# Patient Record
Sex: FEMALE | Race: BLACK OR AFRICAN AMERICAN | HISPANIC OR LATINO | Employment: FULL TIME | URBAN - METROPOLITAN AREA
[De-identification: names, ages, dates, MRNs, and addresses within clinical notes are randomized per-mention and may not be internally consistent; named-entity substitution may affect disease eponyms.]

---

## 2018-10-07 ENCOUNTER — HOSPITAL ENCOUNTER (EMERGENCY)
Facility: HOSPITAL | Age: 38
Discharge: HOME | End: 2018-10-07
Attending: EMERGENCY MEDICINE | Admitting: EMERGENCY MEDICINE

## 2018-10-07 VITALS
TEMPERATURE: 97.7 F | HEART RATE: 73 BPM | SYSTOLIC BLOOD PRESSURE: 124 MMHG | DIASTOLIC BLOOD PRESSURE: 85 MMHG | OXYGEN SATURATION: 100 % | RESPIRATION RATE: 17 BRPM | WEIGHT: 191 LBS

## 2018-10-07 DIAGNOSIS — N39.0 UTI (URINARY TRACT INFECTION): Primary | ICD-10-CM

## 2018-10-07 LAB
BACTERIA UR QL AUTO: ABNORMAL /HPF
BILIRUB UR QL STRIP: NEGATIVE
CLARITY UR: ABNORMAL
COLOR UR: YELLOW
EXT PREG TEST URINE: NEGATIVE
GLUCOSE UR STRIP-MCNC: ABNORMAL MG/DL
HGB UR QL STRIP.AUTO: 150
KETONES UR STRIP-MCNC: NEGATIVE MG/DL
LEUKOCYTE ESTERASE UR QL STRIP: 100
NITRITE UR QL STRIP: NEGATIVE
NON-SQ EPI CELLS URNS QL MICRO: ABNORMAL /HPF
PH UR STRIP.AUTO: 6 [PH] (ref 4.5–8)
PROT UR STRIP-MCNC: ABNORMAL MG/DL
RBC #/AREA URNS AUTO: ABNORMAL /HPF
SP GR UR STRIP.AUTO: 1.01 (ref 1–1.04)
UROBILINOGEN UA: NEGATIVE MG/DL
WBC #/AREA URNS AUTO: ABNORMAL /HPF

## 2018-10-07 PROCEDURE — 99283 EMERGENCY DEPT VISIT LOW MDM: CPT

## 2018-10-07 PROCEDURE — 81001 URINALYSIS AUTO W/SCOPE: CPT | Performed by: PHYSICIAN ASSISTANT

## 2018-10-07 PROCEDURE — 81003 URINALYSIS AUTO W/O SCOPE: CPT | Performed by: PHYSICIAN ASSISTANT

## 2018-10-07 PROCEDURE — 81025 URINE PREGNANCY TEST: CPT | Performed by: PHYSICIAN ASSISTANT

## 2018-10-07 PROCEDURE — 87086 URINE CULTURE/COLONY COUNT: CPT | Performed by: PHYSICIAN ASSISTANT

## 2018-10-07 RX ORDER — SULFAMETHOXAZOLE AND TRIMETHOPRIM 800; 160 MG/1; MG/1
TABLET ORAL
Status: COMPLETED
Start: 2018-10-07 | End: 2018-10-07

## 2018-10-07 RX ORDER — SULFAMETHOXAZOLE AND TRIMETHOPRIM 800; 160 MG/1; MG/1
1 TABLET ORAL ONCE
Status: COMPLETED | OUTPATIENT
Start: 2018-10-07 | End: 2018-10-07

## 2018-10-07 RX ORDER — SULFAMETHOXAZOLE AND TRIMETHOPRIM 800; 160 MG/1; MG/1
1 TABLET ORAL 2 TIMES DAILY
Qty: 14 TABLET | Refills: 0 | Status: SHIPPED | OUTPATIENT
Start: 2018-10-07 | End: 2018-10-14

## 2018-10-07 RX ADMIN — SULFAMETHOXAZOLE AND TRIMETHOPRIM 1 TABLET: 800; 160 TABLET ORAL at 15:46

## 2018-10-07 NOTE — ED PROVIDER NOTES
History  Chief Complaint   Patient presents with    Possible UTI     "UTI bad for three days now"   frequency and pressure   denies vaginal discharge/bleeding       History provided by:  Patient   used: No    Medical Problem   Location:  Pt with burning and pressure with  urine  Severity:  Moderate  Onset quality:  Gradual  Duration:  3 days  Timing:  Constant  Chronicity:  Recurrent  Associated symptoms: no abdominal pain, no chest pain, no congestion, no cough, no diarrhea, no ear pain, no fatigue, no fever, no headaches, no loss of consciousness, no myalgias, no nausea, no rash, no rhinorrhea, no shortness of breath, no sore throat, no vomiting and no wheezing        Prior to Admission Medications   Prescriptions Last Dose Informant Patient Reported? Taking? Prenatal MV-Min-Fe Fum-FA-DHA (PRENATAL 1 PO)   Yes No   Sig: Take 1 tablet by mouth  insulin glargine (LANTUS) 100 units/mL subcutaneous injection   Yes No   Sig: Inject 30 Units under the skin 2 (two) times a day  30 units in morning; 45 HS      Facility-Administered Medications: None       Past Medical History:   Diagnosis Date    Diabetes mellitus ([facility] )        Past Surgical History:   Procedure Laterality Date    HERNIA REPAIR         History reviewed  No pertinent family history  I have reviewed and agree with the history as documented  Social History   Substance Use Topics    Smoking status: Never Smoker    Smokeless tobacco: Never Used    Alcohol use No        Review of Systems   Constitutional: Negative  Negative for fatigue and fever  HENT: Negative  Negative for congestion, ear pain, rhinorrhea and sore throat  Eyes: Negative  Respiratory: Negative  Negative for cough, shortness of breath and wheezing  Cardiovascular: Negative  Negative for chest pain  Gastrointestinal: Negative  Negative for abdominal pain, diarrhea, nausea and vomiting  Endocrine: Negative      Genitourinary: Positive for dysuria  Musculoskeletal: Negative  Negative for myalgias  Skin: Negative  Negative for rash  Allergic/Immunologic: Negative  Neurological: Negative  Negative for loss of consciousness and headaches  Hematological: Negative  Psychiatric/Behavioral: Negative  All other systems reviewed and are negative  Physical Exam  Physical Exam   Constitutional: She is oriented to person, place, and time  She appears well-developed and well-nourished  HENT:   Head: Normocephalic  Right Ear: External ear normal    Left Ear: External ear normal    Nose: Nose normal    Mouth/Throat: Oropharynx is clear and moist    Eyes: Pupils are equal, round, and reactive to light  Conjunctivae and EOM are normal    Neck: Normal range of motion  Neck supple  Cardiovascular: Normal rate, regular rhythm and normal heart sounds  Pulmonary/Chest: Effort normal    Abdominal: Soft  Bowel sounds are normal    Musculoskeletal: Normal range of motion  Neurological: She is alert and oriented to person, place, and time  Skin: Skin is warm  Psychiatric: She has a normal mood and affect  Her behavior is normal  Judgment and thought content normal    Nursing note and vitals reviewed        Vital Signs  ED Triage Vitals [10/07/18 1507]   Temperature Pulse Respirations Blood Pressure SpO2   97 7 °F (36 5 °C) 73 17 124/85 100 %      Temp Source Heart Rate Source Patient Position - Orthostatic VS BP Location FiO2 (%)   Tympanic Monitor Sitting Left arm --      Pain Score       --           Vitals:    10/07/18 1507   BP: 124/85   Pulse: 73   Patient Position - Orthostatic VS: Sitting       Visual Acuity      ED Medications  Medications   sulfamethoxazole-trimethoprim (BACTRIM DS) 800-160 mg per tablet 1 tablet (1 tablet Oral Given 10/7/18 1546)       Diagnostic Studies  Results Reviewed     Procedure Component Value Units Date/Time    Urine Microscopic [[id number]]  (Abnormal) Collected:  10/07/18 1531    Lab Status:  Final result Specimen:  Urine from Urine, Clean Catch Updated:  10/07/18 1601     RBC, UA 4-10 (A) /hpf      WBC, UA Innumerable (A) /hpf      Epithelial Cells Occasional /hpf      Bacteria, UA Occasional /hpf     Urine culture [[id number]] Collected:  10/07/18 1531    Lab Status: In process Specimen:  Urine from Urine, Clean Catch Updated:  10/07/18 1601    UA w Reflex to Microscopic w Reflex to Culture [[id number]]  (Abnormal) Collected:  10/07/18 1531    Lab Status:  Final result Specimen:  Urine from Urine, Clean Catch Updated:  10/07/18 1545     Color, UA Yellow     Clarity, UA Slightly Cloudy (A)     Specific Gravity, UA 1 015     pH, UA 6 0     Leukocytes,  0 (A)     Nitrite, UA Negative     Protein, UA 30 (1+) (A) mg/dl      Glucose, UA >=1000 (1%) (A) mg/dl      Ketones, UA Negative mg/dl      Bilirubin, UA Negative     Blood,  0 (A)     UROBILINOGEN UA Negative mg/dL     POCT pregnancy, urine [[id number]]  (Normal) Resulted:  10/07/18 1535    Lab Status:  Final result Updated:  10/07/18 1535     EXT PREG TEST UR (Ref: Negative) Negative                 No orders to display              Procedures  Procedures       Phone Contacts  ED Phone Contact    ED Course                               MDM  CritCare Time    Disposition  Final diagnoses:   UTI (urinary tract infection)     Time reflects when diagnosis was documented in both MDM as applicable and the Disposition within this note     Time User Action Codes Description Comment    10/7/2018  3:35 PM Christie Vences Add [N39 0] UTI (urinary tract infection)       ED Disposition     ED Disposition Condition Comment    Discharge  Isa Kennedy discharge to home/self care      Condition at discharge: Good        Follow-up Information     Follow up With Specialties Details Why [address], [address] Medicine Schedule an appointment as soon as possible for a visit  [address], [address] [facility]  [phone]          Discharge Medication List as of 10/7/2018  3:43 PM      START taking these medications    Details   sulfamethoxazole-trimethoprim (BACTRIM DS) 800-160 mg per tablet Take 1 tablet by mouth 2 (two) times a day for 7 days smx-tmp DS (BACTRIM) 800-160 mg tabs (1tab q12 D10), Starting Sun 10/7/2018, Until Sun 10/14/2018, Print         CONTINUE these medications which have NOT CHANGED    Details   insulin glargine (LANTUS) 100 units/mL subcutaneous injection Inject 30 Units under the skin 2 (two) times a day  30 units in morning; 45 HS, Until Discontinued, Historical Med      Prenatal MV-Min-Fe Fum-FA-DHA (PRENATAL 1 PO) Take 1 tablet by mouth , Until Discontinued, Historical Med           No discharge procedures on file      ED Provider  Electronically Signed by           Anne Ramsay PA-C  10/07/18 7483

## 2018-10-07 NOTE — DISCHARGE INSTRUCTIONS
Urinary Tract Infection in Women, Ambulatory Care   GENERAL INFORMATION:   A urinary tract infection (UTI)  is caused by bacteria that get inside your urinary tract  Most bacteria that enter your urinary tract are expelled when you urinate  If the bacteria stay in your urinary tract, you may get an infection  Your urinary tract includes your kidneys, ureters, bladder, and urethra  Urine is made in your kidneys, and it flows from the ureters to the bladder  Urine leaves the bladder through the urethra  A UTI is more common in your lower urinary tract, which includes your bladder and urethra  Common symptoms include the following:   · Urinating more often or waking from sleep to urinate    · Pain or burning when you urinate    · Pain or pressure in your lower abdomen     · Urine that smells bad    · Blood in your urine    · Leaking urine  Seek immediate care for the following symptoms:   · Urinating very little or not at all    · Vomiting    · Shaking chills with a fever    · Side or back pain that gets worse  Treatment for a UTI  may include medicines to treat a bacterial infection  You may also need medicines to decrease pain and burning, or decrease the urge to urinate often  Prevent a UTI:   · Urinate when you feel the urge  Do not hold your urine  Urinate as soon as you feel you have to  · Drink liquids as directed  Ask how much liquid to drink each day and which liquids are best for you  You may need to drink more fluids than usual to help flush out the bacteria  Do not drink alcohol, caffeine, and citrus juices  These can irritate your bladder and increase your symptoms  · Apply heat  on your abdomen for 20 to 30 minutes every 2 hours for as many days as directed  Heat helps decrease discomfort and pressure in your bladder  Follow up with your healthcare provider as directed:  Write down your questions so you remember to ask them during your visits     CARE AGREEMENT:   You have the right to help plan your care  Learn about your health condition and how it may be treated  Discuss treatment options with your caregivers to decide what care you want to receive  You always have the right to refuse treatment  The above information is an  only  It is not intended as medical advice for individual conditions or treatments  Talk to your doctor, nurse or pharmacist before following any medical regimen to see if it is safe and effective for you  © 2014 [address] is for End User's use only and may not be sold, redistributed or otherwise used for commercial purposes  All illustrations and images included in CareNotes® are the copyrighted property of [employer]  or Bryon Wylie

## 2018-10-08 LAB — BACTERIA UR CULT: NORMAL

## 2019-05-02 ENCOUNTER — OFFICE VISIT (OUTPATIENT)
Dept: OBGYN CLINIC | Facility: CLINIC | Age: 39
End: 2019-05-02

## 2019-05-02 VITALS
BODY MASS INDEX: 31.69 KG/M2 | HEIGHT: 65 IN | SYSTOLIC BLOOD PRESSURE: 115 MMHG | WEIGHT: 190.2 LBS | HEART RATE: 85 BPM | DIASTOLIC BLOOD PRESSURE: 77 MMHG

## 2019-05-02 DIAGNOSIS — Z01.419 ENCOUNTER FOR ANNUAL ROUTINE GYNECOLOGICAL EXAMINATION: Primary | ICD-10-CM

## 2019-05-02 PROCEDURE — 99395 PREV VISIT EST AGE 18-39: CPT | Performed by: NURSE PRACTITIONER

## 2019-09-17 ENCOUNTER — OFFICE VISIT (OUTPATIENT)
Dept: FAMILY MEDICINE CLINIC | Facility: CLINIC | Age: 39
End: 2019-09-17
Payer: COMMERCIAL

## 2019-09-17 VITALS
BODY MASS INDEX: 31.16 KG/M2 | HEART RATE: 83 BPM | DIASTOLIC BLOOD PRESSURE: 72 MMHG | HEIGHT: 65 IN | TEMPERATURE: 98.2 F | WEIGHT: 187 LBS | OXYGEN SATURATION: 98 % | SYSTOLIC BLOOD PRESSURE: 124 MMHG

## 2019-09-17 DIAGNOSIS — E11.69 TYPE 2 DIABETES MELLITUS WITH OTHER SPECIFIED COMPLICATION, WITH LONG-TERM CURRENT USE OF INSULIN (HCC): Primary | ICD-10-CM

## 2019-09-17 DIAGNOSIS — Z01.00 DIABETIC EYE EXAM (HCC): ICD-10-CM

## 2019-09-17 DIAGNOSIS — Z79.4 TYPE 2 DIABETES MELLITUS WITH OTHER SPECIFIED COMPLICATION, WITH LONG-TERM CURRENT USE OF INSULIN (HCC): Primary | ICD-10-CM

## 2019-09-17 DIAGNOSIS — E66.9 OBESITY (BMI 30-39.9): ICD-10-CM

## 2019-09-17 DIAGNOSIS — E11.65 UNCONTROLLED TYPE 2 DIABETES MELLITUS WITH HYPERGLYCEMIA, WITHOUT LONG-TERM CURRENT USE OF INSULIN (HCC): ICD-10-CM

## 2019-09-17 DIAGNOSIS — R79.89 ABNORMAL THYROID BLOOD TEST: ICD-10-CM

## 2019-09-17 DIAGNOSIS — E11.9 DIABETIC EYE EXAM (HCC): ICD-10-CM

## 2019-09-17 PROBLEM — F43.9 STRESS AT HOME: Status: ACTIVE | Noted: 2017-09-13

## 2019-09-17 LAB — SL AMB POCT HEMOGLOBIN AIC: 13 (ref ?–6.5)

## 2019-09-17 PROCEDURE — 99204 OFFICE O/P NEW MOD 45 MIN: CPT | Performed by: FAMILY MEDICINE

## 2019-09-17 PROCEDURE — 3008F BODY MASS INDEX DOCD: CPT | Performed by: FAMILY MEDICINE

## 2019-09-17 PROCEDURE — 83036 HEMOGLOBIN GLYCOSYLATED A1C: CPT | Performed by: FAMILY MEDICINE

## 2019-09-17 PROCEDURE — 3046F HEMOGLOBIN A1C LEVEL >9.0%: CPT | Performed by: FAMILY MEDICINE

## 2019-09-18 PROBLEM — E66.9 OBESITY (BMI 30-39.9): Status: ACTIVE | Noted: 2019-09-18

## 2019-09-18 PROBLEM — E11.9 DIABETES MELLITUS (HCC): Status: ACTIVE | Noted: 2019-09-18

## 2019-09-18 NOTE — PROGRESS NOTES
Assessment/Plan:    28-year-old woman with:  Type 2 diabetes, obesity and history of abnormal thyroid disease  Will restart her insulin and check fasting blood work and have patient return in 1 week to discuss test results  Encouraged her to check with her insurance to see which brand flu, they will cover  Discussed supportive care return parameters otherwise  No problem-specific Assessment & Plan notes found for this encounter  Diagnoses and all orders for this visit:    Type 2 diabetes mellitus with other specified complication, with long-term current use of insulin ([facility] )  -     POCT hemoglobin A1c  -     Ambulatory referral to Ophthalmology; Future  -     insulin glargine (LANTUS SOLOSTAR) 100 units/mL injection pen; Inject 30 Units under the skin daily at bedtime  -     insulin lispro (HUMALOG KWIKPEN) 100 units/mL injection pen; Inject 5 Units under the skin 3 (three) times a day with meals  -     CBC and differential; Future  -     Comprehensive metabolic panel; Future  -     TSH, 3rd generation with Free T4 reflex; Future  -     Lipid Panel with Direct LDL reflex; Future  -     Urinalysis with reflex to microscopic  -     Microalbumin / creatinine urine ratio    Diabetic eye exam [facility])  -     Ambulatory referral to Ophthalmology; Future  -     insulin glargine (LANTUS SOLOSTAR) 100 units/mL injection pen; Inject 30 Units under the skin daily at bedtime  -     insulin lispro (HUMALOG KWIKPEN) 100 units/mL injection pen; Inject 5 Units under the skin 3 (three) times a day with meals  -     CBC and differential; Future  -     Comprehensive metabolic panel; Future  -     TSH, 3rd generation with Free T4 reflex; Future  -     Lipid Panel with Direct LDL reflex;  Future  -     Urinalysis with reflex to microscopic  -     Microalbumin / creatinine urine ratio    Uncontrolled type 2 diabetes mellitus with hyperglycemia, without long-term current use of insulin (HCC)  -     insulin glargine (LANTUS SOLOSTAR) 100 units/mL injection pen; Inject 30 Units under the skin daily at bedtime  -     insulin lispro (HUMALOG KWIKPEN) 100 units/mL injection pen; Inject 5 Units under the skin 3 (three) times a day with meals  -     CBC and differential; Future  -     Comprehensive metabolic panel; Future  -     TSH, 3rd generation with Free T4 reflex; Future  -     Lipid Panel with Direct LDL reflex; Future  -     Urinalysis with reflex to microscopic  -     Microalbumin / creatinine urine ratio    Abnormal thyroid blood test    Obesity (BMI 30-39  9)          Subjective:      Patient ID: Robyn Martinez is a 46 y o  female  Patient is a 78-year-old woman who presents to establish care in this practice  She has type 2 diabetes that has been uncontrolled and she has been off insulin over the past year since she has not had insurance  She has history of abnormal thyroid tests and also obesity  She admits otherwise being generally stable on her medications  No dizziness lightheadedness or near-syncope  No fevers chills nausea vomiting  No other complaints at this time      The following portions of the patient's history were reviewed and updated as appropriate: allergies, current medications, past family history, past medical history, past social history, past surgical history and problem list     Review of Systems   Constitutional: Negative  HENT: Negative  Eyes: Negative  Respiratory: Negative  Cardiovascular: Negative  Gastrointestinal: Negative  Endocrine: Negative  Genitourinary: Negative  Musculoskeletal: Negative  Allergic/Immunologic: Negative  Neurological: Negative  Hematological: Negative  Psychiatric/Behavioral: Negative  All other systems reviewed and are negative          Objective:      /72   Pulse 83   Temp 98 2 °F (36 8 °C)   Ht 5' 5" (1 651 m)   Wt 84 8 kg (187 lb)   SpO2 98%   BMI 31 12 kg/m²          Physical Exam   Constitutional: She is oriented to person, place, and time  She appears well-developed and well-nourished  HENT:   Head: Atraumatic  Right Ear: External ear normal    Left Ear: External ear normal    Eyes: Pupils are equal, round, and reactive to light  Conjunctivae and EOM are normal    Neck: Normal range of motion  Cardiovascular: Normal rate, regular rhythm and normal heart sounds  Pulses are no weak pulses  Pulses:       Dorsalis pedis pulses are 2+ on the right side, and 2+ on the left side  Posterior tibial pulses are 2+ on the right side, and 2+ on the left side  Pulmonary/Chest: Effort normal and breath sounds normal  No respiratory distress  Abdominal: Soft  She exhibits no distension  There is no tenderness  There is no rebound and no guarding  Musculoskeletal: Normal range of motion  Feet:   Right Foot:   Skin Integrity: Negative for ulcer, skin breakdown, erythema, warmth, callus or dry skin  Left Foot:   Skin Integrity: Negative for ulcer, skin breakdown, erythema, warmth, callus or dry skin  Neurological: She is alert and oriented to person, place, and time  No cranial nerve deficit  Skin: Skin is warm and dry  Psychiatric: She has a normal mood and affect  Her behavior is normal  Judgment and thought content normal          BMI Counseling: Body mass index is 31 12 kg/m²  Discussed the patient's BMI with her  The BMI is above normal  Nutrition recommendations include 3-5 servings of fruits/vegetables daily  Exercise recommendations include moderate aerobic physical activity for 150 minutes/week  Patient's shoes and socks removed  Right Foot/Ankle   Right Foot Inspection  Skin Exam: skin normal and skin intact no dry skin, no warmth, no callus, no erythema, no maceration, no abnormal color, no pre-ulcer, no ulcer and no callus                          Toe Exam: ROM and strength within normal limits  Sensory       Monofilament testing: intact  Vascular    The right DP pulse is 2+   The right PT pulse is 2+      Left Foot/Ankle  Left Foot Inspection  Skin Exam: skin normal and skin intactno dry skin, no warmth, no erythema, no maceration, normal color, no pre-ulcer, no ulcer and no callus                         Toe Exam: ROM and strength within normal limits                   Sensory       Monofilament: intact  Vascular    The left DP pulse is 2+  The left PT pulse is 2+  Assign Risk Category:  No deformity present; No loss of protective sensation;  No weak pulses       Risk: 0

## 2019-09-23 ENCOUNTER — APPOINTMENT (OUTPATIENT)
Dept: LAB | Facility: HOSPITAL | Age: 39
End: 2019-09-23
Payer: COMMERCIAL

## 2019-09-23 DIAGNOSIS — Z01.00 DIABETIC EYE EXAM (HCC): ICD-10-CM

## 2019-09-23 DIAGNOSIS — E11.65 UNCONTROLLED TYPE 2 DIABETES MELLITUS WITH HYPERGLYCEMIA, WITHOUT LONG-TERM CURRENT USE OF INSULIN (HCC): ICD-10-CM

## 2019-09-23 DIAGNOSIS — E11.69 TYPE 2 DIABETES MELLITUS WITH OTHER SPECIFIED COMPLICATION, WITH LONG-TERM CURRENT USE OF INSULIN (HCC): ICD-10-CM

## 2019-09-23 DIAGNOSIS — E11.9 DIABETIC EYE EXAM (HCC): ICD-10-CM

## 2019-09-23 DIAGNOSIS — Z79.4 TYPE 2 DIABETES MELLITUS WITH OTHER SPECIFIED COMPLICATION, WITH LONG-TERM CURRENT USE OF INSULIN (HCC): ICD-10-CM

## 2019-09-23 LAB
ALBUMIN SERPL BCP-MCNC: 3.8 G/DL (ref 3–5.2)
ALP SERPL-CCNC: 72 U/L (ref 43–122)
ALT SERPL W P-5'-P-CCNC: 17 U/L (ref 9–52)
ANION GAP SERPL CALCULATED.3IONS-SCNC: 7 MMOL/L (ref 5–14)
AST SERPL W P-5'-P-CCNC: 17 U/L (ref 14–36)
BACTERIA UR QL AUTO: ABNORMAL /HPF
BASOPHILS # BLD AUTO: 0 THOUSANDS/ΜL (ref 0–0.1)
BASOPHILS NFR BLD AUTO: 1 % (ref 0–1)
BILIRUB SERPL-MCNC: 0.8 MG/DL
BILIRUB UR QL STRIP: NEGATIVE
BUN SERPL-MCNC: 10 MG/DL (ref 5–25)
CALCIUM SERPL-MCNC: 9.2 MG/DL (ref 8.4–10.2)
CHLORIDE SERPL-SCNC: 101 MMOL/L (ref 97–108)
CHOLEST SERPL-MCNC: 142 MG/DL
CLARITY UR: CLEAR
CO2 SERPL-SCNC: 28 MMOL/L (ref 22–30)
COLOR UR: YELLOW
CREAT SERPL-MCNC: 0.62 MG/DL (ref 0.6–1.2)
CREAT UR-MCNC: 127 MG/DL
EOSINOPHIL # BLD AUTO: 0.1 THOUSAND/ΜL (ref 0–0.4)
EOSINOPHIL NFR BLD AUTO: 2 % (ref 0–6)
ERYTHROCYTE [DISTWIDTH] IN BLOOD BY AUTOMATED COUNT: 13.2 %
GFR SERPL CREATININE-BSD FRML MDRD: 115 ML/MIN/1.73SQ M
GLUCOSE P FAST SERPL-MCNC: 273 MG/DL (ref 70–99)
GLUCOSE UR STRIP-MCNC: ABNORMAL MG/DL
HCT VFR BLD AUTO: 40.2 % (ref 36–46)
HDLC SERPL-MCNC: 55 MG/DL (ref 40–59)
HGB BLD-MCNC: 13.4 G/DL (ref 12–16)
HGB UR QL STRIP.AUTO: NEGATIVE
KETONES UR STRIP-MCNC: ABNORMAL MG/DL
LDLC SERPL CALC-MCNC: 69 MG/DL
LEUKOCYTE ESTERASE UR QL STRIP: NEGATIVE
LYMPHOCYTES # BLD AUTO: 3.8 THOUSANDS/ΜL (ref 0.5–4)
LYMPHOCYTES NFR BLD AUTO: 47 % (ref 25–45)
MCH RBC QN AUTO: 29.4 PG (ref 26–34)
MCHC RBC AUTO-ENTMCNC: 33.3 G/DL (ref 31–36)
MCV RBC AUTO: 89 FL (ref 80–100)
MICROALBUMIN UR-MCNC: 12.3 MG/L (ref 0–20)
MICROALBUMIN/CREAT 24H UR: 10 MG/G CREATININE (ref 0–30)
MONOCYTES # BLD AUTO: 0.7 THOUSAND/ΜL (ref 0.2–0.9)
MONOCYTES NFR BLD AUTO: 9 % (ref 1–10)
MUCOUS THREADS UR QL AUTO: ABNORMAL
NEUTROPHILS # BLD AUTO: 3.4 THOUSANDS/ΜL (ref 1.8–7.8)
NEUTS SEG NFR BLD AUTO: 42 % (ref 45–65)
NITRITE UR QL STRIP: NEGATIVE
NON-SQ EPI CELLS URNS QL MICRO: ABNORMAL /HPF
PH UR STRIP.AUTO: 5 [PH]
PLATELET # BLD AUTO: 354 THOUSANDS/UL (ref 150–450)
PMV BLD AUTO: 8 FL (ref 8.9–12.7)
POTASSIUM SERPL-SCNC: 3.8 MMOL/L (ref 3.6–5)
PROT SERPL-MCNC: 7 G/DL (ref 5.9–8.4)
PROT UR STRIP-MCNC: NEGATIVE MG/DL
RBC # BLD AUTO: 4.55 MILLION/UL (ref 4–5.2)
RBC #/AREA URNS AUTO: ABNORMAL /HPF
SODIUM SERPL-SCNC: 136 MMOL/L (ref 137–147)
SP GR UR STRIP.AUTO: 1.02 (ref 1–1.04)
TRIGL SERPL-MCNC: 91 MG/DL
TSH SERPL DL<=0.05 MIU/L-ACNC: 2.9 UIU/ML (ref 0.47–4.68)
UROBILINOGEN UA: NEGATIVE MG/DL
WBC # BLD AUTO: 8.1 THOUSAND/UL (ref 4.5–11)
WBC #/AREA URNS AUTO: ABNORMAL /HPF

## 2019-09-23 PROCEDURE — 85025 COMPLETE CBC W/AUTO DIFF WBC: CPT

## 2019-09-23 PROCEDURE — 82043 UR ALBUMIN QUANTITATIVE: CPT

## 2019-09-23 PROCEDURE — 80053 COMPREHEN METABOLIC PANEL: CPT

## 2019-09-23 PROCEDURE — 80061 LIPID PANEL: CPT

## 2019-09-23 PROCEDURE — 36415 COLL VENOUS BLD VENIPUNCTURE: CPT

## 2019-09-23 PROCEDURE — 82570 ASSAY OF URINE CREATININE: CPT

## 2019-09-23 PROCEDURE — 84443 ASSAY THYROID STIM HORMONE: CPT

## 2019-09-23 PROCEDURE — 81001 URINALYSIS AUTO W/SCOPE: CPT

## 2019-09-26 DIAGNOSIS — E11.69 TYPE 2 DIABETES MELLITUS WITH OTHER SPECIFIED COMPLICATION, WITH LONG-TERM CURRENT USE OF INSULIN (HCC): ICD-10-CM

## 2019-09-26 DIAGNOSIS — Z79.4 TYPE 2 DIABETES MELLITUS WITH OTHER SPECIFIED COMPLICATION, WITH LONG-TERM CURRENT USE OF INSULIN (HCC): Primary | ICD-10-CM

## 2019-09-26 DIAGNOSIS — E11.69 TYPE 2 DIABETES MELLITUS WITH OTHER SPECIFIED COMPLICATION, WITH LONG-TERM CURRENT USE OF INSULIN (HCC): Primary | ICD-10-CM

## 2019-09-26 DIAGNOSIS — Z79.4 TYPE 2 DIABETES MELLITUS WITH OTHER SPECIFIED COMPLICATION, WITH LONG-TERM CURRENT USE OF INSULIN (HCC): ICD-10-CM

## 2019-09-26 NOTE — TELEPHONE ENCOUNTER
humalog is not covered please send in alternative  Patient also needs pen needles, strips and lancets  Lancets are in but pharmacy says did not receive please sign for them

## 2019-09-27 ENCOUNTER — TELEPHONE (OUTPATIENT)
Dept: FAMILY MEDICINE CLINIC | Facility: CLINIC | Age: 39
End: 2019-09-27

## 2019-09-27 NOTE — TELEPHONE ENCOUNTER
Pharmacy called yesterday asking to clarify the testing frequency for the test strips/lancets  Their system indicated previously she was testing QID       ([facility])

## 2019-10-18 DIAGNOSIS — E11.65 UNCONTROLLED TYPE 2 DIABETES MELLITUS WITH HYPERGLYCEMIA, WITHOUT LONG-TERM CURRENT USE OF INSULIN (HCC): ICD-10-CM

## 2019-10-18 DIAGNOSIS — E11.9 DIABETIC EYE EXAM (HCC): ICD-10-CM

## 2019-10-18 DIAGNOSIS — Z79.4 TYPE 2 DIABETES MELLITUS WITH OTHER SPECIFIED COMPLICATION, WITH LONG-TERM CURRENT USE OF INSULIN (HCC): ICD-10-CM

## 2019-10-18 DIAGNOSIS — Z01.00 DIABETIC EYE EXAM (HCC): ICD-10-CM

## 2019-10-18 DIAGNOSIS — E11.69 TYPE 2 DIABETES MELLITUS WITH OTHER SPECIFIED COMPLICATION, WITH LONG-TERM CURRENT USE OF INSULIN (HCC): ICD-10-CM

## 2019-10-18 RX ORDER — INSULIN GLARGINE 100 [IU]/ML
INJECTION, SOLUTION SUBCUTANEOUS
Qty: 15 PEN | Refills: 2 | Status: SHIPPED | OUTPATIENT
Start: 2019-10-18 | End: 2020-04-16 | Stop reason: SDUPTHER

## 2020-04-16 ENCOUNTER — TELEMEDICINE (OUTPATIENT)
Dept: FAMILY MEDICINE CLINIC | Facility: CLINIC | Age: 40
End: 2020-04-16
Payer: COMMERCIAL

## 2020-04-16 DIAGNOSIS — E11.69 TYPE 2 DIABETES MELLITUS WITH OTHER SPECIFIED COMPLICATION, WITH LONG-TERM CURRENT USE OF INSULIN (HCC): ICD-10-CM

## 2020-04-16 DIAGNOSIS — R20.2 NUMBNESS AND TINGLING OF BOTH FEET: Primary | ICD-10-CM

## 2020-04-16 DIAGNOSIS — E11.65 UNCONTROLLED TYPE 2 DIABETES MELLITUS WITH HYPERGLYCEMIA, WITHOUT LONG-TERM CURRENT USE OF INSULIN (HCC): ICD-10-CM

## 2020-04-16 DIAGNOSIS — Z01.00 DIABETIC EYE EXAM (HCC): ICD-10-CM

## 2020-04-16 DIAGNOSIS — Z79.4 TYPE 2 DIABETES MELLITUS WITH OTHER SPECIFIED COMPLICATION, WITH LONG-TERM CURRENT USE OF INSULIN (HCC): ICD-10-CM

## 2020-04-16 DIAGNOSIS — E11.9 DIABETIC EYE EXAM (HCC): ICD-10-CM

## 2020-04-16 DIAGNOSIS — R20.0 NUMBNESS AND TINGLING OF BOTH FEET: Primary | ICD-10-CM

## 2020-04-16 PROCEDURE — 99213 OFFICE O/P EST LOW 20 MIN: CPT | Performed by: FAMILY MEDICINE

## 2020-04-23 ENCOUNTER — OFFICE VISIT (OUTPATIENT)
Dept: FAMILY MEDICINE CLINIC | Facility: CLINIC | Age: 40
End: 2020-04-23
Payer: COMMERCIAL

## 2020-04-23 VITALS
OXYGEN SATURATION: 98 % | SYSTOLIC BLOOD PRESSURE: 118 MMHG | DIASTOLIC BLOOD PRESSURE: 72 MMHG | BODY MASS INDEX: 31.65 KG/M2 | HEART RATE: 70 BPM | HEIGHT: 65 IN | WEIGHT: 190 LBS

## 2020-04-23 DIAGNOSIS — Z01.00 DIABETIC EYE EXAM (HCC): ICD-10-CM

## 2020-04-23 DIAGNOSIS — R20.2 NUMBNESS AND TINGLING OF BOTH FEET: ICD-10-CM

## 2020-04-23 DIAGNOSIS — E11.65 UNCONTROLLED TYPE 2 DIABETES MELLITUS WITH HYPERGLYCEMIA, WITHOUT LONG-TERM CURRENT USE OF INSULIN (HCC): ICD-10-CM

## 2020-04-23 DIAGNOSIS — E11.9 DIABETIC EYE EXAM (HCC): ICD-10-CM

## 2020-04-23 DIAGNOSIS — R20.0 NUMBNESS AND TINGLING OF BOTH FEET: ICD-10-CM

## 2020-04-23 DIAGNOSIS — E11.69 TYPE 2 DIABETES MELLITUS WITH OTHER SPECIFIED COMPLICATION, WITH LONG-TERM CURRENT USE OF INSULIN (HCC): Primary | ICD-10-CM

## 2020-04-23 DIAGNOSIS — E66.9 OBESITY (BMI 30-39.9): ICD-10-CM

## 2020-04-23 DIAGNOSIS — Z79.4 TYPE 2 DIABETES MELLITUS WITH OTHER SPECIFIED COMPLICATION, WITH LONG-TERM CURRENT USE OF INSULIN (HCC): Primary | ICD-10-CM

## 2020-04-23 LAB — SL AMB POCT HEMOGLOBIN AIC: 13 (ref ?–6.5)

## 2020-04-23 PROCEDURE — 99214 OFFICE O/P EST MOD 30 MIN: CPT | Performed by: FAMILY MEDICINE

## 2020-04-23 PROCEDURE — 83036 HEMOGLOBIN GLYCOSYLATED A1C: CPT | Performed by: FAMILY MEDICINE

## 2020-04-23 PROCEDURE — 3046F HEMOGLOBIN A1C LEVEL >9.0%: CPT | Performed by: FAMILY MEDICINE

## 2020-04-23 PROCEDURE — 3008F BODY MASS INDEX DOCD: CPT | Performed by: FAMILY MEDICINE

## 2020-04-23 PROCEDURE — 1036F TOBACCO NON-USER: CPT | Performed by: FAMILY MEDICINE

## 2020-07-21 ENCOUNTER — APPOINTMENT (EMERGENCY)
Dept: CT IMAGING | Facility: HOSPITAL | Age: 40
End: 2020-07-21
Payer: COMMERCIAL

## 2020-07-21 ENCOUNTER — HOSPITAL ENCOUNTER (EMERGENCY)
Facility: HOSPITAL | Age: 40
Discharge: HOME | End: 2020-07-21
Attending: EMERGENCY MEDICINE | Admitting: EMERGENCY MEDICINE
Payer: COMMERCIAL

## 2020-07-21 ENCOUNTER — APPOINTMENT (EMERGENCY)
Dept: RADIOLOGY | Facility: HOSPITAL | Age: 40
End: 2020-07-21
Payer: COMMERCIAL

## 2020-07-21 ENCOUNTER — OFFICE VISIT (OUTPATIENT)
Dept: FAMILY MEDICINE CLINIC | Facility: CLINIC | Age: 40
End: 2020-07-21
Payer: COMMERCIAL

## 2020-07-21 VITALS
DIASTOLIC BLOOD PRESSURE: 71 MMHG | OXYGEN SATURATION: 100 % | WEIGHT: 190.7 LBS | BODY MASS INDEX: 31.73 KG/M2 | TEMPERATURE: 98 F | SYSTOLIC BLOOD PRESSURE: 117 MMHG | HEART RATE: 66 BPM | RESPIRATION RATE: 14 BRPM

## 2020-07-21 VITALS
DIASTOLIC BLOOD PRESSURE: 70 MMHG | TEMPERATURE: 99.3 F | HEART RATE: 87 BPM | BODY MASS INDEX: 31.78 KG/M2 | WEIGHT: 191 LBS | SYSTOLIC BLOOD PRESSURE: 110 MMHG | OXYGEN SATURATION: 99 %

## 2020-07-21 DIAGNOSIS — S46.912A STRAIN OF LEFT SHOULDER, INITIAL ENCOUNTER: ICD-10-CM

## 2020-07-21 DIAGNOSIS — E11.65 UNCONTROLLED TYPE 2 DIABETES MELLITUS WITH HYPERGLYCEMIA, WITHOUT LONG-TERM CURRENT USE OF INSULIN (HCC): ICD-10-CM

## 2020-07-21 DIAGNOSIS — S09.90XA CLOSED HEAD INJURY, INITIAL ENCOUNTER: Primary | ICD-10-CM

## 2020-07-21 DIAGNOSIS — R55 SYNCOPE AND COLLAPSE: Primary | ICD-10-CM

## 2020-07-21 DIAGNOSIS — S06.0X9A CONCUSSION WITH LOSS OF CONSCIOUSNESS, INITIAL ENCOUNTER: ICD-10-CM

## 2020-07-21 DIAGNOSIS — S06.0X9A CONCUSSION: ICD-10-CM

## 2020-07-21 DIAGNOSIS — S16.1XXA STRAIN OF NECK MUSCLE, INITIAL ENCOUNTER: ICD-10-CM

## 2020-07-21 LAB
ANION GAP SERPL CALCULATED.3IONS-SCNC: 7 MMOL/L (ref 4–13)
BASOPHILS # BLD AUTO: 0.04 THOUSANDS/ΜL (ref 0–0.1)
BASOPHILS NFR BLD AUTO: 0 % (ref 0–1)
BUN SERPL-MCNC: 10 MG/DL (ref 5–25)
CALCIUM SERPL-MCNC: 9 MG/DL (ref 8.3–10.1)
CHLORIDE SERPL-SCNC: 100 MMOL/L (ref 100–108)
CO2 SERPL-SCNC: 30 MMOL/L (ref 21–32)
CREAT SERPL-MCNC: 0.87 MG/DL (ref 0.6–1.3)
EOSINOPHIL # BLD AUTO: 0.21 THOUSAND/ΜL (ref 0–0.61)
EOSINOPHIL NFR BLD AUTO: 2 % (ref 0–6)
ERYTHROCYTE [DISTWIDTH] IN BLOOD BY AUTOMATED COUNT: 12.3 % (ref 11.6–15.1)
EXT PREG TEST URINE: NEGATIVE
EXT. CONTROL ED NAV: NORMAL
GFR SERPL CREATININE-BSD FRML MDRD: 85 ML/MIN/1.73SQ M
GLUCOSE SERPL-MCNC: 291 MG/DL (ref 65–140)
GLUCOSE SERPL-MCNC: 310 MG/DL (ref 65–140)
HCT VFR BLD AUTO: 40.3 % (ref 34.8–46.1)
HGB BLD-MCNC: 13 G/DL (ref 11.5–15.4)
IMM GRANULOCYTES # BLD AUTO: 0.03 THOUSAND/UL (ref 0–0.2)
IMM GRANULOCYTES NFR BLD AUTO: 0 % (ref 0–2)
LYMPHOCYTES # BLD AUTO: 5.3 THOUSANDS/ΜL (ref 0.6–4.47)
LYMPHOCYTES NFR BLD AUTO: 57 % (ref 14–44)
MCH RBC QN AUTO: 29.1 PG (ref 26.8–34.3)
MCHC RBC AUTO-ENTMCNC: 32.3 G/DL (ref 31.4–37.4)
MCV RBC AUTO: 90 FL (ref 82–98)
MONOCYTES # BLD AUTO: 0.76 THOUSAND/ΜL (ref 0.17–1.22)
MONOCYTES NFR BLD AUTO: 8 % (ref 4–12)
NEUTROPHILS # BLD AUTO: 3.04 THOUSANDS/ΜL (ref 1.85–7.62)
NEUTS SEG NFR BLD AUTO: 33 % (ref 43–75)
NRBC BLD AUTO-RTO: 0 /100 WBCS
PLATELET # BLD AUTO: 297 THOUSANDS/UL (ref 149–390)
PMV BLD AUTO: 9.3 FL (ref 8.9–12.7)
POTASSIUM SERPL-SCNC: 4 MMOL/L (ref 3.5–5.3)
RBC # BLD AUTO: 4.47 MILLION/UL (ref 3.81–5.12)
SL AMB POCT HEMOGLOBIN AIC: 12.8 (ref ?–6.5)
SODIUM SERPL-SCNC: 137 MMOL/L (ref 136–145)
WBC # BLD AUTO: 9.38 THOUSAND/UL (ref 4.31–10.16)

## 2020-07-21 PROCEDURE — 83036 HEMOGLOBIN GLYCOSYLATED A1C: CPT | Performed by: FAMILY MEDICINE

## 2020-07-21 PROCEDURE — 1036F TOBACCO NON-USER: CPT | Performed by: FAMILY MEDICINE

## 2020-07-21 PROCEDURE — 81025 URINE PREGNANCY TEST: CPT | Performed by: PHYSICIAN ASSISTANT

## 2020-07-21 PROCEDURE — 82948 REAGENT STRIP/BLOOD GLUCOSE: CPT

## 2020-07-21 PROCEDURE — 36415 COLL VENOUS BLD VENIPUNCTURE: CPT | Performed by: PHYSICIAN ASSISTANT

## 2020-07-21 PROCEDURE — 96361 HYDRATE IV INFUSION ADD-ON: CPT

## 2020-07-21 PROCEDURE — 99284 EMERGENCY DEPT VISIT MOD MDM: CPT | Performed by: EMERGENCY MEDICINE

## 2020-07-21 PROCEDURE — 72125 CT NECK SPINE W/O DYE: CPT

## 2020-07-21 PROCEDURE — 99214 OFFICE O/P EST MOD 30 MIN: CPT | Performed by: FAMILY MEDICINE

## 2020-07-21 PROCEDURE — 3046F HEMOGLOBIN A1C LEVEL >9.0%: CPT | Performed by: FAMILY MEDICINE

## 2020-07-21 PROCEDURE — 70450 CT HEAD/BRAIN W/O DYE: CPT

## 2020-07-21 PROCEDURE — 85025 COMPLETE CBC W/AUTO DIFF WBC: CPT | Performed by: PHYSICIAN ASSISTANT

## 2020-07-21 PROCEDURE — 80048 BASIC METABOLIC PNL TOTAL CA: CPT | Performed by: PHYSICIAN ASSISTANT

## 2020-07-21 PROCEDURE — 96374 THER/PROPH/DIAG INJ IV PUSH: CPT

## 2020-07-21 PROCEDURE — 99284 EMERGENCY DEPT VISIT MOD MDM: CPT

## 2020-07-21 PROCEDURE — 73030 X-RAY EXAM OF SHOULDER: CPT

## 2020-07-21 RX ORDER — LIDOCAINE 50 MG/G
1 PATCH TOPICAL ONCE
Status: DISCONTINUED | OUTPATIENT
Start: 2020-07-21 | End: 2020-07-21 | Stop reason: HOSPADM

## 2020-07-21 RX ORDER — ACETAMINOPHEN 325 MG/1
975 TABLET ORAL ONCE AS NEEDED
Status: COMPLETED | OUTPATIENT
Start: 2020-07-21 | End: 2020-07-21

## 2020-07-21 RX ORDER — NAPROXEN 500 MG/1
500 TABLET ORAL 2 TIMES DAILY WITH MEALS
Qty: 60 TABLET | Refills: 0 | Status: ON HOLD | OUTPATIENT
Start: 2020-07-21 | End: 2021-04-25 | Stop reason: SDUPTHER

## 2020-07-21 RX ORDER — KETOROLAC TROMETHAMINE 30 MG/ML
15 INJECTION, SOLUTION INTRAMUSCULAR; INTRAVENOUS ONCE
Status: COMPLETED | OUTPATIENT
Start: 2020-07-21 | End: 2020-07-21

## 2020-07-21 RX ORDER — METHOCARBAMOL 500 MG/1
500 TABLET, FILM COATED ORAL 4 TIMES DAILY
Qty: 40 TABLET | Refills: 0 | Status: SHIPPED | OUTPATIENT
Start: 2020-07-21 | End: 2021-09-21 | Stop reason: HOSPADM

## 2020-07-21 RX ADMIN — LIDOCAINE 1 PATCH: 50 PATCH TOPICAL at 18:15

## 2020-07-21 RX ADMIN — KETOROLAC TROMETHAMINE 15 MG: 30 INJECTION, SOLUTION INTRAMUSCULAR at 19:19

## 2020-07-21 RX ADMIN — ACETAMINOPHEN 975 MG: 325 TABLET ORAL at 18:14

## 2020-07-21 RX ADMIN — SODIUM CHLORIDE 1000 ML: 0.9 INJECTION, SOLUTION INTRAVENOUS at 18:47

## 2020-07-21 NOTE — DISCHARGE INSTRUCTIONS
Tylenol as needed for pain  No sports or gym until you are cleared by your doctor/sports medicine  FU with your doctor in 1-3 days  Watch for sign of worsening head injury: vomiting, severe headache, somnolence, confusion, dizziness, if you see these signs return to the ED or return to your doctor sooner  Warm compresses to the area  Medication as directed  FU with your family doctor, return to the ED for worsening symptoms

## 2020-07-21 NOTE — ED PROVIDER NOTES
History  Chief Complaint   Patient presents with    Syncope     Patient reports had syncopal episode last night before showering  believes hit back of head  c/o headache  called PCP referred to ED  Patient reports last night she slipped and fell in the shower and struck the left side of her head and left shoulder  She thinks that she blacked out and was lying in the shower for several minutes and then came to  +HA since 6/10 since  No photo/phonophobia  occ dizziness since she fell  Taking tylenol for HA  Last dose was at 5am  No nasuea or vomiting  Sent in by PCP concerned because she is also DM and asked for eval            Prior to Admission Medications   Prescriptions Last Dose Informant Patient Reported? Taking? Injection Device for Insulin (B-D PEN MINI) ENRICO  Self No Yes   Sig: by Does not apply route 4 (four) times a day Please use for Lantus and Humalog  4 pen needles daily Dx: Diabetes   Lancets (ACCU-CHEK SOFT TOUCH) lancets  Self No Yes   Sig: Testing 1 time daily Dx: diabetes   Semaglutide,0 25 or 0 5MG/DOS, 2 MG/1 5ML SOPN   No Yes   Sig: Inject 0 25 mg under the skin once a week   insulin glargine (Lantus SoloStar) 100 units/mL injection pen   No Yes   Sig: Inject 20 Units under the skin daily at bedtime      Facility-Administered Medications: None       Past Medical History:   Diagnosis Date    Diabetes mellitus ([address] )        Past Surgical History:   Procedure Laterality Date    HERNIA REPAIR      TUBAL LIGATION  2017       Family History   Problem Relation Age of Onset    Hypertension Mother    [facility] Arthritis Mother     Asthma Sister     Bipolar disorder Brother     Schizophrenia Brother     Asthma Brother     Asthma Brother      I have reviewed and agree with the history as documented      E-Cigarette/Vaping    E-Cigarette Use Never User      E-Cigarette/Vaping Substances    Nicotine No     THC No     CBD No     Flavoring No     Other No     Unknown No      Social History Tobacco Use    Smoking status: Never Smoker    Smokeless tobacco: Never Used   Substance Use Topics    Alcohol use: No    Drug use: No       Review of Systems   All other systems reviewed and are negative  Physical Exam  Physical Exam   Constitutional: She appears well-developed and well-nourished  HENT:   Head: Normocephalic  Right Ear: Tympanic membrane and external ear normal    Left Ear: Tympanic membrane and external ear normal    Mouth/Throat: Oropharynx is clear and moist    Eyes: Conjunctivae and EOM are normal    Neck: Neck supple  Cardiovascular: Normal rate, regular rhythm, normal heart sounds and intact distal pulses  Pulmonary/Chest: Effort normal and breath sounds normal    Abdominal: Soft  Bowel sounds are normal    Musculoskeletal:        Left shoulder: She exhibits decreased range of motion, tenderness, pain and spasm  Cervical back: She exhibits tenderness, bony tenderness, pain and spasm  She exhibits no swelling and no edema  Back:    Lymphadenopathy:     She has no cervical adenopathy  Neurological: She is alert  Skin: Skin is warm  No rash noted  Psychiatric: She has a normal mood and affect  Her behavior is normal    Nursing note and vitals reviewed        Vital Signs  ED Triage Vitals   Temperature Pulse Respirations Blood Pressure SpO2   07/21/20 1654 07/21/20 1654 07/21/20 1654 07/21/20 1654 07/21/20 1654   98 °F (36 7 °C) 74 16 146/79 100 %      Temp Source Heart Rate Source Patient Position - Orthostatic VS BP Location FiO2 (%)   07/21/20 1654 07/21/20 1654 07/21/20 1654 07/21/20 1654 --   Temporal Monitor Sitting Right arm       Pain Score       07/21/20 1814       6           Vitals:    07/21/20 1654 07/21/20 1916   BP: 146/79 114/80   Pulse: 74 62   Patient Position - Orthostatic VS: Sitting Lying         Visual Acuity      ED Medications  Medications   lidocaine (LIDODERM) 5 % patch 1 patch (1 patch Topical Medication Applied 7/21/20 1815) acetaminophen (TYLENOL) tablet 975 mg (975 mg Oral Given 7/21/20 1814)   sodium chloride 0 9 % bolus 1,000 mL (1,000 mL Intravenous New Bag 7/21/20 1847)   ketorolac (TORADOL) injection 15 mg (15 mg Intravenous Given 7/21/20 1919)       Diagnostic Studies  Results Reviewed     Procedure Component Value Units Date/Time    Basic metabolic panel [[id number]]  (Abnormal) Collected:  07/21/20 1847    Lab Status:  Final result Specimen:  Blood from Arm, Right Updated:  07/21/20 1918     Sodium 137 mmol/L      Potassium 4 0 mmol/L      Chloride 100 mmol/L      CO2 30 mmol/L      ANION GAP 7 mmol/L      BUN 10 mg/dL      Creatinine 0 87 mg/dL      Glucose 291 mg/dL      Calcium 9 0 mg/dL      eGFR 85 ml/min/1 73sq m     Narrative:       [facility] guidelines for Chronic Kidney Disease (CKD):     Stage 1 with normal or high GFR (GFR > 90 mL/min/1 73 square meters)    Stage 2 Mild CKD (GFR = 60-89 mL/min/1 73 square meters)    Stage 3A Moderate CKD (GFR = 45-59 mL/min/1 73 square meters)    Stage 3B Moderate CKD (GFR = 30-44 mL/min/1 73 square meters)    Stage 4 Severe CKD (GFR = 15-29 mL/min/1 73 square meters)    Stage 5 End Stage CKD (GFR <15 mL/min/1 73 square meters)  Note: GFR calculation is accurate only with a steady state creatinine    CBC and differential [[id number]]  (Abnormal) Collected:  07/21/20 1847    Lab Status:  Final result Specimen:  Blood from Arm, Right Updated:  07/21/20 1906     WBC 9 38 Thousand/uL      RBC 4 47 Million/uL      Hemoglobin 13 0 g/dL      Hematocrit 40 3 %      MCV 90 fL      MCH 29 1 pg      MCHC 32 3 g/dL      RDW 12 3 %      MPV 9 3 fL      Platelets 731 Thousands/uL      nRBC 0 /100 WBCs      Neutrophils Relative 33 %      Immat GRANS % 0 %      Lymphocytes Relative 57 %      Monocytes Relative 8 %      Eosinophils Relative 2 %      Basophils Relative 0 %      Neutrophils Absolute 3 04 Thousands/µL      Immature Grans Absolute 0 03 Thousand/uL Lymphocytes Absolute 5 30 Thousands/µL      Monocytes Absolute 0 76 Thousand/µL      Eosinophils Absolute 0 21 Thousand/µL      Basophils Absolute 0 04 Thousands/µL     Fingerstick Glucose (POCT) [[id number]]  (Abnormal) Collected:  07/21/20 1824    Lab Status:  Final result Updated:  07/21/20 1825     POC Glucose 310 mg/dl     POCT pregnancy, urine [[id number]]  (Normal) Resulted:  07/21/20 1816    Lab Status:  Final result Updated:  07/21/20 1817     EXT PREG TEST UR (Ref: Negative) negative     Control valid                 CT head without contrast   Final Result by Dottie Soliz MD (07/21 1856)      No acute intracranial abnormality  Workstation performed: [id number]         CT cervical spine without contrast   Final Result by Dottie Soliz MD (07/21 1858)      No cervical spine fracture or traumatic malalignment  Workstation performed: [id number]         XR shoulder 2+ views LEFT   ED Interpretation by Meredith Jo PA-C (07/21 1904)   ALEXSANDER      Final Result by Wilfredo Calderon MD (07/21 1922)      Mild glenohumeral osteoarthritis  No acute fracture or dislocation  Workstation performed: [id number]                    Procedures  Procedures         ED Course  ED Course as of Jul 21 2012   Tue Jul 21, 2020 2011 Pain improved, instructions reviewed w pt  US AUDIT      Most Recent Value   Initial Alcohol Screen: US AUDIT-C    1  How often do you have a drink containing alcohol?  0 Filed at: 07/21/2020 1654   2  How many drinks containing alcohol do you have on a typical day you are drinking? 0 Filed at: 07/21/2020 1654   3b  FEMALE Any Age, or MALE 65+: How often do you have 4 or more drinks on one occassion? 0 Filed at: 07/21/2020 1654   Audit-C Score  0 Filed at: 07/21/2020 1654                  FAMILIA/DAST-10      Most Recent Value   How many times in the past year have you       Used an illegal drug or used a prescription medication for non-medical reasons? Never Filed at: 07/21/2020 6945                                [facility]  Number of Diagnoses or Management Options  Closed head injury, initial encounter: new and requires workup  Concussion: new and requires workup  Strain of left shoulder, initial encounter: new and requires workup  Strain of neck muscle, initial encounter: new and requires workup     Amount and/or Complexity of Data Reviewed  Clinical lab tests: reviewed  Tests in the radiology section of CPT®: reviewed  Independent visualization of images, tracings, or specimens: yes    Risk of Complications, Morbidity, and/or Mortality  General comments: Discussed risk vs benefit of robaxin and when to use it - instructions reviewed w pt  Patient Progress  Patient progress: improved        Disposition  Final diagnoses:   Closed head injury, initial encounter   Concussion   Strain of neck muscle, initial encounter   Strain of left shoulder, initial encounter     Time reflects when diagnosis was documented in both MDM as applicable and the Disposition within this note     Time User Action Codes Description Comment    7/21/2020  7:57 PM Lorin Cha [S09 [id number]] Closed head injury, initial encounter     7/21/2020  7:57 PM Lorin Cha [[id number]] Concussion     7/21/2020  7:58 PM Lorin Cha [G47  1XXA] Strain of neck muscle, initial encounter     7/21/2020  7:58 PM Lorin Cha [C97 [id number]] Strain of left shoulder, initial encounter       ED Disposition     ED Disposition Condition Date/Time Comment    Discharge Stable Tue Jul 21, 2020  7:57 PM [facility] discharge to home/self care              Follow-up Information     Follow up With Specialties Details Why [address] Camille Oviedo MD Family Medicine   [address] [place] [address] Road  [phone]            Patient's Medications   Discharge Prescriptions    METHOCARBAMOL (ROBAXIN) 500 MG TABLET    Take 1 tablet (500 mg total) by mouth 4 (four) times a day for 10 days       Start Date: 7/21/2020 End Date: 7/31/2020       Order Dose: 500 mg       Quantity: 40 tablet    Refills: 0    NAPROXEN (EC NAPROSYN) 500 MG EC TABLET    Take 1 tablet (500 mg total) by mouth 2 (two) times a day with meals       Start Date: 7/21/2020 End Date: 7/21/2021       Order Dose: 500 mg       Quantity: 60 tablet    Refills: 0     No discharge procedures on file      PDMP Review     None          ED Provider  Electronically Signed by           Meredith Jo PA-C  07/21/20 2012

## 2020-07-22 NOTE — PROGRESS NOTES
Assessment/Plan:    20-year-old woman with:  Syncope with uncontrolled diabetes and concussion  Will refer to the emergency room as we discussed the various workup and treatment options with risks and benefits  Discussed supportive care return parameters otherwise    No problem-specific Assessment & Plan notes found for this encounter  Diagnoses and all orders for this visit:    Syncope and collapse  -     POCT hemoglobin A1c  -     Ambulatory Referral to Emergency Medicine; Future    Uncontrolled type 2 diabetes mellitus with hyperglycemia, without long-term current use of insulin ([facility] [address] )  -     Ambulatory Referral to Emergency Medicine; Future    Concussion with loss of consciousness, initial encounter  -     Ambulatory Referral to Emergency Medicine; Future          Subjective:     Chief Complaint   Patient presents with    Fall     fell last night, does not remember how long she was out, woke up to child calling her    Fatigue     so drained, hisotry of anemia    Medication Problem     Lantus costing $100 refill can not afford        Patient ID: Verena Campbell is a 46 y o  female  Patient is a 20-year-old woman who presents after episode yesterday where she fell  She was in her bathroom she admits that she slipped and fell hitting her head and she was passed out she has symptoms for 10-15 minutes and was woken with her 1year-old daughter crying next to her  She did not go to the emergency room to get checked out  She has headaches and dizziness  No fevers chills nausea vomiting  Tolerating p o  Intake she has diabetes that has been particularly uncontrolled      The following portions of the patient's history were reviewed and updated as appropriate: allergies, current medications, past family history, past medical history, past social history, past surgical history and problem list     Review of Systems   Constitutional: Negative  HENT: Negative  Eyes: Negative  Respiratory: Negative  Cardiovascular: Negative  Gastrointestinal: Negative  Endocrine: Negative  Genitourinary: Negative  Musculoskeletal: Negative  Allergic/Immunologic: Negative  Neurological: Positive for dizziness and headaches  Hematological: Negative  Psychiatric/Behavioral: Negative  All other systems reviewed and are negative  Objective:      /70   Pulse 87   Temp 99 3 °F (37 4 °C)   Wt 86 6 kg (191 lb)   SpO2 99%   BMI 31 78 kg/m²          Physical Exam   Constitutional: She is oriented to person, place, and time  She appears well-developed and well-nourished  HENT:   Head: Atraumatic  Right Ear: External ear normal    Left Ear: External ear normal    Eyes: Pupils are equal, round, and reactive to light  Conjunctivae and EOM are normal    Neck: Normal range of motion  Cardiovascular: Normal rate, regular rhythm and normal heart sounds  Pulmonary/Chest: Effort normal and breath sounds normal  No respiratory distress  Abdominal: Soft  She exhibits no distension  There is no tenderness  There is no rebound and no guarding  Musculoskeletal: Normal range of motion  Neurological: She is alert and oriented to person, place, and time  No cranial nerve deficit  Difficulty with tandem walking   Skin: Skin is warm and dry  Psychiatric: She has a normal mood and affect  Her behavior is normal  Judgment and thought content normal        BMI Counseling: Body mass index is 31 78 kg/m²  The BMI is above normal  Nutrition recommendations include encouraging healthy choices of fruits and vegetables  Exercise recommendations include moderate physical activity 150 minutes/week

## 2020-08-25 ENCOUNTER — ANNUAL EXAM (OUTPATIENT)
Dept: OBGYN CLINIC | Facility: CLINIC | Age: 40
End: 2020-08-25

## 2020-08-25 VITALS
TEMPERATURE: 97.8 F | DIASTOLIC BLOOD PRESSURE: 76 MMHG | HEART RATE: 80 BPM | WEIGHT: 192.2 LBS | BODY MASS INDEX: 31.98 KG/M2 | SYSTOLIC BLOOD PRESSURE: 117 MMHG

## 2020-08-25 DIAGNOSIS — Z01.419 ENCOUNTER FOR ANNUAL ROUTINE GYNECOLOGICAL EXAMINATION: Primary | ICD-10-CM

## 2020-08-25 PROCEDURE — 3046F HEMOGLOBIN A1C LEVEL >9.0%: CPT | Performed by: NURSE PRACTITIONER

## 2020-08-25 PROCEDURE — 99395 PREV VISIT EST AGE 18-39: CPT | Performed by: NURSE PRACTITIONER

## 2020-08-25 NOTE — PATIENT INSTRUCTIONS
Call with needs or concerns  Return in 1 year    COVID-19 Instructions    If you are having any of the following:  Cough   Shortness of breath   Fever  If traveled within past 2 weeks internationally or to high risk [place] states  Or been in contact with someone that has     Please call either:   Your PCP office  [phone], option [phone]    They will screen you over the phone and direct you to the nearest appropriate testing location    DO NOT go to your PCP or OB office without calling first

## 2020-08-25 NOTE — PROGRESS NOTES
Annual Exam    Assessment   1  Encounter for annual routine gynecological examination       well woman       Plan       All questions answered  Breast self exam technique reviewed and patient encouraged to perform self-exam monthly  Contraception: tubal ligation  Discussed healthy lifestyle modifications  Follow up in 1 year  There are no Patient Instructions on file for this visit  Milton Coto is a 45 y o  [id number] female who presents for annual well woman exam  Periods are regular every 28-30 days, lasting 3 days  No intermenstrual bleeding, spotting, or discharge  Last WNL PAP and negative HPV 2018 at [facility]  1 partner x 7 years, denies domestic violence    BMI Counseling: Body mass index is 31 98 kg/m²  The BMI is above normal  Nutrition recommendations include reducing portion sizes and consuming healthier snacks  Exercise recommendations include moderate aerobic physical activity for 150 minutes/week  Current contraception: tubal ligation  History of abnormal Pap smear: no  Family history of uterine or ovarian cancer: no  Regular self breast exam: yes  History of abnormal mammogram: N/A  Family history of breast cancer: no  History of abnormal lipids: unknown  Menstrual History:  OB History        6    Para        Term                AB   3    Living   3       SAB   2    TAB   1    Ectopic        Multiple        Live Births   1                Menarche age: 13  Patient's last menstrual period was 2020 (within days)  The following portions of the patient's history were reviewed and updated as appropriate: allergies, current medications, past family history, past medical history, past social history, past surgical history and problem list     Review of Systems  Pertinent items are noted in HPI        Objective      /76   Pulse 80   Temp 97 8 °F (36 6 °C)   Wt 87 2 kg (192 lb 3 2 oz)   LMP 2020 (Within Days)   BMI 31 98 kg/m²     General: alert and oriented, in no acute distress, alert, appears stated age and cooperative   Heart: regular rate and rhythm, S1, S2 normal, no murmur, click, rub or gallop   Lungs: clear to auscultation bilaterally, WNL respiratory effort, negative cough or SOB   Thyroid: Negative masses   Abdomen: soft, non-tender, without masses or organomegaly   Vulva: normal   Vagina: normal mucosa   Cervix: no cervical motion tenderness and no lesions   Uterus: normal size, non-tender, normal shape and consistency   Adnexa: normal adnexa   Urethra: normal   Breasts: NT,negative masses, discharge, or dimpling  Negative fever

## 2020-10-12 LAB
LEFT EYE DIABETIC RETINOPATHY: NORMAL
RIGHT EYE DIABETIC RETINOPATHY: NORMAL

## 2020-11-21 ENCOUNTER — HOSPITAL ENCOUNTER (OUTPATIENT)
Facility: HOSPITAL | Age: 40
Setting detail: OBSERVATION
Discharge: HOME | End: 2020-11-22
Attending: EMERGENCY MEDICINE | Admitting: INTERNAL MEDICINE
Payer: COMMERCIAL

## 2020-11-21 ENCOUNTER — APPOINTMENT (EMERGENCY)
Dept: CT IMAGING | Facility: HOSPITAL | Age: 40
End: 2020-11-21
Payer: COMMERCIAL

## 2020-11-21 ENCOUNTER — APPOINTMENT (OUTPATIENT)
Dept: CT IMAGING | Facility: HOSPITAL | Age: 40
End: 2020-11-21
Payer: COMMERCIAL

## 2020-11-21 DIAGNOSIS — R73.9 HYPERGLYCEMIA: ICD-10-CM

## 2020-11-21 DIAGNOSIS — R41.82 ALTERED MENTAL STATUS: Primary | ICD-10-CM

## 2020-11-21 LAB
AMPHETAMINES SERPL QL SCN: NEGATIVE
ANION GAP SERPL CALCULATED.3IONS-SCNC: 7 MMOL/L (ref 4–13)
BARBITURATES UR QL: NEGATIVE
BASE EX.OXY STD BLDV CALC-SCNC: 71.6 % (ref 60–80)
BASE EXCESS BLDV CALC-SCNC: -0.8 MMOL/L
BASOPHILS # BLD AUTO: 0.04 THOUSANDS/ΜL (ref 0–0.1)
BASOPHILS NFR BLD AUTO: 1 % (ref 0–1)
BENZODIAZ UR QL: NEGATIVE
BETA-HYDROXYBUTYRATE: 0.1 MMOL/L
BILIRUB UR QL STRIP: NEGATIVE
BUN SERPL-MCNC: 8 MG/DL (ref 5–25)
CALCIUM SERPL-MCNC: 9.5 MG/DL (ref 8.3–10.1)
CHLORIDE SERPL-SCNC: 97 MMOL/L (ref 100–108)
CLARITY UR: CLEAR
CO2 SERPL-SCNC: 28 MMOL/L (ref 21–32)
COCAINE UR QL: NEGATIVE
COLOR UR: YELLOW
COLOR, POC: YELLOW
CREAT SERPL-MCNC: 1.05 MG/DL (ref 0.6–1.3)
EOSINOPHIL # BLD AUTO: 0.18 THOUSAND/ΜL (ref 0–0.61)
EOSINOPHIL NFR BLD AUTO: 2 % (ref 0–6)
ERYTHROCYTE [DISTWIDTH] IN BLOOD BY AUTOMATED COUNT: 12.3 % (ref 11.6–15.1)
ETHANOL SERPL-MCNC: <3 MG/DL (ref 0–3)
EXT PREG TEST URINE: NEGATIVE
EXT. CONTROL ED NAV: NORMAL
GFR SERPL CREATININE-BSD FRML MDRD: 67 ML/MIN/1.73SQ M
GLUCOSE SERPL-MCNC: 311 MG/DL (ref 65–140)
GLUCOSE SERPL-MCNC: 366 MG/DL (ref 65–140)
GLUCOSE UR STRIP-MCNC: ABNORMAL MG/DL
HCO3 BLDV-SCNC: 26.2 MMOL/L (ref 24–30)
HCT VFR BLD AUTO: 44.4 % (ref 34.8–46.1)
HGB BLD-MCNC: 14.3 G/DL (ref 11.5–15.4)
HGB UR QL STRIP.AUTO: NEGATIVE
IMM GRANULOCYTES # BLD AUTO: 0.02 THOUSAND/UL (ref 0–0.2)
IMM GRANULOCYTES NFR BLD AUTO: 0 % (ref 0–2)
KETONES UR STRIP-MCNC: NEGATIVE MG/DL
LEUKOCYTE ESTERASE UR QL STRIP: NEGATIVE
LYMPHOCYTES # BLD AUTO: 4.25 THOUSANDS/ΜL (ref 0.6–4.47)
LYMPHOCYTES NFR BLD AUTO: 50 % (ref 14–44)
MCH RBC QN AUTO: 28.9 PG (ref 26.8–34.3)
MCHC RBC AUTO-ENTMCNC: 32.2 G/DL (ref 31.4–37.4)
MCV RBC AUTO: 90 FL (ref 82–98)
METHADONE UR QL: NEGATIVE
MONOCYTES # BLD AUTO: 0.55 THOUSAND/ΜL (ref 0.17–1.22)
MONOCYTES NFR BLD AUTO: 7 % (ref 4–12)
NEUTROPHILS # BLD AUTO: 3.33 THOUSANDS/ΜL (ref 1.85–7.62)
NEUTS SEG NFR BLD AUTO: 40 % (ref 43–75)
NITRITE UR QL STRIP: NEGATIVE
NRBC BLD AUTO-RTO: 0 /100 WBCS
O2 CT BLDV-SCNC: 15.3 ML/DL
OPIATES UR QL SCN: NEGATIVE
OXYCODONE+OXYMORPHONE UR QL SCN: NEGATIVE
PCO2 BLDV: 52.6 MM HG (ref 42–50)
PCP UR QL: NEGATIVE
PH BLDV: 7.32 [PH] (ref 7.3–7.4)
PH UR STRIP.AUTO: 7 [PH] (ref 4.5–8)
PLATELET # BLD AUTO: 304 THOUSANDS/UL (ref 149–390)
PMV BLD AUTO: 10.9 FL (ref 8.9–12.7)
PO2 BLDV: 40.4 MM HG (ref 35–45)
POTASSIUM SERPL-SCNC: 4 MMOL/L (ref 3.5–5.3)
PROT UR STRIP-MCNC: NEGATIVE MG/DL
RBC # BLD AUTO: 4.95 MILLION/UL (ref 3.81–5.12)
SODIUM SERPL-SCNC: 132 MMOL/L (ref 136–145)
SP GR UR STRIP.AUTO: 1.02 (ref 1–1.03)
THC UR QL: POSITIVE
UROBILINOGEN UR QL STRIP.AUTO: 0.2 E.U./DL
WBC # BLD AUTO: 8.37 THOUSAND/UL (ref 4.31–10.16)

## 2020-11-21 PROCEDURE — 81003 URINALYSIS AUTO W/O SCOPE: CPT

## 2020-11-21 PROCEDURE — 85025 COMPLETE CBC W/AUTO DIFF WBC: CPT | Performed by: EMERGENCY MEDICINE

## 2020-11-21 PROCEDURE — 82948 REAGENT STRIP/BLOOD GLUCOSE: CPT

## 2020-11-21 PROCEDURE — 99285 EMERGENCY DEPT VISIT HI MDM: CPT

## 2020-11-21 PROCEDURE — 80048 BASIC METABOLIC PNL TOTAL CA: CPT | Performed by: EMERGENCY MEDICINE

## 2020-11-21 PROCEDURE — 80307 DRUG TEST PRSMV CHEM ANLYZR: CPT | Performed by: EMERGENCY MEDICINE

## 2020-11-21 PROCEDURE — 84443 ASSAY THYROID STIM HORMONE: CPT | Performed by: STUDENT IN AN ORGANIZED HEALTH CARE EDUCATION/TRAINING PROGRAM

## 2020-11-21 PROCEDURE — 70496 CT ANGIOGRAPHY HEAD: CPT

## 2020-11-21 PROCEDURE — 70498 CT ANGIOGRAPHY NECK: CPT

## 2020-11-21 PROCEDURE — G1004 CDSM NDSC: HCPCS

## 2020-11-21 PROCEDURE — 36415 COLL VENOUS BLD VENIPUNCTURE: CPT | Performed by: EMERGENCY MEDICINE

## 2020-11-21 PROCEDURE — 81025 URINE PREGNANCY TEST: CPT | Performed by: EMERGENCY MEDICINE

## 2020-11-21 PROCEDURE — 96360 HYDRATION IV INFUSION INIT: CPT

## 2020-11-21 PROCEDURE — 80320 DRUG SCREEN QUANTALCOHOLS: CPT | Performed by: EMERGENCY MEDICINE

## 2020-11-21 PROCEDURE — 70450 CT HEAD/BRAIN W/O DYE: CPT

## 2020-11-21 PROCEDURE — 82805 BLOOD GASES W/O2 SATURATION: CPT | Performed by: EMERGENCY MEDICINE

## 2020-11-21 PROCEDURE — 93005 ELECTROCARDIOGRAM TRACING: CPT

## 2020-11-21 PROCEDURE — 82010 KETONE BODYS QUAN: CPT | Performed by: EMERGENCY MEDICINE

## 2020-11-21 PROCEDURE — 99285 EMERGENCY DEPT VISIT HI MDM: CPT | Performed by: EMERGENCY MEDICINE

## 2020-11-21 PROCEDURE — 99220 PR INITIAL OBSERVATION CARE/DAY 70 MINUTES: CPT | Performed by: STUDENT IN AN ORGANIZED HEALTH CARE EDUCATION/TRAINING PROGRAM

## 2020-11-21 RX ADMIN — SODIUM CHLORIDE 1000 ML: 0.9 INJECTION, SOLUTION INTRAVENOUS at 20:30

## 2020-11-22 VITALS
OXYGEN SATURATION: 98 % | SYSTOLIC BLOOD PRESSURE: 111 MMHG | TEMPERATURE: 97.9 F | DIASTOLIC BLOOD PRESSURE: 69 MMHG | RESPIRATION RATE: 18 BRPM | HEART RATE: 65 BPM

## 2020-11-22 PROBLEM — R40.4 EPISODE OF UNRESPONSIVENESS: Status: ACTIVE | Noted: 2020-11-21

## 2020-11-22 PROBLEM — R41.89 EPISODE OF UNRESPONSIVENESS: Status: ACTIVE | Noted: 2020-11-21

## 2020-11-22 LAB
ALBUMIN SERPL BCP-MCNC: 2.8 G/DL (ref 3.5–5)
ALP SERPL-CCNC: 65 U/L (ref 46–116)
ALT SERPL W P-5'-P-CCNC: 12 U/L (ref 12–78)
ANION GAP SERPL CALCULATED.3IONS-SCNC: 8 MMOL/L (ref 4–13)
AST SERPL W P-5'-P-CCNC: 7 U/L (ref 5–45)
ATRIAL RATE: 77 BPM
BASOPHILS # BLD AUTO: 0.03 THOUSANDS/ΜL (ref 0–0.1)
BASOPHILS NFR BLD AUTO: 0 % (ref 0–1)
BILIRUB SERPL-MCNC: 0.24 MG/DL (ref 0.2–1)
BUN SERPL-MCNC: 11 MG/DL (ref 5–25)
CALCIUM ALBUM COR SERPL-MCNC: 9.9 MG/DL (ref 8.3–10.1)
CALCIUM SERPL-MCNC: 8.9 MG/DL (ref 8.3–10.1)
CHLORIDE SERPL-SCNC: 107 MMOL/L (ref 100–108)
CO2 SERPL-SCNC: 24 MMOL/L (ref 21–32)
CREAT SERPL-MCNC: 0.73 MG/DL (ref 0.6–1.3)
EOSINOPHIL # BLD AUTO: 0.18 THOUSAND/ΜL (ref 0–0.61)
EOSINOPHIL NFR BLD AUTO: 2 % (ref 0–6)
ERYTHROCYTE [DISTWIDTH] IN BLOOD BY AUTOMATED COUNT: 12.5 % (ref 11.6–15.1)
EST. AVERAGE GLUCOSE BLD GHB EST-MCNC: 315 MG/DL
GFR SERPL CREATININE-BSD FRML MDRD: 104 ML/MIN/1.73SQ M
GLUCOSE P FAST SERPL-MCNC: 293 MG/DL (ref 65–99)
GLUCOSE SERPL-MCNC: 251 MG/DL (ref 65–140)
GLUCOSE SERPL-MCNC: 277 MG/DL (ref 65–140)
GLUCOSE SERPL-MCNC: 293 MG/DL (ref 65–140)
GLUCOSE SERPL-MCNC: 298 MG/DL (ref 65–140)
HBA1C MFR BLD: 12.6 %
HCT VFR BLD AUTO: 40.2 % (ref 34.8–46.1)
HGB BLD-MCNC: 12.6 G/DL (ref 11.5–15.4)
IMM GRANULOCYTES # BLD AUTO: 0.03 THOUSAND/UL (ref 0–0.2)
IMM GRANULOCYTES NFR BLD AUTO: 0 % (ref 0–2)
LYMPHOCYTES # BLD AUTO: 3.52 THOUSANDS/ΜL (ref 0.6–4.47)
LYMPHOCYTES NFR BLD AUTO: 47 % (ref 14–44)
MAGNESIUM SERPL-MCNC: 1.8 MG/DL (ref 1.6–2.6)
MCH RBC QN AUTO: 28.8 PG (ref 26.8–34.3)
MCHC RBC AUTO-ENTMCNC: 31.3 G/DL (ref 31.4–37.4)
MCV RBC AUTO: 92 FL (ref 82–98)
MONOCYTES # BLD AUTO: 0.63 THOUSAND/ΜL (ref 0.17–1.22)
MONOCYTES NFR BLD AUTO: 8 % (ref 4–12)
NEUTROPHILS # BLD AUTO: 3.29 THOUSANDS/ΜL (ref 1.85–7.62)
NEUTS SEG NFR BLD AUTO: 43 % (ref 43–75)
NRBC BLD AUTO-RTO: 0 /100 WBCS
P AXIS: 66 DEGREES
PHOSPHATE SERPL-MCNC: 4.4 MG/DL (ref 2.7–4.5)
PLATELET # BLD AUTO: 297 THOUSANDS/UL (ref 149–390)
PMV BLD AUTO: 9.3 FL (ref 8.9–12.7)
POTASSIUM SERPL-SCNC: 3.8 MMOL/L (ref 3.5–5.3)
PR INTERVAL: 124 MS
PROT SERPL-MCNC: 6.5 G/DL (ref 6.4–8.2)
QRS AXIS: -11 DEGREES
QRSD INTERVAL: 74 MS
QT INTERVAL: 354 MS
QTC INTERVAL: 400 MS
RBC # BLD AUTO: 4.38 MILLION/UL (ref 3.81–5.12)
SODIUM SERPL-SCNC: 139 MMOL/L (ref 136–145)
T WAVE AXIS: 16 DEGREES
TSH SERPL DL<=0.05 MIU/L-ACNC: 1.1 UIU/ML (ref 0.36–3.74)
VENTRICULAR RATE: 77 BPM
WBC # BLD AUTO: 7.68 THOUSAND/UL (ref 4.31–10.16)

## 2020-11-22 PROCEDURE — 84100 ASSAY OF PHOSPHORUS: CPT | Performed by: STUDENT IN AN ORGANIZED HEALTH CARE EDUCATION/TRAINING PROGRAM

## 2020-11-22 PROCEDURE — 92610 EVALUATE SWALLOWING FUNCTION: CPT

## 2020-11-22 PROCEDURE — 99217 PR OBSERVATION CARE DISCHARGE MANAGEMENT: CPT | Performed by: INTERNAL MEDICINE

## 2020-11-22 PROCEDURE — 82948 REAGENT STRIP/BLOOD GLUCOSE: CPT

## 2020-11-22 PROCEDURE — 80053 COMPREHEN METABOLIC PANEL: CPT | Performed by: STUDENT IN AN ORGANIZED HEALTH CARE EDUCATION/TRAINING PROGRAM

## 2020-11-22 PROCEDURE — 83735 ASSAY OF MAGNESIUM: CPT | Performed by: STUDENT IN AN ORGANIZED HEALTH CARE EDUCATION/TRAINING PROGRAM

## 2020-11-22 PROCEDURE — 83036 HEMOGLOBIN GLYCOSYLATED A1C: CPT | Performed by: INTERNAL MEDICINE

## 2020-11-22 PROCEDURE — 99244 OFF/OP CNSLTJ NEW/EST MOD 40: CPT | Performed by: PSYCHIATRY & NEUROLOGY

## 2020-11-22 PROCEDURE — 93010 ELECTROCARDIOGRAM REPORT: CPT | Performed by: INTERNAL MEDICINE

## 2020-11-22 PROCEDURE — 85025 COMPLETE CBC W/AUTO DIFF WBC: CPT | Performed by: STUDENT IN AN ORGANIZED HEALTH CARE EDUCATION/TRAINING PROGRAM

## 2020-11-22 RX ORDER — INSULIN GLARGINE 100 [IU]/ML
20 INJECTION, SOLUTION SUBCUTANEOUS
Status: DISCONTINUED | OUTPATIENT
Start: 2020-11-22 | End: 2020-11-22 | Stop reason: HOSPADM

## 2020-11-22 RX ORDER — SODIUM CHLORIDE 9 MG/ML
75 INJECTION, SOLUTION INTRAVENOUS CONTINUOUS
Status: DISCONTINUED | OUTPATIENT
Start: 2020-11-22 | End: 2020-11-22 | Stop reason: HOSPADM

## 2020-11-22 RX ADMIN — INSULIN LISPRO 3 UNITS: 100 INJECTION, SOLUTION INTRAVENOUS; SUBCUTANEOUS at 08:11

## 2020-11-22 RX ADMIN — ENOXAPARIN SODIUM 40 MG: 40 INJECTION SUBCUTANEOUS at 08:12

## 2020-11-22 RX ADMIN — SODIUM CHLORIDE 75 ML/HR: 0.9 INJECTION, SOLUTION INTRAVENOUS at 01:19

## 2020-11-22 RX ADMIN — INSULIN LISPRO 4 UNITS: 100 INJECTION, SOLUTION INTRAVENOUS; SUBCUTANEOUS at 12:12

## 2020-11-22 RX ADMIN — IOHEXOL 85 ML: 350 INJECTION, SOLUTION INTRAVENOUS at 00:04

## 2020-11-22 RX ADMIN — INSULIN LISPRO 2 UNITS: 100 INJECTION, SOLUTION INTRAVENOUS; SUBCUTANEOUS at 02:05

## 2020-11-23 ENCOUNTER — PATIENT OUTREACH (OUTPATIENT)
Dept: FAMILY MEDICINE CLINIC | Facility: CLINIC | Age: 40
End: 2020-11-23

## 2020-11-23 DIAGNOSIS — Z71.89 COMPLEX CARE COORDINATION: Primary | ICD-10-CM

## 2020-11-24 ENCOUNTER — TELEPHONE (OUTPATIENT)
Dept: NEUROLOGY | Facility: CLINIC | Age: 40
End: 2020-11-24

## 2020-11-27 ENCOUNTER — OFFICE VISIT (OUTPATIENT)
Dept: FAMILY MEDICINE CLINIC | Facility: CLINIC | Age: 40
End: 2020-11-27
Payer: COMMERCIAL

## 2020-11-27 ENCOUNTER — TELEPHONE (OUTPATIENT)
Dept: NEUROLOGY | Facility: CLINIC | Age: 40
End: 2020-11-27

## 2020-11-27 VITALS
TEMPERATURE: 97.7 F | SYSTOLIC BLOOD PRESSURE: 128 MMHG | BODY MASS INDEX: 30.75 KG/M2 | HEIGHT: 65 IN | WEIGHT: 184.6 LBS | DIASTOLIC BLOOD PRESSURE: 80 MMHG

## 2020-11-27 DIAGNOSIS — E66.9 OBESITY (BMI 30-39.9): ICD-10-CM

## 2020-11-27 DIAGNOSIS — E11.65 UNCONTROLLED TYPE 2 DIABETES MELLITUS WITH HYPERGLYCEMIA, WITHOUT LONG-TERM CURRENT USE OF INSULIN (HCC): Primary | ICD-10-CM

## 2020-11-27 DIAGNOSIS — R79.89 ABNORMAL THYROID BLOOD TEST: ICD-10-CM

## 2020-11-27 DIAGNOSIS — R41.89 EPISODE OF UNRESPONSIVENESS: ICD-10-CM

## 2020-11-27 LAB
CREAT UR-MCNC: 49.8 MG/DL
MICROALBUMIN UR-MCNC: 13.7 MG/L (ref 0–20)
MICROALBUMIN/CREAT 24H UR: 28 MG/G CREATININE (ref 0–30)

## 2020-11-27 PROCEDURE — 82043 UR ALBUMIN QUANTITATIVE: CPT | Performed by: FAMILY MEDICINE

## 2020-11-27 PROCEDURE — 82570 ASSAY OF URINE CREATININE: CPT | Performed by: FAMILY MEDICINE

## 2020-11-27 PROCEDURE — 99495 TRANSJ CARE MGMT MOD F2F 14D: CPT | Performed by: FAMILY MEDICINE

## 2020-12-10 ENCOUNTER — CONSULT (OUTPATIENT)
Dept: ENDOCRINOLOGY | Facility: CLINIC | Age: 40
End: 2020-12-10
Payer: COMMERCIAL

## 2020-12-10 ENCOUNTER — TELEPHONE (OUTPATIENT)
Dept: ADMINISTRATIVE | Facility: OTHER | Age: 40
End: 2020-12-10

## 2020-12-10 VITALS
DIASTOLIC BLOOD PRESSURE: 80 MMHG | HEIGHT: 65 IN | BODY MASS INDEX: 30.99 KG/M2 | SYSTOLIC BLOOD PRESSURE: 124 MMHG | HEART RATE: 69 BPM | WEIGHT: 186 LBS

## 2020-12-10 DIAGNOSIS — Z79.4 CURRENT USE OF INSULIN (HCC): Primary | ICD-10-CM

## 2020-12-10 DIAGNOSIS — E11.65 UNCONTROLLED TYPE 2 DIABETES MELLITUS WITH HYPERGLYCEMIA, WITHOUT LONG-TERM CURRENT USE OF INSULIN (HCC): ICD-10-CM

## 2020-12-10 PROCEDURE — 99244 OFF/OP CNSLTJ NEW/EST MOD 40: CPT | Performed by: INTERNAL MEDICINE

## 2020-12-10 RX ORDER — LANCETS
EACH MISCELLANEOUS
Qty: 102 EACH | Refills: 4 | Status: SHIPPED | OUTPATIENT
Start: 2020-12-10 | End: 2021-02-17 | Stop reason: SDUPTHER

## 2020-12-10 RX ORDER — BLOOD SUGAR DIAGNOSTIC
STRIP MISCELLANEOUS
Qty: 100 EACH | Refills: 4 | Status: SHIPPED | OUTPATIENT
Start: 2020-12-10 | End: 2021-02-17 | Stop reason: SDUPTHER

## 2021-01-11 ENCOUNTER — OFFICE VISIT (OUTPATIENT)
Dept: NEUROLOGY | Facility: CLINIC | Age: 41
End: 2021-01-11
Payer: COMMERCIAL

## 2021-01-11 VITALS
BODY MASS INDEX: 29.99 KG/M2 | DIASTOLIC BLOOD PRESSURE: 66 MMHG | SYSTOLIC BLOOD PRESSURE: 114 MMHG | HEART RATE: 83 BPM | WEIGHT: 180.2 LBS

## 2021-01-11 DIAGNOSIS — R41.89 EPISODE OF UNRESPONSIVENESS: Primary | ICD-10-CM

## 2021-01-11 PROCEDURE — 99214 OFFICE O/P EST MOD 30 MIN: CPT | Performed by: PHYSICIAN ASSISTANT

## 2021-01-11 NOTE — PROGRESS NOTES
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, perhaps toxic-metabolic encephalopathy, or related to a stress or dissociative response  No further workup conducted in the hospital     Patient has not had any further events  She does describe other events ongoing for several years associated with feeling shaky along with nausea and vomiting, which is more likely fluctuations in her glucose  No LOC  For completeness, will order sleep deprived EEG  She has no family history of seizures/epilepsy, no personal risk factors for epilepsy  Discussed continuing to follow with endocrinology (newly established) to maintain adequate glucose control and prevent extreme fluctuations in her sugar  Can follow with attending once EEG completed  She was advised to call for any new or worsening symptoms, or if any further events  No problem-specific Assessment & Plan notes found for this encounter  Diagnoses and all orders for this visit:    Episode of unresponsiveness  -     EEG Sleep deprived; Future           Subjective:    DIXON    Lalo Noonan is a 45 y o  female with history of uncontrolled DM who presents today for a hospital follow up  Patient presented to the ED on 11/21/2020 with onset of nausea and vomiting followed by unresponsiveness and altered mentation   Patient was at a birthday party and admitted to having cake as well as wine  She was feeling fine until at the party but afterwards she began to feel very nauseous and vomited  This was followed by a feeling like she was flushed and felt dizziness described as lightheadedness  Then she states that she entered a state where she was aware of everything around her and what people were saying but she was unable to move any of her extremities or speak  She denies ever losing consciousness and remembers everything from the event to her arrival to the hospital and subsequent admission  Upon arrival to the ED she was found to be nonverbal and would not follow commands but had her eyes open and would respond to yes/no questions with blinking per report  Glucose reported to be 426 prior to arrival  She received a head CT that was unremarkable for any acute pathology  UDS was positive for THC  Of note, her A1c was 12 8 in July  Per nursing notes, despite having no movement prior, in response to having a catheter placed she suddenly moved her legs and used her left arm to push away the catheter  She also received a CT angiogram head/neck upon admission that was unremarkable  The next morning on 11/22 when seen by neurology, she was at her baseline with no complaints  She was evaluated by Dr Ryder Valdez  Per his note, 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  This may have been toxic-metabolic encephalopathy in the setting of hyperglycemia and substance use (THC) or may have been related to a stress or dissociative response causing a retarded catatonic-like state  He had low suspicion for primary CNS pathology or epilepsy although she did report an unexplained syncopal episode in July 2020 and other episodic nausea with shivering (which is more likely related to fluctuations in her glucose)  No further inpatient workup was suggested, including EEG, MRI brain  EEG suggested in the outpatient setting if she continued to have these episodes      Today, patient reports she is doing well  She denies any episodes similar to what she had in November, but does continue to report a few episodes intermittently of feeling "shaky" associated with nausea, vomiting  No LOC  She has had these episodes for years  She recently established care with endocrinology to help control her blood sugars  The following portions of the patient's history were reviewed and updated as appropriate: current medications, past family history, past medical history, past social history, past surgical history and problem list          Objective:    Blood pressure 114/66, pulse 83, weight 81 7 kg (180 lb 3 2 oz)    Physical Exam  Constitutional:       Appearance: Normal appearance  She is well-developed  HENT:      Head: Normocephalic and atraumatic  Eyes:      Extraocular Movements: EOM normal       Pupils: Pupils are equal, round, and reactive to light  Skin:     General: Skin is warm and dry  Neurological:      Mental Status: She is alert  Coordination: Coordination is intact  Deep Tendon Reflexes: Strength normal and reflexes are normal and symmetric  Psychiatric:         Mood and Affect: Mood normal          Speech: Speech normal          Behavior: Behavior normal          Neurological Exam  Mental Status  Alert  Oriented to person, place, time and situation  Speech is normal  Language is fluent with no aphasia  Attention and concentration are normal     Cranial Nerves  CN II: Visual fields full to confrontation  CN III, IV, VI: Extraocular movements intact bilaterally  Pupils equal round and reactive to light bilaterally  CN V: Facial sensation is normal   CN VII: Full and symmetric facial movement  CN VIII: Hearing is normal   CN IX, X: Palate elevates symmetrically  CN XI: Shoulder shrug strength is normal   CN XII: Tongue midline without atrophy or fasciculations  Motor   Normal muscle tone  Strength is 5/5 throughout all four extremities      Sensory  Light touch is normal in upper and lower extremities  Reflexes  Deep tendon reflexes are 2+ and symmetric in all four extremities with downgoing toes bilaterally  Coordination  Finger-to-nose, rapid alternating movements and heel-to-shin normal bilaterally without dysmetria  Gait  Casual gait is normal including stance, stride, and arm swing  ROS:    Review of Systems   Constitutional: Positive for fatigue  Negative for appetite change and fever  HENT: Positive for trouble swallowing  Negative for hearing loss, tinnitus and voice change  Sinus problems   Eyes: Negative  Negative for photophobia and pain  Respiratory: Negative  Negative for shortness of breath  Cardiovascular: Negative  Negative for palpitations  Gastrointestinal: Positive for abdominal pain, diarrhea, nausea and vomiting  Endocrine: Negative  Negative for cold intolerance  Genitourinary: Negative  Negative for dysuria, frequency and urgency  Musculoskeletal: Positive for back pain, gait problem and myalgias  Negative for neck pain  Skin: Negative  Negative for rash  Neurological: Positive for dizziness, light-headedness, numbness and headaches  Negative for tremors, seizures, syncope, facial asymmetry, speech difficulty and weakness  Tingling     Hematological: Negative  Does not bruise/bleed easily  Psychiatric/Behavioral: Negative  Negative for confusion, hallucinations and sleep disturbance       I personally reviewed and updated the ROS as appropriate

## 2021-02-17 DIAGNOSIS — Z79.4 TYPE 2 DIABETES MELLITUS WITH OTHER SPECIFIED COMPLICATION, WITH LONG-TERM CURRENT USE OF INSULIN (HCC): ICD-10-CM

## 2021-02-17 DIAGNOSIS — Z01.00 DIABETIC EYE EXAM (HCC): ICD-10-CM

## 2021-02-17 DIAGNOSIS — E11.9 DIABETIC EYE EXAM (HCC): ICD-10-CM

## 2021-02-17 DIAGNOSIS — E11.69 TYPE 2 DIABETES MELLITUS WITH OTHER SPECIFIED COMPLICATION, WITH LONG-TERM CURRENT USE OF INSULIN (HCC): ICD-10-CM

## 2021-02-17 DIAGNOSIS — E11.65 UNCONTROLLED TYPE 2 DIABETES MELLITUS WITH HYPERGLYCEMIA, WITHOUT LONG-TERM CURRENT USE OF INSULIN (HCC): ICD-10-CM

## 2021-02-17 DIAGNOSIS — Z79.4 CURRENT USE OF INSULIN (HCC): ICD-10-CM

## 2021-02-17 RX ORDER — LANCETS
EACH MISCELLANEOUS
Qty: 200 EACH | Refills: 0 | Status: SHIPPED | OUTPATIENT
Start: 2021-02-17 | End: 2021-03-18 | Stop reason: SDUPTHER

## 2021-02-17 RX ORDER — INSULIN ADMIN. SUPPLIES
INSULIN PEN (EA) SUBCUTANEOUS 4 TIMES DAILY
Qty: 400 EACH | Refills: 0 | Status: SHIPPED | OUTPATIENT
Start: 2021-02-17 | End: 2021-03-18 | Stop reason: SDUPTHER

## 2021-02-17 RX ORDER — BLOOD SUGAR DIAGNOSTIC
STRIP MISCELLANEOUS
Qty: 200 EACH | Refills: 0 | Status: SHIPPED | OUTPATIENT
Start: 2021-02-17 | End: 2021-03-18 | Stop reason: SDUPTHER

## 2021-02-24 ENCOUNTER — HOSPITAL ENCOUNTER (OUTPATIENT)
Dept: NEUROLOGY | Facility: CLINIC | Age: 41
Discharge: HOME | End: 2021-02-24
Payer: COMMERCIAL

## 2021-02-24 DIAGNOSIS — R41.89 EPISODE OF UNRESPONSIVENESS: ICD-10-CM

## 2021-02-24 PROCEDURE — 95812 EEG 41-60 MINUTES: CPT | Performed by: PSYCHIATRY & NEUROLOGY

## 2021-02-24 PROCEDURE — 95819 EEG AWAKE AND ASLEEP: CPT

## 2021-02-25 ENCOUNTER — TELEPHONE (OUTPATIENT)
Dept: NEUROLOGY | Facility: CLINIC | Age: 41
End: 2021-02-25

## 2021-02-25 NOTE — TELEPHONE ENCOUNTER
----- Message from Rafael Amador PA-C sent at 2/25/2021  8:52 AM EST -----  Please let patient know EEG is normal   She should keep the f/u with Dr Estefania Kelly next month as scheduled    Thanks

## 2021-03-18 DIAGNOSIS — E11.9 DIABETIC EYE EXAM (HCC): ICD-10-CM

## 2021-03-18 DIAGNOSIS — E11.65 UNCONTROLLED TYPE 2 DIABETES MELLITUS WITH HYPERGLYCEMIA, WITHOUT LONG-TERM CURRENT USE OF INSULIN (HCC): ICD-10-CM

## 2021-03-18 DIAGNOSIS — Z79.4 CURRENT USE OF INSULIN (HCC): ICD-10-CM

## 2021-03-18 DIAGNOSIS — Z79.4 TYPE 2 DIABETES MELLITUS WITH OTHER SPECIFIED COMPLICATION, WITH LONG-TERM CURRENT USE OF INSULIN (HCC): ICD-10-CM

## 2021-03-18 DIAGNOSIS — E11.69 TYPE 2 DIABETES MELLITUS WITH OTHER SPECIFIED COMPLICATION, WITH LONG-TERM CURRENT USE OF INSULIN (HCC): ICD-10-CM

## 2021-03-18 DIAGNOSIS — Z01.00 DIABETIC EYE EXAM (HCC): ICD-10-CM

## 2021-03-18 RX ORDER — BLOOD SUGAR DIAGNOSTIC
STRIP MISCELLANEOUS
Qty: 300 EACH | Refills: 1 | Status: SHIPPED | OUTPATIENT
Start: 2021-03-18 | End: 2021-03-18

## 2021-03-18 RX ORDER — INSULIN ADMIN. SUPPLIES
INSULIN PEN (EA) SUBCUTANEOUS 4 TIMES DAILY
Qty: 400 EACH | Refills: 0 | Status: SHIPPED | OUTPATIENT
Start: 2021-03-18

## 2021-03-18 RX ORDER — LANCETS
EACH MISCELLANEOUS
Qty: 300 EACH | Refills: 1 | Status: SHIPPED | OUTPATIENT
Start: 2021-03-18

## 2021-03-18 RX ORDER — BLOOD SUGAR DIAGNOSTIC
STRIP MISCELLANEOUS
Qty: 100 EACH | Refills: 1 | Status: SHIPPED | OUTPATIENT
Start: 2021-03-18

## 2021-03-23 ENCOUNTER — TELEPHONE (OUTPATIENT)
Dept: NEUROLOGY | Facility: CLINIC | Age: 41
End: 2021-03-23

## 2021-03-25 ENCOUNTER — OFFICE VISIT (OUTPATIENT)
Dept: NEUROLOGY | Facility: CLINIC | Age: 41
End: 2021-03-25
Payer: COMMERCIAL

## 2021-03-25 VITALS
WEIGHT: 175 LBS | SYSTOLIC BLOOD PRESSURE: 98 MMHG | BODY MASS INDEX: 29.16 KG/M2 | HEIGHT: 65 IN | HEART RATE: 81 BPM | DIASTOLIC BLOOD PRESSURE: 60 MMHG

## 2021-03-25 DIAGNOSIS — R41.89 RECURRENT EPISODES OF UNRESPONSIVENESS: Primary | ICD-10-CM

## 2021-03-25 DIAGNOSIS — R55 SYNCOPE: ICD-10-CM

## 2021-03-25 DIAGNOSIS — R25.1 EPISODE OF SHAKING: ICD-10-CM

## 2021-03-25 PROCEDURE — 99215 OFFICE O/P EST HI 40 MIN: CPT | Performed by: PSYCHIATRY & NEUROLOGY
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
## 2021-03-26 ENCOUNTER — TELEPHONE (OUTPATIENT)
Dept: NEUROLOGY | Facility: CLINIC | Age: 41
End: 2021-03-26

## 2021-03-26 DIAGNOSIS — R41.89 RECURRENT EPISODES OF UNRESPONSIVENESS: Primary | ICD-10-CM

## 2021-03-26 NOTE — TELEPHONE ENCOUNTER
Spoke to patient  Agreeable to EMU admission  Scheduled for 4/20/2021 8am  Added to EMU calendar  ADT21 order entered  EMU information mailed to patient  [facility] EMU admission for 4/20/2021 8am      Dr Figueroa - [facility] - patient scheduled for 4/20/21

## 2021-03-26 NOTE — TELEPHONE ENCOUNTER
----- Message from Johnny Vladez MD sent at 3/25/2021  6:14 PM EDT -----  Regarding: emu admission  Please arrange emu admission to characterize events  Thanks

## 2021-04-05 ENCOUNTER — IMMUNIZATIONS (OUTPATIENT)
Dept: FAMILY MEDICINE CLINIC | Facility: HOSPITAL | Age: 41
End: 2021-04-05

## 2021-04-05 DIAGNOSIS — Z23 ENCOUNTER FOR IMMUNIZATION: Primary | ICD-10-CM

## 2021-04-05 PROCEDURE — 0011A SARS-COV-2 / COVID-19 MRNA VACCINE (MODERNA) 100 MCG: CPT

## 2021-04-05 PROCEDURE — 91301 SARS-COV-2 / COVID-19 MRNA VACCINE (MODERNA) 100 MCG: CPT

## 2021-04-12 ENCOUNTER — APPOINTMENT (OUTPATIENT)
Dept: LAB | Facility: HOSPITAL | Age: 41
End: 2021-04-12
Attending: INTERNAL MEDICINE
Payer: COMMERCIAL

## 2021-04-12 DIAGNOSIS — E11.65 UNCONTROLLED TYPE 2 DIABETES MELLITUS WITH HYPERGLYCEMIA, WITHOUT LONG-TERM CURRENT USE OF INSULIN (HCC): ICD-10-CM

## 2021-04-12 DIAGNOSIS — Z79.4 CURRENT USE OF INSULIN (HCC): ICD-10-CM

## 2021-04-12 LAB
ANION GAP SERPL CALCULATED.3IONS-SCNC: 8 MMOL/L (ref 5–14)
BUN SERPL-MCNC: 9 MG/DL (ref 5–25)
CALCIUM SERPL-MCNC: 9.4 MG/DL (ref 8.4–10.2)
CHLORIDE SERPL-SCNC: 100 MMOL/L (ref 97–108)
CO2 SERPL-SCNC: 26 MMOL/L (ref 22–30)
CREAT SERPL-MCNC: 0.69 MG/DL (ref 0.6–1.2)
EST. AVERAGE GLUCOSE BLD GHB EST-MCNC: 286 MG/DL
GFR SERPL CREATININE-BSD FRML MDRD: 127 ML/MIN/1.73SQ M
GLUCOSE P FAST SERPL-MCNC: 368 MG/DL (ref 70–99)
HBA1C MFR BLD: 11.6 %
POTASSIUM SERPL-SCNC: 4.1 MMOL/L (ref 3.6–5)
SODIUM SERPL-SCNC: 134 MMOL/L (ref 137–147)

## 2021-04-12 PROCEDURE — 86341 ISLET CELL ANTIBODY: CPT

## 2021-04-12 PROCEDURE — 36415 COLL VENOUS BLD VENIPUNCTURE: CPT

## 2021-04-12 PROCEDURE — 80048 BASIC METABOLIC PNL TOTAL CA: CPT

## 2021-04-12 PROCEDURE — 83519 RIA NONANTIBODY: CPT

## 2021-04-12 PROCEDURE — 83036 HEMOGLOBIN GLYCOSYLATED A1C: CPT

## 2021-04-13 ENCOUNTER — OFFICE VISIT (OUTPATIENT)
Dept: ENDOCRINOLOGY | Facility: CLINIC | Age: 41
End: 2021-04-13
Payer: COMMERCIAL

## 2021-04-13 VITALS
SYSTOLIC BLOOD PRESSURE: 102 MMHG | HEART RATE: 78 BPM | DIASTOLIC BLOOD PRESSURE: 70 MMHG | HEIGHT: 65 IN | BODY MASS INDEX: 29.84 KG/M2 | WEIGHT: 179.13 LBS

## 2021-04-13 DIAGNOSIS — Z01.00 DIABETIC EYE EXAM (HCC): ICD-10-CM

## 2021-04-13 DIAGNOSIS — E11.65 UNCONTROLLED TYPE 2 DIABETES MELLITUS WITH HYPERGLYCEMIA, WITHOUT LONG-TERM CURRENT USE OF INSULIN (HCC): Primary | ICD-10-CM

## 2021-04-13 DIAGNOSIS — E11.9 DIABETIC EYE EXAM (HCC): ICD-10-CM

## 2021-04-13 DIAGNOSIS — Z79.4 TYPE 2 DIABETES MELLITUS WITH OTHER SPECIFIED COMPLICATION, WITH LONG-TERM CURRENT USE OF INSULIN (HCC): ICD-10-CM

## 2021-04-13 DIAGNOSIS — E11.69 TYPE 2 DIABETES MELLITUS WITH OTHER SPECIFIED COMPLICATION, WITH LONG-TERM CURRENT USE OF INSULIN (HCC): ICD-10-CM

## 2021-04-13 DIAGNOSIS — Z79.4 CURRENT USE OF INSULIN (HCC): ICD-10-CM

## 2021-04-13 PROCEDURE — 99214 OFFICE O/P EST MOD 30 MIN: CPT | Performed by: INTERNAL MEDICINE

## 2021-04-13 RX ORDER — PEN NEEDLE, DIABETIC 32GX 5/32"
NEEDLE, DISPOSABLE MISCELLANEOUS DAILY
Qty: 100 EACH | Refills: 3 | Status: SHIPPED | OUTPATIENT
Start: 2021-04-13 | End: 2022-01-05 | Stop reason: SDUPTHER

## 2021-04-13 NOTE — PROGRESS NOTES
Established Patient Progress Note      Chief Complaint   Patient presents with    Diabetes Type 2        History of Present Illness:   Kelly Laurent is a 45 y o  female with a history of type 2 diabetes with long term use of insulin for many years  Patient had gestational diabetes during 2nd pregnancy and she developed diabetes mellitus type 2 after 2nd pregnancy  Reports no micro or macrovascular complications  Hospitalized once in the past for hyperglycemia  Denies any issues with her current regimen  home glucose monitoring: none  She does not measure blood sugars because she cannot afford glucometer  ROS: denies polyuria, polydipsia, numbness and tingling in hands and feet    Home blood glucose readings: x     Current regimen: 0 25 mg of ozempic weekly  - has allergy to metformin  - she is not on levemir b/c she could not afford it    Last Eye Exam: 4/2021  Last Foot Exam: does not follow up with podiatry    Has hypertension: no  Has hyperlipidemia: no   Thyroid disorders: no    Patient Active Problem List   Diagnosis    Encounter for annual routine gynecological examination    Abnormal thyroid blood test    Papanicolaou smear of cervix with positive high risk human papilloma virus (HPV) test    Uncontrolled type 2 diabetes mellitus with hyperglycemia, without long-term current use of insulin ([facility])    Stress at home    [facility]    Obesity (BMI 30-39  9)    Numbness and tingling of both feet    Episode of unresponsiveness      Past Medical History:   Diagnosis Date    Diabetes mellitus ([address] )       Past Surgical History:   Procedure Laterality Date    HERNIA REPAIR      TUBAL LIGATION  2017      Family History   Problem Relation Age of Onset    Hypertension Mother    Eric Abt Arthritis Mother     Asthma Sister     Bipolar disorder Brother     Schizophrenia Brother     Asthma Brother     Asthma Brother     Diabetes Maternal Grandmother     Diabetes Paternal Grandmother     No Known Problems Maternal Grandfather     No Known Problems Paternal Grandfather      Social History     Tobacco Use    Smoking status: Never Smoker    Smokeless tobacco: Never Used   Substance Use Topics    Alcohol use: Not Currently     Frequency: Monthly or less     Drinks per session: 1 or 2     Binge frequency: Never     Allergies   Allergen Reactions    Metformin And Related Hives         Current Outpatient Medications:     Injection Device for Insulin (B-D PEN MINI) ENRICO, Use 4 (four) times a day Please use for Lantus and Humalog  4 pen needles daily Dx: Diabetes, Disp: 400 each, Rfl: 0    naproxen (EC NAPROSYN) 500 MG EC tablet, Take 1 tablet (500 mg total) by mouth 2 (two) times a day with meals (Patient taking differently: Take 500 mg by mouth 2 (two) times a day as needed ), Disp: 60 tablet, Rfl: 0    Semaglutide,0 25 or 0 5MG/DOS, 2 MG/1 5ML SOPN, Inject 0 5 mg under the skin once a week, Disp: 12 pen, Rfl: 0    Accu-Chek FastClix Lancets MISC, Test BG up to 3x daily as directed (Patient not taking: Reported on 4/13/2021), Disp: 300 each, Rfl: 1    Accu-Chek Guide test strip, TEST BG UP TO 3X DAILY AS DIRECTED (Patient not taking: Reported on 4/13/2021), Disp: 100 each, Rfl: 1    insulin detemir (LEVEMIR FLEXTOUCH) 100 Units/mL injection pen, Inject 20 Units under the skin daily at bedtime, Disp: 15 mL, Rfl: 2    Insulin Pen Needle (BD Pen Needle Em U/F) 32G X 4 MM MISC, Use daily, Disp: 100 each, Rfl: 3    methocarbamol (ROBAXIN) 500 mg tablet, Take 1 tablet (500 mg total) by mouth 4 (four) times a day for 10 days (Patient not taking: Reported on 4/13/2021), Disp: 40 tablet, Rfl: 0    Review of Systems   Constitutional: Negative for chills, fatigue and fever  HENT: Negative for congestion, postnasal drip and sore throat  Eyes: Negative for pain  Respiratory: Negative for cough and shortness of breath  Cardiovascular: Negative for chest pain, palpitations and leg swelling  Gastrointestinal: Negative for abdominal pain, blood in stool, constipation, diarrhea and nausea  Endocrine: Negative for polydipsia and polyuria  Genitourinary: Negative for dysuria  Musculoskeletal: Negative for arthralgias and back pain  Neurological: Negative for dizziness, light-headedness and headaches  Psychiatric/Behavioral: Negative for behavioral problems  The patient is not nervous/anxious  All other systems reviewed and are negative  Physical Exam:  Body mass index is 29 81 kg/m²  /70 (BP Location: Left arm, Patient Position: Sitting, Cuff Size: Standard)   Pulse 78   Ht 5' 5" (1 651 m)   Wt 81 3 kg (179 lb 2 oz)   BMI 29 81 kg/m²    Wt Readings from Last 3 Encounters:   04/13/21 81 3 kg (179 lb 2 oz)   03/25/21 79 4 kg (175 lb)   01/11/21 81 7 kg (180 lb 3 2 oz)       Physical Exam  Constitutional:       Appearance: She is well-developed  HENT:      Head: Normocephalic and atraumatic  Eyes:      General: No scleral icterus  Right eye: No discharge  Left eye: No discharge  Conjunctiva/sclera: Conjunctivae normal       Pupils: Pupils are equal, round, and reactive to light  Neck:      Musculoskeletal: Neck supple  Thyroid: No thyromegaly  Cardiovascular:      Rate and Rhythm: Normal rate and regular rhythm  Pulses: no weak pulses          Dorsalis pedis pulses are 2+ on the right side and 2+ on the left side  Posterior tibial pulses are 2+ on the right side and 2+ on the left side  Heart sounds: No murmur  Pulmonary:      Effort: Pulmonary effort is normal  No respiratory distress  Breath sounds: Normal breath sounds  Abdominal:      General: There is no distension  Palpations: Abdomen is soft  Tenderness: There is no abdominal tenderness  Feet:      Right foot:      Skin integrity: No ulcer, skin breakdown, erythema, warmth, callus or dry skin        Left foot:      Skin integrity: No ulcer, skin breakdown, erythema, warmth, callus or dry skin  Skin:     General: Skin is warm and dry  Neurological:      Mental Status: She is alert  Patient's shoes and socks removed  Right Foot/Ankle   Right Foot Inspection  Skin Exam: skin normal and skin intact no dry skin, no warmth, no callus, no erythema, no maceration, no abnormal color, no pre-ulcer, no ulcer and no callus                          Toe Exam: ROM and strength within normal limitsno swelling, no tenderness, erythema and  no right toe deformity  Sensory   Vibration: intact  Proprioception: intact   Monofilament testing: intact  Vascular    The right DP pulse is 2+  The right PT pulse is 2+  Left Foot/Ankle  Left Foot Inspection  Skin Exam: skin normal and skin intactno dry skin, no warmth, no erythema, no maceration, normal color, no pre-ulcer, no ulcer and no callus                         Toe Exam: ROM and strength within normal limitsno swelling, no tenderness, no erythema and no left toe deformity                   Sensory   Vibration: intact  Proprioception: intact  Monofilament: intact  Vascular    The left DP pulse is 2+  The left PT pulse is 2+  Assign Risk Category:  No deformity present; No loss of protective sensation;  No weak pulses       Risk: 0      Labs:   Lab Results   Component Value Date    HGBA1C 11 6 (H) 04/12/2021    HGBA1C 12 6 (H) 11/22/2020    HGBA1C 12 8 (A) 07/21/2020     Lab Results   Component Value Date    CREATININE 0 69 04/12/2021    CREATININE 0 73 11/22/2020    CREATININE 1 05 11/21/2020    BUN 9 04/12/2021     (L) 01/06/2014    K 4 1 04/12/2021     04/12/2021    CO2 26 04/12/2021     eGFR   Date Value Ref Range Status   04/12/2021 127 >60 ml/min/1 73sq m Final     Lab Results   Component Value Date    HDL 55 09/23/2019    TRIG 91 09/23/2019     Lab Results   Component Value Date    ALT 12 11/22/2020    AST 7 11/22/2020    ALKPHOS 65 11/22/2020     Lab Results   Component Value Date [id number] 1 105 11/21/2020    [id number] 2 900 09/23/2019     No results found for: FREET4, TSI    Impression & Plan:    Shruti West was seen today for diabetes type 2  Diagnoses and all orders for this visit:    Uncontrolled type 2 diabetes mellitus with hyperglycemia, without long-term current use of insulin (HCC) - most recent A1c 11 6%, CHAPITO, zinc transporter antibodies - pending  Insulin sent to [facility] - pt informed that she will have to pay deductible  Provided info about [employer] brandee and free glucometer when signed up for the brandee  Requested to measure BG 4x daily, send a log in 2 weeks  - increase ozempic to 0 5 mg weekly  - refusing to see diabetic educator regarding nutrition  -     insulin detemir (LEVEMIR FLEXTOUCH) 100 Units/mL injection pen; Inject 20 Units under the skin daily at bedtime  -     Insulin Pen Needle (BD Pen Needle Em U/F) 32G X 4 MM MISC; Use daily  -     Semaglutide,0 25 or 0 5MG/DOS, 2 MG/1 5ML SOPN; Inject 0 5 mg under the skin once a week  -     Basic metabolic panel Lab Collect; Future  -     HEMOGLOBIN A1C W/ EAG ESTIMATION Lab Collect; Future    Current use of insulin (HCC)  -     insulin detemir (LEVEMIR FLEXTOUCH) 100 Units/mL injection pen; Inject 20 Units under the skin daily at bedtime    Type 2 diabetes mellitus with other specified complication, with long-term current use of insulin (HCC)  -     Semaglutide,0 25 or 0 5MG/DOS, 2 MG/1 5ML SOPN; Inject 0 5 mg under the skin once a week    Diabetic eye exam ([facility] ) - pt completed eye exam    There are no Patient Instructions on file for this visit  Discussed with the patient and all questioned fully answered  She will call me if any problems arise  Follow-up appointment in 3 months       Counseled patient on diagnostic results, prognosis, risk and benefit of treatment options, instruction for management, importance of treatment compliance, Risk  factor reduction and impressions    Maria Luisa NG Mariana Cook MD

## 2021-04-14 LAB — GAD65 AB SER-ACNC: <5 U/ML (ref 0–5)

## 2021-04-19 NOTE — TELEPHONE ENCOUNTER
Authorization still pending for patient's EMU admission that is scheduled for tomorrow  Spoke with [employer] and this requires a peer to peer       Please complete

## 2021-04-20 ENCOUNTER — APPOINTMENT (INPATIENT)
Dept: NEUROLOGY | Facility: CLINIC | Age: 41
DRG: 101 | End: 2021-04-20
Payer: COMMERCIAL

## 2021-04-20 ENCOUNTER — HOSPITAL ENCOUNTER (INPATIENT)
Facility: HOSPITAL | Age: 41
LOS: 5 days | Discharge: HOME | DRG: 101 | End: 2021-04-25
Attending: PSYCHIATRY & NEUROLOGY | Admitting: PSYCHIATRY & NEUROLOGY
Payer: COMMERCIAL

## 2021-04-20 DIAGNOSIS — S16.1XXA STRAIN OF NECK MUSCLE, INITIAL ENCOUNTER: ICD-10-CM

## 2021-04-20 DIAGNOSIS — R40.4 ALTERED AWARENESS, TRANSIENT: ICD-10-CM

## 2021-04-20 DIAGNOSIS — R41.89 RECURRENT EPISODES OF UNRESPONSIVENESS: Primary | ICD-10-CM

## 2021-04-20 DIAGNOSIS — S46.912A STRAIN OF LEFT SHOULDER, INITIAL ENCOUNTER: ICD-10-CM

## 2021-04-20 DIAGNOSIS — E11.65 UNCONTROLLED TYPE 2 DIABETES MELLITUS WITH HYPERGLYCEMIA, WITHOUT LONG-TERM CURRENT USE OF INSULIN (HCC): ICD-10-CM

## 2021-04-20 PROBLEM — R51.9 FREQUENT HEADACHES: Status: ACTIVE | Noted: 2021-04-20

## 2021-04-20 LAB
ALBUMIN SERPL BCP-MCNC: 3.2 G/DL (ref 3.5–5)
ALP SERPL-CCNC: 57 U/L (ref 46–116)
ALT SERPL W P-5'-P-CCNC: 17 U/L (ref 12–78)
ANION GAP SERPL CALCULATED.3IONS-SCNC: 5 MMOL/L (ref 4–13)
AST SERPL W P-5'-P-CCNC: 15 U/L (ref 5–45)
BASOPHILS # BLD AUTO: 0.03 THOUSANDS/ΜL (ref 0–0.1)
BASOPHILS NFR BLD AUTO: 1 % (ref 0–1)
BILIRUB SERPL-MCNC: 0.5 MG/DL (ref 0.2–1)
BUN SERPL-MCNC: 10 MG/DL (ref 5–25)
CALCIUM ALBUM COR SERPL-MCNC: 9.7 MG/DL (ref 8.3–10.1)
CALCIUM SERPL-MCNC: 9.1 MG/DL (ref 8.3–10.1)
CHLORIDE SERPL-SCNC: 108 MMOL/L (ref 100–108)
CO2 SERPL-SCNC: 25 MMOL/L (ref 21–32)
CREAT SERPL-MCNC: 0.76 MG/DL (ref 0.6–1.3)
EOSINOPHIL # BLD AUTO: 0.16 THOUSAND/ΜL (ref 0–0.61)
EOSINOPHIL NFR BLD AUTO: 3 % (ref 0–6)
ERYTHROCYTE [DISTWIDTH] IN BLOOD BY AUTOMATED COUNT: 12.3 % (ref 11.6–15.1)
GFR SERPL CREATININE-BSD FRML MDRD: 114 ML/MIN/1.73SQ M
GLUCOSE SERPL-MCNC: 135 MG/DL (ref 65–140)
GLUCOSE SERPL-MCNC: 135 MG/DL (ref 65–140)
GLUCOSE SERPL-MCNC: 155 MG/DL (ref 65–140)
GLUCOSE SERPL-MCNC: 169 MG/DL (ref 65–140)
HCT VFR BLD AUTO: 42.6 % (ref 34.8–46.1)
HGB BLD-MCNC: 13.6 G/DL (ref 11.5–15.4)
IMM GRANULOCYTES # BLD AUTO: 0.01 THOUSAND/UL (ref 0–0.2)
IMM GRANULOCYTES NFR BLD AUTO: 0 % (ref 0–2)
LYMPHOCYTES # BLD AUTO: 2.74 THOUSANDS/ΜL (ref 0.6–4.47)
LYMPHOCYTES NFR BLD AUTO: 50 % (ref 14–44)
MCH RBC QN AUTO: 28.8 PG (ref 26.8–34.3)
MCHC RBC AUTO-ENTMCNC: 31.9 G/DL (ref 31.4–37.4)
MCV RBC AUTO: 90 FL (ref 82–98)
MONOCYTES # BLD AUTO: 0.53 THOUSAND/ΜL (ref 0.17–1.22)
MONOCYTES NFR BLD AUTO: 10 % (ref 4–12)
NEUTROPHILS # BLD AUTO: 1.97 THOUSANDS/ΜL (ref 1.85–7.62)
NEUTS SEG NFR BLD AUTO: 36 % (ref 43–75)
NRBC BLD AUTO-RTO: 0 /100 WBCS
PLATELET # BLD AUTO: 292 THOUSANDS/UL (ref 149–390)
PMV BLD AUTO: 9.2 FL (ref 8.9–12.7)
POTASSIUM SERPL-SCNC: 4.2 MMOL/L (ref 3.5–5.3)
PROT SERPL-MCNC: 7.1 G/DL (ref 6.4–8.2)
RBC # BLD AUTO: 4.72 MILLION/UL (ref 3.81–5.12)
SODIUM SERPL-SCNC: 138 MMOL/L (ref 136–145)
WBC # BLD AUTO: 5.44 THOUSAND/UL (ref 4.31–10.16)

## 2021-04-20 PROCEDURE — 95700 EEG CONT REC W/VID EEG TECH: CPT

## 2021-04-20 PROCEDURE — 85025 COMPLETE CBC W/AUTO DIFF WBC: CPT | Performed by: PSYCHIATRY & NEUROLOGY

## 2021-04-20 PROCEDURE — 99254 IP/OBS CNSLTJ NEW/EST MOD 60: CPT | Performed by: INTERNAL MEDICINE

## 2021-04-20 PROCEDURE — 82948 REAGENT STRIP/BLOOD GLUCOSE: CPT

## 2021-04-20 PROCEDURE — 4A10X4Z MONITORING OF CENTRAL NERVOUS ELECTRICAL ACTIVITY, EXTERNAL APPROACH: ICD-10-PCS | Performed by: PSYCHIATRY & NEUROLOGY

## 2021-04-20 PROCEDURE — 95715 VEEG EA 12-26HR INTMT MNTR: CPT

## 2021-04-20 PROCEDURE — 99223 1ST HOSP IP/OBS HIGH 75: CPT | Performed by: PSYCHIATRY & NEUROLOGY

## 2021-04-20 PROCEDURE — 80053 COMPREHEN METABOLIC PANEL: CPT | Performed by: PSYCHIATRY & NEUROLOGY

## 2021-04-20 RX ORDER — DIPHENHYDRAMINE HCL 25 MG
25 TABLET ORAL EVERY 6 HOURS PRN
Status: DISCONTINUED | OUTPATIENT
Start: 2021-04-20 | End: 2021-04-25 | Stop reason: HOSPADM

## 2021-04-20 RX ORDER — ONDANSETRON 2 MG/ML
4 INJECTION INTRAMUSCULAR; INTRAVENOUS EVERY 6 HOURS PRN
Status: DISCONTINUED | OUTPATIENT
Start: 2021-04-20 | End: 2021-04-25 | Stop reason: HOSPADM

## 2021-04-20 RX ORDER — NAPROXEN 500 MG/1
500 TABLET ORAL 2 TIMES DAILY PRN
Status: DISCONTINUED | OUTPATIENT
Start: 2021-04-20 | End: 2021-04-25 | Stop reason: HOSPADM

## 2021-04-20 RX ORDER — NAPROXEN 500 MG/1
250 TABLET ORAL 2 TIMES DAILY PRN
Status: DISCONTINUED | OUTPATIENT
Start: 2021-04-20 | End: 2021-04-25 | Stop reason: HOSPADM

## 2021-04-20 RX ORDER — ACETAMINOPHEN 325 MG/1
650 TABLET ORAL EVERY 6 HOURS PRN
Status: DISCONTINUED | OUTPATIENT
Start: 2021-04-20 | End: 2021-04-25 | Stop reason: HOSPADM

## 2021-04-20 RX ORDER — LORAZEPAM 2 MG/ML
2 INJECTION INTRAMUSCULAR EVERY 8 HOURS PRN
Status: DISCONTINUED | OUTPATIENT
Start: 2021-04-20 | End: 2021-04-25 | Stop reason: HOSPADM

## 2021-04-20 RX ORDER — POLYETHYLENE GLYCOL 3350 17 G/17G
17 POWDER, FOR SOLUTION ORAL DAILY PRN
Status: DISCONTINUED | OUTPATIENT
Start: 2021-04-20 | End: 2021-04-25 | Stop reason: HOSPADM

## 2021-04-20 RX ADMIN — INSULIN DETEMIR 20 UNITS: 100 INJECTION, SOLUTION SUBCUTANEOUS at 21:51

## 2021-04-20 RX ADMIN — INSULIN LISPRO 1 UNITS: 100 INJECTION, SOLUTION INTRAVENOUS; SUBCUTANEOUS at 16:49

## 2021-04-20 RX ADMIN — INSULIN LISPRO 1 UNITS: 100 INJECTION, SOLUTION INTRAVENOUS; SUBCUTANEOUS at 21:52

## 2021-04-20 NOTE — TELEPHONE ENCOUNTER
Call received from [facility] requesting follow up on peer to peer as there is still no determination for patients EMU admission today  Called plan  Spoke to [employer]  He states there is only documentation peer to peer was requested at 8:39am 4/20/21  (Per [facility] [place], she had requested P2P 4/19/2021 as well)  [employer] states video eeg was approved but for outpatient study, they didn't feel this required 3-7 day admission  Ref #[id number]  Per Wellington Marin will be completed at 11am today  Patient is agreeable to waiting at the hospital for determination  Patient was updated on status of authorization throughout process

## 2021-04-20 NOTE — CONSULTS
Jim Corbett 94 1980, 45 y o  female MRN: [id number]  Unit/Bed#: [facility] 878-56 Encounter: [id number]  Primary Care Provider: Elba Phelps MD   Date and time admitted to hospital: 4/20/2021 11:10 AM    Inpatient consult to Internal Medicine  Consult performed by: Oli Trujillo MD  Consult ordered by: Adan Cm MD        Frequent headaches  Assessment & Plan  Management as per primary    Uncontrolled type 2 diabetes mellitus with hyperglycemia, without long-term current use of insulin [facility])  Assessment & Plan  Lab Results   Component Value Date    HGBA1C 11 6 (H) 04/12/2021       Recent Labs     04/20/21  1302 04/20/21  1607   POCGLU 135 155*       Blood Sugar Average: Last 72 hrs:  (P) 145   Patient follows with endocrinology outpatient  Will continue Levemir 20 units q h s , hold Ozempic  Diabetic diet, SSI, Accu-Cheks    * Recurrent episodes of unresponsiveness  Assessment & Plan  Patient undergo video EEG as per primary        VTE Prophylaxis: Enoxaparin (Lovenox)  / sequential compression device     Recommendations for Discharge:  · As per primary    Counseling / Coordination of Care Time: 45 minutes  Greater than 50% of total time spent on patient counseling and coordination of care  Collaboration of Care: Were Recommendations Directly Discussed with Primary Treatment Team? - Yes     History of Present Illness:    Tania Villanueva is a 45 y o  female who is originally admitted to the Neurology service due to periods of unresponsiveness  We are consulted for diabetic management  Patient was admitted by Neurology for video EEG for periods of unresponsiveness  There is concern for focal impaired aware seizures  Patient has a history of uncontrolled diabetes  She has recently started seeing an endocrinologist   She states she has been compliant with medications and diet  She does not have any complications from this    At present patient has no complaints  Review of Systems:    Review of Systems   Constitutional: Negative for activity change, chills, diaphoresis, fatigue and fever  HENT: Negative for congestion, rhinorrhea, sinus pressure and trouble swallowing  Eyes: Negative  Respiratory: Negative for cough, chest tightness and shortness of breath  Cardiovascular: Negative for chest pain, palpitations and leg swelling  Gastrointestinal: Negative for abdominal distention, abdominal pain, constipation, diarrhea, nausea and vomiting  Endocrine: Negative  Genitourinary: Negative for difficulty urinating, frequency, hematuria and urgency  Musculoskeletal: Negative for back pain, joint swelling and neck pain  Skin: Negative  Allergic/Immunologic: Negative  Neurological: Negative for dizziness, syncope, weakness, light-headedness and headaches  Hematological: Negative  Psychiatric/Behavioral: Negative  All other systems reviewed and are negative  Past Medical and Surgical History:     Past Medical History:   Diagnosis Date    Diabetes mellitus ([facility] )        Past Surgical History:   Procedure Laterality Date    HERNIA REPAIR      TUBAL LIGATION  2017       Meds/Allergies:    PTA meds:   Prior to Admission Medications   Prescriptions Last Dose Informant Patient Reported? Taking? Accu-Chek FastClix Lancets MISC Not Taking at Unknown time Self No No   Sig: Test BG up to 3x daily as directed   Patient not taking: Reported on 4/13/2021   Accu-Chek Guide test strip Not Taking at Unknown time Self No No   Sig: TEST BG UP TO 3X DAILY AS DIRECTED   Patient not taking: Reported on 4/13/2021   Injection Device for Insulin (B-D PEN MINI) ENRICO Unknown at Unknown time Self No No   Sig: Use 4 (four) times a day Please use for Lantus and Humalog   4 pen needles daily Dx: Diabetes   Insulin Pen Needle (BD Pen Needle Em U/F) 32G X 4 MM MISC Unknown at Unknown time  No No   Sig: Use daily   Semaglutide,0 25 or 0 5MG/DOS, 2 MG/1 5ML SOPN 4/19/2021 at Unknown time  No Yes   Sig: Inject 0 5 mg under the skin once a week   insulin detemir (LEVEMIR FLEXTOUCH) 100 Units/mL injection pen Not Taking at Unknown time  No No   Sig: Inject 20 Units under the skin daily at bedtime   Patient not taking: Reported on 4/20/2021   methocarbamol (ROBAXIN) 500 mg tablet   No No   Sig: Take 1 tablet (500 mg total) by mouth 4 (four) times a day for 10 days   Patient not taking: Reported on 4/13/2021   naproxen (EC NAPROSYN) 500 MG EC tablet Past Week at Unknown time Self No Yes   Sig: Take 1 tablet (500 mg total) by mouth 2 (two) times a day with meals   Patient taking differently: Take 500 mg by mouth 2 (two) times a day as needed       Facility-Administered Medications: None       Allergies: Allergies   Allergen Reactions    Metformin And Related Hives       Social History:     Marital Status: Single    Substance Use History:   Social History     Substance and Sexual Activity   Alcohol Use Not Currently    Frequency: Monthly or less    Drinks per session: 1 or 2    Binge frequency: Never     Social History     Tobacco Use   Smoking Status Never Smoker   Smokeless Tobacco Never Used     Social History     Substance and Sexual Activity   Drug Use Never       Family History:    Family History   Problem Relation Age of Onset    Hypertension Mother     Arthritis Mother     Asthma Sister     Bipolar disorder Brother     Schizophrenia Brother     Asthma Brother     Asthma Brother     Diabetes Maternal Grandmother     Diabetes Paternal Grandmother     No Known Problems Maternal Grandfather     No Known Problems Paternal Grandfather        Physical Exam:     Vitals:   Blood Pressure: 108/73 (04/20/21 1519)  Pulse: 77 (04/20/21 1519)  Temperature: 98 5 °F (36 9 °C) (04/20/21 1519)  Respirations: 17 (04/20/21 1519)  SpO2: 96 % (04/20/21 1519)    Physical Exam  Vitals signs and nursing note reviewed     Constitutional:       Appearance: Normal appearance  She is normal weight  HENT:      Head: Normocephalic and atraumatic  Right Ear: External ear normal       Left Ear: External ear normal       Nose: Nose normal       Mouth/Throat:      Mouth: Mucous membranes are moist       Pharynx: Oropharynx is clear  Eyes:      Conjunctiva/sclera: Conjunctivae normal       Pupils: Pupils are equal, round, and reactive to light  Neck:      Musculoskeletal: Neck supple  No muscular tenderness  Cardiovascular:      Rate and Rhythm: Normal rate and regular rhythm  Pulses: Normal pulses  Heart sounds: Normal heart sounds  Pulmonary:      Effort: Pulmonary effort is normal       Breath sounds: Normal breath sounds  Abdominal:      General: Abdomen is flat  Bowel sounds are normal       Palpations: Abdomen is soft  Musculoskeletal:         General: No swelling or tenderness  Skin:     General: Skin is warm and dry  Capillary Refill: Capillary refill takes less than 2 seconds  Neurological:      General: No focal deficit present  Mental Status: She is alert and oriented to person, place, and time  Mental status is at baseline  Psychiatric:         Mood and Affect: Mood normal          Behavior: Behavior normal          Thought Content: Thought content normal          Judgment: Judgment normal        Additional Data:     Lab Results: I have personally reviewed pertinent reports        Results from last 7 days   Lab Units 04/20/21  1415   WBC Thousand/uL 5 44   HEMOGLOBIN g/dL 13 6   HEMATOCRIT % 42 6   PLATELETS Thousands/uL 292   NEUTROS PCT % 36*   LYMPHS PCT % 50*   MONOS PCT % 10   EOS PCT % 3     Results from last 7 days   Lab Units 04/20/21  1415   SODIUM mmol/L 138   POTASSIUM mmol/L 4 2   CHLORIDE mmol/L 108   CO2 mmol/L 25   BUN mg/dL 10   CREATININE mg/dL 0 76   ANION GAP mmol/L 5   CALCIUM mg/dL 9 1   ALBUMIN g/dL 3 2*   TOTAL BILIRUBIN mg/dL 0 50   ALK PHOS U/L 57   ALT U/L 17   AST U/L 15   GLUCOSE RANDOM mg/dL 135 Lab Results   Component Value Date/Time    HGBA1C 11 6 (H) 04/12/2021 07:31 AM    HGBA1C 12 6 (H) 11/22/2020 06:31 AM    HGBA1C 12 8 (A) 07/21/2020 03:53 PM    HGBA1C 13 (A) 04/23/2020 03:30 PM    HGBA1C 13 0 (A) 09/17/2019 03:28 PM    HGBA1C 12 9 (H) 01/23/2018 08:44 AM     Results from last 7 days   Lab Units 04/20/21  1607 04/20/21  1302   POC GLUCOSE mg/dl 155* 135           Imaging: I have personally reviewed pertinent reports  No orders to display       EKG, Pathology, and Other Studies Reviewed on Admission:   · EKG:  None available    ** Please Note: This note has been constructed using a voice recognition system   **

## 2021-04-20 NOTE — ASSESSMENT & PLAN NOTE
Lab Results   Component Value Date    HGBA1C 11 6 (H) 04/12/2021       Recent Labs     04/20/21  1302 04/20/21  1607   POCGLU 135 155*       Blood Sugar Average: Last 72 hrs:  (P) 145   Patient follows with endocrinology outpatient  Will continue Levemir 20 units q h s , hold Ozempic  Diabetic diet, SSI, Accu-Cheks

## 2021-04-21 ENCOUNTER — APPOINTMENT (INPATIENT)
Dept: NEUROLOGY | Facility: CLINIC | Age: 41
DRG: 101 | End: 2021-04-21
Payer: COMMERCIAL

## 2021-04-21 PROBLEM — Z79.4 UNCONTROLLED TYPE 2 DIABETES MELLITUS WITH HYPERGLYCEMIA, WITH LONG-TERM CURRENT USE OF INSULIN (HCC): Status: ACTIVE | Noted: 2017-02-01

## 2021-04-21 LAB
GLUCOSE SERPL-MCNC: 114 MG/DL (ref 65–140)
GLUCOSE SERPL-MCNC: 132 MG/DL (ref 65–140)
GLUCOSE SERPL-MCNC: 148 MG/DL (ref 65–140)
GLUCOSE SERPL-MCNC: 177 MG/DL (ref 65–140)

## 2021-04-21 PROCEDURE — 99232 SBSQ HOSP IP/OBS MODERATE 35: CPT | Performed by: INTERNAL MEDICINE

## 2021-04-21 PROCEDURE — 99232 SBSQ HOSP IP/OBS MODERATE 35: CPT | Performed by: PSYCHIATRY & NEUROLOGY

## 2021-04-21 PROCEDURE — 95715 VEEG EA 12-26HR INTMT MNTR: CPT

## 2021-04-21 PROCEDURE — 95720 EEG PHY/QHP EA INCR W/VEEG: CPT | Performed by: PSYCHIATRY & NEUROLOGY

## 2021-04-21 PROCEDURE — 82948 REAGENT STRIP/BLOOD GLUCOSE: CPT

## 2021-04-21 RX ADMIN — INSULIN DETEMIR 20 UNITS: 100 INJECTION, SOLUTION SUBCUTANEOUS at 21:50

## 2021-04-21 RX ADMIN — INSULIN LISPRO 3 UNITS: 100 INJECTION, SOLUTION INTRAVENOUS; SUBCUTANEOUS at 11:37

## 2021-04-21 RX ADMIN — ENOXAPARIN SODIUM 40 MG: 40 INJECTION SUBCUTANEOUS at 08:28

## 2021-04-21 RX ADMIN — INSULIN LISPRO 1 UNITS: 100 INJECTION, SOLUTION INTRAVENOUS; SUBCUTANEOUS at 11:37

## 2021-04-21 RX ADMIN — INSULIN LISPRO 3 UNITS: 100 INJECTION, SOLUTION INTRAVENOUS; SUBCUTANEOUS at 16:52

## 2021-04-21 RX ADMIN — ACETAMINOPHEN 650 MG: 325 TABLET ORAL at 21:52

## 2021-04-21 NOTE — CASE MANAGEMENT
Pt is not a 30 day readmission  Pt is not a bundle  Unplanned readmission risk color- Green  CM met pt at bedside, introduce self and made aware of Cm role at dc  Primary contact is pt's sister Joanna Calderon- [phone]  Pt lives with her children ( ages 6 and 3 y o ) in a 2 story house with 2 JAY and 7-8 steps to the 2nd flr bathroom  Pt reported that her kids is staying with family at this time  Pt was [employer] with all ADL's, drive and employed  Pt does not use any AD but has a RW and STR at home  Pt uses [place] [facility] on [address] [place] fro medication needs  PCP is Dr Edna Hernandez  Pt denies hx with HHc, STR, alc, drug and IP psych tx  Pt has transportation when dc  CM reviewed d/c planning process including the following: identifying help at home, patient preference for d/c planning needs, Discharge Lounge, [facility] Meds to Bed program, availability of treatment team to discuss questions or concerns patient and/or family may have regarding understanding medications and recognizing signs and symptoms once discharged  CM also encouraged patient to follow up with all recommended appointments after discharge  Patient advised of importance for patient and family to participate in managing patients medical well being

## 2021-04-21 NOTE — PROGRESS NOTES
[facility] [facility]  Progress Note - Vahid Jim 1980, 45 y o  female MRN: [id number]  [facility] Encounter: [id number]  Primary Care Provider: Aleksander Chambers MD   Date and time admitted to hospital: 2021 11:10 AM    Uncontrolled type 2 diabetes mellitus with hyperglycemia, with long-term current use of insulin [facility])  Assessment & Plan  Lab Results   Component Value Date    HGBA1C 11 6 (H) 2021       Recent Labs     21  1607 21  2106 21  0630 21  1056   POCGLU 155* 169* 148* 177*       Blood Sugar Average: Last 72 hrs:  (P) 156 8   Patient follows with endocrinology outpatient  Continue to hold Ozempic  Continue Levemir and insulin sliding scale  Add Humalog with meal  Diabetic diet, SSI, Accu-Cheks    * Recurrent episodes of unresponsiveness  Assessment & Plan  Patient undergo video EEG as per primary    Frequent headaches  Assessment & Plan  Management as per primary      VTE Pharmacologic Prophylaxis:   Pharmacologic: Enoxaparin (Lovenox)  Mechanical VTE Prophylaxis in Place: Yes    Patient Centered Rounds: I have performed bedside rounds with nursing staff today  Discussions with Specialists or Other Care Team Provider:     Education and Discussions with Family / Patient:  Patient    Time Spent for Care: 30 minutes  More than 50% of total time spent on counseling and coordination of care as described above      Current Length of Stay: 1 day(s)    Current Patient Status: Inpatient   Certification Statement: The patient will continue to require additional inpatient hospital stay due to For primary      Code Status: Level 1 - Full Code      Subjective:   Patient seen examined  Comfortable in bed  No chest pain shortness of breath  Currently on video EEG    Objective:     Vitals:   Temp (24hrs), Av 3 °F (36 8 °C), Min:98 °F (36 7 °C), Max:98 5 °F (36 9 °C)    Temp:  [98 °F (36 7 °C)-98 5 °F (36 9 °C)] 98 °F (36 7 °C)  HR: [71-84] 77  Resp:  [17-18] 18  BP: (108-128)/(71-87) 108/71  SpO2:  [96 %-100 %] 98 %  There is no height or weight on file to calculate BMI  Input and Output Summary (last 24 hours): Intake/Output Summary (Last 24 hours) at 4/21/2021 1112  Last data filed at 4/21/2021 0801  Gross per 24 hour   Intake 580 ml   Output --   Net 580 ml       Physical Exam:     Physical Exam  Patient is awake alert oriented no acute distress  On video EEG  Lung clear to auscultation bilateral  Heart positive S1-S2 no murmur  Abdomen soft nontender  Lower extremities no edema    Additional Data:     Labs:    Results from last 7 days   Lab Units 04/20/21  1415   WBC Thousand/uL 5 44   HEMOGLOBIN g/dL 13 6   HEMATOCRIT % 42 6   PLATELETS Thousands/uL 292   NEUTROS PCT % 36*   LYMPHS PCT % 50*   MONOS PCT % 10   EOS PCT % 3     Results from last 7 days   Lab Units 04/20/21  1415   POTASSIUM mmol/L 4 2   CHLORIDE mmol/L 108   CO2 mmol/L 25   BUN mg/dL 10   CREATININE mg/dL 0 76   CALCIUM mg/dL 9 1   ALK PHOS U/L 57   ALT U/L 17   AST U/L 15           * I Have Reviewed All Lab Data Listed Above  * Additional Pertinent Lab Tests Reviewed:  Mason [facility] Admission Reviewed    Imaging:    Imaging Reports Reviewed Today Include:   Imaging Personally Reviewed by Myself Includes:      Recent Cultures (last 7 days):           Last 24 Hours Medication List:   Current Facility-Administered Medications   Medication Dose Route Frequency Provider Last Rate    acetaminophen  650 mg Oral Q6H PRN Rose Fang MD      diphenhydrAMINE  25 mg Oral Q6H PRN Rose Fang MD      enoxaparin  40 mg Subcutaneous Q24H Aftab Lieberman MD      insulin detemir  20 Units Subcutaneous HS Rose Fang MD      insulin lispro  1-6 Units Subcutaneous 4x Daily (AC & HS) Jerod Valentine MD      insulin lispro  3 Units Subcutaneous TID With Meals Daniele Kumar DO      LORazepam  2 mg Intravenous Q8H PRN MD Rasta Rodney naproxen  250 mg Oral BID PRN Jared Pierre MD      naproxen  500 mg Oral BID PRN Jared Pierre MD      ondansetron  4 mg Intravenous Q6H PRN Jared Pierre MD      polyethylene glycol  17 g Oral Daily PRN Jared Pierre MD          Today, Patient Was Seen By: Dalila Carty DO    ** Please Note: This note has been constructed using a voice recognition system   **

## 2021-04-21 NOTE — PLAN OF CARE
Problem: PAIN - ADULT  Goal: Verbalizes/displays adequate comfort level or baseline comfort level  Description: Interventions:  - Encourage patient to monitor pain and request assistance  - Assess pain using appropriate pain scale  - Administer analgesics based on type and severity of pain and evaluate response  - Implement non-pharmacological measures as appropriate and evaluate response  - Consider cultural and social influences on pain and pain management  - Notify physician/advanced practitioner if interventions unsuccessful or patient reports new pain  Outcome: Progressing     Problem: INFECTION - ADULT  Goal: Absence or prevention of progression during hospitalization  Description: INTERVENTIONS:  - Assess and monitor for signs and symptoms of infection  - Monitor lab/diagnostic results  - Monitor all insertion sites, i e  indwelling lines, tubes, and drains  - Monitor endotracheal if appropriate and nasal secretions for changes in amount and color  - [place] appropriate cooling/warming therapies per order  - Administer medications as ordered  - Instruct and encourage patient and family to use good hand hygiene technique  - Identify and instruct in appropriate isolation precautions for identified infection/condition  Outcome: Progressing  Goal: Absence of fever/infection during neutropenic period  Description: INTERVENTIONS:  - Monitor WBC    Outcome: Progressing     Problem: SAFETY ADULT  Goal: Patient will remain free of falls  Description: INTERVENTIONS:  - Assess patient frequently for physical needs  -  Identify cognitive and physical deficits and behaviors that affect risk of falls    -  [place] fall precautions as indicated by assessment   - Educate patient/family on patient safety including physical limitations  - Instruct patient to call for assistance with activity based on assessment  - Modify environment to reduce risk of injury  - Consider OT/PT consult to assist with strengthening/mobility  Outcome: Progressing  Goal: Maintain or return to baseline ADL function  Description: INTERVENTIONS:  -  Assess patient's ability to carry out ADLs; assess patient's baseline for ADL function and identify physical deficits which impact ability to perform ADLs (bathing, care of mouth/teeth, toileting, grooming, dressing, etc )  - Assess/evaluate cause of self-care deficits   - Assess range of motion  - Assess patient's mobility; develop plan if impaired  - Assess patient's need for assistive devices and provide as appropriate  - Encourage maximum independence but intervene and supervise when necessary  - Involve family in performance of ADLs  - Assess for home care needs following discharge   - Consider OT consult to assist with ADL evaluation and planning for discharge  - Provide patient education as appropriate  Outcome: Progressing  Goal: Maintain or return mobility status to optimal level  Description: INTERVENTIONS:  - Assess patient's baseline mobility status (ambulation, transfers, stairs, etc )    - Identify cognitive and physical deficits and behaviors that affect mobility  - Identify mobility aids required to assist with transfers and/or ambulation (gait belt, sit-to-stand, lift, walker, cane, etc )  - [place] fall precautions as indicated by assessment  - Record patient progress and toleration of activity level on Mobility SBAR; progress patient to next Phase/Stage  - Instruct patient to call for assistance with activity based on assessment  - Consider rehabilitation consult to assist with strengthening/weightbearing, etc   Outcome: Progressing     Problem: DISCHARGE PLANNING  Goal: Discharge to home or other facility with appropriate resources  Description: INTERVENTIONS:  - Identify barriers to discharge w/patient and caregiver  - Arrange for needed discharge resources and transportation as appropriate  - Identify discharge learning needs (meds, wound care, etc )  - Arrange for interpretive services to assist at discharge as needed  - Refer to Case Management Department for coordinating discharge planning if the patient needs post-hospital services based on physician/advanced practitioner order or complex needs related to functional status, cognitive ability, or social support system  Outcome: Progressing     Problem: Knowledge Deficit  Goal: Patient/family/caregiver demonstrates understanding of disease process, treatment plan, medications, and discharge instructions  Description: Complete learning assessment and assess knowledge base    Interventions:  - Provide teaching at level of understanding  - Provide teaching via preferred learning methods  Outcome: Progressing     Problem: NEUROSENSORY - ADULT  Goal: Achieves stable or improved neurological status  Description: INTERVENTIONS  - Monitor and report changes in neurological status  - Monitor vital signs such as temperature, blood pressure, glucose, and any other labs ordered   - Initiate measures to prevent increased intracranial pressure  - Monitor for seizure activity and implement precautions if appropriate      Outcome: Progressing  Goal: Remains free of injury related to seizures activity  Description: INTERVENTIONS  - Maintain airway, patient safety  and administer oxygen as ordered  - Monitor patient for seizure activity, document and report duration and description of seizure to physician/advanced practitioner  - If seizure occurs,  ensure patient safety during seizure  - Reorient patient post seizure  - Seizure pads on all 4 side rails  - Instruct patient/family to notify RN of any seizure activity including if an aura is experienced  - Instruct patient/family to call for assistance with activity based on nursing assessment  - Administer anti-seizure medications if ordered    Outcome: Progressing  Goal: Achieves maximal functionality and self care  Description: INTERVENTIONS  - Monitor swallowing and airway patency with patient fatigue and changes in neurological status  - Encourage and assist patient to increase activity and self care  - Encourage visually impaired, hearing impaired and aphasic patients to use assistive/communication devices  Outcome: Progressing     Problem: Potential for Falls  Goal: Patient will remain free of falls  Description: INTERVENTIONS:  - Assess patient frequently for physical needs  -  Identify cognitive and physical deficits and behaviors that affect risk of falls    -  Ashley fall precautions as indicated by assessment   - Educate patient/family on patient safety including physical limitations  - Instruct patient to call for assistance with activity based on assessment  - Modify environment to reduce risk of injury  - Consider OT/PT consult to assist with strengthening/mobility  Outcome: Progressing

## 2021-04-21 NOTE — UTILIZATION REVIEW
Initial Clinical Review    Admission: Date/Time/Statement:   Admission Orders (From admission, onward)     Ordered        04/20/21 1111  Inpatient Admission  Once                   Orders Placed This Encounter   Procedures    Inpatient Admission     Standing Status:   Standing     Number of Occurrences:   1     Order Specific Question:   Level of Care     Answer:   Level 2 Stepdown / HOT [14]     Order Specific Question:   Bed Type     Answer:   EMU [8]     Order Specific Question:   Estimated length of stay     Answer:   More than 2 Midnights     Order Specific Question:   Certification     Answer:   I certify that inpatient services are medically necessary for this patient for a duration of greater than two midnights  See H&P and MD Progress Notes for additional information about the patient's course of treatment  Initial Presentation:  Direct elective admission      45year old female presents for inpatient admission to evaluate and treat for spells that are concerning for focal impaired aware seizures  PMHX : DM  Symptoms include unresponsiveness, sensation of shakiness, nausea and gi changes  However, GI complaints and feeling paralyzed are not consistent with epileptic seizures  Diabetes is poorly controlled  Plan includes 24 hour continuous video eeg monitoring, neuro checks q4 hr, seizure precautions, prn iv lorazepam     Date: 4-21  Day 2: medical surgical  Video EEG initiated on admission continues        Neurology assessment   Continuous video EEG monitoring 4/20-4/21/2021 reviewed to 8AM:  Normal background organization  No epileptiform discharges  No clinical event            Initial    04/20/21 1259 04/20/21 1259 04/20/21 1519 04/20/21 1259 04/20/21 1259   98 2 °F (36 8 °C) 71 17 128/87 100 %      Oral Monitor         No Pain          04/13/21 81 3 kg (179 lb 2 oz)     Additional Vital Signs:       Date/Time  Temp  Pulse  Resp  BP  MAP (mmHg)  SpO2  O2 Device   04/21/21 11:50:05  --  78 --  129/82  98  98 %  --   04/21/21 07:32:29  98 °F (36 7 °C)  77  18  108/71  83  98 %  --   04/20/21 22:13:03  98 4 °F (36 9 °C)  84  17  109/71  84  98 %  --   04/20/21 2000  --  --  --  --  --  --  None (Room air)   04/20/21 18:50:46  --  78  --  109/74  86  98 %  --   04/20/21 15:19:58  98 5 °F (36 9 °C)  77  17  108/73  85  96 %  --   04/20/21 12:59:11  98 2 °F (36 8 °C)  71  --  128/87  101  100 %  --       Date and Time R Pupil Size (mm) L Pupil Size (mm) R Pupil Reaction L Pupil Reaction   04/21/21 1200 3 3 Brisk Brisk   04/21/21 0800 3 3 Brisk Brisk   04/21/21 0400 3 3 Brisk Brisk   04/21/21 0000 3 3 Brisk Brisk   04/20/21 2000 3 3 Brisk Brisk   04/20/21 1412 3 3 Brisk Brisk           Pertinent Labs/Diagnostic Test Results:       Results from last 7 days   Lab Units 04/20/21  1415   WBC Thousand/uL 5 44   HEMOGLOBIN g/dL 13 6   HEMATOCRIT % 42 6   PLATELETS Thousands/uL 292   NEUTROS ABS Thousands/µL 1 97         Results from last 7 days   Lab Units 04/20/21  1415   SODIUM mmol/L 138   POTASSIUM mmol/L 4 2   CHLORIDE mmol/L 108   CO2 mmol/L 25   ANION GAP mmol/L 5   BUN mg/dL 10   CREATININE mg/dL 0 76   EGFR ml/min/1 73sq m 114   CALCIUM mg/dL 9 1     Results from last 7 days   Lab Units 04/20/21  1415   AST U/L 15   ALT U/L 17   ALK PHOS U/L 57   TOTAL PROTEIN g/dL 7 1   ALBUMIN g/dL 3 2*   TOTAL BILIRUBIN mg/dL 0 50     Results from last 7 days   Lab Units 04/21/21  1056 04/21/21  0630 04/20/21  2106 04/20/21  1607 04/20/21  1302   POC GLUCOSE mg/dl 177* 148* 169* 155* 135     Results from last 7 days   Lab Units 04/20/21  1415   GLUCOSE RANDOM mg/dL 135             BETA-HYDROXYBUTYRATE   Date Value Ref Range Status   11/21/2020 0 1 <0 6 mmol/L Final       Past Medical History:   Diagnosis Date    Diabetes mellitus ([facility] )      Present on Admission:   Recurrent episodes of unresponsiveness   Frequent headaches      Admitting Diagnosis: Recurrent episodes of unresponsiveness [R41 89]     Age/Sex: 45 y o  female  Admission Orders:  Scheduled Medications:  enoxaparin, 40 mg, Subcutaneous, Q24H [address]  insulin detemir, 20 Units, Subcutaneous, HS  insulin lispro, 1-6 Units, Subcutaneous, 4x Daily (AC & HS)  insulin lispro, 3 Units, Subcutaneous, TID With Meals      Continuous IV Infusions:     PRN Meds:  acetaminophen, 650 mg, Oral, Q6H PRN  diphenhydrAMINE, 25 mg, Oral, Q6H PRN  LORazepam, 2 mg, Intravenous, Q8H PRN  naproxen, 250 mg, Oral, BID PRN  naproxen, 500 mg, Oral, BID PRN  ondansetron, 4 mg, Intravenous, Q6H PRN  polyethylene glycol, 17 g, Oral, Daily PRN        IP CONSULT TO INTERNAL MEDICINE    Network Utilization Review Department  ATTENTION: Please call with any questions or concerns to [phone] and carefully listen to the prompts so that you are directed to the right person  All voicemails are confidential   Makenzie Avery all requests for admission clinical reviews, approved or denied determinations and any other requests to dedicated fax number below belonging to the campus where the patient is receiving treatment   List of dedicated fax numbers for the Facilities:  [address] DENIALS (Administrative/Medical Necessity) [phone]   [address] (Maternity/NICU/Pediatrics) [phone] [address] Etienne Kelly 4[address] [place] [address] Megan Pettit [phone] [place] [address] [phone]

## 2021-04-21 NOTE — UTILIZATION REVIEW
Inpatient Admission Authorization Request   NOTIFICATION OF INPATIENT ADMISSION/INPATIENT AUTHORIZATION REQUEST   SERVICING FACILITY:   [facility]  Address: [address] [place] [address], [address] [place] [address]  Tax ID: [id number]  NPI: [id number]  Place of Service: Inpatient [address] [facility] Code: 24     ATTENDING PROVIDER:  Attending Name and NPI#: Napoleon Barnett Md [[id number]]  Address: [address] [place] [address], [address] [place] [address]  Phone: [phone]     UTILIZATION REVIEW CONTACT:  Author Hampton, Utilization   Network Utilization Review Department  Phone: [phone]  Fax: [phone]  Email: [email or web address]     PHYSICIAN ADVISORY SERVICES:  FOR [id number] REVIEW - MEDICAL NECESSITY DENIAL  Phone: [phone]  Fax: [phone]  Email: [email or web address]     TYPE OF REQUEST:  Inpatient Status     ADMISSION INFORMATION:  ADMISSION DATE/TIME: 4/20/21 1110  PATIENT DIAGNOSIS CODE/DESCRIPTION:  Recurrent episodes of unresponsiveness [R41 89]  DISCHARGE DATE/TIME: No discharge date for patient encounter  DISCHARGE DISPOSITION (IF DISCHARGED): Home/Self Care     IMPORTANT INFORMATION:  Please contact the Author Hampton directly with any questions or concerns regarding this request  Department voicemails are confidential     Send requests for admission clinical reviews, concurrent reviews, approvals, and administrative denials due to lack of clinical to fax [phone]

## 2021-04-21 NOTE — PROGRESS NOTES
Epilepsy Monitoring Unit PROGRESS NOTE     Name: Micah Garzon   Age & Sex: 45 y o  female   MRN: [id number]  Unit/Bed#: [facility] 717-01   Encounter: [id number]    ASSESSMENT & PLAN     1  Recurrent Episodes of Unresponsiveness   - Continue Video EEG monitoring in order to capture and characterize events     -Discussed with patient that we will try sleep depravation today, along with photic stimulation and hyperventilation, in an attempt to elicit one of these events  Patient was advised that she is not able to sleep until 5AM on 4/22 and must subsequently wake up at 7am 2 hours later and remain awake until the evening of 4/22  2  Type 2 Diabetes Mellitus   - Internal medicine consulted for blood glucose monitoring and medication adjustments  Recommendations appreciated  SUBJECTIVE     Patient was seen and examined  Patient denies any acute events overnight  Denies any feelings of "shakiness" or "weakness" that typically precede one of her events  Further denies any headache, nausea, or vomiting at this time  No further complaints  While Ms Carter Resendez denies any headache currently, she does endorse a history of headaches since her fall with left sided head strike in July of 2020  She describes these headaches as "feeling like a hammer" inside her head with no specific laterality  She reports feeling nauseous on occasion with these headaches but denies any episodes of emesis  Patient states that there typically is some photophobia associated with these headaches and that they tend to resolve after "sleeping it off" for a day or two  Patient reports that she tries motrin for these headaches but that it only "takes the edge off"  Denies any associated aura and feelings that the headaches are typically associated with periods of stress as well as a lack of sleep  Head CT on 11/21/2020 was unremarkable  Review of Systems   All other systems reviewed and are negative      OBJECTIVE     Patient ID: Micah Garzon is a 45 y o  female  Vitals:    21 1519 21 1850 21 2213 21 0732   BP: 108/73 109/74 109/71 108/71   BP Location:    Right arm   Pulse: 77 78 84 77   Resp: 17  17 18   Temp: 98 5 °F (36 9 °C)  98 4 °F (36 9 °C) 98 °F (36 7 °C)   TempSrc:    Oral   SpO2: 96% 98% 98% 98%      Temperature:   Temp (24hrs), Av 3 °F (36 8 °C), Min:98 °F (36 7 °C), Max:98 5 °F (36 9 °C)    Temperature: 98 °F (36 7 °C)  Physical Exam  Vitals signs reviewed  Constitutional:       General: She is not in acute distress  HENT:      Head: Normocephalic and atraumatic  Cardiovascular:      Rate and Rhythm: Normal rate and regular rhythm  Heart sounds: Normal heart sounds  Pulmonary:      Effort: Pulmonary effort is normal       Breath sounds: Normal breath sounds  Abdominal:      General: Bowel sounds are normal       Palpations: Abdomen is soft  Neurological:      Mental Status: She is alert and oriented to person, place, and time  Coordination: Finger-Nose-Finger Test normal       Deep Tendon Reflexes: Strength normal       Reflex Scores:       Tricep reflexes are 2+ on the right side and 2+ on the left side  Bicep reflexes are 2+ on the right side and 2+ on the left side  Patellar reflexes are 1+ on the right side and 1+ on the left side  Achilles reflexes are 1+ on the right side and 1+ on the left side  Psychiatric:         Speech: Speech normal         Neurologic Exam     Mental Status   Oriented to person, place, and time  Speech: speech is normal     Cranial Nerves   Cranial nerves II through XII intact  Motor Exam   Muscle bulk: normal  Overall muscle tone: normal    Strength   Strength 5/5 throughout       Sensory Exam   Right arm vibration: normal  Left arm vibration: normal  Right leg vibration: decreased from ankle  Left leg vibration: decreased from ankle  Proprioception normal      Gait, Coordination, and Reflexes     Coordination   Finger to nose coordination: normal    Reflexes   Right biceps: 2+  Left biceps: 2+  Right triceps: 2+  Left triceps: 2+  Right patellar: 1+  Left patellar: 1+  Right achilles: 1+  Left achilles: 1+     LABORATORY DATA     Labs: I have personally reviewed pertinent reports  Results from last 7 days   Lab Units 04/20/21  1415   WBC Thousand/uL 5 44   HEMOGLOBIN g/dL 13 6   HEMATOCRIT % 42 6   PLATELETS Thousands/uL 292   NEUTROS PCT % 36*   MONOS PCT % 10      Results from last 7 days   Lab Units 04/20/21  1415   SODIUM mmol/L 138   POTASSIUM mmol/L 4 2   CHLORIDE mmol/L 108   CO2 mmol/L 25   BUN mg/dL 10   CREATININE mg/dL 0 76   CALCIUM mg/dL 9 1   ALK PHOS U/L 57   ALT U/L 17   AST U/L 15     IMAGING & DIAGNOSTIC TESTING     Other Diagnostic Testing: I have personally reviewed pertinent reports  ACTIVE MEDICATIONS     Current Facility-Administered Medications   Medication Dose Route Frequency    acetaminophen (TYLENOL) tablet 650 mg  650 mg Oral Q6H PRN    diphenhydrAMINE (BENADRYL) tablet 25 mg  25 mg Oral Q6H PRN    enoxaparin (LOVENOX) subcutaneous injection 40 mg  40 mg Subcutaneous Q24H CARMELO    insulin detemir (LEVEMIR) subcutaneous injection 20 Units  20 Units Subcutaneous HS    insulin lispro (HumaLOG) 100 units/mL subcutaneous injection 1-6 Units  1-6 Units Subcutaneous 4x Daily (AC & HS)    LORazepam (ATIVAN) injection 2 mg  2 mg Intravenous Q8H PRN    naproxen (NAPROSYN) tablet 250 mg  250 mg Oral BID PRN    naproxen (NAPROSYN) tablet 500 mg  500 mg Oral BID PRN    ondansetron (ZOFRAN) injection 4 mg  4 mg Intravenous Q6H PRN    polyethylene glycol (MIRALAX) packet 17 g  17 g Oral Daily PRN       Prior to Admission medications    Medication Sig Start Date End Date Taking?  Authorizing Provider   naproxen (EC NAPROSYN) 500 MG EC tablet Take 1 tablet (500 mg total) by mouth 2 (two) times a day with meals  Patient taking differently: Take 500 mg by mouth 2 (two) times a day as needed  7/21/20 7/21/21 Yes Lorin Cha PA-C   Semaglutide,0 25 or 0 5MG/DOS, 2 MG/1 5ML SOPN Inject 0 5 mg under the skin once a week 4/13/21  Yes Adali Heredia MD   Accu-Chek FastClix Lancets MISC Test BG up to 3x daily as directed  Patient not taking: Reported on 4/13/2021 3/18/21   Araceli Dumont MD   Accu-Chek Guide test strip TEST BG UP TO 3X DAILY AS DIRECTED  Patient not taking: Reported on 4/13/2021 3/18/21   Araceli Dumont MD   Injection Device for Insulin (B-D PEN MINI) ENRICO Use 4 (four) times a day Please use for Lantus and Humalog   4 pen needles daily Dx: Diabetes 3/18/21   Darlyn Newman MD   insulin detemir (LEVEMIR FLEXTOUCH) 100 Units/mL injection pen Inject 20 Units under the skin daily at bedtime  Patient not taking: Reported on 4/20/2021 4/13/21   Adali Heredia MD   Insulin Pen Needle (BD Pen Needle Em U/F) 32G X 4 MM MISC Use daily 4/13/21   Adali Heredia MD   methocarbamol (ROBAXIN) 500 mg tablet Take 1 tablet (500 mg total) by mouth 4 (four) times a day for 10 days  Patient not taking: Reported on 4/13/2021 7/21/20 1/11/21  Lorin Cha PA-C     VTE Pharmacologic Prophylaxis: Enoxaparin (Lovenox)  VTE Mechanical Prophylaxis: sequential compression device    ==  [facility] Student MS-3

## 2021-04-21 NOTE — ASSESSMENT & PLAN NOTE
Lab Results   Component Value Date    HGBA1C 11 6 (H) 04/12/2021       Recent Labs     04/20/21  1607 04/20/21  2106 04/21/21  0630 04/21/21  1056   POCGLU 155* 169* 148* 177*       Blood Sugar Average: Last 72 hrs:  (P) 156 8   Patient follows with endocrinology outpatient  Continue to hold Ozempic  Continue Levemir and insulin sliding scale  Add Humalog with meal  Diabetic diet, SSI, Accu-Cheks

## 2021-04-22 ENCOUNTER — APPOINTMENT (INPATIENT)
Dept: NEUROLOGY | Facility: CLINIC | Age: 41
DRG: 101 | End: 2021-04-22
Payer: COMMERCIAL

## 2021-04-22 LAB
GLUCOSE SERPL-MCNC: 121 MG/DL (ref 65–140)
GLUCOSE SERPL-MCNC: 137 MG/DL (ref 65–140)
GLUCOSE SERPL-MCNC: 164 MG/DL (ref 65–140)
GLUCOSE SERPL-MCNC: 165 MG/DL (ref 65–140)

## 2021-04-22 PROCEDURE — 95720 EEG PHY/QHP EA INCR W/VEEG: CPT | Performed by: PSYCHIATRY & NEUROLOGY

## 2021-04-22 PROCEDURE — 99232 SBSQ HOSP IP/OBS MODERATE 35: CPT | Performed by: INTERNAL MEDICINE

## 2021-04-22 PROCEDURE — 95715 VEEG EA 12-26HR INTMT MNTR: CPT

## 2021-04-22 PROCEDURE — 82948 REAGENT STRIP/BLOOD GLUCOSE: CPT

## 2021-04-22 PROCEDURE — 99232 SBSQ HOSP IP/OBS MODERATE 35: CPT | Performed by: PSYCHIATRY & NEUROLOGY

## 2021-04-22 RX ORDER — BUTALBITAL, ACETAMINOPHEN AND CAFFEINE 50; 325; 40 MG/1; MG/1; MG/1
1 TABLET ORAL
Status: DISCONTINUED | OUTPATIENT
Start: 2021-04-22 | End: 2021-04-25 | Stop reason: HOSPADM

## 2021-04-22 RX ADMIN — INSULIN LISPRO 1 UNITS: 100 INJECTION, SOLUTION INTRAVENOUS; SUBCUTANEOUS at 21:12

## 2021-04-22 RX ADMIN — INSULIN LISPRO 3 UNITS: 100 INJECTION, SOLUTION INTRAVENOUS; SUBCUTANEOUS at 17:12

## 2021-04-22 RX ADMIN — INSULIN DETEMIR 20 UNITS: 100 INJECTION, SOLUTION SUBCUTANEOUS at 21:12

## 2021-04-22 RX ADMIN — INSULIN LISPRO 1 UNITS: 100 INJECTION, SOLUTION INTRAVENOUS; SUBCUTANEOUS at 17:12

## 2021-04-22 RX ADMIN — INSULIN LISPRO 3 UNITS: 100 INJECTION, SOLUTION INTRAVENOUS; SUBCUTANEOUS at 11:18

## 2021-04-22 RX ADMIN — INSULIN LISPRO 3 UNITS: 100 INJECTION, SOLUTION INTRAVENOUS; SUBCUTANEOUS at 08:51

## 2021-04-22 RX ADMIN — ENOXAPARIN SODIUM 40 MG: 40 INJECTION SUBCUTANEOUS at 08:52

## 2021-04-22 NOTE — PLAN OF CARE
Problem: PAIN - ADULT  Goal: Verbalizes/displays adequate comfort level or baseline comfort level  Description: Interventions:  - Encourage patient to monitor pain and request assistance  - Assess pain using appropriate pain scale  - Administer analgesics based on type and severity of pain and evaluate response  - Implement non-pharmacological measures as appropriate and evaluate response  - Consider cultural and social influences on pain and pain management  - Notify physician/advanced practitioner if interventions unsuccessful or patient reports new pain  Outcome: Progressing     Problem: INFECTION - ADULT  Goal: Absence or prevention of progression during hospitalization  Description: INTERVENTIONS:  - Assess and monitor for signs and symptoms of infection  - Monitor lab/diagnostic results  - Monitor all insertion sites, i e  indwelling lines, tubes, and drains  - Monitor endotracheal if appropriate and nasal secretions for changes in amount and color  - [place] appropriate cooling/warming therapies per order  - Administer medications as ordered  - Instruct and encourage patient and family to use good hand hygiene technique  - Identify and instruct in appropriate isolation precautions for identified infection/condition  Outcome: Progressing  Goal: Absence of fever/infection during neutropenic period  Description: INTERVENTIONS:  - Monitor WBC    Outcome: Progressing     Problem: SAFETY ADULT  Goal: Patient will remain free of falls  Description: INTERVENTIONS:  - Assess patient frequently for physical needs  -  Identify cognitive and physical deficits and behaviors that affect risk of falls    -  [place] fall precautions as indicated by assessment   - Educate patient/family on patient safety including physical limitations  - Instruct patient to call for assistance with activity based on assessment  - Modify environment to reduce risk of injury  - Consider OT/PT consult to assist with strengthening/mobility  Outcome: Progressing  Goal: Maintain or return to baseline ADL function  Description: INTERVENTIONS:  -  Assess patient's ability to carry out ADLs; assess patient's baseline for ADL function and identify physical deficits which impact ability to perform ADLs (bathing, care of mouth/teeth, toileting, grooming, dressing, etc )  - Assess/evaluate cause of self-care deficits   - Assess range of motion  - Assess patient's mobility; develop plan if impaired  - Assess patient's need for assistive devices and provide as appropriate  - Encourage maximum independence but intervene and supervise when necessary  - Involve family in performance of ADLs  - Assess for home care needs following discharge   - Consider OT consult to assist with ADL evaluation and planning for discharge  - Provide patient education as appropriate  Outcome: Progressing  Goal: Maintain or return mobility status to optimal level  Description: INTERVENTIONS:  - Assess patient's baseline mobility status (ambulation, transfers, stairs, etc )    - Identify cognitive and physical deficits and behaviors that affect mobility  - Identify mobility aids required to assist with transfers and/or ambulation (gait belt, sit-to-stand, lift, walker, cane, etc )  - [place] fall precautions as indicated by assessment  - Record patient progress and toleration of activity level on Mobility SBAR; progress patient to next Phase/Stage  - Instruct patient to call for assistance with activity based on assessment  - Consider rehabilitation consult to assist with strengthening/weightbearing, etc   Outcome: Progressing     Problem: DISCHARGE PLANNING  Goal: Discharge to home or other facility with appropriate resources  Description: INTERVENTIONS:  - Identify barriers to discharge w/patient and caregiver  - Arrange for needed discharge resources and transportation as appropriate  - Identify discharge learning needs (meds, wound care, etc )  - Arrange for interpretive services to assist at discharge as needed  - Refer to Case Management Department for coordinating discharge planning if the patient needs post-hospital services based on physician/advanced practitioner order or complex needs related to functional status, cognitive ability, or social support system  Outcome: Progressing     Problem: NEUROSENSORY - ADULT  Goal: Achieves stable or improved neurological status  Description: INTERVENTIONS  - Monitor and report changes in neurological status  - Monitor vital signs such as temperature, blood pressure, glucose, and any other labs ordered   - Initiate measures to prevent increased intracranial pressure  - Monitor for seizure activity and implement precautions if appropriate      Outcome: Progressing  Goal: Remains free of injury related to seizures activity  Description: INTERVENTIONS  - Maintain airway, patient safety  and administer oxygen as ordered  - Monitor patient for seizure activity, document and report duration and description of seizure to physician/advanced practitioner  - If seizure occurs,  ensure patient safety during seizure  - Reorient patient post seizure  - Seizure pads on all 4 side rails  - Instruct patient/family to notify RN of any seizure activity including if an aura is experienced  - Instruct patient/family to call for assistance with activity based on nursing assessment  - Administer anti-seizure medications if ordered    Outcome: Progressing  Goal: Achieves maximal functionality and self care  Description: INTERVENTIONS  - Monitor swallowing and airway patency with patient fatigue and changes in neurological status  - Encourage and assist patient to increase activity and self care     - Encourage visually impaired, hearing impaired and aphasic patients to use assistive/communication devices  Outcome: Progressing     Problem: Potential for Falls  Goal: Patient will remain free of falls  Description: INTERVENTIONS:  - Assess patient frequently for physical needs  -  Identify cognitive and physical deficits and behaviors that affect risk of falls    -  [place] fall precautions as indicated by assessment   - Educate patient/family on patient safety including physical limitations  - Instruct patient to call for assistance with activity based on assessment  - Modify environment to reduce risk of injury  - Consider OT/PT consult to assist with strengthening/mobility  Outcome: Progressing

## 2021-04-22 NOTE — PROGRESS NOTES
Epilepsy Monitoring Unit PROGRESS NOTE     Name: Jimmie Matute   Age & Sex: 45 y o  female   MRN: [id number]  Unit/Bed#: [facility] 717-01   Encounter: [id number]    ASSESSMENT & PLAN     1  Recurrent episodes of unresponsiveness  -No seizures so far on hospital day 3   -Continue Video EEG monitoring in order to capture and characterize events    -Patient did not stay up for sleep deprivation last night, she reports that she tried but could not stay awake  She also reports a headache that lasted all night   -Discussed the importance of using sleep deprivation to elicit seizures  Offered Fioricet to patient for tonight to help her stay awake and alleviate headache  Will attempt sleep deprivation again today  2  Type 2 Diabetes Mellitus    -Internal medicine consulted for blood glucose monitoring and medication adjustments   -Recent blood sugars were well controlled: 132 -> 121 -> 137  SUBJECTIVE     Patient was seen and examined  No acute events overnight  She reports that she did not stay awake for sleep deprivation study, and that she tried but was very tired and could not stay awake  She also reports an overnight headache, which reportedly lasted "all night " Denies seizure events overnight  Reports that she has not had any shakiness or weakness, which often precede her seizures  No abnormal visual, auditory, or olfactory stimulation  She reports that her overnight headaches were of similar character to her chronic headaches, which she gets a "few" times each week  She describes the headaches as bilateral and "hammer-like " Her headaches are worse with stress and lack of sleep  She denies substance use  Pertinent Negatives include: numbness, weakness, speech or visual changes, syncope, seizures, paralysis/weakness, numbness or tingling, involuntary movements, speech impairment     Review of Systems   Respiratory: Negative for shortness of breath  Cardiovascular: Negative for chest pain  Gastrointestinal: Negative for diarrhea, nausea and vomiting  Genitourinary: Negative for difficulty urinating  Neurological: Negative for dizziness and light-headedness  Psychiatric/Behavioral: Negative for hallucinations  OBJECTIVE     Patient ID: Vladimir Malik is a 45 y o  female  Vitals:    21 1927 21 2153 21 0740 21 1043   BP: 121/79 120/80 118/72 120/82   BP Location:       Pulse: 90 78 77 81   Resp:   18 18   Temp: 98 6 °F (37 °C) 98 5 °F (36 9 °C) 98 2 °F (36 8 °C) 98 8 °F (37 1 °C)   TempSrc:       SpO2: 98% 98% 100% 98%      Temperature:   Temp (24hrs), Av 5 °F (36 9 °C), Min:98 2 °F (36 8 °C), Max:98 8 °F (37 1 °C)    Temperature: 98 8 °F (37 1 °C)      Physical Exam  Constitutional:       Appearance: Normal appearance  HENT:      Head: Normocephalic  Right Ear: External ear normal       Left Ear: External ear normal       Nose: Nose normal    Cardiovascular:      Rate and Rhythm: Normal rate and regular rhythm  Pulses: Normal pulses  Heart sounds: Normal heart sounds  Pulmonary:      Effort: Pulmonary effort is normal       Breath sounds: Normal breath sounds  Abdominal:      Palpations: Abdomen is soft  Skin:     General: Skin is warm  Capillary Refill: Capillary refill takes less than 2 seconds  Neurological:      General: No focal deficit present  Mental Status: She is alert  Deep Tendon Reflexes: Strength normal       Reflex Scores:       Tricep reflexes are 2+ on the right side and 2+ on the left side  Bicep reflexes are 2+ on the right side and 2+ on the left side  Patellar reflexes are 1+ on the right side and 1+ on the left side  Achilles reflexes are 1+ on the right side and 1+ on the left side  Neurologic Exam     Cranial Nerves   Cranial nerves II through XII intact  Motor Exam   Muscle bulk: normal  Overall muscle tone: normal    Strength   Strength 5/5 throughout       Sensory Exam   Right arm light touch: normal  Left arm light touch: normal  Right leg light touch: decreased from ankle  Left leg light touch: decreased from ankle  Proprioception normal      Gait, Coordination, and Reflexes     Reflexes   Right biceps: 2+  Left biceps: 2+  Right triceps: 2+  Left triceps: 2+  Right patellar: 1+  Left patellar: 1+  Right achilles: 1+  Left achilles: 1+         LABORATORY DATA     Labs: I have personally reviewed pertinent reports  Results from last 7 days   Lab Units 04/20/21  1415   WBC Thousand/uL 5 44   HEMOGLOBIN g/dL 13 6   HEMATOCRIT % 42 6   PLATELETS Thousands/uL 292   NEUTROS PCT % 36*   MONOS PCT % 10      Results from last 7 days   Lab Units 04/20/21  1415   SODIUM mmol/L 138   POTASSIUM mmol/L 4 2   CHLORIDE mmol/L 108   CO2 mmol/L 25   BUN mg/dL 10   CREATININE mg/dL 0 76   CALCIUM mg/dL 9 1   ALK PHOS U/L 57   ALT U/L 17   AST U/L 15                            IMAGING & DIAGNOSTIC TESTING     Radiology Results: I have personally reviewed pertinent reports  No orders to display       Other Diagnostic Testing: I have personally reviewed pertinent reports        ACTIVE MEDICATIONS     Current Facility-Administered Medications   Medication Dose Route Frequency    acetaminophen (TYLENOL) tablet 650 mg  650 mg Oral Q6H PRN    diphenhydrAMINE (BENADRYL) tablet 25 mg  25 mg Oral Q6H PRN    enoxaparin (LOVENOX) subcutaneous injection 40 mg  40 mg Subcutaneous Q24H CARMELO    insulin detemir (LEVEMIR) subcutaneous injection 20 Units  20 Units Subcutaneous HS    insulin lispro (HumaLOG) 100 units/mL subcutaneous injection 1-6 Units  1-6 Units Subcutaneous 4x Daily (AC & HS)    insulin lispro (HumaLOG) 100 units/mL subcutaneous injection 3 Units  3 Units Subcutaneous TID With Meals    LORazepam (ATIVAN) injection 2 mg  2 mg Intravenous Q8H PRN    naproxen (NAPROSYN) tablet 250 mg  250 mg Oral BID PRN    naproxen (NAPROSYN) tablet 500 mg  500 mg Oral BID PRN    ondansetron [facility] injection 4 mg  4 mg Intravenous Q6H PRN    polyethylene glycol (MIRALAX) packet 17 g  17 g Oral Daily PRN       Prior to Admission medications    Medication Sig Start Date End Date Taking? Authorizing Provider   naproxen (EC NAPROSYN) 500 MG EC tablet Take 1 tablet (500 mg total) by mouth 2 (two) times a day with meals  Patient taking differently: Take 500 mg by mouth 2 (two) times a day as needed  7/21/20 7/21/21 Yes Lorin Cha PA-C   Semaglutide,0 25 or 0 5MG/DOS, 2 MG/1 5ML SOPN Inject 0 5 mg under the skin once a week 4/13/21  Yes Demetrice Noyola MD   Accu-[place] FastClix Lancets MISC Test BG up to 3x daily as directed  Patient not taking: Reported on 4/13/2021 3/18/21   Elena Burrell MD   Accu-[place] Guide test strip TEST BG UP TO 3X DAILY AS DIRECTED  Patient not taking: Reported on 4/13/2021 3/18/21   Elena Burrell MD   Injection Device for Insulin (B-D PEN MINI) ENRICO Use 4 (four) times a day Please use for Lantus and Humalog   4 pen needles daily Dx: Diabetes 3/18/21   Molly Huffman MD   insulin detemir (LEVEMIR FLEXTOUCH) 100 Units/mL injection pen Inject 20 Units under the skin daily at bedtime  Patient not taking: Reported on 4/20/2021 4/13/21   Demetrice Noyola MD   Insulin Pen Needle (BD Pen Needle Em U/F) 32G X 4 MM MISC Use daily 4/13/21   Demetrice Noyola MD   methocarbamol (ROBAXIN) 500 mg tablet Take 1 tablet (500 mg total) by mouth 4 (four) times a day for 10 days  Patient not taking: Reported on 4/13/2021 7/21/20 1/11/21  Lorin Cha PA-C         VTE Pharmacologic Prophylaxis: Enoxaparin (Lovenox)  VTE Mechanical Prophylaxis: sequential compression device    ==  [address] [employer] Student MS-3

## 2021-04-22 NOTE — ASSESSMENT & PLAN NOTE
Lab Results   Component Value Date    HGBA1C 11 6 (H) 04/12/2021       Recent Labs     04/21/21  1056 04/21/21  1619 04/21/21  2106 04/22/21  0703   POCGLU 177* 114 132 121       Blood Sugar Average: Last 72 hrs:  (P) 143 875   Patient follows with endocrinology outpatient  Poorly-controlled diabetes  She reports she was not been taking insulin and recently due to insulin price  Continue to hold Ozempic  Continue Levemir, Humalog and insulin sliding scale  Diabetic diet, SSI, Accu-Cheks

## 2021-04-22 NOTE — PROGRESS NOTES
[address] [facility]  Progress Note - Olam Service 1980, 45 y o  female MRN: [id number]  Unit/Bed#: [facility] 717-01 Encounter: [id number]  Primary Care Provider: Samantha Corona MD   Date and time admitted to hospital: 2021 11:10 AM    Uncontrolled type 2 diabetes mellitus with hyperglycemia, with long-term current use of insulin [facility])  Assessment & Plan  Lab Results   Component Value Date    HGBA1C 11 6 (H) 2021       Recent Labs     21  1056 21  1619 21  2106 21  0703   POCGLU 177* 114 132 121       Blood Sugar Average: Last 72 hrs:  (P) 143 875   Patient follows with endocrinology outpatient  Poorly-controlled diabetes  She reports she was not been taking insulin and recently due to insulin price  Continue to hold Ozempic  Continue Levemir, Humalog and insulin sliding scale  Diabetic diet, SSI, Accu-Cheks    * Recurrent episodes of unresponsiveness  Assessment & Plan  Currently on video EEG as per primary    Frequent headaches  Assessment & Plan  Management as per primary         VTE Pharmacologic Prophylaxis:   Pharmacologic: Enoxaparin (Lovenox)  Mechanical VTE Prophylaxis in Place: Yes    Patient Centered Rounds: I have performed bedside rounds with nursing staff today  Discussions with Specialists or Other Care Team Provider:     Education and Discussions with Family / Patient:  Patient    Time Spent for Care: 30 minutes  More than 50% of total time spent on counseling and coordination of care as described above      Current Length of Stay: 2 day(s)    Current Patient Status: Inpatient   Certification Statement: The patient will continue to require additional inpatient hospital stay due to per primary      Code Status: Level 1 - Full Code      Subjective:   Patient seen and examined  Comfortable in bed  On video EEG  Blood sugar is controlled  No nausea vomiting or diarrhea    Objective:     Vitals:   Temp (24hrs), Av 5 °F (36 9 °C), Min:98 2 °F (36 8 °C), Max:98 6 °F (37 °C)    Temp:  [98 2 °F (36 8 °C)-98 6 °F (37 °C)] 98 2 °F (36 8 °C)  HR:  [75-90] 77  Resp:  [18] 18  BP: (118-129)/(72-82) 118/72  SpO2:  [98 %-100 %] 100 %  There is no height or weight on file to calculate BMI  Input and Output Summary (last 24 hours):     No intake or output data in the 24 hours ending 04/22/21 0926    Physical Exam:     Physical Exam  Patient is awake alert oriented no acute distress  On video EEG  Lung clear to auscultation bilateral  Heart positive S1-S2 no murmur  Abdomen soft nontender  Lower extremities no edema    Additional Data:     Labs:    Results from last 7 days   Lab Units 04/20/21  1415   WBC Thousand/uL 5 44   HEMOGLOBIN g/dL 13 6   HEMATOCRIT % 42 6   PLATELETS Thousands/uL 292   NEUTROS PCT % 36*   LYMPHS PCT % 50*   MONOS PCT % 10   EOS PCT % 3     Results from last 7 days   Lab Units 04/20/21  1415   POTASSIUM mmol/L 4 2   CHLORIDE mmol/L 108   CO2 mmol/L 25   BUN mg/dL 10   CREATININE mg/dL 0 76   CALCIUM mg/dL 9 1   ALK PHOS U/L 57   ALT U/L 17   AST U/L 15           * I Have Reviewed All Lab Data Listed Above  * Additional Pertinent Lab Tests Reviewed:  [facility] Ad[facility] Reviewed    Imaging:    Imaging Reports Reviewed Today Include:   Imaging Personally Reviewed by Myself Includes:     Recent Cultures (last 7 days):           Last 24 Hours Medication List:   Current Facility-Administered Medications   Medication Dose Route Frequency Provider Last Rate    acetaminophen  650 mg Oral Q6H PRN Scott Gordon MD      diphenhydrAMINE  25 mg Oral Q6H PRN Scott Gordon MD      enoxaparin  40 mg Subcutaneous Q24H Randee Glass MD      insulin detemir  20 Units Subcutaneous HS Scott Gordon MD      insulin lispro  1-6 Units Subcutaneous 4x Daily (AC & HS) Sanjay Swain MD      insulin lispro  3 Units Subcutaneous TID With Meals Marine Caitlyn, DO      LORazepam  2 mg Intravenous Q8H PRN Scott Gordon MD      naproxen  250 mg Oral BID PRN Sam Samuel MD      naproxen  500 mg Oral BID PRN Sam Samuel MD      ondansetron  4 mg Intravenous Q6H PRN Sam Samuel MD      polyethylene glycol  17 g Oral Daily PRN Sam Samuel MD          Today, Patient Was Seen By: Sophia Tovar DO    ** Please Note: This note has been constructed using a voice recognition system   **

## 2021-04-22 NOTE — ASSESSMENT & PLAN NOTE
-No seizures so far on hospital day 3   -Continue Video EEG monitoring in order to capture and characterize events    -Patient did not stay up for sleep deprivation last night, she reports that she tried but could not stay awake  Discussed the importance of using sleep deprivation to elicit seizures  Will try again today

## 2021-04-23 ENCOUNTER — APPOINTMENT (INPATIENT)
Dept: NEUROLOGY | Facility: CLINIC | Age: 41
DRG: 101 | End: 2021-04-23
Payer: COMMERCIAL

## 2021-04-23 LAB
GLUCOSE SERPL-MCNC: 131 MG/DL (ref 65–140)
GLUCOSE SERPL-MCNC: 160 MG/DL (ref 65–140)
GLUCOSE SERPL-MCNC: 164 MG/DL (ref 65–140)
GLUCOSE SERPL-MCNC: 179 MG/DL (ref 65–140)
PLATELET # BLD AUTO: 319 THOUSANDS/UL (ref 149–390)
PMV BLD AUTO: 9 FL (ref 8.9–12.7)

## 2021-04-23 PROCEDURE — 82948 REAGENT STRIP/BLOOD GLUCOSE: CPT

## 2021-04-23 PROCEDURE — 95720 EEG PHY/QHP EA INCR W/VEEG: CPT | Performed by: PSYCHIATRY & NEUROLOGY

## 2021-04-23 PROCEDURE — 99232 SBSQ HOSP IP/OBS MODERATE 35: CPT | Performed by: PSYCHIATRY & NEUROLOGY

## 2021-04-23 PROCEDURE — 95715 VEEG EA 12-26HR INTMT MNTR: CPT

## 2021-04-23 PROCEDURE — 99232 SBSQ HOSP IP/OBS MODERATE 35: CPT | Performed by: INTERNAL MEDICINE

## 2021-04-23 PROCEDURE — 85049 AUTOMATED PLATELET COUNT: CPT | Performed by: NURSE PRACTITIONER

## 2021-04-23 RX ADMIN — INSULIN LISPRO 3 UNITS: 100 INJECTION, SOLUTION INTRAVENOUS; SUBCUTANEOUS at 12:37

## 2021-04-23 RX ADMIN — ONDANSETRON 4 MG: 2 INJECTION INTRAMUSCULAR; INTRAVENOUS at 12:46

## 2021-04-23 RX ADMIN — INSULIN DETEMIR 20 UNITS: 100 INJECTION, SOLUTION SUBCUTANEOUS at 21:12

## 2021-04-23 RX ADMIN — INSULIN LISPRO 1 UNITS: 100 INJECTION, SOLUTION INTRAVENOUS; SUBCUTANEOUS at 12:37

## 2021-04-23 RX ADMIN — ENOXAPARIN SODIUM 40 MG: 40 INJECTION SUBCUTANEOUS at 07:51

## 2021-04-23 RX ADMIN — INSULIN LISPRO 1 UNITS: 100 INJECTION, SOLUTION INTRAVENOUS; SUBCUTANEOUS at 07:51

## 2021-04-23 RX ADMIN — INSULIN LISPRO 3 UNITS: 100 INJECTION, SOLUTION INTRAVENOUS; SUBCUTANEOUS at 16:55

## 2021-04-23 RX ADMIN — INSULIN LISPRO 1 UNITS: 100 INJECTION, SOLUTION INTRAVENOUS; SUBCUTANEOUS at 21:13

## 2021-04-23 RX ADMIN — INSULIN LISPRO 3 UNITS: 100 INJECTION, SOLUTION INTRAVENOUS; SUBCUTANEOUS at 07:51

## 2021-04-23 NOTE — ASSESSMENT & PLAN NOTE
Lab Results   Component Value Date    HGBA1C 11 6 (H) 04/12/2021       Recent Labs     04/22/21  1615 04/22/21  2052 04/23/21  0629 04/23/21  1040   POCGLU 165* 164* 160* 179*       Blood Sugar Average: Last 72 hrs:  (P) 537 [id number]   Patient follows with endocrinology outpatient  Poorly-controlled diabetes  She reports she was not been taking insulin recently due to insulin price  Continue to hold Ozempic  Continue Levemir, Humalog and insulin sliding scale  Diabetic diet, SSI, Accu-Cheks

## 2021-04-23 NOTE — PLAN OF CARE
Problem: PAIN - ADULT  Goal: Verbalizes/displays adequate comfort level or baseline comfort level  Description: Interventions:  - Encourage patient to monitor pain and request assistance  - Assess pain using appropriate pain scale  - Administer analgesics based on type and severity of pain and evaluate response  - Implement non-pharmacological measures as appropriate and evaluate response  - Consider cultural and social influences on pain and pain management  - Notify physician/advanced practitioner if interventions unsuccessful or patient reports new pain  Outcome: Progressing     Problem: INFECTION - ADULT  Goal: Absence or prevention of progression during hospitalization  Description: INTERVENTIONS:  - Assess and monitor for signs and symptoms of infection  - Monitor lab/diagnostic results  - Monitor all insertion sites, i e  indwelling lines, tubes, and drains  - Monitor endotracheal if appropriate and nasal secretions for changes in amount and color  - [place] appropriate cooling/warming therapies per order  - Administer medications as ordered  - Instruct and encourage patient and family to use good hand hygiene technique  - Identify and instruct in appropriate isolation precautions for identified infection/condition  Outcome: Progressing     Problem: SAFETY ADULT  Goal: Patient will remain free of falls  Description: INTERVENTIONS:  - Assess patient frequently for physical needs  -  Identify cognitive and physical deficits and behaviors that affect risk of falls    -  [place] fall precautions as indicated by assessment   - Educate patient/family on patient safety including physical limitations  - Instruct patient to call for assistance with activity based on assessment  - Modify environment to reduce risk of injury  - Consider OT/PT consult to assist with strengthening/mobility  Outcome: Progressing  Goal: Maintain or return to baseline ADL function  Description: INTERVENTIONS:  -  Assess patient's ability to carry out ADLs; assess patient's baseline for ADL function and identify physical deficits which impact ability to perform ADLs (bathing, care of mouth/teeth, toileting, grooming, dressing, etc )  - Assess/evaluate cause of self-care deficits   - Assess range of motion  - Assess patient's mobility; develop plan if impaired  - Assess patient's need for assistive devices and provide as appropriate  - Encourage maximum independence but intervene and supervise when necessary  - Involve family in performance of ADLs  - Assess for home care needs following discharge   - Consider OT consult to assist with ADL evaluation and planning for discharge  - Provide patient education as appropriate  Outcome: Progressing  Goal: Maintain or return mobility status to optimal level  Description: INTERVENTIONS:  - Assess patient's baseline mobility status (ambulation, transfers, stairs, etc )    - Identify cognitive and physical deficits and behaviors that affect mobility  - Identify mobility aids required to assist with transfers and/or ambulation (gait belt, sit-to-stand, lift, walker, cane, etc )  - [place] fall precautions as indicated by assessment  - Record patient progress and toleration of activity level on Mobility SBAR; progress patient to next Phase/Stage  - Instruct patient to call for assistance with activity based on assessment  - Consider rehabilitation consult to assist with strengthening/weightbearing, etc   Outcome: Progressing     Problem: DISCHARGE PLANNING  Goal: Discharge to home or other facility with appropriate resources  Description: INTERVENTIONS:  - Identify barriers to discharge w/patient and caregiver  - Arrange for needed discharge resources and transportation as appropriate  - Identify discharge learning needs (meds, wound care, etc )  - Arrange for interpretive services to assist at discharge as needed  - Refer to Case Management Department for coordinating discharge planning if the patient needs post-hospital services based on physician/advanced practitioner order or complex needs related to functional status, cognitive ability, or social support system  Outcome: Progressing     Problem: Knowledge Deficit  Goal: Patient/family/caregiver demonstrates understanding of disease process, treatment plan, medications, and discharge instructions  Description: Complete learning assessment and assess knowledge base  Interventions:  - Provide teaching at level of understanding  - Provide teaching via preferred learning methods  Outcome: Progressing     Problem: NEUROSENSORY - ADULT  Goal: Achieves stable or improved neurological status  Description: INTERVENTIONS  - Monitor and report changes in neurological status  - Monitor vital signs such as temperature, blood pressure, glucose, and any other labs ordered   - Initiate measures to prevent increased intracranial pressure  - Monitor for seizure activity and implement precautions if appropriate      Outcome: Progressing  Goal: Remains free of injury related to seizures activity  Description: INTERVENTIONS  - Maintain airway, patient safety  and administer oxygen as ordered  - Monitor patient for seizure activity, document and report duration and description of seizure to physician/advanced practitioner  - If seizure occurs,  ensure patient safety during seizure  - Reorient patient post seizure  - Seizure pads on all 4 side rails  - Instruct patient/family to notify RN of any seizure activity including if an aura is experienced  - Instruct patient/family to call for assistance with activity based on nursing assessment  - Administer anti-seizure medications if ordered    Outcome: Progressing  Goal: Achieves maximal functionality and self care  Description: INTERVENTIONS  - Monitor swallowing and airway patency with patient fatigue and changes in neurological status  - Encourage and assist patient to increase activity and self care     - Encourage visually impaired, hearing impaired and aphasic patients to use assistive/communication devices  Outcome: Progressing     Problem: Potential for Falls  Goal: Patient will remain free of falls  Description: INTERVENTIONS:  - Assess patient frequently for physical needs  -  Identify cognitive and physical deficits and behaviors that affect risk of falls    -  [place] fall precautions as indicated by assessment   - Educate patient/family on patient safety including physical limitations  - Instruct patient to call for assistance with activity based on assessment  - Modify environment to reduce risk of injury  - Consider OT/PT consult to assist with strengthening/mobility  Outcome: Progressing

## 2021-04-23 NOTE — PROGRESS NOTES
Pt escorted to bathroom by RN  Pt had one episode of vomiting while in bathroom  Pt denied any HA, dizziness  Pt only complained of nausea  PRN zofran given  Pt resting comfortably in the chair at this time

## 2021-04-23 NOTE — PROGRESS NOTES
RN assisted Pt to sit in chair  RN provided Pt with foot pedals and instructed Pt to use pedals while up in chair

## 2021-04-23 NOTE — PROGRESS NOTES
EMU Daily Progress Note   Kelly Credit 45 y o  female   : 1980  MRN: [id number]     Unit/Bed#: [facility] 717-01    Encounter: [id number]  -------------------------------------------------------------------------------------------------------------------  Interval History:      Patient admitted on 2021 for spell characterization  She has remained on continuous video EEG  No events have been captured so far during admission  Sleep deprivation was attempted again overnight last night as she did not tolerate this well the night prior  She notes that she was able to stay up until 3 am and then went to sleep  She is tired this morning, otherwise her ROS is unrevealing  She does feel that she is ready to go home but is agreeable to stay until tomorrow since she has been sleep deprived  Plan to stay up all day today, undergo photic/HV, and sleep as usual tonight  She will be discharged home tomorrow unless she is agreeable to stay for further days to attempt to capture events concerning for seizure  On admission her blood glucose was elevated  She had reported that she was not taking her Levemir at home  When getting this medication from her local pharmacy it was costing $300 for a single pen  She was not able to afford this  The patient reports that her endocrinologist sent this to mail order and now she will be able to afford the medication and can start taking it when she gets home  She also reports that she recently got a new glucometer to check her sugars  The patient denies needing any further assistance with getting her insulin or testing supplies at this time  EEG continues to be normal since admission  No clinical or subclinical seizures have been captured  Vital signs stable since admission  Neurologic exam at baseline      Blood sugars since admission:  Component       POC Glucose   Latest Ref Rng & Units       65 - 140 mg/dl   2020       310 (H)   2020       311 (H) 11/22/2020      1:23  (H)   11/22/2020      7:30  (H)   11/22/2020      11:10  (H)   4/20/2021      1:02    4/20/2021      4:07  (H)   4/20/2021      9:06  (H)   4/21/2021      6:30  (H)   4/21/2021      10:56  (H)   4/21/2021      4:19    4/21/2021      9:06    4/22/2021      7:03    4/22/2021      10:43    4/22/2021      4:15  (H)   4/22/2021      8:52  (H)   4/23/2021       160 (H)       Labs on admission:  Results for Kj Mendez (MRN [id number])    Ref   Range 4/20/2021 14:15   Sodium Latest Ref Range: 136 - 145 mmol/L 138   Potassium Latest Ref Range: 3 5 - 5 3 mmol/L 4 2   Chloride Latest Ref Range: 100 - 108 mmol/L 108   CO2 Latest Ref Range: 21 - 32 mmol/L 25   Anion Gap Latest Ref Range: 4 - 13 mmol/L 5   BUN Latest Ref Range: 5 - 25 mg/dL 10   Creatinine Latest Ref Range: 0 60 - 1 30 mg/dL 0 76   Glucose, Random Latest Ref Range: 65 - 140 mg/dL 135   Calcium Latest Ref Range: 8 3 - 10 1 mg/dL 9 1   CORRECTED CALCIUM Latest Ref Range: 8 3 - 10 1 mg/dL 9 7   AST Latest Ref Range: 5 - 45 U/L 15   ALT Latest Ref Range: 12 - 78 U/L 17   Alkaline Phosphatase Latest Ref Range: 46 - 116 U/L 57   Total Protein Latest Ref Range: 6 4 - 8 2 g/dL 7 1   Albumin Latest Ref Range: 3 5 - 5 0 g/dL 3 2 (L)   TOTAL BILIRUBIN Latest Ref Range: 0 20 - 1 00 mg/dL 0 50   eGFR Latest Units: ml/min/1 73sq m 114   WBC Latest Ref Range: 4 31 - 10 16 Thousand/uL 5 44   Red Blood Cell Count Latest Ref Range: 3 81 - 5 12 Million/uL 4 72   Hemoglobin Latest Ref Range: 11 5 - 15 4 g/dL 13 6   HCT Latest Ref Range: 34 8 - 46 1 % 42 6   MCV Latest Ref Range: 82 - 98 fL 90   MCH Latest Ref Range: 26 8 - 34 3 pg 28 8   MCHC Latest Ref Range: 31 4 - 37 4 g/dL 31 9   RDW Latest Ref Range: 11 6 - 15 1 % 12 3   Platelet Count Latest Ref Range: 149 - 390 Thousands/uL 292   MPV Latest Ref Range: 8 9 - 12 7 fL 9 2   nRBC Latest Units: /100 WBCs 0   Neutrophils % Latest Ref Range: 43 - 75 % 36 (L)   Immat GRANS % Latest Ref Range: 0 - 2 % 0   Lymphocytes Relative Latest Ref Range: 14 - 44 % 50 (H)   Monocytes Relative Latest Ref Range: 4 - 12 % 10   Eosinophils Latest Ref Range: 0 - 6 % 3   Basophils Relative Latest Ref Range: 0 - 1 % 1   Immature Grans Absolute Latest Ref Range: 0 00 - 0 20 Thousand/uL 0 01   Absolute Neutrophils Latest Ref Range: 1 85 - 7 62 Thousands/µL 1 97   Lymphocytes Absolute Latest Ref Range: 0 60 - 4 47 Thousands/µL 2 74   Absolute Monocytes Latest Ref Range: 0 17 - 1 22 Thousand/µL 0 53   Absolute Eosinophils Latest Ref Range: 0 00 - 0 61 Thousand/µL 0 16   Basophils Absolute Latest Ref Range: 0 00 - 0 10 Thousands/µL 0 03       -------------------------------------------------------------------------------------------------------------------  Past Medical history, surgical history, family history, and social history are unchanged from admission H&P     ROS:  A 12 system review of system was obtained and revealed that she is tired from being sleep deprived but otherwise has no complaints on ROS      -------------------------------------------------------------------------------------------------------------------  Allergies:    Allergies   Allergen Reactions    Metformin And Related Hives      Scheduled Meds:   Current Facility-Administered Medications   Medication Dose Route Frequency Provider Last Rate    acetaminophen  650 mg Oral Q6H PRN Malik Chambers MD      butalbital-acetaminophen-caffeine  1 tablet Oral HS PRN Malik Chambers MD      diphenhydrAMINE  25 mg Oral Q6H PRN Malik Chambers MD      enoxaparin  40 mg Subcutaneous Q24H Silvia Carr MD      insulin detemir  20 Units Subcutaneous HS Malik Chambers MD      insulin lispro  1-6 Units Subcutaneous 4x Daily (AC & HS) Jodie Ganser, MD      insulin lispro  3 Units Subcutaneous TID With Meals Nelson Hardwick DO      LORazepam  2 mg Intravenous Q8H PRN Malik Chambers MD  naproxen  250 mg Oral BID PRN Demetrius Frias MD      naproxen  500 mg Oral BID PRN Demetrius Frias MD      ondansetron  4 mg Intravenous Q6H PRN Demetrius Frias MD      polyethylene glycol  17 g Oral Daily PRN Demetrius Frias MD       PRN Meds:   acetaminophen    butalbital-acetaminophen-caffeine    diphenhydrAMINE    LORazepam    naproxen    naproxen    ondansetron    polyethylene glycol  -------------------------------------------------------------------------------------------------------------------  Physical Exam:  Vitals: Blood pressure 101/67, pulse 86, temperature 97 7 °F (36 5 °C), temperature source Oral, resp  rate 14, SpO2 96 %, unknown if currently breastfeeding  No apparent distress  Appears well nourished  Heart RRR w/o MRG  Lungs CTA w/o WRR  Abdomen soft, NT, normoactive BS  No peripheral edema  Mood appropriate for situation     Neurologic Exam  Mental status- alert and oriented to person, place, and time  Speech appropriate for conversation  Good attention and knowledge  Cranial Nerves- PERRL, EOMS normal, facial sensation and muscles symmetric, hearing intact bilaterally to finger rubs, tongue midline, palate rise symmetrical, shoulder shrug symmetrical      Motor- No pronator drift  Moves all extremities freely  No tremor  Sensory-  Intact distally in all extremities to light touch  DTRs- 1+ and symmetric in all extremities    Gait- not assessed this morning     Coordination- FNF intact              -------------------------------------------------------------------------------------------------------------------  Assessment/Plan:  Patient was admitted for spell characterization on 4/20/2021 due to events concerning for seizures that had been occurring every 2-3 weeks at home prior to admission  She has been on continuous EEG monitoring since she was admitted  There have been no events concerning for seizures captured on recording   Her EEG has been normal thus far  Plan for continued EEG monitoring as she was sleep deprived last night to provoke events  1)  Spells/Seizures:   1) Reason for continued EEG:  To capture and characterize events concerning for seizures to guide further treatment  If events are determined to be epileptic, patient will be started on AED therapy  2) Medication changes:  Patient is not currently on any AEDs  3) Additional diagnostic tests:      - Attempted sleep deprivation on 4/21 but was not tolerated well due to headache  She was unable to stay up past 11pm     - Attempted sleep deprivation overnight again on 4/22   - Order placed to check blood sugar during events concerning for seizure  - photic stim, HV, and exercise today  2)  Co morbidities:   1)Uncontrolled diabetes mellitus - Follows with outpatient endocrinology  Was not taking insulin prior to admission due to high cost  Ozempic on hold  Blood sugars high on admission but have since been more acceptable  Continue with recommendations per SLIM:   - ACHS glucose monitoring   - Levemir 20 units at night   - Sliding scale humalog with meals   - Diabetic diet  2) Frequent headaches- Naproxen, tylenol, and fioricet ordered PRN  Consider starting topiramate for headache prevention  3)  DVT prophylaxis:   1) Continue Lovenox 40 mg daily  Check platelets every 3 days  2) SCDs while in bed    CODE STATUS: FULL CODE    Total time spent:  At least 45 minutes, with more than 50% spent counseling/coordinating care      -------------------------------------------------------------------------------------------------------------------  [employer], CRNP        Date: 4/23/2021     Time: 8:51 AM   [address] [facility]

## 2021-04-23 NOTE — PROGRESS NOTES
[facility] [facility]  Progress Note - Maggie Ojeda 1980, 45 y o  female MRN: [id number]  Unit/Bed#: [facility] 717-01 Encounter: [id number]  Primary Care Provider: Adrian Guadarrama MD   Date and time admitted to hospital: 2021 11:10 AM    Uncontrolled type 2 diabetes mellitus with hyperglycemia, with long-term current use of insulin [facility])  Assessment & Plan  Lab Results   Component Value Date    HGBA1C 11 6 (H) 2021       Recent Labs     21  1615 21  2052 21  0629 21  1040   POCGLU 165* 164* 160* 179*       Blood Sugar Average: Last 72 hrs:  (P) 983 [id number]   Patient follows with endocrinology outpatient  Poorly-controlled diabetes  She reports she was not been taking insulin recently due to insulin price  Continue to hold Ozempic  Continue Levemir, Humalog and insulin sliding scale  Diabetic diet, SSI, Accu-Cheks    * Recurrent episodes of unresponsiveness  Assessment & Plan  Currently on video EEG as per primary  Not on AEDs           VTE Pharmacologic Prophylaxis:   Pharmacologic: Enoxaparin (Lovenox)  Mechanical VTE Prophylaxis in Place: Yes    Patient Centered Rounds: I have performed bedside rounds with nursing staff today  Discussions with Specialists or Other Care Team Provider:     Education and Discussions with Family / Patient:  Patient    Time Spent for Care: 30 minutes  More than 50% of total time spent on counseling and coordination of care as described above      Current Length of Stay: 3 day(s)    Current Patient Status: Inpatient   Certification Statement: The patient will continue to require additional inpatient hospital stay due to Per primary      Code Status: Level 1 - Full Code      Subjective:   Patient is comfortable in bed  Still on video EEG  No event overnight  No nausea vomiting    Refused stool softeners    Objective:     Vitals:   Temp (24hrs), Av 3 °F (36 8 °C), Min:97 7 °F (36 5 °C), Max:98 7 °F (37 1 °C)    Temp:  [97 7 °F (36 5 °C)-98 7 °F (37 1 °C)] 97 7 °F (36 5 °C)  HR:  [76-99] 76  Resp:  [14-17] 17  BP: ()/(67-71) 102/70  SpO2:  [96 %-98 %] 98 %  There is no height or weight on file to calculate BMI  Input and Output Summary (last 24 hours): Intake/Output Summary (Last 24 hours) at 4/23/2021 1144  Last data filed at 4/22/2021 1300  Gross per 24 hour   Intake 220 ml   Output --   Net 220 ml       Physical Exam:     Physical Exam  Patient is awake alert oriented no acute distress  On video EEG  Lung clear to auscultation bilateral  Heart positive S1-S2 no murmur  Abdomen soft nontender  Lower extremities no edema       Additional Data:     Labs:    Results from last 7 days   Lab Units 04/20/21  1415   WBC Thousand/uL 5 44   HEMOGLOBIN g/dL 13 6   HEMATOCRIT % 42 6   PLATELETS Thousands/uL 292   NEUTROS PCT % 36*   LYMPHS PCT % 50*   MONOS PCT % 10   EOS PCT % 3     Results from last 7 days   Lab Units 04/20/21  1415   POTASSIUM mmol/L 4 2   CHLORIDE mmol/L 108   CO2 mmol/L 25   BUN mg/dL 10   CREATININE mg/dL 0 76   CALCIUM mg/dL 9 1   ALK PHOS U/L 57   ALT U/L 17   AST U/L 15           * I Have Reviewed All Lab Data Listed Above  * Additional Pertinent Lab Tests Reviewed:  Mason [facility] Admission Reviewed    Imaging:    Imaging Reports Reviewed Today Include:   Imaging Personally Reviewed by Myself Includes:      Recent Cultures (last 7 days):           Last 24 Hours Medication List:   Current Facility-Administered Medications   Medication Dose Route Frequency Provider Last Rate    acetaminophen  650 mg Oral Q6H PRN Adriane Kussmaul, MD      butalbital-acetaminophen-caffeine  1 tablet Oral HS PRN Adriane Kussmaul, MD      diphenhydrAMINE  25 mg Oral Q6H PRN Adriane Kussmaul, MD      enoxaparin  40 mg Subcutaneous Q24H [address] Zuleika De Oliveira MD      insulin detemir  20 Units Subcutaneous HS Zuleika De Oliveira MD      insulin lispro  1-6 Units Subcutaneous 4x Daily (AC & HS) Ayleen MD Angelica Hollingsworth insulin lispro  3 Units Subcutaneous TID With Meals Minal Oliveira DO      LORazepam  2 mg Intravenous Q8H PRN Trae Marcial MD      naproxen  250 mg Oral BID PRVIVIEN Marcial MD      naproxen  500 mg Oral BID PRVIVIEN Marcial MD      ondansetron  4 mg Intravenous Q6H PRN Trae Marcial MD      polyethylene glycol  17 g Oral Daily PRN Trae Marcial MD          Today, Patient Was Seen By: Minal Oliveira DO    ** Please Note: This note has been constructed using a voice recognition system   **

## 2021-04-24 ENCOUNTER — APPOINTMENT (INPATIENT)
Dept: NEUROLOGY | Facility: CLINIC | Age: 41
DRG: 101 | End: 2021-04-24
Payer: COMMERCIAL

## 2021-04-24 LAB
GLUCOSE SERPL-MCNC: 108 MG/DL (ref 65–140)
GLUCOSE SERPL-MCNC: 110 MG/DL (ref 65–140)
GLUCOSE SERPL-MCNC: 123 MG/DL (ref 65–140)
GLUCOSE SERPL-MCNC: 128 MG/DL (ref 65–140)

## 2021-04-24 PROCEDURE — 95720 EEG PHY/QHP EA INCR W/VEEG: CPT | Performed by: PSYCHIATRY & NEUROLOGY

## 2021-04-24 PROCEDURE — 99233 SBSQ HOSP IP/OBS HIGH 50: CPT | Performed by: PSYCHIATRY & NEUROLOGY

## 2021-04-24 PROCEDURE — 95715 VEEG EA 12-26HR INTMT MNTR: CPT

## 2021-04-24 PROCEDURE — 99232 SBSQ HOSP IP/OBS MODERATE 35: CPT | Performed by: INTERNAL MEDICINE

## 2021-04-24 PROCEDURE — 82948 REAGENT STRIP/BLOOD GLUCOSE: CPT

## 2021-04-24 RX ADMIN — ENOXAPARIN SODIUM 40 MG: 40 INJECTION SUBCUTANEOUS at 08:00

## 2021-04-24 RX ADMIN — INSULIN LISPRO 3 UNITS: 100 INJECTION, SOLUTION INTRAVENOUS; SUBCUTANEOUS at 07:58

## 2021-04-24 RX ADMIN — INSULIN LISPRO 3 UNITS: 100 INJECTION, SOLUTION INTRAVENOUS; SUBCUTANEOUS at 11:34

## 2021-04-24 RX ADMIN — INSULIN LISPRO 3 UNITS: 100 INJECTION, SOLUTION INTRAVENOUS; SUBCUTANEOUS at 17:22

## 2021-04-24 RX ADMIN — INSULIN DETEMIR 20 UNITS: 100 INJECTION, SOLUTION SUBCUTANEOUS at 21:53

## 2021-04-24 NOTE — ASSESSMENT & PLAN NOTE
Lab Results   Component Value Date    HGBA1C 11 6 (H) 04/12/2021       Recent Labs     04/23/21  1040 04/23/21  1559 04/23/21  2112 04/24/21  0659   POCGLU 179* 131 164* 110       Blood Sugar Average: Last 72 hrs:  (P) 820 [id number]   Patient follows with endocrinology outpatient  Poorly-controlled diabetes  She reports she was not been taking insulin recently due to insulin price  Continue to hold Ozempic  Continue Levemir, Humalog and insulin sliding scale  Acceptable blood sugar  Diabetic diet, SSI, Accu-Cheks

## 2021-04-24 NOTE — PLAN OF CARE
Problem: PAIN - ADULT  Goal: Verbalizes/displays adequate comfort level or baseline comfort level  Description: Interventions:  - Encourage patient to monitor pain and request assistance  - Assess pain using appropriate pain scale  - Administer analgesics based on type and severity of pain and evaluate response  - Implement non-pharmacological measures as appropriate and evaluate response  - Consider cultural and social influences on pain and pain management  - Notify physician/advanced practitioner if interventions unsuccessful or patient reports new pain  Outcome: Progressing     Problem: INFECTION - ADULT  Goal: Absence or prevention of progression during hospitalization  Description: INTERVENTIONS:  - Assess and monitor for signs and symptoms of infection  - Monitor lab/diagnostic results  - Monitor all insertion sites, i e  indwelling lines, tubes, and drains  - Monitor endotracheal if appropriate and nasal secretions for changes in amount and color  - [place] appropriate cooling/warming therapies per order  - Administer medications as ordered  - Instruct and encourage patient and family to use good hand hygiene technique  - Identify and instruct in appropriate isolation precautions for identified infection/condition  Outcome: Progressing     Problem: SAFETY ADULT  Goal: Patient will remain free of falls  Description: INTERVENTIONS:  - Assess patient frequently for physical needs  -  Identify cognitive and physical deficits and behaviors that affect risk of falls    -  [place] fall precautions as indicated by assessment   - Educate patient/family on patient safety including physical limitations  - Instruct patient to call for assistance with activity based on assessment  - Modify environment to reduce risk of injury  - Consider OT/PT consult to assist with strengthening/mobility  Outcome: Progressing  Goal: Maintain or return to baseline ADL function  Description: INTERVENTIONS:  -  Assess patient's ability to carry out ADLs; assess patient's baseline for ADL function and identify physical deficits which impact ability to perform ADLs (bathing, care of mouth/teeth, toileting, grooming, dressing, etc )  - Assess/evaluate cause of self-care deficits   - Assess range of motion  - Assess patient's mobility; develop plan if impaired  - Assess patient's need for assistive devices and provide as appropriate  - Encourage maximum independence but intervene and supervise when necessary  - Involve family in performance of ADLs  - Assess for home care needs following discharge   - Consider OT consult to assist with ADL evaluation and planning for discharge  - Provide patient education as appropriate  Outcome: Progressing  Goal: Maintain or return mobility status to optimal level  Description: INTERVENTIONS:  - Assess patient's baseline mobility status (ambulation, transfers, stairs, etc )    - Identify cognitive and physical deficits and behaviors that affect mobility  - Identify mobility aids required to assist with transfers and/or ambulation (gait belt, sit-to-stand, lift, walker, cane, etc )  - [place] fall precautions as indicated by assessment  - Record patient progress and toleration of activity level on Mobility SBAR; progress patient to next Phase/Stage  - Instruct patient to call for assistance with activity based on assessment  - Consider rehabilitation consult to assist with strengthening/weightbearing, etc   Outcome: Progressing     Problem: DISCHARGE PLANNING  Goal: Discharge to home or other facility with appropriate resources  Description: INTERVENTIONS:  - Identify barriers to discharge w/patient and caregiver  - Arrange for needed discharge resources and transportation as appropriate  - Identify discharge learning needs (meds, wound care, etc )  - Arrange for interpretive services to assist at discharge as needed  - Refer to Case Management Department for coordinating discharge planning if the patient needs post-hospital services based on physician/advanced practitioner order or complex needs related to functional status, cognitive ability, or social support system  Outcome: Progressing     Problem: Knowledge Deficit  Goal: Patient/family/caregiver demonstrates understanding of disease process, treatment plan, medications, and discharge instructions  Description: Complete learning assessment and assess knowledge base  Interventions:  - Provide teaching at level of understanding  - Provide teaching via preferred learning methods  Outcome: Progressing     Problem: NEUROSENSORY - ADULT  Goal: Achieves stable or improved neurological status  Description: INTERVENTIONS  - Monitor and report changes in neurological status  - Monitor vital signs such as temperature, blood pressure, glucose, and any other labs ordered   - Initiate measures to prevent increased intracranial pressure  - Monitor for seizure activity and implement precautions if appropriate      Outcome: Progressing  Goal: Remains free of injury related to seizures activity  Description: INTERVENTIONS  - Maintain airway, patient safety  and administer oxygen as ordered  - Monitor patient for seizure activity, document and report duration and description of seizure to physician/advanced practitioner  - If seizure occurs,  ensure patient safety during seizure  - Reorient patient post seizure  - Seizure pads on all 4 side rails  - Instruct patient/family to notify RN of any seizure activity including if an aura is experienced  - Instruct patient/family to call for assistance with activity based on nursing assessment  - Administer anti-seizure medications if ordered    Outcome: Progressing  Goal: Achieves maximal functionality and self care  Description: INTERVENTIONS  - Monitor swallowing and airway patency with patient fatigue and changes in neurological status  - Encourage and assist patient to increase activity and self care     - Encourage visually impaired, hearing impaired and aphasic patients to use assistive/communication devices  Outcome: Progressing     Problem: Potential for Falls  Goal: Patient will remain free of falls  Description: INTERVENTIONS:  - Assess patient frequently for physical needs  -  Identify cognitive and physical deficits and behaviors that affect risk of falls    -  [place] fall precautions as indicated by assessment   - Educate patient/family on patient safety including physical limitations  - Instruct patient to call for assistance with activity based on assessment  - Modify environment to reduce risk of injury  - Consider OT/PT consult to assist with strengthening/mobility  Outcome: Progressing

## 2021-04-24 NOTE — PROGRESS NOTES
[address] [facility]  Progress Note - Mildred Began 1980, 45 y o  female MRN: [id number]  Unit/Bed#: [facility] 717-01 Encounter: [id number]  Primary Care Provider: Kellie Castro MD   Date and time admitted to hospital: 2021 11:10 AM    Uncontrolled type 2 diabetes mellitus with hyperglycemia, with long-term current use of insulin [facility])  Assessment & Plan  Lab Results   Component Value Date    HGBA1C 11 6 (H) 2021       Recent Labs     21  1040 21  1559 21  2112 21  0659   POCGLU 179* 131 164* 110       Blood Sugar Average: Last 72 hrs:  (P) 386 [id number]   Patient follows with endocrinology outpatient  Poorly-controlled diabetes  She reports she was not been taking insulin recently due to insulin price  Continue to hold Ozempic  Continue Levemir, Humalog and insulin sliding scale  Acceptable blood sugar  Diabetic diet, SSI, Accu-Cheks    * Recurrent episodes of unresponsiveness  Assessment & Plan  Currently on video EEG as per primary  Not on AEDs    Frequent headaches  Assessment & Plan  Management as per primary         VTE Pharmacologic Prophylaxis:   Pharmacologic: Enoxaparin (Lovenox)  Mechanical VTE Prophylaxis in Place: Yes    Patient Centered Rounds: I have performed bedside rounds with nursing staff today  Discussions with Specialists or Other Care Team Provider:  Neurology    Education and Discussions with Family / Patient:  Patient    Time Spent for Care: 30 minutes  More than 50% of total time spent on counseling and coordination of care as described above      Current Length of Stay: 4 day(s)    Current Patient Status: Inpatient     Code Status: Level 1 - Full Code      Subjective:   Patient is comfortable in bed  No event overnight  Still on video EEG per Neurology    Objective:     Vitals:   Temp (24hrs), Av 5 °F (36 9 °C), Min:98 °F (36 7 °C), Max:99 °F (37 2 °C)    Temp:  [98 °F (36 7 °C)-99 °F (37 2 °C)] 98 3 °F (36 8 °C)  HR:  [74-96] 74  Resp:  [16-17] 16  BP: ()/(59-77) 101/59  SpO2:  [96 %-99 %] 99 %  There is no height or weight on file to calculate BMI  Input and Output Summary (last 24 hours): Intake/Output Summary (Last 24 hours) at 4/24/2021 1008  Last data filed at 4/23/2021 1700  Gross per 24 hour   Intake 240 ml   Output --   Net 240 ml       Physical Exam:     Physical Exam  Patient is comfortable in bed on video EEG  Awake alert oriented no acute distress  Lung clear to auscultation bilateral anteriorly  Heart positive S1-S2 no murmur  Abdomen soft  Lower extremities no edema    Additional Data:     Labs:    Results from last 7 days   Lab Units 04/23/21  1131 04/20/21  1415   WBC Thousand/uL  --  5 44   HEMOGLOBIN g/dL  --  13 6   HEMATOCRIT %  --  42 6   PLATELETS Thousands/uL 319 292   NEUTROS PCT %  --  36*   LYMPHS PCT %  --  50*   MONOS PCT %  --  10   EOS PCT %  --  3     Results from last 7 days   Lab Units 04/20/21  1415   POTASSIUM mmol/L 4 2   CHLORIDE mmol/L 108   CO2 mmol/L 25   BUN mg/dL 10   CREATININE mg/dL 0 76   CALCIUM mg/dL 9 1   ALK PHOS U/L 57   ALT U/L 17   AST U/L 15           * I Have Reviewed All Lab Data Listed Above  * Additional Pertinent Lab Tests Reviewed:  Mason 66 Admission Reviewed    Imaging:    Imaging Reports Reviewed Today Include:   Imaging Personally Reviewed by Myself Includes:     Recent Cultures (last 7 days):           Last 24 Hours Medication List:   Current Facility-Administered Medications   Medication Dose Route Frequency Provider Last Rate    acetaminophen  650 mg Oral Q6H PRN Bala Smith MD      butalbital-acetaminophen-caffeine  1 tablet Oral HS PRN Bala Smith MD      diphenhydrAMINE  25 mg Oral Q6H PRN Bala Smith MD      enoxaparin  40 mg Subcutaneous Q24H [address] Zuleika De Oliveira MD      insulin detemir  20 Units Subcutaneous HS Zuleika De Oliveira MD      insulin lispro  1-6 Units Subcutaneous 4x Daily (AC & HS) Rakan Zamora MD     [facility] insulin lispro  3 Units Subcutaneous TID With Meals Emani Whitaker DO      LORazepam  2 mg Intravenous Q8H PRN Gerard Chavarria MD      naproxen  250 mg Oral BID PRN Gerard Chavarria MD      naproxen  500 mg Oral BID PRN Gerard Chavarria MD      ondansetron  4 mg Intravenous Q6H PRN Gerard Chavarria MD      polyethylene glycol  17 g Oral Daily PRN Gerard Chavarria MD          Today, Patient Was Seen By: Emani Whitaker DO    ** Please Note: This note has been constructed using a voice recognition system   **

## 2021-04-24 NOTE — PROGRESS NOTES
Neurology/Epilepsy Progress Note  Patient Name:  Mildred Bergeron  : 1980  MRN: [id number]  Encounter: [id number]  Date of admission: 2021  Date: 21  Bed / Unit: [facility]-01    CC:  Admission to EMU for differential diagnosis of recurrent spells    Assessment:  Ms Mildred Bergeron is a 45 y o  woman admitted to the EMU for differential diagnosis of recurrent episodes that involve generalized shakiness, nausea, and diarrhea, with impaired awareness and unresponsiveness  She likely also has episodic migraine headaches  She has uncontrolled diabetes with reduced reflexes (suggestive of an underlying sensory polyneuropathy)  Based on clinical features alone, I cannot determine if she has focal epileptic seizures with impaired awareness or if these are nonepileptic behavioral events  Thus far her EEG has been unremarkable, no epileptiform discharges, or clinical event to make a diagnosis  Because insufficient number of events have not been captured and since the diagnosis of the patient's events have not been clarified, further continuous video EEG monitoring / admission is required  If she does not have a clinical event by tomorrow, we have to make a presumed diagnosis of focal impaired aware type of seizures  This is based on some of the GI symptoms associated with these clinical events, may suggest insular involvement which is in a region that is difficult to monitor interictal epileptiform discharges  We had discussed the potential addition of topiramate for both its prophylactic migraine indication and as an anticonvulsant medication  She would like to hold off starting this medication until after further discussion with Dr Antonina Hopkins  Given that there is the possibility of focal seizures, I will recommend and MRI brain study, which can be performed in an outpatient setting      During admission, with use of Levemir and premeal insulin, her FSBG is better controlled, appreciative of internal medicine assistance in this matter  PLAN:  Seizure-like activity evaluation:  continue continuous video EEG monitoring with single lead ECG  Hyperventilation today  Photic stimulation today  DVT prophylaxis: Lovenox 40mg sc daily and sequential compression devices  Seizure rescue: lorazepam 2mg IV if there is a generalized convulsion or more than 2 complex partial seizures within 12 hours  Plan for discharge to home tomorrow  [x] Medical issues:  Uncontrolled diabetes:  - appreciate SLIM consultation and assistance in management of elevated blood glucose levels  - she will need to follow-up with endocrinology for medication management   - diabetic control diet  FULL CODE     Interval History:   No clinical event yesterday or overnight  She re-stated that her events involve nausea and vomiting, with generalized shakiness, and diarrhea  She is sure that she has no control of her bowels when she has a clinical event  She endorses that she is unresponsive  She is not aware of ever passing out or losing consciousness  She did not want to have HV/PS yesterday because she was in a bad mood when the techs came to do the study  She is willing to stay until tomorrow      Current AEDs:  None    Additional Past Medical/Surgical History:  No additional medical history is provided by the patient and is unchanged from the initial H+P    PFSHx:  [x] Family and Social history is unchanged from HPI Note    Current Medications:  Current Facility-Administered Medications   Medication Dose Route Frequency Provider Last Rate    acetaminophen  650 mg Oral Q6H PRN Zeynep Wright MD      butalbital-acetaminophen-caffeine  1 tablet Oral HS PRN Zeynep Wright MD      diphenhydrAMINE  25 mg Oral Q6H PRN Zeynep Wright MD      enoxaparin  40 mg Subcutaneous Q24H [address] Zuleika De Oliveira MD      insulin detemir  20 Units Subcutaneous HS Zuleika De Oliveira MD      insulin lispro  1-6 Units Subcutaneous 4x Daily (AC & HS) Romualdo Castleman, MD Kary Mare insulin lispro  3 Units Subcutaneous TID With Meals Taye Fei, DO      LORazepam  2 mg Intravenous Q8H PRN Zeynep Wright MD      naproxen  250 mg Oral BID PRN Zeynep Wright MD      naproxen  500 mg Oral BID PRN Zeynep Wright MD      ondansetron  4 mg Intravenous Q6H PRN Zeynep Wright MD      polyethylene glycol  17 g Oral Daily PRN Zeynep Wright MD         PRN medications:     acetaminophen    butalbital-acetaminophen-caffeine    diphenhydrAMINE    LORazepam    naproxen    naproxen    ondansetron    polyethylene glycol    Allergies: Allergies   Allergen Reactions    Metformin And Related Hives       Review of Systems  Constitutional: negative  Eyes: negative  Ears, nose, mouth, throat, and face: negative  Respiratory: negative  Cardiovascular: negative  Gastrointestinal: no bowel movement for several days, but she is not feeling uncomfortable, she declined stool softeners, bulking agents, and stimulants  Genitourinary:negative  Hematologic/lymphatic: negative  Musculoskeletal:negative  Neurological: negative    Physical Exam:  EXAMINATION   (any 12=Level 3; any 6=Level 2; <6 = Level 1)  Elaborate all abnormal findings  General exam   Blood pressure 101/59, pulse 74, temperature 98 3 °F (36 8 °C), resp  rate 16, SpO2 99 %, unknown if currently breastfeeding    Appearance: normally developed, appears well  Carotids: not assessed  Cardiovascular: regular rate and rhythm and normal heart sounds  Pulmonary: clear to auscultation  Abdominal: soft, nondistended  Extremities: normal color, temperature, peripheral pulses intact, no edema    HEENT: anicteric and moist mucus membranes / oral cavity   Fundoscopy: not assessed    Mental status  Orientation: alert and oriented to name, place, time  Fund of Knowledge: intact   Attention and Concentration: intact  Current and Remote Memory:intact  Language: spontaneous speech is normal and comprehension is intact    Cranial Nerves  CN 3, 4, 6: EOMI, no nystagmus  CN 7:muscles of facial expression are symmetric  CN 9, 10:no dysarthria present  CN 11:symmetric shoulder shrug  CN 12:tongue is midline    Motor:  Bulk, Tone: normal bulk, normal tone  Pronation: no pronator drift  Strength: Symmetric strength of the arms and legs, no lateralizing weakness     Sensory:  Lighttouch: intact in all limbs    Coordination:  FNF:FNF bilaterally intact  Gait/Station:normal gait    Reflexes:  not assessed    [x] I personally reviewed the EEG tracing  Labs:  Component       POC Glucose   Latest Ref Rng & Units       65 - 140 mg/dl   4/22/2021      4:15  (H)   4/22/2021      8:52  (H)   4/23/2021      6:29  (H)   4/23/2021      10:40  (H)   4/23/2021      3:59    4/23/2021      9:12  (H)   4/24/2021       110     Continuous video EEG reviewed 4/23-4/24/2021 up to 8AM:  Normal EEG awake and asleep background  No epileptiform discharges  No clinical events  [x]  The patient is having medically uncontrolled seizures or clinical events, which poses a significant mortality and morbidity risk  Continuous monitoring will aid in diagnosis and determining best treatment options  If seizures are left untreated, there is a 10-20% annual morbidity and 2% annual mortality rate  Continuous monitoring is required to capture a sufficient number of clinical events or seizures, while medication are reduced or provoked, which increases the risk of severe seizures and at risk of seizures causing injury or status epilepticus  The EMU is set up to monitor and record EEGs during seizures and it includes nurses trained to provide first aid and patient safety during seizures   The full hospital team must be present to deal with these medical emergency situations should they arise, including nurses to document patients exam  There have been 13 reported deaths during various video-EEG monitoring procedures, so this is not a "low risk" service  Decision making was of high-complexity due to the patient's high risk condition (seizures), psychiatric and neuropsychological comorbidities, behavioral problems, memory and cognitive problems and medication side effects  The total amount of time spent with the patient / floor unit time was 30 minutes  More than 50% of this time was devoted to counseling and coordination of care  Issues addressed during this clinic visit included overall management, medication counseling or monitoring (including adverse effects, side effects and risks of antiepileptic medications)

## 2021-04-25 VITALS
OXYGEN SATURATION: 98 % | HEART RATE: 77 BPM | DIASTOLIC BLOOD PRESSURE: 68 MMHG | SYSTOLIC BLOOD PRESSURE: 93 MMHG | RESPIRATION RATE: 16 BRPM | TEMPERATURE: 98.2 F

## 2021-04-25 PROBLEM — G43.009 MIGRAINE WITHOUT AURA AND WITHOUT STATUS MIGRAINOSUS, NOT INTRACTABLE: Status: ACTIVE | Noted: 2021-04-20

## 2021-04-25 LAB
GLUCOSE SERPL-MCNC: 112 MG/DL (ref 65–140)
GLUCOSE SERPL-MCNC: 143 MG/DL (ref 65–140)

## 2021-04-25 PROCEDURE — 99239 HOSP IP/OBS DSCHRG MGMT >30: CPT | Performed by: PSYCHIATRY & NEUROLOGY

## 2021-04-25 PROCEDURE — 99232 SBSQ HOSP IP/OBS MODERATE 35: CPT | Performed by: INTERNAL MEDICINE

## 2021-04-25 PROCEDURE — 82948 REAGENT STRIP/BLOOD GLUCOSE: CPT

## 2021-04-25 PROCEDURE — 95720 EEG PHY/QHP EA INCR W/VEEG: CPT | Performed by: PSYCHIATRY & NEUROLOGY

## 2021-04-25 RX ORDER — NAPROXEN 500 MG/1
500 TABLET ORAL 2 TIMES DAILY PRN
Start: 2021-04-25 | End: 2022-06-10

## 2021-04-25 RX ADMIN — ENOXAPARIN SODIUM 40 MG: 40 INJECTION SUBCUTANEOUS at 08:45

## 2021-04-25 RX ADMIN — INSULIN LISPRO 3 UNITS: 100 INJECTION, SOLUTION INTRAVENOUS; SUBCUTANEOUS at 07:42

## 2021-04-25 NOTE — PLAN OF CARE
Problem: PAIN - ADULT  Goal: Verbalizes/displays adequate comfort level or baseline comfort level  Description: Interventions:  - Encourage patient to monitor pain and request assistance  - Assess pain using appropriate pain scale  - Administer analgesics based on type and severity of pain and evaluate response  - Implement non-pharmacological measures as appropriate and evaluate response  - Consider cultural and social influences on pain and pain management  - Notify physician/advanced practitioner if interventions unsuccessful or patient reports new pain  Outcome: Progressing     Problem: INFECTION - ADULT  Goal: Absence or prevention of progression during hospitalization  Description: INTERVENTIONS:  - Assess and monitor for signs and symptoms of infection  - Monitor lab/diagnostic results  - Monitor all insertion sites, i e  indwelling lines, tubes, and drains  - Monitor endotracheal if appropriate and nasal secretions for changes in amount and color  - [place] appropriate cooling/warming therapies per order  - Administer medications as ordered  - Instruct and encourage patient and family to use good hand hygiene technique  - Identify and instruct in appropriate isolation precautions for identified infection/condition  Outcome: Progressing     Problem: SAFETY ADULT  Goal: Patient will remain free of falls  Description: INTERVENTIONS:  - Assess patient frequently for physical needs  -  Identify cognitive and physical deficits and behaviors that affect risk of falls    -  [place] fall precautions as indicated by assessment   - Educate patient/family on patient safety including physical limitations  - Instruct patient to call for assistance with activity based on assessment  - Modify environment to reduce risk of injury  - Consider OT/PT consult to assist with strengthening/mobility  Outcome: Progressing  Goal: Maintain or return to baseline ADL function  Description: INTERVENTIONS:  -  Assess patient's ability to carry out ADLs; assess patient's baseline for ADL function and identify physical deficits which impact ability to perform ADLs (bathing, care of mouth/teeth, toileting, grooming, dressing, etc )  - Assess/evaluate cause of self-care deficits   - Assess range of motion  - Assess patient's mobility; develop plan if impaired  - Assess patient's need for assistive devices and provide as appropriate  - Encourage maximum independence but intervene and supervise when necessary  - Involve family in performance of ADLs  - Assess for home care needs following discharge   - Consider OT consult to assist with ADL evaluation and planning for discharge  - Provide patient education as appropriate  Outcome: Progressing  Goal: Maintain or return mobility status to optimal level  Description: INTERVENTIONS:  - Assess patient's baseline mobility status (ambulation, transfers, stairs, etc )    - Identify cognitive and physical deficits and behaviors that affect mobility  - Identify mobility aids required to assist with transfers and/or ambulation (gait belt, sit-to-stand, lift, walker, cane, etc )  - [place] fall precautions as indicated by assessment  - Record patient progress and toleration of activity level on Mobility SBAR; progress patient to next Phase/Stage  - Instruct patient to call for assistance with activity based on assessment  - Consider rehabilitation consult to assist with strengthening/weightbearing, etc   Outcome: Progressing     Problem: DISCHARGE PLANNING  Goal: Discharge to home or other facility with appropriate resources  Description: INTERVENTIONS:  - Identify barriers to discharge w/patient and caregiver  - Arrange for needed discharge resources and transportation as appropriate  - Identify discharge learning needs (meds, wound care, etc )  - Arrange for interpretive services to assist at discharge as needed  - Refer to Case Management Department for coordinating discharge planning if the patient needs post-hospital services based on physician/advanced practitioner order or complex needs related to functional status, cognitive ability, or social support system  Outcome: Progressing     Problem: Knowledge Deficit  Goal: Patient/family/caregiver demonstrates understanding of disease process, treatment plan, medications, and discharge instructions  Description: Complete learning assessment and assess knowledge base  Interventions:  - Provide teaching at level of understanding  - Provide teaching via preferred learning methods  Outcome: Progressing     Problem: NEUROSENSORY - ADULT  Goal: Achieves stable or improved neurological status  Description: INTERVENTIONS  - Monitor and report changes in neurological status  - Monitor vital signs such as temperature, blood pressure, glucose, and any other labs ordered   - Initiate measures to prevent increased intracranial pressure  - Monitor for seizure activity and implement precautions if appropriate      Outcome: Progressing  Goal: Remains free of injury related to seizures activity  Description: INTERVENTIONS  - Maintain airway, patient safety  and administer oxygen as ordered  - Monitor patient for seizure activity, document and report duration and description of seizure to physician/advanced practitioner  - If seizure occurs,  ensure patient safety during seizure  - Reorient patient post seizure  - Seizure pads on all 4 side rails  - Instruct patient/family to notify RN of any seizure activity including if an aura is experienced  - Instruct patient/family to call for assistance with activity based on nursing assessment  - Administer anti-seizure medications if ordered    Outcome: Progressing  Goal: Achieves maximal functionality and self care  Description: INTERVENTIONS  - Monitor swallowing and airway patency with patient fatigue and changes in neurological status  - Encourage and assist patient to increase activity and self care     - Encourage visually impaired, hearing impaired and aphasic patients to use assistive/communication devices  Outcome: Progressing     Problem: Potential for Falls  Goal: Patient will remain free of falls  Description: INTERVENTIONS:  - Assess patient frequently for physical needs  -  Identify cognitive and physical deficits and behaviors that affect risk of falls    -  [place] fall precautions as indicated by assessment   - Educate patient/family on patient safety including physical limitations  - Instruct patient to call for assistance with activity based on assessment  - Modify environment to reduce risk of injury  - Consider OT/PT consult to assist with strengthening/mobility  Outcome: Progressing

## 2021-04-25 NOTE — PROGRESS NOTES
[address] [facility]  Progress Note - Maggie Ojeda 1980, 45 y o  female MRN: [id number]  Unit/Bed#: [facility] 930-04 Encounter: [id number]  Primary Care Provider: Adrian Guadarrama MD   Date and time admitted to hospital: 2021 11:10 AM    Uncontrolled type 2 diabetes mellitus with hyperglycemia, with long-term current use of insulin [facility])  Assessment & Plan  Lab Results   Component Value Date    HGBA1C 11 6 (H) 2021       Recent Labs     21  1109 21  1600 21  2114 21  0704   POCGLU 123 108 128 112       Blood Sugar Average: Last 72 hrs:  (P) 923 [id number]   Patient follows with endocrinology outpatient  Poorly-controlled diabetes  She reports she was not been taking insulin recently due to insulin price  Continue to hold Ozempic  Continue Levemir, Humalog and insulin sliding scale  Acceptable blood sugar  Diabetic diet, SSI, Accu-Cheks  At home continue with same dose of Levemir and restart [facility] outpatient doctor in follow-up    * Recurrent episodes of unresponsiveness  Assessment & Plan  S/p video EEG   Not on AEDs  Per primary    Stable for discharge from SLIM standpoint     VTE Pharmacologic Prophylaxis:   Pharmacologic: Enoxaparin (Lovenox)  Mechanical VTE Prophylaxis in Place: Yes    Patient Centered Rounds: I have performed bedside rounds with nursing staff today  Discussions with Specialists or Other Care Team Provider:     Education and Discussions with Family / Patient:  Patient    Time Spent for Care: 30 minutes  More than 50% of total time spent on counseling and coordination of care as described above      Current Length of Stay: 5 day(s)    Current Patient Status: Inpatient     Code Status: Level 1 - Full Code      Subjective:   Patient is comfortable in bed  No event overnight  No nausea vomiting or diarrhea  Blood sugars well controlled  Objective:     Vitals:   Temp (24hrs), Av 5 °F (36 9 °C), Min:98 2 °F (36 8 °C), Max:98 6 °F (37 °C)    Temp:  [98 2 °F (36 8 °C)-98 6 °F (37 °C)] 98 2 °F (36 8 °C)  HR:  [77-86] 77  Resp:  [16-17] 16  BP: ()/(62-76) 93/68  SpO2:  [97 %-98 %] 98 %  There is no height or weight on file to calculate BMI  Input and Output Summary (last 24 hours): Intake/Output Summary (Last 24 hours) at 4/25/2021 1019  Last data filed at 4/25/2021 0825  Gross per 24 hour   Intake 458 ml   Output --   Net 458 ml       Physical Exam:     Physical Exam  Patient is awake alert oriented no acute distress  Lung clear to auscultation bilateral  Heart positive S1-S2 no murmur  Abdomen soft nontender  Lower extremities no edema    Additional Data:     Labs:    Results from last 7 days   Lab Units 04/23/21  1131 04/20/21  1415   WBC Thousand/uL  --  5 44   HEMOGLOBIN g/dL  --  13 6   HEMATOCRIT %  --  42 6   PLATELETS Thousands/uL 319 292   NEUTROS PCT %  --  36*   LYMPHS PCT %  --  50*   MONOS PCT %  --  10   EOS PCT %  --  3     Results from last 7 days   Lab Units 04/20/21  1415   POTASSIUM mmol/L 4 2   CHLORIDE mmol/L 108   CO2 mmol/L 25   BUN mg/dL 10   CREATININE mg/dL 0 76   CALCIUM mg/dL 9 1   ALK PHOS U/L 57   ALT U/L 17   AST U/L 15           * I Have Reviewed All Lab Data Listed Above  * Additional Pertinent Lab Tests Reviewed:  [place] [facility] Admission Reviewed    Imaging:    Imaging Reports Reviewed Today Include:   Imaging Personally Reviewed by Myself Includes:     Recent Cultures (last 7 days):           Last 24 Hours Medication List:   Current Facility-Administered Medications   Medication Dose Route Frequency Provider Last Rate    acetaminophen  650 mg Oral Q6H PRN Sam Samuel MD      butalbital-acetaminophen-caffeine  1 tablet Oral HS PRN Sam Samuel MD      diphenhydrAMINE  25 mg Oral Q6H PRN Sam Samuel MD      enoxaparin  40 mg Subcutaneous Q24H [address] Sam Samuel MD      insulin detemir  20 Units Subcutaneous HS Sam Samuel MD      insulin lispro  1-6 Units Subcutaneous 4x Daily (AC & HS) Maximilian Fontaine MD      insulin lispro  3 Units Subcutaneous TID With Meals Lucero Coto DO      LORazepam  2 mg Intravenous Q8H PRN Bala Smith MD      naproxen  250 mg Oral BID PRN Bala Smith MD      naproxen  500 mg Oral BID PRN Bala Smith MD      ondansetron  4 mg Intravenous Q6H PRN Bala Smith MD      polyethylene glycol  17 g Oral Daily PRN Bala Smith MD          Today, Patient Was Seen By: Lucero Coto DO    ** Please Note: This note has been constructed using a voice recognition system   **

## 2021-04-25 NOTE — DISCHARGE INSTRUCTIONS
You were admitted to the Epilepsy Monitoring Unit for 5 days for continuous video EEG monitoring  The reason for this study was to determine if these clinical events that happen 1-2 times a month are focal seizures or if these are behavioral nonepileptic events  Unfortunately, after 5 days of EEG monitoring, no abnormality was detected and no clinical event happened  Based on the clinical features of generalized shakiness, nausea/throwing up, sensation of diarrhea or bowel incontinence, and period of unresponsiveness, there is the potential risk that these are focal epileptic seizures  This is not a certain diagnosis but without a clinical event on video EEG monitoring we have to presume which one poses a greater risk  You also have migraine headaches  With recurrent migraines, Dr Ramses Quinteros discussed the possibility of starting topiramate, which is an antiseizure medication and medication typically used for migraine prevention  Dr Ramses Quinteros reviewed side effects of topiramate, which include, but not limited to, drug rash, fevers, kidney stones, metabolic acidosis, renal insufficiency, mood swings, irritability, suicidal ideation, medication interactions, ataxia, incoordination, cognitive impairment, weight loss and anorexia, paresthesia, acute glaucoma  If a trial of two antiseizure medications are not effective in preventing these clinical events (possible focal impaired seizures or complex partial seizures), then another inpatient epilepsy monitoring unit study is recommended with a longer admission may be required to capture one of these events  Due to the lack of awareness (period of unresponsiveness) and loss of control of your thoughts/body, then there is a risk of causing an accident while driving  You cannot drive until these episodes are not present for at least 6 months  If these events continue to happen there is a greater risk of a car accident    Please do not drive and consider the risk that you put yourself and your family if it was to happen while driving  Due to [facility] rules/regulations, these events must be reported to [facility]  Please follow-up with Dr Lili Saenz regarding these events and what Dr Lisette Willett has communicated to you  We recommend an MRI brain w/wo contrast study prior to your visit with Dr Lili Saenz  Please call the neurology office at [phone] to help schedule this study  You can call the office when you are ready to start topiramate  Please follow-up with your PCP and endocrinologist with respect to your uncontrolled diabetes  SEIZURE SAFETY    Dont let fear of seizures keep you at home  Be smart about your activities to make sure you are safe  The guidelines below can help you be as safe as possible  Make sure the people around you are aware of:   What happens when you have a seizure   Correct seizure first aid   First aid for choking (consider taking a basic life support class)   When they should know to call 911 or for medical help    Avoid common triggers of seizures:   Missing your medications   Not getting enough sleep   Drinking alcohol   Using illegal drugs    Safety measures for at home:  In the bathroom:    Do not take baths in the bathtub  Instead, take only showers  Try to have a family member available while you are in the shower   Make sure the drain in the bathtub/shower is working properly to avoid pooling of water   You can consider a fitted shower seat  Recessed soap trays can minimize injury if you would happen to fall in the shower   Bathroom doors can be hung to open outwards, so that the door can still be opened if you fall against the door   Do not lock the bathroom door  Use an Occupied sign on the door or other signal to let others know you are in the bathroom  o Safety locks can be obtained from the [facility]     On your water heater, set your water temperature to a warm temperature that is not scalding to avoid burns from very hot water   Put non-skid strips/ in the bathtub   Use an electric shaver   Avoid any electrical appliances (including electric shaver) in the bathroom or near water   Use shatterproof glass for mirrors  In the kitchen:    When possible, cook using a microwave   Only cook or use electrical appliances when someone else is in the house and available   As much as possible, grill food and avoid fryers or frying food   Use the back burners of the stove and turn the pot handles toward the back of the stove   Avoid carrying hot pans, pots of boiling water, or very hot food  Serve food or liquids directly from the stove  At the least, minimize the distance hot food is carried   Use precut foods or food processers to limit the need to use knives   Use plastic or durable cups, dishes, and containers instead of breakable glass items  In the bedroom   Low level and wide beds (like a futon) can reduce risk of injury of falling out of bed  If there is a high risk of falling out of bed, you can consider simply putting the mattress on the floor   Avoid sleeping on top bunks of bunk beds   Place a soft carpet on the floor  Around the house   Pad sharp corners of tables, chairs, etc  Round tables and furniture can be considered to avoid sharp corners   Avoid open flames  Place a screen in front of fireplaces and dont build a fire alone   Allow for open spaces with furniture   Avoid loose throw rugs or slippery floors  Non-slip vida or cushioned vida can help reduce injury from a fall   Fireproof fabrics and furniture are best, and especially important if you smoke   Doors and windows with safety glass are safer if someone falls against them   Avoid lights and heaters that could easily be knocked over   Use curling irons or clothing irons that have automatic shut off switches     Make sure motor driven equipment or lawn mowers have dead mans handles or seats so they will turn off if you release pressure  Safety measures when away from home  [address] law mandates that you cannot drive for 6 months after your most recent seizure   [place] law mandates that you cannot drive for 6 months after your most recent seizure  Work/Travel   Wear a medical alert bracelet or necklace at all times   Wear a helmet and use protective clothing/equipment when appropriate   Consider telling co-workers and travel companions that you have epilepsy and what to do if you have a seizure   Avoid irregular shifts or disruptions of a regular sleep pattern   Take your medications on time and keep an updated list of medications in your purse or wallet   Do not climb to heights or operate heavy machinery   Stand back from the edges of roads or bus/train platforms when traveling   If you wander when you have a seizure, take a friend along for the trip  Recreation  [employer] can be dangerous  Do not swim alone, in open water, or in murky water that you cannot see the bottom  o Caregivers should be with you in the pool at all times and must be physically able to get you out of the water   Use a flotation device   Scuba diving is not recommended since during a seizure people are unaware of their surroundings  o Having a seizure underwater can be deadly and can endanger the lives of others   Kayaking and canoeing can be especially dangerous  o People with epilepsy are at a higher risk of becoming trapped underneath a canoe or kayak   Wear a helmet when playing contact sports, biking, or if there is a risk of falling  o Patients with epilepsy are at a higher risk of head injury when playing contact sports   Avoid riding a bike, running, or other activities around busy roads, steep hills, or secluded areas   Exercise on soft surfaces     [place]: many people with epilepsy can go on rides depending on their type of seizures  o For some people, stress or excitement can trigger a seizure  o Rollercoasters (especially if you are upside-down) are more dangerous for people with epilepsy  Medications   Take your medications on time  If you have trouble remembering, set alarms on your phone  You can visit www  reedihl0ypxqjxv  com to set up reminders through text message to help you remember to take your medications   Use a pillbox to help you keep track of your medications   When out of the house, take any needed medications with you  Consider keeping one or two extra doses with you in case you are unexpectedly away from home longer than planned   If you realize you missed a dose of your medications and it is less than 2 hours until your next dose, skip the missed dose  Do not double up your medication dose  If it is more than 2 hours until your next dose, you can take the missed dose   Avoid drinking alcohol, since this can enhance effects of your seizure medications   Be aware of common and major side effects of your medications   Keep your medications out of the reach of children  Parenting:  Orpha Coyer your home as much as possible   It is possible that you could drop your baby if you have a seizure while holding or feeding them   If you are nursing a baby, sit on the floor or bed with your back supported so the baby will not fall far if you should lose consciousness   Feed the baby while he or she is seated in an infant seat   Dress, change, and sponge bathe the baby on the floor   Move the baby around in a stroller or small crib   Keep a young baby in a playpen when you are alone, and a toddler in an indoor play yard, or childproof one room and use safety dupont at the doors   When out of the house, use a bungee-type cord or restraint harness so your child cannot wander away if you have a seizure that affects your awareness              Diabetes Mellitus Type 2 in Adults, Ambulatory Care GENERAL INFORMATION:   Diabetes mellitus type 2  is a disease that affects how your body uses glucose (sugar)  Insulin helps move sugar out of the blood so it can be used for energy  Normally, when the blood sugar level increases, the pancreas makes more insulin  Type 2 diabetes develops because either the body cannot make enough insulin, or it cannot use the insulin correctly  After many years, your pancreas may stop making insulin  Common symptoms include the following:   · More hunger or thirst than usual     · Frequent urination     · Weight loss without trying     · Blurred vision  Seek immediate care for the following symptoms:   · Severe abdominal pain, or pain that spreads to your back  You may also be vomiting  · Trouble staying awake or focusing    · Shaking or sweating    · Blurred or double vision    · Breath has a fruity, sweet smell    · Breathing is deep and labored, or rapid and shallow    · Heartbeat is fast and weak  Treatment for diabetes mellitus type 2  includes keeping your blood sugar at a normal level  You must eat the right foods, and exercise regularly  You may also need medicine if you cannot control your blood sugar level with nutrition and exercise  Manage diabetes mellitus type 2:   · Check your blood sugar level  You will be taught how to check a small drop of blood in a glucose monitor  Ask your healthcare provider when and how often to check during the day  Ask your healthcare provider what your blood sugar levels should be when you check them  · Keep track of carbohydrates (sugar and starchy foods)  Your blood sugar level can get too high if you eat too many carbohydrates  Your dietitian will help you plan meals and snacks that have the right amount of carbohydrates  · Eat low-fat foods  Some examples are skinless chicken and low-fat milk  · Eat less sodium (salt)  Some examples of high-sodium foods to limit are soy sauce, potato chips, and soup   Do not add salt to food you cook  Limit your use of table salt  · Eat high-fiber foods  Foods that are a good source of fiber include vegetables, whole grain bread, and beans  · Limit alcohol  Alcohol affects your blood sugar level and can make it harder to manage your diabetes  Women should limit alcohol to 1 drink a day  Men should limit alcohol to 2 drinks a day  A drink of alcohol is 12 ounces of beer, 5 ounces of wine, or 1½ ounces of liquor  · Get regular exercise  Exercise can help keep your blood sugar level steady, decrease your risk of heart disease, and help you lose weight  Exercise for at least 30 minutes, 5 days a week  Include muscle strengthening activities 2 days each week  Work with your healthcare provider to create an exercise plan  · Check your feet each day  for injuries or open sores  Ask your healthcare provider for activities you can do if you have an open sore  · Quit smoking  If you smoke, it is never too late to quit  Smoking can worsen the problems that may occur with diabetes  Ask your healthcare provider for information about how to stop smoking if you are having trouble quitting  · Ask about your weight:  Ask healthcare providers if you need to lose weight, and how much to lose  Ask them to help you with a weight loss program  Even a 10 to 15 pound weight loss can help you manage your blood sugar level  · Carry medical alert identification  Wear medical alert jewelry or carry a card that says you have diabetes  Ask your healthcare provider where to get these items  · Ask about vaccines  Diabetes puts you at risk of serious illness if you get the flu, pneumonia, or hepatitis  Ask your healthcare provider if you should get a flu, pneumonia, or hepatitis B vaccine, and when to get the vaccine  Follow up with your healthcare provider as directed:  Write down your questions so you remember to ask them during your visits     CARE AGREEMENT:   You have the right to help plan your care  Learn about your health condition and how it may be treated  Discuss treatment options with your caregivers to decide what care you want to receive  You always have the right to refuse treatment  The above information is an  only  It is not intended as medical advice for individual conditions or treatments  Talk to your doctor, nurse or pharmacist before following any medical regimen to see if it is safe and effective for you  © 2014 [address] is for End User's use only and may not be sold, redistributed or otherwise used for commercial purposes  All illustrations and images included in CareNotes® are the copyrighted property of A D A "[employer] , Inc  or [facility]

## 2021-04-25 NOTE — PLAN OF CARE
Problem: PAIN - ADULT  Goal: Verbalizes/displays adequate comfort level or baseline comfort level  Description: Interventions:  - Encourage patient to monitor pain and request assistance  - Assess pain using appropriate pain scale  - Administer analgesics based on type and severity of pain and evaluate response  - Implement non-pharmacological measures as appropriate and evaluate response  - Consider cultural and social influences on pain and pain management  - Notify physician/advanced practitioner if interventions unsuccessful or patient reports new pain  4/25/2021 1152 by Yajaira Draper RN  Outcome: Adequate for Discharge  4/25/2021 0738 by Yajaira Draper RN  Outcome: Progressing     Problem: INFECTION - ADULT  Goal: Absence or prevention of progression during hospitalization  Description: INTERVENTIONS:  - Assess and monitor for signs and symptoms of infection  - Monitor lab/diagnostic results  - Monitor all insertion sites, i e  indwelling lines, tubes, and drains  - Monitor endotracheal if appropriate and nasal secretions for changes in amount and color  - [place] appropriate cooling/warming therapies per order  - Administer medications as ordered  - Instruct and encourage patient and family to use good hand hygiene technique  - Identify and instruct in appropriate isolation precautions for identified infection/condition  4/25/2021 1152 by Yajaira Draper RN  Outcome: Adequate for Discharge  4/25/2021 0738 by Yajaira Draper RN  Outcome: Progressing     Problem: SAFETY ADULT  Goal: Patient will remain free of falls  Description: INTERVENTIONS:  - Assess patient frequently for physical needs  -  Identify cognitive and physical deficits and behaviors that affect risk of falls    -  [place] fall precautions as indicated by assessment   - Educate patient/family on patient safety including physical limitations  - Instruct patient to call for assistance with activity based on assessment  - Modify environment to reduce risk of injury  - Consider OT/PT consult to assist with strengthening/mobility  4/25/2021 1152 by Jo Hall RN  Outcome: Adequate for Discharge  4/25/2021 9326 by Jo Hall RN  Outcome: Progressing  Goal: Maintain or return to baseline ADL function  Description: INTERVENTIONS:  -  Assess patient's ability to carry out ADLs; assess patient's baseline for ADL function and identify physical deficits which impact ability to perform ADLs (bathing, care of mouth/teeth, toileting, grooming, dressing, etc )  - Assess/evaluate cause of self-care deficits   - Assess range of motion  - Assess patient's mobility; develop plan if impaired  - Assess patient's need for assistive devices and provide as appropriate  - Encourage maximum independence but intervene and supervise when necessary  - Involve family in performance of ADLs  - Assess for home care needs following discharge   - Consider OT consult to assist with ADL evaluation and planning for discharge  - Provide patient education as appropriate  4/25/2021 1152 by Jo Hall RN  Outcome: Adequate for Discharge  4/25/2021 0738 by Jo Hall RN  Outcome: Progressing  Goal: Maintain or return mobility status to optimal level  Description: INTERVENTIONS:  - Assess patient's baseline mobility status (ambulation, transfers, stairs, etc )    - Identify cognitive and physical deficits and behaviors that affect mobility  - Identify mobility aids required to assist with transfers and/or ambulation (gait belt, sit-to-stand, lift, walker, cane, etc )  - [place] fall precautions as indicated by assessment  - Record patient progress and toleration of activity level on Mobility SBAR; progress patient to next Phase/Stage  - Instruct patient to call for assistance with activity based on assessment  - Consider rehabilitation consult to assist with strengthening/weightbearing, etc   4/25/2021 1152 by Jo Hall RN  Outcome: Adequate for Discharge  4/25/2021 0738 by Tayo Huggins RN  Outcome: Progressing     Problem: DISCHARGE PLANNING  Goal: Discharge to home or other facility with appropriate resources  Description: INTERVENTIONS:  - Identify barriers to discharge w/patient and caregiver  - Arrange for needed discharge resources and transportation as appropriate  - Identify discharge learning needs (meds, wound care, etc )  - Arrange for interpretive services to assist at discharge as needed  - Refer to Case Management Department for coordinating discharge planning if the patient needs post-hospital services based on physician/advanced practitioner order or complex needs related to functional status, cognitive ability, or social support system  4/25/2021 1152 by Tayo Huggins RN  Outcome: Adequate for Discharge  4/25/2021 0738 by Tayo Huggins RN  Outcome: Progressing     Problem: Knowledge Deficit  Goal: Patient/family/caregiver demonstrates understanding of disease process, treatment plan, medications, and discharge instructions  Description: Complete learning assessment and assess knowledge base    Interventions:  - Provide teaching at level of understanding  - Provide teaching via preferred learning methods  4/25/2021 1152 by Tayo Huggins RN  Outcome: Adequate for Discharge  4/25/2021 0738 by Tayo Huggins RN  Outcome: Progressing     Problem: NEUROSENSORY - ADULT  Goal: Achieves stable or improved neurological status  Description: INTERVENTIONS  - Monitor and report changes in neurological status  - Monitor vital signs such as temperature, blood pressure, glucose, and any other labs ordered   - Initiate measures to prevent increased intracranial pressure  - Monitor for seizure activity and implement precautions if appropriate      4/25/2021 1152 by Tayo Huggins RN  Outcome: Adequate for Discharge  4/25/2021 9110 by Tayo Huggins RN  Outcome: Progressing  Goal: Remains free of injury related to seizures activity  Description: INTERVENTIONS  - Maintain airway, patient safety  and administer oxygen as ordered  - Monitor patient for seizure activity, document and report duration and description of seizure to physician/advanced practitioner  - If seizure occurs,  ensure patient safety during seizure  - Reorient patient post seizure  - Seizure pads on all 4 side rails  - Instruct patient/family to notify RN of any seizure activity including if an aura is experienced  - Instruct patient/family to call for assistance with activity based on nursing assessment  - Administer anti-seizure medications if ordered    4/25/2021 1152 by Ramiro Velasquez RN  Outcome: Adequate for Discharge  4/25/2021 0738 by Ramiro Velasquez RN  Outcome: Progressing  Goal: Achieves maximal functionality and self care  Description: INTERVENTIONS  - Monitor swallowing and airway patency with patient fatigue and changes in neurological status  - Encourage and assist patient to increase activity and self care  - Encourage visually impaired, hearing impaired and aphasic patients to use assistive/communication devices  4/25/2021 1152 by Ramiro Velasquez RN  Outcome: Adequate for Discharge  4/25/2021 9764 by Ramiro Velasquez RN  Outcome: Progressing     Problem: Potential for Falls  Goal: Patient will remain free of falls  Description: INTERVENTIONS:  - Assess patient frequently for physical needs  -  Identify cognitive and physical deficits and behaviors that affect risk of falls    -  [place] fall precautions as indicated by assessment   - Educate patient/family on patient safety including physical limitations  - Instruct patient to call for assistance with activity based on assessment  - Modify environment to reduce risk of injury  - Consider OT/PT consult to assist with strengthening/mobility  4/25/2021 1152 by Ramiro Velasquez RN  Outcome: Adequate for Discharge  4/25/2021 0738 by Ramiro Velasquez RN  Outcome: Progressing

## 2021-04-25 NOTE — DISCHARGE SUMMARY
EPILEPSY ATTENDING DISCHARGE SUMMARY    Patient Name: Saqib Albarado  YOB: 1980  MRN: [id number]  Acct/CSN Number: [de-identified]  Date of Admission:   4/20/2021  Date of Discharge: 04/25/21  Attending on Admission: Mariza Webster MD PhD  Attending on Discharge: Mariza Webster MD PhD     Admission Diagnosis:    1  Episodes of unresponsiveness R41 82    Discharge Diagnosis:  1  Transient alteration of awareness R40 4   Cannot rule out the possibility of focal seizures (R56 9)    Secondary Diagnoses:  1  Uncontrolled Diabetes, Type 2  2  Migraine without aura, nonintractable without status migrainosus  3  Stress at Home    Consultations:  1  [facility] Internal Medicine (for assistance in managing uncontrolled diabetes)    Procedures:  Continuous Video EEG monitoring    Medication Changes:  None    Discharge Medications:  Levemir 20 units subcutaneous at bedtime  Semaglutide 0 5mg subcutaneous every Monday  Naproxen 500mg twice a day as needed for pain  Methocarbamol 500mg 4 times a day PRN for muscle spasm (she does not use, but have the medication at home)    Recommended follow-up testing:  MRI brain w/wo contrast with seizure protocol  May need another epilepsy monitoring unit study    Brief History:  Ms Saqib Albarado is a 45 y o  woman referred to the epilepsy monitoring unit for differential diagnosis of recurrent episodes when she become unresponsive  She started to have these events about 4 years ago  The first time it happened she was pregnant, passed out, and was found to have poorly controlled diabetes  She continues to have an episode of unresponsiveness about once every couple of weeks  The last time it happened was about 3 weeks prior to admission to the EMU  She reports that it starts with her feeling shaky in her hands, visual distortion (television static), vomiting/nausea, and experiencing a lot of diarrhea  She may proceed to pass out from these events    Her sister reports that there is generalized shaking, she appears alert, but she cannot speak, eyes are blinking, she looks shaky in her hands, but this is not a "grand mal seizure"  She looks shaky and twitchy  These events seem to be triggered by stress  One time back in November/December 2020, she had an episode of generalized shakiness when she felt that she could hear and see everything going on but she was not able to answer or say anything  Her diabetes has not been under control because she could not afford her insulin (Lantus)  She was recently prescribed Levemir, sent by mail order, but she did not start the medication  Hospital Course:  Aida Light was admitted to the epilepsy monitoring unit for 5 days with continuous video EEG monitoring  We attempted sleep deprivation early in the admission  She was not on an antiepileptic medication  There was no clinical event during this study  Her interictal EEG is normal   Photic stimulation and hyperventilation was tried to provoke a clinical event  SLIM assisted with management of her uncontrolled diabetes  She was put on a sliding scale of insulin for meals and was given 3 units with meals as a standing dose of insulin  Her FSBG was not excessively elevated  She reports that she has headaches 1-2 times a month that is generally over the left side of the head, like a hammer is inside her head, these are associated with nausea, photophobia, and lasts 1-2 days  She has tried ibuprofen with mild improvement  After 5 days of continuous EEG monitoring there was no clinical event, so it was felt that a clinical event was not likely going to happen during this admission      EMU CONCLUSION  Total of 5 days (118 hours) of continuous video EEG monitoring  Summary interictal findings:  Normal awake and asleep EEG background   No epileptiform discharges  No focal slowing    Summary event findings: No clinical event  Seizure Classification: Cannot rule out the possibility of focal impair aware seizures  Epilepsy Classification: Insufficient information    EMU findings and discussion:  Without a clinical event on video EEG monitoring to determine if these episodes of unresponsiveness are epileptic seizures or behavioral nonepileptic spells, we have to make a decision as to the etiology of these events based on clinical features reported by the patient and her family  It is possible that focal impaired aware seizures present with generalized shaking (if not bilateral tonic clonic activity), GI symptoms, and motor impairment from responsiveness  Some insular or temporal seizures may have more motor and visceral manifestations  Given that we could not rule out epileptic seizures with this study, we should attempt to treat for focal impaired aware seizures  It is also probable that these are nonepileptic psychogenic events, based on patient's report that these are sometimes associated with stress  A third possibility is that these events are related to hyperglycemia (or hypoglycemia); because her blood glucose levels were well controlled during this admission, it may have prevented her from experiencing a clinical event  Dr Marisa Parker discussed that with the possibility of focal impaired seizures, we should try to suppress these events with antiseizure medication such as topiramate  Topiramate is also a migraine prophylactic medication  This medication may help decrease headache severity/frequency and see if these events of unresponsiveness becomes less frequent  Dr Marisa Parker discussed the medication side effects with the patient  Ms Braxton Gutierrez did not want to start topiramate or a medication at this time  She wanted to wait until she has a follow-up appointment with Dr Estelita Castellano or call the office when she is ready    To complete the evaluation for focal seizures, it is recommended that she undergo an MRI brain study with seizure protocol to rule out structural abnormalities in the temporal and insular cortices  Dr Ariela Vicente also recommended that if she fails two antiseizure medications to prevent these events from recurring, then another inpatient video EEG monitoring study (EMU) should be repeated  Dr Ariela Vicente also spent a significant amount of time discussing that she should not drive if she is having periods of altered awareness and unresponsiveness  She reports that she has no control of her body and cannot remember what happens during a clinical event  This increases her risk for causing a traffic accident if she was to be driving and have a clinical event of unresponsiveness  Dr Ariela Vicente discussed that [employer] rules require any medical condition that increases the risk of safety it has to be reported  Will report last clinical event of altered awareness as early April 2021  Restrictions:  No Driving due to recurrent episodes of altered awareness and unresponsiveness    Condition at Discharge: stable     Discharge instructions/Information to patient and family:   See after visit summary for information provided to patient and family  Provisions for Follow-Up Care:  See after visit summary for information related to follow-up care and any pertinent home health orders  Disposition: Home    Discharge Statement:  I spent 45 minutes discharging the patient (Start:  10:30AM; Stop:  11:15AM), greater than 50% of total time spent counseling the patient, coordination of care, providing post-discharge instructions and follow-up  This time was spent on the day of discharge  I had direct contact with the patient on the day of discharge  Additional time then spent on discharge activities

## 2021-04-25 NOTE — ASSESSMENT & PLAN NOTE
Lab Results   Component Value Date    HGBA1C 11 6 (H) 04/12/2021       Recent Labs     04/24/21  1109 04/24/21  1600 04/24/21  2114 04/25/21  0704   POCGLU 123 108 128 112       Blood Sugar Average: Last 72 hrs:  (P) 735 [id number]   Patient follows with endocrinology outpatient  Poorly-controlled diabetes  She reports she was not been taking insulin recently due to insulin price  Continue to hold Ozempic  Continue Levemir, Humalog and insulin sliding scale  Acceptable blood sugar  Diabetic diet, SSI, Accu-Cheks  At home continue with same dose of Levemir and restart Ozempic  [facility] outpatient doctor in follow-up

## 2021-04-26 ENCOUNTER — TRANSITIONAL CARE MANAGEMENT (OUTPATIENT)
Dept: FAMILY MEDICINE CLINIC | Facility: CLINIC | Age: 41
End: 2021-04-26

## 2021-04-26 DIAGNOSIS — Z71.89 COMPLEX CARE COORDINATION: Primary | ICD-10-CM

## 2021-04-27 ENCOUNTER — OFFICE VISIT (OUTPATIENT)
Dept: FAMILY MEDICINE CLINIC | Facility: CLINIC | Age: 41
End: 2021-04-27
Payer: COMMERCIAL

## 2021-04-27 VITALS
OXYGEN SATURATION: 99 % | BODY MASS INDEX: 29.62 KG/M2 | SYSTOLIC BLOOD PRESSURE: 114 MMHG | DIASTOLIC BLOOD PRESSURE: 68 MMHG | TEMPERATURE: 98.4 F | WEIGHT: 178 LBS | HEART RATE: 78 BPM

## 2021-04-27 DIAGNOSIS — E11.65 UNCONTROLLED TYPE 2 DIABETES MELLITUS WITH HYPERGLYCEMIA, WITH LONG-TERM CURRENT USE OF INSULIN (HCC): ICD-10-CM

## 2021-04-27 DIAGNOSIS — Z12.4 SCREENING FOR CERVICAL CANCER: Primary | ICD-10-CM

## 2021-04-27 DIAGNOSIS — G43.009 MIGRAINE WITHOUT AURA AND WITHOUT STATUS MIGRAINOSUS, NOT INTRACTABLE: ICD-10-CM

## 2021-04-27 DIAGNOSIS — R41.89 RECURRENT EPISODES OF UNRESPONSIVENESS: ICD-10-CM

## 2021-04-27 DIAGNOSIS — Z79.4 UNCONTROLLED TYPE 2 DIABETES MELLITUS WITH HYPERGLYCEMIA, WITH LONG-TERM CURRENT USE OF INSULIN (HCC): ICD-10-CM

## 2021-04-27 PROCEDURE — 99495 TRANSJ CARE MGMT MOD F2F 14D: CPT | Performed by: FAMILY MEDICINE

## 2021-04-27 NOTE — PROGRESS NOTES
Assessment/Plan:     No problem-specific Assessment & Plan notes found for this encounter  {Assess/PlanSmartLinks:22003}     Subjective:     Patient ID: Micah Garzon is a 45 y o  female  HPI    Review of Systems   Constitutional: Negative  HENT: Negative  Eyes: Negative  Respiratory: Negative  Cardiovascular: Negative  Gastrointestinal: Negative  Endocrine: Negative  Genitourinary: Negative  Musculoskeletal: Negative  Allergic/Immunologic: Negative  Neurological: Positive for seizures  Hematological: Negative  Psychiatric/Behavioral: Negative  Objective:     Physical Exam      There were no vitals filed for this visit  Transitional Care Management Review:  Micah Garzon is a 45 y o  female here for TCM follow up  During the TCM phone call patient stated:    TCM Call (since 3/27/2021)     Date and time call was made  4/26/2021  9:07 AM    Patient was hospitialized at  [facility]        Date of Admission  04/20/21    Date of discharge  04/25/21    Diagnosis  EPISODES OF UNRESPONSIVENESS    Disposition  Home    Were the patients medications reviewed and updated  Yes    Current Symptoms  None      TCM Call (since 3/27/2021)     Post hospital issues  None    Should patient be enrolled in anticoag monitoring? No    Scheduled for follow up?   Yes    Did you obtain your prescribed medications  Yes    Do you need help managing your prescriptions or medications  No    Is transportation to your appointment needed  No    I have advised the patient to call PCP with any new or worsening symptoms  CHULA DELGADO, [facility]    Are you recieving any outpatient services  No    Are you recieving home care services  No    Are you using any community resources  No    Current waiver services  No    Have you fallen in the last 12 months  No    Interperter language line needed  No    Counseling  Patient Karma Doe

## 2021-04-28 ENCOUNTER — PATIENT OUTREACH (OUTPATIENT)
Dept: FAMILY MEDICINE CLINIC | Facility: CLINIC | Age: 41
End: 2021-04-28

## 2021-04-28 NOTE — PROGRESS NOTES
Contacted patient for f/u  She is managing at home and denies needing additional assistance  She monitors BS several times daily and blood sugars are in the range of 100-199,  She denies headache  She states she has vomited several times today  She is tolerating ginger ale  Informed her to contact office is she continues to have issues  She verbalized understanding  She is taking all medications as prescribed  Follow up appointments scheduled  No needs at this time

## 2021-04-28 NOTE — UTILIZATION REVIEW
Notification of Discharge   This is a Notification of Discharge from our facility [address]  Please be advised that this patient has been discharge from our facility  Below you will find the admission and discharge date and time including the patients disposition  UTILIZATION REVIEW CONTACT:  Caitlyn Cross  Utilization   Network Utilization Review Department  Phone: [phone] x carefully listen to the prompts  All voicemails are confidential   Email: [email or web address]     PHYSICIAN ADVISORY SERVICES:  FOR YTCL-JA-AKOH REVIEW - MEDICAL NECESSITY DENIAL  Phone: [phone]  Fax: [phone]  Email: [email or web address]     PRESENTATION DATE: 4/20/2021 11:10 AM    INPATIENT ADMISSION DATE: 4/20/21 1110   DISCHARGE DATE: 4/25/2021 12:57 PM  DISPOSITION: Home/Self Care Home/Self Care      IMPORTANT INFORMATION:  Send all requests for admission clinical reviews, approved or denied determinations and any other requests to dedicated fax number below belonging to the campus where the patient is receiving treatment   List of dedicated fax numbers:  FACILITY NAME UR FAX NUMBER   ADMISSION DENIALS (Administrative/Medical Necessity) [phone]   [address] [place] [address] (Maternity/NICU/Pediatrics) [phone]   Conda Sas [phone]   Jamas Piggs [phone]   Chasity Dress [phone]   [address] [facility] [address] [facility] [phone]   [facility]  [phone]   Megan Abreu [address] [facility] [phone]        EPILEPSY [address] [place] [address]     Patient Name:                                Vashti Deluca  YOB: 1980  MRN: [id number]  [facility] Number:                        [id number]  Date of Admission:                        4/20/2021  Date of Discharge:                         04/25/21  Attending on Admission:             Seamus Jefferson MD PhD  Attending on Discharge:              Seamus Jefferson MD PhD      Admission Diagnosis:    1  Episodes of unresponsiveness R41 82     Discharge Diagnosis:  1  Transient alteration of awareness R40 4   Cannot rule out the possibility of focal seizures (R56 9)     Secondary Diagnoses:  1  Uncontrolled Diabetes, Type 2  2  Migraine without aura, nonintractable without status migrainosus  3  Stress at Home     Consultations:  1  [facility] Internal Medicine (for assistance in managing uncontrolled diabetes)     Procedures:  Continuous Video EEG monitoring     Medication Changes:  None     Discharge Medications:  Levemir 20 units subcutaneous at bedtime  Semaglutide 0 5mg subcutaneous every Monday  Naproxen 500mg twice a day as needed for pain  Methocarbamol 500mg 4 times a day PRN for muscle spasm (she does not use, but have the medication at home)     Recommended follow-up testing:  MRI brain w/wo contrast with seizure protocol  May need another epilepsy monitoring unit study     Brief History:  Ms Mal Grove is a 45 y o  woman referred to the epilepsy monitoring unit for differential diagnosis of recurrent episodes when she become unresponsive  She started to have these events about 4 years ago  The first time it happened she was pregnant, passed out, and was found to have poorly controlled diabetes  She continues to have an episode of unresponsiveness about once every couple of weeks  The last time it happened was about 3 weeks prior to admission to the EMU  She reports that it starts with her feeling shaky in her hands, visual distortion (television static), vomiting/nausea, and experiencing a lot of diarrhea  She may proceed to pass out from these events    Her sister reports that there is generalized shaking, she appears alert, but she cannot speak, eyes are blinking, she looks shaky in her hands, but this is not a "grand mal seizure"  She looks shaky and twitchy  These events seem to be triggered by stress  One time back in November/December 2020, she had an episode of generalized shakiness when she felt that she could hear and see everything going on but she was not able to answer or say anything  Her diabetes has not been under control because she could not afford her insulin (Lantus)  She was recently prescribed Levemir, sent by mail order, but she did not start the medication       Hospital Course:  Julita Turner was admitted to the epilepsy monitoring unit for 5 days with continuous video EEG monitoring  We attempted sleep deprivation early in the admission  She was not on an antiepileptic medication  There was no clinical event during this study  Her interictal EEG is normal   Photic stimulation and hyperventilation was tried to provoke a clinical event  SLIM assisted with management of her uncontrolled diabetes  She was put on a sliding scale of insulin for meals and was given 3 units with meals as a standing dose of insulin  Her FSBG was not excessively elevated  She reports that she has headaches 1-2 times a month that is generally over the left side of the head, like a hammer is inside her head, these are associated with nausea, photophobia, and lasts 1-2 days  She has tried ibuprofen with mild improvement        After 5 days of continuous EEG monitoring there was no clinical event, so it was felt that a clinical event was not likely going to happen during this admission      EMU CONCLUSION  Total of 5 days (118 hours) of continuous video EEG monitoring  Summary interictal findings:  Normal awake and asleep EEG background   No epileptiform discharges  No focal slowing     Summary event findings: No clinical event  Seizure Classification: Cannot rule out the possibility of focal impair aware seizures  Epilepsy Classification: Insufficient information     EMU findings and discussion:  Without a clinical event on video EEG monitoring to determine if these episodes of unresponsiveness are epileptic seizures or behavioral nonepileptic spells, we have to make a decision as to the etiology of these events based on clinical features reported by the patient and her family  It is possible that focal impaired aware seizures present with generalized shaking (if not bilateral tonic clonic activity), GI symptoms, and motor impairment from responsiveness  Some insular or temporal seizures may have more motor and visceral manifestations  Given that we could not rule out epileptic seizures with this study, we should attempt to treat for focal impaired aware seizures  It is also probable that these are nonepileptic psychogenic events, based on patient's report that these are sometimes associated with stress  A third possibility is that these events are related to hyperglycemia (or hypoglycemia); because her blood glucose levels were well controlled during this admission, it may have prevented her from experiencing a clinical event  Dr Joshua Logan discussed that with the possibility of focal impaired seizures, we should try to suppress these events with antiseizure medication such as topiramate  Topiramate is also a migraine prophylactic medication  This medication may help decrease headache severity/frequency and see if these events of unresponsiveness becomes less frequent  Dr Joshua Logan discussed the medication side effects with the patient  Ms Petrina Lombard did not want to start topiramate or a medication at this time  She wanted to wait until she has a follow-up appointment with Dr Ernie Miles or call the office when she is ready    To complete the evaluation for focal seizures, it is recommended that she undergo an MRI brain study with seizure protocol to rule out structural abnormalities in the temporal and insular cortices  Dr Liliam Willams also recommended that if she fails two antiseizure medications to prevent these events from recurring, then another inpatient video EEG monitoring study (EMU) should be repeated        Dr Liliam Willams also spent a significant amount of time discussing that she should not drive if she is having periods of altered awareness and unresponsiveness  She reports that she has no control of her body and cannot remember what happens during a clinical event  This increases her risk for causing a traffic accident if she was to be driving and have a clinical event of unresponsiveness  Dr Liliam Willams discussed that [employer] rules require any medical condition that increases the risk of safety it has to be reported  Will report last clinical event of altered awareness as early April 2021        Restrictions:  No Driving due to recurrent episodes of altered awareness and unresponsiveness     Condition at Discharge: stable      Discharge instructions/Information to patient and family:   See after visit summary for information provided to patient and family        Provisions for Follow-Up Care:  See after visit summary for information related to follow-up care and any pertinent home health orders        Disposition: Home     Discharge Statement:  I spent 45 minutes discharging the patient (Start:  10:30AM; Stop:  11:15AM), greater than 50% of total time spent counseling the patient, coordination of care, providing post-discharge instructions and follow-up  This time was spent on the day of discharge  I had direct contact with the patient on the day of discharge   Additional time then spent on discharge activities

## 2021-04-29 NOTE — PROGRESS NOTES
Assessment/Plan:      29-year-old woman with:  Uncontrolled diabetes repeated episodes of passing out migraines discussed workup and treatment options at length with risks and benefits discussed supportive care return parameters encouraged follow-up with her specialist especially endocrinology discussed supportive care return parameters otherwise follow-up in 6 months    No problem-specific Assessment & Plan notes found for this encounter  Diagnoses and all orders for this visit:    Screening for cervical cancer  -     Ambulatory referral to Gynecology; Future    Uncontrolled type 2 diabetes mellitus with hyperglycemia, with long-term current use of insulin (HCC)    Migraine without aura and without status migrainosus, not intractable    Recurrent episodes of unresponsiveness         Subjective:     Patient ID: Travon Fairchild is a 45 y o  female  Patient is a 29-year-old woman who presents for transitional care management visit she had episodes of passing out went to the hospital she was diagnosed with a likely episodes of passing out from uncontrolled diabetes the setting of migraines no fevers chills nausea vomiting tolerating p o  intake no other complaints at this time    Diabetes        Review of Systems   Constitutional: Negative  HENT: Negative  Eyes: Negative  Respiratory: Negative  Cardiovascular: Negative  Gastrointestinal: Negative  Endocrine: Negative  Genitourinary: Negative  Musculoskeletal: Negative  Allergic/Immunologic: Negative  Neurological: Negative  Hematological: Negative  Psychiatric/Behavioral: Negative  All other systems reviewed and are negative  Objective:     Physical Exam  Constitutional:       Appearance: She is well-developed  HENT:      Head: Atraumatic        Right Ear: External ear normal       Left Ear: External ear normal    Eyes:      Conjunctiva/sclera: Conjunctivae normal       Pupils: Pupils are equal, round, and reactive to light  Neck:      Musculoskeletal: Normal range of motion  Cardiovascular:      Rate and Rhythm: Normal rate and regular rhythm  Heart sounds: Normal heart sounds  Pulmonary:      Effort: Pulmonary effort is normal  No respiratory distress  Breath sounds: Normal breath sounds  Abdominal:      General: Bowel sounds are normal  There is no distension  Palpations: Abdomen is soft  Tenderness: There is no abdominal tenderness  There is no guarding or rebound  Musculoskeletal: Normal range of motion  Skin:     General: Skin is warm and dry  Neurological:      Mental Status: She is alert and oriented to person, place, and time  Cranial Nerves: No cranial nerve deficit  Psychiatric:         Behavior: Behavior normal          Thought Content: Thought content normal          Judgment: Judgment normal            Vitals:    04/27/21 1454   BP: 114/68   Pulse: 78   Temp: 98 4 °F (36 9 °C)   SpO2: 99%   Weight: 80 7 kg (178 lb)       Transitional Care Management Review:  Elena Quintanilla is a 45 y o  female here for TCM follow up  During the TCM phone call patient stated:    TCM Call (since 3/29/2021)     Date and time call was made  4/26/2021  9:07 AM    Patient was hospitialized at  [facility]        Date of Admission  04/20/21    Date of discharge  04/25/21    Diagnosis  EPISODES OF UNRESPONSIVENESS    Disposition  Home    Were the patients medications reviewed and updated  Yes    Current Symptoms  None      TCM Call (since 3/29/2021)     Post hospital issues  None    Should patient be enrolled in anticoag monitoring? No    Scheduled for follow up?   Yes    Did you obtain your prescribed medications  Yes    Do you need help managing your prescriptions or medications  No    Is transportation to your appointment needed  No    I have advised the patient to call PCP with any new or worsening symptoms  CHULA DELGADO, [address] [facility]    Are you recieving any outpatient services  No    Are you recieving home care services  No    Are you using any community resources  No    Current waiver services  No    Have you fallen in the last 12 months  No    Interperter language line needed  No    Counseling  Patient          Emily Hyman MD    BMI Counseling: Body mass index is 29 62 kg/m²  The BMI is above normal  Nutrition recommendations include encouraging healthy choices of fruits and vegetables  Exercise recommendations include moderate physical activity 150 minutes/week

## 2021-05-04 LAB — MISCELLANEOUS LAB TEST RESULT: NORMAL

## 2021-05-05 ENCOUNTER — IMMUNIZATIONS (OUTPATIENT)
Dept: FAMILY MEDICINE CLINIC | Facility: HOSPITAL | Age: 41
End: 2021-05-05

## 2021-05-05 DIAGNOSIS — Z23 ENCOUNTER FOR IMMUNIZATION: Primary | ICD-10-CM

## 2021-05-05 PROCEDURE — 0012A SARS-COV-2 / COVID-19 MRNA VACCINE (MODERNA) 100 MCG: CPT

## 2021-05-05 PROCEDURE — 91301 SARS-COV-2 / COVID-19 MRNA VACCINE (MODERNA) 100 MCG: CPT

## 2021-05-13 ENCOUNTER — TELEPHONE (OUTPATIENT)
Dept: ENDOCRINOLOGY | Facility: CLINIC | Age: 41
End: 2021-05-13

## 2021-05-13 NOTE — TELEPHONE ENCOUNTER
Received paper work for prior authorization for pts ozempic from [employer] faxed back  To [phone]

## 2021-05-17 ENCOUNTER — HOSPITAL ENCOUNTER (OUTPATIENT)
Dept: RADIOLOGY | Age: 41
Discharge: HOME | End: 2021-05-17
Payer: COMMERCIAL

## 2021-05-17 DIAGNOSIS — R40.4 ALTERED AWARENESS, TRANSIENT: ICD-10-CM

## 2021-05-17 DIAGNOSIS — R41.89 RECURRENT EPISODES OF UNRESPONSIVENESS: ICD-10-CM

## 2021-05-17 PROCEDURE — A9585 GADOBUTROL INJECTION: HCPCS | Performed by: PSYCHIATRY & NEUROLOGY

## 2021-05-17 PROCEDURE — 70553 MRI BRAIN STEM W/O & W/DYE: CPT

## 2021-05-17 PROCEDURE — G1004 CDSM NDSC: HCPCS

## 2021-05-17 RX ADMIN — GADOBUTROL 8 ML: 604.72 INJECTION INTRAVENOUS at 10:32

## 2021-05-21 ENCOUNTER — TELEPHONE (OUTPATIENT)
Dept: NEUROLOGY | Facility: CLINIC | Age: 41
End: 2021-05-21

## 2021-05-21 NOTE — TELEPHONE ENCOUNTER
----- Message from Demetrius Frias MD sent at 5/21/2021  9:09 AM EDT -----  There is no structural abnormality to cause seizures found on MRI brain study  The "nonspecific punctate focus of FLAIR hyperintense signal in the right frontal subcortical white matter" does not suggest any specific disease process  Many persons with a variety of neurological symptoms headaches, seizures, syncope, dizziness have a variety of "nonspecific FLAIR hyperintense signal" in the subcortical white matter  Should continue to follow-up on a regular basis to determine if repeat imaging is necessary to monitor for changes over time

## 2021-06-03 ENCOUNTER — OFFICE VISIT (OUTPATIENT)
Dept: NEUROLOGY | Facility: CLINIC | Age: 41
End: 2021-06-03
Payer: COMMERCIAL

## 2021-06-03 VITALS
WEIGHT: 176 LBS | HEIGHT: 65 IN | HEART RATE: 80 BPM | DIASTOLIC BLOOD PRESSURE: 70 MMHG | BODY MASS INDEX: 29.32 KG/M2 | SYSTOLIC BLOOD PRESSURE: 118 MMHG

## 2021-06-03 DIAGNOSIS — R41.89 RECURRENT EPISODES OF UNRESPONSIVENESS: ICD-10-CM

## 2021-06-03 DIAGNOSIS — G43.009 MIGRAINE WITHOUT AURA AND WITHOUT STATUS MIGRAINOSUS, NOT INTRACTABLE: Primary | ICD-10-CM

## 2021-06-03 PROCEDURE — 99214 OFFICE O/P EST MOD 30 MIN: CPT | Performed by: PSYCHIATRY & NEUROLOGY

## 2021-06-03 RX ORDER — PROCHLORPERAZINE MALEATE 10 MG
TABLET ORAL
Qty: 20 TABLET | Refills: 3 | Status: SHIPPED | OUTPATIENT
Start: 2021-06-03

## 2021-06-03 RX ORDER — TOPIRAMATE 50 MG/1
50 TABLET, FILM COATED ORAL
Qty: 90 TABLET | Refills: 1 | Status: SHIPPED | OUTPATIENT
Start: 2021-06-03
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
## 2021-06-03 NOTE — PATIENT INSTRUCTIONS
1  Start topiramate 50 mg nightly for migraine prevention  2  Take prochlorperazine 10 mg as needed for migraine  Take with Benadryl if it makes your feel restless  3  Reduce as needed headache medication to 2-3 days per week or less if possible  4  Let us know if headaches do not improve in 3-4 weeks  5  Let us know if there are spells/seizures

## 2021-06-25 ENCOUNTER — APPOINTMENT (OUTPATIENT)
Dept: RADIOLOGY | Facility: CLINIC | Age: 41
End: 2021-06-25
Payer: COMMERCIAL

## 2021-06-25 ENCOUNTER — OFFICE VISIT (OUTPATIENT)
Dept: OBGYN CLINIC | Facility: CLINIC | Age: 41
End: 2021-06-25
Payer: COMMERCIAL

## 2021-06-25 VITALS
HEIGHT: 65 IN | WEIGHT: 176 LBS | HEART RATE: 75 BPM | SYSTOLIC BLOOD PRESSURE: 127 MMHG | DIASTOLIC BLOOD PRESSURE: 83 MMHG | BODY MASS INDEX: 29.32 KG/M2

## 2021-06-25 DIAGNOSIS — M75.01 ADHESIVE CAPSULITIS OF RIGHT SHOULDER: Primary | ICD-10-CM

## 2021-06-25 DIAGNOSIS — M25.511 RIGHT SHOULDER PAIN, UNSPECIFIED CHRONICITY: ICD-10-CM

## 2021-06-25 PROCEDURE — 99203 OFFICE O/P NEW LOW 30 MIN: CPT | Performed by: ORTHOPAEDIC SURGERY

## 2021-06-25 PROCEDURE — 73030 X-RAY EXAM OF SHOULDER: CPT

## 2021-06-25 NOTE — PROGRESS NOTES
Ortho Sports Medicine Shoulder New Patient Visit     Assesment:   45 y o  female right shoulder early stages of ahesive capsulitis    Plan:    Conservative treatment:    Ice to shoulder 1-2 times daily, for 20 minutes at a time  PT for ROM and strengthening to shoulder, rotator cuff, scapular stabilizers  Imaging: All imaging from today was reviewed by myself and explained to the patient  Injection:    No Injection planned at this time  Surgery:     No surgery is recommended at this point, continue with conservative treatment plan as noted  Follow up:    No follow-ups on file  Chief Complaint   Patient presents with    Right Shoulder - Pain       History of Present Illness: The patient is a 45 y o , right hand dominant female whose occupation is [address] [place] [address] , referred to me by their primary care physician, seen in clinic for consultation of right shoulder pain  The patient has a history of diabetes  The patient denies a history of thyroid disorder  Pain is located anterior, posterior  The patient rates the pain as a 6/10  The pain has been present for a few weeks  The pain is characterized as dull, achy  The pain is present daily  Pain is improved by rest   Pain is aggravated by overhead activity  Symptoms include catching and swelling  The patient denies weakness  The patient denies numbness and tingling       The patient has tried rest           Shoulder Surgical History:  None    Past Medical, Social and Family History:  Past Medical History:   Diagnosis Date    Diabetes mellitus ([address] )      Past Surgical History:   Procedure Laterality Date    HERNIA REPAIR      TUBAL LIGATION  2017     Allergies   Allergen Reactions    Metformin And Related Hives     Current Outpatient Medications on File Prior to Visit   Medication Sig Dispense Refill    Accu-Chek FastClix Lancets MISC Test BG up to 3x daily as directed 300 each 1    Accu-Chek Guide test strip TEST BG UP TO 3X DAILY AS DIRECTED 100 each 1    Injection Device for Insulin (B-D PEN MINI) ENRICO Use 4 (four) times a day Please use for Lantus and Humalog  4 pen needles daily Dx: Diabetes 400 each 0    insulin detemir (LEVEMIR FLEXTOUCH) 100 Units/mL injection pen Inject 20 Units under the skin daily at bedtime 15 mL 2    Insulin Pen Needle (BD Pen Needle Em U/F) 32G X 4 MM MISC Use daily 100 each 3    naproxen (EC NAPROSYN) 500 MG EC tablet Take 1 tablet (500 mg total) by mouth 2 (two) times a day as needed for mild pain or headaches      prochlorperazine (COMPAZINE) 10 mg tablet 1 tab at onset of migraine, can repeat in 8 hours, can take with NSAID  Take with Benadryl if there are side effects  20 tablet 3    Semaglutide,0 25 or 0 5MG/DOS, 2 MG/1 5ML SOPN Inject 0 5 mg under the skin once a week 12 pen 0    topiramate (TOPAMAX) 50 MG tablet Take 1 tablet (50 mg total) by mouth daily at bedtime 90 tablet 1    methocarbamol (ROBAXIN) 500 mg tablet Take 1 tablet (500 mg total) by mouth 4 (four) times a day for 10 days (Patient not taking: Reported on 4/13/2021) 40 tablet 0     No current facility-administered medications on file prior to visit       Social History     Socioeconomic History    Marital status: Single     Spouse name: Not on file    Number of children: Not on file    Years of education: Not on file    Highest education level: Not on file   Occupational History     Employer: [employer]     Employer: [employer]     Employer:   [employer]   Tobacco Use    Smoking status: Never Smoker    Smokeless tobacco: Never Used   Vaping Use    Vaping Use: Never used   Substance and Sexual Activity    Alcohol use: Not Currently    Drug use: Never    Sexual activity: Yes     Partners: Male   Other Topics Concern    Not on file   Social History Narrative    Not on file     Social Determinants of Health     Financial Resource Strain: Low Risk     Difficulty of Paying Living Expenses: Not hard at all   Food Insecurity: No Food Insecurity    Worried About Running Out of Food in the Last Year: Never true    Carolyn of Food in the Last Year: Never true   Transportation Needs: No Transportation Needs    Lack of Transportation (Medical): No    Lack of Transportation (Non-Medical): No   Physical Activity: Unknown    Days of Exercise per Week: 0 days    Minutes of Exercise per Session: Not on file   Stress: No Stress Concern Present    Feeling of Stress : Only a little   Social Connections: Moderately Isolated    Frequency of Communication with Friends and Family: More than three times a week    Frequency of Social Gatherings with Friends and Family: More than three times a week    Attends Gnosticist Services: 1 to 4 times per year    Active Member of [employer] or Organizations: No    Attends Club or Organization Meetings: Never    Marital Status: Never    Intimate Partner Violence: Not At Risk    Fear of Current or Ex-Partner: No    Emotionally Abused: No    Physically Abused: No    Sexually Abused: No         I have reviewed the past medical, surgical, social and family history, medications and allergies as documented in the EMR  Review of systems: ROS is negative other than that noted in the HPI  Constitutional: Negative for fatigue and fever  HENT: Negative for sore throat  Respiratory: Negative for shortness of breath  Cardiovascular: Negative for chest pain  Gastrointestinal: Negative for abdominal pain  Endocrine: Negative for cold intolerance and heat intolerance  Genitourinary: Negative for flank pain  Musculoskeletal: Negative for back pain  Skin: Negative for rash  Allergic/Immunologic: Negative for immunocompromised state  Neurological: Negative for dizziness  Psychiatric/Behavioral: Negative for agitation        Physical Exam:    Blood pressure 127/83, pulse 75, height 5' 5" (1 651 m), weight 79 8 kg (176 lb), unknown if currently breastfeeding  General/Constitutional: NAD, well developed, well nourished  HENT: Normocephalic, atraumatic  CV: Intact distal pulses, regular rate  Resp: No respiratory distress or labored breathing  Lymphatic: No lymphadenopathy palpated  Neuro: Alert and Oriented x 3, no focal deficits  Psych: Normal mood, normal affect, normal judgement, normal behavior  Skin: Warm, dry, no rashes, no erythema      Shoulder focused exam:       RIGHT LEFT    Scapula Atrophy Negative Negative     Winging Negative Negative     Protraction Negative Negative    Rotator cuff SS 5/5 5/5     IS 5/5 5/5     SubS 5/5 5/5    ROM FF 90 active, 120 passive     170     ER0 30 active and passive 60                   IRb Iliac crest    T6    TTP: AC Negative Negative     Biceps Negative Negative     Coracoid Negative Negative    Special Tests: O'Briens Negative Negative     Bowen-shear Negative Negative     Cross body Adduction Negative Negative     Speeds  Negative Negative     Negar's Negative Negative     Whipple Negative Negative       Neer Negative Negative     Keys Negative Negative    Instability: Apprehension & relocation not tested not tested     Load & shift not tested not tested    Other: Crank Negative Negative                 UE NV Exam: +2 Radial pulses bilaterally  Sensation intact to light touch C5-T1 bilaterally, Radial/median/ulnar nerve motor intact      Bilateral elbow, wrist, and and forearm ROM full, painless with passive ROM, no ttp or crepitance throughout extremities below shoulder joint    Cervical ROM is full without pain, numbness or tingling      Shoulder Imaging    X-rays of the right shoulder were reviewed, which demonstrate no acute fracture or osseous abnormality  I have reviewed the radiology report and do not currently have a radiology reading from [facility], but will check the result once the reading is performed

## 2021-07-06 ENCOUNTER — APPOINTMENT (OUTPATIENT)
Dept: LAB | Facility: HOSPITAL | Age: 41
End: 2021-07-06
Payer: COMMERCIAL

## 2021-07-06 DIAGNOSIS — E11.65 UNCONTROLLED TYPE 2 DIABETES MELLITUS WITH HYPERGLYCEMIA, WITHOUT LONG-TERM CURRENT USE OF INSULIN (HCC): ICD-10-CM

## 2021-07-06 LAB
ANION GAP SERPL CALCULATED.3IONS-SCNC: 4 MMOL/L (ref 5–14)
BUN SERPL-MCNC: 13 MG/DL (ref 5–25)
CALCIUM SERPL-MCNC: 9.5 MG/DL (ref 8.4–10.2)
CHLORIDE SERPL-SCNC: 102 MMOL/L (ref 97–108)
CO2 SERPL-SCNC: 31 MMOL/L (ref 22–30)
CREAT SERPL-MCNC: 0.73 MG/DL (ref 0.6–1.2)
EST. AVERAGE GLUCOSE BLD GHB EST-MCNC: 223 MG/DL
GFR SERPL CREATININE-BSD FRML MDRD: 120 ML/MIN/1.73SQ M
GLUCOSE P FAST SERPL-MCNC: 194 MG/DL (ref 70–99)
HBA1C MFR BLD: 9.4 %
POTASSIUM SERPL-SCNC: 4.6 MMOL/L (ref 3.6–5)
SODIUM SERPL-SCNC: 137 MMOL/L (ref 137–147)

## 2021-07-06 PROCEDURE — 80048 BASIC METABOLIC PNL TOTAL CA: CPT

## 2021-07-06 PROCEDURE — 83036 HEMOGLOBIN GLYCOSYLATED A1C: CPT

## 2021-07-06 PROCEDURE — 36415 COLL VENOUS BLD VENIPUNCTURE: CPT

## 2021-07-07 ENCOUNTER — TELEPHONE (OUTPATIENT)
Dept: ENDOCRINOLOGY | Facility: CLINIC | Age: 41
End: 2021-07-07

## 2021-07-07 NOTE — TELEPHONE ENCOUNTER
----- Message from Travis Bright MD sent at 7/6/2021  9:53 PM EDT -----  Pls call patient regarding results: labs will be discussed during upcoming appt

## 2021-07-08 ENCOUNTER — OFFICE VISIT (OUTPATIENT)
Dept: FAMILY MEDICINE CLINIC | Facility: CLINIC | Age: 41
End: 2021-07-08
Payer: COMMERCIAL

## 2021-07-08 VITALS
OXYGEN SATURATION: 99 % | HEART RATE: 80 BPM | SYSTOLIC BLOOD PRESSURE: 118 MMHG | BODY MASS INDEX: 30.29 KG/M2 | DIASTOLIC BLOOD PRESSURE: 84 MMHG | HEIGHT: 65 IN | WEIGHT: 181.8 LBS | TEMPERATURE: 98 F

## 2021-07-08 DIAGNOSIS — N39.0 URINARY TRACT INFECTION WITHOUT HEMATURIA, SITE UNSPECIFIED: Primary | ICD-10-CM

## 2021-07-08 LAB
SL AMB  POCT GLUCOSE, UA: POSITIVE
SL AMB LEUKOCYTE ESTERASE,UA: NORMAL
SL AMB POCT BILIRUBIN,UA: NORMAL
SL AMB POCT BLOOD,UA: NEGATIVE
SL AMB POCT CLARITY,UA: ABNORMAL
SL AMB POCT COLOR,UA: YELLOW
SL AMB POCT KETONES,UA: NORMAL
SL AMB POCT NITRITE,UA: NORMAL
SL AMB POCT PH,UA: NORMAL
SL AMB POCT SPECIFIC GRAVITY,UA: 1.02
SL AMB POCT URINE PROTEIN: NORMAL
SL AMB POCT UROBILINOGEN: NORMAL

## 2021-07-08 PROCEDURE — 87086 URINE CULTURE/COLONY COUNT: CPT | Performed by: FAMILY MEDICINE

## 2021-07-08 PROCEDURE — 87186 SC STD MICRODIL/AGAR DIL: CPT | Performed by: FAMILY MEDICINE

## 2021-07-08 PROCEDURE — 81001 URINALYSIS AUTO W/SCOPE: CPT | Performed by: FAMILY MEDICINE

## 2021-07-08 PROCEDURE — 87077 CULTURE AEROBIC IDENTIFY: CPT | Performed by: FAMILY MEDICINE

## 2021-07-08 PROCEDURE — 81002 URINALYSIS NONAUTO W/O SCOPE: CPT | Performed by: FAMILY MEDICINE

## 2021-07-08 PROCEDURE — 99213 OFFICE O/P EST LOW 20 MIN: CPT | Performed by: FAMILY MEDICINE

## 2021-07-08 RX ORDER — PHENAZOPYRIDINE HYDROCHLORIDE 200 MG/1
200 TABLET, FILM COATED ORAL
Qty: 10 TABLET | Refills: 0 | Status: SHIPPED | OUTPATIENT
Start: 2021-07-08 | End: 2021-09-21 | Stop reason: HOSPADM

## 2021-07-08 RX ORDER — NITROFURANTOIN 25; 75 MG/1; MG/1
100 CAPSULE ORAL 2 TIMES DAILY
Qty: 10 CAPSULE | Refills: 0 | Status: SHIPPED | OUTPATIENT
Start: 2021-07-08 | End: 2021-07-14

## 2021-07-09 LAB
BACTERIA UR QL AUTO: ABNORMAL /HPF
BILIRUB UR QL STRIP: NEGATIVE
CLARITY UR: CLEAR
COLOR UR: YELLOW
GLUCOSE UR STRIP-MCNC: ABNORMAL MG/DL
HGB UR QL STRIP.AUTO: NEGATIVE
HYALINE CASTS #/AREA URNS LPF: ABNORMAL /LPF
KETONES UR STRIP-MCNC: NEGATIVE MG/DL
LEUKOCYTE ESTERASE UR QL STRIP: ABNORMAL
NITRITE UR QL STRIP: NEGATIVE
NON-SQ EPI CELLS URNS QL MICRO: ABNORMAL /HPF
PH UR STRIP.AUTO: 6 [PH]
PROT UR STRIP-MCNC: NEGATIVE MG/DL
RBC #/AREA URNS AUTO: ABNORMAL /HPF
SP GR UR STRIP.AUTO: 1.03 (ref 1–1.03)
UROBILINOGEN UR QL STRIP.AUTO: 1 E.U./DL
WBC #/AREA URNS AUTO: ABNORMAL /HPF

## 2021-07-11 LAB — BACTERIA UR CULT: ABNORMAL

## 2021-07-11 NOTE — PROGRESS NOTES
Assessment/Plan:    78-year-old woman with:  UTI will empirically treat and send urine for culture discussed supportive care return parameters    No problem-specific Assessment & Plan notes found for this encounter  Diagnoses and all orders for this visit:    Urinary tract infection without hematuria, site unspecified  -     nitrofurantoin (MACROBID) 100 mg capsule; Take 1 capsule (100 mg total) by mouth 2 (two) times a day for 5 days  -     phenazopyridine (PYRIDIUM) 200 mg tablet; Take 1 tablet (200 mg total) by mouth 3 (three) times a day with meals  -     POCT urine dip  -     UA w Reflex to Microscopic w Reflex to Culture - Clinic Collect  -     Urine Microscopic  -     Urine culture          Subjective:     Chief Complaint   Patient presents with    Follow-up     Pt is coming in with the following symptoms: - pain and pressure in lower back (pain scale:7), cloudy urine, frequent urge to urinate, mild burning sensation, no otc used to try to subside  noticed 3 days ago  Patient ID: Crow Dumont is a 45 y o  female  Patient is a 78-year-old woman presents complaining of urinary burning and frequency for several days no fevers chills nausea vomiting      The following portions of the patient's history were reviewed and updated as appropriate: allergies, current medications, past family history, past medical history, past social history, past surgical history and problem list     Review of Systems   Constitutional: Negative  HENT: Negative  Eyes: Negative  Respiratory: Negative  Cardiovascular: Negative  Gastrointestinal: Negative  Endocrine: Negative  Genitourinary: Positive for dysuria  Musculoskeletal: Negative  Allergic/Immunologic: Negative  Neurological: Negative  Hematological: Negative  Psychiatric/Behavioral: Negative  All other systems reviewed and are negative          Objective:      /84   Pulse 80   Temp 98 °F (36 7 °C)   Ht 5' 5" (1 651 m)   Wt 82 5 kg (181 lb 12 8 oz)   SpO2 99%   BMI 30 25 kg/m²          Physical Exam  Constitutional:       Appearance: She is well-developed  HENT:      Head: Atraumatic  Right Ear: External ear normal       Left Ear: External ear normal    Eyes:      Conjunctiva/sclera: Conjunctivae normal       Pupils: Pupils are equal, round, and reactive to light  Cardiovascular:      Rate and Rhythm: Normal rate and regular rhythm  Heart sounds: Normal heart sounds  Pulmonary:      Effort: Pulmonary effort is normal  No respiratory distress  Breath sounds: Normal breath sounds  Abdominal:      General: Bowel sounds are normal  There is no distension  Palpations: Abdomen is soft  Tenderness: There is no abdominal tenderness  There is no guarding or rebound  Musculoskeletal:         General: Normal range of motion  Cervical back: Normal range of motion  Skin:     General: Skin is warm and dry  Neurological:      Mental Status: She is alert and oriented to person, place, and time  Cranial Nerves: No cranial nerve deficit  Psychiatric:         Behavior: Behavior normal          Thought Content: Thought content normal          Judgment: Judgment normal          BMI Counseling: Body mass index is 30 25 kg/m²  The BMI is above normal  Nutrition recommendations include encouraging healthy choices of fruits and vegetables  Exercise recommendations include moderate physical activity 150 minutes/week

## 2021-07-13 ENCOUNTER — TELEPHONE (OUTPATIENT)
Dept: FAMILY MEDICINE CLINIC | Facility: CLINIC | Age: 41
End: 2021-07-13

## 2021-07-13 NOTE — TELEPHONE ENCOUNTER
Please call pt to discuss that the antibiotic appeared sensitive to the infection   If symptoms are not improving she should call back

## 2021-07-14 ENCOUNTER — OFFICE VISIT (OUTPATIENT)
Dept: ENDOCRINOLOGY | Facility: CLINIC | Age: 41
End: 2021-07-14
Payer: COMMERCIAL

## 2021-07-14 VITALS
DIASTOLIC BLOOD PRESSURE: 76 MMHG | HEIGHT: 65 IN | SYSTOLIC BLOOD PRESSURE: 100 MMHG | HEART RATE: 76 BPM | WEIGHT: 181.25 LBS | BODY MASS INDEX: 30.2 KG/M2

## 2021-07-14 DIAGNOSIS — Z79.4 CURRENT USE OF INSULIN (HCC): ICD-10-CM

## 2021-07-14 DIAGNOSIS — Z79.4 UNCONTROLLED TYPE 2 DIABETES MELLITUS WITH HYPERGLYCEMIA, WITH LONG-TERM CURRENT USE OF INSULIN (HCC): ICD-10-CM

## 2021-07-14 DIAGNOSIS — E11.65 UNCONTROLLED TYPE 2 DIABETES MELLITUS WITH HYPERGLYCEMIA, WITH LONG-TERM CURRENT USE OF INSULIN (HCC): ICD-10-CM

## 2021-07-14 DIAGNOSIS — E11.65 UNCONTROLLED TYPE 2 DIABETES MELLITUS WITH HYPERGLYCEMIA, WITHOUT LONG-TERM CURRENT USE OF INSULIN (HCC): Primary | ICD-10-CM

## 2021-07-14 PROCEDURE — 99214 OFFICE O/P EST MOD 30 MIN: CPT | Performed by: INTERNAL MEDICINE

## 2021-07-14 RX ORDER — REPAGLINIDE 0.5 MG/1
TABLET ORAL
Qty: 270 TABLET | Refills: 2 | Status: SHIPPED | OUTPATIENT
Start: 2021-07-14 | End: 2022-01-05 | Stop reason: ALTCHOICE

## 2021-07-14 NOTE — PROGRESS NOTES
Est Patient Progress Note      Chief Complaint   Patient presents with    Diabetes Type 2      Referring Provider  No referring provider defined for this encounter  History of Present Illness:   Cari Castro is a 45 y o  female with a history of type s diabetes  Since   Last seen in the office in 4/2021   Currently being treated for a UTI with nitrfuranotoin  She remains very busy at work in the [address] [facility]  Recalls being diagnosed with diabetes 8yrs ago  This was after her 1st pregnancy, but worse with the 3rd (and final pregnancy)  She reports "passing out" and being admitted to a hospital for a BG of 700  She denies being in DKA at that time  She reports taking metformin, Januvia and glimiperide in the past  There is a listed allergy to metformin (hives) which she confirms is accurate  She recalls only taking insulin for this year  She did see [facility] for a time, but does not recall details of this  She has been checked in 2016 for C-peptide and CHAPITO 65, but not other antibodies  These are repeated and negative     Denies complications with her eyes, kidneys, or nerves, or any history of MI or CVA  She denies hospitalizations for severe hypoglycemic or severe hyperglycemic episodes         Current regimen: Ozempic 0 5mg once per week, Levemir 20units once daily      Home FSBGs are reviewed:  Fasting 166, 220, 234, 206, 237  Lunch 327, 348  Dinner 246  Bed     Hypoglycemic episodes: not frequent          Diabetes education: 2-3 years ago  Diet:  eats 2-3 meals per day  Activity: Daily activity reports activity PACU at [address]: received both doses     Opthamology: saw in 2020; no retinopathy   Scheduled for 7/2021 ([place])  Podiatry: does not seen; does self care  Dental: tooth was pulled last year  Infuenza vaccine: gets yearly at work     Thyroid disorders: no known  History of pancreatitis: none    Patient Active Problem List   Diagnosis    Encounter for annual routine gynecological examination    Abnormal thyroid blood test    Papanicolaou smear of cervix with positive high risk human papilloma virus (HPV) test    Uncontrolled type 2 diabetes mellitus with hyperglycemia, with long-term current use of insulin (HCC)    Stress at home    Diabetes mellitus (HCC)    Obesity (BMI 30-39  9)    Numbness and tingling of both feet    Recurrent episodes of unresponsiveness    Migraine without aura and without status migrainosus, not intractable    Current use of insulin (HCC)      Past Medical History:   Diagnosis Date    Diabetes mellitus ([facility] [address] )       Past Surgical History:   Procedure Laterality Date    HERNIA REPAIR      TUBAL LIGATION  2017      Family History   Problem Relation Age of Onset    Hypertension Mother    Chris Cormier Arthritis Mother     Asthma Sister     Bipolar disorder Brother     Schizophrenia Brother     Asthma Brother     Asthma Brother     Diabetes Maternal Grandmother     Diabetes Paternal Grandmother     No Known Problems Maternal Grandfather     No Known Problems Paternal Grandfather      Social History     Tobacco Use    Smoking status: Never Smoker    Smokeless tobacco: Never Used   Substance Use Topics    Alcohol use: Not Currently     Allergies   Allergen Reactions    Metformin And Related Hives         Current Outpatient Medications:     Accu-Chek FastClix Lancets MISC, Test BG up to 3x daily as directed, Disp: 300 each, Rfl: 1    Accu-Chek Guide test strip, TEST BG UP TO 3X DAILY AS DIRECTED, Disp: 100 each, Rfl: 1    Injection Device for Insulin (B-D PEN MINI) ENRICO, Use 4 (four) times a day Please use for Lantus and Humalog   4 pen needles daily Dx: Diabetes, Disp: 400 each, Rfl: 0    insulin detemir (LEVEMIR FLEXTOUCH) 100 Units/mL injection pen, Inject 20 Units under the skin daily at bedtime, Disp: 15 mL, Rfl: 2    Insulin Pen Needle (BD Pen Needle Em U/F) 32G X 4 MM MISC, Use daily, Disp: 100 each, Rfl: 3    naproxen (EC NAPROSYN) 500 MG EC tablet, Take 1 tablet (500 mg total) by mouth 2 (two) times a day as needed for mild pain or headaches, Disp: , Rfl:     nitrofurantoin (MACROBID) 100 mg capsule, Take 1 capsule (100 mg total) by mouth 2 (two) times a day for 5 days, Disp: 10 capsule, Rfl: 0    phenazopyridine (PYRIDIUM) 200 mg tablet, Take 1 tablet (200 mg total) by mouth 3 (three) times a day with meals, Disp: 10 tablet, Rfl: 0    prochlorperazine (COMPAZINE) 10 mg tablet, 1 tab at onset of migraine, can repeat in 8 hours, can take with NSAID  Take with Benadryl if there are side effects  , Disp: 20 tablet, Rfl: 3    topiramate (TOPAMAX) 50 MG tablet, Take 1 tablet (50 mg total) by mouth daily at bedtime, Disp: 90 tablet, Rfl: 1    methocarbamol (ROBAXIN) 500 mg tablet, Take 1 tablet (500 mg total) by mouth 4 (four) times a day for 10 days (Patient not taking: Reported on 4/13/2021), Disp: 40 tablet, Rfl: 0    repaglinide (PRANDIN) 0 5 mg tablet, Take one pill by mouth up to 3x daily just prior to a meal, Disp: 270 tablet, Rfl: 2    semaglutide, 1 mg/dose, (Ozempic) 4 MG/3ML SOPN injection pen, Inject 0 75 mL (1 mg total) under the skin once a week, Disp: 9 mL, Rfl: 3  Review of Systems   Constitutional: Negative for unexpected weight change  HENT: Negative for hearing loss, trouble swallowing and voice change  Eyes: Negative for visual disturbance  Gastrointestinal: Positive for abdominal pain  Negative for constipation and diarrhea  Genitourinary:        Currently experiencing a UTI   Musculoskeletal: Negative for gait problem  Neurological: Negative for tremors, weakness and numbness  Psychiatric/Behavioral: The patient is not nervous/anxious  Physical Exam:  Body mass index is 30 16 kg/m²    /76 (BP Location: Left arm, Patient Position: Sitting, Cuff Size: Large)   Pulse 76   Ht 5' 5" (1 651 m)   Wt 82 2 kg (181 lb 4 oz)   BMI 30 16 kg/m²    Wt Readings from Last 3 Encounters:   07/14/21 82 2 kg (181 lb 4 oz)   07/08/21 82 5 kg (181 lb 12 8 oz)   06/25/21 79 8 kg (176 lb)       GEN: NAD  E/n/m: mask in place, hearing intact bilat,  Eyes: no stare or proptosis, EOMI  Neck: normlal ROM  CV; heart reg rate s1s2 nl, no m/r/g appreciated, no BEATRIZ  Resp: CTAB, good effort  Ab+BS  Neuro: no tremor  MS: no c/c in digits, moves all 4 ext, nl muscle bulk, gait nl  Skin: warm and dry, no palmar erythema  Psych: nl mood and affect, no gross lapses in memory    Labs:     Lab Results   Component Value Date    HGBA1C 9 4 (H) 07/06/2021         Lab Results   Component Value Date    CREATININE 0 73 07/06/2021    CREATININE 0 76 04/20/2021    CREATININE 0 69 04/12/2021    BUN 13 07/06/2021     (L) 01/06/2014    K 4 6 07/06/2021     07/06/2021    CO2 31 (H) 07/06/2021     eGFR   Date Value Ref Range Status   07/06/2021 120 >60 ml/min/1 73sq m Final     No components found for: [facility]    Lab Results   Component Value Date    HDL 55 09/23/2019    TRIG 91 09/23/2019       Lab Results   Component Value Date    ALT 17 04/20/2021    AST 15 04/20/2021    ALKPHOS 57 04/20/2021       No results found for: TSH, FREET4, TSI    Impression:  1  Uncontrolled type 2 diabetes mellitus with hyperglycemia, without long-term current use of insulin ([facility] [address] )    2  Uncontrolled type 2 diabetes mellitus with hyperglycemia, with long-term current use of insulin ([facility])    3  Current use of insulin ([address] )           Plan:    Can Chirinos was seen today for diabetes type 2  Diagnoses and all orders for this visit:    Uncontrolled type 2 diabetes mellitus with hyperglycemia, without long-term current use of insulin ([facility])  -     semaglutide, 1 mg/dose, (Ozempic) 4 MG/3ML SOPN injection pen; Inject 0 75 mL (1 mg total) under the skin once a week  -     repaglinide (PRANDIN) 0 5 mg tablet; Take one pill by mouth up to 3x daily just prior to a meal  -     Hemoglobin A1C; Future  -     Lipid panel Lab Collect Lab Collect;  Future  -     Basic metabolic panel Lab Collect; Future    Uncontrolled type 2 diabetes mellitus with hyperglycemia, with long-term current use of insulin (HCC)    Current use of insulin (HCC)         1  T2DM: Her a1c has improved and is above goal  At this time, she will try to titus the Levemir 20units in the morning and increase the Ozempic to 1mg weekly  She will also try repaglinide 0 5mg just prior to a meal to see if this helps post-prandial spikes  Encouraged increased monitoring as there could be increase chance of hypoglycemia  Discussed with the patient and all questioned fully answered  She will call me if any problems arise      Counseled patient on diagnostic results, prognosis, risk and benefit of treatment options, instruction for management, importance of treatment compliance, Risk  factor reduction and impressions      Jose Alfredo Ford MD

## 2021-07-14 NOTE — TELEPHONE ENCOUNTER
Please send in Keflex 500 mg twice daily for a week she should call in if not improving or worsening

## 2021-07-15 ENCOUNTER — TELEPHONE (OUTPATIENT)
Dept: ENDOCRINOLOGY | Facility: CLINIC | Age: 41
End: 2021-07-15

## 2021-07-15 DIAGNOSIS — N39.0 URINARY TRACT INFECTION WITHOUT HEMATURIA, SITE UNSPECIFIED: Primary | ICD-10-CM

## 2021-07-15 RX ORDER — CEPHALEXIN 500 MG/1
500 CAPSULE ORAL 2 TIMES DAILY
Qty: 14 CAPSULE | Refills: 0 | Status: SHIPPED | OUTPATIENT
Start: 2021-07-15 | End: 2021-07-22

## 2021-07-15 RX ORDER — CEPHALEXIN 500 MG/1
500 CAPSULE ORAL 2 TIMES DAILY
Qty: 14 CAPSULE | Refills: 0 | Status: CANCELLED | OUTPATIENT
Start: 2021-07-15 | End: 2021-07-22

## 2021-07-17 ENCOUNTER — OFFICE VISIT (OUTPATIENT)
Dept: OBGYN CLINIC | Facility: CLINIC | Age: 41
End: 2021-07-17
Payer: COMMERCIAL

## 2021-07-17 VITALS
DIASTOLIC BLOOD PRESSURE: 70 MMHG | HEIGHT: 65 IN | BODY MASS INDEX: 30.16 KG/M2 | SYSTOLIC BLOOD PRESSURE: 118 MMHG | WEIGHT: 181 LBS

## 2021-07-17 DIAGNOSIS — M75.01 ADHESIVE CAPSULITIS OF RIGHT SHOULDER: Primary | ICD-10-CM

## 2021-07-17 PROCEDURE — 99244 OFF/OP CNSLTJ NEW/EST MOD 40: CPT | Performed by: FAMILY MEDICINE

## 2021-07-17 NOTE — LETTER
July 17, 2021     Princess Kimbrough MD  [facility]    Patient: Crow Dumont   YOB: 1980   Date of Visit: 7/17/2021       Dear Dr Kayleigh Collado: Thank you for referring Crow Dumont to me for evaluation  Below are my notes for this consultation  If you have questions, please do not hesitate to call me  I look forward to following your patient along with you  Sincerely,        Abhijit Navarrete III,         CC: [facility] DO Abhijit Goldsmith III,   7/17/2021  9:04 AM  Incomplete  1  Adhesive capsulitis of right shoulder       No orders of the defined types were placed in this encounter  Imaging Studies (I personally reviewed images in PACS and report):      IMPRESSION:  Right severe glenohumeral frozen shoulder  PMH:  Diabetes A1c 9 4, A1c in the past 13    Repeat X-ray next visit: None    Return for Follow-up for ultrasound-guided Toradol injection  Patient Instructions   Explained the patient that she has adhesive capsulitis secondary to severely uncontrolled diabetes  I applauded patient for her improved diabetic control recently with trending down to a 9 for her A1c; however, still believe this is too high to proceed with corticosteroid injection  I have however offered patient Toradol injection and she will follow-up for ultrasound-guided Toradol injection to the right glenohumeral joint  Explained that adhesive capsulitis also notes frozen shoulder takes months up to 2 years to see curative resolution  Explained the importance of formal physical therapy as well as home physical therapy and sugar control  Patient also currently under treatment for UTI which is improving  I reviewed red flags including development of fevers worsening symptoms and recommended she contact her treating physician immediately if she develops any the symptoms            CHIEF COMPLAINT:  Right shoulder    HPI:  Crow Dumont is a 45 y o  female  who presents for       Visit 7/17/2021 :  Evaluation of right shoulder pain and stiffness ongoing approximately 1 month  Denies any specific injury event  Constant achy pain with popping moderate intensity and severe at times worse with reaching overhead  Patient has seen orthopedic surgeon who recommended follow-up with non operative sports medicine for further evaluation  Patient has past medical history significant for diabetes severely uncontrolled with A1c in the past at 13 on 04/23/2020 now improved to 9 4 on 07/06/2021  Patient diagnosed with type 2 diabetes for the past 8 years  Consultation Dr Manan Marcus for frozen shoulder    Review of Systems   Constitutional: Negative for chills, fever and unexpected weight change  HENT: Negative for hearing loss, nosebleeds and sore throat  Eyes: Negative for pain, redness and visual disturbance  Respiratory: Negative for cough, shortness of breath and wheezing  Cardiovascular: Negative for chest pain, palpitations and leg swelling  Gastrointestinal: Negative for abdominal distention, nausea and vomiting  Endocrine: Negative for polydipsia and polyuria  Genitourinary: Negative for dysuria and hematuria  Skin: Negative for rash and wound  Neurological: Negative for dizziness, numbness and headaches  Psychiatric/Behavioral: Negative for decreased concentration and suicidal ideas           Following history reviewed and update:    Past Medical History:   Diagnosis Date    [facility] ([facility])      Past Surgical History:   Procedure Laterality Date    HERNIA REPAIR      TUBAL LIGATION  2017     Social History   Social History     Substance and Sexual Activity   Alcohol Use Not Currently     Social History     Substance and Sexual Activity   Drug Use Never     Social History     Tobacco Use   Smoking Status Never Smoker   Smokeless Tobacco Never Used     Family History   Problem Relation Age of Onset    Hypertension Mother    Deysi Blount Arthritis Mother     Asthma Sister     Bipolar disorder Brother     Schizophrenia Brother     Asthma Brother     Asthma Brother     Diabetes Maternal Grandmother     Diabetes Paternal Grandmother     No Known Problems Maternal Grandfather     No Known Problems Paternal Grandfather      Allergies   Allergen Reactions    Metformin And Related Hives          Physical Exam  /70   Ht 5' 5" (1 651 m)   Wt 82 1 kg (181 lb)   BMI 30 12 kg/m²     Constitutional:  see vital signs  Gen: well-developed, normocephalic/atraumatic, well-groomed  Eyes: No inflammation or discharge of conjunctiva or lids; sclera clear   Pharynx: no inflammation, lesion, or mass of lips  Neck: supple, no masses, non-distended  MSK: no inflammation, lesion, mass, or clubbing of nails and digits except for other than mentioned below  SKIN: no visible rashes or skin lesions  Pulmonary/Chest: Effort normal  No respiratory distress  NEURO: cranial nerves grossly intact  PSYCH:  Alert and oriented to person, place, and time; recent and remote memory intact; mood normal, no depression, anxiety, or agitation, judgment and insight good and intact     Ortho Exam    RIGHT SHOULDER:  Erythema: no  Swelling: no  Increased Warmth: no    Tenderness:     ROM  Forward flexion minimal  External rotation minimal  Internal rotation minimal    Strength  Abduction: 5/5  ER: 5/5  IR: 5/5    Drop-Arm: negative  Emptycan:+  Belly Press:   Lift-off Test:       Lj Roy III, DO  7/17/2021  8:52 AM  Sign when Signing Visit  No diagnosis found  No orders of the defined types were placed in this encounter  Imaging Studies (I personally reviewed images in PACS and report):      IMPRESSION:  ***      Repeat X-ray next visit: None***    No follow-ups on file  There are no Patient Instructions on file for this visit          CHIEF COMPLAINT:  Right shoulder    HPI:  Sherrie Delgado is a 45 y o  female  who presents for       Visit 7/17/2021 :  Evaluation of right shoulder pain and stiffness ongoing approximately 1 month  Denies any specific injury event  Constant achy pain with popping moderate intensity and severe at times worse with reaching overhead  Patient has seen orthopedic surgeon who recommended follow-up with non operative sports medicine for further evaluation  Patient has past medical history significant for diabetes severely uncontrolled with A1c in the past at 13 on 04/23/2020 now improved to 9 4 on 07/06/2021  Patient diagnosed with type 2 diabetes for the past 8 years  Review of Systems   Constitutional: Negative for chills, fever and unexpected weight change  HENT: Negative for hearing loss, nosebleeds and sore throat  Eyes: Negative for pain, redness and visual disturbance  Respiratory: Negative for cough, shortness of breath and wheezing  Cardiovascular: Negative for chest pain, palpitations and leg swelling  Gastrointestinal: Negative for abdominal distention, nausea and vomiting  Endocrine: Negative for polydipsia and polyuria  Genitourinary: Negative for dysuria and hematuria  Skin: Negative for rash and wound  Neurological: Negative for dizziness, numbness and headaches  Psychiatric/Behavioral: Negative for decreased concentration and suicidal ideas           Following history reviewed and update:    Past Medical History:   Diagnosis Date    Diabetes mellitus ([facility] [address] )      Past Surgical History:   Procedure Laterality Date    HERNIA REPAIR      TUBAL LIGATION  2017     Social History   Social History     Substance and Sexual Activity   Alcohol Use Not Currently     Social History     Substance and Sexual Activity   Drug Use Never     Social History     Tobacco Use   Smoking Status Never Smoker   Smokeless Tobacco Never Used     Family History   Problem Relation Age of Onset    Hypertension Mother     Arthritis Mother     Asthma Sister     Bipolar disorder Brother     Schizophrenia Brother     Asthma Brother     Asthma Brother     Diabetes Maternal Grandmother     Diabetes Paternal Grandmother     No Known Problems Maternal Grandfather     No Known Problems Paternal Grandfather      Allergies   Allergen Reactions    Metformin And Related Hives          Physical Exam  /70   Ht 5' 5" (1 651 m)   Wt 82 1 kg (181 lb)   BMI 30 12 kg/m²     Constitutional:  see vital signs  Gen: well-developed, normocephalic/atraumatic, well-groomed  Eyes: No inflammation or discharge of conjunctiva or lids; sclera clear   Pharynx: no inflammation, lesion, or mass of lips  Neck: supple, no masses, non-distended  MSK: no inflammation, lesion, mass, or clubbing of nails and digits except for other than mentioned below  SKIN: no visible rashes or skin lesions  Pulmonary/Chest: Effort normal  No respiratory distress     NEURO: cranial nerves grossly intact  PSYCH:  Alert and oriented to person, place, and time; recent and remote memory intact; mood normal, no depression, anxiety, or agitation, judgment and insight good and intact     Ortho Exam    RIGHT SHOULDER:  Erythema: no  Swelling: no  Increased Warmth: no    Tenderness:     ROM  Touchdown sign: intact  External Rotation: Intact  Internal Rotation: Intact    Strength  Abduction: 5/5  ER: 5/5  IR: 5/5    Drop-Arm: negative  Emptycan: negative  Belly Press:   Lift-off Test:    Keys: negative  Neer: negative  Cross-Arm:   Speeds:     Internal Impingement:  (crank position pain)    Labral Crank Test :  (abducted 90, axial load, guided IR & Er)    Modified Labral Shift:  (seated, ER, abduction, axial load, guided abd/add)    Barton's Test:   (FF 90, abd 15, resist thumbs up-, resist thumbs down+)    Apprehension:  Negar's Relocation Maneuver:    LEFT SHOULDER:  Strength  Abduction: 5/5  ER: 5/5  IR: 5/5    ROM  Touchdown sign: intact    Empty can: negative       Procedures

## 2021-07-17 NOTE — LETTER
July 17, 2021     Lionel Owens MD  [facility]    Patient: Robyn Martinez   YOB: 1980   Date of Visit: 7/17/2021       Dear Dr Jamie Ku: Thank you for referring Robyn Martinez to me for evaluation  Below are my notes for this consultation  If you have questions, please do not hesitate to call me  I look forward to following your patient along with you  Sincerely,        Boris Vicente III, DO        CC: DO Boris Casiano III,   7/17/2021  9:05 AM  Signed  1  Adhesive capsulitis of right shoulder       No orders of the defined types were placed in this encounter  Imaging Studies (I personally reviewed images in PACS and report):      IMPRESSION:  Right severe glenohumeral frozen shoulder  PMH:  Diabetes A1c 9 4, A1c in the past 13    Repeat X-ray next visit: None    Return for Follow-up for ultrasound-guided Toradol injection  Patient Instructions   Explained the patient that she has adhesive capsulitis secondary to severely uncontrolled diabetes  I applauded patient for her improved diabetic control recently with trending down to a 9 for her A1c; however, still believe this is too high to proceed with corticosteroid injection  I have however offered patient Toradol injection and she will follow-up for ultrasound-guided Toradol injection to the right glenohumeral joint  Explained that adhesive capsulitis also notes frozen shoulder takes months up to 2 years to see curative resolution  Explained the importance of formal physical therapy as well as home physical therapy and sugar control  Patient also currently under treatment for UTI which is improving  I reviewed red flags including development of fevers worsening symptoms and recommended she contact her treating physician immediately if she develops any the symptoms            CHIEF COMPLAINT:  Right shoulder    HPI:  Robyn Martinez is a 45 y o  female  who presents for       Visit 7/17/2021 :  Evaluation of right shoulder pain and stiffness ongoing approximately 1 month  Denies any specific injury event  Constant achy pain with popping moderate intensity and severe at times worse with reaching overhead  Patient has seen orthopedic surgeon who recommended follow-up with non operative sports medicine for further evaluation  Patient has past medical history significant for diabetes severely uncontrolled with A1c in the past at 13 on 04/23/2020 now improved to 9 4 on 07/06/2021  Patient diagnosed with type 2 diabetes for the past 8 years  Consultation Dr Yoana Dawson for frozen shoulder    Review of Systems   Constitutional: Negative for chills, fever and unexpected weight change  HENT: Negative for hearing loss, nosebleeds and sore throat  Eyes: Negative for pain, redness and visual disturbance  Respiratory: Negative for cough, shortness of breath and wheezing  Cardiovascular: Negative for chest pain, palpitations and leg swelling  Gastrointestinal: Negative for abdominal distention, nausea and vomiting  Endocrine: Negative for polydipsia and polyuria  Genitourinary: Negative for dysuria and hematuria  Skin: Negative for rash and wound  Neurological: Negative for dizziness, numbness and headaches  Psychiatric/Behavioral: Negative for decreased concentration and suicidal ideas           Following history reviewed and update:    Past Medical History:   Diagnosis Date    Diabetes mellitus ([facility] [address] )      Past Surgical History:   Procedure Laterality Date    HERNIA REPAIR      TUBAL LIGATION  2017     Social History   Social History     Substance and Sexual Activity   Alcohol Use Not Currently     Social History     Substance and Sexual Activity   Drug Use Never     Social History     Tobacco Use   Smoking Status Never Smoker   Smokeless Tobacco Never Used     Family History   Problem Relation Age of Onset    Hypertension Mother     Arthritis Mother  Asthma Sister     Bipolar disorder Brother     Schizophrenia Brother     Asthma Brother     Asthma Brother     Diabetes Maternal Grandmother     Diabetes Paternal Grandmother     No Known Problems Maternal Grandfather     No Known Problems Paternal Grandfather      Allergies   Allergen Reactions    Metformin And Related Hives          Physical Exam  /70   Ht 5' 5" (1 651 m)   Wt 82 1 kg (181 lb)   BMI 30 12 kg/m²     Constitutional:  see vital signs  Gen: well-developed, normocephalic/atraumatic, well-groomed  Eyes: No inflammation or discharge of conjunctiva or lids; sclera clear   Pharynx: no inflammation, lesion, or mass of lips  Neck: supple, no masses, non-distended  MSK: no inflammation, lesion, mass, or clubbing of nails and digits except for other than mentioned below  SKIN: no visible rashes or skin lesions  Pulmonary/Chest: Effort normal  No respiratory distress     NEURO: cranial nerves grossly intact  PSYCH:  Alert and oriented to person, place, and time; recent and remote memory intact; mood normal, no depression, anxiety, or agitation, judgment and insight good and intact     Ortho Exam    RIGHT SHOULDER:  Erythema: no  Swelling: no  Increased Warmth: no    Tenderness:     ROM  Forward flexion minimal  External rotation minimal  Internal rotation minimal    Strength  Abduction: 5/5  ER: 5/5  IR: 5/5    Drop-Arm: negative  Emptycan:+  Belly Press:   Lift-off Test:       Procedures

## 2021-07-17 NOTE — PROGRESS NOTES
1  Adhesive capsulitis of right shoulder       No orders of the defined types were placed in this encounter  Imaging Studies (I personally reviewed images in PACS and report):      IMPRESSION:  Right severe glenohumeral frozen shoulder  PMH:  Diabetes A1c 9 4, A1c in the past 13    Repeat X-ray next visit: None    Return for Follow-up for ultrasound-guided Toradol injection  Patient Instructions   Explained the patient that she has adhesive capsulitis secondary to severely uncontrolled diabetes  I applauded patient for her improved diabetic control recently with trending down to a 9 for her A1c; however, still believe this is too high to proceed with corticosteroid injection  I have however offered patient Toradol injection and she will follow-up for ultrasound-guided Toradol injection to the right glenohumeral joint  Explained that adhesive capsulitis also notes frozen shoulder takes months up to 2 years to see curative resolution  Explained the importance of formal physical therapy as well as home physical therapy and sugar control  Patient also currently under treatment for UTI which is improving  I reviewed red flags including development of fevers worsening symptoms and recommended she contact her treating physician immediately if she develops any the symptoms  CHIEF COMPLAINT:  Right shoulder    HPI:  Joshua Fischer is a 45 y o  female  who presents for       Visit 7/17/2021 :  Evaluation of right shoulder pain and stiffness ongoing approximately 1 month  Denies any specific injury event  Constant achy pain with popping moderate intensity and severe at times worse with reaching overhead  Patient has seen orthopedic surgeon who recommended follow-up with non operative sports medicine for further evaluation  Patient has past medical history significant for diabetes severely uncontrolled with A1c in the past at 13 on 04/23/2020 now improved to 9 4 on 07/06/2021    Patient diagnosed with type 2 diabetes for the past 8 years  Consultation Dr Cierra Smart for frozen shoulder    Review of Systems   Constitutional: Negative for chills, fever and unexpected weight change  HENT: Negative for hearing loss, nosebleeds and sore throat  Eyes: Negative for pain, redness and visual disturbance  Respiratory: Negative for cough, shortness of breath and wheezing  Cardiovascular: Negative for chest pain, palpitations and leg swelling  Gastrointestinal: Negative for abdominal distention, nausea and vomiting  Endocrine: Negative for polydipsia and polyuria  Genitourinary: Negative for dysuria and hematuria  Skin: Negative for rash and wound  Neurological: Negative for dizziness, numbness and headaches  Psychiatric/Behavioral: Negative for decreased concentration and suicidal ideas           Following history reviewed and update:    Past Medical History:   Diagnosis Date    Diabetes mellitus ([facility] [address] )      Past Surgical History:   Procedure Laterality Date    HERNIA REPAIR      TUBAL LIGATION  2017     Social History   Social History     Substance and Sexual Activity   Alcohol Use Not Currently     Social History     Substance and Sexual Activity   Drug Use Never     Social History     Tobacco Use   Smoking Status Never Smoker   Smokeless Tobacco Never Used     Family History   Problem Relation Age of Onset    Hypertension Mother     Arthritis Mother     Asthma Sister     Bipolar disorder Brother     Schizophrenia Brother     Asthma Brother     Asthma Brother     Diabetes Maternal Grandmother     Diabetes Paternal Grandmother     No Known Problems Maternal Grandfather     No Known Problems Paternal Grandfather      Allergies   Allergen Reactions    Metformin And Related Hives          Physical Exam  /70   Ht 5' 5" (1 651 m)   Wt 82 1 kg (181 lb)   BMI 30 12 kg/m²     Constitutional:  see vital signs  Gen: well-developed, normocephalic/atraumatic, well-groomed  Eyes: No inflammation or discharge of conjunctiva or lids; sclera clear   Pharynx: no inflammation, lesion, or mass of lips  Neck: supple, no masses, non-distended  MSK: no inflammation, lesion, mass, or clubbing of nails and digits except for other than mentioned below  SKIN: no visible rashes or skin lesions  Pulmonary/Chest: Effort normal  No respiratory distress     NEURO: cranial nerves grossly intact  PSYCH:  Alert and oriented to person, place, and time; recent and remote memory intact; mood normal, no depression, anxiety, or agitation, judgment and insight good and intact     Ortho Exam    RIGHT SHOULDER:  Erythema: no  Swelling: no  Increased Warmth: no    Tenderness:     ROM  Forward flexion minimal  External rotation minimal  Internal rotation minimal    Strength  Abduction: 5/5  ER: 5/5  IR: 5/5    Drop-Arm: negative  Emptycan:+  Belly Press:   Lift-off Test:       Procedures

## 2021-07-17 NOTE — PATIENT INSTRUCTIONS
Explained the patient that she has adhesive capsulitis secondary to severely uncontrolled diabetes  I applauded patient for her improved diabetic control recently with trending down to a 9 for her A1c; however, still believe this is too high to proceed with corticosteroid injection  I have however offered patient Toradol injection and she will follow-up for ultrasound-guided Toradol injection to the right glenohumeral joint  Explained that adhesive capsulitis also notes frozen shoulder takes months up to 2 years to see curative resolution  Explained the importance of formal physical therapy as well as home physical therapy and sugar control  Patient also currently under treatment for UTI which is improving  I reviewed red flags including development of fevers worsening symptoms and recommended she contact her treating physician immediately if she develops any the symptoms

## 2021-07-19 NOTE — PROGRESS NOTES
1  Adhesive capsulitis of right shoulder  traMADol (ULTRAM) 50 mg tablet    Large joint arthrocentesis: R glenohumeral   2  Right shoulder pain, unspecified chronicity  traMADol (ULTRAM) 50 mg tablet    Large joint arthrocentesis: R glenohumeral     Orders Placed This Encounter   Procedures    Large joint arthrocentesis: R glenohumeral        Imaging Studies (I personally reviewed images in PACS and report):   x-ray right shoulder 06/25/2021:   No acute osseous abnormality  IMPRESSION:  Adhesive capsulitis Right shoulder  PMH: Diabetes, HbA1c 9 4 on 7/6/21, previously 13      Repeat X-ray next visit: None    Return in about 3 months (around 10/20/2021) for Recheck  Patient Instructions   Trial of Toradol injection to right shoulder  Start physical therapy which is the definitive treatment    ed flags and risks of injection include but are not limited to infection <0 072% as referenced in some sources, nerve or artery penetration, and if steroids are used-skin dimpling <1%, hypo-pigmentation <1%  Recommended no submerging underwater in a tub, pool, ocean, lake, jacuzzi, hot tub, or any other body of water for 1 week until needle wound closes due to risk of infection  May take showers  Clean needle site with soap and water and keep covered at all times with sterile bandage such as a band-aid until fully healed  Educated if any symptoms including fevers, chills, swelling, or worsening symptoms occur then to call office or go to hospital for immediate care if physician unavailable due to possible infection or other complication which is a serious medical problem  Patient expressed understanding and agreed to proceed with procedure  CHIEF COMPLAINT:   adhesive capsulitis    HPI:  Norma Alejandre is a 45 y o  female  who presents for       Visit 7/20/2021 :  Patient is for follow up for right shoulder pain   She was initially seen by orthopedic surgery where she was started on PT, she was then referred to non operative sport medicine for further evaluation  Upon evaluation she was diagnosed with adhesive capsulitis and was recommended an US guided Toradol injection to the glenohumeral joint  Today patient states she is still having some pain with elevating her shoulders  She described the pain as dull  Her shoulder also pops with increases range of motion  Review of Systems   Constitutional: Negative for chills, fatigue, fever and unexpected weight change  HENT: Negative for hearing loss, nosebleeds and sore throat  Eyes: Negative for pain, redness and visual disturbance  Respiratory: Negative for cough, chest tightness, shortness of breath and wheezing  Cardiovascular: Negative for chest pain, palpitations and leg swelling  Gastrointestinal: Negative for abdominal distention, abdominal pain, nausea and vomiting  Endocrine: Negative for polydipsia and polyuria  Genitourinary: Negative  Negative for dysuria, flank pain, frequency and hematuria  Musculoskeletal: Negative for back pain and neck pain  Skin: Negative for rash and wound  Neurological: Negative for dizziness, light-headedness, numbness and headaches  Psychiatric/Behavioral: Negative for agitation, decreased concentration and suicidal ideas           Following history reviewed and update:    Past Medical History:   Diagnosis Date    Diabetes mellitus ([facility] [address] )      Past Surgical History:   Procedure Laterality Date    HERNIA REPAIR      TUBAL LIGATION  2017     Social History   Social History     Substance and Sexual Activity   Alcohol Use Not Currently     Social History     Substance and Sexual Activity   Drug Use Never     Social History     Tobacco Use   Smoking Status Never Smoker   Smokeless Tobacco Never Used     Family History   Problem Relation Age of Onset    Hypertension Mother     Arthritis Mother     Asthma Sister     Bipolar disorder Brother     Schizophrenia Brother     Asthma Brother     Asthma Brother     Diabetes Maternal Grandmother     Diabetes Paternal Grandmother     No Known Problems Maternal Grandfather     No Known Problems Paternal Grandfather      Allergies   Allergen Reactions    Metformin And Related Hives          Physical Exam  /82 (BP Location: Left arm, Patient Position: Sitting, Cuff Size: Standard)   Pulse 78   Ht 5' 5" (1 651 m)   Wt 81 kg (178 lb 9 2 oz)   BMI 29 72 kg/m²     Constitutional:  see vital signs  Gen: well-developed, normocephalic/atraumatic, well-groomed  Eyes: No inflammation or discharge of conjunctiva or lids; sclera clear   Pharynx: no inflammation, lesion, or mass of lips  Neck: supple, no masses, non-distended  MSK: no inflammation, lesion, mass, or clubbing of nails and digits except for other than mentioned below  SKIN: no visible rashes or skin lesions  Pulmonary/Chest: Effort normal  No respiratory distress     NEURO: cranial nerves grossly intact  PSYCH:  Alert and oriented to person, place, and time; recent and remote memory intact; mood normal, no depression, anxiety, or agitation, judgment and insight good and intact     Ortho Exam   RIGHT SHOULDER:  Erythema: no  Swelling: no  Increased Warmth: no    Tenderness: +  Subacromial lateral    ROM   abduction:  80°  External rotation:  45   Internal rotation:   Intact    Strength  Abduction: 4/5  ER: 5/5  IR: 5/5    Drop-Arm: negative  Emptycan: +  Belly Press:   Lift-off Test:    Keys: +  Neer: +  Cross-Arm:   Speeds:     Internal Impingement:  (crank position pain)    Labral Crank Test :  (abducted 90, axial load, guided IR & Er)    Modified Labral Shift:  (seated, ER, abduction, axial load, guided abd/add)    Gallion's Test:   (FF 90, abd 15, resist thumbs up-, resist thumbs down+)    Apprehension:  Negar's Relocation Maneuver:    LEFT SHOULDER:  Strength  Abduction: 5/5  ER: 5/5  IR: 5/5    ROM  Touchdown sign: intact    Empty can: negative              Large joint arthrocentesis: R glenohumeral  Universal Protocol:  Consent: Verbal consent obtained  Risks and benefits: risks, benefits and alternatives were discussed  Consent given by: patient  Patient understanding: patient states understanding of the procedure being performed  Site marked: the operative site was marked  Radiology Images displayed and confirmed  If images not available, report reviewed: imaging studies available  Patient identity confirmed: verbally with patient    Supporting Documentation  Indications: pain   Procedure Details  Location: shoulder - R glenohumeral  Needle size: 22 G  Ultrasound guidance: yes  Approach: posterolateral  Medications administered: 4 mL lidocaine 1 %; 3 mL lidocaine 1 %; 5 mL lidocaine 1 % (25 gauge anesthetic needle tract 4 cc: 5 cc lidocaine contrast used to confirm needle placement prior to injection of Toradol)    Patient tolerance: patient tolerated the procedure well with no immediate complications  Dressing:  Sterile dressing applied     Range-of-motion significant improved after injection    External rotation 90°, internal rotation 70°, abduction 170   Rotator cuff post injection 5/5 abduction, external rotation, internal rotation  Significant improvement in pain post injection      Toradol 30mg injected

## 2021-07-20 ENCOUNTER — OFFICE VISIT (OUTPATIENT)
Dept: OBGYN CLINIC | Facility: OTHER | Age: 41
End: 2021-07-20
Payer: COMMERCIAL

## 2021-07-20 VITALS
BODY MASS INDEX: 29.75 KG/M2 | HEIGHT: 65 IN | DIASTOLIC BLOOD PRESSURE: 82 MMHG | SYSTOLIC BLOOD PRESSURE: 121 MMHG | WEIGHT: 178.57 LBS | HEART RATE: 78 BPM

## 2021-07-20 DIAGNOSIS — M75.01 ADHESIVE CAPSULITIS OF RIGHT SHOULDER: Primary | ICD-10-CM

## 2021-07-20 DIAGNOSIS — M25.511 RIGHT SHOULDER PAIN, UNSPECIFIED CHRONICITY: ICD-10-CM

## 2021-07-20 PROCEDURE — 20611 DRAIN/INJ JOINT/BURSA W/US: CPT | Performed by: FAMILY MEDICINE

## 2021-07-20 RX ORDER — TRAMADOL HYDROCHLORIDE 50 MG/1
50 TABLET ORAL EVERY 8 HOURS PRN
Qty: 20 TABLET | Refills: 0 | Status: SHIPPED | OUTPATIENT
Start: 2021-07-20

## 2021-07-20 RX ADMIN — LIDOCAINE HYDROCHLORIDE 5 ML: 10 INJECTION, SOLUTION INFILTRATION; PERINEURAL at 13:49

## 2021-07-20 RX ADMIN — LIDOCAINE HYDROCHLORIDE 4 ML: 10 INJECTION, SOLUTION INFILTRATION; PERINEURAL at 13:49

## 2021-07-20 RX ADMIN — LIDOCAINE HYDROCHLORIDE 3 ML: 10 INJECTION, SOLUTION INFILTRATION; PERINEURAL at 13:49

## 2021-07-20 NOTE — LETTER
July 20, 2021     Patient: Cari Castro   YOB: 1980   Date of Visit: 7/20/2021       To Whom it May Concern:    Cari Castro is under my professional care  She was seen in my office on 7/20/2021  She may return to work on 7/21/21, full duty no restrictions  If you have any questions or concerns, please don't hesitate to call           Sincerely,          Lisa Powers III,         CC: Cari Castro

## 2021-07-20 NOTE — PATIENT INSTRUCTIONS
Trial of Toradol injection to right shoulder  Start physical therapy which is the definitive treatment    ed flags and risks of injection include but are not limited to infection <0 072% as referenced in some sources, nerve or artery penetration, and if steroids are used-skin dimpling <1%, hypo-pigmentation <1%  Recommended no submerging underwater in a tub, pool, ocean, lake, jacuzzi, hot tub, or any other body of water for 1 week until needle wound closes due to risk of infection  May take showers  Clean needle site with soap and water and keep covered at all times with sterile bandage such as a band-aid until fully healed  Educated if any symptoms including fevers, chills, swelling, or worsening symptoms occur then to call office or go to hospital for immediate care if physician unavailable due to possible infection or other complication which is a serious medical problem  Patient expressed understanding and agreed to proceed with procedure

## 2021-08-03 ENCOUNTER — EVALUATION (OUTPATIENT)
Dept: PHYSICAL THERAPY | Facility: CLINIC | Age: 41
End: 2021-08-03
Payer: COMMERCIAL

## 2021-08-03 DIAGNOSIS — M75.01 ADHESIVE CAPSULITIS OF RIGHT SHOULDER: Primary | ICD-10-CM

## 2021-08-03 PROCEDURE — 97162 PT EVAL MOD COMPLEX 30 MIN: CPT

## 2021-08-03 PROCEDURE — 97110 THERAPEUTIC EXERCISES: CPT

## 2021-08-03 NOTE — PROGRESS NOTES
PT Evaluation     Today's date: 8/3/2021  Patient name: Lacie Crocker  : 1980  MRN: [id number]  Referring provider: Kevin Mena,*  Dx:   Encounter Diagnosis     ICD-10-CM    1  Adhesive capsulitis of right shoulder  M75 01 Ambulatory referral to Physical Therapy                  Assessment  Assessment details: Lacie Crocker is a 45 y o  female who presents today to outpatient therapy for evaluation of adhesive capsulitis of right shoulder  Upon assessment today, pt exhibits postural deviations; decreased T/S AROM; decreased AROM and PROM thru the (R) shoulder; and hypomobility thru the (R) GH joint  These impairments are contributing to functional limitations with lifting/carrying; pulling; reaching OH; and cleaning  Pt would therefore benefit from PT intervention in order to address the aforementioned deficits so that she can return to her PLOF and function comfortably/safely in her home and surrounding environment  Thank you for the referral! - Gerardo Glass, PT, DPT  Impairments: abnormal or restricted ROM, impaired physical strength, pain with function, poor posture  and poor body mechanics  Understanding of Dx/Px/POC: good   Prognosis: good    Goals  STG 1: Pt will demonstrate compliance with HEP to supplement therapy in 1-2 weeks  STG 2: Pt will report 25% reduction in pain in 2-4 weeks  STG 3: Pt will demonstrate increased (R) shoulder flex AROM by 10-20 deg to assist pt with reaching OH in 2-4 weeks  LTG 1: Pt will be able to reach [facility] with min increased symptoms in 6-8 weeks to assist pt with ADLs such as getting dressed  LTG 2: Pt will be able to clean with min difficulty in 6-8 weeks  LTG 3: 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 with min difficulty related to the (R) UE in 6-8 weeks  LTG 4: Pt will be able to lift/carry 10# with the (R) UE with min difficulty in 6-8 weeks  Plan  Plan details: Educated pt today about dx; px; goals of therapy; and HEP    Patient would benefit from: skilled physical therapy  Planned modality interventions: cryotherapy, TENS, unattended electrical stimulation and traction  Planned therapy interventions: abdominal trunk stabilization, postural training, patient education, neuromuscular re-education, joint mobilization, manual therapy, massage, activity modification, balance, body mechanics training, Garcia taping, strengthening, stretching, therapeutic activities, coordination, flexibility, home exercise program, therapeutic exercise, functional ROM exercises and balance/weight bearing training  Frequency: 2x week  Duration in weeks: 4  Treatment plan discussed with: patient        Subjective Evaluation    History of Present Illness  Mechanism of injury: Pt states that about 1-2 months ago, she began experiencing insidious onset of pain thru the shoulder which worsens with movement  She f/u with the doctor who dx her with frozen shoulder and attributed this to her hx of DM and a thyroid disorder  She obtained an injection 2 weeks ago which helped for the first week however the pain has since returned  She was instructed to perform physical therapy and to f/u with the doctor again October  Currently, pt reports ongoing difficulty lifting/carrying objects with the (R) UE; pulling patients; reaching OH; and cleaning d/t increased (R) shoulder pain  Eases: Ice, propping the arm up on a pillow, Tylenol   Aggs: Any movement of the arm  Pain  Current pain ratin  At best pain ratin  At worst pain rating: 10  Location: Pt reports a shooting, burning pain thru the (R) anterior shoulder which goes into the elbow and hand on occasion      Hand dominance: right    Patient Goals  Patient goals for therapy: independence with ADLs/IADLs and return to work          Objective     Postural Observations    Additional Postural Observation Details  Inc hiking thru (R) UT, forward head/increased T/S kyphosis    Palpation     Additional Palpation Details  No TTP thru (R) UT, rhomboids, pec, biceps, posterior cuff, C/S PS  Cervical/Thoracic Screen   Cervical range of motion within normal limits  Thoracic range of motion within normal limits with the following exceptions: Ext min decreased,  (R) rotation mod decreased and painful,  (L) rotation mod decreased    Neurological Testing     Sensation     Shoulder   Left Shoulder   Intact: light touch    Right Shoulder   Intact: light touch    Active Range of Motion   Left Shoulder   Flexion: 145 degrees   Extension: 60 degrees   Abduction: 160 degrees   External rotation BTH: T3   Internal rotation BTB: T11     Right Shoulder   Flexion: 85 degrees with pain  Extension: 40 degrees with pain  Abduction: 70 degrees with pain  External rotation BTH: Active external rotation behind the head: Attempted, unable at this time  with pain  Internal rotation BTB: L4 with pain    Passive Range of Motion   Left Shoulder   Normal passive range of motion    Right Shoulder   Flexion: 85 degrees with pain  Abduction: 80 degrees with pain  External rotation 45°: 10 degrees with pain    Additional Passive Range of Motion Details  (R) PROM grossly observed  Joint Play     Right Shoulder  Hypomobile in the posterior capsule and inferior capsule  Strength/Myotome Testing     Left Shoulder     Planes of Motion   Flexion: 5   Abduction: 5   External rotation at 0°: 5   Internal rotation at 0°: 5     Isolated Muscles   Biceps: 4+   Triceps: 5     Additional Strength Details  (R) UE strength NT during IE d/t high symptom irritability, TBD         Flowsheet Rows      Most Recent Value   PT/OT G-Codes   Current Score  38   Projected Score  65             Precautions: Diabetes; migraines; adhesive capsulitis; hx of seizure-like activity; focus on ROM per MD   Date 8/3            FOTO IE             Re-eval IE                Manuals 8/3            (R) Shoulder PROM to tolerance nv            (R) GH PA mobs nv                         Neuro Re-Ed 8/3 Scap retractions nv            Slouch w/ postural overcorrect             No moneys (no band) nv            Seated chin tucks nv            SA punches                                       Ther Ex 8/3            Pendulums  nv            T/S ext nv            T/S rot             Standing cane flex nv            Standing cane ext nv            Functional IR (S) w/ strap (R)             Open books             Supine ER (S) with cane 10x5" HEP            Supine cane flex 15x5" HEP            Sleeper (S)             Cross body (S) sidelying             Pt Edu AL HEP                         Ther Activity 8/3            Retro UBE             Pulleys - flex, scap nv            SWB Flex nv            Table slides nv            Wall walks nv            Pt Edu AL - dx, px                         Modalities 8/3            MH or ice

## 2021-08-11 ENCOUNTER — OFFICE VISIT (OUTPATIENT)
Dept: PHYSICAL THERAPY | Facility: CLINIC | Age: 41
End: 2021-08-11
Payer: COMMERCIAL

## 2021-08-11 DIAGNOSIS — M75.01 ADHESIVE CAPSULITIS OF RIGHT SHOULDER: Primary | ICD-10-CM

## 2021-08-11 PROCEDURE — 97110 THERAPEUTIC EXERCISES: CPT

## 2021-08-11 PROCEDURE — 97530 THERAPEUTIC ACTIVITIES: CPT

## 2021-08-11 NOTE — PROGRESS NOTES
Daily Note     Today's date: 2021  Patient name: Sid Mariee  : 1980  MRN: [id number]  Referring provider: Destiny Mcintyre,*  Dx:   Encounter Diagnosis     ICD-10-CM    1  Adhesive capsulitis of right shoulder  M75 01        Start Time: 730  Stop Time:   Total time in clinic (min): 42 minutes  Subjective: Pt arrives to Tx reporting no significant changes in (R) shoulder mobility since IE  Objective: See treatment diary below    Assessment: Implimented exercise diary per PT POC as indicated below  Notified pt that interventions may be uncomfortable; however, she should notify therapist of any increases in or sharp bouts of pain Sx  Pt tolerated Tx well + demonstrated willingness to perform all interventions  Provided pt education re: PT POC, progressing program as tolerated, and importance of improving mobility  Pt would benefit from continued PT  Plan: Cont /c PT POC  Progress as tolerated        Precautions: Diabetes; migraines; adhesive capsulitis; hx of seizure-like activity; focus on ROM per MD   Date 8/3 08/11           FOTO IE             Re-eval IE                Manuals 8/3 08/11           (R) Shoulder PROM to tolerance nv            (R) GH PA mobs nv                         Neuro Re-Ed 8/3 08/11           Scap retractions nv            Slouch w/ postural overcorrect             No moneys (no band) nv            Seated chin tucks nv            SA punches                                       Ther Ex 8/3 08/11           Pendulums  nv            T/S ext nv 2'           T/S rot  2'           Standing cane flex nv            Standing cane ext nv            Functional IR (S) w/ strap (R)             Open books             Supine ER (S) with cane 10x5" HEP            Supine cane flex 15x5" HEP            Sleeper (S)             Cross body (S) sidelying             Pt Edu AL HEP SP                        Ther Activity 8/3 08/11           Retro UBE             Pulleys - flex, scap nv 5'            SWB Flex nv 3'            Table slides nv 2'ea           Wall walks nv            Pt Edu AL - dx, px SP                        Modalities 8/3 08/11            or ice                              [place], PTA

## 2021-08-13 ENCOUNTER — OFFICE VISIT (OUTPATIENT)
Dept: PHYSICAL THERAPY | Facility: CLINIC | Age: 41
End: 2021-08-13
Payer: COMMERCIAL

## 2021-08-13 DIAGNOSIS — M75.01 ADHESIVE CAPSULITIS OF RIGHT SHOULDER: Primary | ICD-10-CM

## 2021-08-13 PROCEDURE — 97530 THERAPEUTIC ACTIVITIES: CPT

## 2021-08-13 PROCEDURE — 97110 THERAPEUTIC EXERCISES: CPT

## 2021-08-13 NOTE — PROGRESS NOTES
Daily Note     Today's date: 2021  Patient name: Toya Caceres  : 1980  MRN: [id number]  Referring provider: Shameka Cabrera,*  Dx:   Encounter Diagnosis     ICD-10-CM    1  Adhesive capsulitis of right shoulder  M75 01                   Subjective: Pt states that her shoulder is "burning" this morning, rating her pain as 6/10  Following initial exercises LV, pt states that her shoulder was moving better  Objective: See treatment diary below      Assessment: Tolerated treatment well  Pt able to complete exercises as listed slowly and without c/o pain  VC provided during standing ER (S) with cane to reduce shoulder abd/increase elbow flex  Post tx session pt reported feeling "tired " Patient demonstrated fatigue post treatment, exhibited good technique with therapeutic exercises and would benefit from continued PT to progress [facility] ROM to improve pt's tolerance to getting dressed and doing her hair on a daily basis  Plan: Continue per plan of care  Progress treatment as tolerated         Precautions: Diabetes; migraines; adhesive capsulitis; hx of seizure-like activity; focus on ROM per MD   Date 8/3 08/11 8/13          FOTO IE             Re-eval IE                Manuals 8/3 08/11 8/13          (R) Shoulder PROM to tolerance nv            (R) GH PA mobs nv                         Neuro Re-Ed 8/3 08/11 8/13          Scap retractions nv            Slouch w/ postural overcorrect             No moneys (no band) nv            Seated chin tucks nv            SA punches                                       Ther Ex 8/3 08/11 8/13          Pendulums  nv            T/S ext nv 2'           T/S rot  2'           Standing cane flex nv            Standing cane ext nv  10x5"          Functional IR (S) w/ strap (R)   nv          Open books             Supine ER (S) with cane 10x5" HEP            Supine cane flex 15x5" HEP            Sleeper (S)             Cross body (S) sidelying             Pt Edu AL HEP SP AL                       Ther Activity 8/3 08/11 8/13          Retro UBE             Pulleys - flex, scap nv 5'  5'          SWB Flex nv 3'  3'          Table slides nv 2'ea 2'          Wall walks nv            Pt Edu AL - dx, px SP AL                       Modalities 8/3 08/11 8/13          MH or ice

## 2021-08-16 ENCOUNTER — OFFICE VISIT (OUTPATIENT)
Dept: PHYSICAL THERAPY | Facility: CLINIC | Age: 41
End: 2021-08-16
Payer: COMMERCIAL

## 2021-08-16 DIAGNOSIS — M75.01 ADHESIVE CAPSULITIS OF RIGHT SHOULDER: Primary | ICD-10-CM

## 2021-08-16 PROCEDURE — 97110 THERAPEUTIC EXERCISES: CPT

## 2021-08-16 PROCEDURE — 97530 THERAPEUTIC ACTIVITIES: CPT

## 2021-08-16 NOTE — PROGRESS NOTES
Daily Note     Today's date: 2021  Patient name: Shira Chilel  : 1980  MRN: [id number]  Referring provider: Jojo Guy,*  Dx:   Encounter Diagnosis     ICD-10-CM    1  Adhesive capsulitis of right shoulder  M75 01        Start Time: 1500  Stop Time: 1545  Total time in clinic (min): 45 minutes  Subjective: Pt reports feeling "good" following LV  Pt reports no significant changes in (R) shoulder pain or mobility since IE  Objective: See treatment diary below    Assessment: Tolerated treatment well  Pt requires tactile cuing for form /c ER (S) /c cane - good carryover  Discussed /c pt adding activities against gravity NV as tolerated  Pt demonstrated fatigue post treatment, exhibited good technique with therapeutic exercises and would benefit from continued PT to progress [address] ROM to improve pt's tolerance to getting dressed and doing her hair on a daily basis  Plan: Cont /c PT POC  Progress as tolerated       Precautions: Diabetes; migraines; adhesive capsulitis; hx of seizure-like activity; focus on ROM per MD   Date 8/3 08/11 8/13 08/16         FOTO IE             Re-eval IE                Manuals 8/3 08/11 8/13 08/16         (R) Shoulder PROM to tolerance nv            (R) GH PA mobs nv                         Neuro Re-Ed 8/3 08/11 8/13 08/16         Scap retractions nv            Slouch w/ postural overcorrect             No moneys (no band) nv            Seated chin tucks nv            SA punches                                       Ther Ex 8/3 08/11 8/13 08/16         Pendulums  nv            T/S ext nv 2'  2'         T/S rot  2'  2'         Standing cane flex nv            Standing cane ext nv  10x5" 5"x20         Functional IR (S) w/ strap (R)   nv 10"x10         Open books             Supine ER (S) with cane 10x5" HEP   5"x20 stand         Supine cane flex 15x5" HEP            Sleeper (S)             Cross body (S) sidelying             Pt Edu AL HEP SP AL Ther Activity 8/3 08/11 8/13 08/16         Retro UBE             Pulleys - flex, scap nv 5'  5' 5'         SWB Flex nv 3'  3' 3'         Table slides nv 2'ea 2' 2'ea + ER         Wall walks nv            Pt Edu AL - dx, px SP AL SP                      Modalities 8/3 08/11 8/13 08/16         MH or ice                              Wallene Ng, PTA

## 2021-08-19 ENCOUNTER — APPOINTMENT (OUTPATIENT)
Dept: PHYSICAL THERAPY | Facility: CLINIC | Age: 41
End: 2021-08-19
Payer: COMMERCIAL

## 2021-08-24 ENCOUNTER — APPOINTMENT (OUTPATIENT)
Dept: LAB | Facility: HOSPITAL | Age: 41
End: 2021-08-24

## 2021-08-24 DIAGNOSIS — Z00.8 HEALTH EXAMINATION IN POPULATION SURVEYS: ICD-10-CM

## 2021-08-24 LAB
CHOLEST SERPL-MCNC: 122 MG/DL
EST. AVERAGE GLUCOSE BLD GHB EST-MCNC: 243 MG/DL
HBA1C MFR BLD: 10.1 %
HDLC SERPL-MCNC: 36 MG/DL
LDLC SERPL CALC-MCNC: 65 MG/DL
NONHDLC SERPL-MCNC: 86 MG/DL
TRIGL SERPL-MCNC: 103 MG/DL

## 2021-08-24 PROCEDURE — 83036 HEMOGLOBIN GLYCOSYLATED A1C: CPT

## 2021-08-24 PROCEDURE — 36415 COLL VENOUS BLD VENIPUNCTURE: CPT

## 2021-08-24 PROCEDURE — 80061 LIPID PANEL: CPT

## 2021-08-25 RX ORDER — LIDOCAINE HYDROCHLORIDE 10 MG/ML
4 INJECTION, SOLUTION INFILTRATION; PERINEURAL
Status: COMPLETED | OUTPATIENT
Start: 2021-07-20 | End: 2021-07-20

## 2021-08-25 RX ORDER — LIDOCAINE HYDROCHLORIDE 10 MG/ML
5 INJECTION, SOLUTION INFILTRATION; PERINEURAL
Status: COMPLETED | OUTPATIENT
Start: 2021-07-20 | End: 2021-07-20

## 2021-08-25 RX ORDER — LIDOCAINE HYDROCHLORIDE 10 MG/ML
3 INJECTION, SOLUTION INFILTRATION; PERINEURAL
Status: COMPLETED | OUTPATIENT
Start: 2021-07-20 | End: 2021-07-20

## 2021-08-30 ENCOUNTER — OFFICE VISIT (OUTPATIENT)
Dept: PHYSICAL THERAPY | Facility: CLINIC | Age: 41
End: 2021-08-30
Payer: COMMERCIAL

## 2021-08-30 DIAGNOSIS — M75.01 ADHESIVE CAPSULITIS OF RIGHT SHOULDER: Primary | ICD-10-CM

## 2021-08-30 PROCEDURE — 97530 THERAPEUTIC ACTIVITIES: CPT

## 2021-08-30 PROCEDURE — 97110 THERAPEUTIC EXERCISES: CPT

## 2021-08-30 NOTE — PROGRESS NOTES
Daily Note     Today's date: 2021  Patient name: Gloria Guadalupe  : 1980  MRN: [id number]  Referring provider: Trina Brito,*  Dx:   Encounter Diagnosis     ICD-10-CM    1  Adhesive capsulitis of right shoulder  M75 01                   Subjective: Pt states that her shoulder is "a hit or miss " Pt reports no recent improvements in symptoms or function  Objective: See treatment diary below      Assessment: Tolerated treatment well  Added finger ladder and progressed table slides to slides on elevated plinth to progress AAROM and range of AAROM  Pt completed exercises as listed today without complaint  VC provided throughout tx session for recall/prompting  Patient demonstrated fatigue post treatment, exhibited good technique with therapeutic exercises and would benefit from continued PT to progress [facility] ROM to improve pt's tolerance to reaching BTB as when getting dressed on a daily basis  Plan: Continue per plan of care  Progress treatment as tolerated         Precautions: Diabetes; migraines; adhesive capsulitis; hx of seizure-like activity; focus on ROM per MD   Date 8/3 08/11 8/13 08/16 8/30        FOTO IE             Re-eval IE                Manuals 8/3 08/11 8/13 08/16 8/30        (R) Shoulder PROM to tolerance nv            (R) GH PA mobs nv                         Neuro Re-Ed 8/3 08/11 8/13 08/16 8/30        Scap retractions nv            Slouch w/ postural overcorrect             No moneys (no band) nv            Seated chin tucks nv            SA punches                                       Ther Ex 8/3 08/11 8/13 08/16 8/30        Pendulums  nv            T/S ext nv 2'  2'         T/S rot  2'  2'         Standing cane flex nv            Standing cane ext nv  10x5" 5"x20 2'        Functional IR (S) w/ strap (R)   nv 10"x10 2        Open books     2'        Supine ER (S) with cane 10x5" HEP   5"x20 stand         Supine cane flex 15x5" HEP            Sleeper (S) Cross body (S) sidelying             Pt Edu AL HEP SP AL                       Ther Activity 8/3 08/11 8/13 08/16 8/30        Retro UBE             Pulleys - flex, scap nv 5'  5' 5' 5'        SWB Flex nv 3'  3' 3' 3'        Table slides nv 2'ea 2' 2'ea + ER 2' /c ball on inclined plinth ff & scap        Wall walks nv    10x ff, scap        Pt Edu AL - dx, px SP AL SP                      Modalities 8/3 08/11 8/13 08/16 8/30        MH or ice

## 2021-09-07 ENCOUNTER — OFFICE VISIT (OUTPATIENT)
Dept: URGENT CARE | Facility: MEDICAL CENTER | Age: 41
End: 2021-09-07
Payer: COMMERCIAL

## 2021-09-07 VITALS
BODY MASS INDEX: 29.66 KG/M2 | HEIGHT: 65 IN | TEMPERATURE: 98.9 F | WEIGHT: 178 LBS | HEART RATE: 86 BPM | SYSTOLIC BLOOD PRESSURE: 138 MMHG | DIASTOLIC BLOOD PRESSURE: 80 MMHG | OXYGEN SATURATION: 98 % | RESPIRATION RATE: 16 BRPM

## 2021-09-07 DIAGNOSIS — R05.9 COUGH: ICD-10-CM

## 2021-09-07 DIAGNOSIS — B34.9 ACUTE VIRAL SYNDROME: Primary | ICD-10-CM

## 2021-09-07 PROCEDURE — U0003 INFECTIOUS AGENT DETECTION BY NUCLEIC ACID (DNA OR RNA); SEVERE ACUTE RESPIRATORY SYNDROME CORONAVIRUS 2 (SARS-COV-2) (CORONAVIRUS DISEASE [COVID-19]), AMPLIFIED PROBE TECHNIQUE, MAKING USE OF HIGH THROUGHPUT TECHNOLOGIES AS DESCRIBED BY CMS-2020-01-R: HCPCS | Performed by: PHYSICIAN ASSISTANT

## 2021-09-07 PROCEDURE — G0382 LEV 3 HOSP TYPE B ED VISIT: HCPCS | Performed by: PHYSICIAN ASSISTANT

## 2021-09-07 PROCEDURE — U0005 INFEC AGEN DETEC AMPLI PROBE: HCPCS | Performed by: PHYSICIAN ASSISTANT

## 2021-09-07 NOTE — PATIENT INSTRUCTIONS
Patient was educated on [place] [address]  Patient was educated to stay quarantined until results are back  Patient was educated on hydration  Patient was told to go to ED if she begins to have chest pain, shortness of breath or high grade fevers  COVID-19 (Coronavirus Disease 2019)   WHAT YOU NEED TO KNOW:   Coronavirus disease 2019 (COVID-19) is the disease caused by a coronavirus first discovered in December 2019  Coronaviruses generally cause upper respiratory (nose, throat, and lung) infections, such as a cold  The new virus spreads quickly and easily  The virus can be spread starting 2 days before symptoms even begin  The virus has also changed into several new forms (called variants) since it was discovered  The variants may be more contagious (easily spread) than the original form  Some may also cause more severe illness than others  It is important to follow local, national, and worldwide measures to protect yourself and others from infection  DISCHARGE INSTRUCTIONS:   If you think you or someone you know may be infected:  Do the following to protect others:  · If emergency care is needed,  tell the  about the possible infection, or call ahead and tell the emergency department  · Call a healthcare provider  for instructions if symptoms are mild  Anyone who may be infected should not  arrive without calling first  The provider will need to protect staff members and other patients  · The person who may be infected needs to wear a face covering  while getting medical care  This will help lower the risk of infecting others  Coverings are not used for anyone who is younger than 2 years, has breathing problems, or cannot remove it  The provider can give you instructions for anyone who cannot wear a covering      Call your local emergency number [phone] in the [address] [place] [address],[address]) or an emergency department if:   · You have trouble breathing or shortness of breath at rest     · You have chest pain or pressure that lasts longer than 5 minutes  · You become confused or hard to wake  · Your lips or face are blue  · You have a fever of 104°F (40°C) or higher  Call your doctor if:   · You do not  have symptoms of COVID-19 but had close physical contact within 14 days with someone who tested positive  · You have questions or concerns about your condition or care  Medicines: You may need any of the following for mild symptoms:  · Decongestants  help reduce nasal congestion and help you breathe more easily  If you take decongestant pills, they may make you feel restless or cause problems with your sleep  Do not use decongestant sprays for more than a few days  · Cough suppressants  help reduce coughing  Ask your healthcare provider which type of cough medicine is best for you  · Acetaminophen  decreases pain and fever  It is available without a doctor's order  Ask how much to take and how often to take it  Follow directions  Read the labels of all other medicines you are using to see if they also contain acetaminophen, or ask your doctor or pharmacist  Acetaminophen can cause liver damage if not taken correctly  Do not use more than 4 grams (4,000 milligrams) total of acetaminophen in one day  · NSAIDs , such as ibuprofen, help decrease swelling, pain, and fever  NSAIDs can cause stomach bleeding or kidney problems in certain people  If you take blood thinner medicine, always ask your healthcare provider if NSAIDs are safe for you  Always read the medicine label and follow directions  · Take your medicine as directed  Contact your healthcare provider if you think your medicine is not helping or if you have side effects  Tell him or her if you are allergic to any medicine  Keep a list of the medicines, vitamins, and herbs you take  Include the amounts, and when and why you take them  Bring the list or the pill bottles to follow-up visits  Carry your medicine list with you in case of an emergency      How the 2019 coronavirus spreads: The following are ways the virus is thought to spread, but more information may be coming:  · Droplets are the main way all coronaviruses spread  The virus travels in droplets that form when a person talks, coughs, or sneezes  The droplets can also float in the air for minutes or hours  Infection happens when you breathe in the droplets or get them in your eyes or nose  Close personal contact with an infected person increases your risk for infection  This means being within 6 feet (2 meters) of the person for at least 15 minutes over 24 hours  · Person-to-person contact can spread the virus  For example, a person with the virus on his or her hands can spread it by shaking hands with someone  · The virus can stay on objects and surfaces for a short time  You may become infected by touching the object or surface and then touching your eyes or mouth  · An infected animal may be able to infect a person who touches it  This may happen at live markets or on a farm  Help lower the risk for COVID-19:  The best way to prevent infection is to avoid anyone who is infected, but this can be hard to do  An infected person can spread the virus before signs or symptoms begin, or even if signs or symptoms never develop  The following can help lower the risk for infection:      · Wash your hands often throughout the day  Use soap and water  Rub your soapy hands together, lacing your fingers, for at least 20 seconds  Rinse with warm, running water  Dry your hands with a clean towel or paper towel  Use hand  that contains alcohol if soap and water are not available  Teach children how to wash their hands and use hand   · Cover sneezes and coughs  Turn your face away and cover your mouth and nose with a tissue  Throw the tissue away  Use the bend of your arm if a tissue is not available  Then wash your hands well with soap and water or use hand    Teach children how to cover a cough or sneeze  · Wear a face covering (mask) around anyone who does not live in your home  Use a cloth covering with at least 2 layers  You can also create layers by putting a cloth covering over a disposable non-medical mask  Cover your mouth and your nose  The covering should fit snugly against the bridge of your nose  Securely fasten it under your chin and on the sides of your face  Do not  wear a plastic face shield instead of a covering  Continue social distancing and washing your hands often  A face covering is not a substitute for social distancing safety measures  · Follow worldwide, national, and local social distancing guidelines  Keep at least 6 feet (2 meters) between you and others  Also keep this distance from anyone who comes to your home, such as someone making a delivery  Wear a face covering while you are around others  You will need to wear a covering in restaurants, stores, and other public buildings  You will also need a covering on mass transit, such as a bus, subway, or airplane  Remember to use a covering made from thick material or wear 2 coverings together  · Make a habit of not touching your face  If you get the virus on your hands, you can transfer it to your eyes, nose, or mouth and become infected  You can also transfer it to objects, surfaces, or people  Do not touch your eyes, nose, or mouth without washing your hands first     · Clean and disinfect high-touch surfaces and objects often  Use disinfecting wipes, or make a solution of 4 teaspoons of bleach in 1 quart (4 cups) of water  Clean and disinfect even if you think no one living in or coming to your home is infected with the virus  · Ask about vaccines you may need  Get a COVID-19 vaccine when it is available to you  The current vaccines are given as a shot in 1 or 2 doses  Get the influenza (flu) vaccine as soon as recommended each year, usually starting in September or October  Get the pneumonia vaccine if recommended  Follow social distancing guidelines:  National and local social distancing rules vary  Rules may change over time as restrictions are lifted  Restrictions may return if an outbreak happens where you live  It is important to know and follow all current social distancing rules in your area  The following are general guidelines:  · Stay home if you are sick or think you may have COVID-19  It is important to stay home if you are waiting for a testing appointment or for test results  Even if you do not have symptoms, you can pass the virus to others  · Limit trips out of your home  Have food, medicines, and other supplies delivered and left at your door or other area, if possible  Plan trips out of your home so you make the fewest stops possible to limit close personal contact  Keep track of places you go  This will help contact tracers notify others if you become infected  · Avoid close physical contact with anyone who does not live in your home  Do not shake hands with, hug, or kiss a person as a greeting  If you must use public transportation (such as a bus or subway), try to sit or stand away from others  Only allow necessary people into your home  Wear your face covering, and remind others to wear a face covering  Remind them to wash their hands when they arrive and before they leave  Do not  let someone into your home or go to someone's home just to visit  Even if you both do not feel sick, the virus can pass from one of you to the other  · Avoid in-person gatherings and crowds  Gatherings or crowds of 10 or more individuals can cause the virus to spread  Avoid places such as [place], beaches, sporting events, and tourist attractions  For events such as parties, holiday meals, Church services, and conferences, attend virtually (on a computer), if possible  · Ask your healthcare provider for other ways to have appointments    Some providers offer phone, video, or other types of appointments  You may also be able to get prescriptions for a few months of your medicines at a time  · Stay safe if you must go out to work  Keep physical distance between you and other workers as much as possible  Follow your employer's rules so everyone stays safe  If you have COVID-19 and are recovering at home,  healthcare providers will give you specific instructions to follow  The following are general guidelines to remind you how to keep others safe until you are well:  · Wash your hands often  Use soap and water as much as possible  Use hand  that contains alcohol if soap and water are not available  Dry your hands with a clean towel or paper towel  Do not share towels with anyone  If you use paper towels, throw them away in a lined trash can kept in your room or area  Use a covered trash can, if possible  · Do not go out of your home unless it is necessary  Ask someone who is not infected to go out for groceries or supplies, or have them delivered  Do not go to your healthcare provider's office without an appointment  · Only have close physical contact with a person giving direct care, or a baby or child you must care for  Family members and friends should not visit you  If possible, stay in a separate area or room of your home if you live with others  No one should go into the area or room except to give you care  You can visit with others by phone, video chat, e-mail, or similar systems  · Wear a face covering while others are near you  This can help prevent droplets from spreading the virus when you talk, sneeze, or cough  Put the covering on before anyone comes into your room or area  Remind the person to cover his or her nose and mouth before coming in to provide care for you  · Do not share items  Do not share dishes, towels, or other items with anyone  Items need to be washed after you use them  · Protect your baby    Some newborns have tested positive for the virus  It is not known if they became infected before or after birth  The highest risk is when a  has close contact with an infected person  If you are pregnant or breastfeeding, talk to your healthcare provider or obstetrician about any concerns you have  He or she will tell you when to bring your baby in for check-ups and vaccines  He or she will also tell you what to do if you think your baby was infected with the coronavirus  Wash your hands and put on a clean face covering before you breastfeed or care for your baby  · Do not handle live animals unless it is necessary  Some animals, including pets, have been infected with the new coronavirus  Ask someone who is not infected to take care of your pet until you are well  If you must care for a pet, wear a face covering  Wash your hands before and after you give care  Talk to your healthcare provider about how to keep a service animal safe, if needed  · Follow directions from your healthcare provider for being around others after you recover  It is not known if or for how long a recovered person can pass the virus to others  Your provider may give you instructions, such as continuing social distancing and wearing a face covering  He or she will tell you when it is okay to be around others again  This may be 10 to 20 days after symptoms started or you had a positive test  Most symptoms will also need to be gone  Your provider will give you more information  Follow up with your doctor as directed:  Write down your questions so you remember to ask them during your visits  For more information:   · Centers for Disease Control and Prevention  [address] , [address]  Phone: 9- 960 - [phone]  Web Address: [employer]    © [address] [place] [address]  Information is for End User's use only and may not be sold, redistributed or otherwise used for commercial purposes   All illustrations and images included in CareNotes® are the copyrighted property of [employer]  or [employer]   The above information is an  only  It is not intended as medical advice for individual conditions or treatments  Talk to your doctor, nurse or pharmacist before following any medical regimen to see if it is safe and effective for you

## 2021-09-07 NOTE — PROGRESS NOTES
[facility]        NAME: Shira Chilel is a 37 y o  female  : 1980    MRN: [id number]  DATE: 2021  TIME: 5:48 PM    Assessment and Plan   Acute viral syndrome [B34 9]  1  Acute viral syndrome  [facility]ov[facility]19),[facility]     COVID 19 testing completed  Patient was educated on OTC Debrox drops    Patient Instructions       Patient was educated on [place] [address]  Patient was educated to stay quarantined until results are back  Patient was educated on hydration  Patient was told to go to ED if she begins to have chest pain, shortness of breath or high grade fevers  Chief Complaint     Chief Complaint   Patient presents with    Cough     Pt here for cough and right ear pain x 1 week  Pt is covid vaccinated, but wants to be covid tested  History of Present Illness       Patient presents today with cough and right ear pain for 1 week  Patient reports muffled hearing in right ear  Patient is vaccinated for COVID 19  Patient would like to be tested for COVID 19  Review of Systems   Review of Systems   Constitutional: Negative  HENT: Positive for ear pain  Respiratory: Positive for cough  Neurological: Negative  Psychiatric/Behavioral: Negative  Current Medications       Current Outpatient Medications:     Accu-Chek FastClix Lancets MISC, Test BG up to 3x daily as directed, Disp: 300 each, Rfl: 1    Accu-Chek Guide test strip, TEST BG UP TO 3X DAILY AS DIRECTED, Disp: 100 each, Rfl: 1    Injection Device for Insulin (B-D PEN MINI) ENRICO, Use 4 (four) times a day Please use for Lantus and Humalog   4 pen needles daily Dx: Diabetes, Disp: 400 each, Rfl: 0    insulin detemir (LEVEMIR FLEXTOUCH) 100 Units/mL injection pen, Inject 20 Units under the skin daily at bedtime, Disp: 15 mL, Rfl: 2    Insulin Pen Needle (BD Pen Needle Em U/F) 32G X 4 MM MISC, Use daily, Disp: 100 each, Rfl: 3    methocarbamol (ROBAXIN) 500 mg tablet, Take 1 tablet (500 mg total) by mouth 4 (four) times a day for 10 days (Patient not taking: Reported on 4/13/2021), Disp: 40 tablet, Rfl: 0    naproxen (EC NAPROSYN) 500 MG EC tablet, Take 1 tablet (500 mg total) by mouth 2 (two) times a day as needed for mild pain or headaches, Disp: , Rfl:     phenazopyridine (PYRIDIUM) 200 mg tablet, Take 1 tablet (200 mg total) by mouth 3 (three) times a day with meals, Disp: 10 tablet, Rfl: 0    prochlorperazine (COMPAZINE) 10 mg tablet, 1 tab at onset of migraine, can repeat in 8 hours, can take with NSAID  Take with Benadryl if there are side effects  , Disp: 20 tablet, Rfl: 3    repaglinide (PRANDIN) 0 5 mg tablet, Take one pill by mouth up to 3x daily just prior to a meal, Disp: 270 tablet, Rfl: 2    semaglutide, 1 mg/dose, (Ozempic) 4 MG/3ML SOPN injection pen, Inject 0 75 mL (1 mg total) under the skin once a week, Disp: 9 mL, Rfl: 3    topiramate (TOPAMAX) 50 MG tablet, Take 1 tablet (50 mg total) by mouth daily at bedtime, Disp: 90 tablet, Rfl: 1    traMADol (ULTRAM) 50 mg tablet, Take 1 tablet (50 mg total) by mouth every 8 (eight) hours as needed for moderate pain, Disp: 20 tablet, Rfl: 0    Current Allergies     Allergies as of 09/07/2021 - Reviewed 09/07/2021   Allergen Reaction Noted    Metformin and related Hives 02/29/2016            The following portions of the patient's history were reviewed and updated as appropriate: allergies, current medications, past family history, past medical history, past social history, past surgical history and problem list      Past Medical History:   Diagnosis Date    Diabetes mellitus ([address] )        Past Surgical History:   Procedure Laterality Date    HERNIA REPAIR      TUBAL LIGATION  2017       Family History   Problem Relation Age of Onset    Hypertension Mother    Areta Emmmayo Arthritis Mother     Asthma Sister     Bipolar disorder Brother     Schizophrenia Brother     Asthma Brother     Asthma Brother     Diabetes Maternal Grandmother     Diabetes Paternal Grandmother     No Known Problems Maternal Grandfather     No Known Problems Paternal Grandfather          Medications have been verified  Objective   /80   Pulse 86   Temp 98 9 °F (37 2 °C)   Resp 16   Ht 5' 5" (1 651 m)   Wt 80 7 kg (178 lb)   SpO2 98%   BMI 29 62 kg/m²   No LMP recorded  Physical Exam     Physical Exam  Constitutional:       Appearance: She is normal weight  HENT:      Head: Normocephalic  Comments: No pain over frontal or maxillary sinus     Right Ear: Ear canal and external ear normal  There is impacted cerumen  Left Ear: Tympanic membrane, ear canal and external ear normal       Ears:      Comments: Right TM could not be seen due to ear cerumen     Mouth/Throat:      Mouth: Mucous membranes are moist       Pharynx: No posterior oropharyngeal erythema  Eyes:      Extraocular Movements: Extraocular movements intact  Pupils: Pupils are equal, round, and reactive to light  Neck:      Comments: No palpable lymph nodes  Cardiovascular:      Rate and Rhythm: Normal rate and regular rhythm  Heart sounds: Normal heart sounds  Pulmonary:      Effort: Pulmonary effort is normal       Breath sounds: Normal breath sounds  Neurological:      General: No focal deficit present  Mental Status: She is alert and oriented to person, place, and time     Psychiatric:         Mood and Affect: Mood normal          Behavior: Behavior normal

## 2021-09-08 LAB — SARS-COV-2 RNA RESP QL NAA+PROBE: NEGATIVE

## 2021-09-20 ENCOUNTER — APPOINTMENT (EMERGENCY)
Dept: RADIOLOGY | Facility: HOSPITAL | Age: 41
End: 2021-09-20
Payer: COMMERCIAL

## 2021-09-20 ENCOUNTER — HOSPITAL ENCOUNTER (OUTPATIENT)
Facility: HOSPITAL | Age: 41
Setting detail: OBSERVATION
Discharge: HOME | End: 2021-09-21
Attending: EMERGENCY MEDICINE | Admitting: INTERNAL MEDICINE
Payer: COMMERCIAL

## 2021-09-20 DIAGNOSIS — R07.89 ATYPICAL CHEST PAIN: Primary | ICD-10-CM

## 2021-09-20 PROBLEM — R07.9 CHEST PAIN: Status: ACTIVE | Noted: 2021-09-20

## 2021-09-20 LAB
ALBUMIN SERPL BCP-MCNC: 3.7 G/DL (ref 3–5.2)
ALP SERPL-CCNC: 88 U/L (ref 43–122)
ALT SERPL W P-5'-P-CCNC: 15 U/L
ANION GAP SERPL CALCULATED.3IONS-SCNC: 9 MMOL/L (ref 5–14)
AST SERPL W P-5'-P-CCNC: 29 U/L (ref 14–36)
ATRIAL RATE: 0 BPM
ATRIAL RATE: 67 BPM
ATRIAL RATE: 70 BPM
ATRIAL RATE: 72 BPM
BILIRUB SERPL-MCNC: 1.01 MG/DL
BUN SERPL-MCNC: 12 MG/DL (ref 5–25)
CALCIUM SERPL-MCNC: 8.9 MG/DL (ref 8.4–10.2)
CHLORIDE SERPL-SCNC: 101 MMOL/L (ref 97–108)
CO2 SERPL-SCNC: 25 MMOL/L (ref 22–30)
CREAT SERPL-MCNC: 0.72 MG/DL (ref 0.6–1.2)
EOSINOPHIL # BLD AUTO: 0.11 THOUSAND/UL (ref 0–0.4)
EOSINOPHIL NFR BLD MANUAL: 2 % (ref 0–6)
ERYTHROCYTE [DISTWIDTH] IN BLOOD BY AUTOMATED COUNT: 12.9 %
EXT PREG TEST URINE: NEGATIVE
EXT. CONTROL ED NAV: NORMAL
GFR SERPL CREATININE-BSD FRML MDRD: 121 ML/MIN/1.73SQ M
GLUCOSE SERPL-MCNC: 215 MG/DL (ref 65–140)
GLUCOSE SERPL-MCNC: 306 MG/DL (ref 70–99)
HCT VFR BLD AUTO: 35.6 % (ref 36–46)
HGB BLD-MCNC: 11.8 G/DL (ref 12–16)
LIPASE SERPL-CCNC: 73 U/L (ref 23–300)
LYMPHOCYTES # BLD AUTO: 2.51 THOUSAND/UL (ref 0.5–4)
LYMPHOCYTES # BLD AUTO: 44 % (ref 25–45)
MCH RBC QN AUTO: 29.2 PG (ref 26–34)
MCHC RBC AUTO-ENTMCNC: 33.2 G/DL (ref 31–36)
MCV RBC AUTO: 88 FL (ref 80–100)
MONOCYTES # BLD AUTO: 0.74 THOUSAND/UL (ref 0.2–0.9)
MONOCYTES NFR BLD AUTO: 13 % (ref 1–10)
MYELOCYTES NFR BLD MANUAL: 1 % (ref 0–1)
NEUTS SEG # BLD: 2.28 THOUSAND/UL (ref 1.8–7.8)
NEUTS SEG NFR BLD AUTO: 40 %
P AXIS: 51 DEGREES
P AXIS: 65 DEGREES
P AXIS: 68 DEGREES
PLATELET # BLD AUTO: 313 THOUSANDS/UL (ref 150–450)
PLATELET BLD QL SMEAR: ADEQUATE
PMV BLD AUTO: 8 FL (ref 8.9–12.7)
POTASSIUM SERPL-SCNC: 4.5 MMOL/L (ref 3.6–5)
PR INTERVAL: 120 MS
PR INTERVAL: 120 MS
PR INTERVAL: 122 MS
PROT SERPL-MCNC: 7.7 G/DL (ref 5.9–8.4)
QRS AXIS: -11 DEGREES
QRS AXIS: -11 DEGREES
QRS AXIS: -14 DEGREES
QRS AXIS: 60 DEGREES
QRSD INTERVAL: 180 MS
QRSD INTERVAL: 74 MS
QRSD INTERVAL: 76 MS
QRSD INTERVAL: 82 MS
QT INTERVAL: 392 MS
QT INTERVAL: 398 MS
QT INTERVAL: 406 MS
QT INTERVAL: 500 MS
QTC INTERVAL: 423 MS
QTC INTERVAL: 429 MS
QTC INTERVAL: 435 MS
QTC INTERVAL: 515 MS
RBC # BLD AUTO: 4.05 MILLION/UL (ref 4–5.2)
RBC MORPH BLD: NORMAL
SODIUM SERPL-SCNC: 135 MMOL/L (ref 137–147)
T WAVE AXIS: -4 DEGREES
T WAVE AXIS: 1 DEGREES
T WAVE AXIS: 3 DEGREES
T WAVE AXIS: 33 DEGREES
TOTAL CELLS COUNTED SPEC: 100
TROPONIN I SERPL-MCNC: 0.02 NG/ML (ref 0–0.03)
TROPONIN I SERPL-MCNC: <0.01 NG/ML (ref 0–0.03)
TROPONIN I SERPL-MCNC: <0.01 NG/ML (ref 0–0.03)
VENTRICULAR RATE: 64 BPM
VENTRICULAR RATE: 67 BPM
VENTRICULAR RATE: 70 BPM
VENTRICULAR RATE: 72 BPM
WBC # BLD AUTO: 5.7 THOUSAND/UL (ref 4.5–11)

## 2021-09-20 PROCEDURE — 81025 URINE PREGNANCY TEST: CPT | Performed by: PHYSICIAN ASSISTANT

## 2021-09-20 PROCEDURE — 96375 TX/PRO/DX INJ NEW DRUG ADDON: CPT

## 2021-09-20 PROCEDURE — 99285 EMERGENCY DEPT VISIT HI MDM: CPT

## 2021-09-20 PROCEDURE — 82948 REAGENT STRIP/BLOOD GLUCOSE: CPT

## 2021-09-20 PROCEDURE — 85007 BL SMEAR W/DIFF WBC COUNT: CPT | Performed by: PHYSICIAN ASSISTANT

## 2021-09-20 PROCEDURE — 80053 COMPREHEN METABOLIC PANEL: CPT | Performed by: PHYSICIAN ASSISTANT

## 2021-09-20 PROCEDURE — 93005 ELECTROCARDIOGRAM TRACING: CPT

## 2021-09-20 PROCEDURE — 96361 HYDRATE IV INFUSION ADD-ON: CPT

## 2021-09-20 PROCEDURE — 99220 PR INITIAL OBSERVATION CARE/DAY 70 MINUTES: CPT | Performed by: INTERNAL MEDICINE

## 2021-09-20 PROCEDURE — 71045 X-RAY EXAM CHEST 1 VIEW: CPT

## 2021-09-20 PROCEDURE — 36415 COLL VENOUS BLD VENIPUNCTURE: CPT | Performed by: PHYSICIAN ASSISTANT

## 2021-09-20 PROCEDURE — 83690 ASSAY OF LIPASE: CPT | Performed by: PHYSICIAN ASSISTANT

## 2021-09-20 PROCEDURE — 96374 THER/PROPH/DIAG INJ IV PUSH: CPT

## 2021-09-20 PROCEDURE — 99285 EMERGENCY DEPT VISIT HI MDM: CPT | Performed by: PHYSICIAN ASSISTANT

## 2021-09-20 PROCEDURE — 93010 ELECTROCARDIOGRAM REPORT: CPT | Performed by: INTERNAL MEDICINE

## 2021-09-20 PROCEDURE — 84484 ASSAY OF TROPONIN QUANT: CPT | Performed by: INTERNAL MEDICINE

## 2021-09-20 PROCEDURE — 85027 COMPLETE CBC AUTOMATED: CPT | Performed by: PHYSICIAN ASSISTANT

## 2021-09-20 PROCEDURE — 84484 ASSAY OF TROPONIN QUANT: CPT | Performed by: PHYSICIAN ASSISTANT

## 2021-09-20 RX ORDER — TOPIRAMATE 25 MG/1
50 TABLET ORAL
Status: DISCONTINUED | OUTPATIENT
Start: 2021-09-20 | End: 2021-09-21 | Stop reason: HOSPADM

## 2021-09-20 RX ORDER — KETOROLAC TROMETHAMINE 30 MG/ML
15 INJECTION, SOLUTION INTRAMUSCULAR; INTRAVENOUS ONCE
Status: COMPLETED | OUTPATIENT
Start: 2021-09-20 | End: 2021-09-20

## 2021-09-20 RX ORDER — NITROGLYCERIN 0.4 MG/1
0.4 TABLET SUBLINGUAL
Status: DISCONTINUED | OUTPATIENT
Start: 2021-09-20 | End: 2021-09-21 | Stop reason: HOSPADM

## 2021-09-20 RX ORDER — PROCHLORPERAZINE MALEATE 5 MG/1
10 TABLET ORAL EVERY 8 HOURS PRN
Status: DISCONTINUED | OUTPATIENT
Start: 2021-09-20 | End: 2021-09-21 | Stop reason: HOSPADM

## 2021-09-20 RX ORDER — INSULIN GLARGINE 100 [IU]/ML
20 INJECTION, SOLUTION SUBCUTANEOUS
Status: DISCONTINUED | OUTPATIENT
Start: 2021-09-20 | End: 2021-09-21 | Stop reason: HOSPADM

## 2021-09-20 RX ORDER — ONDANSETRON 2 MG/ML
4 INJECTION INTRAMUSCULAR; INTRAVENOUS EVERY 6 HOURS PRN
Status: DISCONTINUED | OUTPATIENT
Start: 2021-09-20 | End: 2021-09-20

## 2021-09-20 RX ORDER — TRAMADOL HYDROCHLORIDE 50 MG/1
50 TABLET ORAL EVERY 8 HOURS PRN
Status: DISCONTINUED | OUTPATIENT
Start: 2021-09-20 | End: 2021-09-21 | Stop reason: HOSPADM

## 2021-09-20 RX ORDER — SIMETHICONE 80 MG
80 TABLET,CHEWABLE ORAL 4 TIMES DAILY PRN
Status: DISCONTINUED | OUTPATIENT
Start: 2021-09-20 | End: 2021-09-21 | Stop reason: HOSPADM

## 2021-09-20 RX ORDER — PHENAZOPYRIDINE HYDROCHLORIDE 100 MG/1
200 TABLET, FILM COATED ORAL
Status: DISCONTINUED | OUTPATIENT
Start: 2021-09-20 | End: 2021-09-20

## 2021-09-20 RX ORDER — ONDANSETRON 2 MG/ML
4 INJECTION INTRAMUSCULAR; INTRAVENOUS ONCE
Status: COMPLETED | OUTPATIENT
Start: 2021-09-20 | End: 2021-09-20

## 2021-09-20 RX ORDER — ACETAMINOPHEN 325 MG/1
650 TABLET ORAL EVERY 6 HOURS PRN
Status: DISCONTINUED | OUTPATIENT
Start: 2021-09-20 | End: 2021-09-21 | Stop reason: HOSPADM

## 2021-09-20 RX ADMIN — TOPIRAMATE 50 MG: 25 TABLET, FILM COATED ORAL at 21:33

## 2021-09-20 RX ADMIN — INSULIN LISPRO 5 UNITS: 100 INJECTION, SOLUTION INTRAVENOUS; SUBCUTANEOUS at 16:51

## 2021-09-20 RX ADMIN — ACETAMINOPHEN 650 MG: 325 TABLET ORAL at 21:33

## 2021-09-20 RX ADMIN — MORPHINE SULFATE 2 MG: 2 INJECTION, SOLUTION INTRAMUSCULAR; INTRAVENOUS at 12:32

## 2021-09-20 RX ADMIN — KETOROLAC TROMETHAMINE 15 MG: 30 INJECTION, SOLUTION INTRAMUSCULAR; INTRAVENOUS at 11:13

## 2021-09-20 RX ADMIN — SODIUM CHLORIDE 1000 ML: 0.9 INJECTION, SOLUTION INTRAVENOUS at 11:03

## 2021-09-20 RX ADMIN — TRAMADOL HYDROCHLORIDE 50 MG: 50 TABLET, FILM COATED ORAL at 18:09

## 2021-09-20 RX ADMIN — ACETAMINOPHEN 650 MG: 325 TABLET ORAL at 16:50

## 2021-09-20 RX ADMIN — INSULIN GLARGINE 20 UNITS: 100 INJECTION, SOLUTION SUBCUTANEOUS at 21:33

## 2021-09-20 RX ADMIN — ONDANSETRON 4 MG: 2 INJECTION INTRAMUSCULAR; INTRAVENOUS at 12:31

## 2021-09-20 NOTE — H&P
[facility] 1980, 37 y o  female MRN: [id number]  Unit/Bed#: ED 06 Encounter: [id number]  Primary Care Provider: Maylin Rodas MD   Date and time admitted to hospital: 9/20/2021 10:34 AM    * Chest pain  Assessment & Plan  · Chest pain need to rule out ACS  · BLANCO - 1 point  · First troponin negative, EKG showed no acute ST-T changes  · Awaiting chest x-ray  · Will admit for observation  · Cardiologic consult - input appreciated  · Follow troponin for 2 more times with EKGs  · Continue telemetry monitoring    Migraine without aura and without status migrainosus, not intractable  Assessment & Plan  · Stable, continue home Topamax    Obesity (BMI 30-39  9)  Assessment & Plan  · Lifestyle modification, diet and exercise    Uncontrolled type 2 diabetes mellitus with hyperglycemia, with long-term current use of insulin (HCC)  Assessment & Plan  Lab Results   Component Value Date    HGBA1C 10 1 (H) 08/24/2021       No results for input(s): POCGLU in the last 72 hours  Blood Sugar Average: Last 72 hrs:     Patient was on Prandin, Semaglutide at home  Continue home detemir  20 units q h s , Humalog 5 units t i d  A c , sliding scale insulin, diabetic diet    VTE Prophylaxis: Pharmacologic VTE Prophylaxis contraindicated due to Low risk  / sequential compression device   Code Status:  Full Code  POLST: There is no POLST form on file for this patient (pre-hospital)  Discussion with family:  Discussed with patient    Anticipated Length of Stay:  Patient will be admitted on an Observation basis with an anticipated length of stay of  less than 2 midnights  Justification for Hospital Stay:  Chest pain needs to rule out ACS    Total Time for Visit, including Counseling / Coordination of Care: 45 minutes  Greater than 50% of this total time spent on direct patient counseling and coordination of care      Chief Complaint:   Chest pain    History of Present Illness:    Cari Castro is a 37 y o  female with PMH of IDDM, morbid obesity who presents with left-sided substernal chest pain started last night while she was sleeping  Patient described the chest pain as a constant sharp stabbing pain on the left side of the chest   It was exacerbated by deep inspiration and touch  She denies any radiation, nausea, vomiting, diaphoresis  No history of fever, cough, shortness of breath or any clots in the past   Patient said mother has heart disease however she was not sure it was a heart attack or not  Lifetime nonsmoker  History of migraine, patient has been taking Topamax for prevention  In ED, vital signs stable  Electrolytes WNL, normal renal function  Troponin 0 02  CBC unremarkable  EKG shows NSR, no acute ST-T changes  Will be admitted for observation need to rule out ACS  Review of Systems:    Review of Systems Patient was seen and examined at bedside  The patient has constant sharp stabbing chest pain but denies any headache, blurry vision, palpitation, shortness of breath, N/V, abd pain  Past Medical and Surgical History:     Past Medical History:   Diagnosis Date    Diabetes mellitus ([facility] [address] )        Past Surgical History:   Procedure Laterality Date    HERNIA REPAIR      TUBAL LIGATION  2017       Meds/Allergies:    Prior to Admission medications    Medication Sig Start Date End Date Taking? Authorizing Provider   Accu-Chek FastClix Lancets MISC Test BG up to 3x daily as directed 3/18/21   Laura Cano MD   Accu-Chek Guide test strip TEST BG UP TO 3X DAILY AS DIRECTED 3/18/21   Laura Cano MD   Injection Device for Insulin (B-D PEN MINI) ENRICO Use 4 (four) times a day Please use for Lantus and Humalog   4 pen needles daily Dx: Diabetes 3/18/21   James Novak MD   insulin detemir (LEVEMIR FLEXTOUCH) 100 Units/mL injection pen Inject 20 Units under the skin daily at bedtime 4/13/21   Buddy López MD   Insulin Pen Needle (BD Pen Needle Em U/F) 32G X 4 MM MISC Use daily 4/13/21   Vinny Guy MD   methocarbamol (ROBAXIN) 500 mg tablet Take 1 tablet (500 mg total) by mouth 4 (four) times a day for 10 days  Patient not taking: Reported on 4/13/2021 7/21/20 1/11/21  Lorin Cha PA-C   naproxen (EC NAPROSYN) 500 MG EC tablet Take 1 tablet (500 mg total) by mouth 2 (two) times a day as needed for mild pain or headaches 4/25/21 4/25/22  Maik Triplett MD   phenazopyridine (PYRIDIUM) 200 mg tablet Take 1 tablet (200 mg total) by mouth 3 (three) times a day with meals 7/8/21   Norman Bond MD   prochlorperazine (COMPAZINE) 10 mg tablet 1 tab at onset of migraine, can repeat in 8 hours, can take with NSAID  Take with Benadryl if there are side effects  6/3/21   Huber Hurst MD   repaglinide (PRANDIN) 0 5 mg tablet Take one pill by mouth up to 3x daily just prior to a meal 7/14/21   Vernal Prior, MD   semaglutide, 1 mg/dose, (Ozempic) 4 MG/3ML SOPN injection pen Inject 0 75 mL (1 mg total) under the skin once a week 7/14/21   Radu Storey MD   topiramate (TOPAMAX) 50 MG tablet Take 1 tablet (50 mg total) by mouth daily at bedtime 6/3/21   Huber Hurst MD   traMADol [place]) 50 mg tablet Take 1 tablet (50 mg total) by mouth every 8 (eight) hours as needed for moderate pain 7/20/21   Atiya Vergara III, DO     I have reviewed home medications with patient personally  Allergies:    Allergies   Allergen Reactions    Metformin And Related Hives       Social History:     Marital Status: Single     Patient Pre-hospital Living Situation:  Independent  Patient Pre-hospital Level of Mobility:  Independent  Patient Pre-hospital Diet Restrictions:  No restrictions  Substance Use History:   Social History     Substance and Sexual Activity   Alcohol Use Not Currently     Social History     Tobacco Use   Smoking Status Never Smoker   Smokeless Tobacco Never Used     Social History     Substance and Sexual Activity   Drug Use Never       Family History:    non-contributory    Physical Exam:     Vitals:   Blood Pressure: 141/78 (09/20/21 1249)  Pulse: 65 (09/20/21 1249)  Respirations: 15 (09/20/21 1249)  Weight - Scale: 80 7 kg (177 lb 14 6 oz) (09/20/21 1042)  SpO2: 100 % (09/20/21 1249)    Physical Exam  General:  Middle-aged female patient lying in bed, breathing well on room air, no acute distress  HEENT: NC/AT, PERRL, EOM - normal  Neck: Supple  Pulm/Chest: Normal chest wall expansion, clear breath sounds on both side, no wheezing/rhonchi or crackles appreciated, chest pain was reproducible  CVS: RRR, normal S1&S2, no murmur appreciated, capillary refill <2s  Abd: soft, non tender, non distended, bowel sounds +  MSK: move all 4 extremities spontaneously  Skin: warm  CNS: AAOx3, no acute focal neuro deficit    Additional Data:     Lab Results: I have personally reviewed pertinent reports  Results from last 7 days   Lab Units 09/20/21  1103   WBC Thousand/uL 5 70   HEMOGLOBIN g/dL 11 8*   HEMATOCRIT % 35 6*   PLATELETS Thousands/uL 313   LYMPHO PCT % 44   MONO PCT % 13*   EOS PCT % 2     Results from last 7 days   Lab Units 09/20/21  1103   SODIUM mmol/L 135*   POTASSIUM mmol/L 4 5   CHLORIDE mmol/L 101   CO2 mmol/L 25   BUN mg/dL 12   CREATININE mg/dL 0 72   ANION GAP mmol/L 9   CALCIUM mg/dL 8 9   ALBUMIN g/dL 3 7   TOTAL BILIRUBIN mg/dL 1 01   ALK PHOS U/L 88   ALT U/L 15   AST U/L 29   GLUCOSE RANDOM mg/dL 306*                       Imaging: I have personally reviewed pertinent reports  XR chest 1 view portable   ED Interpretation by Vipin Morgan PA-C (09/20 1212)   No acute finding          EKG, Pathology, and Other Studies Reviewed on Admission:   · EKG:  NSR, no acute ST-T changes    Allscripts / Epic Records Reviewed: Yes     ** Please Note: This note has been constructed using a voice recognition system   **

## 2021-09-20 NOTE — ASSESSMENT & PLAN NOTE
Lab Results   Component Value Date    HGBA1C 10 1 (H) 08/24/2021       No results for input(s): POCGLU in the last 72 hours      Blood Sugar Average: Last 72 hrs:     Patient was on Prandin, Semaglutide at home  Continue home detemir  20 units q h s , Humalog 5 units t i d  A c , sliding scale insulin, diabetic diet

## 2021-09-20 NOTE — ASSESSMENT & PLAN NOTE
· Chest pain most likely musculoskeletal in nature, low suspicion of ACS,BLANCO - 1 point  · Troponin negative for 3 times  · Chest x-ray showed no acute cardiopulmonary changes  · Continue telemetry monitoring  · Recommended outpatient cardiology follow-up

## 2021-09-20 NOTE — ED NOTES
Pt states unable to void at this time but she had tubal ligation done in past, provider notified and stated ok to give toradol at this time         Sharath Erazo RN  09/20/21 4342

## 2021-09-20 NOTE — PLAN OF CARE
Problem: PAIN - ADULT  Goal: Verbalizes/displays adequate comfort level or baseline comfort level  Description: Interventions:  - Encourage patient to monitor pain and request assistance  - Assess pain using appropriate pain scale  - Administer analgesics based on type and severity of pain and evaluate response  - Implement non-pharmacological measures as appropriate and evaluate response  - Consider cultural and social influences on pain and pain management  - Notify physician/advanced practitioner if interventions unsuccessful or patient reports new pain  Outcome: Progressing     Problem: INFECTION - ADULT  Goal: Absence or prevention of progression during hospitalization  Description: INTERVENTIONS:  - Assess and monitor for signs and symptoms of infection  - Monitor lab/diagnostic results  - Monitor all insertion sites, i e  indwelling lines, tubes, and drains  - Monitor endotracheal if appropriate and nasal secretions for changes in amount and color  - [place] appropriate cooling/warming therapies per order  - Administer medications as ordered  - Instruct and encourage patient and family to use good hand hygiene technique  - Identify and instruct in appropriate isolation precautions for identified infection/condition  Outcome: Progressing     Problem: DISCHARGE PLANNING  Goal: Discharge to home or other facility with appropriate resources  Description: INTERVENTIONS:  - Identify barriers to discharge w/patient and caregiver  - Arrange for needed discharge resources and transportation as appropriate  - Identify discharge learning needs (meds, wound care, etc )  - Arrange for interpretive services to assist at discharge as needed  - Refer to Case Management Department for coordinating discharge planning if the patient needs post-hospital services based on physician/advanced practitioner order or complex needs related to functional status, cognitive ability, or social support system  Outcome: Progressing Problem: Knowledge Deficit  Goal: Patient/family/caregiver demonstrates understanding of disease process, treatment plan, medications, and discharge instructions  Description: Complete learning assessment and assess knowledge base    Interventions:  - Provide teaching at level of understanding  - Provide teaching via preferred learning methods  Outcome: Progressing

## 2021-09-20 NOTE — ASSESSMENT & PLAN NOTE
Lab Results   Component Value Date    HGBA1C 10 1 (H) 08/24/2021       No results for input(s): POCGLU in the last 72 hours      Blood Sugar Average: Last 72 hrs:     Patient was on Prandin, Semaglutide at home  Continue home detemir  20 units q h s , Humalog 5 units t i d  A c , sliding scale insulin, diabetic diet  Recommended to follow-up with endocrinology as an outpatient

## 2021-09-20 NOTE — PLAN OF CARE
Problem: Potential for Falls  Goal: Patient will remain free of falls  Description: INTERVENTIONS:  - Educate patient/family on patient safety including physical limitations  - Instruct patient to call for assistance with activity   - Consult OT/PT to assist with strengthening/mobility   - Keep Call bell within reach  - Keep bed low and locked with side rails adjusted as appropriate  - Keep care items and personal belongings within reach  - Initiate and maintain comfort rounds  - Make Fall Risk Sign visible to staff  - Apply yellow socks and bracelet for high fall risk patients  - Consider moving patient to room near nurses station  Outcome: Progressing     Problem: PAIN - ADULT  Goal: Verbalizes/displays adequate comfort level or baseline comfort level  Description: Interventions:  - Encourage patient to monitor pain and request assistance  - Assess pain using appropriate pain scale  - Administer analgesics based on type and severity of pain and evaluate response  - Implement non-pharmacological measures as appropriate and evaluate response  - Consider cultural and social influences on pain and pain management  - Notify physician/advanced practitioner if interventions unsuccessful or patient reports new pain  Outcome: Progressing     Problem: INFECTION - ADULT  Goal: Absence or prevention of progression during hospitalization  Description: INTERVENTIONS:  - Assess and monitor for signs and symptoms of infection  - Monitor lab/diagnostic results  - Monitor all insertion sites, i e  indwelling lines, tubes, and drains  - Monitor endotracheal if appropriate and nasal secretions for changes in amount and color  - [place] appropriate cooling/warming therapies per order  - Administer medications as ordered  - Instruct and encourage patient and family to use good hand hygiene technique  - Identify and instruct in appropriate isolation precautions for identified infection/condition  Outcome: Progressing  Goal: Absence toleration of activity level   Outcome: Progressing     Problem: DISCHARGE PLANNING  Goal: Discharge to home or other facility with appropriate resources  Description: INTERVENTIONS:  - Identify barriers to discharge w/patient and caregiver  - Arrange for needed discharge resources and transportation as appropriate  - Identify discharge learning needs (meds, wound care, etc )  - Arrange for interpretive services to assist at discharge as needed  - Refer to Case Management Department for coordinating discharge planning if the patient needs post-hospital services based on physician/advanced practitioner order or complex needs related to functional status, cognitive ability, or social support system  Outcome: Progressing     Problem: Knowledge Deficit  Goal: Patient/family/caregiver demonstrates understanding of disease process, treatment plan, medications, and discharge instructions  Description: Complete learning assessment and assess knowledge base    Interventions:  - Provide teaching at level of understanding  - Provide teaching via preferred learning methods  Outcome: Progressing

## 2021-09-20 NOTE — ED PROVIDER NOTES
History  Chief Complaint   Patient presents with    Chest Pain     pt c/o "sharp, stabbing" chest pains     This is a 78-year-old female patient who is a diabetic who started with which she describes as sharp stabbing chest pain that does not radiate it is substernal it is reproducible when she takes deep breath she denies any shortness of breath or history of clots  No headache blurred vision double vision cough congestion sore throat no diaphoresis no chest pressure  It hurts when she pushes on her chest but she does not have a rash  Has never had this before  Denies nausea vomiting diarrhea abdominal pain  No urinary symptoms  Has tried Tylenol without improvement  Differential diagnosis includes not limited to ACS, pleurisy, pulmonary embolus less likely, chest wall pain, atypical pancreatitis, pneumothorax less likely  Prior to Admission Medications   Prescriptions Last Dose Informant Patient Reported? Taking? Accu-Chek FastClix Lancets MISC  Self No No   Sig: Test BG up to 3x daily as directed   Accu-Chek Guide test strip  Self No No   Sig: TEST BG UP TO 3X DAILY AS DIRECTED   Injection Device for Insulin (B-D PEN MINI) ENRICO  Self No No   Sig: Use 4 (four) times a day Please use for Lantus and Humalog   4 pen needles daily Dx: Diabetes   Insulin Pen Needle (BD Pen Needle Em U/F) 32G X 4 MM MISC  Self No No   Sig: Use daily   insulin detemir (LEVEMIR FLEXTOUCH) 100 Units/mL injection pen  Self No No   Sig: Inject 20 Units under the skin daily at bedtime   methocarbamol (ROBAXIN) 500 mg tablet   No No   Sig: Take 1 tablet (500 mg total) by mouth 4 (four) times a day for 10 days   Patient not taking: Reported on 4/13/2021   naproxen (EC NAPROSYN) 500 MG EC tablet  Self No No   Sig: Take 1 tablet (500 mg total) by mouth 2 (two) times a day as needed for mild pain or headaches   phenazopyridine (PYRIDIUM) 200 mg tablet  Self No No   Sig: Take 1 tablet (200 mg total) by mouth 3 (three) times a day with meals   prochlorperazine (COMPAZINE) 10 mg tablet  Self No No   Si tab at onset of migraine, can repeat in 8 hours, can take with NSAID  Take with Benadryl if there are side effects  repaglinide (PRANDIN) 0 5 mg tablet  Self No No   Sig: Take one pill by mouth up to 3x daily just prior to a meal   semaglutide, 1 mg/dose, (Ozempic) 4 MG/3ML SOPN injection pen  Self No No   Sig: Inject 0 75 mL (1 mg total) under the skin once a week   topiramate (TOPAMAX) 50 MG tablet  Self No No   Sig: Take 1 tablet (50 mg total) by mouth daily at bedtime   traMADol (ULTRAM) 50 mg tablet   No No   Sig: Take 1 tablet (50 mg total) by mouth every 8 (eight) hours as needed for moderate pain      Facility-Administered Medications: None       Past Medical History:   Diagnosis Date    Diabetes mellitus ([facility] [address] )        Past Surgical History:   Procedure Laterality Date    HERNIA REPAIR      TUBAL LIGATION         Family History   Problem Relation Age of Onset    Hypertension Mother    Maria Luisa Day Arthritis Mother     Asthma Sister     Bipolar disorder Brother     Schizophrenia Brother     Asthma Brother     Asthma Brother     Diabetes Maternal Grandmother     Diabetes Paternal Grandmother     No Known Problems Maternal Grandfather     No Known Problems Paternal Grandfather      I have reviewed and agree with the history as documented  E-Cigarette/Vaping    E-Cigarette Use Never User      E-Cigarette/Vaping Substances    Nicotine No     THC No     CBD No     Flavoring No     Other No     Unknown No      Social History     Tobacco Use    Smoking status: Never Smoker    Smokeless tobacco: Never Used   Vaping Use    Vaping Use: Never used   Substance Use Topics    Alcohol use: Not Currently    Drug use: Never       Review of Systems   Constitutional: Negative for fatigue and fever  HENT: Negative  Negative for congestion and hearing loss  Eyes: Negative for photophobia and pain     Respiratory: Negative for cough and chest tightness  Cardiovascular: Positive for chest pain  Negative for leg swelling  Gastrointestinal: Negative for abdominal pain, diarrhea and nausea  Endocrine: Negative for polydipsia and polyphagia  Genitourinary: Negative for dysuria and frequency  Musculoskeletal: Negative for arthralgias and gait problem  Skin: Negative for pallor and rash  Allergic/Immunologic: Negative for environmental allergies and food allergies  Neurological: Negative for dizziness and headaches  Psychiatric/Behavioral: Negative for agitation and confusion  Physical Exam  Physical Exam  Vitals and nursing note reviewed  Constitutional:       General: She is not in acute distress  Appearance: Normal appearance  She is not ill-appearing, toxic-appearing or diaphoretic  HENT:      Head: Normocephalic and atraumatic  Right Ear: Tympanic membrane, ear canal and external ear normal       Left Ear: Tympanic membrane, ear canal and external ear normal       Nose: Nose normal  No congestion or rhinorrhea  Mouth/Throat:      Mouth: Mucous membranes are dry  Pharynx: Oropharynx is clear  No oropharyngeal exudate or posterior oropharyngeal erythema  Eyes:      Extraocular Movements: Extraocular movements intact  Conjunctiva/sclera: Conjunctivae normal       Pupils: Pupils are equal, round, and reactive to light  Cardiovascular:      Rate and Rhythm: Normal rate and regular rhythm  Pulmonary:      Effort: Pulmonary effort is normal  No respiratory distress  Breath sounds: Normal breath sounds  Chest:      Chest wall: Tenderness present  Abdominal:      General: Bowel sounds are normal       Palpations: Abdomen is soft  Tenderness: There is no abdominal tenderness  Musculoskeletal:         General: Normal range of motion  Cervical back: Normal range of motion and neck supple  No rigidity or tenderness  Right lower leg: No edema        Left lower leg: No edema    Lymphadenopathy:      Cervical: No cervical adenopathy  Skin:     General: Skin is warm and dry  Capillary Refill: Capillary refill takes less than 2 seconds  Findings: No rash  Neurological:      General: No focal deficit present  Mental Status: She is alert and oriented to person, place, and time  Mental status is at baseline     Psychiatric:         Mood and Affect: Mood normal          Behavior: Behavior normal          Vital Signs  ED Triage Vitals   Temp Pulse Respirations Blood Pressure SpO2   -- 09/20/21 1042 09/20/21 1042 09/20/21 1042 09/20/21 1042    68 18 140/89 100 %      Temp src Heart Rate Source Patient Position - Orthostatic VS BP Location FiO2 (%)   -- 09/20/21 1042 09/20/21 1042 09/20/21 1042 --    Monitor Sitting Left arm       Pain Score       09/20/21 1113       6           Vitals:    09/20/21 1042   BP: 140/89   Pulse: 68   Patient Position - Orthostatic VS: Sitting         Visual Acuity      ED Medications  Medications   ondansetron (ZOFRAN) injection 4 mg (has no administration in time range)   morphine injection 2 mg (has no administration in time range)   sodium chloride 0 9 % bolus 1,000 mL (0 mL Intravenous Stopped 9/20/21 1203)   ketorolac (TORADOL) injection 15 mg (15 mg Intravenous Given 9/20/21 1113)       Diagnostic Studies  Results Reviewed     Procedure Component Value Units Date/Time    Troponin I [[id number]]  (Normal) Collected: 09/20/21 1103    Lab Status: Final result Specimen: Blood from Arm, Right Updated: 09/20/21 1155     Troponin I 0 02 ng/mL     Narrative:      Hemolysis    Manual Differential (Non Wam) [[id number]]  (Abnormal) Collected: 09/20/21 1103    Lab Status: Final result Specimen: Blood from Arm, Right Updated: 09/20/21 1153     Segmented % 40 %      Lymphocytes % 44 %      Monocytes % 13 %      Eosinophils, % 2 %      Myelocytes % 1 %      Neutrophils Absolute 2 28 Thousand/uL      Lymphocytes Absolute 2 51 Thousand/uL      Monocytes Absolute 0 74 Thousand/uL      Eosinophils Absolute 0 11 Thousand/uL      Total Counted 100     RBC Morphology Normal     Platelet Estimate Adequate    Lipase [[id number]]  (Normal) Collected: 09/20/21 1103    Lab Status: Final result Specimen: Blood from Arm, Right Updated: 09/20/21 1145     Lipase 73 u/L     Comprehensive metabolic panel [[id number]]  (Abnormal) Collected: 09/20/21 1103    Lab Status: Final result Specimen: Blood from Arm, Right Updated: 09/20/21 1145     Sodium 135 mmol/L      Potassium 4 5 mmol/L      Chloride 101 mmol/L      CO2 25 mmol/L      ANION GAP 9 mmol/L      BUN 12 mg/dL      Creatinine 0 72 mg/dL      Glucose 306 mg/dL      Calcium 8 9 mg/dL      AST 29 U/L      ALT 15 U/L      Alkaline Phosphatase 88 U/L      Total Protein 7 7 g/dL      Albumin 3 7 g/dL      Total Bilirubin 1 01 mg/dL      eGFR 121 ml/min/1 73sq m     Narrative:      Hemolysis  National Kidney Disease Foundation guidelines for Chronic Kidney Disease (CKD):     Stage 1 with normal or high GFR (GFR > 90 mL/min/1 73 square meters)    Stage 2 Mild CKD (GFR = 60-89 mL/min/1 73 square meters)    Stage 3A Moderate CKD (GFR = 45-59 mL/min/1 73 square meters)    Stage 3B Moderate CKD (GFR = 30-44 mL/min/1 73 square meters)    Stage 4 Severe CKD (GFR = 15-29 mL/min/1 73 square meters)    Stage 5 End Stage CKD (GFR <15 mL/min/1 73 square meters)  Note: GFR calculation is accurate only with a steady state creatinine    CBC and differential [[id number]]  (Abnormal) Collected: 09/20/21 1103    Lab Status: Final result Specimen: Blood from Arm, Right Updated: 09/20/21 1136     WBC 5 70 Thousand/uL      RBC 4 05 Million/uL      Hemoglobin 11 8 g/dL      Hematocrit 35 6 %      MCV 88 fL      MCH 29 2 pg      MCHC 33 2 g/dL      RDW 12 9 %      MPV 8 0 fL      Platelets 089 Thousands/uL     POCT pregnancy, urine [[id number]]     Lab Status: No result                  XR chest 1 view portable   ED Interpretation by Noelle Sunshine TAY Ware (09/20 1212)   No acute finding                 Procedures  Procedures         ED Course  ED Course as of Sep 20 1228   Mon Sep 20, 2021   1223 Initial EKG interpreted by me 70 beats per minute normal sinus rhythm with unifocal infrequent PVC normal axis  similar to previous except for PVC                HEART Risk Score      Most Recent Value   Heart Score Risk Calculator   History  1 Filed at: 09/20/2021 1218   ECG  0 Filed at: 09/20/2021 1218   Age  0 Filed at: 09/20/2021 1218   Risk Factors  2 Filed at: 09/20/2021 1218   Troponin  0 Filed at: 09/20/2021 1218   HEART Score  3 Filed at: 09/20/2021 1218              PERC Rule for PE      Most Recent Value   PERC Rule for PE   Age >=50  0 Filed at: 09/20/2021 1048   HR >=100  0 Filed at: 09/20/2021 1048   O2 Sat on room air < 95%  0 Filed at: 09/20/2021 1048   History of PE or DVT  0 Filed at: 09/20/2021 1048   Recent trauma or surgery  0 Filed at: 09/20/2021 1048   Hemoptysis  0 Filed at: 09/20/2021 1048   Exogenous estrogen  0 Filed at: 09/20/2021 1048   Unilateral leg swelling  0 Filed at: 09/20/2021 1048   PERC Rule for PE Results  0 Filed at: 09/20/2021 1048                            MDM  Number of Diagnoses or Management Options  Atypical chest pain: new and requires workup  Diagnosis management comments: This is a 23-year-old female patient was insulin-dependent diabetic comes in with atypical chest pain  Patient has more than 2 risk factors in with age EKG and a negative troponin does have a lower heart score my Gestalt delta troponin her out this not to  I feel in observation with cardiac consultation be most appropriate this was discussed with the admitting service who agreed    All labs and testing reviewed by this provider    Risk of Complications, Morbidity, and/or Mortality  Presenting problems: moderate  Diagnostic procedures: moderate  Management options: moderate    Patient Progress  Patient progress: stable      Disposition  Final diagnoses:   Atypical chest pain     Time reflects when diagnosis was documented in both MDM as applicable and the Disposition within this note     Time User Action Codes Description Comment    9/20/2021 12:27 PM Sohail Park Add [R07 89] Atypical chest pain       ED Disposition     ED Disposition Condition Date/Time Comment    Admit Stable Mon Sep 20, 2021 12:27 PM Case was discussed with DR Magallanes and the patient's admission status was agreed to be Admission Status: observation status to the service of Dr Tamara Roy   Follow-up Information    None         Patient's Medications   Discharge Prescriptions    No medications on file     No discharge procedures on file      PDMP Review       Value Time User    PDMP Reviewed  Yes 7/20/2021  8:33 AM Parisa May DO          ED Provider  Electronically Signed by           Kayla Cohen PA-C  09/20/21 8869

## 2021-09-20 NOTE — ASSESSMENT & PLAN NOTE
· Chest pain need to rule out ACS  · First troponin negative, EKG showed no acute ST-T changes  · Will admit for observation  · Follow troponin for 2 more times with EKGs  · Continue telemetry monitoring

## 2021-09-21 VITALS
HEIGHT: 65 IN | DIASTOLIC BLOOD PRESSURE: 59 MMHG | TEMPERATURE: 96 F | SYSTOLIC BLOOD PRESSURE: 104 MMHG | OXYGEN SATURATION: 100 % | WEIGHT: 180.56 LBS | HEART RATE: 69 BPM | RESPIRATION RATE: 17 BRPM | BODY MASS INDEX: 30.08 KG/M2

## 2021-09-21 DIAGNOSIS — Z71.89 COMPLEX CARE COORDINATION: Primary | ICD-10-CM

## 2021-09-21 LAB
ANION GAP SERPL CALCULATED.3IONS-SCNC: 8 MMOL/L (ref 5–14)
ATRIAL RATE: 67 BPM
BASOPHILS # BLD AUTO: 0 THOUSANDS/ΜL (ref 0–0.1)
BASOPHILS NFR BLD AUTO: 1 % (ref 0–1)
BUN SERPL-MCNC: 13 MG/DL (ref 5–25)
CALCIUM SERPL-MCNC: 8.7 MG/DL (ref 8.4–10.2)
CHLORIDE SERPL-SCNC: 104 MMOL/L (ref 97–108)
CO2 SERPL-SCNC: 25 MMOL/L (ref 22–30)
CREAT SERPL-MCNC: 0.73 MG/DL (ref 0.6–1.2)
EOSINOPHIL # BLD AUTO: 0.2 THOUSAND/ΜL (ref 0–0.4)
EOSINOPHIL NFR BLD AUTO: 3 % (ref 0–6)
ERYTHROCYTE [DISTWIDTH] IN BLOOD BY AUTOMATED COUNT: 13 %
GFR SERPL CREATININE-BSD FRML MDRD: 119 ML/MIN/1.73SQ M
GLUCOSE SERPL-MCNC: 191 MG/DL (ref 70–99)
HCT VFR BLD AUTO: 36.1 % (ref 36–46)
HGB BLD-MCNC: 11.9 G/DL (ref 12–16)
LYMPHOCYTES # BLD AUTO: 2.7 THOUSANDS/ΜL (ref 0.5–4)
LYMPHOCYTES NFR BLD AUTO: 52 % (ref 25–45)
MCH RBC QN AUTO: 28.8 PG (ref 26–34)
MCHC RBC AUTO-ENTMCNC: 32.8 G/DL (ref 31–36)
MCV RBC AUTO: 88 FL (ref 80–100)
MONOCYTES # BLD AUTO: 0.6 THOUSAND/ΜL (ref 0.2–0.9)
MONOCYTES NFR BLD AUTO: 12 % (ref 1–10)
NEUTROPHILS # BLD AUTO: 1.7 THOUSANDS/ΜL (ref 1.8–7.8)
NEUTS SEG NFR BLD AUTO: 32 % (ref 45–65)
P AXIS: 52 DEGREES
PLATELET # BLD AUTO: 306 THOUSANDS/UL (ref 150–450)
PMV BLD AUTO: 7.7 FL (ref 8.9–12.7)
POTASSIUM SERPL-SCNC: 3.7 MMOL/L (ref 3.6–5)
PR INTERVAL: 124 MS
QRS AXIS: -11 DEGREES
QRSD INTERVAL: 76 MS
QT INTERVAL: 398 MS
QTC INTERVAL: 420 MS
RBC # BLD AUTO: 4.11 MILLION/UL (ref 4–5.2)
SODIUM SERPL-SCNC: 137 MMOL/L (ref 137–147)
T WAVE AXIS: -3 DEGREES
VENTRICULAR RATE: 67 BPM
WBC # BLD AUTO: 5.2 THOUSAND/UL (ref 4.5–11)

## 2021-09-21 PROCEDURE — 85025 COMPLETE CBC W/AUTO DIFF WBC: CPT | Performed by: INTERNAL MEDICINE

## 2021-09-21 PROCEDURE — 80048 BASIC METABOLIC PNL TOTAL CA: CPT | Performed by: INTERNAL MEDICINE

## 2021-09-21 PROCEDURE — 93010 ELECTROCARDIOGRAM REPORT: CPT | Performed by: INTERNAL MEDICINE

## 2021-09-21 PROCEDURE — 99217 PR OBSERVATION CARE DISCHARGE MANAGEMENT: CPT | Performed by: INTERNAL MEDICINE

## 2021-09-21 RX ORDER — NITROGLYCERIN 0.4 MG/1
0.4 TABLET SUBLINGUAL
Qty: 20 TABLET | Refills: 0 | Status: SHIPPED | OUTPATIENT
Start: 2021-09-21 | End: 2022-06-10

## 2021-09-21 RX ADMIN — INSULIN LISPRO 5 UNITS: 100 INJECTION, SOLUTION INTRAVENOUS; SUBCUTANEOUS at 08:11

## 2021-09-21 NOTE — DISCHARGE SUMMARY
[facility]  Discharge- Linda Jaramillo 1980, 37 y o  female MRN: [id number]  Unit/Bed#: 7T [facility] 712-01 Encounter: [id number]  Primary Care Provider: Coby Walton MD   Date and time admitted to hospital: 9/20/2021 10:34 AM    * Chest pain  Assessment & Plan  · Chest pain most likely musculoskeletal in nature, low suspicion of ACS,BLANCO - 1 point  · Troponin negative for 3 times  · Chest x-ray showed no acute cardiopulmonary changes  · Continue telemetry monitoring  · Recommended outpatient cardiology follow-up    Migraine without aura and without status migrainosus, not intractable  Assessment & Plan  · Stable, continue home Topamax    Obesity (BMI 30-39  9)  Assessment & Plan  · Lifestyle modification, diet and exercise    Uncontrolled type 2 diabetes mellitus with hyperglycemia, with long-term current use of insulin (HCC)  Assessment & Plan  Lab Results   Component Value Date    HGBA1C 10 1 (H) 08/24/2021       No results for input(s): POCGLU in the last 72 hours      Blood Sugar Average: Last 72 hrs:     Patient was on Prandin, Semaglutide at home  Continue home detemir  20 units q h s , Humalog 5 units t i d  A c , sliding scale insulin, diabetic diet  Recommended to follow-up with endocrinology as an outpatient    Discharging Physician / Practitioner: Nahum Felipe MD  PCP: Coby Walton MD  Admission Date:   Admission Orders (From admission, onward)     Ordered        09/20/21 1232  Place in Observation  Once                   Discharge Date: 09/21/21    Medical Problems     Resolved Problems  Date Reviewed: 9/21/2021    None                Consultations During Hospital Stay:  · No    Procedures Performed:   · No    Significant Findings / Test Results:   · No    Incidental Findings:   · No    Test Results Pending at Discharge (will require follow up):   · No     Outpatient Tests Requested:  · No    Complications:  No    Reason for Admission:  Atypical chest    OSKAR RENEE OLI - HUMACAO Course:     Brandi Henry is a 37 y o  female patient who originally presented to the hospital on 9/20/2021 due to atypical chest pain  Patient woke up due to chest pain which was constant sharp stabbing pain on the left side of the chest   It was exacerbated by deep inspiration and touch  The pain was not radiated  It was not associated with nausea, vomiting, diaphoresis  Patient denies fever, cough, shortness of breath, any clots in the past   Troponin was negative for 3 times  EKG showed no acute ST-T changes  Patient pain has been well controlled with Tylenol and tramadol  Patient is clinically hemodynamically stable to be discharged today  Patient is recommended to see Cardiology as an outpatient  Patient will need to see endocrinologist for DM control  Please see above list of diagnoses and related plan for additional information  Condition at Discharge: stable     Discharge Day Visit / Exam:     Subjective:  Patient was seen and examined at bedside  The patient said her pain is better  Patient does not seem to be in distress like she was yesterday  She denies any fever, chills, palpitation, shortness of breath, N/V, abd pain      Vitals: Blood Pressure: 104/59 (09/21/21 0715)  Pulse: 69 (09/21/21 0715)  Temperature: (!) 96 °F (35 6 °C) (09/21/21 0715)  Temp Source: Temporal (09/21/21 0715)  Respirations: 17 (09/21/21 0715)  Height: 5' 5" (165 1 cm) (09/20/21 1500)  Weight - Scale: 81 9 kg (180 lb 8 9 oz) (09/20/21 1500)  SpO2: 100 % (09/21/21 0715)  Exam:   Physical Exam  General:  Middle-aged female patient lying in bed, breathing well on room air, no acute distress  HEENT: NC/AT, PERRL, EOM - normal  Neck: Supple  Pulm/Chest: Normal chest wall expansion, clear breath sounds on both side, no wheezing/rhonchi or crackles appreciated  CVS: RRR, normal S1&S2, no murmur appreciated, capillary refill <2s  Abd: soft, non tender, non distended, bowel sounds +  MSK: move all 4 extremities spontaneously  Skin: warm  CNS: no acute focal neuro deficit    Discussion with Family:  Discussed with patient    Discharge instructions/Information to patient and family:   See after visit summary for information provided to patient and family  Provisions for Follow-Up Care:  See after visit summary for information related to follow-up care and any pertinent home health orders  Disposition:     Home    Planned Readmission: No     Discharge Statement:  I spent 45 minutes discharging the patient  This time was spent on the day of discharge  I had direct contact with the patient on the day of discharge  Greater than 50% of the total time was spent examining patient, answering all patient questions, arranging and discussing plan of care with patient as well as directly providing post-discharge instructions  Additional time then spent on discharge activities  Discharge Medications:  See after visit summary for reconciled discharge medications provided to patient and family        ** Please Note: This note has been constructed using a voice recognition system **

## 2021-09-21 NOTE — NURSING NOTE
IV removed with tip intact  Pressure held to control bleeding  Discharge instructions discussed with patient and verbalizes understanding  Diabetes handbook given as well as information print out on new medication  Patient left with all their belongings and discharge instructions

## 2021-09-22 ENCOUNTER — TRANSITIONAL CARE MANAGEMENT (OUTPATIENT)
Dept: FAMILY MEDICINE CLINIC | Facility: CLINIC | Age: 41
End: 2021-09-22

## 2021-09-22 ENCOUNTER — PATIENT OUTREACH (OUTPATIENT)
Dept: FAMILY MEDICINE CLINIC | Facility: CLINIC | Age: 41
End: 2021-09-22

## 2021-09-22 NOTE — PROGRESS NOTES
Contacted patient for f/u post hospitalization for chest pain  It was felt to be musculoskeletal in nature  Cathi Salmon continues to experience chest discomfort  She is taking toradol and tylenol and was prescribed nitro  She has not picked up nitro from the pharmacy  She is managing at home and denies needing additional assistance  Her BS have been up in the 200's, she is taking insulin as prescribed and will be f/u with endo in October  Nutritional intake adequate  No changes to her medications  She has f/u scheduled with cardiology in October and will be seeing PCP on 9/30  No other needs present at this time

## 2021-09-30 ENCOUNTER — OFFICE VISIT (OUTPATIENT)
Dept: OBGYN CLINIC | Facility: CLINIC | Age: 41
End: 2021-09-30

## 2021-09-30 VITALS
BODY MASS INDEX: 29.45 KG/M2 | HEART RATE: 80 BPM | SYSTOLIC BLOOD PRESSURE: 137 MMHG | WEIGHT: 177 LBS | DIASTOLIC BLOOD PRESSURE: 78 MMHG

## 2021-09-30 DIAGNOSIS — Z12.31 ENCOUNTER FOR SCREENING MAMMOGRAM FOR MALIGNANT NEOPLASM OF BREAST: ICD-10-CM

## 2021-09-30 DIAGNOSIS — Z01.419 ENCOUNTER FOR ANNUAL ROUTINE GYNECOLOGICAL EXAMINATION: Primary | ICD-10-CM

## 2021-09-30 PROCEDURE — G0476 HPV COMBO ASSAY CA SCREEN: HCPCS | Performed by: NURSE PRACTITIONER

## 2021-09-30 PROCEDURE — 99396 PREV VISIT EST AGE 40-64: CPT | Performed by: NURSE PRACTITIONER

## 2021-09-30 PROCEDURE — G0145 SCR C/V CYTO,THINLAYER,RESCR: HCPCS | Performed by: NURSE PRACTITIONER

## 2021-09-30 NOTE — LETTER
10/8/2021    To Lázaro Richards  : 1980      This letter is to advise you that your recent PAP SMEAR results were reviewed by me and are NORMAL    We will see you in 1 year for your annual exam     Jony Moon

## 2021-09-30 NOTE — PROGRESS NOTES
Annual Exam    Assessment   1  Encounter for annual routine gynecological examination  Liquid-based pap, screening   2  Encounter for screening mammogram for malignant neoplasm of breast  Mammo screening bilateral w 3d & cad     well woman       Plan       All questions answered  Breast self exam technique reviewed and patient encouraged to perform self-exam monthly  Contraception: tubal ligation  Discussed healthy lifestyle modifications  Follow up in 1 year  Mammogram    Pap, thin prep    Patient Instructions   PAP results can take up to 2 weeks  Mammogram appointment  Call with needs or concerns  Return in 1 year    Pt verbalized understanding of all discussed  Joyce Narvaez is a 37 y o  [id number] female who presents for annual well woman exam  Periods are regular every 28-30 days, lasting 3 days  No intermenstrual bleeding, spotting, or discharge  1 partner x 5 months, denies domestic violence  Last PAP LVH, results are not available      Depression Screening Follow-up Plan: Patient's depression screening was negative with a PHQ-2 score of 1   Their PHQ-9 score was4   Clinically patient does not have depression  No treatment is required  Current contraception: tubal ligation  History of abnormal Pap smear: unsure  Family history of uterine or ovarian cancer: no  Regular self breast exam: yes  History of abnormal mammogram: ordered today  Family history of breast cancer: no  History of abnormal lipids: unknown  Menstrual History:  OB History        6    Para   3    Term                AB   3    Living   3       SAB   2    TAB   1    Ectopic        Multiple        Live Births   1                Menarche age: 13  Patient's last menstrual period was 2021 (exact date)         The following portions of the patient's history were reviewed and updated as appropriate: allergies, current medications, past family history, past medical history, past social history, past surgical history and problem list     Review of Systems  Pertinent items are noted in HPI        Objective      /78   Pulse 80   Wt 80 3 kg (177 lb)   LMP 09/17/2021 (Exact Date)   BMI 29 45 kg/m²     General: alert and oriented, in no acute distress, alert, appears stated age and cooperative   Heart: regular rate and rhythm, S1, S2 normal, no murmur, click, rub or gallop   Lungs: clear to auscultation bilaterally, WNL respiratory effort, negative cough or SOB   Thyroid: Negative masses   Abdomen: soft, non-tender, without masses or organomegaly   Vulva: normal   Vagina: normal mucosa   Cervix: no bleeding following Pap, no cervical motion tenderness and no lesions   Uterus: normal size, non-tender, normal shape and consistency   Adnexa: normal adnexa   Urethra: normal   Breasts: NT,negative masses, discharge, or dimpling

## 2021-09-30 NOTE — PATIENT INSTRUCTIONS
PAP results can take up to 2 weeks  Mammogram appointment  Call with needs or concerns  Return in 1 year    COVID-19 Instructions    If you are having any of the following:  Cough   Shortness of breath   Fever  If traveled within past 2 weeks internationally or to high risk [place] states  Or been in contact with someone that has     Please call either:   Your PCP office  [phone], option [phone]    They will screen you over the phone and direct you to the nearest appropriate testing location    DO NOT go to your PCP or OB office without calling first

## 2021-10-01 ENCOUNTER — HOSPITAL ENCOUNTER (OUTPATIENT)
Dept: MAMMOGRAPHY | Facility: CLINIC | Age: 41
Discharge: HOME | End: 2021-10-01
Payer: COMMERCIAL

## 2021-10-01 VITALS — WEIGHT: 177 LBS | HEIGHT: 65 IN | BODY MASS INDEX: 29.49 KG/M2

## 2021-10-01 DIAGNOSIS — Z12.31 ENCOUNTER FOR SCREENING MAMMOGRAM FOR MALIGNANT NEOPLASM OF BREAST: ICD-10-CM

## 2021-10-01 LAB
HPV HR 12 DNA CVX QL NAA+PROBE: NEGATIVE
HPV16 DNA CVX QL NAA+PROBE: NEGATIVE
HPV18 DNA CVX QL NAA+PROBE: NEGATIVE

## 2021-10-01 PROCEDURE — 77067 SCR MAMMO BI INCL CAD: CPT

## 2021-10-01 PROCEDURE — 77063 BREAST TOMOSYNTHESIS BI: CPT

## 2021-10-06 ENCOUNTER — CONSULT (OUTPATIENT)
Dept: CARDIOLOGY CLINIC | Facility: CLINIC | Age: 41
End: 2021-10-06
Payer: COMMERCIAL

## 2021-10-06 VITALS
WEIGHT: 181.4 LBS | OXYGEN SATURATION: 99 % | HEIGHT: 65 IN | HEART RATE: 84 BPM | BODY MASS INDEX: 30.22 KG/M2 | DIASTOLIC BLOOD PRESSURE: 60 MMHG | SYSTOLIC BLOOD PRESSURE: 100 MMHG

## 2021-10-06 DIAGNOSIS — R07.89 ATYPICAL CHEST PAIN: ICD-10-CM

## 2021-10-06 DIAGNOSIS — R01.1 HEART MURMUR: ICD-10-CM

## 2021-10-06 DIAGNOSIS — E11.65 UNCONTROLLED TYPE 2 DIABETES MELLITUS WITH HYPERGLYCEMIA, WITH LONG-TERM CURRENT USE OF INSULIN (HCC): ICD-10-CM

## 2021-10-06 DIAGNOSIS — Z79.4 UNCONTROLLED TYPE 2 DIABETES MELLITUS WITH HYPERGLYCEMIA, WITH LONG-TERM CURRENT USE OF INSULIN (HCC): ICD-10-CM

## 2021-10-06 DIAGNOSIS — R07.9 CHEST PAIN, UNSPECIFIED TYPE: Primary | ICD-10-CM

## 2021-10-06 PROCEDURE — 99244 OFF/OP CNSLTJ NEW/EST MOD 40: CPT | Performed by: INTERNAL MEDICINE

## 2021-10-08 LAB
LAB AP GYN PRIMARY INTERPRETATION: NORMAL
Lab: NORMAL
PATH INTERP SPEC-IMP: NORMAL

## 2021-10-19 ENCOUNTER — OFFICE VISIT (OUTPATIENT)
Dept: OBGYN CLINIC | Facility: OTHER | Age: 41
End: 2021-10-19
Payer: COMMERCIAL

## 2021-10-19 VITALS
DIASTOLIC BLOOD PRESSURE: 79 MMHG | BODY MASS INDEX: 29.66 KG/M2 | HEIGHT: 65 IN | WEIGHT: 178 LBS | HEART RATE: 81 BPM | SYSTOLIC BLOOD PRESSURE: 116 MMHG

## 2021-10-19 DIAGNOSIS — M75.01 ADHESIVE CAPSULITIS OF RIGHT SHOULDER: Primary | ICD-10-CM

## 2021-10-19 DIAGNOSIS — M25.511 RIGHT SHOULDER PAIN, UNSPECIFIED CHRONICITY: ICD-10-CM

## 2021-10-19 PROCEDURE — 99213 OFFICE O/P EST LOW 20 MIN: CPT | Performed by: FAMILY MEDICINE

## 2021-11-01 ENCOUNTER — HOSPITAL ENCOUNTER (OUTPATIENT)
Dept: ULTRASOUND IMAGING | Facility: CLINIC | Age: 41
Discharge: HOME | End: 2021-11-01
Payer: COMMERCIAL

## 2021-11-01 ENCOUNTER — HOSPITAL ENCOUNTER (OUTPATIENT)
Dept: MAMMOGRAPHY | Facility: CLINIC | Age: 41
Discharge: HOME | End: 2021-11-01
Payer: COMMERCIAL

## 2021-11-01 VITALS — WEIGHT: 178 LBS | HEIGHT: 65 IN | BODY MASS INDEX: 29.66 KG/M2

## 2021-11-01 DIAGNOSIS — R92.8 ABNORMAL MAMMOGRAM: ICD-10-CM

## 2021-11-01 DIAGNOSIS — N63.0 BREAST MASS SEEN ON MAMMOGRAM: ICD-10-CM

## 2021-11-01 DIAGNOSIS — N64.89 DISTORTION OF CONTOUR OF BREAST: ICD-10-CM

## 2021-11-01 PROCEDURE — 76642 ULTRASOUND BREAST LIMITED: CPT

## 2021-11-01 PROCEDURE — G0279 TOMOSYNTHESIS, MAMMO: HCPCS

## 2021-11-01 PROCEDURE — 77065 DX MAMMO INCL CAD UNI: CPT

## 2021-11-03 ENCOUNTER — HOSPITAL ENCOUNTER (OUTPATIENT)
Dept: NON INVASIVE DIAGNOSTICS | Facility: HOSPITAL | Age: 41
Discharge: HOME | End: 2021-11-03
Payer: COMMERCIAL

## 2021-11-03 VITALS
HEIGHT: 65 IN | BODY MASS INDEX: 29.66 KG/M2 | DIASTOLIC BLOOD PRESSURE: 79 MMHG | SYSTOLIC BLOOD PRESSURE: 116 MMHG | WEIGHT: 178 LBS

## 2021-11-03 DIAGNOSIS — R07.9 CHEST PAIN, UNSPECIFIED TYPE: ICD-10-CM

## 2021-11-03 DIAGNOSIS — R01.1 HEART MURMUR: ICD-10-CM

## 2021-11-03 LAB
AORTIC ROOT: 2.8 CM
APICAL FOUR CHAMBER EJECTION FRACTION: 59 %
BASELINE ST DEPRESSION: 0 MM
CHEST PAIN STATEMENT: NORMAL
E WAVE DECELERATION TIME: 177 MS
E/A RATIO: 1.31
FRACTIONAL SHORTENING: 33 (ref 28–44)
INTERVENTRICULAR SEPTUM IN DIASTOLE (PARASTERNAL SHORT AXIS VIEW): 0.9 CM
LEFT INTERNAL DIMENSION IN SYSTOLE: 3 CM (ref 2.1–4)
LEFT VENTRICULAR INTERNAL DIMENSION IN DIASTOLE: 4.5 CM (ref 4.56–6.79)
LEFT VENTRICULAR POSTERIOR WALL IN END DIASTOLE: 0.9 CM
LEFT VENTRICULAR STROKE VOLUME: 58 ML
MAX DIASTOLIC BP: 70 MMHG
MAX HEART RATE: 139 BPM
MAX HR PERCENT: 77 %
MAX HR: 180 BPM
MAX PREDICTED HEART RATE: 180 BPM
MAX. SYSTOLIC BP: 178 MMHG
MV E'TISSUE VEL-SEP: 10 CM/S
MV PEAK A VEL: 0.62 M/S
MV PEAK E VEL: 81 CM/S
MV STENOSIS PRESSURE HALF TIME: 0 MS
MV VALVE AREA P 1/2 METHOD: 4.3
PROTOCOL NAME: NORMAL
REASON FOR TERMINATION: NORMAL
RIGHT VENTRICLE ID DIMENSION: 2.5 CM
SL CV PED ECHO LEFT VENTRICLE DIASTOLIC VOLUME (MOD BIPLANE) 2D: 94 ML
SL CV PED ECHO LEFT VENTRICLE SYSTOLIC VOLUME (MOD BIPLANE) 2D: 36 ML
SL CV STRESS RECOVERY BP: NORMAL MMHG
SL CV STRESS RECOVERY HR: 91 BPM
SL CV STRESS RECOVERY O2 SAT: 98 %
SL CV STRESS STAGE REACHED: 3
STRESS ANGINA INDEX: 0
STRESS BASELINE BP: NORMAL MMHG
STRESS BASELINE HR: 87 BPM
STRESS DUKE TREADMILL SCORE: 7
STRESS O2 SAT REST: 98 %
STRESS PEAK HR: 139 BPM
STRESS PERCENT HR: 77 %
STRESS POST ESTIMATED WORKLOAD: 8.8 METS
STRESS POST EXERCISE DUR MIN: 7 MIN
STRESS POST EXERCISE DUR SEC: 14 SEC
STRESS POST O2 SAT PEAK: 99 %
STRESS POST PEAK BP: NORMAL MMHG
STRESS ST DEPRESSION: 0 MM
STRESS TARGET HR: 180 BPM
TARGET HR FORMULA: NORMAL
TEST INDICATION: NORMAL
TIME IN EXERCISE PHASE: NORMAL
TR PEAK VELOCITY: 1.8 M/S
TRICUSPID VALVE PEAK REGURGITATION VELOCITY: 1.79 M/S
TRICUSPID VALVE S': 0.8 CM/S
TV PEAK GRADIENT: 13 MMHG
Z-SCORE OF LEFT VENTRICULAR DIMENSION IN END SYSTOLE: -2.11

## 2021-11-03 PROCEDURE — 93306 TTE W/DOPPLER COMPLETE: CPT | Performed by: INTERNAL MEDICINE

## 2021-11-03 PROCEDURE — 93017 CV STRESS TEST TRACING ONLY: CPT

## 2021-11-03 PROCEDURE — 93306 TTE W/DOPPLER COMPLETE: CPT

## 2021-11-03 PROCEDURE — 93018 CV STRESS TEST I&R ONLY: CPT | Performed by: INTERNAL MEDICINE

## 2021-11-03 PROCEDURE — 93016 CV STRESS TEST SUPVJ ONLY: CPT | Performed by: INTERNAL MEDICINE

## 2021-11-15 ENCOUNTER — TELEPHONE (OUTPATIENT)
Dept: NON INVASIVE DIAGNOSTICS | Facility: HOSPITAL | Age: 41
End: 2021-11-15

## 2021-11-15 ENCOUNTER — TELEPHONE (OUTPATIENT)
Dept: CARDIOLOGY CLINIC | Facility: CLINIC | Age: 41
End: 2021-11-15

## 2021-11-15 DIAGNOSIS — R07.9 CHEST PAIN, UNSPECIFIED TYPE: Primary | ICD-10-CM

## 2021-11-22 ENCOUNTER — HOSPITAL ENCOUNTER (OUTPATIENT)
Dept: RADIOLOGY | Facility: HOSPITAL | Age: 41
Discharge: HOME | End: 2021-11-22
Payer: COMMERCIAL

## 2021-11-22 ENCOUNTER — HOSPITAL ENCOUNTER (OUTPATIENT)
Dept: NON INVASIVE DIAGNOSTICS | Facility: HOSPITAL | Age: 41
Discharge: HOME | End: 2021-11-22
Payer: COMMERCIAL

## 2021-11-22 VITALS — WEIGHT: 178 LBS | HEIGHT: 65 IN | BODY MASS INDEX: 29.66 KG/M2

## 2021-11-22 DIAGNOSIS — R07.9 CHEST PAIN, UNSPECIFIED TYPE: ICD-10-CM

## 2021-11-22 LAB
MAX DIASTOLIC BP: 64 MMHG
MAX HEART RATE: 115 BPM
MAX PREDICTED HEART RATE: 180 BPM
MAX. SYSTOLIC BP: 120 MMHG
PROTOCOL NAME: NORMAL
REASON FOR TERMINATION: NORMAL
TARGET HR FORMULA: NORMAL
TEST INDICATION: NORMAL
TIME IN EXERCISE PHASE: NORMAL

## 2021-11-22 PROCEDURE — 78452 HT MUSCLE IMAGE SPECT MULT: CPT | Performed by: INTERNAL MEDICINE

## 2021-11-22 PROCEDURE — 78452 HT MUSCLE IMAGE SPECT MULT: CPT

## 2021-11-22 PROCEDURE — G1004 CDSM NDSC: HCPCS

## 2021-11-22 PROCEDURE — A9502 TC99M TETROFOSMIN: HCPCS

## 2021-11-22 PROCEDURE — 93017 CV STRESS TEST TRACING ONLY: CPT

## 2021-11-22 PROCEDURE — 93016 CV STRESS TEST SUPVJ ONLY: CPT | Performed by: INTERNAL MEDICINE

## 2021-11-22 PROCEDURE — 93018 CV STRESS TEST I&R ONLY: CPT | Performed by: INTERNAL MEDICINE

## 2021-11-22 RX ADMIN — REGADENOSON 0.4 MG: 0.08 INJECTION, SOLUTION INTRAVENOUS at 09:25

## 2021-11-23 LAB
MAX HR PERCENT: 63 %
NUC STRESS EJECTION FRACTION: 65 %
RATE PRESSURE PRODUCT: NORMAL
SL CV REST NUCLEAR ISOTOPE DOSE: 10.3 MCI
SL CV STRESS NUCLEAR ISOTOPE DOSE: 31.4 MCI
SL CV STRESS RECOVERY BP: NORMAL MMHG
SL CV STRESS RECOVERY HR: 101 BPM
SL CV STRESS RECOVERY O2 SAT: 100 %
STRESS ANGINA INDEX: 0
STRESS BASELINE BP: NORMAL MMHG
STRESS BASELINE HR: 80 BPM
STRESS O2 SAT REST: 100 %
STRESS PEAK HR: 115 BPM
STRESS POST ESTIMATED WORKLOAD: 1 METS
STRESS POST EXERCISE DUR MIN: 3 MIN
STRESS POST EXERCISE DUR SEC: 6 SEC
STRESS POST O2 SAT PEAK: 100 %
STRESS POST PEAK BP: 96 MMHG
STRESS/REST PERFUSION RATIO: 1

## 2021-12-01 ENCOUNTER — TELEPHONE (OUTPATIENT)
Dept: FAMILY MEDICINE CLINIC | Facility: CLINIC | Age: 41
End: 2021-12-01

## 2022-01-05 ENCOUNTER — OFFICE VISIT (OUTPATIENT)
Dept: ENDOCRINOLOGY | Facility: CLINIC | Age: 42
End: 2022-01-05
Payer: COMMERCIAL

## 2022-01-05 VITALS
BODY MASS INDEX: 30.56 KG/M2 | SYSTOLIC BLOOD PRESSURE: 110 MMHG | WEIGHT: 183.4 LBS | HEART RATE: 83 BPM | DIASTOLIC BLOOD PRESSURE: 80 MMHG | HEIGHT: 65 IN

## 2022-01-05 DIAGNOSIS — E11.65 UNCONTROLLED TYPE 2 DIABETES MELLITUS WITH HYPERGLYCEMIA, WITHOUT LONG-TERM CURRENT USE OF INSULIN (HCC): Primary | ICD-10-CM

## 2022-01-05 DIAGNOSIS — Z79.4 CURRENT USE OF INSULIN (HCC): ICD-10-CM

## 2022-01-05 LAB — SL AMB POCT HEMOGLOBIN AIC: 12.8 (ref ?–6.5)

## 2022-01-05 PROCEDURE — 99214 OFFICE O/P EST MOD 30 MIN: CPT

## 2022-01-05 PROCEDURE — 83036 HEMOGLOBIN GLYCOSYLATED A1C: CPT

## 2022-01-05 RX ORDER — EMPAGLIFLOZIN 10 MG/1
10 TABLET, FILM COATED ORAL EVERY MORNING
Qty: 90 TABLET | Refills: 3 | Status: SHIPPED | OUTPATIENT
Start: 2022-01-05 | End: 2022-03-15

## 2022-01-05 RX ORDER — PEN NEEDLE, DIABETIC 32GX 5/32"
NEEDLE, DISPOSABLE MISCELLANEOUS DAILY
Qty: 100 EACH | Refills: 3 | Status: SHIPPED | OUTPATIENT
Start: 2022-01-05

## 2022-01-05 NOTE — ASSESSMENT & PLAN NOTE
A1C is increased, above goal  Not taking medications consistently  Start Jardiance 10 mg daily and stop Prandin  Easier for patient to take medication once daily instead of 3 times a day with meals  Stressed importance of taking medications daily  Reviewed ways in which she can improve compliance of levemir  Currently taking at night but forgetting many doses, try different time of day (morning, before or after dinner)  Recommended to see dietician, milesies at this time  Continue to work on lifestyle modifications  Recommend patient check BGs more often, at least once a day, especially with starting new medication and uncontrolled diabetes  BG log given to patient - send BGs in 2 weeks for further adjustments on medications  Patient was counseled regarding the significance of maintaining glycemic control to further prevent any diabetic complications including retinopathy, neuropathy, nephropathy as well as the risk of having heart disease  Repeat A1C, CMP, lipids, and microalbumin before next visit in 3 months       Lab Results   Component Value Date    HGBA1C 12 8 (A) 01/05/2022

## 2022-01-05 NOTE — PROGRESS NOTES
Established Patient Progress Note      Chief Complaint   Patient presents with    Diabetes Type 2        History of Present Illness:   Pilo Solorzano is a 37 y o  female with a history of type 2 diabetes without long term use of insulin for about 8 years after her first pregnancy  Last seen in the office 7/2021  Reports complications of neuropathy  A1C today was 12 8 up from 10 1  She has been checked for C-peptide and CHAPITO 65 in the past which were negative  She reports taking metformin, Januvia and glimiperide in the past  Had allergy to metformin (hives)  She has since been started on Prandin, Levemir, and ozempic  Denies any GI side effects from ozempic  Admits she has not been taking prandin regularly, she did not take it all of December and takes it on average 1-2 times a day  She reports she skips meals many times so does not take it  She also is not consistently taking levemir daily  She reports missing 2 doses a week on levemir on average, missed more in month of December  Denies recent illness or hospitalizations  Denies recent severe hypoglycemic or severe hyperglycemic episodes  Denies any issues with her current regimen  Home glucose monitoring: are performed sporadically 2-3 times a week  Meter was brought to visit, numbers need to be faxed  Reports she walks a lot and moves a lot at work  She has been working on her diet, avoiding high processed foods and sugary drinks, eating more fruits and vegetables      Current regimen:   Ozempic 1 mg weekly   Levemir 20 units   Prandin 0 5 mg 3x a day with meals     Last Eye Exam: bunin, went yesterday, no retinopathy   Last Foot Exam: 4/13/2021    Patient Active Problem List   Diagnosis    Encounter for annual routine gynecological examination    Abnormal thyroid blood test    Papanicolaou smear of cervix with positive high risk human papilloma virus (HPV) test    Uncontrolled type 2 diabetes mellitus with hyperglycemia, with long-term current use of insulin ([facility] )    Stress at home    Diabetes mellitus ([place] [address] )    Obesity (BMI 30-39  9)    Numbness and tingling of both feet    Recurrent episodes of unresponsiveness    Migraine without aura and without status migrainosus, not intractable    Current use of insulin (HCC)    Chest pain    Uncontrolled type 2 diabetes mellitus with hyperglycemia, without long-term current use of insulin (HCC)      Past Medical History:   Diagnosis Date    Diabetes mellitus ([place] [address] )       Past Surgical History:   Procedure Laterality Date    HERNIA REPAIR      TUBAL LIGATION  2017      Family History   Problem Relation Age of Onset    Hypertension Mother    Daniel Walker Arthritis Mother     Asthma Sister     Bipolar disorder Brother     Schizophrenia Brother     Asthma Brother     Asthma Brother     Diabetes Maternal Grandmother     Diabetes Paternal Grandmother     No Known Problems Maternal Grandfather     No Known Problems Paternal Grandfather      Social History     Tobacco Use    Smoking status: Never Smoker    Smokeless tobacco: Never Used   Substance Use Topics    Alcohol use: Yes     Comment: ocassionally     Allergies   Allergen Reactions    Metformin And Related Hives         Current Outpatient Medications:     Accu-Chek FastClix Lancets MISC, Test BG up to 3x daily as directed, Disp: 300 each, Rfl: 1    Accu-Chek Guide test strip, TEST BG UP TO 3X DAILY AS DIRECTED, Disp: 100 each, Rfl: 1    Injection Device for Insulin (B-D PEN MINI) ENRICO, Use 4 (four) times a day Please use for Lantus and Humalog   4 pen needles daily Dx: Diabetes, Disp: 400 each, Rfl: 0    insulin detemir (LEVEMIR FLEXTOUCH) 100 Units/mL injection pen, Inject 20 Units under the skin daily at bedtime, Disp: 15 mL, Rfl: 2    Insulin Pen Needle (BD Pen Needle Em U/F) 32G X 4 MM MISC, Use daily, Disp: 100 each, Rfl: 3    naproxen (EC NAPROSYN) 500 MG EC tablet, Take 1 tablet (500 mg total) by mouth 2 (two) times a day as needed for mild pain or headaches, Disp: , Rfl:     nitroglycerin (NITROSTAT) 0 4 mg SL tablet, Place 1 tablet (0 4 mg total) under the tongue every 5 (five) minutes as needed for chest pain for up to 7 days, Disp: 20 tablet, Rfl: 0    prochlorperazine (COMPAZINE) 10 mg tablet, 1 tab at onset of migraine, can repeat in 8 hours, can take with NSAID  Take with Benadryl if there are side effects  , Disp: 20 tablet, Rfl: 3    semaglutide, 1 mg/dose, (Ozempic) 4 MG/3ML SOPN injection pen, Inject 0 75 mL (1 mg total) under the skin once a week (Patient taking differently: Inject 1 mg under the skin once a week On mondays), Disp: 9 mL, Rfl: 3    topiramate (TOPAMAX) 50 MG tablet, Take 1 tablet (50 mg total) by mouth daily at bedtime, Disp: 90 tablet, Rfl: 1    traMADol (ULTRAM) 50 mg tablet, Take 1 tablet (50 mg total) by mouth every 8 (eight) hours as needed for moderate pain, Disp: 20 tablet, Rfl: 0    Empagliflozin (Jardiance) 10 MG TABS, Take 1 tablet (10 mg total) by mouth every morning, Disp: 90 tablet, Rfl: 3    Review of Systems   Constitutional: Negative for activity change, appetite change, chills, fatigue, fever and unexpected weight change  HENT: Negative for ear pain, sore throat, trouble swallowing and voice change  Eyes: Negative for pain and visual disturbance  Respiratory: Negative for cough and shortness of breath  Cardiovascular: Negative for chest pain and palpitations  Gastrointestinal: Negative for abdominal pain, constipation, diarrhea, nausea and vomiting  Endocrine: Negative for cold intolerance, heat intolerance, polydipsia, polyphagia and polyuria  Genitourinary: Negative for dysuria, frequency and hematuria  Musculoskeletal: Negative for arthralgias, back pain, gait problem and joint swelling  Skin: Positive for rash (brown spots on hands and feet)  Negative for color change and pallor  Cut leg shaving - healing   Neurological: Positive for light-headedness, numbness and headaches  Negative for dizziness, tremors, syncope and weakness  Psychiatric/Behavioral: Negative  All other systems reviewed and are negative  Physical Exam:  Body mass index is 30 52 kg/m²  /80   Pulse 83   Ht 5' 5" (1 651 m)   Wt 83 2 kg (183 lb 6 4 oz)   BMI 30 52 kg/m²    Wt Readings from Last 3 Encounters:   01/05/22 83 2 kg (183 lb 6 4 oz)   11/22/21 80 7 kg (178 lb)   11/03/21 80 7 kg (178 lb)       Physical Exam  Vitals reviewed  Constitutional:       General: She is not in acute distress  Appearance: Normal appearance  She is obese  She is not ill-appearing or diaphoretic  HENT:      Head: Normocephalic  Right Ear: External ear normal       Left Ear: External ear normal    Eyes:      Extraocular Movements: Extraocular movements intact  Conjunctiva/sclera: Conjunctivae normal       Pupils: Pupils are equal, round, and reactive to light  Cardiovascular:      Rate and Rhythm: Normal rate and regular rhythm  Pulses: Normal pulses  Heart sounds: Normal heart sounds  No murmur heard  No friction rub  No gallop  Pulmonary:      Effort: Pulmonary effort is normal  No respiratory distress  Breath sounds: Normal breath sounds  No stridor  No wheezing, rhonchi or rales  Abdominal:      General: Bowel sounds are normal  There is no distension  Palpations: Abdomen is soft  Tenderness: There is no abdominal tenderness  Musculoskeletal:         General: No swelling, tenderness or signs of injury  Normal range of motion  Cervical back: Normal range of motion and neck supple  Right lower leg: No edema  Left lower leg: No edema  Skin:     General: Skin is warm and dry  Coloration: Skin is not jaundiced  Findings: Rash (brown spots on hand and feet) present  No bruising or erythema  Comments: Cut on ankle from shaving - skin intact   Neurological:      General: No focal deficit present        Mental Status: She is alert and oriented to person, place, and time  Sensory: No sensory deficit  Motor: No weakness  Coordination: Coordination normal       Gait: Gait normal    Psychiatric:         Mood and Affect: Mood normal          Behavior: Behavior normal          Thought Content: Thought content normal          Judgment: Judgment normal        Labs:   Lab Results   Component Value Date    HGBA1C 12 8 (A) 01/05/2022    HGBA1C 10 1 (H) 08/24/2021    HGBA1C 9 4 (H) 07/06/2021     Lab Results   Component Value Date    CREATININE 0 73 09/21/2021    CREATININE 0 72 09/20/2021    CREATININE 0 73 07/06/2021    BUN 13 09/21/2021     (L) 01/06/2014    K 3 7 09/21/2021     09/21/2021    CO2 25 09/21/2021     eGFR   Date Value Ref Range Status   09/21/2021 119 >60 ml/min/1 73sq m Final     Lab Results   Component Value Date    HDL 36 (L) 08/24/2021    TRIG 103 08/24/2021     Lab Results   Component Value Date    ALT 15 09/20/2021    AST 29 09/20/2021    ALKPHOS 88 09/20/2021     Lab Results   Component Value Date    PWY0EWFHBRON 1 105 11/21/2020    BCA1AJPBBGNO 2 900 09/23/2019     No results found for: FREETSintia, TSI    Impression & Plan:    Problem List Items Addressed This Visit        Endocrine    Uncontrolled type 2 diabetes mellitus with hyperglycemia, without long-term current use of insulin ([facility] ) - Primary     A1C is increased, above goal  Not taking medications consistently  Start Jardiance 10 mg daily and stop Prandin  Easier for patient to take medication once daily instead of 3 times a day with meals  Stressed importance of taking medications daily  Reviewed ways in which she can improve compliance of levemir  Currently taking at night but forgetting many doses, try different time of day (morning, before or after dinner)  Recommended to see dietician, milesies at this time  Continue to work on lifestyle modifications   Recommend patient check BGs more often, at least once a day, especially with starting new medication and uncontrolled diabetes  BG log given to patient - send BGs in 2 weeks for further adjustments on medications  Patient was counseled regarding the significance of maintaining glycemic control to further prevent any diabetic complications including retinopathy, neuropathy, nephropathy as well as the risk of having heart disease  Repeat A1C, CMP, lipids, and microalbumin before next visit in 3 months  Lab Results   Component Value Date    HGBA1C 12 8 (A) 01/05/2022            Relevant Medications    Empagliflozin (Jardiance) 10 MG TABS    Insulin Pen Needle (BD Pen Needle Em U/F) 32G X 4 MM MISC    insulin detemir (LEVEMIR FLEXTOUCH) 100 Units/mL injection pen    Other Relevant Orders    Microalbumin / creatinine urine ratio Lab Collect    Lipid panel Lab Collect Lab Collect    Comprehensive metabolic panel Lab Collect    HEMOGLOBIN A1C W/ EAG ESTIMATION Lab Collect       Other    Current use of insulin (HCC)    Relevant Medications    insulin detemir (LEVEMIR FLEXTOUCH) 100 Units/mL injection pen          Orders Placed This Encounter   Procedures    Microalbumin / creatinine urine ratio Lab Collect     Standing Status:   Future     Standing Expiration Date:   1/5/2023    Lipid panel Lab Collect Lab Collect     This is a patient instruction: This test requires patient fasting for 10-12 hours or longer  Drinking of black coffee or black tea is acceptable  Standing Status:   Future     Standing Expiration Date:   1/5/2023    Comprehensive metabolic panel Lab Collect     This is a patient instruction: Patient fasting for 8 hours or longer recommended  Standing Status:   Future     Standing Expiration Date:   1/5/2023    HEMOGLOBIN A1C W/ EAG ESTIMATION Lab Collect     Standing Status:   Future     Standing Expiration Date:   1/5/2023       Patient Instructions   Start Jardiance 10 mg daily in the morning  Stop Prandin  Discussed with the patient and all questioned fully answered   She will call me if any problems arise      KETURAH Hernandez

## 2022-03-15 ENCOUNTER — OFFICE VISIT (OUTPATIENT)
Dept: FAMILY MEDICINE CLINIC | Facility: CLINIC | Age: 42
End: 2022-03-15
Payer: COMMERCIAL

## 2022-03-15 VITALS
HEART RATE: 68 BPM | BODY MASS INDEX: 30.19 KG/M2 | TEMPERATURE: 99 F | WEIGHT: 181.2 LBS | SYSTOLIC BLOOD PRESSURE: 118 MMHG | DIASTOLIC BLOOD PRESSURE: 74 MMHG | OXYGEN SATURATION: 99 % | HEIGHT: 65 IN

## 2022-03-15 DIAGNOSIS — E11.69 TYPE 2 DIABETES MELLITUS WITH OTHER SPECIFIED COMPLICATION, WITH LONG-TERM CURRENT USE OF INSULIN (HCC): ICD-10-CM

## 2022-03-15 DIAGNOSIS — Z79.4 TYPE 2 DIABETES MELLITUS WITH OTHER SPECIFIED COMPLICATION, WITH LONG-TERM CURRENT USE OF INSULIN (HCC): ICD-10-CM

## 2022-03-15 DIAGNOSIS — E11.65 UNCONTROLLED TYPE 2 DIABETES MELLITUS WITH HYPERGLYCEMIA, WITHOUT LONG-TERM CURRENT USE OF INSULIN (HCC): ICD-10-CM

## 2022-03-15 DIAGNOSIS — E11.65 UNCONTROLLED TYPE 2 DIABETES MELLITUS WITH HYPERGLYCEMIA, WITH LONG-TERM CURRENT USE OF INSULIN (HCC): ICD-10-CM

## 2022-03-15 DIAGNOSIS — Z79.4 CURRENT USE OF INSULIN (HCC): ICD-10-CM

## 2022-03-15 DIAGNOSIS — R79.89 ABNORMAL THYROID BLOOD TEST: ICD-10-CM

## 2022-03-15 DIAGNOSIS — G43.009 MIGRAINE WITHOUT AURA AND WITHOUT STATUS MIGRAINOSUS, NOT INTRACTABLE: ICD-10-CM

## 2022-03-15 DIAGNOSIS — R20.0 NUMBNESS AND TINGLING OF RIGHT ARM: ICD-10-CM

## 2022-03-15 DIAGNOSIS — R20.2 NUMBNESS AND TINGLING OF RIGHT ARM: ICD-10-CM

## 2022-03-15 DIAGNOSIS — M54.12 CERVICAL RADICULITIS: ICD-10-CM

## 2022-03-15 DIAGNOSIS — L65.9 HAIR LOSS: Primary | ICD-10-CM

## 2022-03-15 DIAGNOSIS — Z79.4 UNCONTROLLED TYPE 2 DIABETES MELLITUS WITH HYPERGLYCEMIA, WITH LONG-TERM CURRENT USE OF INSULIN (HCC): ICD-10-CM

## 2022-03-15 DIAGNOSIS — M75.01 ADHESIVE CAPSULITIS OF RIGHT SHOULDER: ICD-10-CM

## 2022-03-15 LAB — SL AMB POCT HEMOGLOBIN AIC: 12.5 (ref ?–6.5)

## 2022-03-15 PROCEDURE — 99214 OFFICE O/P EST MOD 30 MIN: CPT | Performed by: FAMILY MEDICINE

## 2022-03-15 PROCEDURE — 83036 HEMOGLOBIN GLYCOSYLATED A1C: CPT | Performed by: FAMILY MEDICINE

## 2022-03-15 NOTE — PROGRESS NOTES
Diabetic Foot Exam    Right Foot/Ankle   Right Foot Inspection  Skin Exam: skin normal, skin intact and dry skin  No warmth, no callus, no erythema, no maceration, no abnormal color, no pre-ulcer, no ulcer and no callus  Toe Exam: ROM and strength within normal limits  Sensory   Monofilament testing: intact    Vascular  The right DP pulse is 2+  The right PT pulse is 2+  Left Foot/Ankle  Left Foot Inspection  Skin Exam: skin normal, skin intact and dry skin  No warmth, no erythema, no maceration, normal color, no pre-ulcer, no ulcer and no callus  Toe Exam: ROM and strength within normal limits  Sensory   Monofilament testing: intact    Vascular  The left DP pulse is 2+  The left PT pulse is 2+       Assign Risk Category  No deformity present  No loss of protective sensation  No weak pulses  Risk: 0

## 2022-03-16 NOTE — PATIENT INSTRUCTIONS
10% - bad control"> 10% - bad control,Hemoglobin A1c (HbA1c) greater than 10% indicating poor diabetic control,Haemoglobin A1c greater than 10% indicating poor diabetic control">   Diabetes Mellitus Type 2 in Adults, Ambulatory Care   GENERAL INFORMATION:   Diabetes mellitus type 2  is a disease that affects how your body uses glucose (sugar)  Insulin helps move sugar out of the blood so it can be used for energy  Normally, when the blood sugar level increases, the pancreas makes more insulin  Type 2 diabetes develops because either the body cannot make enough insulin, or it cannot use the insulin correctly  After many years, your pancreas may stop making insulin  Common symptoms include the following:   · More hunger or thirst than usual     · Frequent urination     · Weight loss without trying     · Blurred vision  Seek immediate care for the following symptoms:   · Severe abdominal pain, or pain that spreads to your back  You may also be vomiting  · Trouble staying awake or focusing    · Shaking or sweating    · Blurred or double vision    · Breath has a fruity, sweet smell    · Breathing is deep and labored, or rapid and shallow    · Heartbeat is fast and weak  Treatment for diabetes mellitus type 2  includes keeping your blood sugar at a normal level  You must eat the right foods, and exercise regularly  You may also need medicine if you cannot control your blood sugar level with nutrition and exercise  Manage diabetes mellitus type 2:   · Check your blood sugar level  You will be taught how to check a small drop of blood in a glucose monitor  Ask your healthcare provider when and how often to check during the day  Ask your healthcare provider what your blood sugar levels should be when you check them  · Keep track of carbohydrates (sugar and starchy foods)  Your blood sugar level can get too high if you eat too many carbohydrates   Your dietitian will help you plan meals and snacks that have the right amount of carbohydrates  · Eat low-fat foods  Some examples are skinless chicken and low-fat milk  · Eat less sodium (salt)  Some examples of high-sodium foods to limit are soy sauce, potato chips, and soup  Do not add salt to food you cook  Limit your use of table salt  · Eat high-fiber foods  Foods that are a good source of fiber include vegetables, whole grain bread, and beans  · Limit alcohol  Alcohol affects your blood sugar level and can make it harder to manage your diabetes  Women should limit alcohol to 1 drink a day  Men should limit alcohol to 2 drinks a day  A drink of alcohol is 12 ounces of beer, 5 ounces of wine, or 1½ ounces of liquor  · Get regular exercise  Exercise can help keep your blood sugar level steady, decrease your risk of heart disease, and help you lose weight  Exercise for at least 30 minutes, 5 days a week  Include muscle strengthening activities 2 days each week  Work with your healthcare provider to create an exercise plan  · Check your feet each day  for injuries or open sores  Ask your healthcare provider for activities you can do if you have an open sore  · Quit smoking  If you smoke, it is never too late to quit  Smoking can worsen the problems that may occur with diabetes  Ask your healthcare provider for information about how to stop smoking if you are having trouble quitting  · Ask about your weight:  Ask healthcare providers if you need to lose weight, and how much to lose  Ask them to help you with a weight loss program  Even a 10 to 15 pound weight loss can help you manage your blood sugar level  · Carry medical alert identification  Wear medical alert jewelry or carry a card that says you have diabetes  Ask your healthcare provider where to get these items  · Ask about vaccines  Diabetes puts you at risk of serious illness if you get the flu, pneumonia, or hepatitis   Ask your healthcare provider if you should get a flu, pneumonia, or hepatitis B vaccine, and when to get the vaccine  Follow up with your healthcare provider as directed:  Write down your questions so you remember to ask them during your visits  CARE AGREEMENT:   You have the right to help plan your care  Learn about your health condition and how it may be treated  Discuss treatment options with your caregivers to decide what care you want to receive  You always have the right to refuse treatment  The above information is an  only  It is not intended as medical advice for individual conditions or treatments  Talk to your doctor, nurse or pharmacist before following any medical regimen to see if it is safe and effective for you  © 2014 [address] is for End User's use only and may not be sold, redistributed or otherwise used for commercial purposes  All illustrations and images included in CareNotes® are the copyrighted property of A [employer]  or [employer]on Wylie

## 2022-03-16 NOTE — PROGRESS NOTES
Assessment/Plan:    28-year-old woman with: type 2 diabetes on insulin her loss that is significant right shoulder pain and stiffness along with numbness and tingling in her right arm will refer to Dermatology will check labs will continue current medications and COVID encouraged close follow-up with endocrinology will check x-rays and refer back to physical therapy encouraged her follow-up with orthopedics as well to call back if not improving worsening follow-up in 3 month    No problem-specific Assessment & Plan notes found for this encounter  Diagnoses and all orders for this visit:    Hair loss  -     Ambulatory Referral to Dermatology; Future  -     CBC and differential; Future  -     Comprehensive metabolic panel; Future  -     TSH, 3rd generation with Free T4 reflex; Future  -     Lipid Panel with Direct LDL reflex; Future  -     XR spine cervical complete 4 or 5 vw non injury; Future  -     XR shoulder 2+ vw right; Future  -     Ambulatory Referral to Physical Therapy; Future    Type 2 diabetes mellitus with other specified complication, with long-term current use of insulin (HCC)  -     POCT hemoglobin A1c  -     Empagliflozin 25 MG TABS; Take 1 tablet (25 mg total) by mouth every morning  -     CBC and differential; Future  -     Comprehensive metabolic panel; Future  -     TSH, 3rd generation with Free T4 reflex; Future  -     Lipid Panel with Direct LDL reflex; Future  -     XR spine cervical complete 4 or 5 vw non injury; Future  -     XR shoulder 2+ vw right; Future  -     Ambulatory Referral to Physical Therapy; Future    Migraine without aura and without status migrainosus, not intractable  -     CBC and differential; Future  -     Comprehensive metabolic panel; Future  -     TSH, 3rd generation with Free T4 reflex; Future  -     Lipid Panel with Direct LDL reflex; Future  -     XR spine cervical complete 4 or 5 vw non injury;  Future  -     XR shoulder 2+ vw right; Future  -     Ambulatory Referral to Physical Therapy; Future    Uncontrolled type 2 diabetes mellitus with hyperglycemia, without long-term current use of insulin (HCC)  -     CBC and differential; Future  -     Comprehensive metabolic panel; Future  -     TSH, 3rd generation with Free T4 reflex; Future  -     Lipid Panel with Direct LDL reflex; Future  -     XR spine cervical complete 4 or 5 vw non injury; Future  -     XR shoulder 2+ vw right; Future  -     Ambulatory Referral to Physical Therapy; Future    Uncontrolled type 2 diabetes mellitus with hyperglycemia, with long-term current use of insulin (HCC)  -     CBC and differential; Future  -     Comprehensive metabolic panel; Future  -     TSH, 3rd generation with Free T4 reflex; Future  -     Lipid Panel with Direct LDL reflex; Future  -     XR spine cervical complete 4 or 5 vw non injury; Future  -     XR shoulder 2+ vw right; Future  -     Ambulatory Referral to Physical Therapy; Future    Current use of insulin (HCC)  -     CBC and differential; Future  -     Comprehensive metabolic panel; Future  -     TSH, 3rd generation with Free T4 reflex; Future  -     Lipid Panel with Direct LDL reflex; Future  -     XR spine cervical complete 4 or 5 vw non injury; Future  -     XR shoulder 2+ vw right; Future  -     Ambulatory Referral to Physical Therapy; Future    Abnormal thyroid blood test  -     CBC and differential; Future  -     Comprehensive metabolic panel; Future  -     TSH, 3rd generation with Free T4 reflex; Future  -     Lipid Panel with Direct LDL reflex; Future  -     XR spine cervical complete 4 or 5 vw non injury; Future  -     XR shoulder 2+ vw right; Future  -     Ambulatory Referral to Physical Therapy; Future    Numbness and tingling of right arm  -     XR spine cervical complete 4 or 5 vw non injury; Future  -     XR shoulder 2+ vw right; Future  -     Ambulatory Referral to Physical Therapy;  Future    Cervical radiculitis  -     XR spine cervical complete 4 or 5 vw non injury; Future  -     XR shoulder 2+ vw right; Future  -     Ambulatory Referral to Physical Therapy; Future    Adhesive capsulitis of right shoulder  -     XR spine cervical complete 4 or 5 vw non injury; Future  -     XR shoulder 2+ vw right; Future  -     Ambulatory Referral to Physical Therapy; Future          Subjective:     Chief Complaint   Patient presents with    Follow-up     hair falling out        Patient ID: Elena zavala a 37 y o  female  Patient is a 27-year-old woman who presents for follow-up on type 2 diabetes on insulin her loss that is significant right shoulder pain and stiffness along with numbness and tingling in her right arm she admits having stress of late but otherwise admits being stable on medications no fevers chills nausea vomiting tolerating p o  intake no other complaints at this time      The following portions of the patient's history were reviewed and updated as appropriate: allergies, current medications, past family history, past medical history, past social history, past surgical history and problem list     Review of Systems   Constitutional: Negative  HENT: Negative  Eyes: Negative  Respiratory: Negative  Cardiovascular: Negative  Gastrointestinal: Negative  Endocrine: Negative  Genitourinary: Negative  Musculoskeletal: Positive for arthralgias, myalgias and neck pain  Allergic/Immunologic: Negative  Neurological: Positive for numbness  Hematological: Negative  Psychiatric/Behavioral: Negative  All other systems reviewed and are negative  Objective:      /74 (BP Location: Left arm, Patient Position: Sitting, Cuff Size: Standard)   Pulse 68   Temp 99 °F (37 2 °C) (Axillary)   Ht 5' 5" (1 651 m)   Wt 82 2 kg (181 lb 3 2 oz)   SpO2 99%   BMI 30 15 kg/m²          Physical Exam  Constitutional:       Appearance: She is well-developed  HENT:      Head: Atraumatic        Right Ear: External ear normal       Left Ear: External ear normal    Eyes:      Conjunctiva/sclera: Conjunctivae normal       Pupils: Pupils are equal, round, and reactive to light  Cardiovascular:      Rate and Rhythm: Normal rate and regular rhythm  Heart sounds: Normal heart sounds  Pulmonary:      Effort: Pulmonary effort is normal  No respiratory distress  Breath sounds: Normal breath sounds  Abdominal:      General: There is no distension  Palpations: Abdomen is soft  Tenderness: There is no abdominal tenderness  There is no guarding or rebound  Musculoskeletal:         General: Normal range of motion  Cervical back: Normal range of motion  Skin:     General: Skin is warm and dry  Neurological:      Mental Status: She is alert and oriented to person, place, and time  Cranial Nerves: No cranial nerve deficit  Psychiatric:         Behavior: Behavior normal          Thought Content: Thought content normal          Judgment: Judgment normal          BMI Counseling: Body mass index is 30 15 kg/m²  The BMI is above normal  Nutrition recommendations include encouraging healthy choices of fruits and vegetables  Exercise recommendations include moderate physical activity 150 minutes/week  Rationale for BMI follow-up plan is due to patient being overweight or obese  Depression Screening and Follow-up Plan: Patient was screened for depression during today's encounter  They screened negative with a PHQ-2 score of 0

## 2022-03-18 ENCOUNTER — HOSPITAL ENCOUNTER (OUTPATIENT)
Dept: RADIOLOGY | Facility: HOSPITAL | Age: 42
Discharge: HOME | End: 2022-03-18
Payer: COMMERCIAL

## 2022-03-18 ENCOUNTER — APPOINTMENT (OUTPATIENT)
Dept: LAB | Facility: HOSPITAL | Age: 42
End: 2022-03-18
Attending: INTERNAL MEDICINE
Payer: COMMERCIAL

## 2022-03-18 DIAGNOSIS — G43.009 MIGRAINE WITHOUT AURA AND WITHOUT STATUS MIGRAINOSUS, NOT INTRACTABLE: ICD-10-CM

## 2022-03-18 DIAGNOSIS — E11.65 UNCONTROLLED TYPE 2 DIABETES MELLITUS WITH HYPERGLYCEMIA, WITH LONG-TERM CURRENT USE OF INSULIN (HCC): ICD-10-CM

## 2022-03-18 DIAGNOSIS — R79.89 ABNORMAL THYROID BLOOD TEST: ICD-10-CM

## 2022-03-18 DIAGNOSIS — R20.0 NUMBNESS AND TINGLING OF RIGHT ARM: ICD-10-CM

## 2022-03-18 DIAGNOSIS — E11.65 UNCONTROLLED TYPE 2 DIABETES MELLITUS WITH HYPERGLYCEMIA, WITHOUT LONG-TERM CURRENT USE OF INSULIN (HCC): ICD-10-CM

## 2022-03-18 DIAGNOSIS — Z79.4 TYPE 2 DIABETES MELLITUS WITH OTHER SPECIFIED COMPLICATION, WITH LONG-TERM CURRENT USE OF INSULIN (HCC): ICD-10-CM

## 2022-03-18 DIAGNOSIS — Z79.4 UNCONTROLLED TYPE 2 DIABETES MELLITUS WITH HYPERGLYCEMIA, WITH LONG-TERM CURRENT USE OF INSULIN (HCC): ICD-10-CM

## 2022-03-18 DIAGNOSIS — Z79.4 CURRENT USE OF INSULIN (HCC): ICD-10-CM

## 2022-03-18 DIAGNOSIS — L65.9 HAIR LOSS: ICD-10-CM

## 2022-03-18 DIAGNOSIS — R20.2 NUMBNESS AND TINGLING OF RIGHT ARM: ICD-10-CM

## 2022-03-18 DIAGNOSIS — M75.01 ADHESIVE CAPSULITIS OF RIGHT SHOULDER: ICD-10-CM

## 2022-03-18 DIAGNOSIS — M54.12 CERVICAL RADICULITIS: ICD-10-CM

## 2022-03-18 DIAGNOSIS — E11.69 TYPE 2 DIABETES MELLITUS WITH OTHER SPECIFIED COMPLICATION, WITH LONG-TERM CURRENT USE OF INSULIN (HCC): ICD-10-CM

## 2022-03-18 LAB
ALBUMIN SERPL BCP-MCNC: 3.8 G/DL (ref 3–5.2)
ALP SERPL-CCNC: 79 U/L (ref 43–122)
ALT SERPL W P-5'-P-CCNC: 12 U/L
ANION GAP SERPL CALCULATED.3IONS-SCNC: 6 MMOL/L (ref 5–14)
AST SERPL W P-5'-P-CCNC: 20 U/L (ref 14–36)
BASOPHILS # BLD AUTO: 0 THOUSANDS/ΜL (ref 0–0.1)
BASOPHILS NFR BLD AUTO: 1 % (ref 0–1)
BILIRUB SERPL-MCNC: 0.94 MG/DL
BUN SERPL-MCNC: 11 MG/DL (ref 5–25)
CALCIUM SERPL-MCNC: 8.9 MG/DL (ref 8.4–10.2)
CHLORIDE SERPL-SCNC: 102 MMOL/L (ref 97–108)
CHOLEST SERPL-MCNC: 148 MG/DL
CO2 SERPL-SCNC: 26 MMOL/L (ref 22–30)
CREAT SERPL-MCNC: 0.69 MG/DL (ref 0.6–1.2)
CREAT UR-MCNC: 73.4 MG/DL
EOSINOPHIL # BLD AUTO: 0.2 THOUSAND/ΜL (ref 0–0.4)
EOSINOPHIL NFR BLD AUTO: 2 % (ref 0–6)
ERYTHROCYTE [DISTWIDTH] IN BLOOD BY AUTOMATED COUNT: 13.6 %
EST. AVERAGE GLUCOSE BLD GHB EST-MCNC: 292 MG/DL
GFR SERPL CREATININE-BSD FRML MDRD: 109 ML/MIN/1.73SQ M
GLUCOSE P FAST SERPL-MCNC: 309 MG/DL (ref 70–99)
HBA1C MFR BLD: 11.8 %
HCT VFR BLD AUTO: 38.6 % (ref 36–46)
HDLC SERPL-MCNC: 66 MG/DL
HGB BLD-MCNC: 12.9 G/DL (ref 12–16)
LDLC SERPL CALC-MCNC: 65 MG/DL
LYMPHOCYTES # BLD AUTO: 2.9 THOUSANDS/ΜL (ref 0.5–4)
LYMPHOCYTES NFR BLD AUTO: 45 % (ref 25–45)
MCH RBC QN AUTO: 28.7 PG (ref 26–34)
MCHC RBC AUTO-ENTMCNC: 33.4 G/DL (ref 31–36)
MCV RBC AUTO: 86 FL (ref 80–100)
MICROALBUMIN UR-MCNC: 18.7 MG/L (ref 0–20)
MICROALBUMIN/CREAT 24H UR: 25 MG/G CREATININE (ref 0–30)
MONOCYTES # BLD AUTO: 0.5 THOUSAND/ΜL (ref 0.2–0.9)
MONOCYTES NFR BLD AUTO: 8 % (ref 1–10)
NEUTROPHILS # BLD AUTO: 2.8 THOUSANDS/ΜL (ref 1.8–7.8)
NEUTS SEG NFR BLD AUTO: 44 % (ref 45–65)
PLATELET # BLD AUTO: 314 THOUSANDS/UL (ref 150–450)
PMV BLD AUTO: 8.3 FL (ref 8.9–12.7)
POTASSIUM SERPL-SCNC: 4 MMOL/L (ref 3.6–5)
PROT SERPL-MCNC: 7.6 G/DL (ref 5.9–8.4)
RBC # BLD AUTO: 4.49 MILLION/UL (ref 4–5.2)
SODIUM SERPL-SCNC: 134 MMOL/L (ref 137–147)
TRIGL SERPL-MCNC: 83 MG/DL
TSH SERPL DL<=0.05 MIU/L-ACNC: 2.23 UIU/ML (ref 0.47–4.68)
WBC # BLD AUTO: 6.3 THOUSAND/UL (ref 4.5–11)

## 2022-03-18 PROCEDURE — 80061 LIPID PANEL: CPT

## 2022-03-18 PROCEDURE — 80053 COMPREHEN METABOLIC PANEL: CPT

## 2022-03-18 PROCEDURE — 83036 HEMOGLOBIN GLYCOSYLATED A1C: CPT

## 2022-03-18 PROCEDURE — 72050 X-RAY EXAM NECK SPINE 4/5VWS: CPT

## 2022-03-18 PROCEDURE — 73030 X-RAY EXAM OF SHOULDER: CPT

## 2022-03-18 PROCEDURE — 82570 ASSAY OF URINE CREATININE: CPT

## 2022-03-18 PROCEDURE — 82043 UR ALBUMIN QUANTITATIVE: CPT

## 2022-03-18 PROCEDURE — 85025 COMPLETE CBC W/AUTO DIFF WBC: CPT

## 2022-03-18 PROCEDURE — 84443 ASSAY THYROID STIM HORMONE: CPT

## 2022-03-18 PROCEDURE — 36415 COLL VENOUS BLD VENIPUNCTURE: CPT

## 2022-03-22 ENCOUNTER — TELEPHONE (OUTPATIENT)
Dept: ENDOCRINOLOGY | Facility: CLINIC | Age: 42
End: 2022-03-22

## 2022-03-22 DIAGNOSIS — Z79.4 CURRENT USE OF INSULIN (HCC): ICD-10-CM

## 2022-03-22 DIAGNOSIS — E11.65 UNCONTROLLED TYPE 2 DIABETES MELLITUS WITH HYPERGLYCEMIA, WITHOUT LONG-TERM CURRENT USE OF INSULIN (HCC): ICD-10-CM

## 2022-03-22 NOTE — TELEPHONE ENCOUNTER
----- Message from  [facility] sent at 3/21/2022  4:51 PM EDT -----  Please call patient regarding labs  A1C remains very elevated as well as fasting glucose  I would advise her to increase levemir to 25 units daily  I highly recommend she bring in blood sugar log at upcoming appointment so medications can be adjusted further  All other labs look good  Can discuss in more detail at upcoming appointment

## 2022-04-08 ENCOUNTER — CONSULT (OUTPATIENT)
Dept: DERMATOLOGY | Facility: CLINIC | Age: 42
End: 2022-04-08
Payer: COMMERCIAL

## 2022-04-08 VITALS — BODY MASS INDEX: 29.82 KG/M2 | TEMPERATURE: 98.1 F | HEIGHT: 65 IN | WEIGHT: 179 LBS

## 2022-04-08 DIAGNOSIS — L65.9 HAIR LOSS: ICD-10-CM

## 2022-04-08 PROCEDURE — 99244 OFF/OP CNSLTJ NEW/EST MOD 40: CPT | Performed by: DERMATOLOGY

## 2022-04-08 RX ORDER — MOMETASONE FUROATE 1 MG/ML
SOLUTION TOPICAL DAILY
Qty: 60 ML | Refills: 0 | Status: SHIPPED | OUTPATIENT
Start: 2022-04-08

## 2022-04-08 NOTE — PROGRESS NOTES
[facility] Note     Patient Name: Madison Watson  Encounter Date: 04/08/2022     Have you been cared for by a [facility] in the last 3 years and, if so, which one? No    · Have you traveled outside of the [address] [place] [address] in the past 3 months or outside of the [facility] area in the last 2 weeks? No     May we call your Preferred Phone number to discuss your specific medical information? Yes     May we leave a detailed message that includes your specific medical information? Yes      Today's Chief Concerns:   Concern #1:  Hair Loss       Past Medical History:  Have you personally ever had or currently have any of the following? · Skin cancer (such as Melanoma, Basal Cell Carcinoma, Squamous Cell Carcinoma? (If Yes, please provide more detail)- No  · Eczema: No  · Psoriasis: No  · HIV/AIDS: No  · Hepatitis B or C: No  · Tuberculosis: No  · Systemic Immunosuppression such as Diabetes, Biologic or Immunotherapy, Chemotherapy, Organ Transplantation, Bone Marrow Transplantation (If YES, please provide more detail): No  · Radiation Treatment (If YES, please provide more detail): No  · Any other major medical conditions/concerns? 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 Reading     Family History:  Have any of your "first degree relatives" (parent, brother, sister, or child) had any of the following       · Skin cancer such as Melanoma or Merkel Cell Carcinoma or Pancreatic Cancer? No  · Eczema, Asthma, Hay Fever or Seasonal Allergies: YES, Eczema and asthma   · Psoriasis or Psoriatic Arthritis: YES, Psoriasis   · Do any other medical conditions seem to run in your family? If Yes, what condition and which relatives?   No    Current Medications:       Current Outpatient Medications:     Accu-Chek FastClix Lancets MISC, Test BG up to 3x daily as directed, Disp: 300 each, Rfl: 1    Accu-Chek Guide test strip, TEST BG UP TO 3X DAILY AS DIRECTED, Disp: 100 each, Rfl: 1    Empagliflozin 25 MG TABS, Take 1 tablet (25 mg total) by mouth every morning, Disp: 90 tablet, Rfl: 1    Injection Device for Insulin (B-D PEN MINI) ENRICO, Use 4 (four) times a day Please use for Lantus and Humalog  4 pen needles daily Dx: Diabetes, Disp: 400 each, Rfl: 0    insulin detemir (LEVEMIR FLEXTOUCH) 100 Units/mL injection pen, Inject 25 Units under the skin daily at bedtime, Disp: 15 mL, Rfl: 2    Insulin Pen Needle (BD Pen Needle Em U/F) 32G X 4 MM MISC, Use daily, Disp: 100 each, Rfl: 3    naproxen (EC NAPROSYN) 500 MG EC tablet, Take 1 tablet (500 mg total) by mouth 2 (two) times a day as needed for mild pain or headaches, Disp: , Rfl:     nitroglycerin (NITROSTAT) 0 4 mg SL tablet, Place 1 tablet (0 4 mg total) under the tongue every 5 (five) minutes as needed for chest pain for up to 7 days, Disp: 20 tablet, Rfl: 0    prochlorperazine (COMPAZINE) 10 mg tablet, 1 tab at onset of migraine, can repeat in 8 hours, can take with NSAID  Take with Benadryl if there are side effects  , Disp: 20 tablet, Rfl: 3    semaglutide, 1 mg/dose, (Ozempic) 4 MG/3ML SOPN injection pen, Inject 0 75 mL (1 mg total) under the skin once a week (Patient taking differently: Inject 1 mg under the skin once a week On mondays), Disp: 9 mL, Rfl: 3    topiramate (TOPAMAX) 50 MG tablet, Take 1 tablet (50 mg total) by mouth daily at bedtime, Disp: 90 tablet, Rfl: 1    traMADol (ULTRAM) 50 mg tablet, Take 1 tablet (50 mg total) by mouth every 8 (eight) hours as needed for moderate pain, Disp: 20 tablet, Rfl: 0      Review of Systems:  Have you recently had or currently have any of the following? If YES, what are you doing for the problem?     · Fever, chills or unintended weight loss: No  · Sudden loss or change in your vision: No  · Nausea, vomiting or blood in your stool: No  · Painful or swollen joints: No  · Wheezing or cough: No  · Changing mole or non-healing wound: No  · Nosebleeds: No  · Excessive sweating: No  · Easy or prolonged bleeding? No  · Over the last 2 weeks, how often have you been bothered by the following problems? · Taking little interest or pleasure in doing things: 1 - Not at All  · Feeling down, depressed, or hopeless: 1 - Not at All  · Rapid heartbeat with epinephrine:  No    · FEMALES ONLY:    · Are you pregnant or planning to become pregnant? No  · Are you currently or planning to be nursing or breast feeding? No    · Any known allergies? Allergies   Allergen Reactions    Metformin And Related Hives         Physical Exam:     Was a chaperone (Derm Clinical Assistant) present throughout the entire Physical Exam? Yes     Did the Dermatology Team specifically  the patient on the importance of a Full Skin Exam to be sure that nothing is missed clinically?  Yes}  o Did the patient ultimately request or accept a Full Skin Exam?  Yes  o Did the patient specifically refuse to have the areas "under-the-bra" examined by the Dermatologist? No  o Did the patient specifically refuse to have the areas "under-the-underwear" examined by the Dermatologist? No    CONSTITUTIONAL:   Vitals:    04/08/22 1437   Temp: 98 1 °F (36 7 °C)   TempSrc: Temporal   Weight: 81 2 kg (179 lb)   Height: 5' 5" (1 651 m)         PSYCH: Normal mood and affect  EYES: Normal conjunctiva  ENT: Normal lips and oral mucosa  CARDIOVASCULAR: No edema  RESPIRATORY: Normal respirations  HEME/LYMPH/IMMUNO:  No regional lymphadenopathy except as noted below in "ASSESSMENT AND PLAN BY DIAGNOSIS"    SKIN:  FULL ORGAN SYSTEM EXAM   Hair, Scalp, Ears, Face Normal except as noted below in Assessment   Neck, Cervical Chain Nodes Normal except as noted below in Assessment                                        Assessment and Plan by Diagnosis:    History of Present Condition:     Duration:  How long has this been an issue for you?    o  2 months ago    Location Affected:  Where on the body is this affecting you?    o  Scalp    Quality:  Is there any bleeding, pain, itch, burning/irritation, or redness associated with the skin lesion?    o  Burning    Severity:  Describe any bleeding, pain, itch, burning/irritation, or redness on a scale of 1 to 10 (with 10 being the worst)  o  1   Timing:  Does this condition seem to be there pretty constantly or do you notice it more at specific times throughout the day?    o  Denies   Context:  Have you ever noticed that this condition seems to be associated with specific activities you do?    o  Denies   Modifying Factors:    o Anything that seems to make the condition worse?    -  Denies  o What have you tried to do to make the condition better?    -  Oils OTC    Associated Signs and Symptoms:  Does this skin lesion seem to be associated with any of the following:  o Denies        1  HAIR LOSS    Physical Exam:   Anatomic Location Affected:  Scalp   Morphological Description:  Diffuse Hair Thinning See photographs   Pertinent Positives:   Pertinent Negatives: Additional History of Present Condition:  Located on scalp  Presents for 2 months, Patient had tried over the counter oils with no improvement     Assessment and Plan:  Based on a thorough discussion of this condition and the management approach to it (including a comprehensive discussion of the known risks, side effects and potential benefits of treatment), the patient (family) agrees to implement the following specific plan:   Reminiscent of diffuse, extensive CCCA because there are hints a perifollicular fibrosis and scale, but diffuse AA cannot be ruled out   Not clinically consistent with dermatophytosis   No discernable reason to suspect TE   Schedule for scalp biopsy to be scheduled in June with Dr Chelsy Gonzalez once schedule is available      Please start Mometasone Lotion apply topically to the scalp once a day           Scribe Attestation    I,:  Watson Palomares am acting as a scribe while in the presence of the attending physician :       I,:  Kendal Angel MD personally performed the services described in this documentation    as scribed in my presence :

## 2022-04-08 NOTE — PATIENT INSTRUCTIONS
1  HAIR LOSS      Assessment and Plan:  Based on a thorough discussion of this condition and the management approach to it (including a comprehensive discussion of the known risks, side effects and potential benefits of treatment), the patient (family) agrees to implement the following specific plan:   Schedule for scalp biopsy to be scheduled in June with Dr Chelsy Gonzalez once schedule is available      Please start Mometasone Lotion apply topically to the scalp once a day

## 2022-04-21 ENCOUNTER — OFFICE VISIT (OUTPATIENT)
Dept: OBGYN CLINIC | Facility: OTHER | Age: 42
End: 2022-04-21
Payer: COMMERCIAL

## 2022-04-21 VITALS
SYSTOLIC BLOOD PRESSURE: 115 MMHG | HEIGHT: 65 IN | BODY MASS INDEX: 29.82 KG/M2 | DIASTOLIC BLOOD PRESSURE: 77 MMHG | HEART RATE: 82 BPM | WEIGHT: 179 LBS

## 2022-04-21 DIAGNOSIS — M54.2 NECK PAIN: Primary | ICD-10-CM

## 2022-04-21 DIAGNOSIS — R20.0 NUMBNESS AND TINGLING OF RIGHT ARM: ICD-10-CM

## 2022-04-21 DIAGNOSIS — R20.2 NUMBNESS AND TINGLING OF RIGHT ARM: ICD-10-CM

## 2022-04-21 DIAGNOSIS — M75.01 ADHESIVE CAPSULITIS OF RIGHT SHOULDER: ICD-10-CM

## 2022-04-21 PROCEDURE — 99214 OFFICE O/P EST MOD 30 MIN: CPT | Performed by: FAMILY MEDICINE

## 2022-04-21 NOTE — PATIENT INSTRUCTIONS
Explained the patient that her numbness and tingling in the right arm as well as pain about the trees region could be due to pinched nerve in the neck  Her range of motion today on examination is significant improved for her shoulder however I do recommend continue physical therapy especially range-of-motion exercises for the shoulder  Explained that her diabetes is too uncontrolled to perform ultrasound-guided corticosteroid injection this time    I have ordered MRI of the cervical spine to evaluate for possible pinched nerve in the neck known as radiculopathy which could cause pain down the arm

## 2022-04-21 NOTE — PROGRESS NOTES
1  Neck pain  MRI cervical spine wo contrast    [facility]   2  Numbness and tingling of right arm  MRI cervical spine wo contrast    [facility]   3  Adhesive capsulitis of right shoulder  [facility]     Orders Placed This Encounter   Procedures    MRI cervical spine wo contrast    [facility]        IMAGING STUDIES: (I personally reviewed images in PACS and report):         PAST REPORTS:   x-ray cervical spine 03/18/2022: No acute cervical spinal abnormalities      ASSESSMENT/PLAN:  Right shoulder adhesive capsulitis    subjective right arm weakness  Numbness and tingling right arm   Right upper trapezius spasm  PMH diabetes HA1c 10 4 08/24/2021; 9 4 07/06/2021, and previously 13 1/5/22, 11 8 on 3/18/22    Differential diagnosis:   Improving adhesive capsulitis   Cervical radiculopathy     interventions: We will consider ultrasound-guided corticosteroid injection once A1c 9 or below  Physical therapy and daily home exercise program      Repeat X-ray next visit: None    Return for Follow-up after MRI is completed for review  Patient Instructions    Explained the patient that her numbness and tingling in the right arm as well as pain about the trees region could be due to pinched nerve in the neck  Her range of motion today on examination is significant improved for her shoulder however I do recommend continue physical therapy especially range-of-motion exercises for the shoulder  Explained that her diabetes is too uncontrolled to perform ultrasound-guided corticosteroid injection this time    I have ordered MRI of the cervical spine to evaluate for possible pinched nerve in the neck known as radiculopathy which could cause pain down the arm        __________________________________________________________________________    CHIEF COMPLAINT:  Right shoulder pain and numbness in the right arm    HPI:  Malu Webber is a 37 y o  female  who presents for       Visit  04/21/2022: Significant past medical history uncontrolled diabetes  Last visit diagnosed with stiffness in the right shoulder adhesive capsulitis  Plan was for corticosteroid injection once diabetes improved however she has had recent increase in her sugar  She has seen Endocrinology and they are working with her medications to improve her diabetes  Today, of points the right upper trapezius region neck as well as lateral shoulder source of her pain  Associated symptoms include numbness and tingling down the right arm    Stiffness subjectively  On history but improved on exam passively     feels subjective weakness in the right arm    patient has performed physical therapy  In the fall and winter of 2021 for 6+ weeks per patient        Review of Systems      Following history reviewed and update:    Past Medical History:   Diagnosis Date    Diabetes mellitus ([facility] [address] )      Past Surgical History:   Procedure Laterality Date    HERNIA REPAIR      TUBAL LIGATION  2017     Social History   Social History     Substance and Sexual Activity   Alcohol Use Yes    Comment: ocassionally     Social History     Substance and Sexual Activity   Drug Use Never     Social History     Tobacco Use   Smoking Status Never Smoker   Smokeless Tobacco Never Used     Family History   Problem Relation Age of Onset    Hypertension Mother     Arthritis Mother     Asthma Sister     Bipolar disorder Brother     Schizophrenia Brother     Asthma Brother     Asthma Brother     Diabetes Maternal Grandmother     Diabetes Paternal Grandmother     No Known Problems Maternal Grandfather     No Known Problems Paternal Grandfather      Allergies   Allergen Reactions    Metformin And Related Hives          Physical Exam  /77 (BP Location: Left arm, Patient Position: Sitting, Cuff Size: Adult)   Pulse 82   Ht 5' 5" (1 651 m)   Wt 81 2 kg (179 lb)   BMI 29 79 kg/m²     Constitutional:  see vital signs  Gen: well-developed, normocephalic/atraumatic, well-groomed  Eyes: No inflammation or discharge of conjunctiva or lids; sclera clear   Pharynx: no inflammation, lesion, or mass of lips  Neck: supple, no masses, non-distended  MSK: no inflammation, lesion, mass, or clubbing of nails and digits except for other than mentioned below  SKIN: no visible rashes or skin lesions  Pulmonary/Chest: Effort normal  No respiratory distress     NEURO: cranial nerves grossly intact  PSYCH:  Alert and oriented to person, place, and time; recent and remote memory intact; mood normal, no depression, anxiety, or agitation, judgment and insight good and intact     Ortho Exam  Cervical  ROM: intact  Midline spinous process tenderness: None  Muscular Tenderness:+ right upper trapezius right paraspinals  Sensation UE Bilateral:  C5: normal  C6: normal  C7: normal  C8: normal  T1: normal  Strength UE: 5/5 elbow, wrist, fingers bilateral  Reflexes:   Spurlings:       RIGHT SHOULDER:  Erythema: no  Swelling: no  Increased Warmth: no    Tenderness: + lateral subacromial    ROM  Touchdown sign:  Passive abduction almost full compared to contralateral side with coaching  External Rotation:  80-90 passively with coaching  Internal Rotation: Intact    Strength  Abduction: 5/5  ER: 5/5  IR: 5/5    Drop-Arm: negative  Emptycan: +  Belly Press:   Lift-off Test:    Massimo:+  Neer: +  Cross-Arm:   Speeds:     Internal Impingement:  (crank position pain)    Labral Crank Test :  (abducted 90, axial load, guided IR & Er)    Modified Labral Shift:  (seated, ER, abduction, axial load, guided abd/add)    Chambers's Test:   (FF 90, abd 15, resist thumbs up-, resist thumbs down+)    Apprehension:  Negar's Relocation Maneuver:    LEFT SHOULDER:  Strength  Abduction: 5/5  ER: 5/5  IR: 5/5    ROM  Touchdown sign: intact    Empty can: negative     __________________________________________________________________________  Procedures

## 2022-05-02 ENCOUNTER — HOSPITAL ENCOUNTER (OUTPATIENT)
Dept: MAMMOGRAPHY | Facility: CLINIC | Age: 42
Discharge: HOME | End: 2022-05-02
Payer: COMMERCIAL

## 2022-05-02 ENCOUNTER — HOSPITAL ENCOUNTER (OUTPATIENT)
Dept: ULTRASOUND IMAGING | Facility: CLINIC | Age: 42
Discharge: HOME | End: 2022-05-02
Payer: COMMERCIAL

## 2022-05-02 VITALS — WEIGHT: 170 LBS | HEIGHT: 65 IN | BODY MASS INDEX: 28.32 KG/M2

## 2022-05-02 DIAGNOSIS — R92.8 ABNORMAL ULTRASOUND OF BREAST: ICD-10-CM

## 2022-05-02 PROCEDURE — G0279 TOMOSYNTHESIS, MAMMO: HCPCS

## 2022-05-02 PROCEDURE — 77065 DX MAMMO INCL CAD UNI: CPT

## 2022-05-02 PROCEDURE — 76642 ULTRASOUND BREAST LIMITED: CPT

## 2022-05-09 ENCOUNTER — HOSPITAL ENCOUNTER (OUTPATIENT)
Dept: MRI IMAGING | Facility: HOSPITAL | Age: 42
Discharge: HOME | End: 2022-05-09
Payer: COMMERCIAL

## 2022-05-09 DIAGNOSIS — M54.2 NECK PAIN: ICD-10-CM

## 2022-05-09 DIAGNOSIS — R20.0 NUMBNESS AND TINGLING OF RIGHT ARM: ICD-10-CM

## 2022-05-09 DIAGNOSIS — Z79.4 TYPE 2 DIABETES MELLITUS WITH OTHER SPECIFIED COMPLICATION, WITH LONG-TERM CURRENT USE OF INSULIN (HCC): ICD-10-CM

## 2022-05-09 DIAGNOSIS — E11.69 TYPE 2 DIABETES MELLITUS WITH OTHER SPECIFIED COMPLICATION, WITH LONG-TERM CURRENT USE OF INSULIN (HCC): ICD-10-CM

## 2022-05-09 DIAGNOSIS — R20.2 NUMBNESS AND TINGLING OF RIGHT ARM: ICD-10-CM

## 2022-05-09 DIAGNOSIS — E11.65 UNCONTROLLED TYPE 2 DIABETES MELLITUS WITH HYPERGLYCEMIA, WITHOUT LONG-TERM CURRENT USE OF INSULIN (HCC): ICD-10-CM

## 2022-05-09 PROCEDURE — G1004 CDSM NDSC: HCPCS

## 2022-05-09 PROCEDURE — 72141 MRI NECK SPINE W/O DYE: CPT

## 2022-05-24 ENCOUNTER — OFFICE VISIT (OUTPATIENT)
Dept: OBGYN CLINIC | Facility: OTHER | Age: 42
End: 2022-05-24
Payer: COMMERCIAL

## 2022-05-24 VITALS
SYSTOLIC BLOOD PRESSURE: 115 MMHG | HEART RATE: 74 BPM | DIASTOLIC BLOOD PRESSURE: 77 MMHG | HEIGHT: 65 IN | WEIGHT: 180 LBS | BODY MASS INDEX: 29.99 KG/M2

## 2022-05-24 DIAGNOSIS — M50.223 HERNIATION OF INTERVERTEBRAL DISC AT C6-C7 LEVEL: Primary | ICD-10-CM

## 2022-05-24 DIAGNOSIS — R20.2 NUMBNESS AND TINGLING OF RIGHT ARM: ICD-10-CM

## 2022-05-24 DIAGNOSIS — M54.2 NECK PAIN: ICD-10-CM

## 2022-05-24 DIAGNOSIS — R20.0 NUMBNESS AND TINGLING OF RIGHT ARM: ICD-10-CM

## 2022-05-24 PROCEDURE — 99214 OFFICE O/P EST MOD 30 MIN: CPT | Performed by: FAMILY MEDICINE

## 2022-05-24 NOTE — PROGRESS NOTES
1  Herniation of intervertebral disc at C6-C7 level  Ambulatory Referral to Neurosurgery    Ambulatory Referral to Pain Management   2  Neck pain  Ambulatory Referral to Neurosurgery    Ambulatory Referral to Pain Management   3  Numbness and tingling of right arm  Ambulatory Referral to Neurosurgery    Ambulatory Referral to Pain Management     Orders Placed This Encounter   Procedures    Ambulatory Referral to Neurosurgery    Ambulatory Referral to Pain Management        Imaging Studies (I personally reviewed images in PACS and report):    MRI cervical spine 05/09/2022:   Multifactorial disease results in moderate canal stenosis with mild cord impingement at C6-C7  Moderate left foraminal narrowing is present at this level  There is a bulging annulus with a superimposed central disc protrusion  Past reports:   x-ray cervical spine 03/18/2022: No acute cervical spinal abnormalities    IMPRESSION:  Right shoulder adhesive capsulitis    subjective right arm weakness  Numbness and tingling right arm   Right upper trapezius spasm  PMH diabetes HA1c 10 4 08/24/2021; 9 4 07/06/2021, and previously 13 1/5/22, 11 8 on 3/18/22      Repeat X-ray next visit: None    Return for Please follow up with neurosurgery and pain management   Patient Instructions   Reviewed MRI findings multifactorial disease results in moderate canal stenosis with mild cord impingement at C6-C7  Moderate left foraminal narrowing is present at this level  Physical exam weakness right finger extension 4/5 correlating with MRI findings  Patient referred to neurosurgery as well as pain management  Please make sure to continue to follow up with PCP for diabetes  CHIEF COMPLAINT:  Neck pain right sided numbness/tingling     HPI:  Aydee Becerra is a 37 y o  female  who presents for follow up neck pain  Right hand dominant  Visit 5/24/2022 : Today she reports persistent mild to moderate worsening neck and right upper extremity pain  Attended physical therapy 08/2021 partial compliance  Partial compliance with home exercises  Plans to go back to PT this week  Additional symptoms include right upper extremity numbness and tingling radiating from her right paraspinal cervical spine down to her fingers  Patient reports dropping things with her right hand  Denies acute injury since last seen  Review of Systems   Constitutional: Negative for chills and fever  HENT: Negative for ear pain and sore throat  Eyes: Negative for pain and visual disturbance  Respiratory: Negative for cough and shortness of breath  Cardiovascular: Negative for chest pain and palpitations  Gastrointestinal: Negative for abdominal pain and vomiting  Genitourinary: Negative for dysuria and hematuria  Musculoskeletal: Positive for neck pain  Negative for arthralgias and back pain  Skin: Negative for color change and rash  Neurological: Negative for seizures and syncope  All other systems reviewed and are negative      Following history reviewed and update:    Past Medical History:   Diagnosis Date    Diabetes mellitus ([facility] [address] )      Past Surgical History:   Procedure Laterality Date    HERNIA REPAIR      TUBAL LIGATION  2017     Social History   Social History     Substance and Sexual Activity   Alcohol Use Yes    Comment: ocassionally     Social History     Substance and Sexual Activity   Drug Use Never     Social History     Tobacco Use   Smoking Status Never Smoker   Smokeless Tobacco Never Used     Family History   Problem Relation Age of Onset    Hypertension Mother     Arthritis Mother     Asthma Sister     Bipolar disorder Brother     Schizophrenia Brother     Asthma Brother     Asthma Brother     Diabetes Maternal Grandmother     Diabetes Paternal Grandmother     No Known Problems Maternal Grandfather     No Known Problems Paternal Grandfather     Breast cancer Neg Hx      Allergies   Allergen Reactions    Metformin And Related Hives          Physical Exam  /77 (BP Location: Left arm, Patient Position: Sitting, Cuff Size: Adult)   Pulse 74   Ht 5' 5" (1 651 m)   Wt 81 6 kg (180 lb)   BMI 29 95 kg/m²     Constitutional:  see vital signs  Gen: well-developed, normocephalic/atraumatic, well-groomed  Eyes: No inflammation or discharge of conjunctiva or lids; sclera clear   Pharynx: no inflammation, lesion, or mass of lips  Neck: supple, no masses, non-distended  MSK: no inflammation, lesion, mass, or clubbing of nails and digits except for other than mentioned below  SKIN: no visible rashes or skin lesions  Pulmonary/Chest: Effort normal  No respiratory distress     NEURO: cranial nerves grossly intact  PSYCH:  Alert and oriented to person, place, and time; recent and remote memory intact; mood normal, no depression, anxiety, or agitation, judgment and insight good and intact     Ortho Exam  Cervical  ROM: intact  Midline spinous process tenderness: midline cervical spine from C4-C7  Multiple myofacial point tenderness   Muscular Tenderness: None  Sensation UE Bilateral:  C5: normal  C6: normal  C7: normal  C8: normal  T1: normal  Strength UE: 5/5 elbow, wrist, bilateral   Finger extension right 4/5 reproduces pain  Finger extension left 5/5   Reflexes: 2+ bilateral upper extremity          Procedures

## 2022-05-24 NOTE — PATIENT INSTRUCTIONS
Reviewed MRI findings multifactorial disease results in moderate canal stenosis with mild cord impingement at C6-C7  Moderate left foraminal narrowing is present at this level  Physical exam weakness right finger extension 4/5 correlating with MRI findings  Patient referred to neurosurgery as well as pain management  Please make sure to continue to follow up with PCP for diabetes

## 2022-05-25 ENCOUNTER — EVALUATION (OUTPATIENT)
Dept: PHYSICAL THERAPY | Facility: MEDICAL CENTER | Age: 42
End: 2022-05-25
Payer: COMMERCIAL

## 2022-05-25 DIAGNOSIS — M54.12 CERVICAL RADICULOPATHY: Primary | ICD-10-CM

## 2022-05-25 PROCEDURE — 97163 PT EVAL HIGH COMPLEX 45 MIN: CPT | Performed by: PHYSICAL THERAPIST

## 2022-05-25 NOTE — PROGRESS NOTES
PT Evaluation     Today's date: 2022  Patient name: Octavio Lombardi  : 1980  MRN: [id number]  Referring provider: Marcia Storey  Dx:   Encounter Diagnosis     ICD-10-CM    1  Cervical radiculopathy  M54 12                   Assessment/Plan  Patient is a very pleasant 36 yo female who presents with symptoms of neck pain and right arm weakness, numbing and tingling  Patient has been having symptoms over the past year however right arm weakness has worsened in the past 4 weeks  Patient's symptoms are consistent with cervical radiculopathy with progressive weakness of C7 myotome  Patient will be having surgical consultation as well as pain management over the next several weeks  Patient may benefit from physical therapy services however with the progressive nature of the symptoms that correlate with MRI findings it is imperative that patient have surgical consultation  Patient may also be suffering from adhesive capsulitis complication from diabetes however unlikely the cause of her weakness  2x a week for 4 weeks    Subjective  Patient states that she is now dropping things with her right hand because she is having loss of feeling and strength  Patient is having worsening neck pain however is more concerned with her weakness  Patient notes that she continues to work however her pain is very bad at 8/10 and her arm is making it difficult  Objective  Red Flags: progressive weakness  Pain: 8/10  Reflexes: right arm diminished triceps at 0/5, biceps 0/5, brachioradialis 1/5  Left arm 2/5 throughout  Posture: head listing left, very guarded   AROM: limited in all cervical motion by 75%, right shoulder motion limited in all plains by 50%      PROM: deferred due to pain  PAROM: diminished C6-7 with comparable sign  Palpation: severely painful with all cervical palpation  Special Tests: + spurlings, + dural sign, + ULTTA, + distraction  Coordination of Movement/strengthe:Patient demonstrates poor activation of Longus Capitus and Longus Coli with loss of feed forward mechanism with reaching tasks  Patient was able to perform activation with cueing   Weakness of C7 myotome right at 3-/5 throughout   Goals  - Pt I with initial HEP in 1-2 visits  - Improve ROM by 50%  - Improved Longus Coli and Longus Capitus activation and return of feed forward mechanism   - Increase Functional Status Measure measured by FOTO by MDIC  -reduce pain to 3/10         Precautions:

## 2022-06-01 ENCOUNTER — OFFICE VISIT (OUTPATIENT)
Dept: PHYSICAL THERAPY | Facility: MEDICAL CENTER | Age: 42
End: 2022-06-01
Payer: COMMERCIAL

## 2022-06-01 DIAGNOSIS — M54.12 CERVICAL RADICULOPATHY: Primary | ICD-10-CM

## 2022-06-01 PROCEDURE — 97110 THERAPEUTIC EXERCISES: CPT | Performed by: PHYSICAL THERAPIST

## 2022-06-01 PROCEDURE — 97140 MANUAL THERAPY 1/> REGIONS: CPT | Performed by: PHYSICAL THERAPIST

## 2022-06-03 NOTE — PROGRESS NOTES
Daily Note     Today's date: 6/3/2022  Patient name: Dylan Poe  : 1980  MRN: [id number]  Referring provider: Godfrey Bedolla  Dx:   Encounter Diagnosis     ICD-10-CM    1  Cervical radiculopathy  M54 12                   Subjective:   Alex Page reports no change, still with pain down arm and limited neck motion  Has neuro consult for July, hoping to get it moved up       Objective: See treatment diary below      Assessment: Tolerated treatment well  Patient tolerated light cervical mobility well, symptoms remained stable but no notable improvement by end of session  Plan: Continue per plan of care        Precautions:               Manuals             Cervical grade II-III mobilizations AF                                                    Neuro Re-Ed                                                                                                        Ther Ex             Supine cervical rotatoins 20            Scapular retractions 30                                                                                          Ther Activity                                       Gait Training                                       Modalities             MHP 10

## 2022-06-07 ENCOUNTER — OFFICE VISIT (OUTPATIENT)
Dept: PHYSICAL THERAPY | Facility: MEDICAL CENTER | Age: 42
End: 2022-06-07
Payer: COMMERCIAL

## 2022-06-07 DIAGNOSIS — M54.12 CERVICAL RADICULOPATHY: Primary | ICD-10-CM

## 2022-06-07 PROCEDURE — 97110 THERAPEUTIC EXERCISES: CPT | Performed by: PHYSICAL THERAPIST

## 2022-06-07 PROCEDURE — 97140 MANUAL THERAPY 1/> REGIONS: CPT | Performed by: PHYSICAL THERAPIST

## 2022-06-07 PROCEDURE — 97012 MECHANICAL TRACTION THERAPY: CPT | Performed by: PHYSICAL THERAPIST

## 2022-06-07 NOTE — PROGRESS NOTES
Daily Note     Today's date: 2022  Patient name: Bret Schroeder  : 1980  MRN: [id number]  Referring provider: Sofia Bedoya  Dx:   Encounter Diagnosis     ICD-10-CM    1  Cervical radiculopathy  M54 12                   Subjective: patient states that she is feeling the same  She continues to have the pain in her neck and arm and it has not changed with starting therapy or working on her HEP  Objective: See treatment diary below      Assessment: patient was given trial of traction today  She tolerated it well and it seemed to reduce symptoms into arm slightly  Patient will follow up in 2 days  Plan: Continue per plan of care        Precautions:               Manuals             Cervical grade II-III mobilizations GS                                                   Neuro Re-Ed                                                                                                        Ther Ex             Supine cervical rotatoins 20            Scapular retractions 30                                                                                          Ther Activity                                       Gait Training                                       Modalities             MHP 10            Mechanical traction 10min 30/5  25lbs

## 2022-06-09 ENCOUNTER — APPOINTMENT (OUTPATIENT)
Dept: PHYSICAL THERAPY | Facility: MEDICAL CENTER | Age: 42
End: 2022-06-09
Payer: COMMERCIAL

## 2022-06-10 ENCOUNTER — CONSULT (OUTPATIENT)
Dept: NEUROSURGERY | Facility: CLINIC | Age: 42
End: 2022-06-10
Payer: COMMERCIAL

## 2022-06-10 VITALS
HEIGHT: 65 IN | TEMPERATURE: 98.2 F | DIASTOLIC BLOOD PRESSURE: 82 MMHG | SYSTOLIC BLOOD PRESSURE: 122 MMHG | WEIGHT: 179.6 LBS | BODY MASS INDEX: 29.92 KG/M2 | HEART RATE: 77 BPM | RESPIRATION RATE: 16 BRPM

## 2022-06-10 DIAGNOSIS — R29.898 RIGHT ARM WEAKNESS: Primary | ICD-10-CM

## 2022-06-10 DIAGNOSIS — R20.0 NUMBNESS AND TINGLING OF RIGHT ARM: ICD-10-CM

## 2022-06-10 DIAGNOSIS — R20.2 NUMBNESS AND TINGLING OF RIGHT ARM: ICD-10-CM

## 2022-06-10 DIAGNOSIS — M54.2 NECK PAIN: ICD-10-CM

## 2022-06-10 DIAGNOSIS — M50.223 HERNIATION OF INTERVERTEBRAL DISC AT C6-C7 LEVEL: ICD-10-CM

## 2022-06-10 PROCEDURE — 99244 OFF/OP CNSLTJ NEW/EST MOD 40: CPT | Performed by: NEUROLOGICAL SURGERY

## 2022-06-10 RX ORDER — GABAPENTIN 300 MG/1
300 CAPSULE ORAL
Qty: 90 CAPSULE | Refills: 2 | Status: SHIPPED | OUTPATIENT
Start: 2022-06-10

## 2022-06-10 NOTE — PROGRESS NOTES
Neurosurgery Office Note  Octavio Lombardi 37 y o  female MRN: [id number]      Assessment/Plan      Diagnoses and all orders for this visit:    Right arm weakness  -     gabapentin (NEURONTIN) 300 mg capsule; Take 1 capsule (300 mg total) by mouth daily at bedtime  -     EMG 1 Limb; Future  -     XR spine cervical complete 6+ vw flex/ext/obl; Future    Neck pain  -     Ambulatory Referral to Neurosurgery  -     gabapentin (NEURONTIN) 300 mg capsule; Take 1 capsule (300 mg total) by mouth daily at bedtime  -     EMG 1 Limb; Future  -     XR spine cervical complete 6+ vw flex/ext/obl; Future    Numbness and tingling of right arm  -     Ambulatory Referral to Neurosurgery  -     gabapentin (NEURONTIN) 300 mg capsule; Take 1 capsule (300 mg total) by mouth daily at bedtime  -     EMG 1 Limb; Future  -     XR spine cervical complete 6+ vw flex/ext/obl; Future    Herniation of intervertebral disc at C6-C7 level  -     Ambulatory Referral to Neurosurgery  -     gabapentin (NEURONTIN) 300 mg capsule; Take 1 capsule (300 mg total) by mouth daily at bedtime  -     EMG 1 Limb; Future  -     XR spine cervical complete 6+ vw flex/ext/obl; Future        Discussion:    Pain Score:   8 (neck pain radiates into right shoulder down right arm/hand)    62-year-old woman who has had neck and shoulder and upper arm symptoms/issues for over a year  She works at [address] [place] [address] [place], and was seen by Zulma Griffith  She was felt to have a frozen shoulder, underwent PT, and then injections into the right shoulder  However, over the last 2 months, symptoms began to radiate up into the neck, and down into the right arm  Involves numbness and tingling into all her fingers  She is dropping items, having difficulty with her writing, she is right-handed  She feels the strength in her arm is worsening  I was, in fact, contacted about this worsening by her physical therapist, Dorene Bang   The patient denies symptoms into her legs    Denies bowel or bladder difficulties  Has attempted to treat this with tramadol, naproxen  On exam, the patient has diffuse weakness in the right upper extremity, 4/5 in deltoid, biceps, triceps, wrist extensor, , IO  Left upper extremity full strength  Ambulates with no aids  Reflexes muted, no Alford's  MRI scan cervical spine does show degenerative disease at C6-7, with disc bulge as well as some ligamentous overgrowth posteriorly, causing mild central stenosis, and anterior lateral right cord abutment/compression  I reviewed with the patient her MRI study  While the findings could be resulting in some guarding, creating such diffuse upper extremity weakness, I do feel the extent of her weakness is out of proportion, and clearly not radicular  She has no long tract signs or suggestions of myelopathy  I do wonder whether she has any inflammation into her brachial plexus on the right, which may be a better explanation for her diffuse symptoms  As such, I have ordered an EMG of the right upper extremity  To mitigate her pain, until she sees pain management in 2 weeks, I did order her gabapentin  I suggested she start taking the 300 mg q h s , and then a week, our office will contact her to decide whether to escalate, hold at that dose, or discontinue  We did touch briefly on surgery, should we ultimately decided that is appropriate  An anterior cervical approach for diskectomy and decompression could be completed with either fixation fusion or disc arthroplasty  In order to determine whether she would be a candidate for arthroplasty, I have ordered flexion-extension cervical spine x-rays  She will have this done before I see her next  I also touched on the status of her diabetes  Her most recent hemoglobin A1c was 11 8, which is well in exces of our threshold of 7 5 for any instrumented spine surgery    This would need to be much better controlled and documented to be below target before she would be a candidate for elective surgery  She expressed understanding  As mentioned, the patient has follow up with pain management in 2 weeks  I will plan to see the patient back after she has her EMG, x-rays, evaluations with pain management etcetera  06/10/22 Metrics: [id number]=6 676; VAS 85; MJOA 14/17        CHIEF COMPLAINT    Chief Complaint   Patient presents with   [address] PAIN MRI CSPINE 5/9/22, XRAY CSPINE 3/18/22 SL       HISTORY    History of Present Illness     37y o  year old female     HPI    See Discussion    REVIEW OF SYSTEMS    Review of Systems   Constitutional: Negative  HENT: Negative  Eyes: Negative  Respiratory: Negative  Cardiovascular: Negative  Gastrointestinal: Negative  Endocrine: Negative  Genitourinary: Negative  Musculoskeletal: Positive for neck pain (neck pain radiates into right shoulder down right arm into right hand)  Negative for gait problem  Skin: Negative  Neurological: Positive for seizures (unsure if episodes of seizure, last hospitalized 4/2021), syncope (episodes of passing out for the past 5 years, last time hospitalized 11/2021), weakness (right arm/hand, dropping things), numbness (right arm/hand numbness and tingling) and headaches (varies, h/o migraines)  Negative for dizziness, tremors and speech difficulty  Hematological: Negative  Psychiatric/Behavioral: Negative for confusion, decreased concentration and sleep disturbance           Meds/Allergies     Current Outpatient Medications   Medication Sig Dispense Refill    Accu-Chek FastClix Lancets MISC Test BG up to 3x daily as directed 300 each 1    Accu-Chek Guide test strip TEST BG UP TO 3X DAILY AS DIRECTED 100 each 1    Empagliflozin 25 MG TABS Take 1 tablet (25 mg total) by mouth every morning 90 tablet 0    gabapentin (NEURONTIN) 300 mg capsule Take 1 capsule (300 mg total) by mouth daily at bedtime 90 capsule 2    Injection Device for Insulin (B-D PEN MINI) ENRICO Use 4 (four) times a day Please use for Lantus and Humalog  4 pen needles daily Dx: Diabetes 400 each 0    insulin detemir (LEVEMIR FLEXTOUCH) 100 Units/mL injection pen Inject 25 Units under the skin daily at bedtime 15 mL 2    Insulin Pen Needle (BD Pen Needle Em U/F) 32G X 4 MM MISC Use daily 100 each 3    mometasone (ELOCON) 0 1 % lotion Apply topically daily 60 mL 0    naproxen (EC NAPROSYN) 500 MG EC tablet Take 1 tablet (500 mg total) by mouth 2 (two) times a day as needed for mild pain or headaches      nitroglycerin (NITROSTAT) 0 4 mg SL tablet Place 1 tablet (0 4 mg total) under the tongue every 5 (five) minutes as needed for chest pain for up to 7 days 20 tablet 0    prochlorperazine (COMPAZINE) 10 mg tablet 1 tab at onset of migraine, can repeat in 8 hours, can take with NSAID  Take with Benadryl if there are side effects  20 tablet 3    semaglutide, 1 mg/dose, (Ozempic) 4 MG/3ML SOPN injection pen Inject 0 75 mL (1 mg total) under the skin once a week 9 mL 0    topiramate (TOPAMAX) 50 MG tablet Take 1 tablet (50 mg total) by mouth daily at bedtime 90 tablet 1    traMADol (ULTRAM) 50 mg tablet Take 1 tablet (50 mg total) by mouth every 8 (eight) hours as needed for moderate pain 20 tablet 0     No current facility-administered medications for this visit         Allergies   Allergen Reactions    Metformin And Related Hives       PAST HISTORY    Past Medical History:   Diagnosis Date    Diabetes mellitus ([address] )        Past Surgical History:   Procedure Laterality Date    HERNIA REPAIR      TUBAL LIGATION  2017       Social History     Tobacco Use    Smoking status: Never Smoker    Smokeless tobacco: Never Used   Vaping Use    Vaping Use: Never used   Substance Use Topics    Alcohol use: Yes     Comment: ocassionally    Drug use: Never       Family History   Problem Relation Age of Onset    Hypertension Mother    Sia Drop Arthritis Mother     Asthma Sister    Siacyndi Crawford Bipolar disorder Brother     Schizophrenia Brother     Asthma Brother     Asthma Brother     Diabetes Maternal Grandmother     Diabetes Paternal Grandmother     No Known Problems Maternal Grandfather     No Known Problems Paternal Grandfather     Breast cancer Neg Hx          The following portions of the patient's history were reviewed in this encounter and updated as appropriate: Past medical, surgical, family, and social history, as well as medications, allergies, and review of systems  EXAM    Vitals:Blood pressure 122/82, pulse 77, temperature 98 2 °F (36 8 °C), resp  rate 16, height 5' 5" (1 651 m), weight 81 5 kg (179 lb 9 6 oz), unknown if currently breastfeeding  ,Body mass index is 29 89 kg/m²  Physical Exam  Vitals reviewed  Constitutional:       Appearance: Normal appearance  She is well-developed  HENT:      Head: Normocephalic  Eyes:      General: No scleral icterus  Cardiovascular:      Rate and Rhythm: Normal rate  Pulmonary:      Effort: Pulmonary effort is normal    Abdominal:      Palpations: Abdomen is soft  Musculoskeletal:      Cervical back: Neck supple  Skin:     General: Skin is warm and dry  Neurological:      Mental Status: She is alert and oriented to person, place, and time  GCS: GCS eye subscore is 4  GCS verbal subscore is 5  GCS motor subscore is 6  Psychiatric:         Speech: Speech normal          Behavior: Behavior normal          Neurologic Exam     Mental Status   Oriented to person, place, and time  Attention: normal    Speech: speech is normal   Level of consciousness: alert    Cranial Nerves     CN VII   Facial expression full, symmetric  Motor Exam   Muscle bulk: normal  Overall muscle tone: normal  Moves all extremities, grossly normal, except as described in discussion  Gait, Coordination, and Reflexes     Tremor   Resting tremor: absent  Intention tremor: absent  Action tremor: absent  No aids            MEDICAL DECISION MAKING    Imaging Studies:     MRI CSpine 5/9/22:     IMPRESSION:     Multifactorial disease results in moderate canal stenosis with mild cord impingement at C6-C7  Moderate left foraminal narrowing is present at this level  I have personally reviewed pertinent reports  and I have personally reviewed pertinent films in PACS    0   Lab Value Date/Time    HGBA1C 11 8 (H) 03/18/2022 0810    HGBA1C 12 9 (H) 01/23/2018 0844             PLEASE NOTE:  This encounter may have been completed utilizing the [email or web address] Direct Speech Voice Recognition Software  Grammatical errors, random word insertions, pronoun errors and incomplete sentences are occasional consequences of the system due to software limitations, ambient noise and hardware issues  These may be missed by proof reading prior to affixing electronic signature  Any questions or concerns about the content, text or information contained within the body of this dictation should be directly addressed to the advanced practitioner or physician for clarification

## 2022-06-15 ENCOUNTER — OFFICE VISIT (OUTPATIENT)
Dept: PHYSICAL THERAPY | Facility: MEDICAL CENTER | Age: 42
End: 2022-06-15
Payer: COMMERCIAL

## 2022-06-15 DIAGNOSIS — M54.12 CERVICAL RADICULOPATHY: Primary | ICD-10-CM

## 2022-06-15 PROCEDURE — 97110 THERAPEUTIC EXERCISES: CPT | Performed by: PHYSICAL THERAPIST

## 2022-06-15 PROCEDURE — 97140 MANUAL THERAPY 1/> REGIONS: CPT | Performed by: PHYSICAL THERAPIST

## 2022-06-15 NOTE — PROGRESS NOTES
Daily Note     Today's date: 6/15/2022  Patient name: Lea Cormier  : 1980  MRN: [id number]  Referring provider: Lexi Valle  Dx:   Encounter Diagnosis     ICD-10-CM    1  Cervical radiculopathy  M54 12                   Subjective: patient states that she is feeling the same  She continues to have the pain in her neck and arm and it has not changed with starting therapy or working on her HEP  Had consult with neurosurgery and will be getting EMG, earliest appointment was January       Objective: See treatment diary below      Assessment: Tolerated traction again well today and cervical mobilizations  Continue to progress as tolerated and reducing the level of pain irritability     Plan: Continue per plan of care        Precautions:               Manuals             Cervical grade II-III mobilizations AF                                                   Neuro Re-Ed                                                                                                        Ther Ex             Supine cervical rotatoins 20            Scapular retractions 30                                                                                          Ther Activity                                       Gait Training                                       Modalities             MHP 10            Mechanical traction 10min 30/5  25lbs

## 2022-06-16 ENCOUNTER — TELEPHONE (OUTPATIENT)
Dept: NEUROSURGERY | Facility: CLINIC | Age: 42
End: 2022-06-16

## 2022-06-16 NOTE — TELEPHONE ENCOUNTER
----- Message from Leo Lau MD sent at 6/10/2022  4:24 PM EDT -----  Qi Pfeiffer,    I started the patient on gabapentin, the usual 300 mg q h s     Please contact her mid-end of next week, to assess her response, decide whether to increase, stay at that dose, or discontinue  She does have follow-up with pain management in about 2 weeks, at which point they can take this prescription over      Thanks

## 2022-06-16 NOTE — TELEPHONE ENCOUNTER
1st attempt-- reached out to patient to check in to see how she is doing on the gabapentin 300 mg HS  Left a VM requesting a call back at her earliest convenience, provided my direct line

## 2022-06-17 NOTE — TELEPHONE ENCOUNTER
2nd attempt-- reached out to patient to check in to see how she is doing since starting the gabapentin 300 mg HS  Patient stated she still feels the same however the gabapentin is helping her to sleep at night  Patient stated however her pain is the same during the day  Offered patient to take the medication BID to help with her pain throughout the day however patient declined as she does not want to feel fatigued during the day  Patient is in agreement to keep the dosage at 300 mg HS  Informed patient she does see PM on 6/24 who will likely then take over her medication regimens  Patient was appreciative of the call

## 2022-06-24 ENCOUNTER — CONSULT (OUTPATIENT)
Dept: PAIN MEDICINE | Facility: CLINIC | Age: 42
End: 2022-06-24
Payer: COMMERCIAL

## 2022-06-24 VITALS
DIASTOLIC BLOOD PRESSURE: 79 MMHG | BODY MASS INDEX: 29.16 KG/M2 | HEIGHT: 65 IN | SYSTOLIC BLOOD PRESSURE: 121 MMHG | HEART RATE: 79 BPM | WEIGHT: 175 LBS

## 2022-06-24 DIAGNOSIS — M50.223 HERNIATION OF INTERVERTEBRAL DISC AT C6-C7 LEVEL: ICD-10-CM

## 2022-06-24 DIAGNOSIS — M54.12 CERVICAL RADICULOPATHY: Primary | ICD-10-CM

## 2022-06-24 DIAGNOSIS — M54.2 NECK PAIN: ICD-10-CM

## 2022-06-24 PROCEDURE — 99244 OFF/OP CNSLTJ NEW/EST MOD 40: CPT | Performed by: ANESTHESIOLOGY

## 2022-06-24 NOTE — PROGRESS NOTES
Assessment  1  Cervical radiculopathy    2  Neck pain    3  Herniation of intervertebral disc at C6-C7 level        Plan  71-year-old female with a history of diabetes and migraines, referred by Dr Kinjal Azar, presenting for initial consultation regarding neck pain with radiation into the right upper extremity in a specific dermatomal fashion with associated numbness, paresthesias, and subjective weakness since July 2021 without any specific trauma or inciting event  MRI of the cervical spine shows disc herniation at C6-7 resulting in moderate central stenosis with mild cord impingement and moderate left foraminal stenosis  X-ray of the right shoulder was unremarkable  The patient has an upcoming EMG of the right arm next month as well as an appointment with Dr Selvin Perez of Neurosurgery after the EMG is completed  She has been doing physical therapy without any significant relief  She does take gabapentin 300 mg q h s  and has been taking this for the past week without much relief  Tylenol and ibuprofen provide minimal relief  Patient's symptoms are consistent with cervical radiculopathy  Peripheral neuropathy also on the differential       1  I advised the patient to increase her gabapentin to 300 mg b i d  for her neuropathic complaints  2  May consider a cervical epidural steroid injection in the future, however patient's hemoglobin A1c was greater than 12 and she will need better glucose control before undergoing CSI  3  Patient will proceed with EMG of the right arm as well as neurosurgical consultation as scheduled   4  I will follow up with the patient in 6 weeks or sooner if needed        My impressions and treatment recommendations were discussed in detail with the patient who verbalized understanding and had no further questions  Discharge instructions were provided  I personally saw and examined the patient and I agree with the above discussed plan of care      No orders of the defined types were placed in this encounter  No orders of the defined types were placed in this encounter  History of Present Illness    Norma Segovia is a 37 y o  female with a history of diabetes and migraines, referred by Dr Benedict Kendrick, presenting for initial consultation regarding neck pain with radiation into the right upper extremity in a specific dermatomal fashion with associated numbness, paresthesias, and subjective weakness since July 2021 without any specific trauma or inciting event  She denies any left upper extremity symptoms, bladder or bowel incontinence, or balance issues  MRI of the cervical spine shows disc herniation at C6-7 resulting in moderate central stenosis with mild cord impingement and moderate left foraminal stenosis  X-ray of the right shoulder was unremarkable  The patient has an upcoming EMG of the right arm next month as well as an appointment with Dr Sri Hudson of Neurosurgery after the EMG is completed  She has been doing physical therapy without any significant relief  She does take gabapentin 300 mg q h s  and has been taking this for the past week without much relief  Tylenol and ibuprofen provide minimal relief  The patient rates her pain moderate to severe nearly constant  The pain does not follow any particular pattern throughout the day  The pain is described as burning, cramping, shooting, and numbness  The pain is increased with activities involving the right arm  She denies any alleviating factors  Other than as stated above, the patient denies any interval changes in medications, medical condition, mental condition, symptoms, or allergies since the last office visit  I have personally reviewed and/or updated the patient's past medical history, past surgical history, family history, social history, current medications, allergies, and vital signs today  Review of Systems   Constitutional: Negative for fever and unexpected weight change     HENT: Negative for trouble swallowing  Eyes: Negative for visual disturbance  Respiratory: Negative for shortness of breath and wheezing  Cardiovascular: Negative for chest pain and palpitations  Gastrointestinal: Negative for constipation, diarrhea, nausea and vomiting  Endocrine: Negative for cold intolerance, heat intolerance and polydipsia  Genitourinary: Negative for difficulty urinating and frequency  Musculoskeletal: Positive for myalgias  Negative for arthralgias, gait problem and joint swelling  Skin: Negative for rash  Neurological: Positive for numbness and headaches  Negative for dizziness, seizures, syncope and weakness  Hematological: Does not bruise/bleed easily  Psychiatric/Behavioral: Negative for dysphoric mood  All other systems reviewed and are negative  Patient Active Problem List   Diagnosis    Encounter for annual routine gynecological examination    Abnormal thyroid blood test    Papanicolaou smear of cervix with positive high risk human papilloma virus (HPV) test    Uncontrolled type 2 diabetes mellitus with hyperglycemia, with long-term current use of insulin (HCC)    Stress at home    Diabetes mellitus ([facility] )    Obesity (BMI 30-39  9)    Numbness and tingling of both feet    Recurrent episodes of unresponsiveness    Migraine without aura and without status migrainosus, not intractable    Current use of insulin (HCC)    Chest pain    Uncontrolled type 2 diabetes mellitus with hyperglycemia, without long-term current use of insulin (HCC)    Numbness and tingling of right arm    Cervical radiculitis    Adhesive capsulitis of right shoulder       Past Medical History:   Diagnosis Date    Diabetes mellitus ([facility] )        Past Surgical History:   Procedure Laterality Date    HERNIA REPAIR      TUBAL LIGATION  2017       Family History   Problem Relation Age of Onset    Hypertension Mother    Oliva Flannery Arthritis Mother     Asthma Sister     Bipolar disorder Brother    Oliva Flannery Schizophrenia Brother     Asthma Brother     Asthma Brother     Diabetes Maternal Grandmother     Diabetes Paternal Grandmother     No Known Problems Maternal Grandfather     No Known Problems Paternal Grandfather     Breast cancer Neg Hx        Social History     Occupational History     Employer: [employer]     Employer: [employer]     Employer: [employer] EMPLOYEES   Tobacco Use    Smoking status: Never Smoker    Smokeless tobacco: Never Used   Vaping Use    Vaping Use: Never used   Substance and Sexual Activity    Alcohol use: Yes     Comment: ocassionally    Drug use: Never    Sexual activity: Yes     Partners: Male       Current Outpatient Medications on File Prior to Visit   Medication Sig    Accu-Chek FastClix Lancets MISC Test BG up to 3x daily as directed    Accu-Chek Guide test strip TEST BG UP TO 3X DAILY AS DIRECTED    Empagliflozin 25 MG TABS Take 1 tablet (25 mg total) by mouth every morning    gabapentin (NEURONTIN) 300 mg capsule Take 1 capsule (300 mg total) by mouth daily at bedtime    Injection Device for Insulin (B-D PEN MINI) ENRICO Use 4 (four) times a day Please use for Lantus and Humalog  4 pen needles daily Dx: Diabetes    insulin detemir (LEVEMIR FLEXTOUCH) 100 Units/mL injection pen Inject 25 Units under the skin daily at bedtime    Insulin Pen Needle (BD Pen Needle Em U/F) 32G X 4 MM MISC Use daily    mometasone (ELOCON) 0 1 % lotion Apply topically daily    naproxen (EC NAPROSYN) 500 MG EC tablet Take 1 tablet (500 mg total) by mouth 2 (two) times a day as needed for mild pain or headaches    nitroglycerin (NITROSTAT) 0 4 mg SL tablet Place 1 tablet (0 4 mg total) under the tongue every 5 (five) minutes as needed for chest pain for up to 7 days    prochlorperazine (COMPAZINE) 10 mg tablet 1 tab at onset of migraine, can repeat in 8 hours, can take with NSAID  Take with Benadryl if there are side effects      semaglutide, 1 mg/dose, (Ozempic) 4 MG/3ML SOPN injection pen Inject 0 75 mL (1 mg total) under the skin once a week    topiramate (TOPAMAX) 50 MG tablet Take 1 tablet (50 mg total) by mouth daily at bedtime    traMADol (ULTRAM) 50 mg tablet Take 1 tablet (50 mg total) by mouth every 8 (eight) hours as needed for moderate pain     No current facility-administered medications on file prior to visit  Allergies   Allergen Reactions    Metformin And Related Hives       Physical Exam    There were no vitals taken for this visit  Constitutional: normal, well developed, well nourished, alert, in no distress and non-toxic and no overt pain behavior  Eyes: anicteric  HEENT: grossly intact  Neck: supple, symmetric, trachea midline and no masses   Pulmonary:even and unlabored  Cardiovascular:No edema or pitting edema present  Skin:Normal without rashes or lesions and well hydrated  Psychiatric:Mood and affect appropriate  Neurologic:Cranial Nerves II-XII grossly intact  Musculoskeletal:normal gait  Right cervical paraspinals and trapezius tender to palpation and ropy and texture  Bilateral biceps, triceps, and brachioradialis reflexes were 2/4 and symmetrical   No clonus noted bilaterally  Sensation intact to light touch in C5 through T1 dermatomes bilaterally  Left upper extremity strength 5/5 in all muscle groups  Right  strength 3-5  Finger abduction, wrist flexion, and extension 4/5 strength  Right biceps 4/5 strength  Right triceps 3-5 strength  Right deltoid 4-5 strength  Negative Spurling's bilaterally      Imaging  PACS Images     Show images for MRI cervical spine wo contrast    Study Result    Narrative & Impression   MRI CERVICAL SPINE WITHOUT CONTRAST     INDICATION: M54 2: Cervicalgia  R20 0: Anesthesia of skin  R20 2: Paresthesia of skin      COMPARISON:  None      TECHNIQUE:  Sagittal T1, sagittal T2, sagittal inversion recovery, axial T2, axial  2D merge     IMAGE QUALITY: Diagnostic     FINDINGS:     ALIGNMENT:  Normal alignment of the cervical spine  No compression fracture  No subluxation  No scoliosis      MARROW SIGNAL:  Normal marrow signal is identified within the visualized bony structures  No discrete marrow lesion      CERVICAL AND VISUALIZED THORACIC CORD:  Normal signal within the visualized cord      PREVERTEBRAL AND PARASPINAL SOFT TISSUES:  Normal      VISUALIZED POSTERIOR FOSSA:  The visualized posterior fossa demonstrates no abnormal signal      CERVICAL DISC SPACES:     C2-C3:  Normal      C3-C4:  Normal      C4-C5:  Normal      C5-C6:  Normal      C6-C7:  There is a bulging annulus with a superimposed central disc protrusion  There is facet arthrosis with hypertrophy of the posterior arch  There is moderate canal stenosis with mild cord impingement  There is moderate left foraminal narrowing      C7-T1:  Normal      UPPER THORACIC DISC SPACES:  Normal      IMPRESSION:     Multifactorial disease results in moderate canal stenosis with mild cord impingement at C6-C7    Moderate left foraminal narrowing is present at this level               Workstation performed: [id number]       Study Result    Narrative & Impression   CERVICAL SPINE     INDICATION:   L65 9: Nonscarring hair loss, unspecified  E11 69: Type 2 diabetes mellitus with other specified complication     COMPARISON:  7/21/2020 CT     VIEWS:  XR SPINE CERVICAL COMPLETE 4 OR 5 VW NON INJURY   Images: 5     FINDINGS:     No fracture       Normal alignment without subluxation      The intervertebral disc spaces are preserved       The neuroforamina are patent      The prevertebral soft tissues are within normal limits        The lung apices are clear      IMPRESSION:  No acute cervical spinal abnormalities identified     Consistent with prior CT     Workstation performed: [id number]     PACS Images     Show images for CTA head and neck w wo contrast    Study Result    Narrative & Impression   CTA NECK AND BRAIN WITH AND WITHOUT CONTRAST     INDICATION: Neuro deficit, acute, stroke suspected  Inability to move throughout all her extremities     COMPARISON:   CT of the head on November 21, 2020 at 9:15 PM      TECHNIQUE:  Routine CT imaging of the Brain without contrast   Post contrast imaging was performed after administration of iodinated contrast through the neck and brain  Post contrast axial 0 625 mm images timed to opacify the arterial system        3D rendering was performed on an independent workstation  MIP reconstructions performed  Coronal reconstructions were performed of the noncontrast portion of the brain        Radiation dose length product (DLP) for this visit:  3921 mGy-cm   This examination, like all CT scans performed in the [facility], was performed utilizing techniques to minimize radiation dose exposure, including the use of iterative   reconstruction and automated exposure control         IV Contrast:  85 mL of iohexol (OMNIPAQUE) was administered intravenously without immediate adverse reaction  IMAGE QUALITY:   Diagnostic     FINDINGS:  NONCONTRAST BRAIN  PARENCHYMA:  No intracranial mass, mass effect or midline shift  No CT signs of acute infarction  No acute parenchymal hemorrhage      VENTRICLES AND EXTRA-AXIAL SPACES:  Normal for the patient's age      VISUALIZED ORBITS AND PARANASAL SINUSES:  Unremarkable      CERVICAL VASCULATURE  AORTIC ARCH AND GREAT VESSELS:  Normal aortic arch and great vessel origins  Normal visualized subclavian vessels  There is common origin of the right brachiocephalic artery and left common carotid artery       RIGHT VERTEBRAL ARTERY CERVICAL SEGMENT:  Normal origin  The vessel is normal in caliber throughout the neck      LEFT VERTEBRAL ARTERY CERVICAL SEGMENT:  Normal origin  The vessel is normal in caliber throughout the neck      RIGHT EXTRACRANIAL CAROTID SEGMENT:  Normal caliber common carotid artery    Normal bifurcation and cervical internal carotid artery  No stenosis or dissection      LEFT EXTRACRANIAL CAROTID SEGMENT:  Normal caliber common carotid artery  Normal bifurcation and cervical internal carotid artery  No stenosis or dissection      NASCET criteria was used to determine the degree of internal carotid artery diameter stenosis        INTRACRANIAL VASCULATURE   INTERNAL CAROTID ARTERIES:  Normal enhancement of the intracranial portions of the internal carotid arteries  Normal ophthalmic artery origins  Normal ICA terminus       ANTERIOR CIRCULATION:  Symmetric A1 segments and anterior cerebral arteries with normal enhancement  Normal anterior communicating artery      MIDDLE CEREBRAL ARTERY CIRCULATION:  M1 segment and middle cerebral artery branches demonstrate normal enhancement bilaterally      DISTAL VERTEBRAL ARTERIES:  Normal distal vertebral arteries  Posterior inferior cerebellar artery origins are normal  Normal vertebral basilar junction      BASILAR ARTERY:  Basilar artery is normal in caliber  Normal superior cerebellar arteries      POSTERIOR CEREBRAL ARTERIES: Both posterior cerebral arteries arises from the basilar tip  Both arteries demonstrate normal enhancement  Normal posterior communicating arteries      DURAL VENOUS SINUSES:  Normal         NON VASCULAR ANATOMY  BONY STRUCTURES:  No acute osseous abnormality      SOFT TISSUES OF THE NECK:  Normal      THORACIC INLET:  Unremarkable         IMPRESSION:     No acute intracranial hemorrhage, midline shift, or mass effect  No hemodynamically significant stenosis of the arteries of the neck  No high-grade proximal stenosis of the visualized [place]    No significant intracranial arteriovenous malformation or aneurysm       Workstation performed: [id number]       PACS Images     Show images for CT cervical spine without contrast    Study Result    Narrative & Impression   CT CERVICAL SPINE - WITHOUT CONTRAST     INDICATION:   Neck pain, initial exam  fall      COMPARISON:  Cervical spine plain film 2/29/2016     TECHNIQUE:  CT examination of the cervical spine was performed without intravenous contrast   Contiguous axial images were obtained  Sagittal and coronal reconstructions were performed        Radiation dose length product (DLP) for this visit:  503 mGy-cm   This examination, like all CT scans performed in the [facility], was performed utilizing techniques to minimize radiation dose exposure, including the use of iterative   reconstruction and automated exposure control        IMAGE QUALITY:  Diagnostic      FINDINGS:     ALIGNMENT:  Normal alignment of the cervical spine   No subluxation      VERTEBRAL BODIES:  No fracture      DEGENERATIVE CHANGES:  No significant cervical degenerative changes are noted      PREVERTEBRAL AND PARASPINAL SOFT TISSUES:  Unremarkable      THORACIC INLET:  Normal      IMPRESSION:     No cervical spine fracture or traumatic malalignment                  Workstation performed: [id number]       PACS Images     Show images for XR shoulder 2+ vw right    Study Result    Narrative & Impression   RIGHT SHOULDER     INDICATION:   L65 9: Nonscarring hair loss, unspecified  E11 69: Type 2 diabetes mellitus with other specified complication     COMPARISON:  6/25/2021     VIEWS:  XR SHOULDER 2+ VW RIGHT  Images: 3     FINDINGS:     There is no acute fracture or dislocation      No significant degenerative changes      No lytic or blastic osseous lesion      Soft tissues are unremarkable      IMPRESSION:     No acute osseous abnormality      Findings are stable     Workstation performed: [id number]

## 2022-06-29 ENCOUNTER — OFFICE VISIT (OUTPATIENT)
Dept: PHYSICAL THERAPY | Facility: MEDICAL CENTER | Age: 42
End: 2022-06-29
Payer: COMMERCIAL

## 2022-06-29 DIAGNOSIS — M54.12 CERVICAL RADICULOPATHY: Primary | ICD-10-CM

## 2022-06-29 PROCEDURE — 97140 MANUAL THERAPY 1/> REGIONS: CPT | Performed by: PHYSICAL THERAPIST

## 2022-06-29 PROCEDURE — 97110 THERAPEUTIC EXERCISES: CPT | Performed by: PHYSICAL THERAPIST

## 2022-07-01 NOTE — PROGRESS NOTES
Daily Note     Today's date: 2022  Patient name: Dylan Poe  : 1980  MRN: [id number]  Referring provider: Godfrey Bedolla  Dx:   Encounter Diagnosis     ICD-10-CM    1  Cervical radiculopathy  M54 12                   Subjective: patient states that she is feeling the same  Her EMG has been moved up to July and she will be having pain management and neurosurgery consults after that time       Objective: See treatment diary below      Assessment: Tolerated traction again well today and cervical mobilizations  Due to lack of any notable progress, she will be on hold until further diagnostics and MD follow ups     Plan: Continue per plan of care        Precautions:               Manuals             Cervical grade II-III mobilizations AF                                                   Neuro Re-Ed                                                                                                        Ther Ex             Supine cervical rotatoins 20            Scapular retractions 30                                                                                          Ther Activity                                       Gait Training                                       Modalities             MHP 10            Mechanical traction 10min 30/5  25lbs

## 2022-07-18 ENCOUNTER — HOSPITAL ENCOUNTER (OUTPATIENT)
Dept: RADIOLOGY | Facility: HOSPITAL | Age: 42
Discharge: HOME | End: 2022-07-18
Attending: NEUROLOGICAL SURGERY
Payer: COMMERCIAL

## 2022-07-18 DIAGNOSIS — M54.2 NECK PAIN: ICD-10-CM

## 2022-07-18 DIAGNOSIS — R20.2 NUMBNESS AND TINGLING OF RIGHT ARM: ICD-10-CM

## 2022-07-18 DIAGNOSIS — M50.223 HERNIATION OF INTERVERTEBRAL DISC AT C6-C7 LEVEL: ICD-10-CM

## 2022-07-18 DIAGNOSIS — R29.898 RIGHT ARM WEAKNESS: ICD-10-CM

## 2022-07-18 DIAGNOSIS — R20.0 NUMBNESS AND TINGLING OF RIGHT ARM: ICD-10-CM

## 2022-07-18 PROCEDURE — 72052 X-RAY EXAM NECK SPINE 6/>VWS: CPT

## 2022-07-21 ENCOUNTER — HOSPITAL ENCOUNTER (OUTPATIENT)
Dept: NEUROLOGY | Facility: CLINIC | Age: 42
Discharge: HOME | End: 2022-07-21
Payer: COMMERCIAL

## 2022-07-21 DIAGNOSIS — M50.223 HERNIATION OF INTERVERTEBRAL DISC AT C6-C7 LEVEL: ICD-10-CM

## 2022-07-21 DIAGNOSIS — R20.2 NUMBNESS AND TINGLING OF RIGHT ARM: ICD-10-CM

## 2022-07-21 DIAGNOSIS — R29.898 RIGHT ARM WEAKNESS: ICD-10-CM

## 2022-07-21 DIAGNOSIS — R20.0 NUMBNESS AND TINGLING OF RIGHT ARM: ICD-10-CM

## 2022-07-21 DIAGNOSIS — M54.2 NECK PAIN: ICD-10-CM

## 2022-07-21 PROCEDURE — 95912 NRV CNDJ TEST 11-12 STUDIES: CPT | Performed by: PSYCHIATRY & NEUROLOGY

## 2022-07-21 PROCEDURE — 95886 MUSC TEST DONE W/N TEST COMP: CPT | Performed by: PSYCHIATRY & NEUROLOGY

## 2022-07-22 ENCOUNTER — OFFICE VISIT (OUTPATIENT)
Dept: NEUROSURGERY | Facility: CLINIC | Age: 42
End: 2022-07-22
Payer: COMMERCIAL

## 2022-07-22 VITALS
BODY MASS INDEX: 29.16 KG/M2 | RESPIRATION RATE: 18 BRPM | HEIGHT: 65 IN | SYSTOLIC BLOOD PRESSURE: 108 MMHG | HEART RATE: 74 BPM | DIASTOLIC BLOOD PRESSURE: 76 MMHG | TEMPERATURE: 97.8 F | WEIGHT: 175 LBS

## 2022-07-22 DIAGNOSIS — G54.0 BRACHIAL PLEXOPATHY: Primary | ICD-10-CM

## 2022-07-22 PROCEDURE — 99213 OFFICE O/P EST LOW 20 MIN: CPT | Performed by: PHYSICIAN ASSISTANT

## 2022-07-22 NOTE — PROGRESS NOTES
Neurosurgery Office Note  Nick Pike 37 y o  female MRN: [id number]      Assessment/Plan     Brachial plexopathy  · Presents to review imaging and EMG completed of RUE  · Presented with diffuse RUE weakness and pain out of proportion to cervical imaging  Imaging:   · XR cervical spine 7/18/2022: cervical straightening without acute osseous abnormality  No dynamic instability  · EMG 7/21/2022: brachial plexopathy involving the entire brachial plexus, but with acute changes in the lower trunk and more chronic changes in the upper trunk  Plan:   · Continue to monitor symptoms   · Reviewed imaging as well as EMG results with patient in room  · Continue taking pain medication as prescribed  · Continue to be active with walking and low impact activities   · Will place order for MRI of the brachial plexus to rule out structural etiology  · Referral placed to movement neurologist for further evaluation and management  · Can consider steroids, but risk vs benefit in patient with uncontrolled insulin dependent diabetes- Deferred at this time until patient can follow up with neurology provider  · Follow up with neurosurgery as needed  Patient has no current neurosurgical needs  Encouraged to call with questions or concerns         Diagnoses and all orders for this visit:    Brachial plexopathy  -     MRI brachial plexus wo w contrast; Future  -     Ambulatory referral to Neurology; Future  -     BUN; Future  -     Creatinine, serum; Future          I spent 20 minutes with the patient today in which >50% of the time was spent counseling/coordination of care regarding diagnosis, imaging review, symptoms and treatment plan  CHIEF COMPLAINT    Chief Complaint   Patient presents with    Follow-up       HISTORY    History of Present Illness     37y o  year old female who has had neck and shoulder and upper arm symptoms/issues for over a year  She was evaluated on 6/10/2022 by Dr Citlali Billingsley   Details were aquired at that last visit  She works at [employer]  She was felt to have a frozen shoulder, underwent PT, and then injections into the right shoulder  However, over the last 2 months, symptoms began to radiate up into the neck, and down into the right arm  Involves numbness and tingling into all her fingers  She is dropping items, having difficulty with her writing, she is right-handed  She feels the strength in her arm is worsening  She states there has been no significant change since the last appointment  She still has persistent and near constant nerve pain in the right arm  She continues to deny symptoms into her legs  Denies bowel or bladder difficulties or gait instability  Has attempted to treat this with tramadol, naproxen and more recently gabapentin  States since initiation of her gabapentin she has not noticed any relief or change in symptoms  See Discussion    REVIEW OF SYSTEMS    Review of Systems   Constitutional: Negative  HENT: Negative  Eyes: Negative  Respiratory: Negative  Cardiovascular: Negative  Gastrointestinal: Negative  Endocrine: Negative  Genitourinary: Negative  Musculoskeletal: Positive for neck pain (neck pain radiates into right shoulder down right arm into right hand)  EMG R arm sched 7/21  PT May/ [id number], DR Guzmán--Increased Gabapentin, recoomend JEAN C6-7   Skin: Negative  Allergic/Immunologic: Negative  Neurological: Negative  Hematological: Negative  Psychiatric/Behavioral: Negative  All other systems reviewed and are negative          Meds/Allergies     Current Outpatient Medications   Medication Sig Dispense Refill    Accu-Chek FastClix Lancets MISC Test BG up to 3x daily as directed 300 each 1    Accu-Chek Guide test strip TEST BG UP TO 3X DAILY AS DIRECTED 100 each 1    Empagliflozin 25 MG TABS Take 1 tablet (25 mg total) by mouth every morning 90 tablet 0    gabapentin (NEURONTIN) 300 mg capsule Take 1 capsule (300 mg total) by mouth daily at bedtime 90 capsule 2    Injection Device for Insulin (B-D PEN MINI) ENRICO Use 4 (four) times a day Please use for Lantus and Humalog  4 pen needles daily Dx: Diabetes 400 each 0    insulin detemir (LEVEMIR FLEXTOUCH) 100 Units/mL injection pen Inject 25 Units under the skin daily at bedtime 15 mL 2    Insulin Pen Needle (BD Pen Needle Em U/F) 32G X 4 MM MISC Use daily 100 each 3    mometasone (ELOCON) 0 1 % lotion Apply topically daily 60 mL 0    prochlorperazine (COMPAZINE) 10 mg tablet 1 tab at onset of migraine, can repeat in 8 hours, can take with NSAID  Take with Benadryl if there are side effects  20 tablet 3    semaglutide, 1 mg/dose, (Ozempic) 4 MG/3ML SOPN injection pen Inject 0 75 mL (1 mg total) under the skin once a week 9 mL 0    topiramate (TOPAMAX) 50 MG tablet Take 1 tablet (50 mg total) by mouth daily at bedtime 90 tablet 1    traMADol (ULTRAM) 50 mg tablet Take 1 tablet (50 mg total) by mouth every 8 (eight) hours as needed for moderate pain 20 tablet 0    naproxen (EC NAPROSYN) 500 MG EC tablet Take 1 tablet (500 mg total) by mouth 2 (two) times a day as needed for mild pain or headaches      nitroglycerin (NITROSTAT) 0 4 mg SL tablet Place 1 tablet (0 4 mg total) under the tongue every 5 (five) minutes as needed for chest pain for up to 7 days 20 tablet 0     No current facility-administered medications for this visit         Allergies   Allergen Reactions    Metformin And Related Hives       PAST HISTORY    Past Medical History:   Diagnosis Date    Diabetes mellitus ([facility] [address] )        Past Surgical History:   Procedure Laterality Date    HERNIA REPAIR      TUBAL LIGATION  2017       Social History     Tobacco Use    Smoking status: Never Smoker    Smokeless tobacco: Never Used   Vaping Use    Vaping Use: Never used   Substance Use Topics    Alcohol use: Yes     Comment: ocassionally    Drug use: Never       Family History   Problem Relation Age of Onset    Hypertension Mother    Ardeth Needs Arthritis Mother     Asthma Sister     Bipolar disorder Brother     Schizophrenia Brother     Asthma Brother     Asthma Brother     Diabetes Maternal Grandmother     Diabetes Paternal Grandmother     No Known Problems Maternal Grandfather     No Known Problems Paternal Grandfather     Breast cancer Neg Hx          Above history personally reviewed  EXAM    Vitals:Blood pressure 108/76, pulse 74, temperature 97 8 °F (36 6 °C), resp  rate 18, height 5' 5" (1 651 m), weight 79 4 kg (175 lb), unknown if currently breastfeeding  ,Body mass index is 29 12 kg/m²  Physical Exam  Constitutional:       Appearance: Normal appearance  HENT:      Head: Atraumatic  Eyes:      Extraocular Movements: Extraocular movements intact and EOM normal    Musculoskeletal:      Cervical back: Normal range of motion  Comments: DROM in the right shoulder/arm   Neurological:      Mental Status: She is alert and oriented to person, place, and time  Cranial Nerves: No cranial nerve deficit  Sensory: Sensory deficit present  Motor: Weakness present  Gait: Gait is intact  Deep Tendon Reflexes:      Reflex Scores:       Bicep reflexes are 2+ on the right side and 2+ on the left side  Brachioradialis reflexes are 2+ on the right side and 2+ on the left side  Psychiatric:         Speech: Speech normal          Neurologic Exam     Mental Status   Oriented to person, place, and time  Attention: normal    Speech: speech is normal   Level of consciousness: alert  Knowledge: good  Normal comprehension  Cranial Nerves     CN III, IV, VI   Extraocular motions are normal    Upgaze: normal  Downgaze: normal    CN VII   Facial expression full, symmetric       CN VIII   CN VIII normal    Hearing: intact    Motor Exam   Muscle bulk: normal  Right arm tone: normal  Left arm tone: normal  Right leg tone: normal  Left leg tone: normal    Strength   Left deltoid: 5/5  Left biceps: 5/5  Left triceps: 5/5  Left wrist flexion: 5/5  Left wrist extension: 5/5  Right iliopsoas: 5/5  Left iliopsoas: 5/5  Right quadriceps: 5/5  Left quadriceps: 5/5  Right hamstrin/5  Left hamstrin/5  Right anterior tibial: 5/5  Left anterior tibial: 5/5  Right gastroc: 5/5  Left gastroc: 5/5Patient unable to abduct the shoulder against gravity on the right  4-/5 tricep and   4/5 bicep and WF/WE     Sensory Exam   Left arm light touch: normal  Right leg light touch: normal  Left leg light touch: normal  Numbness/tingling with decrease sensation to LT      Gait, Coordination, and Reflexes     Gait  Gait: normal    Tremor   Resting tremor: absent  Action tremor: absent    Reflexes   Right brachioradialis: 2+  Left brachioradialis: 2+  Right biceps: 2+  Left biceps: 2+  Right Alofrd: absent  Left Alford: absent        MEDICAL DECISION MAKING    Imaging Studies:     XR spine cervical complete 6+ vw flex/ext/obl    Result Date: 2022  Narrative: CERVICAL SPINE INDICATION:  M54 2: Cervicalgia  R20 0: Anesthesia of skin  R20 2: Paresthesia of skin  M46 429: Other cervical disc displacement at C6-C7 level  R29 898: Other symptoms and signs involving the musculoskeletal system  COMPARISON:  Cervical spine radiographs 3/18/2022, MRI cervical spine 2022 VIEWS:  XR SPINE CERVICAL COMPLETE 6+ VW FLEX /EXT /OBL Images: 7 FINDINGS: No fracture  Straightening of the cervical lordosis may be related to patient positioning or muscle spasm  The intervertebral disc spaces are preserved  No evidence of dynamic instability seen with flexion or extension  The left-sided neural foramina are patent  Evaluation of the right neural foramina is limited by suboptimal positioning  The prevertebral soft tissues are within normal limits  The lung apices are clear  Impression: No acute osseous abnormality  Straightening of the cervical lordosis   Workstation performed: [id number]       I have personally reviewed pertinent reports     and I have personally reviewed pertinent films in PACS

## 2022-07-22 NOTE — ASSESSMENT & PLAN NOTE
· Presents to review imaging and EMG completed of RUE  · Presented with diffuse RUE weakness and pain out of proportion to cervical imaging  Imaging:   · XR cervical spine 7/18/2022: cervical straightening without acute osseous abnormality  No dynamic instability  · EMG 7/21/2022: brachial plexopathy involving the entire brachial plexus, but with acute changes in the lower trunk and more chronic changes in the upper trunk  Plan:   · Continue to monitor symptoms   · Reviewed imaging as well as EMG results with patient in room  · Continue taking pain medication as prescribed  · Continue to be active with walking and low impact activities   · Will place order for MRI of the brachial plexus to rule out structural etiology  · Referral placed to movement neurologist for further evaluation and management  · Can consider steroids, but risk vs benefit in patient with uncontrolled insulin dependent diabetes- Deferred at this time until patient can follow up with neurology provider  · Follow up with neurosurgery as needed  Patient has no current neurosurgical needs   Encouraged to call with questions or concerns

## 2022-07-26 ENCOUNTER — HOSPITAL ENCOUNTER (EMERGENCY)
Facility: HOSPITAL | Age: 42
Discharge: HOME | End: 2022-07-27
Attending: EMERGENCY MEDICINE | Admitting: EMERGENCY MEDICINE
Payer: COMMERCIAL

## 2022-07-26 ENCOUNTER — TELEPHONE (OUTPATIENT)
Dept: NEUROLOGY | Facility: CLINIC | Age: 42
End: 2022-07-26

## 2022-07-26 VITALS
TEMPERATURE: 98.9 F | DIASTOLIC BLOOD PRESSURE: 83 MMHG | RESPIRATION RATE: 17 BRPM | HEART RATE: 83 BPM | SYSTOLIC BLOOD PRESSURE: 125 MMHG | BODY MASS INDEX: 29.68 KG/M2 | WEIGHT: 178.35 LBS | OXYGEN SATURATION: 99 %

## 2022-07-26 DIAGNOSIS — M54.12 CERVICAL RADICULOPATHY: Primary | ICD-10-CM

## 2022-07-26 PROCEDURE — 81025 URINE PREGNANCY TEST: CPT | Performed by: EMERGENCY MEDICINE

## 2022-07-26 PROCEDURE — 99283 EMERGENCY DEPT VISIT LOW MDM: CPT

## 2022-07-26 RX ORDER — LIDOCAINE 50 MG/G
1 PATCH TOPICAL ONCE
Status: DISCONTINUED | OUTPATIENT
Start: 2022-07-26 | End: 2022-07-27 | Stop reason: HOSPADM

## 2022-07-26 RX ORDER — DIAZEPAM 5 MG/1
5 TABLET ORAL 2 TIMES DAILY
Qty: 6 TABLET | Refills: 0 | Status: SHIPPED | OUTPATIENT
Start: 2022-07-26 | End: 2022-10-12

## 2022-07-26 RX ORDER — KETOROLAC TROMETHAMINE 30 MG/ML
30 INJECTION, SOLUTION INTRAMUSCULAR; INTRAVENOUS ONCE
Status: COMPLETED | OUTPATIENT
Start: 2022-07-26 | End: 2022-07-27

## 2022-07-26 RX ORDER — METHOCARBAMOL 500 MG/1
500 TABLET, FILM COATED ORAL 2 TIMES DAILY
Qty: 10 TABLET | Refills: 0 | Status: SHIPPED | OUTPATIENT
Start: 2022-07-26

## 2022-07-26 NOTE — TELEPHONE ENCOUNTER
Patient called back and accepted the appt that we offered for 11-16-22 at 1230 pm with Dr Jojo Nguyen

## 2022-07-26 NOTE — TELEPHONE ENCOUNTER
1st attempt to schedule follow up - per Dr Toney Gonzalez instructions see below  DO Lynette Guzman; KETURAH Buitrago  Cannot do a spnl x on 09/14 as this is my lunch  at 12;30    I already have a noon pt so the 12 ;30 should be closed      She can be seen by lola w/o me but please check with lola       Nov 16 I have a 12;30 opening and no pts at noon so we could her as long as lola had a opening on 11;45 as a SPNL x  otherwise she can see [facility] alone             Previous Messages       ----- Message -----   From: Lynette Coley   Sent: 7/26/2022   8:36 AM EDT   To: Arias Holden DO, Cherlynn Bones, CRNP   Subject: Follow up after EMG                               Good morning,     The appointment that you would like me to schedule on a Wednesday with Hakan Honeycutt for this follow up, would you like me to schedule it as a SNPX or just a regular appointment, because there is availability on September 14 where he has an open SNPX and you have an opening at 12:30  Please advise and I will schedule as such        Thank you, as always, for your time,     Lesly May   ----- Message -----   From: Arias Holden DO   Sent: 7/22/2022  11:09 AM EDT   To: KETURAH Nixon     I did an emg on  this pt yesterday and she saw neurosurgery today for a brachial plexopathy     A neurology movement disorder consult was ordered , not sure why since it is a neuromuscular case   Duong Snide urgent     Can be seen with Edmundo Jacome on a Wednesday     since I am not accepting any new patients and I have booked schedule till [facility]     fyi   Terry Li  PT is treatment of choice with steroid use but her Hba1c as over 11

## 2022-07-27 LAB
EXT PREG TEST URINE: NEGATIVE
EXT. CONTROL ED NAV: NORMAL

## 2022-07-27 PROCEDURE — 96372 THER/PROPH/DIAG INJ SC/IM: CPT

## 2022-07-27 PROCEDURE — 99284 EMERGENCY DEPT VISIT MOD MDM: CPT | Performed by: EMERGENCY MEDICINE

## 2022-07-27 RX ADMIN — LIDOCAINE 1 PATCH: 50 PATCH CUTANEOUS at 00:01

## 2022-07-27 RX ADMIN — KETOROLAC TROMETHAMINE 30 MG: 30 INJECTION, SOLUTION INTRAMUSCULAR; INTRAVENOUS at 00:01

## 2022-07-27 NOTE — ED PROVIDER NOTES
History  Chief Complaint   Patient presents with    Shoulder Pain     Right shoulder pain "for a few months"       History provided by:  Patient   used: No    Shoulder Pain  Location:  Shoulder  Shoulder location:  R shoulder  Injury: no    Pain details:     Quality:  Aching    Radiates to:  Does not radiate    Severity:  Moderate    Onset quality:  Gradual    Timing:  Intermittent    Progression:  Waxing and waning  Dislocation: no    Foreign body present:  Unable to specify  Tetanus status:  Unknown  Prior injury to area:  Unable to specify  Relieved by:  Nothing  Worsened by:  Nothing  Ineffective treatments:  None tried  Associated symptoms: decreased range of motion (due to pain)    Associated symptoms: no back pain, no fever, no muscle weakness, no neck pain and no swelling    Risk factors: no concern for non-accidental trauma        Prior to Admission Medications   Prescriptions Last Dose Informant Patient Reported? Taking? Accu-Chek FastClix Lancets MISC  Self No No   Sig: Test BG up to 3x daily as directed   Accu-Chek Guide test strip  Self No No   Sig: TEST BG UP TO 3X DAILY AS DIRECTED   Empagliflozin 25 MG TABS  Self No No   Sig: Take 1 tablet (25 mg total) by mouth every morning   Injection Device for Insulin (B-D PEN MINI) ENRICO  Self No No   Sig: Use 4 (four) times a day Please use for Lantus and Humalog   4 pen needles daily Dx: Diabetes   Insulin Pen Needle (BD Pen Needle Em U/F) 32G X 4 MM MISC  Self No No   Sig: Use daily   gabapentin (NEURONTIN) 300 mg capsule   No No   Sig: Take 1 capsule (300 mg total) by mouth daily at bedtime   insulin detemir (LEVEMIR FLEXTOUCH) 100 Units/mL injection pen  Self No No   Sig: Inject 25 Units under the skin daily at bedtime   mometasone (ELOCON) 0 1 % lotion  Self No No   Sig: Apply topically daily   naproxen (EC NAPROSYN) 500 MG EC tablet  Self No No   Sig: Take 1 tablet (500 mg total) by mouth 2 (two) times a day as needed for mild pain or headaches   nitroglycerin (NITROSTAT) 0 4 mg SL tablet  Self No No   Sig: Place 1 tablet (0 4 mg total) under the tongue every 5 (five) minutes as needed for chest pain for up to 7 days   prochlorperazine (COMPAZINE) 10 mg tablet  Self No No   Si tab at onset of migraine, can repeat in 8 hours, can take with NSAID  Take with Benadryl if there are side effects  semaglutide, 1 mg/dose, (Ozempic) 4 MG/3ML SOPN injection pen  Self No No   Sig: Inject 0 75 mL (1 mg total) under the skin once a week   topiramate (TOPAMAX) 50 MG tablet  Self No No   Sig: Take 1 tablet (50 mg total) by mouth daily at bedtime   traMADol (ULTRAM) 50 mg tablet  Self No No   Sig: Take 1 tablet (50 mg total) by mouth every 8 (eight) hours as needed for moderate pain      Facility-Administered Medications: None       Past Medical History:   Diagnosis Date    Diabetes mellitus ([facility] [address] )        Past Surgical History:   Procedure Laterality Date    HERNIA REPAIR      TUBAL LIGATION         Family History   Problem Relation Age of Onset    Hypertension Mother    Romina Lamdonna Arthritis Mother     Asthma Sister     Bipolar disorder Brother     Schizophrenia Brother     Asthma Brother     Asthma Brother     Diabetes Maternal Grandmother     Diabetes Paternal Grandmother     No Known Problems Maternal Grandfather     No Known Problems Paternal Grandfather     Breast cancer Neg Hx      I have reviewed and agree with the history as documented  E-Cigarette/Vaping    E-Cigarette Use Never User      E-Cigarette/Vaping Substances    Nicotine No     THC No     CBD No     Flavoring No     Other No     Unknown No      Social History     Tobacco Use    Smoking status: Never Smoker    Smokeless tobacco: Never Used   Vaping Use    Vaping Use: Never used   Substance Use Topics    Alcohol use: Yes     Comment: ocassionally    Drug use: Never       Review of Systems   Constitutional: Negative for chills and fever     HENT: Negative for facial swelling, sore throat and trouble swallowing  Eyes: Negative for pain and visual disturbance  Respiratory: Negative for cough and shortness of breath  Cardiovascular: Negative for chest pain and leg swelling  Gastrointestinal: Negative for abdominal pain, blood in stool, diarrhea, nausea and vomiting  Genitourinary: Negative for dysuria and flank pain  Musculoskeletal: Negative for back pain, neck pain and neck stiffness  Right scapular/shoulder/arm pain   Skin: Negative for pallor and rash  Allergic/Immunologic: Negative for environmental allergies and immunocompromised state  Neurological: Negative for dizziness and headaches  Hematological: Negative for adenopathy  Does not bruise/bleed easily  Psychiatric/Behavioral: Negative for agitation and behavioral problems  All other systems reviewed and are negative  Physical Exam  Physical Exam  Vitals and nursing note reviewed  Constitutional:       General: She is not in acute distress  Appearance: She is well-developed  HENT:      Head: Normocephalic and atraumatic  Eyes:      Extraocular Movements: Extraocular movements intact  Cardiovascular:      Rate and Rhythm: Normal rate and regular rhythm  Pulmonary:      Effort: Pulmonary effort is normal    Abdominal:      Palpations: Abdomen is soft  Tenderness: There is no abdominal tenderness  There is no guarding or rebound  Musculoskeletal:         General: No swelling or deformity  Cervical back: Normal range of motion and neck supple  Comments: Tenderness to right scapular area and right arm, decreased ROM due tro pain, NV intact distally   Skin:     General: Skin is warm and dry  Neurological:      General: No focal deficit present  Mental Status: She is alert and oriented to person, place, and time     Psychiatric:         Mood and Affect: Mood normal          Behavior: Behavior normal          Vital Signs  ED Triage Vitals   Temperature Pulse Respirations Blood Pressure SpO2   07/26/22 2150 07/26/22 2149 07/26/22 2149 07/26/22 2150 07/26/22 2149   98 9 °F (37 2 °C) 83 17 125/83 99 %      Temp src Heart Rate Source Patient Position - Orthostatic VS BP Location FiO2 (%)   -- 07/26/22 2149 -- -- --    Monitor         Pain Score       07/26/22 2149       10 - Worst Possible Pain           Vitals:    07/26/22 2149 07/26/22 2150   BP:  125/83   Pulse: 83          Visual Acuity      ED Medications  Medications   lidocaine (LIDODERM) 5 % patch 1 patch (1 patch Topical Medication Applied 7/27/22 0001)   ketorolac (TORADOL) injection 30 mg (30 mg Intramuscular Given 7/27/22 0001)       Diagnostic Studies  Results Reviewed     Procedure Component Value Units Date/Time    POCT pregnancy, urine [[id number]]  (Normal) Resulted: 07/27/22 0000    Lab Status: Final result Updated: 07/27/22 0000     EXT PREG TEST UR (Ref: Negative) negative     Control valid                 No orders to display              Procedures  Procedures         ED Course                                             MDM  Number of Diagnoses or Management Options  Cervical radiculopathy  Diagnosis management comments: Patient is a 45-year-old female, history of type 1 diabetes, cervical radiculopathy, comes in with complaints of persisting right sided neck, scapular, shoulder, arm pain, going on for several months almost a near according to patient, the patient has had MRI, EMG, evaluation by Neurosurgery recently 5 days back, is scheduled to have MRI brachial plexus, and see Neurology, patient comes after finishing work you to the persisting pain in the right scapular and arm region, patient is started on gabapentin recently, taking naproxen; no bowel or bladder incontinence    On exam, patient is conscious, alert, no acute distress:  Vital signs are stable, no midline C-spine tenderness, mild right cervical muscular, right scapular, right arm tenderness, no deformity, neurovascular intact distally  Impression:  Persisting cervical radiculopathy, will give Toradol, lidocaine patch, will prescribe muscle relaxant, advise follow-up with PCP and Neurology  Amount and/or Complexity of Data Reviewed  Review and summarize past medical records: yes (Recent imaging including MRI Cervical spine, EMG; NS office evaluation 7/22/22)        Disposition  Final diagnoses:   Cervical radiculopathy     Time reflects when diagnosis was documented in both MDM as applicable and the Disposition within this note     Time User Action Codes Description Comment    7/26/2022 11:44 PM Jarome Cart Add [M54 12] Cervical radiculopathy     7/26/2022 11:48 PM Alam, [address] [M54 12] Cervical radiculopathy     7/26/2022 11:48 PM Alam, [address] [K59 27] Cervical radiculopathy       ED Disposition     ED Disposition   Discharge    Condition   Stable    Date/Time   Tue Jul 26, 2022 11:51 PM    [address] [place] [address] discharge to home/self care                 Follow-up Information     Follow up With Specialties Details Why Contact Info Additional Information    Janae Garcia MD Family Medicine Schedule an appointment as soon as possible for a visit   [facility]  [address] [facility] Neurology [facility] Neurology Schedule an appointment as soon as possible for a visit   [address] [facility] [address] [phone] [facility], [address] [place] [address], [address] [place] [address], [id number] [phone]          Patient's Medications   Discharge Prescriptions    DIAZEPAM (VALIUM) 5 MG TABLET    Take 1 tablet (5 mg total) by mouth 2 (two) times a day for 3 days       Start Date: 7/26/2022 End Date: 7/29/2022       Order Dose: 5 mg       Quantity: 6 tablet    Refills: 0    METHOCARBAMOL (ROBAXIN) 500 MG TABLET    Take 1 tablet (500 mg total) by mouth 2 (two) times a day       Start Date: 7/26/2022 End Date: --       Order Dose: 500 mg       Quantity: 10 tablet    Refills: 0       No discharge procedures on file      PDMP Review       Value Time User    PDMP Reviewed  Yes 7/20/2021  8:33 AM Sailaja Trujillo DO          ED Provider  Electronically Signed by           Kathleen Colunga MD  07/27/22 0005

## 2022-08-03 ENCOUNTER — CLINICAL SUPPORT (OUTPATIENT)
Dept: FAMILY MEDICINE CLINIC | Facility: CLINIC | Age: 42
End: 2022-08-03
Payer: COMMERCIAL

## 2022-08-03 DIAGNOSIS — Z11.1 PPD SCREENING TEST: Primary | ICD-10-CM

## 2022-08-03 PROCEDURE — 86580 TB INTRADERMAL TEST: CPT

## 2022-08-03 PROCEDURE — 96372 THER/PROPH/DIAG INJ SC/IM: CPT

## 2022-08-05 ENCOUNTER — CLINICAL SUPPORT (OUTPATIENT)
Dept: FAMILY MEDICINE CLINIC | Facility: CLINIC | Age: 42
End: 2022-08-05

## 2022-08-05 DIAGNOSIS — Z11.1 ENCOUNTER FOR PPD SKIN TEST READING: Primary | ICD-10-CM

## 2022-08-05 LAB
INDURATION: 0 MM
TB SKIN TEST: NEGATIVE

## 2022-08-22 ENCOUNTER — TELEPHONE (OUTPATIENT)
Dept: FAMILY MEDICINE CLINIC | Facility: CLINIC | Age: 42
End: 2022-08-22

## 2022-08-22 ENCOUNTER — APPOINTMENT (OUTPATIENT)
Dept: LAB | Facility: HOSPITAL | Age: 42
End: 2022-08-22
Payer: COMMERCIAL

## 2022-08-22 DIAGNOSIS — G54.0 BRACHIAL PLEXOPATHY: ICD-10-CM

## 2022-08-22 LAB
BUN SERPL-MCNC: 9 MG/DL (ref 5–25)
CREAT SERPL-MCNC: 0.72 MG/DL (ref 0.6–1.2)
GFR SERPL CREATININE-BSD FRML MDRD: 105 ML/MIN/1.73SQ M

## 2022-08-22 PROCEDURE — 36415 COLL VENOUS BLD VENIPUNCTURE: CPT

## 2022-08-22 PROCEDURE — 84520 ASSAY OF UREA NITROGEN: CPT

## 2022-08-22 PROCEDURE — 82565 ASSAY OF CREATININE: CPT

## 2022-08-23 ENCOUNTER — HOSPITAL ENCOUNTER (OUTPATIENT)
Dept: RADIOLOGY | Facility: HOSPITAL | Age: 42
Discharge: HOME | End: 2022-08-23
Payer: COMMERCIAL

## 2022-08-23 DIAGNOSIS — G54.0 BRACHIAL PLEXOPATHY: ICD-10-CM

## 2022-08-23 PROCEDURE — 71552 MRI CHEST W/O & W/DYE: CPT

## 2022-08-23 PROCEDURE — G1004 CDSM NDSC: HCPCS

## 2022-08-23 PROCEDURE — A9585 GADOBUTROL INJECTION: HCPCS | Performed by: PHYSICIAN ASSISTANT

## 2022-08-23 RX ADMIN — GADOBUTROL 8 ML: 604.72 INJECTION INTRAVENOUS at 19:42

## 2022-10-12 ENCOUNTER — ANNUAL EXAM (OUTPATIENT)
Dept: OBGYN CLINIC | Facility: CLINIC | Age: 42
End: 2022-10-12

## 2022-10-12 VITALS
SYSTOLIC BLOOD PRESSURE: 116 MMHG | WEIGHT: 177.2 LBS | HEART RATE: 85 BPM | DIASTOLIC BLOOD PRESSURE: 76 MMHG | HEIGHT: 65 IN | BODY MASS INDEX: 29.52 KG/M2

## 2022-10-12 DIAGNOSIS — Z13.31 POSITIVE DEPRESSION SCREENING: ICD-10-CM

## 2022-10-12 DIAGNOSIS — Z11.3 SCREEN FOR STD (SEXUALLY TRANSMITTED DISEASE): ICD-10-CM

## 2022-10-12 DIAGNOSIS — Z59.9 FINANCIAL DIFFICULTIES: ICD-10-CM

## 2022-10-12 DIAGNOSIS — Z01.419 ENCOUNTER FOR ANNUAL ROUTINE GYNECOLOGICAL EXAMINATION: Primary | ICD-10-CM

## 2022-10-12 PROCEDURE — 99396 PREV VISIT EST AGE 40-64: CPT | Performed by: NURSE PRACTITIONER

## 2022-10-12 PROCEDURE — 87591 N.GONORRHOEAE DNA AMP PROB: CPT | Performed by: NURSE PRACTITIONER

## 2022-10-12 PROCEDURE — 87491 CHLMYD TRACH DNA AMP PROBE: CPT | Performed by: NURSE PRACTITIONER

## 2022-10-12 SDOH — ECONOMIC STABILITY - INCOME SECURITY: PROBLEM RELATED TO HOUSING AND ECONOMIC CIRCUMSTANCES, UNSPECIFIED: Z59.9

## 2022-10-12 NOTE — LETTER
2022    [facility]  [phone]          10/14/2022    To Mannieallisonlashay Marroquin  : 1980      This letter is to advise you that your recent CULTURES for gonorrhea and chlamydia were reviewed by me and are NORMAL  Please contact the office for an appointment if you have any additional concerns      Lindsey Bruno

## 2022-10-12 NOTE — PROGRESS NOTES
Annual Exam    Assessment   1  Encounter for annual routine gynecological examination     2  Financial difficulties  Ambulatory referral to social work care management program   3  Positive depression screening  Ambulatory Referral to Social Work Care Management Program   4  Screen for STD (sexually transmitted disease)  Chlamydia/GC amplified DNA by PCR    RPR    Hepatitis C antibody    Hepatitis B surface antigen    HIV 1/2 Antigen/Antibody (4th Generation) w Reflex [facility]     well woman       Plan       All questions answered  Breast self exam technique reviewed and patient encouraged to perform self-exam monthly  Chlamydia specimen  Contraception: Tubal ligation  Discussed healthy lifestyle modifications  Follow up in 1 year  GC specimen  Mammogram   HIV, Hep B & Hep C, RPR     Patient Instructions   Keep appointment for R breast U/S  Keep appointment for mammogram  STD results can take up to 2 weeks  Viky from social work will call you  Call with needs or concerns  Return in 1 year   Pt verbalized understanding of all discussed  Jacob Cummins is a 40 y o  [id number] female who presents for annual well woman exam  Periods are regular every 28-30 days, lasting 3 days  No intermenstrual bleeding, spotting, or discharge  Last WNL PAP and negative HPV was 2021  1 partner x x 2 years, denies domestic violence  Requesting STD testing for peace of mind, denies symptoms    Depression Screening Follow-up Plan: Patient's depression screening was positive with a PHQ-2 score of 6   Their PHQ-9 score was 13    Denies suicidal thoughts would like a social work consult      Current contraception: tubal ligation  History of abnormal Pap smear: no  Family history of uterine or ovarian cancer: no  Regular self breast exam: yes  History of abnormal mammogram: R breast cyst  Family history of breast cancer: no  History of abnormal lipids: unknown  Menstrual History:  OB History        6    Para 3    Term   3            AB   3    Living   3       SAB   2    IAB   1    Ectopic        Multiple        Live Births   1                Menarche age: 13  Patient's last menstrual period was 2022  The following portions of the patient's history were reviewed and updated as appropriate: allergies, current medications, past family history, past medical history, past social history, past surgical history and problem list     Review of Systems  Pertinent items are noted in HPI        Objective      /76   Pulse 85   Ht 5' 5" (1 651 m)   Wt 80 4 kg (177 lb 3 2 oz)   LMP 2022   BMI 29 49 kg/m²     General: alert and oriented, in no acute distress, alert, appears stated age and cooperative   Heart: regular rate and rhythm, S1, S2 normal, no murmur, click, rub or gallop   Lungs: clear to auscultation bilaterally, WNL respiratory effort, negative cough or SOB   Thyroid: Negative masses   Abdomen: soft, non-tender, without masses or organomegaly   Vulva: normal   Vagina: normal mucosa   Cervix: no cervical motion tenderness and no lesions   Uterus: normal size, non-tender, normal shape and consistency   Adnexa: normal adnexa   Urethra: normal   Breasts: NT,negative masses, discharge, or dimpling

## 2022-10-12 NOTE — PATIENT INSTRUCTIONS
Keep appointment for R breast U/S  Keep appointment for mammogram  STD results can take up to 2 weeks  Viky from social work will call you  Call with needs or concerns  Return in 1 year     COVID-19 Instructions    If you are having any of the following:  Cough   Shortness of breath   Fever  If traveled within past 2 weeks internationally or to high risk [place] states  Or been in contact with someone that has     Please call either:   Your PCP office  [phone], option [phone]    They will screen you over the phone and direct you to the nearest appropriate testing location    DO NOT go to your PCP or OB office without calling first       Igor Iniguez

## 2022-10-14 ENCOUNTER — PATIENT OUTREACH (OUTPATIENT)
Dept: OBGYN CLINIC | Facility: CLINIC | Age: 42
End: 2022-10-14

## 2022-10-14 LAB
C TRACH DNA SPEC QL NAA+PROBE: NEGATIVE
N GONORRHOEA DNA SPEC QL NAA+PROBE: NEGATIVE

## 2022-10-14 NOTE — PROGRESS NOTES
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

## 2022-10-21 ENCOUNTER — PATIENT OUTREACH (OUTPATIENT)
Dept: FAMILY MEDICINE CLINIC | Facility: CLINIC | Age: 42
End: 2022-10-21

## 2022-10-21 DIAGNOSIS — Z78.9 NEEDS ASSISTANCE WITH COMMUNITY RESOURCES: Primary | ICD-10-CM

## 2022-10-21 NOTE — PROGRESS NOTES
SERAFIN BONNER received a call from [employer] [facility] regarding patient's current need for social work assistance due to financial difficulties  Patient had been out of work for some time as she had to care for her son post spinal surgery  Patient did utilize [employer] through [employer] int he past to help with rental payments therefore they are unable to assist again at this time  Patient was at risk of eviction, however, [facility] did assist patient in creating a plan with landlord to pay what she owes and avoid eviction  At this time, patient is also behind on her gas bill and has received a shut off notice due to owing [facility] a total of $971 80  Patient is insulin dependent diabetic  SERAFIN BONNER discussed case with  to see if we would be able to assist with patient's gas being turned back on      SERAFIN BONNER will continue to remain available for collaboration with [facility] and [employer] as needed

## 2022-10-21 NOTE — PROGRESS NOTES
received a referral from SERAFIN Yates to assist pt with gas utility resources  OC placed outreach call to pt and introduced myself and the reason for my call  Pt did stated that she would like some assistance with her Gas which is now shut off  OC asked pt if she has reached out to [facility] to request a payment arrangement but pt stated that she will need to pay the complete balance off  OC called Oswaldo Gil in [facility] and they stated that they can help but not with customers who have their gas that has been turned off  They will help prior to customers who are facing shut off notices  OC is currently waiting for a call back from [facility] to ask that a medical necessity form but faxed over to the PCP office so that Dr Bhavesh Kaiser can complete and fax back so that pt can have her gas turned on back for 30 days  OC will continue to look at other resources to help with payment  Addendum:    OC spoke with [facility] representative Cassie Chloe who will fax a medical certification form to Dr Bhavesh Kaiser office [phone]  Once that form has been completed it will be faxed back to [facility]  Pt can call [facility] once form has been received to schedule a day and time so that the gas can be turned back on  SERAFIN Yates will be touch with Dr Bhavesh Kaiser office to give them my contact information so that they can call and let me know once the form has been faxed back to [facility]

## 2022-10-25 ENCOUNTER — PATIENT OUTREACH (OUTPATIENT)
Dept: FAMILY MEDICINE CLINIC | Facility: CLINIC | Age: 42
End: 2022-10-25

## 2022-10-25 NOTE — PROGRESS NOTES
SERAFIN BONNER contacted [facility] to inform them that the initial fax was not received by Dr Geovanna Noriega' office  SERAFIN BONNER did request that the medical cert form be refaxed to [phone] with attn to Verena Sifuentes  [facility] agreed to refax at this time

## 2022-10-26 ENCOUNTER — PATIENT OUTREACH (OUTPATIENT)
Dept: FAMILY MEDICINE CLINIC | Facility: CLINIC | Age: 42
End: 2022-10-26

## 2022-10-26 NOTE — PROGRESS NOTES
placed call to pt regarding form of medical certification  SERAFIN Wolfe informed OC that the med cert was received by Dr Leydi Rodriguez office and PCP will be completing it before then end of the day  OC informed pt that the form has been received and she should follow up with [facility] today or tomorrow so that [facility] can come out to reconnect her gas (if form was approved by [facility])  Pt stated that she understood  OC also stated to pt that this is only for 30 days and will need to reach out to Sulema or Sharla to check for funding so that she can get some assistance with he bill  Pt stated that she understood and will reach out if she needs my assistance

## 2022-10-27 ENCOUNTER — TELEPHONE (OUTPATIENT)
Dept: FAMILY MEDICINE CLINIC | Facility: CLINIC | Age: 42
End: 2022-10-27

## 2022-11-03 ENCOUNTER — HOSPITAL ENCOUNTER (OUTPATIENT)
Dept: ULTRASOUND IMAGING | Facility: CLINIC | Age: 42
Discharge: HOME | End: 2022-11-03

## 2022-11-03 ENCOUNTER — HOSPITAL ENCOUNTER (OUTPATIENT)
Dept: MAMMOGRAPHY | Facility: CLINIC | Age: 42
Discharge: HOME | End: 2022-11-03

## 2022-11-03 DIAGNOSIS — R92.8 ABNORMAL FINDINGS ON DIAGNOSTIC IMAGING OF BREAST: ICD-10-CM

## 2022-11-08 ENCOUNTER — HOSPITAL ENCOUNTER (OUTPATIENT)
Facility: HOSPITAL | Age: 42
Setting detail: OBSERVATION
Discharge: HOME | End: 2022-11-09
Attending: EMERGENCY MEDICINE | Admitting: INTERNAL MEDICINE

## 2022-11-08 ENCOUNTER — APPOINTMENT (EMERGENCY)
Dept: CT IMAGING | Facility: HOSPITAL | Age: 42
End: 2022-11-08

## 2022-11-08 DIAGNOSIS — R29.898 WEAKNESS OF RIGHT LOWER EXTREMITY: Primary | ICD-10-CM

## 2022-11-08 LAB
ANION GAP SERPL CALCULATED.3IONS-SCNC: 9 MMOL/L (ref 4–13)
BASOPHILS # BLD AUTO: 0.05 THOUSANDS/ÂΜL (ref 0–0.1)
BASOPHILS NFR BLD AUTO: 1 % (ref 0–1)
BUN SERPL-MCNC: 10 MG/DL (ref 5–25)
CALCIUM SERPL-MCNC: 9.1 MG/DL (ref 8.3–10.1)
CHLORIDE SERPL-SCNC: 100 MMOL/L (ref 96–108)
CO2 SERPL-SCNC: 28 MMOL/L (ref 21–32)
CREAT SERPL-MCNC: 0.81 MG/DL (ref 0.6–1.3)
EOSINOPHIL # BLD AUTO: 0.32 THOUSAND/ÂΜL (ref 0–0.61)
EOSINOPHIL NFR BLD AUTO: 4 % (ref 0–6)
ERYTHROCYTE [DISTWIDTH] IN BLOOD BY AUTOMATED COUNT: 12.5 % (ref 11.6–15.1)
GFR SERPL CREATININE-BSD FRML MDRD: 90 ML/MIN/1.73SQ M
GLUCOSE SERPL-MCNC: 180 MG/DL (ref 65–140)
GLUCOSE SERPL-MCNC: 297 MG/DL (ref 65–140)
HCT VFR BLD AUTO: 42 % (ref 34.8–46.1)
HGB BLD-MCNC: 13.5 G/DL (ref 11.5–15.4)
IMM GRANULOCYTES # BLD AUTO: 0.04 THOUSAND/UL (ref 0–0.2)
IMM GRANULOCYTES NFR BLD AUTO: 0 % (ref 0–2)
LYMPHOCYTES # BLD AUTO: 4.29 THOUSANDS/ÂΜL (ref 0.6–4.47)
LYMPHOCYTES NFR BLD AUTO: 46 % (ref 14–44)
MCH RBC QN AUTO: 28.5 PG (ref 26.8–34.3)
MCHC RBC AUTO-ENTMCNC: 32.1 G/DL (ref 31.4–37.4)
MCV RBC AUTO: 89 FL (ref 82–98)
MONOCYTES # BLD AUTO: 0.72 THOUSAND/ÂΜL (ref 0.17–1.22)
MONOCYTES NFR BLD AUTO: 8 % (ref 4–12)
NEUTROPHILS # BLD AUTO: 3.83 THOUSANDS/ÂΜL (ref 1.85–7.62)
NEUTS SEG NFR BLD AUTO: 41 % (ref 43–75)
NRBC BLD AUTO-RTO: 0 /100 WBCS
PLATELET # BLD AUTO: 331 THOUSANDS/UL (ref 149–390)
PMV BLD AUTO: 9.3 FL (ref 8.9–12.7)
POTASSIUM SERPL-SCNC: 4 MMOL/L (ref 3.5–5.3)
RBC # BLD AUTO: 4.73 MILLION/UL (ref 3.81–5.12)
SODIUM SERPL-SCNC: 137 MMOL/L (ref 135–147)
WBC # BLD AUTO: 9.25 THOUSAND/UL (ref 4.31–10.16)

## 2022-11-08 RX ORDER — GABAPENTIN 300 MG/1
300 CAPSULE ORAL
Status: DISCONTINUED | OUTPATIENT
Start: 2022-11-08 | End: 2022-11-09 | Stop reason: HOSPADM

## 2022-11-08 RX ORDER — ENOXAPARIN SODIUM 100 MG/ML
40 INJECTION SUBCUTANEOUS
Status: DISCONTINUED | OUTPATIENT
Start: 2022-11-09 | End: 2022-11-09 | Stop reason: HOSPADM

## 2022-11-08 RX ORDER — INSULIN LISPRO 100 [IU]/ML
1-5 INJECTION, SOLUTION INTRAVENOUS; SUBCUTANEOUS
Status: DISCONTINUED | OUTPATIENT
Start: 2022-11-08 | End: 2022-11-09 | Stop reason: HOSPADM

## 2022-11-08 RX ORDER — ACETAMINOPHEN 325 MG/1
650 TABLET ORAL EVERY 6 HOURS PRN
Status: DISCONTINUED | OUTPATIENT
Start: 2022-11-08 | End: 2022-11-09 | Stop reason: HOSPADM

## 2022-11-08 RX ORDER — INSULIN LISPRO 100 [IU]/ML
1-5 INJECTION, SOLUTION INTRAVENOUS; SUBCUTANEOUS
Status: DISCONTINUED | OUTPATIENT
Start: 2022-11-09 | End: 2022-11-09 | Stop reason: HOSPADM

## 2022-11-08 RX ADMIN — INSULIN DETEMIR 20 UNITS: 100 INJECTION, SOLUTION SUBCUTANEOUS at 22:10

## 2022-11-08 RX ADMIN — GABAPENTIN 300 MG: 300 CAPSULE ORAL at 22:10

## 2022-11-08 NOTE — CONSULTS
Consultation - Neurology   aMrky Aguillon 40 y o  female MRN: [id number]  Unit/Bed#: ED 27 Encounter: [id number]      Assessment/Plan     Weakness of right lower extremity  Assessment & Plan  43-year-old female with diabetes, episodes of unresponsiveness suspicious for nonepileptic events, suspected right brachial plexopathy (abnormal right upper extremity EMG/NCS but MRI right brachial plexus normal in July 2022), presents with numbness and near complete inability to move the right lower extremity upon awakening this morning  Exam with considerable weakness of the right lower extremity, with complete paralysis except for 1/5 right ankle dorsiflexion and 3/5 right plantar flexion  Incongruent with observed transfer from bed to wheelchair and from wheelchair to [facility] 5, however  ED physician noted more movement of the right lower extremity than this examiner  Patient also reports currently inability to appreciate light touch, pin, temperature, vibration, and with absent proprioception in the distal right lower extremity  No bowel/bladder incontinence  Patient has chronic back pain, but no worse than usual   No reflex asymmetry and toes are bilaterally downgoing  Unclear whether symptoms may be inorganic/functional     Plan:  -CT head and lumbar spine pending  -PT/OT and fall precautions  -further recommendations as per attending neurologist attestation  Recommendations for outpatient neurological follow up have yet to be determined      History of Present Illness     Reason for Consult / Principal Problem:  Right lower extremity weakness and numbness  Hx and PE limited by:  N/A  HPI: Marky Aguillon is a 40 y o  right handed female with diabetes, episodes of unresponsiveness suspicious for nonepileptic events, suspected right brachial plexopathy (abnormal right upper extremity EMG/NCS but MRI right brachial plexus normal in July 2022), presents with numbness and near complete inability to move the right lower extremity upon awakening this morning  Patient states she was in her usual state of health last evening before going to bed  She was able to go up and down the stairs to do laundry and walk around normally  When she woke this morning around 0630, she felt the right lower extremity was [facility] and she thought it was “asleep”, though she denies paresthesias  She attempted to ambulate but had to drag the right lower extremity and hold onto anything she could in order to get around  She denies bowel/bladder dysfunction, she has chronic back pain but denies any change  She continues to have right upper extremity heaviness and numbness, which is chronic  No other symptoms noted  She was previously noted to have uncontrolled diabetes but states recently her blood sugars have been much better controlled  She had been out of work for several months as she was caring for her son after her his emergent back surgery and 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 [employer]  On exam, patient has 1/5 right ankle dorsiflexion but otherwise 0/5 movement of the right lower extremity  She some degree of pin appreciation of the right thigh but distal to the right thigh is unable to appreciate pin, temperature, and has some vibratory sense at the right great toe but completely impaired proprioception distally  She states she is unable to appreciate light touch of the right extremity from knee distally  She has some degree of pin loss in the right upper extremity in no particular distribution but otherwise intact temperature, vibratory sense, proprioception of the right upper extremity  Full pin, temperature, vibratory sense, proprioception in the left upper and left lower extremities  Reflexes 1 bilateral upper extremities and absent in the lower extremities bilaterally  Toe responses were bilaterally downgoing      Inpatient consult to Neurology  Consult performed by: Ana Cha TAY  Consult ordered by: Duane Gist, MD          Review of Systems   Constitutional: Negative  HENT: Negative  Eyes: Negative  Respiratory: Negative  Cardiovascular: Negative  Gastrointestinal: Negative  Endocrine: Negative  Genitourinary: Negative  Musculoskeletal: Positive for gait problem  Skin: Negative for rash  Allergic/Immunologic: Negative  Neurological: Positive for weakness and numbness  As above  Hematological: Negative  Psychiatric/Behavioral: Negative  Historical Information   Past Medical History:   Diagnosis Date   • Diabetes mellitus ([facility] [address] )      Past Surgical History:   Procedure Laterality Date   • HERNIA REPAIR     • TUBAL LIGATION  2017     Social History   Social History     Substance and Sexual Activity   Alcohol Use Not Currently    Comment: ocassionally     Social History     Substance and Sexual Activity   Drug Use Never     E-Cigarette/Vaping   • E-Cigarette Use Never User      E-Cigarette/Vaping Substances   • Nicotine No    • THC No    • CBD No    • Flavoring No    • Other No    • Unknown No      Social History     Tobacco Use   Smoking Status Never Smoker   Smokeless Tobacco Never Used     Family History:   Family History   Problem Relation Age of Onset   • Hypertension Mother    • Arthritis Mother    • Asthma Sister    • Bipolar disorder Brother    • Schizophrenia Brother    • Asthma Brother    • Asthma Brother    • Diabetes Maternal Grandmother    • Diabetes Paternal Grandmother    • No Known Problems Maternal Grandfather    • No Known Problems Paternal Grandfather    • Breast cancer Neg Hx        Review of previous medical records was completed  Meds/Allergies   PTA meds:   Prior to Admission Medications   Prescriptions Last Dose Informant Patient Reported? Taking?    Accu-Chek FastClix Lancets MISC  Self No Yes   Sig: Test BG up to 3x daily as directed   Accu-Chek Guide test strip  Self No Yes   Sig: TEST BG UP TO 3X DAILY AS DIRECTED   Empagliflozin 25 MG TABS  Self No Yes   Sig: Take 1 tablet (25 mg total) by mouth every morning   Injection Device for Insulin (B-D PEN MINI) ENRICO  Self No Yes   Sig: Use 4 (four) times a day Please use for Lantus and Humalog  4 pen needles daily Dx: Diabetes   Insulin Pen Needle (BD Pen Needle Em U/F) 32G X 4 MM MISC  Self No Yes   Sig: Use daily   gabapentin (NEURONTIN) 300 mg capsule   No Yes   Sig: Take 1 capsule (300 mg total) by mouth daily at bedtime   insulin detemir (LEVEMIR FLEXTOUCH) 100 Units/mL injection pen  Self No Yes   Sig: Inject 25 Units under the skin daily at bedtime   methocarbamol (ROBAXIN) 500 mg tablet   No Yes   Sig: Take 1 tablet (500 mg total) by mouth 2 (two) times a day   prochlorperazine (COMPAZINE) 10 mg tablet  Self No Yes   Si tab at onset of migraine, can repeat in 8 hours, can take with NSAID  Take with Benadryl if there are side effects  semaglutide, 1 mg/dose, (Ozempic) 4 MG/3ML SOPN injection pen  Self No Yes   Sig: Inject 0 75 mL (1 mg total) under the skin once a week   topiramate (TOPAMAX) 50 MG tablet  Self No Yes   Sig: Take 1 tablet (50 mg total) by mouth daily at bedtime   traMADol (ULTRAM) 50 mg tablet  Self No Yes   Sig: Take 1 tablet (50 mg total) by mouth every 8 (eight) hours as needed for moderate pain      Facility-Administered Medications: None       Allergies   Allergen Reactions   • Metformin And Related Hives       Objective   Vitals:Blood pressure 141/67, pulse 74, temperature 99 2 °F (37 3 °C), temperature source Oral, resp  rate 18, last menstrual period 10/25/2022, SpO2 100 %, unknown if currently breastfeeding  ,There is no height or weight on file to calculate BMI  No intake or output data in the 24 hours ending 22 7317    Invasive Devices:    Invasive Devices  Report    Peripheral Intravenous Line  Duration           Peripheral IV 22 Right Antecubital <1 day                Physical Exam   General:  Patient is well-developed, well-nourished, and in no acute distress  HEENT:  Head normocephalic  Eyes anicteric  Cardiovascular:  With regular rhythm  Lungs:  Normal effort  Nonlabored breathing  Extremities:  With no significant edema  Skin: No rashes  Neurologic Exam  Neurologic:  Patient is alert, pleasantly interactive, and appropriately conversational   No obvious symbolic language difficulty or dysarthria  Patient observed transferring from bed to wheelchair and from wheelchair to CT scanner with minimal assist of 1  She was up on her toes on the right and was able to briefly bear weight with right lower extremity  Cranial Nerves:   II: Visual fields full to confrontation  Cannot visualize optic fundi  III,IV,VI: extraocular movements intact with no nystagmus  V: Sensation in the V1 through V3 distributions intact to light touch bilaterally  VII: Face is symmetric with no weakness noted  XII: Tongue midline with no atrophy or fasciculations with appropriate movement  Coordination:  Accurate with finger-to-nose maneuvers bilaterally  Full strength noted bilateral upper extremities and left lower extremity  Except for 1/5 right dorsiflexion and 3/5 right plantar flexion, complete inability to move the right lower extremity (0/5)  Anthony positive with possible effort dependent weakness noted  Sensory testing with completely absent light touch, pin, temperature, vibration, proprioception of the right lower extremity from the knee distally  Some appreciation of these modalities in the right quad  Full appreciation of light touch, pin, temperature, vibration, proprioception of the remainder of the extremities, exception of diminished pin appreciation right upper extremity in no particular dermatomal distribution  Reflexes 1+ bilateral upper extremities, bilaterally absent in the lower extremities  Toe responses are downgoing bilaterally      Lab Results:   CBC:   Results from last 7 days   Lab Units 11/08/22  1446   WBC Thousand/uL 9 25   RBC Million/uL 4 73   HEMOGLOBIN g/dL 13 5   HEMATOCRIT % 42 0   MCV fL 89   PLATELETS Thousands/uL 331   , BMP/CMP:   Results from last 7 days   Lab Units 11/08/22  1446   SODIUM mmol/L 137   POTASSIUM mmol/L 4 0   CHLORIDE mmol/L 100   CO2 mmol/L 28   BUN mg/dL 10   CREATININE mg/dL 0 81   CALCIUM mg/dL 9 1   EGFR ml/min/1 73sq m 90     Imaging Studies: I have personally reviewed pertinent reports  EKG, Pathology, and Other Studies: I have personally reviewed pertinent reports      VTE Prophylaxis: Sequential compression device Ro Siad)     Code Status: Prior  Advance Directive and Living Will:      Power of :    POLST:

## 2022-11-08 NOTE — ED PROVIDER NOTES
History  Chief Complaint   Patient presents with   • Numbness     Pt reports R sided leg numbness starting this morning around 0630  +weakness     45-year-old female presents for evaluation of right lower extremity numbness and weakness  Patient states he symptoms started gradually after awakening this morning  They are constant, are unchanged without modifying factors  She complains of chronic back pain which is unchanged today  No bowel or bladder complaints, no abdominal pain, nausea, vomiting fevers, chills, headache, other focal neuro deficits weakness other than preexistent brachial plexopathy  History provided by:  Patient      Prior to Admission Medications   Prescriptions Last Dose Informant Patient Reported? Taking? Accu-Chek FastClix Lancets MISC  Self No Yes   Sig: Test BG up to 3x daily as directed   Accu-Chek Guide test strip  Self No Yes   Sig: TEST BG UP TO 3X DAILY AS DIRECTED   Empagliflozin 25 MG TABS  Self No Yes   Sig: Take 1 tablet (25 mg total) by mouth every morning   Injection Device for Insulin (B-D PEN MINI) ENRICO  Self No Yes   Sig: Use 4 (four) times a day Please use for Lantus and Humalog  4 pen needles daily Dx: Diabetes   Insulin Pen Needle (BD Pen Needle Me U/F) 32G X 4 MM MISC  Self No Yes   Sig: Use daily   gabapentin (NEURONTIN) 300 mg capsule   No Yes   Sig: Take 1 capsule (300 mg total) by mouth daily at bedtime   insulin detemir (LEVEMIR FLEXTOUCH) 100 Units/mL injection pen  Self No Yes   Sig: Inject 25 Units under the skin daily at bedtime   methocarbamol (ROBAXIN) 500 mg tablet   No Yes   Sig: Take 1 tablet (500 mg total) by mouth 2 (two) times a day   prochlorperazine (COMPAZINE) 10 mg tablet  Self No Yes   Si tab at onset of migraine, can repeat in 8 hours, can take with NSAID  Take with Benadryl if there are side effects     semaglutide, 1 mg/dose, (Ozempic) 4 MG/3ML SOPN injection pen  Self No Yes   Sig: Inject 0 75 mL (1 mg total) under the skin once a week   topiramate (TOPAMAX) 50 MG tablet  Self No Yes   Sig: Take 1 tablet (50 mg total) by mouth daily at bedtime   traMADol (ULTRAM) 50 mg tablet  Self No Yes   Sig: Take 1 tablet (50 mg total) by mouth every 8 (eight) hours as needed for moderate pain      Facility-Administered Medications: None       Past Medical History:   Diagnosis Date   • Diabetes mellitus ([facility] [address] )        Past Surgical History:   Procedure Laterality Date   • HERNIA REPAIR     • TUBAL LIGATION  2017       Family History   Problem Relation Age of Onset   • Hypertension Mother    • Arthritis Mother    • Asthma Sister    • Bipolar disorder Brother    • Schizophrenia Brother    • Asthma Brother    • Asthma Brother    • Diabetes Maternal Grandmother    • Diabetes Paternal Grandmother    • No Known Problems Maternal Grandfather    • No Known Problems Paternal Grandfather    • Breast cancer Neg Hx      I have reviewed and agree with the history as documented  E-Cigarette/Vaping   • E-Cigarette Use Never User      E-Cigarette/Vaping Substances   • Nicotine No    • THC No    • CBD No    • Flavoring No    • Other No    • Unknown No      Social History     Tobacco Use   • Smoking status: Never Smoker   • Smokeless tobacco: Never Used   Vaping Use   • Vaping Use: Never used   Substance Use Topics   • Alcohol use: Not Currently     Comment: ocassionally   • Drug use: Never       Review of Systems   Constitutional: Negative for activity change, appetite change, fatigue and fever  HENT: Negative for congestion, dental problem, ear pain, rhinorrhea and sore throat  Eyes: Negative for pain and redness  Respiratory: Negative for chest tightness, shortness of breath and wheezing  Cardiovascular: Negative for chest pain and palpitations  Gastrointestinal: Negative for abdominal pain, blood in stool, constipation, diarrhea, nausea and vomiting  Endocrine: Negative for cold intolerance and heat intolerance     Genitourinary: Negative for dysuria, frequency and hematuria  Musculoskeletal: Positive for back pain  Negative for arthralgias and myalgias  Skin: Negative for color change, pallor and rash  Neurological: Positive for weakness and numbness  Hematological: Does not bruise/bleed easily  Psychiatric/Behavioral: Negative for agitation, hallucinations and suicidal ideas  Physical Exam  Physical Exam  Vitals and nursing note reviewed  Constitutional:       Appearance: She is well-developed  HENT:      Head: Normocephalic and atraumatic  Eyes:      General: No scleral icterus  Extraocular Movements: Extraocular movements intact  Neck:      Vascular: No JVD  Trachea: No tracheal deviation  Comments: Under palpation over the thoracic and lumbar spines  No step-offs or deformities  Cardiovascular:      Rate and Rhythm: Normal rate and regular rhythm  Pulses: Normal pulses  Heart sounds: Normal heart sounds  Pulmonary:      Effort: Pulmonary effort is normal  No tachypnea, accessory muscle usage or respiratory distress  Breath sounds: Normal breath sounds  Abdominal:      General: There is no distension  Palpations: There is no mass  Tenderness: There is no abdominal tenderness  Musculoskeletal:      Cervical back: Normal range of motion  No rigidity  Right lower leg: Normal       Left lower leg: Normal       Comments: Patient with no sensation of the right thigh, decreased sensation of the right shin/, decreased sensation of the right foot  Patient unable to flex or extend at the hip or the knee  Patient has decreased strength with flexion and extension of the right foot  Skin:     General: Skin is warm  Capillary Refill: Capillary refill takes less than 2 seconds  Neurological:      Mental Status: She is alert     Psychiatric:         Mood and Affect: Mood normal          Behavior: Behavior normal          Vital Signs  ED Triage Vitals [11/08/22 1349]   Temperature Pulse Respirations Blood Pressure SpO2   99 2 °F (37 3 °C) 82 20 134/80 100 %      Temp Source Heart Rate Source Patient Position - Orthostatic VS BP Location FiO2 (%)   Oral Monitor Sitting Left arm --      Pain Score       No Pain           Vitals:    11/08/22 1349 11/08/22 1539 11/08/22 1736   BP: 134/80 141/67 130/65   Pulse: 82 74 72   Patient Position - Orthostatic VS: Sitting Lying Lying         Visual Acuity      ED Medications  Medications - No data to display    Diagnostic Studies  Results Reviewed     Procedure Component Value Units Date/Time    Basic metabolic panel [[id number]]  (Abnormal) Collected: 11/08/22 1446    Lab Status: Final result Specimen: Blood from Arm, Right Updated: 11/08/22 1512     Sodium 137 mmol/L      Potassium 4 0 mmol/L      Chloride 100 mmol/L      CO2 28 mmol/L      ANION GAP 9 mmol/L      BUN 10 mg/dL      Creatinine 0 81 mg/dL      Glucose 297 mg/dL      Calcium 9 1 mg/dL      eGFR 90 ml/min/1 73sq m     Narrative:      [place] guidelines for Chronic Kidney Disease (CKD):   •  Stage 1 with normal or high GFR (GFR > 90 mL/min/1 73 square meters)  •  Stage 2 Mild CKD (GFR = 60-89 mL/min/1 73 square meters)  •  Stage 3A Moderate CKD (GFR = 45-59 mL/min/1 73 square meters)  •  Stage 3B Moderate CKD (GFR = 30-44 mL/min/1 73 square meters)  •  Stage 4 Severe CKD (GFR = 15-29 mL/min/1 73 square meters)  •  Stage 5 End Stage CKD (GFR <15 mL/min/1 73 square meters)  Note: GFR calculation is accurate only with a steady state creatinine    CBC and differential [[id number]]  (Abnormal) Collected: 11/08/22 1446    Lab Status: Final result Specimen: Blood from Arm, Right Updated: 11/08/22 1457     WBC 9 25 Thousand/uL      RBC 4 73 Million/uL      Hemoglobin 13 5 g/dL      Hematocrit 42 0 %      MCV 89 fL      MCH 28 5 pg      MCHC 32 1 g/dL      RDW 12 5 %      MPV 9 3 fL      Platelets 171 Thousands/uL      nRBC 0 /100 WBCs      Neutrophils Relative 41 %      Immat GRANS % 0 %      Lymphocytes Relative 46 %      Monocytes Relative 8 %      Eosinophils Relative 4 %      Basophils Relative 1 %      Neutrophils Absolute 3 83 Thousands/µL      Immature Grans Absolute 0 04 Thousand/uL      Lymphocytes Absolute 4 29 Thousands/µL      Monocytes Absolute 0 72 Thousand/µL      Eosinophils Absolute 0 32 Thousand/µL      Basophils Absolute 0 05 Thousands/µL     POCT pregnancy, urine [[id number]]     Lab Status: No result                  CT head without contrast   Final Result by Eduardo Sanchez MD (11/08 1700)      No acute intracranial abnormality  Workstation performed: [id number]         CT lumbar spine without contrast   Final Result by Eduardo Sanchez MD (11/08 1704)      Mild degenerative changes as described above without areas of significant central canal stenosis or neural foraminal narrowing  Workstation performed: [id number]                    Procedures  Procedures         ED Course                               SBIRT 20yo+    Flowsheet Row Most Recent Value   SBIRT (23 yo +)    In order to provide better care to our patients, we are screening all of our patients for alcohol and drug use  Would it be okay to ask you these screening questions? No Filed at: 11/08/2022 1414                    MDM  Number of Diagnoses or Management Options  Weakness of right lower extremity  Diagnosis management comments: Right lower extremity weakness and numbness-patient's case was discussed with Neurology  Will do CT head rule out stroke, CT lumbar spine rule out significant stenosis, labs         Amount and/or Complexity of Data Reviewed  Clinical lab tests: ordered and reviewed  Tests in the radiology section of CPT®: ordered and reviewed  Tests in the medicine section of CPT®: reviewed and ordered  Discussion of test results with the performing providers: yes  Discuss the patient with other providers: yes  Independent visualization of images, tracings, or specimens: yes        Disposition  Final diagnoses:   Weakness of right lower extremity     Time reflects when diagnosis was documented in both MDM as applicable and the Disposition within this note     Time User Action Codes Description Comment    11/8/2022  3:57 PM Kyacee Memo Add [R29 [id number]] Weakness of right lower extremity       ED Disposition     ED Disposition   Admit    Condition   Stable    Date/Time   Tue Nov 8, 2022  6:28 PM    Comment   Case was discussed with Dr Rodo Mcarthur and the patient's admission status was agreed to be Admission Status: observation status to the service of Dr Edmundo Nolasco   Admission Status: observation status         Follow-up Information    None         Patient's Medications   Discharge Prescriptions    No medications on file       No discharge procedures on file      PDMP Review       Value Time User    PDMP Reviewed  Yes 7/20/2021  8:33 AM Max Quinteros DO          ED Provider  Electronically Signed by           Claudette Sicks, MD  11/08/22 9034

## 2022-11-08 NOTE — ASSESSMENT & PLAN NOTE
52-year-old female with diabetes, episodes of unresponsiveness suspicious for nonepileptic events, suspected right brachial plexopathy (abnormal right upper extremity EMG/NCS but MRI right brachial plexus normal in July 2022), presented with numbness and near complete inability to move the right lower extremity upon awakening the morning of 11/08/2022  Exam in the ED on 11/08/2022 with near total paralysis of the right lower extremity except for subtle movement with right dorsiflexion and plantar flexion  Upon re-evaluation on 11/09/2022, patient able to achieve full or near full strength with confrontational testing of the right lower extremity, with some limitation distally due to pain  Patient mostly complaining of right calf cramping and pulling sensation and some heaviness of the right lower extremity, but able to ambulate to the restroom with the assistance of the nurse  CT head and CT lumbar spine unremarkable  Suspicion for functional etiology of presenting symptoms  Plan:  -as discussed with primary team, recommend further evaluation of right calf cramping (evaluation of possible DVT)   -re-evaluation by PT/OT as patient's strength has significantly improved today   -fall precautions   -do not anticipate need for further neuro imaging this admission  No further recs

## 2022-11-09 ENCOUNTER — APPOINTMENT (OUTPATIENT)
Dept: CT IMAGING | Facility: HOSPITAL | Age: 42
End: 2022-11-09

## 2022-11-09 VITALS
BODY MASS INDEX: 28.32 KG/M2 | HEIGHT: 65 IN | TEMPERATURE: 97.6 F | SYSTOLIC BLOOD PRESSURE: 122 MMHG | OXYGEN SATURATION: 100 % | RESPIRATION RATE: 20 BRPM | DIASTOLIC BLOOD PRESSURE: 63 MMHG | HEART RATE: 85 BPM | WEIGHT: 170 LBS

## 2022-11-09 PROBLEM — M54.12 CERVICAL RADICULOPATHY: Status: RESOLVED | Noted: 2022-03-15 | Resolved: 2022-11-09

## 2022-11-09 LAB
ANION GAP SERPL CALCULATED.3IONS-SCNC: 7 MMOL/L (ref 4–13)
ATRIAL RATE: 78 BPM
BASOPHILS # BLD AUTO: 0.04 THOUSANDS/ÂΜL (ref 0–0.1)
BASOPHILS NFR BLD AUTO: 1 % (ref 0–1)
BUN SERPL-MCNC: 11 MG/DL (ref 5–25)
CALCIUM SERPL-MCNC: 8.2 MG/DL (ref 8.3–10.1)
CHLORIDE SERPL-SCNC: 104 MMOL/L (ref 96–108)
CO2 SERPL-SCNC: 26 MMOL/L (ref 21–32)
CREAT SERPL-MCNC: 0.94 MG/DL (ref 0.6–1.3)
D DIMER PPP FEU-MCNC: <0.27 UG/ML FEU
EOSINOPHIL # BLD AUTO: 0.26 THOUSAND/ÂΜL (ref 0–0.61)
EOSINOPHIL NFR BLD AUTO: 4 % (ref 0–6)
ERYTHROCYTE [DISTWIDTH] IN BLOOD BY AUTOMATED COUNT: 12.6 % (ref 11.6–15.1)
EST. AVERAGE GLUCOSE BLD GHB EST-MCNC: 280 MG/DL
GFR SERPL CREATININE-BSD FRML MDRD: 75 ML/MIN/1.73SQ M
GLUCOSE P FAST SERPL-MCNC: 297 MG/DL (ref 65–99)
GLUCOSE SERPL-MCNC: 227 MG/DL (ref 65–140)
GLUCOSE SERPL-MCNC: 247 MG/DL (ref 65–140)
GLUCOSE SERPL-MCNC: 253 MG/DL (ref 65–140)
GLUCOSE SERPL-MCNC: 297 MG/DL (ref 65–140)
HBA1C MFR BLD: 11.4 %
HCT VFR BLD AUTO: 37.9 % (ref 34.8–46.1)
HGB BLD-MCNC: 12.3 G/DL (ref 11.5–15.4)
IMM GRANULOCYTES # BLD AUTO: 0.03 THOUSAND/UL (ref 0–0.2)
IMM GRANULOCYTES NFR BLD AUTO: 0 % (ref 0–2)
LYMPHOCYTES # BLD AUTO: 3.54 THOUSANDS/ÂΜL (ref 0.6–4.47)
LYMPHOCYTES NFR BLD AUTO: 49 % (ref 14–44)
MAGNESIUM SERPL-MCNC: 1.9 MG/DL (ref 1.6–2.6)
MCH RBC QN AUTO: 28.7 PG (ref 26.8–34.3)
MCHC RBC AUTO-ENTMCNC: 32.5 G/DL (ref 31.4–37.4)
MCV RBC AUTO: 88 FL (ref 82–98)
MONOCYTES # BLD AUTO: 0.67 THOUSAND/ÂΜL (ref 0.17–1.22)
MONOCYTES NFR BLD AUTO: 9 % (ref 4–12)
NEUTROPHILS # BLD AUTO: 2.68 THOUSANDS/ÂΜL (ref 1.85–7.62)
NEUTS SEG NFR BLD AUTO: 37 % (ref 43–75)
NRBC BLD AUTO-RTO: 0 /100 WBCS
P AXIS: 49 DEGREES
PLATELET # BLD AUTO: 277 THOUSANDS/UL (ref 149–390)
PMV BLD AUTO: 9.1 FL (ref 8.9–12.7)
POTASSIUM SERPL-SCNC: 3.9 MMOL/L (ref 3.5–5.3)
PR INTERVAL: 124 MS
QRS AXIS: -16 DEGREES
QRSD INTERVAL: 76 MS
QT INTERVAL: 356 MS
QTC INTERVAL: 405 MS
RBC # BLD AUTO: 4.29 MILLION/UL (ref 3.81–5.12)
SODIUM SERPL-SCNC: 137 MMOL/L (ref 135–147)
T WAVE AXIS: 4 DEGREES
VENTRICULAR RATE: 78 BPM
WBC # BLD AUTO: 7.22 THOUSAND/UL (ref 4.31–10.16)

## 2022-11-09 RX ADMIN — INSULIN LISPRO 2 UNITS: 100 INJECTION, SOLUTION INTRAVENOUS; SUBCUTANEOUS at 08:21

## 2022-11-09 RX ADMIN — ENOXAPARIN SODIUM 40 MG: 40 INJECTION SUBCUTANEOUS at 08:21

## 2022-11-09 RX ADMIN — INSULIN LISPRO 2 UNITS: 100 INJECTION, SOLUTION INTRAVENOUS; SUBCUTANEOUS at 12:17

## 2022-11-09 RX ADMIN — INSULIN LISPRO 2 UNITS: 100 INJECTION, SOLUTION INTRAVENOUS; SUBCUTANEOUS at 16:33

## 2022-11-09 NOTE — PLAN OF CARE
Problem: PHYSICAL THERAPY ADULT  Goal: Performs mobility at highest level of function for planned discharge setting  See evaluation for individualized goals  Description: Treatment/Interventions: Functional transfer training, Elevations, LE strengthening/ROM, Therapeutic exercise, Equipment eval/education, Patient/family training, Bed mobility, Gait training, Compensatory technique education, Continued evaluation, Spoke to MD, Spoke to nursing, OT, Spoke to case management  Equipment Recommended: Mala Kunz       See flowsheet documentation for full assessment, interventions and recommendations  11/9/2022 1215 by Denita Marie, PT  Note: Prognosis: Good  Problem List: Decreased strength, Decreased endurance, Impaired balance, Decreased mobility, Impaired sensation, Obesity, Pain  Assessment: Lalo Noonan is a 40 y o  female admitted to [facility] on 11/8/2022 for Weakness of right lower extremity  PT was consulted and pt was seen on 11/9/2022 for mobility assessment and d/c planning  Pt presents w telemetry monitoring  At baseline is indep for ADLs, IADLs and ambulation without an AD  Pt is currently functioning at a supervision assistance x1 level for bed mobility and transfers  Noted increased use of UEs to move RLE in and out of bed  When going to transf sit>stand pt w self limited WB on R  Despite more narrow VALENTINE no LOB noted during transf  Initially easily fatigued ambulating w RW per patient due to heaviness of RLE  Encouraged increased WB on RLE as tolerated however pt mostly TTWB on R  Pt gait speed and sequencing did improve w time allowing progress from CGA to supervision  Pt will benefit from continued skilled IP PT to address the above mentioned impairments of RLE strength in order to maximize recovery and increase functional independence when completing mobility and ADLs  Currently PT recommendations for DME include RW  At this time PT recommendations for d/c are home w HHPT pending progress   Despite functional decline pt denies concerns w d/c home and is not interested in STR at this time  Barriers to Discharge: Inaccessible home environment  Barriers to Discharge Comments: steps, limited walking dist  PT Discharge Recommendation: Home with home health rehabilitation (pending progress)    See flowsheet documentation for full assessment  11/9/2022 1215 by Ness Sanders PT  Note: Prognosis: Good  Problem List: Decreased strength, Decreased endurance, Impaired balance, Decreased mobility, Impaired sensation, Obesity, Pain  Assessment: Marilyn Mack is a 40 y o  female admitted to [facility] on 11/8/2022 for Weakness of right lower extremity  PT was consulted and pt was seen on 11/9/2022 for mobility assessment and d/c planning  Pt presents w telemetry monitoring  At baseline is indep for ADLs, IADLs and ambulation without an AD  Pt is currently functioning at a supervision assistance x1 level for bed mobility and transfers  Noted increased use of UEs to move RLE in and out of bed  When going to transf sit>stand pt w self limited WB on R  Despite more narrow VALENTINE no LOB noted during transf  Initially easily fatigued ambulating w RW per patient due to heaviness of RLE  Encouraged increased WB on RLE as tolerated however pt mostly TTWB on R  Pt gait speed and sequencing did improve w time allowing progress from CGA to supervision  Pt will benefit from continued skilled IP PT to address the above mentioned impairments of RLE strength in order to maximize recovery and increase functional independence when completing mobility and ADLs  Currently PT recommendations for DME include RW  At this time PT recommendations for d/c are home w HHPT pending progress  Despite functional decline pt denies concerns w d/c home and is not interested in STR at this time    Barriers to Discharge: Inaccessible home environment  Barriers to Discharge Comments: steps, limited walking dist  PT Discharge Recommendation: Home with home health rehabilitation (pending progress)    See flowsheet documentation for full assessment

## 2022-11-09 NOTE — PLAN OF CARE
Problem: OCCUPATIONAL THERAPY ADULT  Goal: Performs self-care activities at highest level of function for planned discharge setting  See evaluation for individualized goals  Description: Treatment Interventions: ADL retraining, Functional transfer training, UE strengthening/ROM, Endurance training, Cognitive reorientation, Patient/family training, Equipment evaluation/education, Compensatory technique education          See flowsheet documentation for full assessment, interventions and recommendations  Note: Limitation: Decreased ADL status, Decreased UE strength, Decreased Safe judgement during ADL, Decreased endurance, Decreased high-level ADLs  Prognosis: Good  Assessment: Pt is a 43y/o female admitted to the hospital 2* onset of R LE weakness, numbness, episode of unresponsive; CT(brain/L-spine)=neg new findings  Pt with PMH DM, hernia repair, and cervical radiculopathy  PTA pt states independence with her ADLs, transfers, ambulation--w/o device; +, neg falls, neg home alone  During initial eval, pt demonstrated deficits with her functional mobility, ADL status, transfer safety, b/l UE strength, activity tolerance(currently fair=15-20mins), and functional balance  Pt would benefit from continued OT tx for the above deficits  3-5xwk/1-2wks  OT Discharge Recommendation:  (home vs  rehab pending progress)          Problem: OCCUPATIONAL THERAPY ADULT  Goal: Performs self-care activities at highest level of function for planned discharge setting  See evaluation for individualized goals  Description: Treatment Interventions: ADL retraining, Functional transfer training, UE strengthening/ROM, Endurance training, Cognitive reorientation, Patient/family training, Equipment evaluation/education, Compensatory technique education          See flowsheet documentation for full assessment, interventions and recommendations     11/9/2022 1448 by Gerber Yoon OT  Note: Limitation: Decreased ADL status, Decreased UE strength, Decreased Safe judgement during ADL, Decreased endurance, Decreased high-level ADLs  Prognosis: Good  Assessment: Pt is a 43y/o female admitted to the hospital 2* onset of R LE weakness, numbness, episode of unresponsive; CT(brain/L-spine)=neg new findings  Pt with PMH DM, hernia repair, and cervical radiculopathy  PTA pt states independence with her ADLs, transfers, ambulation--w/o device; +, neg falls, neg home alone  During initial eval, pt demonstrated deficits with her functional mobility, ADL status, transfer safety, b/l UE strength, activity tolerance(currently fair=15-20mins), and functional balance  Pt would benefit from continued OT tx for the above deficits  3-5xwk/1-2wks  The patient's raw score on the AM-PAC Daily Activity inpatient short form is 20, standardized score is 42 03, greater than 39 4  Patients at this level are likely to benefit from discharge to home  Please refer to the recommendation of the Occupational Therapist for safe discharge planning  OT Discharge Recommendation:  (home vs  rehab pending progress)       11/9/2022 1447 by Ridge Johnson OT  Note: Limitation: Decreased ADL status, Decreased UE strength, Decreased Safe judgement during ADL, Decreased endurance, Decreased high-level ADLs  Prognosis: Good  Assessment: Pt is a 43y/o female admitted to the hospital 2* onset of R LE weakness, numbness, episode of unresponsive; CT(brain/L-spine)=neg new findings  Pt with PMH DM, hernia repair, and cervical radiculopathy  PTA pt states independence with her ADLs, transfers, ambulation--w/o device; +, neg falls, neg home alone  During initial eval, pt demonstrated deficits with her functional mobility, ADL status, transfer safety, b/l UE strength, activity tolerance(currently fair=15-20mins), and functional balance  Pt would benefit from continued OT tx for the above deficits  3-5xwk/1-2wks       OT Discharge Recommendation:  (home vs  rehab pending progress)

## 2022-11-09 NOTE — PLAN OF CARE
Problem: MOBILITY - ADULT  Goal: Maintain or return to baseline ADL function  Description: INTERVENTIONS:  -  Assess patient's ability to carry out ADLs; assess patient's baseline for ADL function and identify physical deficits which impact ability to perform ADLs (bathing, care of mouth/teeth, toileting, grooming, dressing, etc )  - Assess/evaluate cause of self-care deficits   - Assess range of motion  - Assess patient's mobility; develop plan if impaired  - Assess patient's need for assistive devices and provide as appropriate  - Encourage maximum independence but intervene and supervise when necessary  - Involve family in performance of ADLs  - Assess for home care needs following discharge   - Consider OT consult to assist with ADL evaluation and planning for discharge  - Provide patient education as appropriate  Outcome: Progressing  Goal: Maintains/Returns to pre admission functional level  Description: INTERVENTIONS:  - Perform BMAT or MOVE assessment daily    - Set and communicate daily mobility goal to care team and patient/family/caregiver  - Collaborate with rehabilitation services on mobility goals if consulted  - Perform Range of Motion  times a day  - Reposition patient every  hours    - Dangle patient  times a day  - Stand patient  times a day  - Ambulate patient  times a day  - Out of bed to chair  times a day   - Out of bed for meals  times a day  - Out of bed for toileting  - Record patient progress and toleration of activity level   Outcome: Progressing     Problem: Potential for Falls  Goal: Patient will remain free of falls  Description: INTERVENTIONS:  - Educate patient/family on patient safety including physical limitations  - Instruct patient to call for assistance with activity   - Consult OT/PT to assist with strengthening/mobility   - Keep Call bell within reach  - Keep bed low and locked with side rails adjusted as appropriate  - Keep care items and personal belongings within reach  - Initiate and maintain comfort rounds  - Make Fall Risk Sign visible to staff  - Offer Toileting every  Hours, in advance of need  - Initiate/Maintain alarm  - Obtain necessary fall risk management equipment:   - Apply yellow socks and bracelet for high fall risk patients  - Consider moving patient to room near nurses station  Outcome: Progressing     Problem: CARDIOVASCULAR - ADULT  Goal: Maintains optimal cardiac output and hemodynamic stability  Description: INTERVENTIONS:  - Monitor I/O, vital signs and rhythm  - Monitor for S/S and trends of decreased cardiac output  - Administer and titrate ordered vasoactive medications to optimize hemodynamic stability  - Assess quality of pulses, skin color and temperature  - Assess for signs of decreased coronary artery perfusion  - Instruct patient to report change in severity of symptoms  Outcome: Progressing  Goal: Absence of cardiac dysrhythmias or at baseline rhythm  Description: INTERVENTIONS:  - Continuous cardiac monitoring, vital signs, obtain 12 lead EKG if ordered  - Administer antiarrhythmic and heart rate control medications as ordered  - Monitor electrolytes and administer replacement therapy as ordered  Outcome: Progressing     Problem: PAIN - ADULT  Goal: Verbalizes/displays adequate comfort level or baseline comfort level  Description: Interventions:  - Encourage patient to monitor pain and request assistance  - Assess pain using appropriate pain scale  - Administer analgesics based on type and severity of pain and evaluate response  - Implement non-pharmacological measures as appropriate and evaluate response  - Consider cultural and social influences on pain and pain management  - Notify physician/advanced practitioner if interventions unsuccessful or patient reports new pain  Outcome: Progressing     Problem: INFECTION - ADULT  Goal: Absence or prevention of progression during hospitalization  Description: INTERVENTIONS:  - Assess and monitor for signs and symptoms of infection  - Monitor lab/diagnostic results  - Monitor all insertion sites, i e  indwelling lines, tubes, and drains  - Monitor endotracheal if appropriate and nasal secretions for changes in amount and color  - Wilkinson appropriate cooling/warming therapies per order  - Administer medications as ordered  - Instruct and encourage patient and family to use good hand hygiene technique  - Identify and instruct in appropriate isolation precautions for identified infection/condition  Outcome: Progressing  Goal: Absence of fever/infection during neutropenic period  Description: INTERVENTIONS:  - Monitor WBC    Outcome: Progressing     Problem: SAFETY ADULT  Goal: Maintain or return to baseline ADL function  Description: INTERVENTIONS:  -  Assess patient's ability to carry out ADLs; assess patient's baseline for ADL function and identify physical deficits which impact ability to perform ADLs (bathing, care of mouth/teeth, toileting, grooming, dressing, etc )  - Assess/evaluate cause of self-care deficits   - Assess range of motion  - Assess patient's mobility; develop plan if impaired  - Assess patient's need for assistive devices and provide as appropriate  - Encourage maximum independence but intervene and supervise when necessary  - Involve family in performance of ADLs  - Assess for home care needs following discharge   - Consider OT consult to assist with ADL evaluation and planning for discharge  - Provide patient education as appropriate  Outcome: Progressing  Goal: Maintains/Returns to pre admission functional level  Description: INTERVENTIONS:  - Perform BMAT or MOVE assessment daily    - Set and communicate daily mobility goal to care team and patient/family/caregiver  - Collaborate with rehabilitation services on mobility goals if consulted  - Perform Range of Motion  times a day  - Reposition patient every  hours    - Dangle patient  times a day  - Stand patient  times a day  - Ambulate patient  times a day  - Out of bed to chair  times a day   - Out of bed for meals  times a day  - Out of bed for toileting  - Record patient progress and toleration of activity level   Outcome: Progressing  Goal: Patient will remain free of falls  Description: INTERVENTIONS:  - Educate patient/family on patient safety including physical limitations  - Instruct patient to call for assistance with activity   - Consult OT/PT to assist with strengthening/mobility   - Keep Call bell within reach  - Keep bed low and locked with side rails adjusted as appropriate  - Keep care items and personal belongings within reach  - Initiate and maintain comfort rounds  - Make Fall Risk Sign visible to staff  - Offer Toileting every  Hours, in advance of need  - Initiate/Maintain alarm  - Obtain necessary fall risk management equipment:   - Apply yellow socks and bracelet for high fall risk patients  - Consider moving patient to room near nurses station  Outcome: Progressing     Problem: DISCHARGE PLANNING  Goal: Discharge to home or other facility with appropriate resources  Description: INTERVENTIONS:  - Identify barriers to discharge w/patient and caregiver  - Arrange for needed discharge resources and transportation as appropriate  - Identify discharge learning needs (meds, wound care, etc )  - Arrange for interpretive services to assist at discharge as needed  - Refer to Case Management Department for coordinating discharge planning if the patient needs post-hospital services based on physician/advanced practitioner order or complex needs related to functional status, cognitive ability, or social support system  Outcome: Progressing     Problem: Knowledge Deficit  Goal: Patient/family/caregiver demonstrates understanding of disease process, treatment plan, medications, and discharge instructions  Description: Complete learning assessment and assess knowledge base    Interventions:  - Provide teaching at level of understanding  - Provide teaching via preferred learning methods  Outcome: Progressing     Problem: Prexisting or High Potential for Compromised Skin Integrity  Goal: Skin integrity is maintained or improved  Description: INTERVENTIONS:  - Identify patients at risk for skin breakdown  - Assess and monitor skin integrity  - Assess and monitor nutrition and hydration status  - Monitor labs   - Assess for incontinence   - Turn and reposition patient  - Assist with mobility/ambulation  - Relieve pressure over bony prominences  - Avoid friction and shearing  - Provide appropriate hygiene as needed including keeping skin clean and dry  - Evaluate need for skin moisturizer/barrier cream  - Collaborate with interdisciplinary team   - Patient/family teaching  - Consider wound care consult   Outcome: Progressing

## 2022-11-09 NOTE — ASSESSMENT & PLAN NOTE
Patient currently being followed by Neurology and Neurosurgery as outpatient  EMG/NCS of right upper extremity in July 2022 demonstrated findings consistent with possible right brachial plexopathy; however, MRI right brachial plexus was a normal study  -patient has appointment with neuromuscular subspecialist on 11/16/2022

## 2022-11-09 NOTE — CONSULTS
Orthopedics   Marky Aguillon 40 y o  female MRN: [id number]  Unit/Bed#: AL CT SCAN      Chief Complaint:   Right below-knee weakness    HPI:  40 y o  female was set for discharge home today when orthopedics was consulted by [facility] to rule out compartment syndrome of the right leg due to weakness and decreased sensation  Patient is a community ambulator  Patient was admitted yesterday with the same complaints  She underwent CT of the head which showed no acute intracranial abnormality  CT lumbar spine showed mild degenerative changes without areas of significant central canal stenosis or foraminal narrowing  Patient had a neurology exam in the ED which concluded functional in organic cause of weakness  She has a history of diabetes with episodes of unresponsiveness suspicious for nonepileptic events and suspected right brachial plexopathy  Her right leg weakness started the morning of admission and was associated with decreased sensation  Reports she was otherwise in her normal state health yesterday  She does have a history of chronic low back pain but denies any bowel or bladder incontinence  There is no history of trauma or DVT      Review Of Systems:   · Skin: Normal  · Neuro: See HPI  · Musculoskeletal: See HPI  · 14 point review of systems negative except as stated above     Past Medical History:   Past Medical History:   Diagnosis Date   • Diabetes mellitus ([facility] [address] )      Past Surgical History:   Past Surgical History:   Procedure Laterality Date   • HERNIA REPAIR     • TUBAL LIGATION  2017     Family History:  Family history reviewed and non-contributory  Family History   Problem Relation Age of Onset   • Hypertension Mother    • Arthritis Mother    • Asthma Sister    • Bipolar disorder Brother    • Schizophrenia Brother    • Asthma Brother    • Asthma Brother    • Diabetes Maternal Grandmother    • Diabetes Paternal Grandmother    • No Known Problems Maternal Grandfather    • No Known Problems Paternal Grandfather    • Breast cancer Neg Hx      Social History:  Social History     Socioeconomic History   • Marital status: Single     Spouse name: None   • Number of children: None   • Years of education: None   • Highest education level: None   Occupational History     Employer: [employer]     Employer: [employer]     Employer: [employer] ALL EMPLOYEES   Tobacco Use   • Smoking status: Never Smoker   • Smokeless tobacco: Never Used   Vaping Use   • Vaping Use: Never used   Substance and Sexual Activity   • Alcohol use: Not Currently     Comment: ocassionally   • Drug use: Never   • Sexual activity: Yes     Partners: Male   Other Topics Concern   • None   Social History Narrative   • None     Social Determinants of Health     Financial Resource Strain: Medium Risk   • Difficulty of Paying Living Expenses: Somewhat hard   Food Insecurity: Food Insecurity Present   • Worried About Running Out of Food in the Last Year: Sometimes true   • Ran Out of Food in the Last Year: Sometimes true   Transportation Needs: No Transportation Needs   • Lack of Transportation (Medical): No   • Lack of Transportation (Non-Medical): No   Physical Activity: Not on file   Stress: Not on file   Social Connections: Not on file   Intimate Partner Violence: Not on file   Housing Stability: Not on file     Allergies:    Allergies   Allergen Reactions   • Metformin And Related Hives     Labs:  0   Lab Value Date/Time    HCT 37 9 11/09/2022 0519    HCT 42 0 11/08/2022 1446    HCT 38 6 03/18/2022 0810    HGB 12 3 11/09/2022 0519    HGB 13 5 11/08/2022 1446    HGB 12 9 03/18/2022 0810    HGB 13 6 12/26/2015 1632    WBC 7 22 11/09/2022 0519    WBC 9 25 11/08/2022 1446    WBC 6 30 03/18/2022 0810     Meds:    Current Facility-Administered Medications:   •  acetaminophen (TYLENOL) tablet 650 mg, 650 mg, Oral, Q6H PRN, Aneta Wallis MD  •  enoxaparin (LOVENOX) subcutaneous injection 40 mg, 40 mg, Subcutaneous, Q24H [facility], Jaelyn Clancy MD, 40 mg at 11/09/22 5896  •  gabapentin (NEURONTIN) capsule 300 mg, 300 mg, Oral, HS, Jaelyn Clancy MD, 300 mg at 11/08/22 2210  •  insulin detemir (LEVEMIR) subcutaneous injection 20 Units, 20 Units, Subcutaneous, HS, Jaelyn Clancy MD, 20 Units at 11/08/22 2210  •  insulin lispro (HumaLOG) 100 units/mL subcutaneous injection 1-5 Units, 1-5 Units, Subcutaneous, TID AC, 2 Units at 11/09/22 1633 **AND** Fingerstick Glucose (POCT), , , TID AC, Lea Smith MD  •  insulin lispro (HumaLOG) 100 units/mL subcutaneous injection 1-5 Units, 1-5 Units, Subcutaneous, HS, Jaelyn Clancy MD    Blood Culture:   No results found for: BLOODCX    Wound Culture:   No results found for: WOUNDCULT    Ins and Outs:  No intake/output data recorded  Physical Exam:   /63 (BP Location: Left arm)   Pulse 85   Temp 97 6 °F (36 4 °C) (Temporal)   Resp 20   Ht 5' 5" (1 651 m)   Wt 77 1 kg (170 lb)   LMP 10/25/2022   SpO2 100%   BMI 28 29 kg/m²   Gen: No acute distress, resting comfortably in bed  HEENT: Eyes clear, moist mucus membranes, hearing intact  Respiratory: No audible wheezing or stridor  Cardiovascular: Well Perfused peripherally, 2+ distal pulse  Abdomen: nondistended, no peritoneal signs  Musculoskeletal: right lower extremity  · Skin without rash, warmth, or increased sheen  There is no erythema or ecchymosis  · Minimal tenderness in the calf region however compartments were all soft  There is weakness with active dorsiflexion and minimally decreased sensation  There is no effusion about the knee  There is no pain in the right thigh  There is no swelling of the right thigh or calf region  · Peripheral pulses are intact  · Dorsiflexion strength is 3+/5 and plantar flexion strength 4/5  Patient was evaluated in full by Dr Ruggiero   · Musculature is soft and compressible, no pain with passive stretch    · Leg lengths equal and no signs of trauma  Radiology:   I personally reviewed the films  CT lumbar spine:    Mild degenerative changes as described above without areas of significant central canal stenosis or neural foraminal narrowing  CT right lower extremity:  No evidence of stress fracture by CT technique as suspected clinically  Assesment:  40 y  o female with unclear etiology of abrupt onset of right shin somewhat decreased sensation and weakness with dorsiflexion  All compartments are soft there is no sign of compartment syndrome  Etiology possibly related to neurological process and would defer further treatment to neurology  Plan:   · Weight-bearing as tolerated right lower extremity  · Consider MRI of the lumbar spine  · Orthopedics will sign off  · PT/OT to work on range of motion and strengthening and gait training    · Patient may need MAFO brace  · DVT ppx per primary team    Km Banegas PA-C

## 2022-11-09 NOTE — ASSESSMENT & PLAN NOTE
Lab Results   Component Value Date    HGBA1C 11 8 (H) 03/18/2022       No results for input(s): POCGLU in the last 72 hours  Blood Sugar Average: Last 72 hrs:  Current regimen includes Ozempic and insulin levemir 25u qHS     · Continue basal insulin, start with decreased dose levemir 20u qHS while inpatient  · SSI  · Hypoglycemia protocol

## 2022-11-09 NOTE — PHYSICAL THERAPY NOTE
PHYSICAL THERAPY EVALUATION          Patient Name: Facundo GRANADOSGP'L Date: 11/9/2022    PT EVALUATION    40 y o     [id number]    Leg pain [M79 [id number]]  Weakness of right lower extremity [R29 [id number]]    Past Medical History:   Diagnosis Date    Diabetes mellitus [facility])      Past Surgical History:   Procedure Laterality Date    HERNIA REPAIR      TUBAL LIGATION  2017 11/09/22 1005   PT Last Visit   PT Visit Date 11/09/22   Note Type   Note type Evaluation   Pain Assessment   Pain Assessment Tool 0-10   Pain Score 7   Pain Location/Orientation Orientation: Right;Location: Leg   Pain Radiating Towards posterior   Pain Onset/Description Other (Comment)  (heavy)   Hospital Pain Intervention(s) Emotional support; Rest   Restrictions/Precautions   Weight Bearing Precautions Per Order No   Other Precautions Fall Risk;Pain;Telemetry   Home Living   Type of [address] [place] [address] Multi-level;Stairs to enter with rails   Home Equipment   (can use brothers RW)   Additional Comments 3 JAY w bl HR  can stay on first floor w full bathroom and sleep on sofa  FOS to bedrooms   Prior Function   Level of [place] Independent with ADLs; Independent with functional mobility; Independent with IADLS   Lives With Family  (kids age 25, 15, 10)   [place] 69 Help From Family  (sister if needed)   IADLs Independent with driving; Independent with meal prep; Independent with medication management   Falls in the last 6 months 0   Vocational Full time employment   Comments pt reports indep at baseline without an AD  27 yo dtr just moved home so able to assist    General   Additional Pertinent History pt admitted 11/8/22 for weakness of RLE  pt reports sx began x1 day ago w no known TOYA  only mildly improved since admission  described as pain in the posterior leg  denies back pain   states leg feels heavy and asleep  pain not worse w WB  per discussion w multiple life stressors including return to work, caring for her child after back surgery, recent passing of her aunt  PMHx significant for DM   Cognition   Overall Cognitive Status WFL   Arousal/Participation Cooperative   Orientation Level Oriented X4   Memory Within functional limits   Following Commands Follows all commands and directions without difficulty   RLE Assessment   RLE Assessment X   Strength RLE   R Hip Flexion 3-/5   R Hip ABduction 4-/5   R Hip ADduction 4-/5   R Knee Flexion 3-/5   R Knee Extension 3-/5   R Ankle Dorsiflexion 1/5   LLE Assessment   LLE Assessment WFL  (grossly 4- to 4/5)   Coordination   Sensation X  (per patient, numb and heavy RLE)   Light Touch   RLE Light Touch Grossly intact   Bed Mobility   Supine to Sit 5  Supervision   Additional items Increased time required   Sit to Supine 5  Supervision   Additional items Increased time required   Additional Comments uses arms to move RLE   Transfers   Sit to Stand 5  Supervision   Additional items Bedrails;Armrests   Stand to Sit 5  Supervision   Additional items Armrests; Verbal cues   Ambulation/Elevation   Gait pattern R Hemiparesis; Improper Weight shift;Decreased R stance;Decreased foot clearance;Decreased heel strike; Excessively slow; Inconsistent hilda   Gait Assistance 5  Supervision  (initially CGA progressing to S)   Additional items Assist x 1;Verbal cues   Assistive Device Rolling walker   Distance 5'x2   Balance   Static Standing Fair -   Dynamic Standing Poor +   Ambulatory Poor +   Endurance Deficit   Endurance Deficit Yes   Endurance Deficit Description easily fatigues w ambulation- self limited walking distance   Activity Tolerance   Activity Tolerance Patient limited by fatigue;Treatment limited secondary to medical complications (Comment)  (RLE "heaviness")   Medical Staff Made Aware [place] CARRANZA; Dr Nilda Guadalupe RN   Assessment   Prognosis Good   Problem List Decreased strength;Decreased endurance; Impaired balance;Decreased mobility; Impaired sensation;Obesity;Pain   Assessment Kalli Davis is a 40 y o  female admitted to [address] on 11/8/2022 for Weakness of right lower extremity  PT was consulted and pt was seen on 11/9/2022 for mobility assessment and d/c planning  Pt presents w telemetry monitoring  At baseline is indep for ADLs, IADLs and ambulation without an AD  Pt is currently functioning at a supervision assistance x1 level for bed mobility and transfers  Noted increased use of UEs to move RLE in and out of bed  When going to transf sit>stand pt w self limited WB on R  Despite more narrow VALENTINE no LOB noted during transf  Initially easily fatigued ambulating w RW per patient due to heaviness of RLE  Encouraged increased WB on RLE as tolerated however pt mostly TTWB on R  Pt gait speed and sequencing did improve w time allowing progress from CGA to supervision  Despite reported issues w RUE at baseline demonstrates increased reliance on BUE for support during session w no complaints of pain or limited ROM or strength noted  Pt will benefit from continued skilled IP PT to address the above mentioned impairments of RLE strength in order to maximize recovery and increase functional independence when completing mobility and ADLs  Currently PT recommendations for DME include RW  At this time PT recommendations for d/c are home w HHPT pending progress  Despite functional decline pt denies concerns w d/c home and is not interested in STR at this time  Barriers to Discharge Inaccessible home environment   Barriers to Discharge Comments steps, limited walking dist   Goals   Patient Goals feel better, go home   STG Expiration Date 11/19/22   Short Term Goal #1 1)  Pt will perform bed mobility with [place] demonstrating appropriate technique 100% of the time in order to improve function  2)  Perform all transfers with [place] demonstrating safe and appropriate technique 100% of the time in order to improve ability to negotiate safely in home environment  3) Amb with least restrictive AD > 50'x1 with mod I in order to demonstrate ability to negotiate in home environment  4)  Improve overall strength and balance 1/2 grade in order to optimize ability to perform functional tasks and reduce fall risk  5) Increase activity tolerance to 45 minutes in order to improve endurance to functional tasks  6)  Negotiate stairs using most appropriate technique and S in order to be able to negotiate safely in home environment  7) PT for ongoing patient and family/caregiver education, DME needs and d/c planning in order to promote highest level of function in least restrictive environment  8) indep w HEP for RLE strengthening  Plan   Treatment/Interventions Functional transfer training;Elevations;LE strengthening/ROM; Therapeutic exercise;Equipment eval/education;Patient/family training;Bed mobility;Gait training; Compensatory technique education;Continued evaluation;Spoke to MD;Spoke to nursing;OT;Spoke to case management   PT Frequency Other (Comment)  (4-5x)   Recommendation   PT Discharge Recommendation Home with home health rehabilitation  (pending progress)   Equipment Recommended [place] walker   Change/add to [employer]? No   AM-PAC Basic Mobility Inpatient   Turning in Bed Without Bedrails 4   Lying on Back to Sitting on Edge of Flat Bed 3   Moving Bed to Chair 3   Standing Up From Chair 3   Walk in Room 3   Climb 3-5 Stairs 3   Basic Mobility Inpatient Raw Score 19   Basic Mobility Standardized Score 42 48   Highest Level Of Mobility   JH-HLM Goal 6: Walk 10 steps or more   JH-HLM Achieved 6: Walk 10 steps or more   End of Consult   Patient Position at End of Consult Supine; All needs within reach;Bed/Chair alarm activated   History: co - morbidities (DM), coping styles (??, recent family and work stressors), social background (steps), all risk  Exam: impairments in systems including musculoskeletal (strength), neuromuscular (balance, gait, transfers, motor function and sensation), am-pac, cognition  Clinical: unstable/unpredictable; pending doppler RLE, functional change  Complexity:high      Mayur Waldron

## 2022-11-09 NOTE — ASSESSMENT & PLAN NOTE
Lab Results   Component Value Date    HGBA1C 11 4 (H) 11/08/2022       Recent Labs     11/08/22 2057 11/09/22  0719   POCGLU 180* 247*       Blood Sugar Average: Last 72 hrs:  (P) 213 5     Goal euglycemia  Management as per primary service

## 2022-11-09 NOTE — ASSESSMENT & PLAN NOTE
Patient with gradual weakening of right lower extremity with decreased sensation below the knee  No effort against gravity in right lower leg upon presentation  · CT Head without acute intracranial abnormality, CT lumbar spine with mild degenerative changes without areas of significant central canal stenosis or neural foraminal narrowing     · Neurology following no further no imaging at this time, follow-up with neuromuscular specialist on 11/16/2028  · PT/OT recommending home with home health

## 2022-11-09 NOTE — PLAN OF CARE
Problem: MOBILITY - ADULT  Goal: Maintain or return to baseline ADL function  Description: INTERVENTIONS:  -  Assess patient's ability to carry out ADLs; assess patient's baseline for ADL function and identify physical deficits which impact ability to perform ADLs (bathing, care of mouth/teeth, toileting, grooming, dressing, etc )  - Assess/evaluate cause of self-care deficits   - Assess range of motion  - Assess patient's mobility; develop plan if impaired  - Assess patient's need for assistive devices and provide as appropriate  - Encourage maximum independence but intervene and supervise when necessary  - Involve family in performance of ADLs  - Assess for home care needs following discharge   - Consider OT consult to assist with ADL evaluation and planning for discharge  - Provide patient education as appropriate  Outcome: Progressing  Goal: Maintains/Returns to pre admission functional level  Description: INTERVENTIONS:  - Perform BMAT or MOVE assessment daily    - Set and communicate daily mobility goal to care team and patient/family/caregiver  - Collaborate with rehabilitation services on mobility goals if consulted  - Perform Range of Motion 3 times a day  - Reposition patient every 2 hours    - Dangle patient 3 times a day  - Stand patient 3 times a day  - Ambulate patient 3 times a day  - Out of bed to chair 3 times a day   - Out of bed for meals 3 times a day  - Out of bed for toileting  - Record patient progress and toleration of activity level   Outcome: Progressing     Problem: Potential for Falls  Goal: Patient will remain free of falls  Description: INTERVENTIONS:  - Educate patient/family on patient safety including physical limitations  - Instruct patient to call for assistance with activity   - Consult OT/PT to assist with strengthening/mobility   - Keep Call bell within reach  - Keep bed low and locked with side rails adjusted as appropriate  - Keep care items and personal belongings within reach  - Initiate and maintain comfort rounds  - Make Fall Risk Sign visible to staff  - Offer Toileting every 2 Hours, in advance of need  - Initiate/Maintain bed alarm  - Obtain necessary fall risk management equipment  - Apply yellow socks and bracelet for high fall risk patients  - Consider moving patient to room near nurses station  Outcome: Progressing     Problem: CARDIOVASCULAR - ADULT  Goal: Maintains optimal cardiac output and hemodynamic stability  Description: INTERVENTIONS:  - Monitor I/O, vital signs and rhythm  - Monitor for S/S and trends of decreased cardiac output  - Administer and titrate ordered vasoactive medications to optimize hemodynamic stability  - Assess quality of pulses, skin color and temperature  - Assess for signs of decreased coronary artery perfusion  - Instruct patient to report change in severity of symptoms  Outcome: Progressing  Goal: Absence of cardiac dysrhythmias or at baseline rhythm  Description: INTERVENTIONS:  - Continuous cardiac monitoring, vital signs, obtain 12 lead EKG if ordered  - Administer antiarrhythmic and heart rate control medications as ordered  - Monitor electrolytes and administer replacement therapy as ordered  Outcome: Progressing     Problem: PAIN - ADULT  Goal: Verbalizes/displays adequate comfort level or baseline comfort level  Description: Interventions:  - Encourage patient to monitor pain and request assistance  - Assess pain using appropriate pain scale  - Administer analgesics based on type and severity of pain and evaluate response  - Implement non-pharmacological measures as appropriate and evaluate response  - Consider cultural and social influences on pain and pain management  - Notify physician/advanced practitioner if interventions unsuccessful or patient reports new pain  Outcome: Progressing     Problem: INFECTION - ADULT  Goal: Absence or prevention of progression during hospitalization  Description: INTERVENTIONS:  - Assess and monitor for signs and symptoms of infection  - Monitor lab/diagnostic results  - Monitor all insertion sites, i e  indwelling lines, tubes, and drains  - Monitor endotracheal if appropriate and nasal secretions for changes in amount and color  - [place] appropriate cooling/warming therapies per order  - Administer medications as ordered  - Instruct and encourage patient and family to use good hand hygiene technique  - Identify and instruct in appropriate isolation precautions for identified infection/condition  Outcome: Progressing  Goal: Absence of fever/infection during neutropenic period  Description: INTERVENTIONS:  - Monitor WBC    Outcome: Progressing     Problem: SAFETY ADULT  Goal: Maintain or return to baseline ADL function  Description: INTERVENTIONS:  -  Assess patient's ability to carry out ADLs; assess patient's baseline for ADL function and identify physical deficits which impact ability to perform ADLs (bathing, care of mouth/teeth, toileting, grooming, dressing, etc )  - Assess/evaluate cause of self-care deficits   - Assess range of motion  - Assess patient's mobility; develop plan if impaired  - Assess patient's need for assistive devices and provide as appropriate  - Encourage maximum independence but intervene and supervise when necessary  - Involve family in performance of ADLs  - Assess for home care needs following discharge   - Consider OT consult to assist with ADL evaluation and planning for discharge  - Provide patient education as appropriate  Outcome: Progressing  Goal: Maintains/Returns to pre admission functional level  Description: INTERVENTIONS:  - Perform BMAT or MOVE assessment daily    - Set and communicate daily mobility goal to care team and patient/family/caregiver  - Collaborate with rehabilitation services on mobility goals if consulted  - Perform Range of Motion 3 times a day  - Reposition patient every 2 hours    - Dangle patient 3 times a day  - Stand patient 3 times a day  - Ambulate patient 3 times a day  - Out of bed to chair 3 times a day   - Out of bed for meals 3 times a day  - Out of bed for toileting  - Record patient progress and toleration of activity level   Outcome: Progressing  Goal: Patient will remain free of falls  Description: INTERVENTIONS:  - Educate patient/family on patient safety including physical limitations  - Instruct patient to call for assistance with activity   - Consult OT/PT to assist with strengthening/mobility   - Keep Call bell within reach  - Keep bed low and locked with side rails adjusted as appropriate  - Keep care items and personal belongings within reach  - Initiate and maintain comfort rounds  - Make Fall Risk Sign visible to staff  - Offer Toileting every 2 Hours, in advance of need  - Initiate/Maintain bed alarm  - Obtain necessary fall risk management equipment  - Apply yellow socks and bracelet for high fall risk patients  - Consider moving patient to room near nurses station  Outcome: Progressing     Problem: DISCHARGE PLANNING  Goal: Discharge to home or other facility with appropriate resources  Description: INTERVENTIONS:  - Identify barriers to discharge w/patient and caregiver  - Arrange for needed discharge resources and transportation as appropriate  - Identify discharge learning needs (meds, wound care, etc )  - Arrange for interpretive services to assist at discharge as needed  - Refer to Case Management Department for coordinating discharge planning if the patient needs post-hospital services based on physician/advanced practitioner order or complex needs related to functional status, cognitive ability, or social support system  Outcome: Progressing     Problem: Knowledge Deficit  Goal: Patient/family/caregiver demonstrates understanding of disease process, treatment plan, medications, and discharge instructions  Description: Complete learning assessment and assess knowledge base    Interventions:  - Provide teaching at level of understanding  - Provide teaching via preferred learning methods  Outcome: Progressing

## 2022-11-09 NOTE — H&P
[address] 1980, 40 y o  female MRN: [id number]  Unit/Bed#: ED 27 Encounter: [id number]  Primary Care Provider: Zane Roth MD   Date and time admitted to hospital: 11/8/2022  1:52 PM    * Weakness of right lower extremity  Assessment & Plan  Patient with gradual weakening of right lower extremity with decreased sensation below the knee  No effort against gravity in right lower leg  · CT Head without acute intracranial abnormality, CT lumbar spine with mild degenerative changes without areas of significant central canal stenosis or neural foraminal narrowing  · Neurology evaluation in ED, appreciate recommendations  · PT/OT evaluation    Uncontrolled type 2 diabetes mellitus with hyperglycemia, with long-term current use of insulin ([facility] [address] )  Assessment & Plan  Lab Results   Component Value Date    HGBA1C 11 8 (H) 03/18/2022       No results for input(s): POCGLU in the last 72 hours  Blood Sugar Average: Last 72 hrs:  Current regimen includes Ozempic and insulin levemir 25u qHS  · Continue basal insulin, start with decreased dose levemir 20u qHS while inpatient  · SSI  · Hypoglycemia protocol          VTE Prophylaxis: Enoxaparin (Lovenox)  / sequential compression device and foot pump applied   Code Status: Prior Level 1-Full code      Anticipated Length of Stay:  Patient will be admitted on an Observation basis with an anticipated length of stay of  Less than 2 midnights  Justification for Hospital Stay: Please see detailed plans noted above  Chief Complaint:     Weakness    History of Present Illness:  Ruben Hall is a 40 y o  female who has past medical history significant for diabetes, episodes of unresponsiveness suspicious for nonepileptic events, suspected R brachial plexopathy presenting with right lower leg weakness       Patient reports gradual onset of right lower leg weakness starting this morning, with associated decreased sensation  Reports she was in normal state of health yesterday, including walking, ADLs at home  This morning, she notes that her leg started feeling "heavy" or "asleep" and unable to spontaneously move right leg  Denies symptoms of weakness in upper extremities or left leg  She otherwise reports chronic lower back pain, denies bowel/bladder incontinence  In ED, Neurology evaluated patient, suspect functional/inorganic cause for weakness  CT Head negative for acute intracranial process and CT Lumbar spine showing mild degenerative changes  Review of Systems:    Constitutional:  Denies fever or chills   Eyes:  Denies change in visual acuity   HENT:  Denies nasal congestion or sore throat   Respiratory:  Denies cough or shortness of breath   Cardiovascular:  Denies chest pain or edema   GI:  Denies abdominal pain or bloody stools  :  Denies dysuria   Musculoskeletal:  Reports lower back pain, right leg weakness as above  Integument:  Denies rash   Neurologic:  Denies headache or sensory changes   Endocrine:  Denies polyuria or polydipsia   Lymphatic:  Denies swollen glands   Psychiatric:  Denies depression or anxiety     Past Medical and Surgical History:   Past Medical History:   Diagnosis Date   • Diabetes mellitus ([facility] [address] )      Past Surgical History:   Procedure Laterality Date   • HERNIA REPAIR     • TUBAL LIGATION  2017       Meds/Allergies:  No current facility-administered medications on file prior to encounter       Current Outpatient Medications on File Prior to Encounter   Medication Sig Dispense Refill   • Accu-Chek FastClix Lancets MISC Test BG up to 3x daily as directed 300 each 1   • Accu-Chek Guide test strip TEST BG UP TO 3X DAILY AS DIRECTED 100 each 1   • Empagliflozin 25 MG TABS Take 1 tablet (25 mg total) by mouth every morning 90 tablet 0   • gabapentin (NEURONTIN) 300 mg capsule Take 1 capsule (300 mg total) by mouth daily at bedtime 90 capsule 2   • Injection Device for Insulin (B-D PEN MINI) ENRICO Use 4 (four) times a day Please use for Lantus and Humalog  4 pen needles daily Dx: Diabetes 400 each 0   • insulin detemir (LEVEMIR FLEXTOUCH) 100 Units/mL injection pen Inject 25 Units under the skin daily at bedtime 15 mL 2   • Insulin Pen Needle (BD Pen Needle Em U/F) 32G X 4 MM MISC Use daily 100 each 3   • methocarbamol (ROBAXIN) 500 mg tablet Take 1 tablet (500 mg total) by mouth 2 (two) times a day 10 tablet 0   • prochlorperazine (COMPAZINE) 10 mg tablet 1 tab at onset of migraine, can repeat in 8 hours, can take with NSAID  Take with Benadryl if there are side effects  20 tablet 3   • semaglutide, 1 mg/dose, (Ozempic) 4 MG/3ML SOPN injection pen Inject 0 75 mL (1 mg total) under the skin once a week 9 mL 0   • topiramate (TOPAMAX) 50 MG tablet Take 1 tablet (50 mg total) by mouth daily at bedtime 90 tablet 1   • traMADol (ULTRAM) 50 mg tablet Take 1 tablet (50 mg total) by mouth every 8 (eight) hours as needed for moderate pain 20 tablet 0   • [DISCONTINUED] diazepam (VALIUM) 5 mg tablet Take 1 tablet (5 mg total) by mouth 2 (two) times a day for 3 days 6 tablet 0   • [DISCONTINUED] mometasone (ELOCON) 0 1 % lotion Apply topically daily (Patient not taking: Reported on 10/12/2022) 60 mL 0   • [DISCONTINUED] naproxen (EC NAPROSYN) 500 MG EC tablet Take 1 tablet (500 mg total) by mouth 2 (two) times a day as needed for mild pain or headaches     • [DISCONTINUED] nitroglycerin (NITROSTAT) 0 4 mg SL tablet Place 1 tablet (0 4 mg total) under the tongue every 5 (five) minutes as needed for chest pain for up to 7 days 20 tablet 0         Allergies:    Allergies   Allergen Reactions   • Metformin And Related Hives       History:  Marital Status: Single     Substance Use History:   Social History     Substance and Sexual Activity   Alcohol Use Not Currently    Comment: ocassionally     Social History     Tobacco Use   Smoking Status Never Smoker   Smokeless Tobacco Never Used     Social History Substance and Sexual Activity   Drug Use Never       Family History:  Family History   Problem Relation Age of Onset   • Hypertension Mother    • Arthritis Mother    • Asthma Sister    • Bipolar disorder Brother    • Schizophrenia Brother    • Asthma Brother    • Asthma Brother    • Diabetes Maternal Grandmother    • Diabetes Paternal Grandmother    • No Known Problems Maternal Grandfather    • No Known Problems Paternal Grandfather    • Breast cancer Neg Hx        Physical Exam:     Vitals:   Blood Pressure: 130/65 (11/08/22 1736)  Pulse: 72 (11/08/22 1736)  Temperature: 99 2 °F (37 3 °C) (11/08/22 1349)  Temp Source: Oral (11/08/22 1349)  Respirations: 18 (11/08/22 1736)  SpO2: 99 % (11/08/22 1736)    Constitutional:  Non-toxic appearance, NAD  Eyes:  PERRL, EOMI, No scleral icterus   HENT:   oropharynx moist, external ears normal, external nose normal   Respiratory:  No respiratory distress, no wheezing   Cardiovascular:  Normal rate, no murmurs   GI:  Soft, nondistended, no guarding   Musculoskeletal:   no tenderness, no deformities or edema  Integument:  no jaundice, no rash   Neurologic:  Alert &awake, communicative, CN 2-12 normal,  Strength 1/5 in right lower extremity, strength 4/5 right upper extremity and 5/5 in left upper and lower extremities  Decreased sensation over right lower leg below knee  Psychiatric:  Speech and behavior appropriate       Lab Results: I have personally reviewed pertinent reports  Results from last 7 days   Lab Units 11/08/22  1446   WBC Thousand/uL 9 25   HEMOGLOBIN g/dL 13 5   HEMATOCRIT % 42 0   PLATELETS Thousands/uL 331   NEUTROS PCT % 41*   LYMPHS PCT % 46*   MONOS PCT % 8   EOS PCT % 4     Results from last 7 days   Lab Units 11/08/22  1446   POTASSIUM mmol/L 4 0   CHLORIDE mmol/L 100   CO2 mmol/L 28   BUN mg/dL 10   CREATININE mg/dL 0 81   CALCIUM mg/dL 9 1             Imaging: I have personally reviewed pertinent reports        CT head without contrast    Result Date: 11/8/2022  Narrative: CT BRAIN - WITHOUT CONTRAST INDICATION:   Neuro deficit, acute, stroke suspected RLE weakness  COMPARISON:  CT head 11/21/2020  MRI brain 5/17/2021 TECHNIQUE:  CT examination of the brain was performed  In addition to axial images, sagittal and coronal 2D reformatted images were created and submitted for interpretation  Radiation dose length product (DLP) for this visit:  841 mGy-cm   This examination, like all CT scans performed in the [facility], was performed utilizing techniques to minimize radiation dose exposure, including the use of iterative reconstruction and automated exposure control  IMAGE QUALITY:  Diagnostic  FINDINGS: PARENCHYMA:  No intracranial mass, mass effect or midline shift  No CT signs of acute infarction  No acute parenchymal hemorrhage  VENTRICLES AND EXTRA-AXIAL SPACES:  Normal for the patient's age  VISUALIZED ORBITS AND PARANASAL SINUSES:  Unremarkable  CALVARIUM AND EXTRACRANIAL SOFT TISSUES:  Normal      Impression: No acute intracranial abnormality  Workstation performed: [id number]     CT lumbar spine without contrast    Result Date: 11/8/2022  Narrative: CT LUMBAR SPINE INDICATION:   Low back pain, progressive neurologic deficit RLE weakness  COMPARISON: None  TECHNIQUE:  Contiguous axial images through the lumbar spine were obtained  Sagittal and coronal reconstructions were performed  IV Contrast:  Not given Radiation dose length product (DLP) for this visit:  761 mGy-cm   This examination, like all CT scans performed in the [facility], was performed utilizing techniques to minimize radiation dose exposure, including the use of iterative reconstruction and automated exposure control  IMAGE QUALITY:  Diagnostic  FINDINGS: ALIGNMENT:  There are 5 lumbar type vertebral bodies  Normal alignment of the lumbar spine  No spondylolysis or spondylolisthesis  VERTEBRAL BODIES:  No fracture    No lytic or blastic lesion  DEGENERATIVE CHANGES: Lower Thoracic spine:  Normal lower thoracic disc spaces  L1-2:  Mild disc space height loss with a small posterior disc bulge and osteophytic ridge  No normal facet joints  No canal or foraminal stenosis  L2-3:  Normal disc height  No herniation  Normal facet joints  No canal or foraminal stenosis  L3-4:  Normal disc height  No herniation  Mild facet arthropathy  No canal or foraminal stenosis  L4-5:  Normal disc height  Small posterior disc bulge  Mild bilateral set arthropathy and ligamentum flavum hypertrophy  No canal or foraminal stenosis  L5-S1:  Normal disc height  Small posterior disc bulge  Mild bilateral set arthropathy and ligamentum flavum hypertrophy  No canal or foraminal stenosis  PARASPINAL SOFT TISSUES:   Normal      Impression: Mild degenerative changes as described above without areas of significant central canal stenosis or neural foraminal narrowing  Workstation performed: [id number]     Mammo diagnostic bilateral w 3d & cad, US breast right limited (diagnostic)    Result Date: 11/3/2022  Narrative: DIAGNOSIS: Abnormal findings on diagnostic imaging of breast TECHNIQUE: Digital diagnostic mammography was performed  Computer Aided Detection (CAD) analyzed all applicable images  Ultrasound of the bilateral breast(s) was performed  COMPARISONS: Prior breast imaging dated: 05/02/2022, 05/02/2022, 11/01/2021, 11/01/2021, 10/01/2021, and 07/09/2015 RELEVANT HISTORY: Family Breast Cancer History: History of breast cancer in Neg Hx  Family Medical History: No known relevant family medical history  Personal History: No known relevant hormone history  No known relevant surgical history  No known relevant medical history  RISK ASSESSMENT: 5 Year Tyrer-Cuzick: 0 49 % 10 Year Tyrer-Cuzick: 1 22 % Lifetime Tyrer-Cuzick: 9 09 % TISSUE DENSITY: The breasts are heterogeneously dense, which may obscure small masses   INDICATION: Hayder Fisher is a 40 y o  female presenting for 6 MONTH FOLLOW UP  FINDINGS: RIGHT A) MASS Mammo diagnostic bilateral w 3d & cad: There is an equal density, oval mass with circumscribed margins seen in the right breast at 9 o'clock, 7 cm from the nipple  No significant blood flow was seen in or around this finding on today's study  Good posterior acoustic through transmission  US breast right limited (diagnostic): There is a 6 mm x 3 mm x 4 mm oval, parallel, hypoechoic mass with circumscribed margins with no posterior features seen in the right breast at 9 o'clock, 7 cm from the nipple  Associated features include absent vascularity  No worrisome new findings in the right breast  Left There are no suspicious masses, grouped microcalcifications or areas of unexplained architectural distortion  The skin and nipple areolar complex are unremarkable  A couple small nodular densities in the upper breast are stable since 2015     Impression: Stable exam   Recommend bilateral diagnostic mammogram in 1 year with repeat right breast ultrasound to document 2 year stability of probably benign mass  ASSESSMENT/BI-RADS CATEGORY:  Overall: 3 - Probably Benign RECOMMENDATION:      - Ultrasound in 1 year for the right breast       - Diagnostic mammogram in 1 year for both breasts  Workstation ID: [id number]       Total time for visit, including counseling/coordination of care: 30 minutes  Greater than 50% of this total time spent on direct patient counseling and coorination of care  Epic Records Reviewed as well as Records in Care Everywhere    ** Please Note: Dragon 360 Dictation voice to text software was used in the creation of this document   **

## 2022-11-09 NOTE — ASSESSMENT & PLAN NOTE
Patient with gradual weakening of right lower extremity with decreased sensation below the knee  No effort against gravity in right lower leg  · CT Head without acute intracranial abnormality, CT lumbar spine with mild degenerative changes without areas of significant central canal stenosis or neural foraminal narrowing     · Neurology evaluation in ED, appreciate recommendations  · PT/OT evaluation

## 2022-11-09 NOTE — PROGRESS NOTES
Progress Note - Neurology   Marilyn Mack 40 y o  female MRN: [id number]  Unit/Bed#: E4 -01 Encounter: [id number]      Assessment/Plan     * Weakness of right lower extremity  Assessment & Plan  45-year-old female with diabetes, episodes of unresponsiveness suspicious for nonepileptic events, suspected right brachial plexopathy (abnormal right upper extremity EMG/NCS but MRI right brachial plexus normal in July 2022), presented with numbness and near complete inability to move the right lower extremity upon awakening the morning of 11/08/2022  Exam in the ED on 11/08/2022 with near total paralysis of the right lower extremity except for subtle movement with right dorsiflexion and plantar flexion  Upon re-evaluation on 11/09/2022, patient able to achieve full or near full strength with confrontational testing of the right lower extremity, with some limitation distally due to pain  Patient mostly complaining of right calf cramping and pulling sensation and some heaviness of the right lower extremity, but able to ambulate to the restroom with the assistance of the nurse  CT head and CT lumbar spine unremarkable  Suspicion for functional etiology of presenting symptoms  Plan:  -as discussed with primary team, recommend further evaluation of right calf cramping (evaluation of possible DVT)   -re-evaluation by PT/OT as patient's strength has significantly improved today   -fall precautions   -do not anticipate need for further neuro imaging this admission  No further recs  Brachial plexopathy  Assessment & Plan  Patient currently being followed by Neurology and Neurosurgery as outpatient  EMG/NCS of right upper extremity in July 2022 demonstrated findings consistent with possible right brachial plexopathy; however, MRI right brachial plexus was a normal study  -patient has appointment with neuromuscular subspecialist on 11/16/2022      Uncontrolled type 2 diabetes mellitus with hyperglycemia, with long-term current use of insulin [facility])  Assessment & Plan  Lab Results   Component Value Date    HGBA1C 11 4 (H) 11/08/2022       Recent Labs     11/08/22 2057 11/09/22  0719   POCGLU 180* 247*       Blood Sugar Average: Last 72 hrs:  (P) 213 5     Goal euglycemia  Management as per primary service  Jaguar Felder will need follow up in at the next regular appointment with neuromuscular attending  She will not require outpatient neurological testing  Patient has appointment with Dr Mauricio Morales on 11/16/2022  Subjective:   Patient reports significant improvement in her right lower extremity strength today  She was able to walk to the restroom with the assistance of the nurse this morning  She still feels the limb is heavy, and now complains of right calf “Charley horse” pain and pulling sensation  She states this started early this morning  ROS: 12 point review of systems performed and negative except as indicated above  Vitals: Blood pressure 122/63, pulse 85, temperature 97 6 °F (36 4 °C), temperature source Temporal, resp  rate 20, height 5' 5" (1 651 m), weight 77 1 kg (170 lb), last menstrual period 10/25/2022, SpO2 100 %, unknown if currently breastfeeding  ,Body mass index is 28 29 kg/m²  Physical Exam:  General:  Patient is well-developed, well-nourished, and in no acute distress  HEENT:  Head normocephalic  Eyes anicteric  Cardiovascular:  With regular rhythm  Lungs:  Normal effort  Nonlabored breathing  Extremities:  With no significant edema  Tenderness to palpation of the right calf and right foot  Bertram sign positive on the right  Skin: No rashes  Neurologic:  Patient is alert, pleasantly interactive, and appropriately conversational   No obvious symbolic language difficulty or dysarthria  Gait deferred for safety  Cranial Nerves:   III,IV,VI: extraocular movements intact with no nystagmus  VII: Face is symmetric with no weakness noted       With encouragement, patient able to achieve full or near full strength with motor testing of the right lower extremity, with some limitation distally due to pain  Sensory testing with nearly absent pin and temperature appreciation of the distal right lower extremity from knee down, but able to appreciate vibration  Reporting significant tenderness to palpation of the posterior right lower extremity at the level of the calf and of the foot  Able to appreciate pain, temperature and light touch in the right thigh  Areflexic in the lower extremities bilaterally  Toe responses are downgoing bilaterally  Lab, Imaging and other studies:   CBC:   Results from last 7 days   Lab Units 11/09/22 0519 11/08/22  1446   WBC Thousand/uL 7 22 9 25   RBC Million/uL 4 29 4 73   HEMOGLOBIN g/dL 12 3 13 5   HEMATOCRIT % 37 9 42 0   MCV fL 88 89   PLATELETS Thousands/uL 277 331   , BMP/CMP:   Results from last 7 days   Lab Units 11/09/22 0519 11/08/22  1446   SODIUM mmol/L 137 137   POTASSIUM mmol/L 3 9 4 0   CHLORIDE mmol/L 104 100   CO2 mmol/L 26 28   BUN mg/dL 11 10   CREATININE mg/dL 0 94 0 81   CALCIUM mg/dL 8 2* 9 1   EGFR ml/min/1 73sq m 75 90     VTE Prophylaxis: Enoxaparin (Lovenox)    Counseling / Coordination of Care  Total time spent today 25 minutes  Greater than 50% of total time was spent with the patient and / or family counseling and / or coordination of care  A description of the counseling / coordination of care: Discussed with patient results of CT head and lumbar spine and plan for PT re-evaluation and possible lower extremity Doppler  Discussed with primary team as well

## 2022-11-09 NOTE — OCCUPATIONAL THERAPY NOTE
Occupational Therapy Evaluation(time=9658-2560)     Patient Name: Dino Live  Today's Date: 11/9/2022  Problem List  Principal Problem:    Weakness of right lower extremity  Active Problems:    Uncontrolled type 2 diabetes mellitus with hyperglycemia, with long-term current use of insulin (HCC)    Brachial plexopathy    Past Medical History  Past Medical History:   Diagnosis Date    Diabetes mellitus ([address] )      Past Surgical History  Past Surgical History:   Procedure Laterality Date    HERNIA REPAIR      TUBAL LIGATION  2017 11/09/22 0945   Note Type   Note type Evaluation   Pain Assessment   Pain Assessment Tool 0-10   Pain Score 7   Pain Location/Orientation Orientation: Right;Location: Leg   Pain Rating: FLACC (Rest) - Face 0   Pain Rating: FLACC (Rest) - Legs 0   Pain Rating: FLACC (Rest) - Activity 0   Pain Rating: FLACC (Rest) - Cry 0   Pain Rating: FLACC (Rest) - Consolability 0   Score: FLACC (Rest) 0   Restrictions/Precautions   Weight Bearing Precautions Per Order No   Other Precautions Fall Risk;Pain   Home Living   Type of Home House   Home Layout Multi-level   Bathroom Shower/Tub Tub/shower unit   Bathroom Toilet Standard   Home Equipment Walker   Prior Function   Lives With   (children--6, 15, 23y/o)   Falls in the last 6 months 0   Lifestyle   Autonomy PTA pt states independence with her ADLs, transfers, ambulation--w/o device; +, neg falls, neg home alone   Reciprocal Relationships 3 children; supportive brother, sister   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   Intrinsic Gratification watching TV   Subjective   Subjective "I wasn't using anything to walk with before "   ADL   Where Assessed Edge of bed   Eating Assistance 6  Modified independent   Grooming Assistance 6  Modified Independent   UB Bathing Assistance 5  Supervision/Setup   LB Bathing Assistance 3  Moderate Assistance   UB Dressing Assistance 5  Supervision/Setup   LB Dressing Assistance 3  Moderate Assistance   Bed Mobility   Supine to Sit 5  Supervision   Additional items Increased time required;Verbal cues   Sit to Supine 5  Supervision   Additional items Increased time required   Transfers   Sit to Stand 5  Supervision   Additional items Bedrails   Stand to Sit 5  Supervision   Additional items Armrests; Verbal cues   Functional Mobility   Functional Mobility 4  Minimal assistance   Additional Comments x1   Additional items Rolling walker   Balance   Static Sitting Fair +   Dynamic Sitting Fair   Static Standing Fair -   Dynamic Standing Poor +   Activity Tolerance   Activity Tolerance Patient limited by fatigue;Patient limited by pain   Medical Staff Made Aware nsg, P T    RUE Assessment   RUE Assessment WFL   RUE Strength   RUE Overall Strength Within Functional Limits - able to perform ADL tasks with strength  (4/5 throughout)   LUE Assessment   LUE Assessment WFL   LUE Strength   LUE Overall Strength Within Functional Limits - able to perform ADL tasks with strength  (4/5 throughout)   Hand Function   Gross Motor Coordination Functional   Fine Motor Coordination Functional   Sensation   Light Touch No apparent deficits   Proprioception   Proprioception No apparent deficits   Vision-Basic Assessment   Current Vision Wears glasses all the time   Vision - Complex Assessment   Acuity Able to read clock/calendar on wall without difficulty   Psychosocial   Psychosocial (WDL) WDL   Perception   Inattention/Neglect Appears intact   Cognition   Overall Cognitive Status WFL   Arousal/Participation Alert   Attention Attends with cues to redirect   Orientation Level Oriented X4   Memory Within functional limits   Following Commands Follows all commands and directions without difficulty   Assessment   Limitation Decreased ADL status; Decreased UE strength;Decreased Safe judgement during ADL;Decreased endurance;Decreased high-level ADLs   Prognosis Good   Assessment Pt is a 43y/o female admitted to the hospital 2* onset of R LE weakness, numbness, episode of unresponsive; CT(brain/L-spine)=neg new findings  Pt with PMH DM, hernia repair, and cervical radiculopathy  PTA pt states independence with her ADLs, transfers, ambulation--w/o device; +, neg falls, neg home alone  During initial eval, pt demonstrated deficits with her functional mobility, ADL status, transfer safety, b/l UE strength, activity tolerance(currently fair=15-20mins), and functional balance  Pt would benefit from continued OT tx for the above deficits  3-5xwk/1-2wks  Goals   Patient Goals "to feel better"   STG Time Frame   (1-7 days)   Short Term Goal #1 Pt will demonstrate improved activity tolerance to good(20-30mins) and standing tolerance to 3-5mins to assist with ADLs  Short Term Goal #2 Pt will demonstrate mod I with their sit-stand transfers to assist with completion of their LE dressing  Short Term Goal  Pt will independently verbalize 2-3 potential fall risks/transfer safety hazards and their appropriate compensation techniques  LTG Time Frame   (7-14 days)   Long Term Goal #1 Pt will demonstrate proper walker/transfer safety 100% of the time  Long Term Goal #2 Pt will demonstrate g/g- balance with all functional activities  Long Term Goal Pt will demonstrate mod I with their UE and LE bathing/dresssing  Plan   Treatment Interventions ADL retraining;Functional transfer training;UE strengthening/ROM; Endurance training;Cognitive reorientation;Patient/family training;Equipment evaluation/education; Compensatory technique education   Goal Expiration Date 11/23/22   OT Treatment Day 0   OT Frequency 3-5x/wk   Recommendation   OT Discharge Recommendation   (home vs  rehab pending progress)   AM-PAC Daily Activity Inpatient   Lower Body Dressing 3   Bathing 3   Toileting 3   Upper Body Dressing 3   Grooming 4   Eating 4   Daily Activity Raw Score 20   Daily Activity Standardized Score (Calc for Raw Score >=11) 42 03   AM-PAC Applied Cognition Inpatient   Following a Speech/Presentation 4   Understanding Ordinary Conversation 4   Taking Medications 4   Remembering Where Things Are Placed or Put Away 4   Remembering List of 4-5 Errands 4   Taking Care of Complicated Tasks 4   Applied Cognition Raw Score 24   Applied Cognition Standardized Score 62 21   Sonia Prescott

## 2022-11-09 NOTE — UTILIZATION REVIEW
Initial Clinical Review    Admission: Date/Time/Statement:   Admission Orders (From admission, onward)     Ordered        11/08/22 1903  Place in Observation  Once                      Orders Placed This Encounter   Procedures   • Place in Observation     Standing Status:   Standing     Number of Occurrences:   1     Order Specific Question:   Level of Care     Answer:   Med Surg [16]     ED Arrival Information     Expected   -    Arrival   11/8/2022 13:32    Acuity   Emergent            Means of arrival   Wheelchair    Escorted by   Family Member    Service   Hospitalist    Admission type   Emergency            Arrival complaint   numbness in leg           Chief Complaint   Patient presents with   • Numbness     Pt reports R sided leg numbness starting this morning around 0630  +weakness       Initial Presentation: 40 y o  female presents to the ED from home with c/o gradual onset RLE weakness w/ decreased sensation  Feels "heavy" and "asleep" w/ inability to move spontaneously  No other sites of weakness  PMH: IDDM, episodes of unresponsiveness suspicious for nonepileptic events, suspected R brachial plexopathy, chronic LBP  In the ED imaging negative for acute disease  She was seen by Neuro in the ED  On exam she has Strength 1/5 in right lower extremity, strength 4/5 right upper extremity and 5/5 in left upper and lower extremities  Decreased sensation over right lower leg below knee  Glucose 297  She is admitted to OBSERVATION status with Weakness RLE - Neuro consult, therapy evals  11/8 Neuro Consult - numbness and near complete inability to move the right lower extremity on waking  PMH: IDDM, episodes of unresponsiveness suspicious for nonepileptic events, suspected right brachial plexopathy (abnormal right upper extremity EMG/NCS but MRI right brachial plexus normal in July 2022  On exam considerable weakness RLE, complete paralysis 1/5 ankle dorsiflexion, 3/5 R plantar flexion    Incongruent with observed transfer from bed to wheelchair and from wheelchair to [facility] 5  ED physician noted more movement of the right lower extremity than this examiner  Inability to appreciate light touch, pin, temperature, vibration, and with absent proprioception in the distal RLE  Imaging shows mild degen changes, therapy evals  Date: 11/9:  Elevated glucose today  ED Triage Vitals [11/08/22 1349]   Temperature Pulse Respirations Blood Pressure SpO2   99 2 °F (37 3 °C) 82 20 134/80 100 %      Temp Source Heart Rate Source Patient Position - Orthostatic VS BP Location FiO2 (%)   Oral Monitor Sitting Left arm --      Pain Score       No Pain          Wt Readings from Last 1 Encounters:   11/08/22 77 1 kg (170 lb)     Additional Vital Signs:   11/09/22 0735 97 8 °F (36 6 °C) 72 20 112/70 86 99 % None (Room air) Lying   11/08/22 2306 97 5 °F (36 4 °C) 78 20 130/67 -- 100 % None (Room air) Lying   11/08/22 2100 -- -- -- -- -- -- None (Room air) --   11/08/22 2041 97 2 °F (36 2 °C) Abnormal  77 20 132/78 97 99 % None (Room air) Lying   11/08/22 1915 -- 70 18 132/74 -- 100 % None (Room air) Lying   11/08/22 1736 -- 72 18 130/65 -- 99 % None (Room air) Lying   11/08/22 1539 -- 74 18 141/67 97 100 % None (Room air) Lying     Pertinent Labs/Diagnostic Test Results:     11/8 ECG - SR w/ fusion complexes    CT head without contrast   Final Result by José Miguel Son MD (11/08 1700)      No acute intracranial abnormality  CT lumbar spine without contrast   Final Result by José Miguel Son MD (11/08 1704)      Mild degenerative changes as described above without areas of significant central canal stenosis or neural foraminal narrowing        VAS lower limb venous duplex study, unilateral/limited    (Results Pending)            Results from last 7 days   Lab Units 11/09/22  0519 11/08/22  1446   WBC Thousand/uL 7 22 9 25   HEMOGLOBIN g/dL 12 3 13 5   HEMATOCRIT % 37 9 42 0   PLATELETS Thousands/uL 277 331   NEUTROS ABS Thousands/µL 2 68 3 83         Results from last 7 days   Lab Units 11/09/22  0519 11/08/22  1446   SODIUM mmol/L 137 137   POTASSIUM mmol/L 3 9 4 0   CHLORIDE mmol/L 104 100   CO2 mmol/L 26 28   ANION GAP mmol/L 7 9   BUN mg/dL 11 10   CREATININE mg/dL 0 94 0 81   EGFR ml/min/1 73sq m 75 90   CALCIUM mg/dL 8 2* 9 1   MAGNESIUM mg/dL 1 9  --          Results from last 7 days   Lab Units 11/09/22  0719 11/08/22  2057   POC GLUCOSE mg/dl 247* 180*     Results from last 7 days   Lab Units 11/09/22  0519 11/08/22  1446   GLUCOSE RANDOM mg/dL 297* 297*         Results from last 7 days   Lab Units 11/08/22  1446   HEMOGLOBIN A1C % 11 4*   EAG mg/dl 280     BETA-HYDROXYBUTYRATE   Date Value Ref Range Status   11/21/2020 0 1 <0 6 mmol/L Final      ED Treatment:   Medication Administration from 11/08/2022 1332 to 11/08/2022 2038     None        Past Medical History:   Diagnosis Date   • Diabetes mellitus ([facility] [address] )      Admitting Diagnosis: Leg pain [M79 606]  Weakness of right lower extremity [R29 898]  Age/Sex: 40 y o  female  Admission Orders:  Scheduled Medications:  enoxaparin, 40 mg, Subcutaneous, Q24H [address]  gabapentin, 300 mg, Oral, HS  insulin detemir, 20 Units, Subcutaneous, HS  insulin lispro, 1-5 Units, Subcutaneous, TID AC  insulin lispro, 1-5 Units, Subcutaneous, HS      Continuous IV Infusions:     PRN Meds:  acetaminophen, 650 mg, Oral, Q6H PRN    POC GLUCOSE AC/HS WITH [employer]SI COVERAGE   Tele  IP CONSULT TO NEUROLOGY    Network Utilization Review Department  ATTENTION: Please call with any questions or concerns to [phone] and carefully listen to the prompts so that you are directed to the right person  All voicemails are confidential   Ady Yarbrough all requests for admission clinical reviews, approved or denied determinations and any other requests to dedicated fax number below belonging to the campus where the patient is receiving treatment   List of dedicated fax numbers for the Facilities:  FACILITY NAME UR FAX NUMBER   ADMISSION DENIALS (Administrative/Medical Necessity) [phone]   [address]  (Maternity/NICU/Pediatrics) [phone]   [address] [phone]   [address] [facility] 310 [facility] [address] [facility] [phone]

## 2022-11-09 NOTE — ASSESSMENT & PLAN NOTE
Lab Results   Component Value Date    HGBA1C 11 4 (H) 11/08/2022       Recent Labs     11/08/22 2057 11/09/22  0719 11/09/22  1125   POCGLU 180* 247* 253*       Blood Sugar Average: Last 72 hrs:  (P) 764 [id number] regimen includes Ozempic and insulin levemir 25u qHS     · Patient was maintained on basal insulin 20 units while inpatient and insulin sliding scale  · Hypoglycemia protocol  · Continue on home regimen at discharge

## 2022-11-09 NOTE — PLAN OF CARE
Problem: MOBILITY - ADULT  Goal: Maintain or return to baseline ADL function  Description: INTERVENTIONS:  -  Assess patient's ability to carry out ADLs; assess patient's baseline for ADL function and identify physical deficits which impact ability to perform ADLs (bathing, care of mouth/teeth, toileting, grooming, dressing, etc )  - Assess/evaluate cause of self-care deficits   - Assess range of motion  - Assess patient's mobility; develop plan if impaired  - Assess patient's need for assistive devices and provide as appropriate  - Encourage maximum independence but intervene and supervise when necessary  - Involve family in performance of ADLs  - Assess for home care needs following discharge   - Consider OT consult to assist with ADL evaluation and planning for discharge  - Provide patient education as appropriate  Outcome: Adequate for Discharge  Goal: Maintains/Returns to pre admission functional level  Description: INTERVENTIONS:  - Perform BMAT or MOVE assessment daily    - Set and communicate daily mobility goal to care team and patient/family/caregiver  - Collaborate with rehabilitation services on mobility goals if consulted  - Perform Range of Motion 3 times a day  - Reposition patient every 2 hours    - Dangle patient 3 times a day  - Stand patient 3 times a day  - Ambulate patient 3 times a day  - Out of bed to chair 3 times a day   - Out of bed for meals 3 times a day  - Out of bed for toileting  - Record patient progress and toleration of activity level   Outcome: Adequate for Discharge     Problem: Potential for Falls  Goal: Patient will remain free of falls  Description: INTERVENTIONS:  - Educate patient/family on patient safety including physical limitations  - Instruct patient to call for assistance with activity   - Consult OT/PT to assist with strengthening/mobility   - Keep Call bell within reach  - Keep bed low and locked with side rails adjusted as appropriate  - Keep care items and personal belongings within reach  - Initiate and maintain comfort rounds  - Make Fall Risk Sign visible to staff  - Offer Toileting every 2 Hours, in advance of need  - Initiate/Maintain bed alarm  - Obtain necessary fall risk management equipment: alarms  - Apply yellow socks and bracelet for high fall risk patients  - Consider moving patient to room near nurses station  Outcome: Adequate for Discharge     Problem: CARDIOVASCULAR - ADULT  Goal: Maintains optimal cardiac output and hemodynamic stability  Description: INTERVENTIONS:  - Monitor I/O, vital signs and rhythm  - Monitor for S/S and trends of decreased cardiac output  - Administer and titrate ordered vasoactive medications to optimize hemodynamic stability  - Assess quality of pulses, skin color and temperature  - Assess for signs of decreased coronary artery perfusion  - Instruct patient to report change in severity of symptoms  Outcome: Adequate for Discharge  Goal: Absence of cardiac dysrhythmias or at baseline rhythm  Description: INTERVENTIONS:  - Continuous cardiac monitoring, vital signs, obtain 12 lead EKG if ordered  - Administer antiarrhythmic and heart rate control medications as ordered  - Monitor electrolytes and administer replacement therapy as ordered  Outcome: Adequate for Discharge     Problem: PAIN - ADULT  Goal: Verbalizes/displays adequate comfort level or baseline comfort level  Description: Interventions:  - Encourage patient to monitor pain and request assistance  - Assess pain using appropriate pain scale  - Administer analgesics based on type and severity of pain and evaluate response  - Implement non-pharmacological measures as appropriate and evaluate response  - Consider cultural and social influences on pain and pain management  - Notify physician/advanced practitioner if interventions unsuccessful or patient reports new pain  Outcome: Adequate for Discharge     Problem: INFECTION - ADULT  Goal: Absence or prevention of progression during hospitalization  Description: INTERVENTIONS:  - Assess and monitor for signs and symptoms of infection  - Monitor lab/diagnostic results  - Monitor all insertion sites, i e  indwelling lines, tubes, and drains  - Monitor endotracheal if appropriate and nasal secretions for changes in amount and color  - Pandora appropriate cooling/warming therapies per order  - Administer medications as ordered  - Instruct and encourage patient and family to use good hand hygiene technique  - Identify and instruct in appropriate isolation precautions for identified infection/condition  Outcome: Adequate for Discharge  Goal: Absence of fever/infection during neutropenic period  Description: INTERVENTIONS:  - Monitor WBC    Outcome: Adequate for Discharge     Problem: SAFETY ADULT  Goal: Maintain or return to baseline ADL function  Description: INTERVENTIONS:  -  Assess patient's ability to carry out ADLs; assess patient's baseline for ADL function and identify physical deficits which impact ability to perform ADLs (bathing, care of mouth/teeth, toileting, grooming, dressing, etc )  - Assess/evaluate cause of self-care deficits   - Assess range of motion  - Assess patient's mobility; develop plan if impaired  - Assess patient's need for assistive devices and provide as appropriate  - Encourage maximum independence but intervene and supervise when necessary  - Involve family in performance of ADLs  - Assess for home care needs following discharge   - Consider OT consult to assist with ADL evaluation and planning for discharge  - Provide patient education as appropriate  Outcome: Adequate for Discharge  Goal: Maintains/Returns to pre admission functional level  Description: INTERVENTIONS:  - Perform BMAT or MOVE assessment daily    - Set and communicate daily mobility goal to care team and patient/family/caregiver     - Collaborate with rehabilitation services on mobility goals if consulted  - Perform Range of Motion 3 times a day  - Reposition patient every 2 hours    - Dangle patient 3 times a day  - Stand patient 3 times a day  - Ambulate patient 3 times a day  - Out of bed to chair 3 times a day   - Out of bed for meals 3 times a day  - Out of bed for toileting  - Record patient progress and toleration of activity level   Outcome: Adequate for Discharge  Goal: Patient will remain free of falls  Description: INTERVENTIONS:  - Educate patient/family on patient safety including physical limitations  - Instruct patient to call for assistance with activity   - Consult OT/PT to assist with strengthening/mobility   - Keep Call bell within reach  - Keep bed low and locked with side rails adjusted as appropriate  - Keep care items and personal belongings within reach  - Initiate and maintain comfort rounds  - Make Fall Risk Sign visible to staff  - Offer Toileting every 2 Hours, in advance of need  - Initiate/Maintain bed alarm  - Obtain necessary fall risk management equipment: alarms  - Apply yellow socks and bracelet for high fall risk patients  - Consider moving patient to room near nurses station  Outcome: Adequate for Discharge     Problem: DISCHARGE PLANNING  Goal: Discharge to home or other facility with appropriate resources  Description: INTERVENTIONS:  - Identify barriers to discharge w/patient and caregiver  - Arrange for needed discharge resources and transportation as appropriate  - Identify discharge learning needs (meds, wound care, etc )  - Arrange for interpretive services to assist at discharge as needed  - Refer to Case Management Department for coordinating discharge planning if the patient needs post-hospital services based on physician/advanced practitioner order or complex needs related to functional status, cognitive ability, or social support system  Outcome: Adequate for Discharge     Problem: Knowledge Deficit  Goal: Patient/family/caregiver demonstrates understanding of disease process, treatment plan, medications, and discharge instructions  Description: Complete learning assessment and assess knowledge base    Interventions:  - Provide teaching at level of understanding  - Provide teaching via preferred learning methods  Outcome: Adequate for Discharge     Problem: Prexisting or High Potential for Compromised Skin Integrity  Goal: Skin integrity is maintained or improved  Description: INTERVENTIONS:  - Identify patients at risk for skin breakdown  - Assess and monitor skin integrity  - Assess and monitor nutrition and hydration status  - Monitor labs   - Assess for incontinence   - Turn and reposition patient  - Assist with mobility/ambulation  - Relieve pressure over bony prominences  - Avoid friction and shearing  - Provide appropriate hygiene as needed including keeping skin clean and dry  - Evaluate need for skin moisturizer/barrier cream  - Collaborate with interdisciplinary team   - Patient/family teaching  - Consider wound care consult   Outcome: Adequate for Discharge

## 2022-11-09 NOTE — DISCHARGE SUMMARY
[address] [facility]  [facility] 1980[facility] 40 [facility]  [facility] MRN: [id number]  Unit/Bed#: E4 -01 Encounter: [id number]  Primary Care Provider: Pradip Knight MD   Date and time admitted to hospital: 11/8/2022  1:52 PM    * Weakness of right lower extremity  Assessment & Plan  Patient with gradual weakening of right lower extremity with decreased sensation below the knee  No effort against gravity in right lower leg upon presentation  · CT Head without acute intracranial abnormality, CT lumbar spine with mild degenerative changes without areas of significant central canal stenosis or neural foraminal narrowing  · Neurology following no further no imaging at this time, follow-up with neuromuscular specialist on 11/16/2028  · PT/OT recommending home with home health    Brachial plexopathy  Assessment & Plan  · Patient has visit planned with neuromuscular specialist outpatient    Uncontrolled type 2 diabetes mellitus with hyperglycemia, with long-term current use of insulin [facility])  Assessment & Plan  Lab Results   Component Value Date    HGBA1C 11 4 (H) 11/08/2022       Recent Labs     11/08/22 2057 11/09/22  0719 11/09/22  1125   POCGLU 180* 247* 253*       Blood Sugar Average: Last 72 hrs:  (P) 097 [id number] regimen includes Ozempic and insulin levemir 25u qHS     · Patient was maintained on basal insulin 20 units while inpatient and insulin sliding scale  · Hypoglycemia protocol  · Continue on home regimen at discharge      Medical Problems             Resolved Problems  Date Reviewed: 11/9/2022   None               Discharging Physician / Practitioner: Tahira Edwards  PCP: Pradip Knight MD  Admission Date:   Admission Orders (From admission, onward)     Ordered        11/08/22 1903  Place in Observation  Once                      Discharge Date: 11/09/22    Consultations During Hospital Stay:  · Neurology    Procedures Performed: · None    Significant Findings / Test Results:   · CT head showing no acute intracranial abnormality  · CT lumbar spine showing mild degenerative changes without central canal stenosis or neural foraminal narrowing  Incidental Findings:   · None    Test Results Pending at Discharge (will require follow up): · None     Outpatient Tests Requested:  · None    Complications:  None    Reason for Admission:  Weakness of right lower extremity    Hospital Course:   Uyen Campoverde is a 40 y o  female patient who originally presented to the hospital on 11/8/2022 due to gradual onset of right lower extremity weakness  Patient reported gradual onset of right lower extremity weakness associated with decreased sensation she also reported her legs felt heavy  Neurology was consulted  CT head showed no acute intracranial process and lumbar spine showing mild degenerative changes  Due to cramping and pain in right lower extremity D-dimer was ordered and was negative  PT evaluated patient and recommended home with home health  Please see above list of diagnoses and related plan for additional information  Condition at Discharge: stable    Discharge Day Visit / Exam:   Subjective:  Patient was seen lying in bed today  She reports pain and right calf  She states that weakness in right lower extremity is moderately improved from admission  She was agreeable to home PT   she denies any numbness or tingling in her right lower extremity  She denies any other focal weakness, change in speech, change in vision, chest pain, shortness a breath, fever, chills    Vitals: Blood Pressure: 116/61 (11/09/22 1108)  Pulse: 81 (11/09/22 1108)  Temperature: 97 9 °F (36 6 °C) (11/09/22 1108)  Temp Source: Temporal (11/09/22 1108)  Respirations: 20 (11/09/22 1108)  Height: 5' 5" (165 1 cm) (11/08/22 2041)  Weight - Scale: 77 1 kg (170 lb) (11/08/22 2041)  SpO2: 98 % (11/09/22 1108)  Exam:   Physical Exam  Vitals and nursing note reviewed  Constitutional:       Appearance: Normal appearance  HENT:      Head: Normocephalic and atraumatic  Eyes:      General: No scleral icterus  Cardiovascular:      Rate and Rhythm: Normal rate and regular rhythm  Pulmonary:      Effort: Pulmonary effort is normal       Breath sounds: Normal breath sounds  No wheezing or rales  Abdominal:      General: Abdomen is flat  Bowel sounds are normal       Palpations: Abdomen is soft  Tenderness: There is no abdominal tenderness  Musculoskeletal:         General: Tenderness (Right lower extremity) present  Right lower leg: No edema  Left lower leg: No edema  Skin:     General: Skin is warm and dry  Neurological:      Mental Status: She is alert  Mental status is at baseline  Comments: Sensation intact to light stroke bilateral lower extremity  Patient with 1/5 strength right lower extremity  Patient with 4-5 strength right upper extremity  Patient with 5/5   Psychiatric:         Mood and Affect: Mood normal          Behavior: Behavior normal         Discussion with Family: Patient declined call to   Discharge instructions/Information to patient and family:   See after visit summary for information provided to patient and family  Provisions for Follow-Up Care:  See after visit summary for information related to follow-up care and any pertinent home health orders  Disposition:   Home with [facility] Services (Reminder: Complete face to face encounter)    Planned Readmission:  None     Discharge Statement:  I spent 60 minutes discharging the patient  This time was spent on the day of discharge  I had direct contact with the patient on the day of discharge  Greater than 50% of the total time was spent examining patient, answering all patient questions, arranging and discussing plan of care with patient as well as directly providing post-discharge instructions    Additional time then spent on discharge activities  Discharge Medications:  See after visit summary for reconciled discharge medications provided to patient and/or family        **Please Note: This note may have been constructed using a voice recognition system**

## 2022-11-10 ENCOUNTER — TELEPHONE (OUTPATIENT)
Dept: NEUROLOGY | Facility: CLINIC | Age: 42
End: 2022-11-10

## 2022-11-10 NOTE — TELEPHONE ENCOUNTER
I called and left a voicemail message about patients upcoming appointment with Lillian Cabrales on 11/126/22 @ 12:30 pm  I requested a call back to our office to confirm the appointment or if the patient has any issues or concerns or cannot keep this appointment

## 2022-11-11 ENCOUNTER — TRANSITIONAL CARE MANAGEMENT (OUTPATIENT)
Dept: FAMILY MEDICINE CLINIC | Facility: CLINIC | Age: 42
End: 2022-11-11

## 2022-11-16 ENCOUNTER — OFFICE VISIT (OUTPATIENT)
Dept: NEUROLOGY | Facility: CLINIC | Age: 42
End: 2022-11-16

## 2022-11-16 ENCOUNTER — PATIENT OUTREACH (OUTPATIENT)
Dept: FAMILY MEDICINE CLINIC | Facility: CLINIC | Age: 42
End: 2022-11-16

## 2022-11-16 VITALS
HEART RATE: 73 BPM | RESPIRATION RATE: 16 BRPM | DIASTOLIC BLOOD PRESSURE: 72 MMHG | BODY MASS INDEX: 30.39 KG/M2 | SYSTOLIC BLOOD PRESSURE: 132 MMHG | WEIGHT: 178 LBS | TEMPERATURE: 97 F | HEIGHT: 64 IN

## 2022-11-16 DIAGNOSIS — M79.661 RIGHT CALF PAIN: Primary | ICD-10-CM

## 2022-11-16 DIAGNOSIS — M54.16 RADICULOPATHY, LUMBAR REGION: ICD-10-CM

## 2022-11-16 DIAGNOSIS — G54.0 BRACHIAL PLEXOPATHY: ICD-10-CM

## 2022-11-16 DIAGNOSIS — G62.9 POLYNEUROPATHY: ICD-10-CM

## 2022-11-16 DIAGNOSIS — R29.898 TRANSIENT WEAKNESS OF RIGHT LOWER EXTREMITY: ICD-10-CM

## 2022-11-16 NOTE — PROGRESS NOTES
Patient ID: Benny Lira is a 40 y o  female  Assessment/Plan:  Patient Instructions:  Do home exercises taught by PT  Continue working on glucose control  Labs to rule out other causes of neuropathy/fluctuating symptoms  ultrasound for further evaluation of right calf pain  EMG in 6 weeks of lower extremities  Follow up in 6 months    Upper extremity symptoms are likely related to brachial plexopathy related to her uncontrolled DM  We discussed how this takes time to heal and it is very important to continue exercises/PT  We would avoid corticosteroids in this case as her A1c is elevated  She is encouraged to work on controlling blood sugar  Labs are ordered at this time to rule out neoplastic/autoimmune cause  Lower extremity symptoms are suggestive of a lumbar radiculopathy superimposed on polyneuropathy  EMG repeat ordered of lower extremities  For the specific tenderness to the calf area, a venous duplex was ordered to rule out DVT  Diagnoses and all orders for this visit:    Right calf pain  -     VAS lower limb venous duplex study, unilateral/limited; Future    Brachial plexopathy  -     Ambulatory referral to Neurology  -     Sedimentation rate, automated; Future  -     RAMYA w/Reflex if Positive; Future  -     Sjogren's Antibodies; Future  -     Anti-neutrophilic cytoplasmic antibody; Future    Transient weakness of right lower extremity  -     EMG 2 limb lower extremity; Future    Polyneuropathy  -     Sedimentation rate, automated; Future  -     RAMYA w/Reflex if Positive; Future  -     Sjogren's Antibodies; Future  -     Protein electrophoresis, serum; Future  -     Vitamin B12; Future  -     Vitamin B6; Future  -     Vitamin B1, whole blood; Future  -     Anti-neutrophilic cytoplasmic antibody; Future    Radiculopathy, lumbar region  -     EMG 2 limb lower extremity;  Future        Subjective:    HPI  Benny Lira is a 40year old female with past medical history of uncontrolled DM (usually a1c is around 11) , migraine, seizures vs nonepileptic events (negative EMU EEG study), right breast mass (likely benign), hair loss,  who presents for consult regarding 1 year long history of right arm pain/weakness and more recent episode of right leg weakness  She states today she continues with intermittent headaches but feels well on 50mg nightly topiramate for prevention  She denies recent seizure like activity  Her right arm symptoms began suddenly, initially it was thought to be adhesive capsulitis in October 2021  She had injections without improvement  She then developed neck pain so MRI C spine was completed which showed c6/7 moderate canal stenosis and she was referred to neurosurgery and pain management; records show KAI was held because of high blood sugars  Flexion/extension xrays of neck did not show instability; no surgery was indicated  EMG showed a brachial plexopathy with acute changes in lower trunk and more chronic changes in upper trunk-it did not show cervical radiculopathy  She went through PT, this was helpful slightly for mobility but not for pain-she admits she is not regular with the home exercises  MRI brachial plexus was performed and did not show a structural cause  She uses gabapentin nightly for pain, but states it makes her too sleepy during the daytime hours when she has to work; she works in [employer]  Her right leg symptoms started suddenly 11/8/2022 and she went to the hospital for this  Hospital description:  Patient states she was in her usual state of health last evening before going to bed  She was able to go up and down the stairs to do laundry and walk around normally  When she woke this morning around 0630, she felt the right lower extremity was [facility] and she thought it was “asleep”, though she denies paresthesias  She attempted to ambulate but had to drag the right lower extremity and hold onto anything she could in order to get around    She denies bowel/bladder dysfunction, she has chronic back pain but denies any change  Exam at that time described complete weakness of the leg except for 3/5 plantar flexion weakness, but this resolved by the next day and neuroimaging including CT head/lower extremity was normal  CT lumbar spine showed mild degenerative changes but no significant canal or foraminal stenosis  Today she states her lower extremity weakness is better except for ankle weakness (especially dorsiflexion) and she has been able to walk unassisted  She does complain of continued right calf tenderness and pain  The following portions of the patient's history were reviewed and updated as appropriate: allergies, current medications, past family history, past medical history, past social history, past surgical history and problem list          Objective:    Blood pressure 132/72, pulse 73, temperature (!) 97 °F (36 1 °C), temperature source Temporal, resp  rate 16, height 5' 4" (1 626 m), weight 80 7 kg (178 lb), last menstrual period 10/25/2022, unknown if currently breastfeeding  Physical Exam  Vitals reviewed  Constitutional:       General: She is not in acute distress  HENT:      Head: Normocephalic and atraumatic  Right Ear: External ear normal       Left Ear: External ear normal       Nose: Nose normal       Mouth/Throat:      Mouth: Mucous membranes are moist       Pharynx: Oropharynx is clear  Eyes:      General:         Right eye: No discharge  Left eye: No discharge  Extraocular Movements: Extraocular movements intact  Neck:      Comments: Especially right sided neck muscular pain/trapezius tenderness  Cardiovascular:      Rate and Rhythm: Normal rate and regular rhythm  Pulses: Normal pulses  Dorsalis pedis pulses are 2+ on the right side and 2+ on the left side  Heart sounds: Normal heart sounds     Pulmonary:      Effort: Pulmonary effort is normal       Breath sounds: Normal breath sounds  Abdominal:      General: There is no distension  Musculoskeletal:      Cervical back: Pain with movement and muscular tenderness present  Right lower leg: Tenderness (right calf) present  No swelling  Right foot: No Charcot foot or foot drop  Left foot: No Charcot foot or foot drop  Feet:      Right foot:      Skin integrity: Skin integrity normal       Toenail Condition: Right toenails are normal       Left foot:      Skin integrity: Skin integrity normal       Toenail Condition: Left toenails are normal    Skin:     General: Skin is warm  Findings: No rash  Neurological:      Mental Status: She is alert  Coordination: Romberg sign negative  Deep Tendon Reflexes:      Reflex Scores:       Tricep reflexes are 1+ on the right side and 2+ on the left side  Bicep reflexes are 2+ on the right side and 2+ on the left side  Brachioradialis reflexes are 1+ on the right side and 1+ on the left side  Patellar reflexes are 1+ on the right side and 1+ on the left side  Achilles reflexes are 1+ on the left side  Psychiatric:         Attention and Perception: Attention normal          Mood and Affect: Affect is flat  Speech: Speech normal          Behavior: Behavior is cooperative  Cognition and Memory: Cognition normal          Neurological Exam  Mental Status  Alert  Oriented to person, place and time  Speech is normal  Language is fluent with no aphasia  Cranial Nerves  CN II: Right visual acuity: counts fingers  Left visual acuity: counts fingers  CN III, IV, VI: Extraocular movements intact bilaterally  CN V: Facial sensation is normal   CN VII: Full and symmetric facial movement  CN VIII: Hearing is normal   CN IX, X: Palate elevates symmetrically  CN XI: Shoulder shrug strength is normal   CN XII: Tongue midline without atrophy or fasciculations  Motor   Strength is 5/5 in all four extremities except as noted  Right                     Left   Shoulder abduction              4                           Elbow flexion                        4                           Wrist flexion                          4+                           Wrist extension                     4+                           Finger flexion                        4+                           Hip flexion                             4+                           Ankle inversion                     4+                           Ankle eversion                      5-                           Plantarflexion                        4+                           Dorsiflexion                           4-                             Sensory  Light touch abnormality: Pinprick abnormality: Temperature abnormality: Vibration abnormality:   Sensation decreased in both lower extremities- in stocking distribution just below knee  No vibration sense at toes, 6 seconds at ankle, normal vibration sense in upper extremities  Reflexes                                            Right                      Left  Brachioradialis                    1+                         1+  Biceps                                 2+                         2+  Triceps                                1+                         2+  Patellar                                1+                         1+  Achilles                                Tr                         1+    Right pathological reflexes: Ankle clonus absent  Left pathological reflexes: Ankle clonus absent  Coordination  Right: Rapid alternating movement normal Left: Rapid alternating movement normal     Gait  Casual gait is normal including stance, stride, and arm swing  Romberg is absent  Able to rise from chair without using arms  Slight difficulty with this  ROS:    Review of Systems   Constitutional: Negative  Negative for appetite change and fever  HENT: Negative    Negative for hearing loss, tinnitus, trouble swallowing and voice change  Eyes: Negative  Negative for photophobia, pain and visual disturbance  Respiratory: Negative  Negative for shortness of breath  Cardiovascular: Negative  Negative for palpitations  Gastrointestinal: Negative  Negative for nausea and vomiting  Endocrine: Negative  Negative for cold intolerance  Genitourinary: Negative  Negative for dysuria, frequency and urgency  Musculoskeletal: Negative  Negative for gait problem, myalgias and neck pain  Skin: Negative  Negative for rash  Allergic/Immunologic: Negative  Neurological: Positive for weakness (R side) and numbness (R leg)  Negative for dizziness, tremors, seizures, syncope, facial asymmetry, speech difficulty, light-headedness and headaches  Hematological: Negative  Does not bruise/bleed easily  Psychiatric/Behavioral: Negative  Negative for confusion, hallucinations and sleep disturbance     ROS was reviewed and updated as appropriate

## 2022-11-16 NOTE — PROGRESS NOTES
placed outreach call to pt for follow up  OC wanted to follow up regarding her [employer] account to see if she has applied for either [facility] or the CAP program     Pt stated that she did apply for the CAP program but did not qualify because she is over the income limit, as well as [employer]  Pt stated that she will try and make a payment tomorrow 11/17 to [employer]  OC explained that she if makes a payment she should speak with representative and they can possible put her on a payment plan  Pt stated she will ask  OC stated if there are any other resource she will need but pt stated no  At this time OC will be closing this case  If a need arises case may be reopened

## 2022-11-16 NOTE — PATIENT INSTRUCTIONS
Do home exercises taught by PT  Continue working on glucose control  Labs to rule out other causes of neuropathy/fluctuating symptoms  ultrasound for further evaluation of right calf pain  EMG in 6 weeks of lower extremities  Follow up in 6 months

## 2022-11-23 ENCOUNTER — OFFICE VISIT (OUTPATIENT)
Dept: FAMILY MEDICINE CLINIC | Facility: CLINIC | Age: 42
End: 2022-11-23

## 2022-11-23 VITALS
BODY MASS INDEX: 30.32 KG/M2 | TEMPERATURE: 98.4 F | OXYGEN SATURATION: 100 % | WEIGHT: 182 LBS | SYSTOLIC BLOOD PRESSURE: 120 MMHG | DIASTOLIC BLOOD PRESSURE: 90 MMHG | HEIGHT: 65 IN | HEART RATE: 88 BPM

## 2022-11-23 DIAGNOSIS — G54.0 BRACHIAL PLEXOPATHY: ICD-10-CM

## 2022-11-23 DIAGNOSIS — E11.65 UNCONTROLLED TYPE 2 DIABETES MELLITUS WITH HYPERGLYCEMIA, WITH LONG-TERM CURRENT USE OF INSULIN (HCC): Primary | ICD-10-CM

## 2022-11-23 DIAGNOSIS — E11.65 UNCONTROLLED TYPE 2 DIABETES MELLITUS WITH HYPERGLYCEMIA, WITHOUT LONG-TERM CURRENT USE OF INSULIN (HCC): ICD-10-CM

## 2022-11-23 DIAGNOSIS — Z79.4 UNCONTROLLED TYPE 2 DIABETES MELLITUS WITH HYPERGLYCEMIA, WITH LONG-TERM CURRENT USE OF INSULIN (HCC): Primary | ICD-10-CM

## 2022-11-23 DIAGNOSIS — R29.898 RIGHT LEG WEAKNESS: ICD-10-CM

## 2022-11-23 NOTE — PROGRESS NOTES
Assessment & Plan     1  Uncontrolled type 2 diabetes mellitus with hyperglycemia, with long-term current use of insulin ([facility] [address] )    2  Uncontrolled type 2 diabetes mellitus with hyperglycemia, without long-term current use of insulin ([facility] [address] )    3  Brachial plexopathy    4  Right leg weakness        73-year-old woman with:  Transitional care management visit for recent episode right leg weakness along with facial weakness in the setting of patient with breaks you plexi and uncontrolled type 2 diabetes encouraged follow-up with Endocrinology discussed supportive care return parameters encouraged follow-up with her specialists continue current medications follow-up in 3 mo  BMI Counseling: Body mass index is 30 29 kg/m²  The BMI is above normal  Nutrition recommendations include encouraging healthy choices of fruits and vegetables  Exercise recommendations include moderate physical activity 150 minutes/week  Rationale for BMI follow-up plan is due to patient being overweight or obese  Subjective     Transitional Care Management Review:   Neyda Dawn is a 40 y o  female here for TCM follow up  During the TCM phone call patient stated:  TCM Call     Date and time call was made  11/10/2022 11:24 AM    Hospital care reviewed  Records reviewed    Patient was hospitialized at  [facility]    Date of Admission  11/08/22    Date of discharge  11/09/22    Diagnosis  WEAKNESS RLE    Disposition  Home    Were the patients medications reviewed and updated  Yes    Current Symptoms  None      TCM Call     Post hospital issues  None    Should patient be enrolled in anticoag monitoring? No    Scheduled for follow up?   Yes    Did you obtain your prescribed medications  Yes    Do you need help managing your prescriptions or medications  No    Is transportation to your appointment needed  No    I have advised the patient to call PCP with any new or worsening symptoms  CHULA DELGADO, [id number] [address] members    Support System  Family    The type of support provided  Emotional; Financial; Physical    Do you have social support  Yes, as much as I need    Are you recieving any outpatient services  No    Are you recieving home care services  No    Are you using any community resources  No    Current waiver services  No    Have you fallen in the last 12 months  No    Interperter language line needed  No    Counseling  Patient        Patient is a 26-year-old woman who presents for transitional care management visit she went to the hospital for episode of right leg weakness along with facial weakness in the setting of patient with break CO plexi and uncontrolled type 2 diabetes she admits that symptoms are slightly better no fevers chills nausea vomiting tolerating p o  intake no other complaints at this time    Review of Systems   Constitutional: Negative  HENT: Negative  Eyes: Negative  Respiratory: Negative  Cardiovascular: Negative  Gastrointestinal: Negative  Endocrine: Negative  Genitourinary: Negative  Musculoskeletal: Negative  Allergic/Immunologic: Negative  Neurological: Positive for weakness  Hematological: Negative  Psychiatric/Behavioral: Negative  All other systems reviewed and are negative  Objective     /90 (BP Location: Left arm, Patient Position: Sitting, Cuff Size: Standard)   Pulse 88   Temp 98 4 °F (36 9 °C) (Tympanic)   Ht 5' 5" (1 651 m)   Wt 82 6 kg (182 lb)   LMP 10/25/2022   SpO2 100%   BMI 30 29 kg/m²      Physical Exam  Constitutional:       General: She is not in acute distress  Appearance: She is well-developed and well-nourished  She is not diaphoretic  HENT:      Head: Normocephalic and atraumatic  Right Ear: External ear normal       Left Ear: External ear normal       Nose: Nose normal       Mouth/Throat:      Mouth: Oropharynx is clear and moist       Pharynx: No oropharyngeal exudate     Eyes:      General: Right eye: No discharge  Left eye: No discharge  Extraocular Movements: EOM normal       Conjunctiva/sclera: Conjunctivae normal       Pupils: Pupils are equal, round, and reactive to light  Neck:      Thyroid: No thyromegaly  Trachea: No tracheal deviation  Cardiovascular:      Rate and Rhythm: Normal rate and regular rhythm  Heart sounds: Normal heart sounds  No murmur heard  No friction rub  No gallop  Pulmonary:      Effort: Pulmonary effort is normal  No respiratory distress  Breath sounds: Normal breath sounds  Abdominal:      General: There is no distension  Palpations: Abdomen is soft  Tenderness: There is no abdominal tenderness  There is no guarding or rebound  Musculoskeletal:         General: Normal range of motion  Lymphadenopathy:      Cervical: No cervical adenopathy  Skin:     General: Skin is warm  Neurological:      Mental Status: She is alert and oriented to person, place, and time  Cranial Nerves: No cranial nerve deficit  Psychiatric:         Mood and Affect: Mood and affect normal          Behavior: Behavior normal          Thought Content:  Thought content normal          Judgment: Judgment normal        Shawna Klinefelter, MD

## 2022-11-28 NOTE — PROGRESS NOTES
Assessment/Plan:    20-year-old woman with:  Transitional care management visit for recent episode right leg weakness along with facial weakness in the setting of patient with breaks you plexi and uncontrolled type 2 diabetes encouraged follow-up with Endocrinology discussed supportive care return parameters encouraged follow-up with her specialists continue current medications follow-up in 3 mo  No problem-specific Assessment & Plan notes found for this encounter  Diagnoses and all orders for this visit:    Uncontrolled type 2 diabetes mellitus with hyperglycemia, with long-term current use of insulin ([address] )    Uncontrolled type 2 diabetes mellitus with hyperglycemia, without long-term current use of insulin ([facility])    Brachial plexopathy    Right leg weakness          Subjective:     Chief Complaint   Patient presents with   • Transition of Care Management        Patient ID: Ruben Hall is a 40 y o  female  Patient is a 20-year-old woman who presents for transitional care management visit she went to the hospital for episode of right leg weakness along with facial weakness in the setting of patient with break CO plexi and uncontrolled type 2 diabetes she admits that symptoms are slightly better no fevers chills nausea vomiting tolerating p o  intake no other complaints at this time      The following portions of the patient's history were reviewed and updated as appropriate: allergies, current medications, past family history, past medical history, past social history, past surgical history and problem list     Review of Systems   Constitutional: Negative  HENT: Negative  Eyes: Negative  Respiratory: Negative  Cardiovascular: Negative  Gastrointestinal: Negative  Endocrine: Negative  Genitourinary: Negative  Musculoskeletal: Negative  Allergic/Immunologic: Negative  Neurological: Positive for weakness  Hematological: Negative  Psychiatric/Behavioral: Negative      All other systems reviewed and are negative  Objective:      /90 (BP Location: Left arm, Patient Position: Sitting, Cuff Size: Standard)   Pulse 88   Temp 98 4 °F (36 9 °C) (Tympanic)   Ht 5' 5" (1 651 m)   Wt 82 6 kg (182 lb)   LMP 10/25/2022   SpO2 100%   BMI 30 29 kg/m²          Physical Exam  Constitutional:       Appearance: She is well-developed and well-nourished  HENT:      Head: Atraumatic  Right Ear: External ear normal       Left Ear: External ear normal    Eyes:      Extraocular Movements: EOM normal       Conjunctiva/sclera: Conjunctivae normal       Pupils: Pupils are equal, round, and reactive to light  Cardiovascular:      Rate and Rhythm: Normal rate and regular rhythm  Heart sounds: Normal heart sounds  Pulmonary:      Effort: Pulmonary effort is normal  No respiratory distress  Breath sounds: Normal breath sounds  Abdominal:      General: There is no distension  Palpations: Abdomen is soft  Tenderness: There is no abdominal tenderness  There is no guarding or rebound  Musculoskeletal:         General: Normal range of motion  Cervical back: Normal range of motion  Skin:     General: Skin is warm and dry  Neurological:      Mental Status: She is alert and oriented to person, place, and time  Cranial Nerves: No cranial nerve deficit  Psychiatric:         Mood and Affect: Mood and affect normal          Behavior: Behavior normal          Thought Content: Thought content normal          Judgment: Judgment normal          BMI Counseling: Body mass index is 30 29 kg/m²  The BMI is above normal  Nutrition recommendations include encouraging healthy choices of fruits and vegetables  Exercise recommendations include moderate physical activity 150 minutes/week  Rationale for BMI follow-up plan is due to patient being overweight or obese

## 2022-11-28 NOTE — PATIENT INSTRUCTIONS

## 2022-12-05 ENCOUNTER — APPOINTMENT (OUTPATIENT)
Dept: LAB | Facility: HOSPITAL | Age: 42
End: 2022-12-05

## 2022-12-05 DIAGNOSIS — G62.9 POLYNEUROPATHY: ICD-10-CM

## 2022-12-05 DIAGNOSIS — Z11.3 SCREEN FOR STD (SEXUALLY TRANSMITTED DISEASE): Primary | ICD-10-CM

## 2022-12-05 DIAGNOSIS — G54.0 BRACHIAL PLEXOPATHY: ICD-10-CM

## 2022-12-05 LAB
ANA SER QL IA: NEGATIVE
ERYTHROCYTE [SEDIMENTATION RATE] IN BLOOD: 25 MM/HOUR (ref 0–19)
HBV SURFACE AG SER QL: NORMAL
HCV AB SER QL: NORMAL
VIT B12 SERPL-MCNC: 722 PG/ML (ref 100–900)

## 2022-12-06 LAB
ENA SS-A AB SER-ACNC: <0.2 AI (ref 0–0.9)
ENA SS-B AB SER-ACNC: <0.2 AI (ref 0–0.9)

## 2022-12-07 ENCOUNTER — HOSPITAL ENCOUNTER (OUTPATIENT)
Dept: NON INVASIVE DIAGNOSTICS | Facility: HOSPITAL | Age: 42
Discharge: HOME | End: 2022-12-07

## 2022-12-07 DIAGNOSIS — M79.661 RIGHT CALF PAIN: ICD-10-CM

## 2022-12-07 LAB
ALBUMIN SERPL ELPH-MCNC: 3.88 G/DL (ref 3.5–5)
ALBUMIN SERPL ELPH-MCNC: 53.1 % (ref 52–65)
ALPHA1 GLOB SERPL ELPH-MCNC: 0.28 G/DL (ref 0.1–0.4)
ALPHA1 GLOB SERPL ELPH-MCNC: 3.8 % (ref 2.5–5)
ALPHA2 GLOB SERPL ELPH-MCNC: 0.66 G/DL (ref 0.4–1.2)
ALPHA2 GLOB SERPL ELPH-MCNC: 9.1 % (ref 7–13)
BETA GLOB ABNORMAL SERPL ELPH-MCNC: 0.55 G/DL (ref 0.4–0.8)
BETA1 GLOB SERPL ELPH-MCNC: 7.5 % (ref 5–13)
BETA2 GLOB SERPL ELPH-MCNC: 6.9 % (ref 2–8)
BETA2+GAMMA GLOB SERPL ELPH-MCNC: 0.5 G/DL (ref 0.2–0.5)
C-ANCA TITR SER IF: NORMAL TITER
GAMMA GLOB ABNORMAL SERPL ELPH-MCNC: 1.43 G/DL (ref 0.5–1.6)
GAMMA GLOB SERPL ELPH-MCNC: 19.6 % (ref 12–22)
IGG/ALB SER: 1.13 {RATIO} (ref 1.1–1.8)
MYELOPEROXIDASE AB SER IA-ACNC: <0.2 UNITS (ref 0–0.9)
P-ANCA ATYPICAL TITR SER IF: NORMAL TITER
P-ANCA TITR SER IF: NORMAL TITER
PROT PATTERN SERPL ELPH-IMP: NORMAL
PROT SERPL-MCNC: 7.3 G/DL (ref 6.4–8.2)
PROTEINASE3 AB SER IA-ACNC: <0.2 UNITS (ref 0–0.9)

## 2022-12-08 LAB
VIT B1 BLD-SCNC: 112.8 NMOL/L (ref 66.5–200)
VIT B6 SERPL-MCNC: 10.1 UG/L (ref 3.4–65.2)

## 2022-12-29 ENCOUNTER — HOSPITAL ENCOUNTER (OUTPATIENT)
Dept: NEUROLOGY | Facility: CLINIC | Age: 42
End: 2022-12-29

## 2022-12-29 ENCOUNTER — DOCUMENTATION (OUTPATIENT)
Dept: NEUROLOGY | Facility: CLINIC | Age: 42
End: 2022-12-29

## 2022-12-29 DIAGNOSIS — M54.16 RADICULOPATHY, LUMBAR REGION: ICD-10-CM

## 2022-12-29 DIAGNOSIS — R29.898 TRANSIENT WEAKNESS OF RIGHT LOWER EXTREMITY: ICD-10-CM

## 2022-12-29 NOTE — PROGRESS NOTES
Neurology     This is a 60-year-old patient with a history of diabetes, plexopathy in the upper plexus who had an episode of right lower extremity heaviness and numbness  She did undergo work-up for ischemic cerebral event that was negative  She was noted to have weakness of various muscles in the right lower extremity  Today she presented for EMG testing of the lower extremity  Her exam did demonstrate plantarflexion was a 4+ dorsiflexion was a 4 with ankle eversion and inversion that was decreased  Ankle dorsiflexion was decreased that she had reflexes that were trace  EMG study was performed today and it demonstrated evidence of a sensorimotor polyneuropathy and evidence of a subacute to chronic right L4-L5 radiculopathy  Again I have stressed the need for physical therapy  Unfortunately she is busy managing the care of herself,  working full-time and of her 3 children  I have asked her to initiate a home exercise program and I did show her some exercises to utilize  She is to call our offices in several weeks if the symptoms do persist and she will then require a formal physical therapy evaluation  If they do persist or they worsen she should inform her offices and she may require MRI imaging of the lumbar spine  she does need to better manage her DM    She does understand

## 2023-01-17 LAB
LEFT EYE DIABETIC RETINOPATHY: NORMAL
RIGHT EYE DIABETIC RETINOPATHY: NORMAL
SEVERITY (EYE EXAM): NORMAL

## 2023-03-16 PROBLEM — I45.89 CHRONOTROPIC INCOMPETENCE: Status: ACTIVE | Noted: 2023-03-16

## 2023-03-30 ENCOUNTER — OFFICE VISIT (OUTPATIENT)
Dept: FAMILY MEDICINE CLINIC | Facility: CLINIC | Age: 43
End: 2023-03-30

## 2023-03-30 VITALS
SYSTOLIC BLOOD PRESSURE: 158 MMHG | BODY MASS INDEX: 30.22 KG/M2 | WEIGHT: 181.4 LBS | HEART RATE: 86 BPM | DIASTOLIC BLOOD PRESSURE: 98 MMHG | HEIGHT: 65 IN | OXYGEN SATURATION: 99 % | TEMPERATURE: 99 F

## 2023-03-30 DIAGNOSIS — L03.019 PARONYCHIA OF FINGER, UNSPECIFIED LATERALITY: ICD-10-CM

## 2023-03-30 DIAGNOSIS — Z79.4 UNCONTROLLED TYPE 2 DIABETES MELLITUS WITH HYPERGLYCEMIA, WITH LONG-TERM CURRENT USE OF INSULIN (HCC): Primary | ICD-10-CM

## 2023-03-30 DIAGNOSIS — E11.65 UNCONTROLLED TYPE 2 DIABETES MELLITUS WITH HYPERGLYCEMIA, WITH LONG-TERM CURRENT USE OF INSULIN (HCC): Primary | ICD-10-CM

## 2023-03-30 DIAGNOSIS — E11.65 UNCONTROLLED TYPE 2 DIABETES MELLITUS WITH HYPERGLYCEMIA, WITHOUT LONG-TERM CURRENT USE OF INSULIN (HCC): ICD-10-CM

## 2023-03-30 DIAGNOSIS — Z79.4 CURRENT USE OF INSULIN (HCC): ICD-10-CM

## 2023-03-30 LAB
CREAT UR-MCNC: 51.2 MG/DL
MICROALBUMIN UR-MCNC: 34 MG/L (ref 0–20)
MICROALBUMIN/CREAT 24H UR: 66 MG/G CREATININE (ref 0–30)
SL AMB POCT HEMOGLOBIN AIC: 13.3 (ref ?–6.5)

## 2023-03-30 RX ORDER — CEPHALEXIN 500 MG/1
500 CAPSULE ORAL EVERY 8 HOURS SCHEDULED
Qty: 21 CAPSULE | Refills: 0 | Status: SHIPPED | OUTPATIENT
Start: 2023-03-30 | End: 2023-04-06

## 2023-03-31 ENCOUNTER — TELEPHONE (OUTPATIENT)
Dept: ADMINISTRATIVE | Facility: OTHER | Age: 43
End: 2023-03-31

## 2023-03-31 NOTE — TELEPHONE ENCOUNTER
----- Message from Minal Posadas sent at 3/30/2023  4:16 PM EDT -----  Regarding: dm eye  03/30/23 4:17 PM    Hello, our patient Lucian Berg has had Diabetic Eye Exam completed/performed  Please assist in updating the patient chart by making an External outreach to [facility] Best facility located in [facility]  The date of service is early March 2023      Thank you,  [facility]

## 2023-03-31 NOTE — RESULT ENCOUNTER NOTE
Please call patient  Urine shows protein  She is recommended to begin a medication to increase blood flow to the kidneys  Will discuss more fully at next visit

## 2023-03-31 NOTE — LETTER
Diabetic Eye Exam Form    Date Requested: 23  Patient: Adeel Zapien  Patient : 1980   Referring Provider: Gerardo Fields MD      DIABETIC Eye Exam Date _______________________________      Type of Exam MUST be documented for Diabetic Eye Exams  Please CHECK ONE  Retinal Exam       Dilated Retinal Exam       OCT       Optomap-Iris Exam      Fundus Photography       Left Eye - Please check Retinopathy or No Retinopathy        Exam did show retinopathy    Exam did not show retinopathy       Right Eye - Please check Retinopathy or No Retinopathy       Exam did show retinopathy    Exam did not show retinopathy       Comments __________________________________________________________    Practice Providing Exam ______________________________________________    Exam Performed By (print name) _______________________________________      Provider Signature ___________________________________________________      These reports are needed for  compliance  Please fax this completed form and a copy of the Diabetic Eye Exam report to our office located at [place] [address] as soon as possible via Fax [phone] attention Cassia: Phone [phone]  We thank you for your assistance in treating our mutual patient

## 2023-03-31 NOTE — TELEPHONE ENCOUNTER
Upon review of the In Basket request and the patient's chart, initial outreach has been made via fax to facility  Please see Contacts section for details       Thank you  Merline Jacome MA

## 2023-04-03 NOTE — PROGRESS NOTES
Assessment/Plan:    60-year-old woman with: Type 2 diabetes along with finger infection paronychia we will give antibiotic we will continue current medications encourage follow-up with endocrinology discussed particular return parameters otherwise    No problem-specific Assessment & Plan notes found for this encounter  Diagnoses and all orders for this visit:    Uncontrolled type 2 diabetes mellitus with hyperglycemia, with long-term current use of insulin ([facility])  -     POCT hemoglobin A1c  -     Microalbumin / creatinine urine ratio    Paronychia of finger, unspecified laterality  -     cephalexin (KEFLEX) 500 mg capsule; Take 1 capsule (500 mg total) by mouth every 8 (eight) hours for 7 days    Uncontrolled type 2 diabetes mellitus with hyperglycemia, without long-term current use of insulin ([facility])  -     insulin detemir (LEVEMIR FLEXTOUCH) 100 Units/mL injection pen; Inject 25 Units under the skin daily at bedtime  -     semaglutide, 1 mg/dose, (Ozempic) 4 mg/3 mL injection pen; Inject 0 75 mL (1 mg total) under the skin once a week    Current use of insulin ([facility])  -     insulin detemir (LEVEMIR FLEXTOUCH) 100 Units/mL injection pen; Inject 25 Units under the skin daily at bedtime          Subjective:     Chief Complaint   Patient presents with   • finger infection     Infection in right ring finger  Patient states start of symptoms was Saturday  She started feeling sick nausea and headache and finger pain  Patient does feel better but still has finger pain which now has purple color to it  Order pended for microalbumin  Please sign        Patient ID: Prerna Lo is a 40 y o  female      Patient is a 60-year-old woman who presents for follow-up on type 2 diabetes along with a finger infection no fevers chills nausea vomiting tolerating p o  intake      The following portions of the patient's history were reviewed and updated as appropriate: allergies, current medications, past family history, past medical "history, past social history, past surgical history and problem list     Review of Systems   Constitutional: Negative  HENT: Negative  Eyes: Negative  Respiratory: Negative  Cardiovascular: Negative  Gastrointestinal: Negative  Endocrine: Negative  Genitourinary: Negative  Musculoskeletal: Negative  Allergic/Immunologic: Negative  Neurological: Negative  Hematological: Negative  Psychiatric/Behavioral: Negative  All other systems reviewed and are negative  Objective:      /98 (BP Location: Left arm, Patient Position: Sitting, Cuff Size: Large)   Pulse 86   Temp 99 °F (37 2 °C) (Tympanic)   Ht 5' 5\" (1 651 m)   Wt 82 3 kg (181 lb 6 4 oz)   SpO2 99%   BMI 30 19 kg/m²          Physical Exam  Constitutional:       Appearance: She is well-developed  HENT:      Head: Atraumatic  Right Ear: External ear normal       Left Ear: External ear normal    Eyes:      Conjunctiva/sclera: Conjunctivae normal       Pupils: Pupils are equal, round, and reactive to light  Cardiovascular:      Rate and Rhythm: Normal rate and regular rhythm  Heart sounds: Normal heart sounds  Pulmonary:      Effort: Pulmonary effort is normal  No respiratory distress  Breath sounds: Normal breath sounds  Abdominal:      General: There is no distension  Palpations: Abdomen is soft  Tenderness: There is no abdominal tenderness  There is no guarding or rebound  Musculoskeletal:         General: Normal range of motion  Cervical back: Normal range of motion  Skin:     General: Skin is warm and dry  Neurological:      Mental Status: She is alert and oriented to person, place, and time  Cranial Nerves: No cranial nerve deficit  Psychiatric:         Behavior: Behavior normal          Thought Content: Thought content normal          Judgment: Judgment normal        BMI Counseling: Body mass index is 30 19 kg/m²   The BMI is above normal  Nutrition " recommendations include encouraging healthy choices of fruits and vegetables  Exercise recommendations include moderate physical activity 150 minutes/week  Rationale for BMI follow-up plan is due to patient being overweight or obese  Depression Screening and Follow-up Plan: Patient's depression screening was positive with a PHQ-2 score of 6  Their PHQ-9 score was 16  Patient assessed for underlying major depression  Brief counseling provided and recommend additional follow-up/re-evaluation next office visit

## 2023-04-05 NOTE — TELEPHONE ENCOUNTER
Upon review of the In Basket request we were able to locate, review, and update the patient chart as requested for Diabetic Eye Exam     Any additional questions or concerns should be emailed to the Practice Liaisons via the appropriate education email address, please do not reply via In Basket      Thank you  Paulie Mejia MA

## 2023-05-09 ENCOUNTER — OFFICE VISIT (OUTPATIENT)
Dept: CARDIOLOGY CLINIC | Facility: CLINIC | Age: 43
End: 2023-05-09

## 2023-05-09 VITALS
BODY MASS INDEX: 29.99 KG/M2 | WEIGHT: 180 LBS | SYSTOLIC BLOOD PRESSURE: 120 MMHG | DIASTOLIC BLOOD PRESSURE: 80 MMHG | HEART RATE: 79 BPM | HEIGHT: 65 IN | OXYGEN SATURATION: 99 %

## 2023-05-09 DIAGNOSIS — R07.9 CHEST PAIN, UNSPECIFIED TYPE: Primary | ICD-10-CM

## 2023-05-09 NOTE — PROGRESS NOTES
Cardiology Follow Up    Olivia Rocha  [id number]  1980  [address] [facility] [address] [address] [phone]      1  Chest pain, unspecified type  POCT ECG          Discussion/Summary:    Noncardiac chest pain  No significant abnormalities found on her testing  She did have PVCs noted with exercise but no major palpitations are noted currently  No changes at this time  She can follow-up as needed  History of Present Illness:    Returns for follow-up  No further chest pain this seems to be more musculoskeletal in etiology  She has some issues with spinal stenosis, and she is working with the specialists regarding this  No change in symptoms otherwise shortness of breath, palpitations  We reviewed the testing that she had done with the echocardiogram which was unremarkable  She had nondiagnostic exercise treadmill test because she could not reach her target heart rate but subsequent pharmacologic nuclear stress test was normal     Patient Active Problem List   Diagnosis   • Encounter for annual routine gynecological examination   • Abnormal thyroid blood test   • Papanicolaou smear of cervix with positive high risk human papilloma virus (HPV) test   • Uncontrolled type 2 diabetes mellitus with hyperglycemia, with long-term current use of insulin ([facility])   • Stress at home   • Diabetes mellitus ([facility] )   • Obesity (BMI 30-39  9)   • Numbness and tingling of both feet   • Recurrent episodes of unresponsiveness   • Migraine without aura and without status migrainosus, not intractable   • Current use of insulin (HCC)   • Chest pain   • Uncontrolled type 2 diabetes mellitus with hyperglycemia, without long-term current use of insulin ([facility])   • Numbness and tingling of right arm   • Adhesive capsulitis of right shoulder   • Herniation of intervertebral disc at C6-C7 level   • Neck pain   • Brachial plexopathy   • Right leg weakness   • Radiculopathy, lumbar region   • Polyneuropathy   • Chronotropic incompetence     Past Medical History:   Diagnosis Date   • Diabetes mellitus ([facility] [address] )      Social History     Tobacco Use   • Smoking status: Never   • Smokeless tobacco: Never   Vaping Use   • Vaping Use: Never used   Substance Use Topics   • Alcohol use: Not Currently     Comment: ocassionally   • Drug use: Never      Family History   Problem Relation Age of Onset   • Hypertension Mother    • Arthritis Mother    • Asthma Sister    • Bipolar disorder Brother    • Schizophrenia Brother    • Asthma Brother    • Asthma Brother    • Diabetes Maternal Grandmother    • Diabetes Paternal Grandmother    • No Known Problems Maternal Grandfather    • No Known Problems Paternal Grandfather    • Breast cancer Neg Hx      Past Surgical History:   Procedure Laterality Date   • HERNIA REPAIR     • TUBAL LIGATION  2017       Current Outpatient Medications:   •  Accu-Chek FastClix Lancets MISC, Test BG up to 3x daily as directed, Disp: 300 each, Rfl: 1  •  Accu-Chek Guide test strip, TEST BG UP TO 3X DAILY AS DIRECTED, Disp: 100 each, Rfl: 1  •  Injection Device for Insulin (B-D PEN MINI) ENRICO, Use 4 (four) times a day Please use for Lantus and Humalog  4 pen needles daily Dx: Diabetes, Disp: 400 each, Rfl: 0  •  insulin detemir (LEVEMIR FLEXTOUCH) 100 Units/mL injection pen, Inject 25 Units under the skin daily at bedtime, Disp: 15 mL, Rfl: 2  •  Insulin Pen Needle (BD Pen Needle Em U/F) 32G X 4 MM MISC, Use daily, Disp: 100 each, Rfl: 3  •  prochlorperazine (COMPAZINE) 10 mg tablet, 1 tab at onset of migraine, can repeat in 8 hours, can take with NSAID  Take with Benadryl if there are side effects  , Disp: 20 tablet, Rfl: 3  •  semaglutide, 1 mg/dose, (Ozempic) 4 mg/3 mL injection pen, Inject 0 75 mL (1 mg total) under the skin once a week, Disp: 9 mL, Rfl: 0  •  topiramate (TOPAMAX) 50 MG tablet, Take 1 tablet (50 mg total) by "mouth daily at bedtime, Disp: 90 tablet, Rfl: 1  •  traMADol (ULTRAM) 50 mg tablet, Take 1 tablet (50 mg total) by mouth every 8 (eight) hours as needed for moderate pain, Disp: 20 tablet, Rfl: 0  •  Empagliflozin 25 MG TABS, Take 1 tablet (25 mg total) by mouth every morning (Patient not taking: Reported on 5/9/2023), Disp: 90 tablet, Rfl: 0  •  gabapentin (NEURONTIN) 300 mg capsule, Take 1 capsule (300 mg total) by mouth daily at bedtime (Patient not taking: Reported on 5/9/2023), Disp: 90 capsule, Rfl: 2  •  methocarbamol (ROBAXIN) 500 mg tablet, Take 1 tablet (500 mg total) by mouth 2 (two) times a day (Patient not taking: Reported on 5/9/2023), Disp: 10 tablet, Rfl: 0  Allergies   Allergen Reactions   • Metformin And Related Hives       Vitals:    05/09/23 1506   BP: 120/80   BP Location: Right arm   Patient Position: Sitting   Cuff Size: Standard   Pulse: 79   SpO2: 99%   Weight: 81 6 kg (180 lb)   Height: 5' 5\" (1 651 m)     Vitals:    05/09/23 1506   Weight: 81 6 kg (180 lb)      Height: 5' 5\" (165 1 cm)   Body mass index is 29 95 kg/m²  Physical Exam:  GEN: Render Erlanger appears well, alert and oriented x 3, pleasant and cooperative   HEENT: pupils equal, round, and reactive to light; extraocular muscles intact  NECK: supple, no carotid bruits   HEART: regular rhythm, normal S1 and S2, no murmurs, clicks, gallops or rubs   LUNGS: clear to auscultation bilaterally; no wheezes, rales, or rhonchi   ABDOMEN: normal bowel sounds, soft, no tenderness, no distention  EXTREMITIES: peripheral pulses normal; no clubbing, cyanosis, or edema  NEURO: no focal findings   SKIN: normal without suspicious lesions on exposed skin      ROS:  Positive for Difficulty sleeping, lack of energy dysphagia, back pain, shortness of breath, anemia, UTIs, birth control, dizziness, seizures, headache, syncope  Except as noted in HPI, is otherwise reviewed in detail and a 12 point review of systems is negative    ROS reviewed and is " unchanged    Labs:  Lab Results   Component Value Date    SODIUM 137 11/09/2022    K 3 9 11/09/2022     11/09/2022    CREATININE 0 94 11/09/2022    BUN 11 11/09/2022    CO2 26 11/09/2022    ALT 12 03/18/2022    AST 20 03/18/2022    GLUF 297 (H) 11/09/2022    HGBA1C 13 3 (A) 03/30/2023    WBC 7 22 11/09/2022    HGB 12 3 11/09/2022    HCT 37 9 11/09/2022     11/09/2022       No results found for: CHOL  Lab Results   Component Value Date    HDL 66 03/18/2022    HDL 36 (L) 08/24/2021    HDL 55 09/23/2019     Lab Results   Component Value Date    LDLCALC 65 03/18/2022    LDLCALC 65 08/24/2021    LDLCALC 69 09/23/2019     Lab Results   Component Value Date    TRIG 83 03/18/2022    TRIG 103 08/24/2021    TRIG 91 09/23/2019     Nuc 11/2021:  Pharmacologic nuclear stress test negative for myocardial ischemia  Apical fixed defect most consistent with breast attenuation given normal apical wall motion  •  Stress ECG: ECG portion of stress test with nonspecific changes, nondiagnostic for myocardial ischemia  •  Stress ECG: The patient after exercising for 3 min and 6 sec and had a maximal HR of 115 bpm (63 % of MPHR) and 1 0 METS  The patient experienced no angina during the test  Blood pressure demonstrated a normal response and heart rate demonstrated a normal response to stress  •  Frequent VPCs  •  Stress Function: Left ventricular function post-stress is normal  Post-stress ejection fraction is 65 %  Echo 11/3/21  Echocardiogram shows normal left ventricular size and systolic and diastolic function, EF around 58%, normal right ventricular size and systolic function, normal left and right atrial size, normal aortic valve, trace mitral tricuspid and pulmonic valve regurgitation, no obvious pulmonary hypertension and no pericardial effusion      There is no previous echocardiogram available for comparison  Stress 11/3/21:  1   Exercise stress test is nondiagnostic for ischemia as target heart rate was not achieved  No angina and no ECG changes of ischemia were reported at 77% of patient's maximal age predicted heart rate  2  Fair to good exercise capacity, limited by dizziness at peak exercise  3  Normal resting blood pressure with appropriate blood pressure response to exercise  Slightly blunted heart rate response to exercise  4  Nonspecific resting ECG abnormality with relatively frequent premature ventricular contractions noted during exercise and recovery  EKG:  Sinus rhythm   79 bpm

## 2023-05-10 ENCOUNTER — TELEPHONE (OUTPATIENT)
Dept: NEUROLOGY | Facility: CLINIC | Age: 43
End: 2023-05-10

## 2023-05-10 NOTE — TELEPHONE ENCOUNTER
Called and left a voicemail for patient - Please call back to confirm upcoming appointment with [facility]  Provided patient with apt date, time and location  Informed patient that check in is at least 15 minutes prior to apt time

## 2023-05-16 ENCOUNTER — OFFICE VISIT (OUTPATIENT)
Dept: NEUROLOGY | Facility: CLINIC | Age: 43
End: 2023-05-16

## 2023-05-16 VITALS
RESPIRATION RATE: 18 BRPM | WEIGHT: 180.4 LBS | BODY MASS INDEX: 30.06 KG/M2 | SYSTOLIC BLOOD PRESSURE: 124 MMHG | DIASTOLIC BLOOD PRESSURE: 64 MMHG | HEIGHT: 65 IN | TEMPERATURE: 96.9 F | HEART RATE: 86 BPM | OXYGEN SATURATION: 99 %

## 2023-05-16 DIAGNOSIS — M54.16 RADICULOPATHY, LUMBAR REGION: Primary | ICD-10-CM

## 2023-05-16 DIAGNOSIS — E11.42 DIABETIC PERIPHERAL NEUROPATHY (HCC): ICD-10-CM

## 2023-05-16 DIAGNOSIS — G43.009 MIGRAINE WITHOUT AURA AND WITHOUT STATUS MIGRAINOSUS, NOT INTRACTABLE: ICD-10-CM

## 2023-05-16 RX ORDER — TOPIRAMATE 50 MG/1
50 TABLET, FILM COATED ORAL
Qty: 90 TABLET | Refills: 1 | Status: SHIPPED | OUTPATIENT
Start: 2023-05-16

## 2023-05-16 RX ORDER — PROCHLORPERAZINE MALEATE 10 MG
TABLET ORAL
Qty: 20 TABLET | Refills: 1 | Status: SHIPPED | OUTPATIENT
Start: 2023-05-16

## 2023-05-16 NOTE — PATIENT INSTRUCTIONS
Continue with home PT/stretching; make sure to keep active at home; make sure to continue follow up at primary care for continued blood sugar control as this needs to get much better  For increase in headaches off topamax I would suggest getting back on topamax-start with 1/2 pill for 5 days and then go back to full pill at night; if this doesn't help sleep, could consider also adding in low dose 3-5 mg melatonin for a period of time until sleep schedule improves like we spoke about  If there are episodes of waking for unknown reason, continued daytime fatigue, loud snoring, I would suggest sleep test in the future  For acute management of headache use ibuprofen with or without compazine; do not use more than 3x/week  Follow up in 6 months time, let me know in mean time if any new/worsening symptoms

## 2023-05-16 NOTE — PROGRESS NOTES
Patient ID: Shawn Cárdenas is a 40 y o  female  Assessment/Plan:  Patient Instructions:  Continue with home PT/stretching; make sure to keep active at home; make sure to continue follow up at primary care for continued blood sugar control as this needs to get much better  For increase in headaches off topamax I would suggest getting back on topamax-start with 1/2 pill for 5 days and then go back to full pill at night; if this doesn't help sleep, could consider also adding in low dose 3-5 mg melatonin for a period of time until sleep schedule improves like we spoke about  If there are episodes of waking for unknown reason, continued daytime fatigue, loud snoring, I would suggest sleep test in the future  For acute management of headache use ibuprofen with or without compazine; do not use more than 3x/week  Follow up in 6 months time, let me know in mean time if any new/worsening symptoms  Diagnoses and all orders for this visit:    Radiculopathy, lumbar region    Diabetic peripheral neuropathy (HCC)    Migraine without aura and without status migrainosus, not intractable  -     topiramate (TOPAMAX) 50 MG tablet; Take 1 tablet (50 mg total) by mouth daily at bedtime  -     prochlorperazine (COMPAZINE) 10 mg tablet; 1 tab at onset of migraine, can repeat in 8 hours, can take with NSAID  Take with Benadryl if there are side effects  Subjective:  Shawn Cárdenas is a 40year old female who presents for neurology follow up  She states continued right arm and right leg weakness and pain  The right leg does appear stronger than previous; there has been no falls and she is working at home on exercises  She has also been going to the chiropractor 3x/week which has been helpful  She recently restarted (end of march) diabetes medications including ozempic and levemir, last A1c 13 3  She mentions she has been going through a lot of family stress recently and this impacts her self care   She states her headaches have increased in frequency to 3-4x/week  She gets bifrontal/bitemporal pressure headaches  She has not been taking her gabapentin or her topiramate; she is interested in restarting the topiramate  EMG 12/29/2022: This EMG/ NCV study of the bilateral lower extremity is abnormal    There is electrodiagnostic evidence of   1  a sensorimotor polyneuropathy with predominantly axonal features and a secondary demyelinating response  2  There is a superimposed right L4/ L5 radiculopathy This is of a subacute to chronic duration  Venous duplex RLE 12/7/2022:  CONCLUSION:     Impression:  RIGHT LOWER LIMB  No evidence of acute or chronic deep vein thrombosis   No evidence of superficial thrombophlebitis noted  Doppler evaluation shows a normal response to augmentation maneuvers  Popliteal, posterior tibial and anterior tibial arterial Doppler waveforms are  triphasic  LEFT LOWER LIMB LIMITED  Evaluation shows no evidence of thrombus in the common femoral vein  Doppler evaluation shows a normal response to augmentation maneuvers  Previous History:  past medical history of uncontrolled DM (usually a1c is around 11) , migraine, seizures vs nonepileptic events (negative EMU EEG study), right breast mass (likely benign), hair loss,   She denies recent seizure like activity  Her right arm symptoms began suddenly, initially it was thought to be adhesive capsulitis in October 2021  She had injections without improvement  She then developed neck pain so MRI C spine was completed which showed c6/7 moderate canal stenosis and she was referred to neurosurgery and pain management; records show KAI was held because of high blood sugars  Flexion/extension xrays of neck did not show instability; no surgery was indicated  EMG showed a brachial plexopathy with acute changes in lower trunk and more chronic changes in upper trunk-it did not show cervical radiculopathy   She went through PT, this was helpful slightly for mobility "but not for pain-she admits she is not regular with the home exercises  MRI brachial plexus was performed and did not show a structural cause  She uses gabapentin nightly for pain, but states it makes her too sleepy during the daytime hours when she has to work; she works in [employer]  Her right leg symptoms started suddenly 11/8/2022 and she went to the hospital for this  Hospital description:  Patient states she was in her usual state of health last evening before going to bed   She was able to go up and down the stairs to do laundry and walk around normally   When she woke this morning around 0630, she felt the right lower extremity was [facility] and she thought it was “asleep”, though she denies paresthesias   She attempted to ambulate but had to drag the right lower extremity and hold onto anything she could in order to get around  Jami Lackey denies bowel/bladder dysfunction, she has chronic back pain but denies any change  The following portions of the patient's history were reviewed and updated as appropriate: allergies, current medications, past family history, past medical history, past social history, past surgical history and problem list          Objective:    Blood pressure 124/64, pulse 86, temperature (!) 96 9 °F (36 1 °C), temperature source Tympanic, resp  rate 18, height 5' 5\" (1 651 m), weight 81 8 kg (180 lb 6 4 oz), SpO2 99 %, unknown if currently breastfeeding  Physical Exam  Vitals reviewed  Constitutional:       General: She is not in acute distress  HENT:      Head: Normocephalic  Right Ear: External ear normal       Left Ear: External ear normal       Nose: Nose normal       Mouth/Throat:      Mouth: Mucous membranes are moist       Pharynx: Oropharynx is clear  Comments: Crowded oropharynx  Eyes:      General: Lids are normal          Right eye: No discharge  Left eye: No discharge  Extraocular Movements: Extraocular movements intact        Pupils: Pupils are equal, " round, and reactive to light  Cardiovascular:      Rate and Rhythm: Normal rate  Pulses: Normal pulses  Heart sounds: Normal heart sounds  Pulmonary:      Effort: Pulmonary effort is normal       Breath sounds: Normal breath sounds  Abdominal:      General: There is no distension  Musculoskeletal:      Cervical back: Normal range of motion  Right lower leg: No edema  Left lower leg: No edema  Skin:     General: Skin is warm  Capillary Refill: Capillary refill takes less than 2 seconds  Neurological:      Mental Status: She is alert  Mental status is at baseline  Coordination: Romberg sign negative  Deep Tendon Reflexes:      Reflex Scores:       Brachioradialis reflexes are 2+ on the right side and 2+ on the left side  Patellar reflexes are 1+ on the right side and 1+ on the left side  Achilles reflexes are 1+ on the right side and 1+ on the left side  Psychiatric:         Mood and Affect: Mood normal          Speech: Speech normal          Neurological Exam  Mental Status  Alert  Oriented to person, place and time  Speech is normal  Language is fluent with no aphasia  Cranial Nerves  CN II: Visual acuity is normal  Visual fields full to confrontation  CN III, IV, VI: Extraocular movements intact bilaterally  Normal lids and orbits bilaterally  Pupils equal round and reactive to light bilaterally  CN V: Facial sensation is normal   CN VII: Full and symmetric facial movement  CN VIII: Hearing is normal   CN IX, X: Palate elevates symmetrically  Normal gag reflex  CN XI: Shoulder shrug strength is normal   CN XII: Tongue midline without atrophy or fasciculations  Motor  Normal muscle bulk throughout  Strength is 5/5 in all four extremities except as noted  Decreased shoulder abduction 4/5 secondary to pain      Sensory  Light touch abnormality:     Reflexes                                            Right                      Left  Brachioradialis 2+                         2+  Patellar                                1+                         1+  Achilles                                1+                         1+    Coordination  Right: Finger-to-nose normal Left: Finger-to-nose normal     Gait  Casual gait is normal including stance, stride, and arm swing  Romberg is absent  Able to rise from chair without using arms  ROS:    Review of Systems   Constitutional: Negative  Negative for appetite change and fever  HENT: Negative  Negative for hearing loss, tinnitus, trouble swallowing and voice change  Eyes: Negative  Negative for photophobia, pain and visual disturbance  Respiratory: Negative  Negative for shortness of breath  Cardiovascular: Negative  Negative for palpitations  Gastrointestinal: Negative  Negative for nausea and vomiting  Endocrine: Negative  Negative for cold intolerance  Genitourinary: Negative  Negative for dysuria, frequency and urgency  Musculoskeletal: Positive for gait problem (balance issues - leans more towards the right), neck pain and neck stiffness  Negative for myalgias  Whole right side of her body is painful   Skin: Negative  Negative for rash  Allergic/Immunologic: Negative  Neurological: Positive for numbness (right side of her body)  Negative for dizziness, tremors, seizures, syncope, facial asymmetry, speech difficulty, weakness, light-headedness and headaches  Hematological: Negative  Does not bruise/bleed easily  Psychiatric/Behavioral: Positive for sleep disturbance  Negative for confusion and hallucinations  The patient is nervous/anxious      ROS was reviewed and updated as appropriate

## 2023-05-23 ENCOUNTER — APPOINTMENT (EMERGENCY)
Dept: RADIOLOGY | Facility: HOSPITAL | Age: 43
End: 2023-05-23

## 2023-05-23 ENCOUNTER — HOSPITAL ENCOUNTER (EMERGENCY)
Facility: HOSPITAL | Age: 43
Discharge: HOME | End: 2023-05-23
Attending: INTERNAL MEDICINE | Admitting: INTERNAL MEDICINE

## 2023-05-23 VITALS
DIASTOLIC BLOOD PRESSURE: 75 MMHG | SYSTOLIC BLOOD PRESSURE: 127 MMHG | RESPIRATION RATE: 16 BRPM | TEMPERATURE: 98.2 F | HEART RATE: 72 BPM | OXYGEN SATURATION: 98 %

## 2023-05-23 DIAGNOSIS — R07.9 CHEST PAIN: Primary | ICD-10-CM

## 2023-05-23 LAB
ALBUMIN SERPL BCP-MCNC: 3.7 G/DL (ref 3.5–5)
ALP SERPL-CCNC: 62 U/L (ref 34–104)
ALT SERPL W P-5'-P-CCNC: 9 U/L (ref 7–52)
ANION GAP SERPL CALCULATED.3IONS-SCNC: 8 MMOL/L (ref 4–13)
AST SERPL W P-5'-P-CCNC: 10 U/L (ref 13–39)
ATRIAL RATE: 76 BPM
BASOPHILS # BLD AUTO: 0.03 THOUSANDS/ÂΜL (ref 0–0.1)
BASOPHILS NFR BLD AUTO: 0 % (ref 0–1)
BILIRUB SERPL-MCNC: 0.46 MG/DL (ref 0.2–1)
BUN SERPL-MCNC: 9 MG/DL (ref 5–25)
CALCIUM SERPL-MCNC: 8.7 MG/DL (ref 8.4–10.2)
CARDIAC TROPONIN I PNL SERPL HS: 2 NG/L
CHLORIDE SERPL-SCNC: 101 MMOL/L (ref 96–108)
CO2 SERPL-SCNC: 24 MMOL/L (ref 21–32)
CREAT SERPL-MCNC: 0.89 MG/DL (ref 0.6–1.3)
D DIMER PPP FEU-MCNC: 0.3 UG/ML FEU
EOSINOPHIL # BLD AUTO: 0.25 THOUSAND/ÂΜL (ref 0–0.61)
EOSINOPHIL NFR BLD AUTO: 3 % (ref 0–6)
ERYTHROCYTE [DISTWIDTH] IN BLOOD BY AUTOMATED COUNT: 12.2 % (ref 11.6–15.1)
GFR SERPL CREATININE-BSD FRML MDRD: 80 ML/MIN/1.73SQ M
GLUCOSE SERPL-MCNC: 465 MG/DL (ref 65–140)
GLUCOSE SERPL-MCNC: 492 MG/DL (ref 65–140)
HCT VFR BLD AUTO: 38.5 % (ref 34.8–46.1)
HGB BLD-MCNC: 12.3 G/DL (ref 11.5–15.4)
IMM GRANULOCYTES # BLD AUTO: 0.02 THOUSAND/UL (ref 0–0.2)
IMM GRANULOCYTES NFR BLD AUTO: 0 % (ref 0–2)
LIPASE SERPL-CCNC: 38 U/L (ref 11–82)
LYMPHOCYTES # BLD AUTO: 3.23 THOUSANDS/ÂΜL (ref 0.6–4.47)
LYMPHOCYTES NFR BLD AUTO: 46 % (ref 14–44)
MCH RBC QN AUTO: 28.2 PG (ref 26.8–34.3)
MCHC RBC AUTO-ENTMCNC: 31.9 G/DL (ref 31.4–37.4)
MCV RBC AUTO: 88 FL (ref 82–98)
MONOCYTES # BLD AUTO: 0.52 THOUSAND/ÂΜL (ref 0.17–1.22)
MONOCYTES NFR BLD AUTO: 7 % (ref 4–12)
NEUTROPHILS # BLD AUTO: 3.2 THOUSANDS/ÂΜL (ref 1.85–7.62)
NEUTS SEG NFR BLD AUTO: 44 % (ref 43–75)
NRBC BLD AUTO-RTO: 0 /100 WBCS
P AXIS: 69 DEGREES
PLATELET # BLD AUTO: 273 THOUSANDS/UL (ref 149–390)
PMV BLD AUTO: 8.8 FL (ref 8.9–12.7)
POTASSIUM SERPL-SCNC: 4.2 MMOL/L (ref 3.5–5.3)
PR INTERVAL: 124 MS
PROT SERPL-MCNC: 6.6 G/DL (ref 6.4–8.4)
QRS AXIS: -23 DEGREES
QRSD INTERVAL: 72 MS
QT INTERVAL: 376 MS
QTC INTERVAL: 423 MS
RBC # BLD AUTO: 4.36 MILLION/UL (ref 3.81–5.12)
SODIUM SERPL-SCNC: 133 MMOL/L (ref 135–147)
T WAVE AXIS: -10 DEGREES
VENTRICULAR RATE: 76 BPM
WBC # BLD AUTO: 7.25 THOUSAND/UL (ref 4.31–10.16)

## 2023-05-23 RX ORDER — KETOROLAC TROMETHAMINE 30 MG/ML
15 INJECTION, SOLUTION INTRAMUSCULAR; INTRAVENOUS ONCE
Status: COMPLETED | OUTPATIENT
Start: 2023-05-23 | End: 2023-05-23

## 2023-05-23 RX ORDER — LIDOCAINE 50 MG/G
1 PATCH TOPICAL ONCE
Status: DISCONTINUED | OUTPATIENT
Start: 2023-05-23 | End: 2023-05-23 | Stop reason: HOSPADM

## 2023-05-23 RX ORDER — NAPROXEN 500 MG/1
500 TABLET ORAL 2 TIMES DAILY WITH MEALS
Qty: 30 TABLET | Refills: 0 | Status: SHIPPED | OUTPATIENT
Start: 2023-05-23

## 2023-05-23 RX ORDER — LIDOCAINE 50 MG/G
1 PATCH TOPICAL DAILY
Qty: 5 PATCH | Refills: 0 | Status: SHIPPED | OUTPATIENT
Start: 2023-05-23

## 2023-05-23 RX ORDER — SODIUM CHLORIDE 9 MG/ML
3 INJECTION INTRAVENOUS
Status: DISCONTINUED | OUTPATIENT
Start: 2023-05-23 | End: 2023-05-23 | Stop reason: HOSPADM

## 2023-05-23 RX ADMIN — LIDOCAINE 1 PATCH: 700 PATCH TOPICAL at 12:01

## 2023-05-23 RX ADMIN — KETOROLAC TROMETHAMINE 15 MG: 30 INJECTION, SOLUTION INTRAMUSCULAR; INTRAVENOUS at 12:01

## 2023-05-23 NOTE — Clinical Note
Claudette Downey was seen and treated in our emergency department on 5/23/2023  Diagnosis:     [place]    She may return on this date: 05/24/2023         If you have any questions or concerns, please don't hesitate to call        Claudette Downey MD    ______________________________           _______________          _______________  [facility] Representative                              Date                                Time

## 2023-05-23 NOTE — ED PROVIDER NOTES
"History  Chief Complaint   Patient presents with   • Chest Pain     Left sided chest pain and SOB, constant since approx 0700  HPI  45-year-old woman with history of diabetes mellitus presents to ED for evaluation of chest pain  Patient reports left-sided chest pain that radiates to her left shoulder  She reports it is dull and constant  This started at 7 AM while she was sitting down and has continued  It is not severe, sharp, tearing or ripping in nature  She denies any alleviating factors  She states pressing down on her chest wall makes it worse  She states she felt like this a while back but on the right side of her chest   She reports associated dyspnea  She states, \" I feel a little short of breath  \"  No previous cardiac or pulmonary history including no MI, ACS or PE  Patient denies headache, visual changes, dizziness, fevers, chills, palpitations, abdominal pain, diarrhea, new skin rashes or numbness or tingling of the extremities  No other complaints or concerns  Prior to Admission Medications   Prescriptions Last Dose Informant Patient Reported? Taking? Accu-Chek FastClix Lancets MISC   No No   Sig: Test BG up to 3x daily as directed   Accu-Chek Guide test strip   No No   Sig: TEST BG UP TO 3X DAILY AS DIRECTED   Injection Device for Insulin (B-D PEN MINI) ENRICO   No No   Sig: Use 4 (four) times a day Please use for Lantus and Humalog  4 pen needles daily Dx: Diabetes   Insulin Pen Needle (BD Pen Needle Em U/F) 32G X 4 MM MISC   No No   Sig: Use daily   gabapentin (NEURONTIN) 300 mg capsule   No No   Sig: Take 1 capsule (300 mg total) by mouth daily at bedtime   Patient not taking: Reported on 2023   insulin detemir (LEVEMIR FLEXTOUCH) 100 Units/mL injection pen   No No   Sig: Inject 25 Units under the skin daily at bedtime   prochlorperazine (COMPAZINE) 10 mg tablet   No No   Si tab at onset of migraine, can repeat in 8 hours, can take with NSAID   Take with Benadryl if there " are side effects  semaglutide, 1 mg/dose, (Ozempic) 4 mg/3 mL injection pen   No No   Sig: Inject 0 75 mL (1 mg total) under the skin once a week   topiramate (TOPAMAX) 50 MG tablet   No No   Sig: Take 1 tablet (50 mg total) by mouth daily at bedtime      Facility-Administered Medications: None       Past Medical History:   Diagnosis Date   • Diabetes mellitus ([facility] [address] )        Past Surgical History:   Procedure Laterality Date   • HERNIA REPAIR     • TUBAL LIGATION  2017       Family History   Problem Relation Age of Onset   • Hypertension Mother    • Arthritis Mother    • Asthma Sister    • Bipolar disorder Brother    • Schizophrenia Brother    • Asthma Brother    • Asthma Brother    • Diabetes Maternal Grandmother    • Diabetes Paternal Grandmother    • No Known Problems Maternal Grandfather    • No Known Problems Paternal Grandfather    • Breast cancer Neg Hx      I have reviewed and agree with the history as documented  E-Cigarette/Vaping   • E-Cigarette Use Never User      E-Cigarette/Vaping Substances   • Nicotine No    • THC No    • CBD No    • Flavoring No    • Other No    • Unknown No      Social History     Tobacco Use   • Smoking status: Never   • Smokeless tobacco: Never   Vaping Use   • Vaping Use: Never used   Substance Use Topics   • Alcohol use: Not Currently     Comment: ocassionally   • Drug use: Never       Review of Systems   All other systems reviewed and are negative  Physical Exam  Physical Exam  PHYSICAL EXAM    Constitutional:  Well developed, well nourished, no acute distress, non-toxic appearance    HEENT:  Conjunctiva normal  Oropharynx moist  Respiratory:  No respiratory distress, normal breath sounds  Cardiovascular:  Normal rate, normal rhythm, no murmurs  Tenderness to palpation in the left to the sternum  GI:  Soft, nondistended, normal bowel sounds, nontender  :  No costovertebral angle tenderness   Musculoskeletal:  No edema, no tenderness, no deformities     Integument: Well hydrated, no rash   Lymphatic:  No lymphadenopathy noted   Neurologic:  Alert & oriented, normal motor function, normal sensory function, no focal deficits noted   Psychiatric:  Speech and behavior appropriate     Vital Signs  ED Triage Vitals [05/23/23 1054]   Temperature Pulse Respirations Blood Pressure SpO2   98 2 °F (36 8 °C) 80 22 158/89 99 %      Temp Source Heart Rate Source Patient Position - Orthostatic VS BP Location FiO2 (%)   Oral Monitor Lying Left arm --      Pain Score       5           Vitals:    05/23/23 1054 05/23/23 1200   BP: 158/89 127/75   Pulse: 80 72   Patient Position - Orthostatic VS: Lying Lying         Visual Acuity      ED Medications  Medications   sodium chloride (PF) 0 9 % injection 3 mL (has no administration in time range)   lidocaine (LIDODERM) 5 % patch 1 patch (1 patch Topical Medication Applied 5/23/23 1201)   ketorolac (TORADOL) injection 15 mg (15 mg Intravenous Given 5/23/23 1201)       Diagnostic Studies  Results Reviewed     Procedure Component Value Units Date/Time    HS Troponin I 4hr [[id number]]     Lab Status: No result Specimen: Blood     Fingerstick Glucose (POCT) [[id number]]  (Abnormal) Collected: 05/23/23 1042    Lab Status: Final result Updated: 05/23/23 1154     POC Glucose 465 mg/dl     HS Troponin I 2hr [[id number]]     Lab Status: No result Specimen: Blood     HS Troponin 0hr (reflex protocol) [[id number]]  (Normal) Collected: 05/23/23 1105    Lab Status: Final result Specimen: Blood from Arm, Right Updated: 05/23/23 1138     hs TnI 0hr 2 ng/L     Lipase [[id number]]  (Normal) Collected: 05/23/23 1105    Lab Status: Final result Specimen: Blood from Arm, Right Updated: 05/23/23 1129     Lipase 38 u/L     Comprehensive metabolic panel [[id number]]  (Abnormal) Collected: 05/23/23 1105    Lab Status: Final result Specimen: Blood from Arm, Right Updated: 05/23/23 1129     Sodium 133 mmol/L      Potassium 4 2 mmol/L      Chloride 101 mmol/L      CO2 24 mmol/L ANION GAP 8 mmol/L      BUN 9 mg/dL      Creatinine 0 89 mg/dL      Glucose 492 mg/dL      Calcium 8 7 mg/dL      AST 10 U/L      ALT 9 U/L      Alkaline Phosphatase 62 U/L      Total Protein 6 6 g/dL      Albumin 3 7 g/dL      Total Bilirubin 0 46 mg/dL      eGFR 80 ml/min/1 73sq m     Narrative:      National Kidney Disease Foundation guidelines for Chronic Kidney Disease (CKD):   •  Stage 1 with normal or high GFR (GFR > 90 mL/min/1 73 square meters)  •  Stage 2 Mild CKD (GFR = 60-89 mL/min/1 73 square meters)  •  Stage 3A Moderate CKD (GFR = 45-59 mL/min/1 73 square meters)  •  Stage 3B Moderate CKD (GFR = 30-44 mL/min/1 73 square meters)  •  Stage 4 Severe CKD (GFR = 15-29 mL/min/1 73 square meters)  •  Stage 5 End Stage CKD (GFR <15 mL/min/1 73 square meters)  Note: GFR calculation is accurate only with a steady state creatinine    D-dimer, quantitative [[id number]]  (Normal) Collected: 05/23/23 1105    Lab Status: Final result Specimen: Blood from Arm, Right Updated: 05/23/23 1128     D-Dimer, Quant 0 30 ug/ml FEU     CBC and differential [[id number]]  (Abnormal) Collected: 05/23/23 1105    Lab Status: Final result Specimen: Blood from Arm, Right Updated: 05/23/23 1110     WBC 7 25 Thousand/uL      RBC 4 36 Million/uL      Hemoglobin 12 3 g/dL      Hematocrit 38 5 %      MCV 88 fL      MCH 28 2 pg      MCHC 31 9 g/dL      RDW 12 2 %      MPV 8 8 fL      Platelets 735 Thousands/uL      nRBC 0 /100 WBCs      Neutrophils Relative 44 %      Immat GRANS % 0 %      Lymphocytes Relative 46 %      Monocytes Relative 7 %      Eosinophils Relative 3 %      Basophils Relative 0 %      Neutrophils Absolute 3 20 Thousands/µL      Immature Grans Absolute 0 02 Thousand/uL      Lymphocytes Absolute 3 23 Thousands/µL      Monocytes Absolute 0 52 Thousand/µL      Eosinophils Absolute 0 25 Thousand/µL      Basophils Absolute 0 03 Thousands/µL                  X-ray chest 1 view portable   ED Interpretation by Ailyn George MD (05/23 1200)   No acute cardiopulmonary disease                 Procedures  Procedures         ED Course  ED Course as of 05/23/23 1230   Tue May 23, 2023   1056 EKG Interpretation    Rate: 76 BPM  Rhythm: NSR  Axis: normal  Intervals: Normal, no blocks, QTc 423ms  Q waves: normal  T waves: normal  ST segments: normal    Impression: normal EKG  EKG for comparison: No significant changes  EKG interpreted by me  1151 hs TnI 0hr: 2   1151 D-Dimer, Quant: 0 30   1151 Glucose, Random(!): 492   1151 Anion Gap: 8             HEART Risk Score    Flowsheet Row Most Recent Value   Heart Score Risk Calculator    History 1 Filed at: 05/23/2023 1228   ECG 0 Filed at: 05/23/2023 1228   Age 0 Filed at: 05/23/2023 1228   Risk Factors 1 Filed at: 05/23/2023 1228   Troponin 0 Filed at: 05/23/2023 1228   HEART Score 2 Filed at: 05/23/2023 1228                        SBIRT 20yo+    Flowsheet Row Most Recent Value   Initial Alcohol Screen: US AUDIT-C     1  How often do you have a drink containing alcohol? 0 Filed at: 05/23/2023 1102   2  How many drinks containing alcohol do you have on a typical day you are drinking? 0 Filed at: 05/23/2023 1102   3a  Male UNDER 65: How often do you have five or more drinks on one occasion? 0 Filed at: 05/23/2023 1102   3b  FEMALE Any Age, or MALE 65+: How often do you have 4 or more drinks on one occassion? 0 Filed at: 05/23/2023 1102   Audit-C Score 0 Filed at: 05/23/2023 1102   FAMILIA: How many times in the past year have you    Used an illegal drug or used a prescription medication for non-medical reasons? Never Filed at: 05/23/2023 1102                    Medical Decision Making  41-year-old woman with history of diabetes mellitus presents to ED for evaluation of chest pain  Differential diagnosis includes ACS, cardiac arrhythmia, PE, pneumonia, pneumothorax, hemothorax, metabolic disturbance, costochondritis  Will obtain cardiac work-up      Amount and/or Complexity of Data Reviewed  Labs: ordered  Radiology: ordered  Risk  Prescription drug management  Disposition  Final diagnoses:   Chest pain     Time reflects when diagnosis was documented in both MDM as applicable and the Disposition within this note     Time User Action Codes Description Comment    5/23/2023 12:27 PM Ivette Figueroa [R07 [id number]] Chest pain       ED Disposition     ED Disposition   Discharge    Condition   Stable    Date/Time   Tue May 23, 2023 12:27 PM    [address] [facility] discharge to home/self care  Follow-up Information     Follow up With Specialties Details Why Contact Info    Kevin Jones MD Family Medicine Call  As needed [facility]  [phone]            Patient's Medications   Discharge Prescriptions    LIDOCAINE (LIDODERM) 5 %    Apply 1 patch topically over 12 hours daily Remove & Discard patch within 12 hours or as directed by MD       Start Date: 5/23/2023 End Date: --       Order Dose: 1 patch       Quantity: 5 patch    Refills: 0    NAPROXEN (NAPROSYN) 500 MG TABLET    Take 1 tablet (500 mg total) by mouth 2 (two) times a day with meals       Start Date: 5/23/2023 End Date: --       Order Dose: 500 mg       Quantity: 30 tablet    Refills: 0       No discharge procedures on file      PDMP Review       Value Time User    PDMP Reviewed  Yes 7/20/2021  8:33 AM Eveline Devine DO          ED Provider  Electronically Signed by           Angela Huynh MD  05/23/23 5536

## 2023-05-23 NOTE — Clinical Note
Olvin Lowry was seen and treated in our emergency department on 5/23/2023  Diagnosis:     [place]    She may return on this date: 05/24/2023         If you have any questions or concerns, please don't hesitate to call        Olvin Lowry MD    ______________________________           _______________          _______________  [facility] Representative                              Date                                Time

## 2023-07-24 ENCOUNTER — TELEPHONE (OUTPATIENT)
Dept: FAMILY MEDICINE CLINIC | Facility: CLINIC | Age: 43
End: 2023-07-24

## 2023-07-24 NOTE — TELEPHONE ENCOUNTER
Pt called to request a letter to have her electric turned back on. She is diabetic. Her Account # is [de-[email or web address] Fx # to send letter to is [phone]. Phone # is [phone].

## 2023-07-27 NOTE — TELEPHONE ENCOUNTER
I received a call from Papito Marley @ [employer] indicating the letter that was received is not sufficient for their needs for the patient's account. She is faxing a med cert to be completed and signed by Dr Venkatesh Rodney instead of writing up another letter.     Patient ref #: [id number]

## 2023-08-13 ENCOUNTER — APPOINTMENT (EMERGENCY)
Dept: RADIOLOGY | Facility: HOSPITAL | Age: 43
End: 2023-08-13
Payer: COMMERCIAL

## 2023-08-13 ENCOUNTER — HOSPITAL ENCOUNTER (EMERGENCY)
Facility: HOSPITAL | Age: 43
Discharge: HOME | End: 2023-08-13
Attending: EMERGENCY MEDICINE
Payer: COMMERCIAL

## 2023-08-13 VITALS
TEMPERATURE: 98.2 F | SYSTOLIC BLOOD PRESSURE: 143 MMHG | WEIGHT: 182.76 LBS | BODY MASS INDEX: 30.41 KG/M2 | RESPIRATION RATE: 18 BRPM | OXYGEN SATURATION: 100 % | HEART RATE: 86 BPM | DIASTOLIC BLOOD PRESSURE: 74 MMHG
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
## 2023-08-13 DIAGNOSIS — M25.561 ACUTE PAIN OF RIGHT KNEE: Primary | ICD-10-CM

## 2023-08-13 PROCEDURE — 99283 EMERGENCY DEPT VISIT LOW MDM: CPT

## 2023-08-13 PROCEDURE — 73564 X-RAY EXAM KNEE 4 OR MORE: CPT

## 2023-08-13 PROCEDURE — 99284 EMERGENCY DEPT VISIT MOD MDM: CPT | Performed by: PHYSICIAN ASSISTANT

## 2023-08-13 RX ORDER — IBUPROFEN 400 MG/1
800 TABLET ORAL ONCE
Status: COMPLETED | OUTPATIENT
Start: 2023-08-13 | End: 2023-08-13

## 2023-08-13 RX ORDER — ACETAMINOPHEN 500 MG
500 TABLET ORAL EVERY 6 HOURS PRN
Qty: 30 TABLET | Refills: 0 | Status: SHIPPED | OUTPATIENT
Start: 2023-08-13

## 2023-08-13 RX ORDER — ACETAMINOPHEN 325 MG/1
975 TABLET ORAL ONCE
Status: COMPLETED | OUTPATIENT
Start: 2023-08-13 | End: 2023-08-13

## 2023-08-13 RX ORDER — IBUPROFEN 400 MG/1
400 TABLET ORAL EVERY 6 HOURS PRN
Qty: 30 TABLET | Refills: 0 | Status: SHIPPED | OUTPATIENT
Start: 2023-08-13

## 2023-08-13 RX ADMIN — ACETAMINOPHEN 325MG 975 MG: 325 TABLET ORAL at 19:03

## 2023-08-13 RX ADMIN — IBUPROFEN 800 MG: 400 TABLET, FILM COATED ORAL at 19:04

## 2023-08-13 NOTE — ED PROVIDER NOTES
History  Chief Complaint   Patient presents with   • Knee Pain     Pt states that on Wednesday she fell onto her right knee, has had been having pain and swelling since. Knee Pain  Location:  Knee  Knee location:  R knee  Pain details:     Quality:  Aching    Severity:  Moderate    Onset quality:  Gradual    Timing:  Constant    Progression:  Worsening  Chronicity:  New  Dislocation: no    Foreign body present:  No foreign bodies  Associated symptoms: decreased ROM (Unable to straighten knee.), stiffness, swelling and tingling    Associated symptoms: no back pain, no fatigue, no fever and no muscle weakness    Associated symptoms comment:  Patient denies wrist pain shoulder pain back pain hip pain or ankle pain      Prior to Admission Medications   Prescriptions Last Dose Informant Patient Reported? Taking? Accu-Chek FastClix Lancets MISC  Self No No   Sig: Test BG up to 3x daily as directed   Accu-Chek Guide test strip  Self No No   Sig: TEST BG UP TO 3X DAILY AS DIRECTED   Injection Device for Insulin (B-D PEN MINI) ENRICO  Self No No   Sig: Use 4 (four) times a day Please use for Lantus and Humalog. 4 pen needles daily Dx: Diabetes   Insulin Pen Needle (BD Pen Needle Em U/F) 32G X 4 MM MISC  Self No No   Sig: Use daily   gabapentin (NEURONTIN) 300 mg capsule  Self No No   Sig: Take 1 capsule (300 mg total) by mouth daily at bedtime   Patient not taking: Reported on 2023   insulin detemir (LEVEMIR FLEXTOUCH) 100 Units/mL injection pen   No No   Sig: Inject 25 Units under the skin daily at bedtime   lidocaine (Lidoderm) 5 %   No No   Sig: Apply 1 patch topically over 12 hours daily Remove & Discard patch within 12 hours or as directed by MD   naproxen (Naprosyn) 500 mg tablet   No No   Sig: Take 1 tablet (500 mg total) by mouth 2 (two) times a day with meals   prochlorperazine (COMPAZINE) 10 mg tablet   No No   Si tab at onset of migraine, can repeat in 8 hours, can take with NSAID.  Take with Benadryl if there are side effects. semaglutide, 1 mg/dose, (Ozempic) 4 mg/3 mL injection pen   No No   Sig: Inject 0.75 mL (1 mg total) under the skin once a week   topiramate (TOPAMAX) 50 MG tablet   No No   Sig: Take 1 tablet (50 mg total) by mouth daily at bedtime      Facility-Administered Medications: None       Past Medical History:   Diagnosis Date   • Diabetes mellitus ([address])        Past Surgical History:   Procedure Laterality Date   • HERNIA REPAIR     • TUBAL LIGATION  2017       Family History   Problem Relation Age of Onset   • Hypertension Mother    • Arthritis Mother    • Asthma Sister    • Bipolar disorder Brother    • Schizophrenia Brother    • Asthma Brother    • Asthma Brother    • Diabetes Maternal Grandmother    • Diabetes Paternal Grandmother    • No Known Problems Maternal Grandfather    • No Known Problems Paternal Grandfather    • Breast cancer Neg Hx      I have reviewed and agree with the history as documented. E-Cigarette/Vaping   • E-Cigarette Use Never User      E-Cigarette/Vaping Substances   • Nicotine No    • THC No    • CBD No    • Flavoring No    • Other No    • Unknown No      Social History     Tobacco Use   • Smoking status: Never   • Smokeless tobacco: Never   Vaping Use   • Vaping Use: Never used   Substance Use Topics   • Alcohol use: Not Currently     Comment: ocassionally   • Drug use: Never       Review of Systems   Constitutional: Negative for fatigue and fever. Musculoskeletal: Positive for arthralgias (Right knee pain) and stiffness. Negative for back pain. Skin: Positive for wound. Skin abrasion medial right knee   All other systems reviewed and are negative. Physical Exam  Physical Exam  Vitals reviewed. HENT:      Head: Normocephalic. Right Ear: External ear normal.      Left Ear: External ear normal.      Nose: Nose normal.      Mouth/Throat:      Pharynx: Oropharynx is clear.    Eyes:      Conjunctiva/sclera: Conjunctivae normal. Cardiovascular:      Rate and Rhythm: Normal rate. Pulmonary:      Effort: Pulmonary effort is normal.   Abdominal:      General: There is no distension. Musculoskeletal:         General: Swelling (Mild swelling noted right anterior medial knee) and tenderness present. Normal range of motion. Cervical back: Normal range of motion and neck supple. Comments: Significant tenderness along the right medial meniscal line. Negative Lachman negative drawer sign. Negative ballottement test of the patella. Skin:     General: Skin is warm and dry. Capillary Refill: Capillary refill takes less than 2 seconds. Comments: Healing abrasion right anterior medial knee. Neurological:      General: No focal deficit present. Mental Status: She is alert and oriented to person, place, and time. Psychiatric:         Mood and Affect: Mood normal.         Vital Signs  ED Triage Vitals   Temperature Pulse Respirations Blood Pressure SpO2   08/13/23 1833 08/13/23 1833 08/13/23 1833 08/13/23 1833 08/13/23 1833   98.2 °F (36.8 °C) 86 18 143/74 100 %      Temp Source Heart Rate Source Patient Position - Orthostatic VS BP Location FiO2 (%)   08/13/23 1833 08/13/23 1833 -- -- --   Oral Monitor         Pain Score       08/13/23 1903       8           Vitals:    08/13/23 1833   BP: 143/74   Pulse: 86         Visual Acuity      ED Medications  Medications   ibuprofen (MOTRIN) tablet 800 mg (800 mg Oral Given 8/13/23 1904)   acetaminophen (TYLENOL) tablet 975 mg (975 mg Oral Given 8/13/23 1903)       Diagnostic Studies  Results Reviewed     None                 XR knee 4+ views Right injury    (Results Pending)              Procedures  Procedures         ED Course  ED Course as of 08/13/23 2050   Sun Aug 13, 2023   1935 XR knee 4+ views Right injury                               SBIRT 20yo+    Flowsheet Row Most Recent Value   Initial Alcohol Screen: US AUDIT-C     1.  How often do you have a drink containing alcohol? 0 Filed at: 08/13/2023 1857   2. How many drinks containing alcohol do you have on a typical day you are drinking? 0 Filed at: 08/13/2023 1857   3b. FEMALE Any Age, or MALE 65+: How often do you have 4 or more drinks on one occassion? 0 Filed at: 08/13/2023 1857   Audit-C Score 0 Filed at: 08/13/2023 1857   FAMILIA: How many times in the past year have you. .. Used an illegal drug or used a prescription medication for non-medical reasons? Never Filed at: 08/13/2023 [address] [place] [employer]  78-year-old female presents emergency department s/p fall while rollerskating on Wednesday. Patient states that she landed directly on her right knee. Patient states that she has had worsening right knee pain since. X-ray reviewed slight effusion appreciated. No obvious fracture dislocation. Patient is tender along the medial meniscal line and right medial collateral ligament. Patient states she has a difficulty with straightening the knee. This is suggestive of potential meniscal injury. At this time we will treat conservatively place patient in knee immobilizer and supplied crutches. Work note provided. At this time highly encourage patient to follow-up with sports medicine for further evaluation and management. Encourage patient to utilize rest ice compress and elevate. Encourage patient utilize analgesic medications as prescribed. Educated on persistent or worsening signs symptoms and either follow-up with primary care sports medicine and/or return to emergency department. Patient mended understanding and agreement. Amount and/or Complexity of Data Reviewed  Radiology: ordered. Decision-making details documented in ED Course. Details: X-ray obtained secondary to history of trauma with fall to right knee now with difficulty straightening right knee. Risk  OTC drugs. Prescription drug management.           Disposition  Final diagnoses:   Acute pain of right knee   Fall, initial encounter     Time reflects when diagnosis was documented in both MDM as applicable and the Disposition within this note     Time User Action Codes Description Comment    8/13/2023  7:53 PM Stella Horvath Add [M25.561] Acute pain of right knee     8/13/2023  7:53 PM Stella Horvath Add [W19. Johanna Kothari, initial encounter       ED Disposition     ED Disposition   Discharge    Condition   Stable    Date/Time   Sun Aug 13, 2023  7:54 PM    [id number]  [facility] discharge to home/self care. Follow-up Information     Follow up With Specialties Details Why Vandana Blanco MD Sports Medicine, Orthopedic Surgery   [address] [place] [address]  [phone]            Discharge Medication List as of 8/13/2023  7:55 PM      START taking these medications    Details   acetaminophen (TYLENOL) 500 mg tablet Take 1 tablet (500 mg total) by mouth every 6 (six) hours as needed for mild pain or moderate pain, Starting Sun 8/13/2023, Print      ibuprofen (MOTRIN) 400 mg tablet Take 1 tablet (400 mg total) by mouth every 6 (six) hours as needed for mild pain or moderate pain, Starting Sun 8/13/2023, Print         CONTINUE these medications which have NOT CHANGED    Details   Accu-Chek FastClix Lancets MISC Test BG up to 3x daily as directed, Normal      Accu-Chek Guide test strip TEST BG UP TO 3X DAILY AS DIRECTED, Normal      gabapentin (NEURONTIN) 300 mg capsule Take 1 capsule (300 mg total) by mouth daily at bedtime, Starting Fri 6/10/2022, Normal      Injection Device for Insulin (B-D PEN MINI) ENRICO Use 4 (four) times a day Please use for Lantus and Humalog.  4 pen needles daily Dx: Diabetes, Starting Thu 3/18/2021, Normal      insulin detemir (LEVEMIR FLEXTOUCH) 100 Units/mL injection pen Inject 25 Units under the skin daily at bedtime, Starting Thu 3/30/2023, Normal      Insulin Pen Needle (BD Pen Needle Em U/F) 32G X 4 MM MISC Use daily, Starting Wed 1/5/2022, Normal lidocaine (Lidoderm) 5 % Apply 1 patch topically over 12 hours daily Remove & Discard patch within 12 hours or as directed by MD, Starting Tue 5/23/2023, Normal      naproxen (Naprosyn) 500 mg tablet Take 1 tablet (500 mg total) by mouth 2 (two) times a day with meals, Starting Tue 5/23/2023, Normal      prochlorperazine (COMPAZINE) 10 mg tablet 1 tab at onset of migraine, can repeat in 8 hours, can take with NSAID. Take with Benadryl if there are side effects., Normal      semaglutide, 1 mg/dose, (Ozempic) 4 mg/3 mL injection pen Inject 0.75 mL (1 mg total) under the skin once a week, Starting Thu 3/30/2023, Normal      topiramate (TOPAMAX) 50 MG tablet Take 1 tablet (50 mg total) by mouth daily at bedtime, Starting Tue 5/16/2023, Normal             No discharge procedures on file.     PDMP Review       Value Time User    PDMP Reviewed  Yes 7/20/2021  8:33 AM Medina Deleon DO          ED Provider  Electronically Signed by           Genesis Mayorga PA-C  08/13/23 2050

## 2023-08-13 NOTE — Clinical Note
Matt Henley was seen and treated in our emergency department on 8/13/2023. Diagnosis:     [place]  . She may return on this date: 08/16/2023         If you have any questions or concerns, please don't hesitate to call.       Sailaja De Leon PA-C    ______________________________           _______________          _______________  Hospital Representative                              Date                                Time

## 2023-08-17 ENCOUNTER — OFFICE VISIT (OUTPATIENT)
Dept: OBGYN CLINIC | Facility: MEDICAL CENTER | Age: 43
End: 2023-08-17
Payer: COMMERCIAL

## 2023-08-17 VITALS — WEIGHT: 182 LBS | HEIGHT: 65 IN | BODY MASS INDEX: 30.32 KG/M2

## 2023-08-17 DIAGNOSIS — S89.91XA RIGHT KNEE INJURY, INITIAL ENCOUNTER: Primary | ICD-10-CM

## 2023-08-17 DIAGNOSIS — M25.561 ACUTE PAIN OF RIGHT KNEE: ICD-10-CM

## 2023-08-17 PROCEDURE — 99213 OFFICE O/P EST LOW 20 MIN: CPT | Performed by: EMERGENCY MEDICINE

## 2023-08-17 NOTE — PROGRESS NOTES
Assessment/Plan:    Diagnoses and all orders for this visit:    Right knee injury, initial encounter  -     MRI knee right  wo contrast; Future    Acute pain of right knee  -     MRI knee right  wo contrast; Future    MRI Right knee evaluate for occult tibial plateau fracture after fall on flexed knee, she is 1 week out from injury with continued significant pain and significantly limited ROM which limits exam.    Continue protected weightbearing with crutches I have instructed her to wean from the knee immobilizer to prevent stiffness and weakness. She may continue over-the-counter NSAIDs and Tylenol and ice. Work excuse note provided    Return for Follow Up After Imaging Study. Subjective:   Patient ID: Julius Raygoza is a 40 y.o. female. DOI 8/9/23    NP presents for right knee injury occurring after falling onto knee while rollerskating she was evaluated in the emergency department x-rays were obtained and reviewed today she was provided a knee immobilizer and crutches. Most of her pain is anterior medial aspect of the knee. She is taking over-the-counter medications for pain regularly. 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[place] [address]    The following portions of the patient's chart were reviewed and updated as appropriate:    Allergy:    Allergies   Allergen Reactions   • Metformin And Related Hives       Medications:    Current Outpatient Medications:   •  Accu-Chek FastClix Lancets MISC, Test BG up to 3x daily as directed, Disp: 300 each, Rfl: 1  •  Accu-Chek Guide test strip, TEST BG UP TO 3X DAILY AS DIRECTED, Disp: 100 each, Rfl: 1  •  acetaminophen (TYLENOL) 500 mg tablet, Take 1 tablet (500 mg total) by mouth every 6 (six) hours as needed for mild pain or moderate pain, Disp: 30 tablet, Rfl: 0  •  ibuprofen (MOTRIN) 400 mg tablet, Take 1 tablet (400 mg total) by mouth every 6 (six) hours as needed for mild pain or moderate pain, Disp: 30 tablet, Rfl: 0  •  Injection Device for Insulin (B-D PEN MINI) ENRICO, Use 4 (four) times a day Please use for Lantus and Humalog. 4 pen needles daily Dx: Diabetes, Disp: 400 each, Rfl: 0  •  insulin detemir (LEVEMIR FLEXTOUCH) 100 Units/mL injection pen, Inject 25 Units under the skin daily at bedtime, Disp: 15 mL, Rfl: 2  •  Insulin Pen Needle (BD Pen Needle Em U/F) 32G X 4 MM MISC, Use daily, Disp: 100 each, Rfl: 3  •  naproxen (Naprosyn) 500 mg tablet, Take 1 tablet (500 mg total) by mouth 2 (two) times a day with meals, Disp: 30 tablet, Rfl: 0  •  prochlorperazine (COMPAZINE) 10 mg tablet, 1 tab at onset of migraine, can repeat in 8 hours, can take with NSAID. Take with Benadryl if there are side effects. , Disp: 20 tablet, Rfl: 1  •  semaglutide, 1 mg/dose, (Ozempic) 4 mg/3 mL injection pen, Inject 0.75 mL (1 mg total) under the skin once a week, Disp: 9 mL, Rfl: 0  •  topiramate (TOPAMAX) 50 MG tablet, Take 1 tablet (50 mg total) by mouth daily at bedtime, Disp: 90 tablet, Rfl: 1  •  gabapentin (NEURONTIN) 300 mg capsule, Take 1 capsule (300 mg total) by mouth daily at bedtime (Patient not taking: Reported on 5/9/2023), Disp: 90 capsule, Rfl: 2  •  lidocaine (Lidoderm) 5 %, Apply 1 patch topically over 12 hours daily Remove & Discard patch within 12 hours or as directed by MD (Patient not taking: Reported on 8/17/2023), Disp: 5 patch, Rfl: 0    Patient Active Problem List   Diagnosis   • Encounter for annual routine gynecological examination   • Abnormal thyroid blood test   • Papanicolaou smear of cervix with positive high risk human papilloma virus (HPV) test   • Uncontrolled type 2 diabetes mellitus with hyperglycemia, with long-term current use of insulin (HCC)   • Stress at home   • Diabetes mellitus ([address])   • Obesity (BMI 30-39. 9)   • Numbness and tingling of both feet   • Recurrent episodes of unresponsiveness   • Migraine without aura and without status migrainosus, not intractable   • Current use of insulin (HCC)   • Chest pain   • Uncontrolled type 2 diabetes mellitus with hyperglycemia, without long-term current use of insulin ([facility])   • Numbness and tingling of right arm   • Adhesive capsulitis of right shoulder   • Herniation of intervertebral disc at C6-C7 level   • Neck pain   • Brachial plexopathy   • Right leg weakness   • Radiculopathy, lumbar region   • Polyneuropathy   • Chronotropic incompetence       Objective:  Ht 5' 5" (1.651 m)   Wt 82.6 kg (182 lb)   BMI 30.29 kg/m²     Right Knee Exam     Tenderness   The patient is experiencing tenderness in the patellar tendon, patella and medial joint line (Anterior medial aspect of the proximal tibia). Range of Motion   Extension: abnormal   Flexion: abnormal     Other   Erythema: absent  Sensation: normal  Swelling: mild    Comments:  Patient with severely limited flexion and mild to moderate limited extension. This limits physical exam            Physical Exam      Neurologic Exam    Procedures    I have personally reviewed pertinent films in PACS. and I have personally reviewed the written report of the pertinent studies. RIGHT KNEE     INDICATION:   pain trauma.     COMPARISON:  None     VIEWS:  XR KNEE 4+ VW RIGHT INJURY        FINDINGS:     There is no acute fracture or dislocation.     There is no joint effusion.     No significant degenerative changes.     No lytic or blastic osseous lesion.     Soft tissues are unremarkable.     IMPRESSION:     No acute osseous abnormality.             Past Medical History:   Diagnosis Date   • Diabetes mellitus ([address])        Past Surgical History:   Procedure Laterality Date   • HERNIA REPAIR     • TUBAL LIGATION  2017       Social History     Socioeconomic History   • Marital status: Single     Spouse name: Not on file   • Number of children: Not on file   • Years of education: Not on file   • Highest education level: Not on file   Occupational History     Employer: [employer]     Employer: [employer]     Employer: [employer]   Tobacco Use   • Smoking status: Never   • Smokeless tobacco: Never   Vaping Use   • Vaping Use: Never used   Substance and Sexual Activity   • Alcohol use: Not Currently     Comment: ocassionally   • Drug use: Never   • Sexual activity: Yes     Partners: Male   Other Topics Concern   • Not on file   Social History Narrative   • Not on file     Social Determinants of Health     Financial Resource Strain: Medium Risk (10/12/2022)    Overall Financial Resource Strain (CARDIA)    • Difficulty of Paying Living Expenses: Somewhat hard   Food Insecurity: Food Insecurity Present (10/12/2022)    Hunger Vital Sign    • Worried About Running Out of Food in the Last Year: Sometimes true    • Ran Out of Food in the Last Year: Sometimes true   Transportation Needs: No Transportation Needs (10/12/2022)    PRAPARE - Transportation    • Lack of Transportation (Medical): No    • Lack of Transportation (Non-Medical): No   Physical Activity: Unknown (11/27/2020)    Exercise Vital Sign    • Days of Exercise per Week: 0 days    • Minutes of Exercise per Session: Not on file   Stress: No Stress Concern Present (11/27/2020)    [address] [place] [address]    • Feeling of Stress : Only a little   Social Connections: Moderately Isolated (11/27/2020)    Social Connection and Isolation Panel [NHANES]    • Frequency of Communication with Friends and Family: More than three times a week    • Frequency of Social Gatherings with Friends and Family: More than three times a week    • Attends [employer] Services: 1 to 4 times per year    • Active Member of Clubs or Organizations: No    • Attends Club or Organization Meetings: Never    • Marital Status: Never    Intimate Partner Violence: Not At Risk (11/27/2020)    Humiliation, Afraid, Rape, and Kick questionnaire    • Fear of Current or Ex-Partner: No    • Emotionally Abused: No    • Physically Abused: No    • Sexually Abused:  No Housing Stability: High Risk (9/30/2021)    Housing Stability Vital Sign    • Unable to Pay for Housing in the Last Year: Yes    • Number of Places Lived in the Last Year: 1    • Unstable Housing in the Last Year: No       Family History   Problem Relation Age of Onset   • Hypertension Mother    • Arthritis Mother    • Asthma Sister    • Bipolar disorder Brother    • Schizophrenia Brother    • Asthma Brother    • Asthma Brother    • Diabetes Maternal Grandmother    • Diabetes Paternal Grandmother    • No Known Problems Maternal Grandfather    • No Known Problems Paternal Grandfather    • Breast cancer Neg Hx

## 2023-08-17 NOTE — PATIENT INSTRUCTIONS
You may use Advil (ibuprofen) 600mg every 6 hours or at least twice per day OR Aleve (naproxen) 250-500mg every 12 hours as needed for pain and inflammation. You may also take Tylenol 500mg every 4-6 hours as needed OR max 1,000mg per dose up to 3 times per day for a total of 3,000mg per day  Check with your primary care physician to see if these medications are safe to take and to make sure they do not interfere with your other medications and medical issues.

## 2023-08-17 NOTE — LETTER
August 17, 2023     Patient: Sergio Galaviz  YOB: 1980  Date of Visit: 8/17/2023      To Whom it May Concern:    Sergio Galaviz is under my professional care. Collins Mane was seen in my office on 8/17/2023. Work excuse until further notice. F/u after further testing    If you have any questions or concerns, please don't hesitate to call.          Sincerely,          Verito Martins MD        CC: No Recipients

## 2023-08-23 ENCOUNTER — HOSPITAL ENCOUNTER (OUTPATIENT)
Dept: MRI IMAGING | Facility: HOSPITAL | Age: 43
Discharge: HOME | End: 2023-08-23
Payer: COMMERCIAL

## 2023-08-23 ENCOUNTER — PATIENT OUTREACH (OUTPATIENT)
Dept: OTHER | Facility: OTHER | Age: 43
End: 2023-08-23

## 2023-08-23 DIAGNOSIS — S89.91XA RIGHT KNEE INJURY, INITIAL ENCOUNTER: ICD-10-CM

## 2023-08-23 DIAGNOSIS — M25.561 ACUTE PAIN OF RIGHT KNEE: ICD-10-CM

## 2023-08-23 PROCEDURE — 73721 MRI JNT OF LWR EXTRE W/O DYE: CPT

## 2023-08-23 PROCEDURE — G1004 CDSM NDSC: HCPCS

## 2023-08-23 NOTE — PROGRESS NOTES
8/23/23: Client came to ask for assistance with housing. She owes $4500 in rent, court costs and fines. She will add $1418 to that amount on September 1st. She was given a list by the  of community partners to call which she had starting in July. [id number] stated she doesn't meet the criteria of [address] diagnosis. [place] hasn't called her back. I gave her the resource of [employer] and [employer] for housing. I committed to contacting Mariaa may [employer] who said she could call him. Called to give her Woodrow's number. He said the money is very depleted but he will gladly meet with her.

## 2023-08-31 NOTE — TELEPHONE ENCOUNTER
The above form was faxed back to [facility] today 10/27/22 and a copy of the form was scanned into patients' chart-original form was file at  [facility]  Cosentyx Counseling:  I discussed with the patient the risks of Cosentyx including but not limited to worsening of Crohn's disease, immunosuppression, allergic reactions and infections.  The patient understands that monitoring is required including a PPD at baseline and must alert us or the primary physician if symptoms of infection or other concerning signs are noted.

## 2023-09-13 ENCOUNTER — TELEPHONE (OUTPATIENT)
Dept: OBGYN CLINIC | Facility: MEDICAL CENTER | Age: 43
End: 2023-09-13

## 2023-09-13 NOTE — TELEPHONE ENCOUNTER
Pt left vm. Stated that is leaking trough underwear and her pelvic feels very heavy (?. Tried to call pt, left vm to call. Please review when you have a chance, ty!

## 2023-09-20 ENCOUNTER — PATIENT OUTREACH (OUTPATIENT)
Dept: FAMILY MEDICINE CLINIC | Facility: CLINIC | Age: 43
End: 2023-09-20

## 2023-09-20 NOTE — PROGRESS NOTES
Outpatient Care Management Note:    Chart reviewed for outreach purposes. Patients last YrD4Q=22.3. Care manager called Lanie Bender, introduced myself and the care management program. Overall, she states she is doing well. She is interested in working to improve her blood sugars. She is scheduled to see her PCP on 9/28 and will be discussing endocrine follow up. Lanie Bender states that she does not check her blood sugars as frequently as she should. She may check it 1-2 times per day. We briefly talked about [employer] and that she can get her diabetic supplies for free. She states that she already does that. Lanie Bender also complained about neuropathy in her toes and issues with sciatica. She is following with neurology. Med review completed. Lanie Bender agreed to [facility] following up in 2 weeks. KAILEY gave her my contact [facility]. She knows that my phone goes through my computer and I do not get messages after hours or on weekends. She is aware to call her PCP office directly with any immediate questions.

## 2023-09-28 ENCOUNTER — OFFICE VISIT (OUTPATIENT)
Dept: FAMILY MEDICINE CLINIC | Facility: CLINIC | Age: 43
End: 2023-09-28
Payer: COMMERCIAL

## 2023-09-28 VITALS
DIASTOLIC BLOOD PRESSURE: 78 MMHG | TEMPERATURE: 97.8 F | WEIGHT: 181 LBS | HEART RATE: 81 BPM | BODY MASS INDEX: 30.16 KG/M2 | HEIGHT: 65 IN | SYSTOLIC BLOOD PRESSURE: 124 MMHG | RESPIRATION RATE: 16 BRPM | OXYGEN SATURATION: 99 %

## 2023-09-28 DIAGNOSIS — E11.21 MICROALBUMINURIC DIABETIC NEPHROPATHY (HCC): ICD-10-CM

## 2023-09-28 DIAGNOSIS — R80.9 TYPE 2 DIABETES MELLITUS WITH MICROALBUMINURIA, WITH LONG-TERM CURRENT USE OF INSULIN (HCC): ICD-10-CM

## 2023-09-28 DIAGNOSIS — Z79.4 UNCONTROLLED TYPE 2 DIABETES MELLITUS WITH HYPERGLYCEMIA, WITH LONG-TERM CURRENT USE OF INSULIN (HCC): Primary | ICD-10-CM

## 2023-09-28 DIAGNOSIS — E11.29 TYPE 2 DIABETES MELLITUS WITH MICROALBUMINURIA, WITH LONG-TERM CURRENT USE OF INSULIN (HCC): ICD-10-CM

## 2023-09-28 DIAGNOSIS — E11.65 UNCONTROLLED TYPE 2 DIABETES MELLITUS WITH HYPERGLYCEMIA, WITHOUT LONG-TERM CURRENT USE OF INSULIN (HCC): ICD-10-CM

## 2023-09-28 DIAGNOSIS — F43.9 STRESS AT HOME: ICD-10-CM

## 2023-09-28 DIAGNOSIS — E11.65 UNCONTROLLED TYPE 2 DIABETES MELLITUS WITH HYPERGLYCEMIA, WITH LONG-TERM CURRENT USE OF INSULIN (HCC): Primary | ICD-10-CM

## 2023-09-28 DIAGNOSIS — Z79.4 TYPE 2 DIABETES MELLITUS WITH MICROALBUMINURIA, WITH LONG-TERM CURRENT USE OF INSULIN (HCC): ICD-10-CM

## 2023-09-28 LAB — SL AMB POCT HEMOGLOBIN AIC: 13.6 (ref ?–6.5)

## 2023-09-28 PROCEDURE — 83036 HEMOGLOBIN GLYCOSYLATED A1C: CPT | Performed by: FAMILY MEDICINE

## 2023-09-28 PROCEDURE — 99214 OFFICE O/P EST MOD 30 MIN: CPT | Performed by: FAMILY MEDICINE

## 2023-09-28 RX ORDER — INSULIN ASPART 100 [IU]/ML
5 INJECTION, SOLUTION INTRAVENOUS; SUBCUTANEOUS
Qty: 15 ML | Refills: 2 | Status: SHIPPED | OUTPATIENT
Start: 2023-09-28

## 2023-10-02 ENCOUNTER — TELEPHONE (OUTPATIENT)
Dept: FAMILY MEDICINE CLINIC | Facility: CLINIC | Age: 43
End: 2023-10-02

## 2023-10-02 PROBLEM — E11.29 TYPE 2 DIABETES MELLITUS WITH MICROALBUMINURIA, WITH LONG-TERM CURRENT USE OF INSULIN (HCC): Status: ACTIVE | Noted: 2017-02-01

## 2023-10-02 PROBLEM — R80.9 TYPE 2 DIABETES MELLITUS WITH MICROALBUMINURIA, WITH LONG-TERM CURRENT USE OF INSULIN (HCC): Status: ACTIVE | Noted: 2017-02-01

## 2023-10-02 PROBLEM — E11.21 MICROALBUMINURIC DIABETIC NEPHROPATHY (HCC): Status: ACTIVE | Noted: 2023-10-02

## 2023-10-02 NOTE — TELEPHONE ENCOUNTER
Auth approved, valid 10/02/2023 - 10/02/2024. I faxed the approval letter to [facility] and sent patient a [employer] message.

## 2023-10-02 NOTE — PROGRESS NOTES
Assessment/Plan:    43 y/o woman with:  uncontrolled DM2 with hyperglycemia along with diabetic microalbuminuria along with significant life stress. Discussed workup and treatment options. Will titrate meds. Will refer to Endocrinoloy along with refer to Complex Care Management and . Discussed supportive care and return parameters. No problem-specific Assessment & Plan notes found for this encounter. Diagnoses and all orders for this visit:    Uncontrolled type 2 diabetes mellitus with hyperglycemia, with long-term current use of insulin ([facility])  -     POCT hemoglobin A1c  -     Ambulatory Referral to Endocrinology; Future  -     Ambulatory Referral to Complex Care Management Program; Future  -     Ambulatory Referral to Social Work Care Management Program; Future    Uncontrolled type 2 diabetes mellitus with hyperglycemia, without long-term current use of insulin (HCC)  -     semaglutide, 2 mg/dose, (Ozempic) 8 mg/ mL injection pen; Inject 0.75 mL (2 mg total) under the skin every 7 days  -     insulin aspart (NovoLOG FlexPen) 100 UNIT/ML injection pen; Inject 5 Units under the skin 3 (three) times a day with meals  -     Ambulatory Referral to Complex Care Management Program; Future  -     Ambulatory Referral to Social Work Care Management Program; Future    Stress at home  -     Ambulatory Referral to Complex Care Management Program; Future  -     Ambulatory Referral to Social Work Care Management Program; Future    Type 2 diabetes mellitus with microalbuminuria, with long-term current use of insulin ([facility])    Microalbuminuric diabetic nephropathy (HCC)          Subjective:     Chief Complaint   Patient presents with   • 6 Month follow up     No questions or concerns. Patient ID: Manny Malave is a 44 y.o. female.     Patient is a 43 y/o woman who presents for follow-up on uncontrolled DM2 with hyperglycemia along with diabetic microalbuminuria along with significant life stress, pt admits hyperglycemia and admits being otherwise stable on meds and denies acute complaints no fevers chills nausea or vomiting. The following portions of the patient's history were reviewed and updated as appropriate: allergies, current medications, past family history, past medical history, past social history, past surgical history and problem list.    Review of Systems   Constitutional: Negative. HENT: Negative. Eyes: Negative. Respiratory: Negative. Cardiovascular: Negative. Gastrointestinal: Negative. Endocrine: Negative. Genitourinary: Negative. Musculoskeletal: Negative. Allergic/Immunologic: Negative. Neurological: Negative. Hematological: Negative. Psychiatric/Behavioral: The patient is nervous/anxious. All other systems reviewed and are negative. Objective:      /78 (BP Location: Left arm, Patient Position: Sitting, Cuff Size: Standard)   Pulse 81   Temp 97.8 °F (36.6 °C) (Tympanic)   Resp 16   Ht 5' 5" (1.651 m)   Wt 82.1 kg (181 lb)   SpO2 99%   BMI 30.12 kg/m²          Physical Exam  Constitutional:       Appearance: She is well-developed. HENT:      Head: Atraumatic. Right Ear: External ear normal.      Left Ear: External ear normal.   Eyes:      Conjunctiva/sclera: Conjunctivae normal.      Pupils: Pupils are equal, round, and reactive to light. Cardiovascular:      Rate and Rhythm: Normal rate and regular rhythm. Heart sounds: Normal heart sounds. Pulmonary:      Effort: Pulmonary effort is normal. No respiratory distress. Breath sounds: Normal breath sounds. Abdominal:      General: There is no distension. Palpations: Abdomen is soft. Tenderness: There is no abdominal tenderness. There is no guarding or rebound. Musculoskeletal:         General: Normal range of motion. Cervical back: Normal range of motion. Skin:     General: Skin is warm and dry.    Neurological:      Mental Status: She is alert and oriented to person, place, and time. Cranial Nerves: No cranial nerve deficit. Psychiatric:         Behavior: Behavior normal.         Thought Content: Thought content normal.         Judgment: Judgment normal.         BMI Counseling: Body mass index is 30.12 kg/m². The BMI is above normal. Nutrition recommendations include encouraging healthy choices of fruits and vegetables. Exercise recommendations include moderate physical activity 150 minutes/week. Rationale for BMI follow-up plan is due to patient being overweight or obese. Depression Screening and Follow-up Plan: Patient was screened for depression during today's encounter. They screened negative with a PHQ-2 score of 0.

## 2023-10-04 ENCOUNTER — PATIENT OUTREACH (OUTPATIENT)
Dept: FAMILY MEDICINE CLINIC | Facility: CLINIC | Age: 43
End: 2023-10-04

## 2023-10-04 DIAGNOSIS — E11.65 UNCONTROLLED TYPE 2 DIABETES MELLITUS WITH HYPERGLYCEMIA, WITHOUT LONG-TERM CURRENT USE OF INSULIN (HCC): ICD-10-CM

## 2023-10-04 RX ORDER — PEN NEEDLE, DIABETIC 32GX 5/32"
NEEDLE, DISPOSABLE MISCELLANEOUS DAILY
Qty: 100 EACH | Refills: 0 | Status: SHIPPED | OUTPATIENT
Start: 2023-10-04

## 2023-10-04 NOTE — PROGRESS NOTES
Outpatient Care Management Note:    Care manager called Waldo. She states that she is scheduled to see endocrine on 11/2. She noted that Dr Aneta Del Cid added novolog insulin and stopped her levemir. KAILEY reviewed that there is no order to stop the levemir. I explained the difference between short acting and long acting insulin. Waldo was insistent that he told her to stop the levemir. CM will forward to Dr Aneta Del Cid. Waldo is also to increase her ozempic. She states it is ready at the pharmacy and she will be picking it up after work. Waldo states that she just restarted checking her sugars. They are averaging in the high 200-330 range. She is trying to check it before meals and 2 hours after. Waldo is part of the [place] program and gets her diabetic supplies for free. Tari informed KAILEY that she is homeless. She has several children. She is staying with her sister, a friend, or in her car. She states that her children do not stay in the car. They are either staying with her sister or their father. Waldo states that the shelters all have wait lists. She has been in contact with [facility] Leno, [address] [place] [address], and is on some wait lists. KAILEY will follow up with our social work team and have someone contact her. Waldo has my contact information. She knows that my phone goes through my computer and I do not get messages after hours or on weekends. She is aware to call her PCP office directly with any immediate questions. KAILEY contacted Alan Downs RN. She will update her  regarding above concerns and have her contact patient.

## 2023-10-05 ENCOUNTER — PATIENT OUTREACH (OUTPATIENT)
Dept: FAMILY MEDICINE CLINIC | Facility: CLINIC | Age: 43
End: 2023-10-05

## 2023-10-05 NOTE — PROGRESS NOTES
Outpatient Care Management Note:    Inbasket message received from Dr Ronald Hoffman: No pt should have added the novolog to the levemir. Recommend follow-ing with endocrinology as well     CM called Israel and reviewed above. Israel is aware that she should continue her levemir at bedtime and take the novolog as ordered with meals. We did discuss low blood sugars and how to treat a low. Israel states that she has experienced lows in the past.  She denied any questions about treating a low. CM instructed her to call Dr Ronald Hoffman if she experiences any lows as her medications may need to be adjusted. CM will follow up in 2 weeks.

## 2023-10-05 NOTE — PROGRESS NOTES
SERAFIN BONNER received referral for patient regarding uncontrolled type 2 diabetes. RN CM was also referred and is involved with diabetic management. SERAFIN BONNER noted in chart that patient is homeless and living with sister a friend or in her car. She has children but they stay with her sister or their father. She is on shelter waiting lists. She had worked with with MARTIN Justice with [address] [place] [address] team. SERAFIN BONNER noted patient did not meet [facility] is out of funding for rental assistance. She was provided information for [employer] at that time. SERAFIN BONNER placed call to the patient, Aleksey Negrete and left message. SERAFIN BONNER will await return call to provide resources and psychosocial support as needed.

## 2023-10-17 ENCOUNTER — PATIENT OUTREACH (OUTPATIENT)
Dept: FAMILY MEDICINE CLINIC | Facility: CLINIC | Age: 43
End: 2023-10-17

## 2023-10-17 NOTE — PROGRESS NOTES
SERAFIN BONNER placed second call to the patient, Yulisa Morton who advised at work but able to talk. She is still currently between her car and a friend's house and sister's house. She is trying to find place for her and children. Her sister is due to give birth soon as well which is added pressure. She has been doing apartment searches. She is working with a  [address] [place] [address]. Someone supposed to be calling her this week for security deposit that Lorin was able to use connections to obtain. Shelters all full - she does have referrals in to them. SERAFIN BONNER and Yulisa Morton discussed the children, Her son is struggling but they are taking it day by day. He is starting to act out. He will start [employer] program at school she is just waiting on paperwork before he can start. He is in 8th grade and struggling with academics. He tried out for the basketball team. She is managing to get the children to school without issue. Yulisa Morton reported overall food security. She gets paid this week and will give her sister money. SERAFIN BONNER will send food pantry info by text via Find Help. Patient was referred on Findhelp to [employer], [address] [facility], [employer], [facility] [address], [address], [address] Pantry (program) for food    SERAFIN BONNER will close referral as requested information was requested. Patient is working with another  regarding security deposit for apartment. Patient is aware to contact SERAFIN BONNER directly or contact office as needed. SERAFIN BONNER will remain available for future psychosocial support as needed.

## 2023-10-18 ENCOUNTER — PATIENT OUTREACH (OUTPATIENT)
Dept: FAMILY MEDICINE CLINIC | Facility: CLINIC | Age: 43
End: 2023-10-18

## 2023-10-18 NOTE — PROGRESS NOTES
Outpatient Care Management Note:    Care manager called Waldo. She states that she left work early, because she is sick. She has been nauseous and vomiting. She also has a migraine. She did increase the ozempic dose. She took it yesterday. CM reviewed that ozempic can cause nausea and vomiting. Waldo denies being around anyone who is sick. Waldo states that she is taking both the levemir and the novolog. Her sugars have been greatly improved. They are ranging between 150-170. She reports that she has also been feeling shaky today. She has not checked her sugar since feeling this way. She is in the car with her sister who is driving. She will check her sugar as soon as she gets home. She will call Dr Aneta Del Cid, review her symptoms, and discuss if it could be related to the ozempic. She will continue to eat small, bland foods and drink liquids.

## 2023-10-20 ENCOUNTER — PATIENT OUTREACH (OUTPATIENT)
Dept: FAMILY MEDICINE CLINIC | Facility: CLINIC | Age: 43
End: 2023-10-20

## 2023-10-20 NOTE — PROGRESS NOTES
Outpatient Care Management Note:    Voice mail message left for Waldo, with my contact information, attempting to follow up on how she is feeling. I requested a call back and did inform her that I will be out of the office this afternoon. Any concerns or if she is not feeling better, she should call her PCP.

## 2023-10-27 ENCOUNTER — PATIENT OUTREACH (OUTPATIENT)
Dept: FAMILY MEDICINE CLINIC | Facility: CLINIC | Age: 43
End: 2023-10-27

## 2023-10-27 NOTE — LETTER
Date: 10/27/23    Dear Pablo Baird,   My name is Faye Torres. I am a registered nurse care manager working with  [address] [place] [phone]. I have not been able to reach you and would like to set a time that I can talk with you over the phone. My work is to help patients that have complex medical conditions get the care they need. This includes patients who may have been in the hospital or emergency room. Please call me with any questions you may have. I look forward to speaking with you.   Sincerely,  Faye Torres  [phone]  Outpatient Care Manager  Copy:  (primary care physician name and address)

## 2023-10-27 NOTE — PROGRESS NOTES
Outpatient Care Management Note:    Voice mail message left for Waldo, with my contact information, attempting to follow up and requesting a call back. (2nd attempt)    Unable to reach letter sent via my chart.

## 2023-10-30 DIAGNOSIS — L73.2 HIDRADENITIS: Primary | ICD-10-CM

## 2023-10-30 RX ORDER — AMOXICILLIN AND CLAVULANATE POTASSIUM 875; 125 MG/1; MG/1
1 TABLET, FILM COATED ORAL EVERY 12 HOURS SCHEDULED
Qty: 14 TABLET | Refills: 0 | Status: SHIPPED | OUTPATIENT
Start: 2023-10-30 | End: 2023-11-06

## 2023-11-02 ENCOUNTER — OFFICE VISIT (OUTPATIENT)
Dept: ENDOCRINOLOGY | Facility: CLINIC | Age: 43
End: 2023-11-02
Payer: COMMERCIAL

## 2023-11-02 VITALS
DIASTOLIC BLOOD PRESSURE: 78 MMHG | HEIGHT: 65 IN | SYSTOLIC BLOOD PRESSURE: 122 MMHG | BODY MASS INDEX: 30.29 KG/M2 | WEIGHT: 181.8 LBS

## 2023-11-02 DIAGNOSIS — Z79.4 CURRENT USE OF INSULIN (HCC): ICD-10-CM

## 2023-11-02 DIAGNOSIS — E11.65 UNCONTROLLED TYPE 2 DIABETES MELLITUS WITH HYPERGLYCEMIA, WITHOUT LONG-TERM CURRENT USE OF INSULIN (HCC): Primary | ICD-10-CM

## 2023-11-02 PROCEDURE — 99214 OFFICE O/P EST MOD 30 MIN: CPT | Performed by: INTERNAL MEDICINE

## 2023-11-02 RX ORDER — ACYCLOVIR 400 MG/1
1 TABLET ORAL
Qty: 9 EACH | Refills: 3 | Status: SHIPPED | OUTPATIENT
Start: 2023-11-02

## 2023-11-02 RX ORDER — ACYCLOVIR 400 MG/1
1 TABLET ORAL DAILY
Qty: 1 EACH | Refills: 1 | Status: SHIPPED | OUTPATIENT
Start: 2023-11-02

## 2023-11-02 NOTE — PROGRESS NOTES
Established Patient Progress Note      Chief Complaint   Patient presents with    Diabetes Type 2        History of Present Illness:   Michoacano Puentes is a 44 y.o. female with a history of type 2 diabetes without long term use of insulin for about 9 years. Last seen in the office in Jan 2022 with [address]  Since the last visit, she has had housing insecurity. She us storing medications at her sister's house. This is effecting her and her children as the family living arrangements are very fluid. She was dx with DM after her first pregnancy. Last seen in the office. Reports complications of neuropathy. A1C today was 12.8 up from 10.1. She has been checked for C-peptide and CHAPITO 65 in the past which were negative. She reports taking metformin, Januvia and glimiperide in the past. Had allergy to metformin (hives). She has since been started on Prandin, Levemir, and ozempic. Denies any GI side effects from ozempic. Previously took Prandin but not consistently. S    Current regimen:   Ozempic 2mg weekly   Levemir 25 units   Novolog 5units with meals. She is able to get all her medications. She stared the Novolog 2 weeks ago and is using pens. She also was able to get the 2mg Ozmepic pen. She rotates sites in her abdomen. Not currently checking her Bgs. She is interested in CGM. Her battery on the [employer] meter appears to be dead. Eyes: scheduled for this month. Having some issues with night visit  Pod: Dr. Gerhardt Dimitri (10/31/23). Reports her toes feel cold. Patient Active Problem List   Diagnosis    Encounter for annual routine gynecological examination    Abnormal thyroid blood test    Papanicolaou smear of cervix with positive high risk human papilloma virus (HPV) test    Type 2 diabetes mellitus with microalbuminuria, with long-term current use of insulin (HCC)    Stress at home    Diabetes mellitus (HCC)    Obesity (BMI 30-39. 9)    Numbness and tingling of both feet    Recurrent episodes of unresponsiveness    Migraine without aura and without status migrainosus, not intractable    Current use of insulin (HCC)    Chest pain    Uncontrolled type 2 diabetes mellitus with hyperglycemia, without long-term current use of insulin (HCC)    Numbness and tingling of right arm    Adhesive capsulitis of right shoulder    Herniation of intervertebral disc at C6-C7 level    Neck pain    Brachial plexopathy    Right leg weakness    Radiculopathy, lumbar region    Polyneuropathy    Chronotropic incompetence    Microalbuminuric diabetic nephropathy (HCC)      Past Medical History:   Diagnosis Date    Diabetes mellitus ([address])       Past Surgical History:   Procedure Laterality Date    HERNIA REPAIR      TUBAL LIGATION  2017      Family History   Problem Relation Age of Onset    Hypertension Mother     Arthritis Mother     Asthma Sister     Bipolar disorder Brother     Schizophrenia Brother     Asthma Brother     Asthma Brother     Diabetes Maternal Grandmother     Diabetes Paternal Grandmother     No Known Problems Maternal Grandfather     No Known Problems Paternal Grandfather     Breast cancer Neg Hx      Social History     Tobacco Use    Smoking status: Never    Smokeless tobacco: Never   Substance Use Topics    Alcohol use: Not Currently     Comment: ocassionally     Allergies   Allergen Reactions    Metformin And Related Hives         Current Outpatient Medications:     Accu-Chek FastClix Lancets MISC, Test BG up to 3x daily as directed, Disp: 300 each, Rfl: 1    Accu-Chek Guide test strip, TEST BG UP TO 3X DAILY AS DIRECTED, Disp: 100 each, Rfl: 1    acetaminophen (TYLENOL) 500 mg tablet, Take 1 tablet (500 mg total) by mouth every 6 (six) hours as needed for mild pain or moderate pain, Disp: 30 tablet, Rfl: 0    amoxicillin-clavulanate (AUGMENTIN) 875-125 mg per tablet, Take 1 tablet by mouth every 12 (twelve) hours for 7 days (Patient not taking: Reported on 11/2/2023), Disp: 14 tablet, Rfl: 0 Continuous Blood Gluc  (Dexcom G7 ) ENRICO, Use 1 Units daily, Disp: 1 each, Rfl: 1    Continuous Blood Gluc Sensor (Dexcom G7 Sensor), Use 1 Device every 10 days, Disp: 9 each, Rfl: 3    gabapentin (NEURONTIN) 300 mg capsule, Take 1 capsule (300 mg total) by mouth daily at bedtime, Disp: 90 capsule, Rfl: 2    ibuprofen (MOTRIN) 400 mg tablet, Take 1 tablet (400 mg total) by mouth every 6 (six) hours as needed for mild pain or moderate pain, Disp: 30 tablet, Rfl: 0    Injection Device for Insulin (B-D PEN MINI) ENRICO, Use 4 (four) times a day Please use for Lantus and Humalog. 4 pen needles daily Dx: Diabetes, Disp: 400 each, Rfl: 0    insulin aspart (NovoLOG FlexPen) 100 UNIT/ML injection pen, Inject 5 Units under the skin 3 (three) times a day with meals, Disp: 15 mL, Rfl: 2    insulin detemir (LEVEMIR FLEXTOUCH) 100 Units/mL injection pen, Inject 25 Units under the skin daily at bedtime, Disp: 15 mL, Rfl: 2    Insulin Pen Needle (BD Pen Needle Em U/F) 32G X 4 MM MISC, Use daily, Disp: 100 each, Rfl: 0    lidocaine (Lidoderm) 5 %, Apply 1 patch topically over 12 hours daily Remove & Discard patch within 12 hours or as directed by MD, Disp: 5 patch, Rfl: 0    prochlorperazine (COMPAZINE) 10 mg tablet, 1 tab at onset of migraine, can repeat in 8 hours, can take with NSAID. Take with Benadryl if there are side effects. , Disp: 20 tablet, Rfl: 1    semaglutide, 2 mg/dose, (Ozempic) 8 mg/ mL injection pen, Inject 0.75 mL (2 mg total) under the skin every 7 days, Disp: 9 mL, Rfl: 1    topiramate (TOPAMAX) 50 MG tablet, Take 1 tablet (50 mg total) by mouth daily at bedtime, Disp: 90 tablet, Rfl: 1    naproxen (Naprosyn) 500 mg tablet, Take 1 tablet (500 mg total) by mouth 2 (two) times a day with meals (Patient not taking: Reported on 9/20/2023), Disp: 30 tablet, Rfl: 0    Review of Systems   Constitutional:  Negative for unexpected weight change. HENT:  Negative for hearing loss and voice change. Eyes:  Positive for visual disturbance (see HPI). Gastrointestinal:  Negative for constipation, diarrhea and nausea. Endocrine: Negative for polydipsia and polyuria. Musculoskeletal:  Negative for gait problem. Skin:  Positive for rash. Neurological:  Positive for headaches (gets with highs). Negative for tremors and weakness. Physical Exam:  Body mass index is 30.25 kg/m². /78 (BP Location: Left arm, Patient Position: Sitting, Cuff Size: Adult)   Ht 5' 5" (1.651 m)   Wt 82.5 kg (181 lb 12.8 oz)   BMI 30.25 kg/m²    Wt Readings from Last 3 Encounters:   11/02/23 82.5 kg (181 lb 12.8 oz)   09/28/23 82.1 kg (181 lb)   08/17/23 82.6 kg (182 lb)       Physical Exam   Gen: appears well-developed and well-nourished. No apparent distress. Head: Normocephalic and atraumatic. Eyes: no stare or proptosis, no periorbital edema  E/N/M nl facies, hearing grossly intact  Neck: range of motion nl. Pulmonary/Chest: breathing  comfortably, no accessory muscle use, effort normal.   Musculoskeletal: moves all 4 extremities, gait nl  Neurological: alert and oriented to person, place, and time.  No upper ext tremor appreciated  Skin: does not appear diaphoretic, no facial plethora  Psychiatric: normal mood and affect; behavior is normal; no gross lapses in memory, answer questions appropriately        Labs:   Lab Results   Component Value Date    HGBA1C 13.6 (A) 09/28/2023    HGBA1C 13.3 (A) 03/30/2023    HGBA1C 11.4 (H) 11/08/2022     Lab Results   Component Value Date    CREATININE 0.89 05/23/2023    CREATININE 0.94 11/09/2022    CREATININE 0.81 11/08/2022    BUN 9 05/23/2023     (L) 01/06/2014    K 4.2 05/23/2023     05/23/2023    CO2 24 05/23/2023     eGFR   Date Value Ref Range Status   05/23/2023 80 ml/min/1.73sq m Final     Lab Results   Component Value Date    HDL 66 03/18/2022    TRIG 83 03/18/2022     Lab Results   Component Value Date    ALT 9 05/23/2023    AST 10 (L) 05/23/2023 ALKPHOS 62 05/23/2023     Lab Results   Component Value Date    FMI6EAKHSLRR 2.230 03/18/2022    HMM6ENADRZWN 1.105 11/21/2020    MUA0XIQUWVSZ 2.900 09/23/2019         Impression & Plan:  1. Uncontrolled type 2 diabetes mellitus with hyperglycemia, without long-term current use of insulin (HCC)  Continuous Blood Gluc Sensor (Dexcom G7 Sensor)    Continuous Blood Gluc  (Dexcom G7 ) [address]    Ambulatory referral to Diabetic Education    Albumin / creatinine urine ratio    Basic metabolic panel    Hemoglobin A1C    Lipid panel Lab Collect Lab Collect    TSH, 3rd generation Lab Collect      2. Current use of insulin (HCC)  Continuous Blood Gluc Sensor (Dexcom G7 Sensor)    Continuous Blood Gluc  (Dexcom G7 ) [address]    Ambulatory referral to Diabetic Education    Albumin / creatinine urine ratio    Basic metabolic panel    Hemoglobin A1C    Lipid panel Lab Collect Lab Collect    TSH, 3rd generation Lab Collect          T2DM: She has several social and economic barriers to care at this time. She is taking her current regimen and has a place to store her medications safely. Meal selection is quite difficult given the difficulties in finding stable housing. Will send in RX for Dexcom G7 as this could improve her overall glycemic control. DM education for training is ordered. She will likely use her smartphone to scan her sensor and will then be able to share her data. Labs ordered for next f/u. Discussed with the patient and all questioned fully answered. She will call me if any problems arise.     Claudeen Slaughter, MD

## 2023-11-03 ENCOUNTER — OFFICE VISIT (OUTPATIENT)
Dept: DIABETES SERVICES | Facility: CLINIC | Age: 43
End: 2023-11-03
Payer: COMMERCIAL

## 2023-11-03 DIAGNOSIS — E11.65 UNCONTROLLED TYPE 2 DIABETES MELLITUS WITH HYPERGLYCEMIA, WITHOUT LONG-TERM CURRENT USE OF INSULIN (HCC): Primary | ICD-10-CM

## 2023-11-03 DIAGNOSIS — Z79.4 CURRENT USE OF INSULIN (HCC): ICD-10-CM

## 2023-11-03 PROCEDURE — 95249 CONT GLUC MNTR PT PROV EQP: CPT

## 2023-11-03 NOTE — PROGRESS NOTES
Dexcom G7 Personal Training    Met with Neo Perez for [employer] personal training. Patient comes in today with there own unit to be trained on. Completed all aspects of training, including site selection on rotation, not infusing insulin near the sensor site, proper insertion technique, inserting codes into the , charging, waterproof sensor, range of 20ft, setting high low alarms that can be adjusted based on their preferences. They put on their first sensor by themselves with no issue. Left my office today with sensor on and in 30 min warm up mode. Neo Perez will be running the dexcom through their . Discussed creating a Clarity account to be able to link and upload from home or auto upload from their phone. Patient was added to our clarity account today while in office and invited to link to us if using their phone. Patient understands that Joela Sal will be downloaded while in office at time of visit and/or cell phone will automatically upload to our clarity for them. They understand that their blood sugars are not monitored by us on a regular basis, but that we can access them as needed or desired by the patient and provider. Dexcom's phone number is in their paperwork, encouraged Kena Chacha to reach out to [facility] if they have any issues after hours, 24/7. Training completed, will call with questions.      Lab Results   Component Value Date    HGBA1C 13.6 (A) 09/28/2023       Lab Results   Component Value Date     (L) 01/06/2014    SODIUM 133 (L) 05/23/2023    K 4.2 05/23/2023     05/23/2023    CO2 24 05/23/2023    ANIONGAP 19 12/26/2015    AGAP 8 05/23/2023    BUN 9 05/23/2023    CREATININE 0.89 05/23/2023    GLUC 492 (H) 05/23/2023    GLUF 297 (H) 11/09/2022    CALCIUM 8.7 05/23/2023    AST 10 (L) 05/23/2023    ALT 9 05/23/2023    ALKPHOS 62 05/23/2023    TP 6.6 05/23/2023    TBILI 0.46 05/23/2023    EGFR 80 05/23/2023           Patient response to instruction    Comprehension: good  Motivation: good  Expected Compliance: good  Response to Teachback: 100%, demonstrated understanding    Start- Stop: 3:25-3:52  Total Minutes: 17 Minutes  Group or Individual Instruction: DSME  Other: Jaelyn Jon MD    Thank you for referring your patient to [facility], it was a pleasure working with them today. Please feel free to call with any questions or concerns.

## 2023-11-08 ENCOUNTER — TELEPHONE (OUTPATIENT)
Dept: NEUROLOGY | Facility: CLINIC | Age: 43
End: 2023-11-08

## 2023-11-08 NOTE — TELEPHONE ENCOUNTER
Called and left a voicemail for patient - Please call back to confirm upcoming appointment with [facility]. Provided patient with apt date, time and location. Informed patient that check in is at least 15 minutes prior to apt time.
normal...

## 2023-11-10 ENCOUNTER — PATIENT OUTREACH (OUTPATIENT)
Dept: FAMILY MEDICINE CLINIC | Facility: CLINIC | Age: 43
End: 2023-11-10

## 2023-11-10 NOTE — PROGRESS NOTES
Outpatient Care Management Note:    No response to telephone calls or unable to reach letter sent to patient. Patient closed to care management services, but CM will remain available if they call back. Re-consult as needed.

## 2023-11-21 ENCOUNTER — ANNUAL EXAM (OUTPATIENT)
Dept: OBGYN CLINIC | Facility: CLINIC | Age: 43
End: 2023-11-21

## 2023-11-21 VITALS
DIASTOLIC BLOOD PRESSURE: 84 MMHG | BODY MASS INDEX: 31.59 KG/M2 | HEART RATE: 85 BPM | HEIGHT: 65 IN | WEIGHT: 189.6 LBS | SYSTOLIC BLOOD PRESSURE: 134 MMHG

## 2023-11-21 DIAGNOSIS — Z59.00 HOMELESS: ICD-10-CM

## 2023-11-21 DIAGNOSIS — Z01.419 ENCOUNTER FOR ANNUAL ROUTINE GYNECOLOGICAL EXAMINATION: Primary | ICD-10-CM

## 2023-11-21 DIAGNOSIS — N63.10 MASS OF RIGHT BREAST, UNSPECIFIED QUADRANT: ICD-10-CM

## 2023-11-21 PROCEDURE — S0612 ANNUAL GYNECOLOGICAL EXAMINA: HCPCS | Performed by: NURSE PRACTITIONER

## 2023-11-21 SDOH — ECONOMIC STABILITY - HOUSING INSECURITY: HOMELESSNESS UNSPECIFIED: Z59.00

## 2023-11-21 NOTE — PATIENT INSTRUCTIONS
Viky from social work will call you  Schedule mammogram and R breast U/S  Call with needs or concerns  Return in 1 year

## 2023-11-23 ENCOUNTER — HOSPITAL ENCOUNTER (EMERGENCY)
Facility: HOSPITAL | Age: 43
Discharge: HOME | End: 2023-11-23
Attending: EMERGENCY MEDICINE
Payer: COMMERCIAL

## 2023-11-23 VITALS
DIASTOLIC BLOOD PRESSURE: 91 MMHG | RESPIRATION RATE: 18 BRPM | TEMPERATURE: 97.7 F | OXYGEN SATURATION: 98 % | HEART RATE: 93 BPM | SYSTOLIC BLOOD PRESSURE: 147 MMHG

## 2023-11-23 DIAGNOSIS — M62.838 TRAPEZIUS MUSCLE SPASM: Primary | ICD-10-CM

## 2023-11-23 PROCEDURE — 96372 THER/PROPH/DIAG INJ SC/IM: CPT

## 2023-11-23 PROCEDURE — 99284 EMERGENCY DEPT VISIT MOD MDM: CPT | Performed by: EMERGENCY MEDICINE

## 2023-11-23 PROCEDURE — 93005 ELECTROCARDIOGRAM TRACING: CPT

## 2023-11-23 PROCEDURE — 99284 EMERGENCY DEPT VISIT MOD MDM: CPT

## 2023-11-23 RX ORDER — ACETAMINOPHEN 325 MG/1
650 TABLET ORAL ONCE
Status: COMPLETED | OUTPATIENT
Start: 2023-11-23 | End: 2023-11-23

## 2023-11-23 RX ORDER — METHOCARBAMOL 500 MG/1
500 TABLET, FILM COATED ORAL ONCE
Status: COMPLETED | OUTPATIENT
Start: 2023-11-23 | End: 2023-11-23

## 2023-11-23 RX ORDER — KETOROLAC TROMETHAMINE 30 MG/ML
15 INJECTION, SOLUTION INTRAMUSCULAR; INTRAVENOUS ONCE
Status: COMPLETED | OUTPATIENT
Start: 2023-11-23 | End: 2023-11-23

## 2023-11-23 RX ORDER — LIDOCAINE 50 MG/G
1 PATCH TOPICAL ONCE
Status: DISCONTINUED | OUTPATIENT
Start: 2023-11-23 | End: 2023-11-24 | Stop reason: HOSPADM

## 2023-11-23 RX ORDER — METHOCARBAMOL 500 MG/1
500 TABLET, FILM COATED ORAL 2 TIMES DAILY
Qty: 20 TABLET | Refills: 0 | Status: SHIPPED | OUTPATIENT
Start: 2023-11-23

## 2023-11-23 RX ADMIN — LIDOCAINE 5% 1 PATCH: 700 PATCH TOPICAL at 22:33

## 2023-11-23 RX ADMIN — ACETAMINOPHEN 650 MG: 325 TABLET, FILM COATED ORAL at 22:33

## 2023-11-23 RX ADMIN — METHOCARBAMOL 500 MG: 500 TABLET ORAL at 22:33

## 2023-11-23 RX ADMIN — KETOROLAC TROMETHAMINE 15 MG: 30 INJECTION, SOLUTION INTRAMUSCULAR at 22:33

## 2023-11-23 NOTE — Clinical Note
Anisa Chambers was seen and treated in our emergency department on 11/23/2023. Diagnosis: muscle spasm    Barnetta Halsted  may return to work on return date. She may return on this date: 11/25/2023         If you have any questions or concerns, please don't hesitate to call.       Rosie Cruz, DO    ______________________________           _______________          _______________  Hospital Representative                              Date                                Time

## 2023-11-24 ENCOUNTER — PATIENT OUTREACH (OUTPATIENT)
Dept: OBGYN CLINIC | Facility: CLINIC | Age: 43
End: 2023-11-24

## 2023-11-24 ENCOUNTER — TELEPHONE (OUTPATIENT)
Dept: PHYSICAL THERAPY | Facility: OTHER | Age: 43
End: 2023-11-24

## 2023-11-24 NOTE — ED PROVIDER NOTES
History  Chief Complaint   Patient presents with    Neck Pain     Pt reports R sided neck pain that radiates down the R arm. Pt reports numbness/tingling in R shoulder. HPI  MDM  Pt is a 61-year-old female, presenting with right posterior neck pain radiating down to the arm for the last 4 days. 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 Denies any associated arm weakness or chest pain or shortness of breath. No nausea or vomiting. No trauma. Denies fever. Has been taking Motrin without much relief. Has history of left-sided C6-C7 disc protrusion    On exam   General: VSS, NAD, awake, alert. Talking normally. Head: Normocephalic, atraumatic, nontender. Eyes: EOM-No subconjunctival hemorrhages. ENT: Nose atraumatic. MMM  No malocclusion. No stridor. Normal phonation. No drooling. Normal swallowing. Neck: Trachea midline. No JVD. CV: RRR. Lungs: CTAB No tachypnea. No paradoxical motion. Abd: +BS, soft, NT/ND. No guarding/rigidity. MSK: Full ROM throughout. No lower extremity edema. Spasm right trapezius, mild right-sided paraspinal muscle tenderness. 5 out of 5 strength and sensory in all extremities. Skin: Dry, intact. Neuro: AAOx3, GCS 15, CN II-XII grossly intact. Motor/sensory grossly intact. Psychiatric/Behavioral: mood/affect normal; behavior normal; thought content normal; judgement normal   Exam: deferred      Ddx: MSK, muscle spasm    Plan: Patient's symptoms consistent with musculoskeletal origin, possible right trapezius spasm. No concern for cardiac issues. Patient EKG as interpreted by me was unremarkable with no acute changes. Patient was symptomatically treated with multimodal pain management including Tylenol Toradol, Robaxin, lidocaine patch. Patient was discharged with outpatient comprehensive spine follow-up. Given strict return precautions    Final Dispo: Pt is hemodynamically stable and clear for discharge with outpatient f/u with their PCP. Return precautions given pt verbalized understanding      Prior to Admission Medications   Prescriptions Last Dose Informant Patient Reported? Taking? Accu-Chek FastClix Lancets MISC  Self No No   Sig: Test BG up to 3x daily as directed   Accu-Chek Guide test strip  Self No No   Sig: TEST BG UP TO 3X DAILY AS DIRECTED   Continuous Blood Gluc  (Dexcom G7 ) ENRICO   No No   Sig: Use 1 Units daily   Continuous Blood Gluc Sensor (Dexcom G7 Sensor)   No No   Sig: Use 1 Device every 10 days   Injection Device for Insulin (B-D PEN MINI) ENRICO  Self No No   Sig: Use 4 (four) times a day Please use for Lantus and Humalog. 4 pen needles daily Dx: Diabetes   Insulin Pen Needle (BD Pen Needle Em U/F) 32G X 4 MM MISC  Self No No   Sig: Use daily   acetaminophen (TYLENOL) 500 mg tablet  Self No No   Sig: Take 1 tablet (500 mg total) by mouth every 6 (six) hours as needed for mild pain or moderate pain   gabapentin (NEURONTIN) 300 mg capsule  Self No No   Sig: Take 1 capsule (300 mg total) by mouth daily at bedtime   ibuprofen (MOTRIN) 400 mg tablet  Self No No   Sig: Take 1 tablet (400 mg total) by mouth every 6 (six) hours as needed for mild pain or moderate pain   insulin aspart (NovoLOG FlexPen) 100 UNIT/ML injection pen  Self No No   Sig: Inject 5 Units under the skin 3 (three) times a day with meals   insulin detemir (LEVEMIR FLEXTOUCH) 100 Units/mL injection pen  Self No No   Sig: Inject 25 Units under the skin daily at bedtime   lidocaine (Lidoderm) 5 %  Self No No   Sig: Apply 1 patch topically over 12 hours daily Remove & Discard patch within 12 hours or as directed by MD   naproxen (Naprosyn) 500 mg tablet  Self No No   Sig: Take 1 tablet (500 mg total) by mouth 2 (two) times a day with meals   Patient not taking: Reported on 2023   prochlorperazine (COMPAZINE) 10 mg tablet  Self No No   Si tab at onset of migraine, can repeat in 8 hours, can take with NSAID.  Take with Benadryl if there are side effects. semaglutide, 2 mg/dose, (Ozempic) 8 mg/ mL injection pen  Self No No   Sig: Inject 0.75 mL (2 mg total) under the skin every 7 days   topiramate (TOPAMAX) 50 MG tablet  Self No No   Sig: Take 1 tablet (50 mg total) by mouth daily at bedtime      Facility-Administered Medications: None       Past Medical History:   Diagnosis Date    Diabetes mellitus ([address])        Past Surgical History:   Procedure Laterality Date    HERNIA REPAIR      TUBAL LIGATION  2017       Family History   Problem Relation Age of Onset    Hypertension Mother     Arthritis Mother     Asthma Sister     Bipolar disorder Brother     Schizophrenia Brother     Asthma Brother     Asthma Brother     Diabetes Maternal Grandmother     Diabetes Paternal Grandmother     No Known Problems Maternal Grandfather     No Known Problems Paternal Grandfather     Breast cancer Neg Hx      I have reviewed and agree with the history as documented.     E-Cigarette/Vaping    E-Cigarette Use Never User      E-Cigarette/Vaping Substances    Nicotine No     THC No     CBD No     Flavoring No     Other No     Unknown No      Social History     Tobacco Use    Smoking status: Never    Smokeless tobacco: Never   Vaping Use    Vaping Use: Never used   Substance Use Topics    Alcohol use: Not Currently     Comment: ocassionally    Drug use: Never        Review of Systems    Physical Exam  ED Triage Vitals [11/23/23 2059]   Temperature Pulse Respirations Blood Pressure SpO2   97.7 °F (36.5 °C) 93 18 147/91 98 %      Temp Source Heart Rate Source Patient Position - Orthostatic VS BP Location FiO2 (%)   Temporal Monitor Sitting Right arm --      Pain Score       9             Orthostatic Vital Signs  Vitals:    11/23/23 2059   BP: 147/91   Pulse: 93   Patient Position - Orthostatic VS: Sitting       Physical Exam    ED Medications  Medications   acetaminophen (TYLENOL) tablet 650 mg (650 mg Oral Given 11/23/23 2233)   ketorolac (TORADOL) injection 15 mg (15 mg Intramuscular Given 11/23/23 2233)   methocarbamol (ROBAXIN) tablet 500 mg (500 mg Oral Given 11/23/23 2233)       Diagnostic Studies  Results Reviewed       None                   No orders to display         Procedures  Procedures      ED Course                             SBIRT 22yo+      Flowsheet Row Most Recent Value   Initial Alcohol Screen: US AUDIT-C     1. How often do you have a drink containing alcohol? 0 Filed at: 11/23/2023 2100   2. How many drinks containing alcohol do you have on a typical day you are drinking? 0 Filed at: 11/23/2023 2100   3b. FEMALE Any Age, or MALE 65+: How often do you have 4 or more drinks on one occassion? 0 Filed at: 11/23/2023 2100   Audit-C Score 0 Filed at: 11/23/2023 2100   FAMILIA: How many times in the past year have you. .. Used an illegal drug or used a prescription medication for non-medical reasons? Never Filed at: 11/23/2023 2100                  Medical Decision Making  Risk  OTC drugs. Prescription drug management. Disposition  Final diagnoses:   Trapezius muscle spasm     Time reflects when diagnosis was documented in both MDM as applicable and the Disposition within this note       Time User Action Codes Description Comment    11/23/2023 10:26 PM Shelby Pope Add [X00.787] Trapezius muscle spasm           ED Disposition       ED Disposition   Discharge    Condition   Stable    Date/Time   Thu Nov 23, 2023 [id number]  [facility].                    Follow-up Information       Follow up With Specialties Details Why Contact Info Additional [address] [facility] 101 In 1 week  [facility] [id number]  [facility], [facility] [address], [address] [place], [place], [address] Emergency Department Emergency Medicine  If symptoms worsen Ollie Whitney   [facility] [address] [id number]  [facility] Emergency Department, [address] [facility],[address] [place] [address], [facility]            Discharge Medication List as of 11/23/2023 11:11 PM        START taking these medications    Details   methocarbamol (ROBAXIN) 500 mg tablet Take 1 tablet (500 mg total) by mouth 2 (two) times a day, Starting Thu 11/23/2023, Normal           CONTINUE these medications which have NOT CHANGED    Details   Accu-Chek FastClix Lancets MISC Test BG up to 3x daily as directed, Normal      Accu-Chek Guide test strip TEST BG UP TO 3X DAILY AS DIRECTED, Normal      acetaminophen (TYLENOL) 500 mg tablet Take 1 tablet (500 mg total) by mouth every 6 (six) hours as needed for mild pain or moderate pain, Starting Sun 8/13/2023, Print      Continuous Blood Gluc  (Dexcom G7 ) ENRICO Use 1 Units daily, Starting Thu 11/2/2023, Normal      Continuous Blood Gluc Sensor (Dexcom G7 Sensor) Use 1 Device every 10 days, Starting u 11/2/2023, Normal      gabapentin (NEURONTIN) 300 mg capsule Take 1 capsule (300 mg total) by mouth daily at bedtime, Starting Fri 6/10/2022, Normal      ibuprofen (MOTRIN) 400 mg tablet Take 1 tablet (400 mg total) by mouth every 6 (six) hours as needed for mild pain or moderate pain, Starting Sun 8/13/2023, Print      Injection Device for Insulin (B-D PEN MINI) ENRICO Use 4 (four) times a day Please use for Lantus and Humalog.  4 pen needles daily Dx: Diabetes, Starting Thu 3/18/2021, Normal      insulin aspart (NovoLOG FlexPen) 100 UNIT/ML injection pen Inject 5 Units under the skin 3 (three) times a day with meals, Starting Thu 9/28/2023, Normal      insulin detemir (LEVEMIR FLEXTOUCH) 100 Units/mL injection pen Inject 25 Units under the skin daily at bedtime, Starting Thu 3/30/2023, Normal      Insulin Pen Needle (BD Pen Needle Em U/F) 32G X 4 MM MISC Use daily, Starting Wed 10/4/2023, Normal      lidocaine (Lidoderm) 5 % Apply 1 patch topically over 12 hours daily Remove & Discard patch within 12 hours or as directed by MD, Starting Tue 5/23/2023, Normal      naproxen (Naprosyn) 500 mg tablet Take 1 tablet (500 mg total) by mouth 2 (two) times a day with meals, Starting Tue 5/23/2023, Normal      prochlorperazine (COMPAZINE) 10 mg tablet 1 tab at onset of migraine, can repeat in 8 hours, can take with NSAID. Take with Benadryl if there are side effects., Normal      semaglutide, 2 mg/dose, (Ozempic) 8 mg/ mL injection pen Inject 0.75 mL (2 mg total) under the skin every 7 days, Starting Thu 9/28/2023, Normal      topiramate (TOPAMAX) 50 MG tablet Take 1 tablet (50 mg total) by mouth daily at bedtime, Starting Tue 5/16/2023, Normal               PDMP Review         Value Time User    PDMP Reviewed  Yes 7/20/2021  8:33 AM Esperanza Olivares III, DO             ED Provider  Attending physically available and evaluated Delia Owusu. I managed the patient along with the ED Attending.     Electronically Signed by           Amanda Rodriguez DO  11/24/23 [id number]

## 2023-11-24 NOTE — TELEPHONE ENCOUNTER
Call placed to the patient per [facility] referral.    Voice message left for patient to call back. Phone number and hours of business provided. This is the 1st attempt to reach the patient. Will defer per protocol.     NOTE:   Patient is established with PM, Dr Zhanna Lerma and [facility] for cervical pain, Last appt's were in 2022  Also had PT in 2022 for cervical

## 2023-11-24 NOTE — PROGRESS NOTES
SERAFIN BONNER attempted to contact pt for concerns of being homeless. Pt did not answer the call. SERAFIN BONNER left a voicemail with contact information for a return call.

## 2023-11-24 NOTE — ED ATTENDING ATTESTATION
11/23/2023  Anuja Duvall DO, saw and evaluated the patient. I have discussed the patient with the resident/non-physician practitioner and agree with the resident's/non-physician practitioner's findings, Plan of Care, and MDM as documented in the resident's/non-physician practitioner's note, except where noted. All available labs and Radiology studies were reviewed. I was present for key portions of any procedure(s) performed by the resident/non-physician practitioner and I was immediately available to provide assistance. At this point I agree with the current assessment done in the Emergency Department. 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 No numbness/tingling/weakness in R arm. No recent trauma/injury. Symptoms started while she wasn't doing anything in particular. Reviewed MRI from may 2022 showing C6-C7 disc bulge with moderate central canal stenosis, moderate LEFT foraminal stenosis. She had a RIGHT brachial plexus MRI at that time which was normal.    RUE:  Skin intact  SILT ax/m/u/r  Motor intact ain/pin/m/u/r  2+ radial  TTP over trapezius ridge. Imp: R trapezius pain/hypertonicity/spasm plan: MMA, PT/comp spine f/u.       ED Course         Critical Care Time  Procedures

## 2023-11-26 LAB
ATRIAL RATE: 80 BPM
P AXIS: 53 DEGREES
PR INTERVAL: 134 MS
QRS AXIS: -17 DEGREES
QRSD INTERVAL: 76 MS
QT INTERVAL: 348 MS
QTC INTERVAL: 401 MS
T WAVE AXIS: -1 DEGREES
VENTRICULAR RATE: 80 BPM

## 2023-11-28 NOTE — TELEPHONE ENCOUNTER
Pt returned call to comp spine. She declined triage to PT. She is established with PM, Dr Kaia Mcgregor and [facility]. She would like to follow up with one of those specialties.      [facility] spine closed

## 2023-12-01 ENCOUNTER — PATIENT OUTREACH (OUTPATIENT)
Dept: OBGYN CLINIC | Facility: CLINIC | Age: 43
End: 2023-12-01

## 2023-12-04 ENCOUNTER — PATIENT OUTREACH (OUTPATIENT)
Dept: OBGYN CLINIC | Facility: CLINIC | Age: 43
End: 2023-12-04

## 2023-12-04 NOTE — PROGRESS NOTES
FLORIDALMA BONNER called pt to address concerns of homelessness. Pt is a 43 yo Single  Female. Pt reports that she has 3 children. Pt reports that she just "got the keys for my new place". Pt denies concerns for homeless and reports that she is still working full time. Pt reports that she is moving in soon and is happy. Pt denies concerns for transportation as she has her own car and drives. FLORIDALMA BONNER spoke with pt about food insecurity. Pt reports that she was denied SNAP due to income. Pt spoke about concern for food stability. FLORIDALMA BONNER spoke with pt about findhelp resource. FLORIDALMA BONNER gathered food pantry resources and has messaged pt in basket with resources. FLORIDALMA BONNER provided contact information and encouraged pt to contact Floridalma BONNER for support or resource concerns. FLORIDALMA BONNER will otherwise close this referral at this time.

## 2023-12-09 ENCOUNTER — OFFICE VISIT (OUTPATIENT)
Dept: URGENT CARE | Facility: MEDICAL CENTER | Age: 43
End: 2023-12-09
Payer: COMMERCIAL

## 2023-12-09 VITALS
OXYGEN SATURATION: 99 % | TEMPERATURE: 99.4 F | HEART RATE: 90 BPM | RESPIRATION RATE: 18 BRPM | DIASTOLIC BLOOD PRESSURE: 62 MMHG | SYSTOLIC BLOOD PRESSURE: 129 MMHG

## 2023-12-09 DIAGNOSIS — J06.9 VIRAL URI WITH COUGH: Primary | ICD-10-CM

## 2023-12-09 PROCEDURE — 99213 OFFICE O/P EST LOW 20 MIN: CPT

## 2023-12-09 NOTE — PROGRESS NOTES
[facility]        NAME: Aure Hunt is a 44 y.o. female  : 1980    MRN: [id number]  DATE: 2023  TIME: 5:06 PM    Assessment and Plan   Viral URI with cough [J06.9]  1. Viral URI with cough  dextromethorphan-guaifenesin (MUCINEX DM)  MG per 12 hr tablet            Patient Instructions     Your symptoms are consistent with a viral illness. For nasal/sinus congestion you can try steam, warm compresses, saline nasal spray, Neti pot, nasal steroid (Flonase, Nasocort), or nasal decongestant (Afrin - for 3 days only). You can try a decongestant (Sudafed) if > 10years of age and no history of high blood pressure. For cough you can take an over-the-counter expectorant such as plain Robitussion or Mucinex. A spoonful of honey at bedtime may also be helpful. For cold symptoms with high blood pressure take Coricidin cough/cold. For sore throat you can use Cepacol lozenges, do warm salt water gargles, drink warm water with lemon or herbal teas, or use an over-the-counter throat spray (Chloraseptic). You can take ibuprofen/Motrin and acetaminophen/Tylenol as needed for pain, fever, body aches. Do not take ibuprofen/Motrin/Advil if you have a history of heart disease, bleeding ulcers, or if you take blood thinners. Drink plenty of fluids to stay hydrated. Follow up with your PCP in 5-7 days for persistent symptoms. Go to the ER if symptoms worsen. Chief Complaint     Chief Complaint   Patient presents with    Cold Like Symptoms     Patient c/o nasal congestion and fever x 2-3 days          History of Present Illness       79-year-old female presenting with cold-like symptoms x2-3 days. Patient reports fevers Tmax 101, chills, headaches, nasal congestion, and a mild dry cough. Notes some SOB with coughing episodes. Occasional nosebleeds - started using heat in her home. Vomited a few times. No diarrhea. Denies history of asthma. Non-smoker.  Taking Motrin for fever control. Review of Systems   Review of Systems   Constitutional:  Positive for chills, fatigue and fever. HENT:  Positive for congestion. Negative for ear pain, rhinorrhea, sinus pressure and sore throat. Eyes:  Negative for discharge and redness. Respiratory:  Positive for cough and shortness of breath. Negative for chest tightness and wheezing. Cardiovascular:  Negative for chest pain and palpitations. Gastrointestinal:  Positive for vomiting. Negative for abdominal pain and diarrhea. Genitourinary:  Negative for dysuria, frequency and urgency. Skin:  Negative for pallor and rash. Neurological:  Positive for headaches. Negative for dizziness and light-headedness. Current Medications       Current Outpatient Medications:     dextromethorphan-guaifenesin (MUCINEX DM)  MG per 12 hr tablet, Take 1 tablet by mouth every 12 (twelve) hours for 7 days, Disp: 14 tablet, Rfl: 0    Accu-Chek FastClix Lancets MISC, Test BG up to 3x daily as directed, Disp: 300 each, Rfl: 1    Accu-Chek Guide test strip, TEST BG UP TO 3X DAILY AS DIRECTED, Disp: 100 each, Rfl: 1    acetaminophen (TYLENOL) 500 mg tablet, Take 1 tablet (500 mg total) by mouth every 6 (six) hours as needed for mild pain or moderate pain, Disp: 30 tablet, Rfl: 0    Continuous Blood Gluc  (Dexcom G7 ) ENRICO, Use 1 Units daily, Disp: 1 each, Rfl: 1    Continuous Blood Gluc Sensor (Dexcom G7 Sensor), Use 1 Device every 10 days, Disp: 9 each, Rfl: 3    gabapentin (NEURONTIN) 300 mg capsule, Take 1 capsule (300 mg total) by mouth daily at bedtime, Disp: 90 capsule, Rfl: 2    ibuprofen (MOTRIN) 400 mg tablet, Take 1 tablet (400 mg total) by mouth every 6 (six) hours as needed for mild pain or moderate pain, Disp: 30 tablet, Rfl: 0    Injection Device for Insulin (B-D PEN MINI) ENRICO, Use 4 (four) times a day Please use for Lantus and Humalog.  4 pen needles daily Dx: Diabetes, Disp: 400 each, Rfl: 0    insulin aspart (NovoLOG FlexPen) 100 UNIT/ML injection pen, Inject 5 Units under the skin 3 (three) times a day with meals, Disp: 15 mL, Rfl: 2    insulin detemir (LEVEMIR FLEXTOUCH) 100 Units/mL injection pen, Inject 25 Units under the skin daily at bedtime, Disp: 15 mL, Rfl: 2    Insulin Pen Needle (BD Pen Needle Em U/F) 32G X 4 MM MISC, Use daily, Disp: 100 each, Rfl: 0    lidocaine (Lidoderm) 5 %, Apply 1 patch topically over 12 hours daily Remove & Discard patch within 12 hours or as directed by MD, Disp: 5 patch, Rfl: 0    methocarbamol (ROBAXIN) 500 mg tablet, Take 1 tablet (500 mg total) by mouth 2 (two) times a day, Disp: 20 tablet, Rfl: 0    naproxen (Naprosyn) 500 mg tablet, Take 1 tablet (500 mg total) by mouth 2 (two) times a day with meals (Patient not taking: Reported on 9/20/2023), Disp: 30 tablet, Rfl: 0    prochlorperazine (COMPAZINE) 10 mg tablet, 1 tab at onset of migraine, can repeat in 8 hours, can take with NSAID. Take with Benadryl if there are side effects. , Disp: 20 tablet, Rfl: 1    semaglutide, 2 mg/dose, (Ozempic) 8 mg/ mL injection pen, Inject 0.75 mL (2 mg total) under the skin every 7 days, Disp: 9 mL, Rfl: 1    topiramate (TOPAMAX) 50 MG tablet, Take 1 tablet (50 mg total) by mouth daily at bedtime, Disp: 90 tablet, Rfl: 1    Current Allergies     Allergies as of 12/09/2023 - Reviewed 12/09/2023   Allergen Reaction Noted    Metformin and related Hives 02/29/2016            The following portions of the patient's history were reviewed and updated as appropriate: allergies, current medications, past family history, past medical history, past social history, past surgical history and problem list.     Past Medical History:   Diagnosis Date    Diabetes mellitus ([address])        Past Surgical History:   Procedure Laterality Date    HERNIA REPAIR      TUBAL LIGATION  2017       Family History   Problem Relation Age of Onset    Hypertension Mother     Arthritis Mother     Asthma Sister     Bipolar disorder Brother     Schizophrenia Brother     Asthma Brother     Asthma Brother     Diabetes Maternal Grandmother     Diabetes Paternal Grandmother     No Known Problems Maternal Grandfather     No Known Problems Paternal Grandfather     Breast cancer Neg Hx          Medications have been verified. Objective   /62   Pulse 90   Temp 99.4 °F (37.4 °C)   Resp 18   LMP 10/16/2023 (Approximate)   SpO2 99%        Physical Exam     Physical Exam  Vitals and nursing note reviewed. Constitutional:       General: She is not in acute distress. Appearance: She is not ill-appearing or diaphoretic. HENT:      Head: Normocephalic. Right Ear: Tympanic membrane, ear canal and external ear normal.      Left Ear: Tympanic membrane, ear canal and external ear normal.      Nose: Congestion present. Mouth/Throat:      Mouth: Mucous membranes are moist.      Pharynx: Oropharynx is clear. No oropharyngeal exudate or posterior oropharyngeal erythema. Eyes:      Conjunctiva/sclera: Conjunctivae normal.      Pupils: Pupils are equal, round, and reactive to light. Cardiovascular:      Rate and Rhythm: Normal rate and regular rhythm. Heart sounds: Normal heart sounds. Pulmonary:      Effort: Pulmonary effort is normal.      Breath sounds: Normal breath sounds. Musculoskeletal:         General: Normal range of motion. Cervical back: Normal range of motion and neck supple. Lymphadenopathy:      Cervical: No cervical adenopathy. Skin:     General: Skin is warm and dry. Capillary Refill: Capillary refill takes less than 2 seconds. Neurological:      Mental Status: She is alert and oriented to person, place, and time.

## 2023-12-09 NOTE — LETTER
December 9, 2023     Patient: Constanza Cassidy   YOB: 1980   Date of Visit: 12/9/2023       To Whom it May Concern:    Constanza Cassidy was seen in my clinic on 12/9/2023. She may return to work on 12/12/2023 . If you have any questions or concerns, please don't hesitate to call.          Sincerely,          Normajean Bumpers, CRNP        CC: No Recipients

## 2023-12-09 NOTE — PATIENT INSTRUCTIONS
Your symptoms are consistent with a viral illness. For nasal/sinus congestion you can try steam, warm compresses, saline nasal spray, Neti pot, nasal steroid (Flonase, Nasocort), or nasal decongestant (Afrin - for 3 days only). You can try a decongestant (Sudafed) if > 10years of age and no history of high blood pressure. For cough you can take an over-the-counter expectorant such as plain Robitussion or Mucinex. A spoonful of honey at bedtime may also be helpful. For cold symptoms with high blood pressure take Coricidin cough/cold. For sore throat you can use Cepacol lozenges, do warm salt water gargles, drink warm water with lemon or herbal teas, or use an over-the-counter throat spray (Chloraseptic). You can take ibuprofen/Motrin and acetaminophen/Tylenol as needed for pain, fever, body aches. Do not take ibuprofen/Motrin/Advil if you have a history of heart disease, bleeding ulcers, or if you take blood thinners. Drink plenty of fluids to stay hydrated. Follow up with your PCP in 5-7 days for persistent symptoms. Go to the ER if symptoms worsen.

## 2023-12-18 ENCOUNTER — APPOINTMENT (EMERGENCY)
Dept: CT IMAGING | Facility: HOSPITAL | Age: 43
End: 2023-12-18
Payer: COMMERCIAL

## 2023-12-18 ENCOUNTER — HOSPITAL ENCOUNTER (EMERGENCY)
Facility: HOSPITAL | Age: 43
Discharge: HOME | End: 2023-12-18
Attending: EMERGENCY MEDICINE
Payer: COMMERCIAL

## 2023-12-18 VITALS
TEMPERATURE: 98.8 F | BODY MASS INDEX: 30.56 KG/M2 | OXYGEN SATURATION: 100 % | HEART RATE: 82 BPM | WEIGHT: 183.64 LBS | SYSTOLIC BLOOD PRESSURE: 122 MMHG | DIASTOLIC BLOOD PRESSURE: 58 MMHG | RESPIRATION RATE: 18 BRPM

## 2023-12-18 DIAGNOSIS — N76.0 BV (BACTERIAL VAGINOSIS): ICD-10-CM

## 2023-12-18 DIAGNOSIS — R10.9 ABDOMINAL PAIN: ICD-10-CM

## 2023-12-18 DIAGNOSIS — A59.9 TRICHOMONIASIS: ICD-10-CM

## 2023-12-18 DIAGNOSIS — R11.10 VOMITING: ICD-10-CM

## 2023-12-18 DIAGNOSIS — B96.89 BV (BACTERIAL VAGINOSIS): ICD-10-CM

## 2023-12-18 DIAGNOSIS — E83.42 HYPOMAGNESEMIA: ICD-10-CM

## 2023-12-18 DIAGNOSIS — N89.8 VAGINAL DISCHARGE: Primary | ICD-10-CM

## 2023-12-18 LAB
ALBUMIN SERPL BCP-MCNC: 3.3 G/DL (ref 3.5–5)
ALP SERPL-CCNC: 61 U/L (ref 34–104)
ALT SERPL W P-5'-P-CCNC: 9 U/L (ref 7–52)
ANION GAP SERPL CALCULATED.3IONS-SCNC: 5 MMOL/L
AST SERPL W P-5'-P-CCNC: 12 U/L (ref 13–39)
BACTERIA UR QL AUTO: ABNORMAL /HPF
BASOPHILS # BLD AUTO: 0.03 THOUSANDS/ÂΜL (ref 0–0.1)
BASOPHILS NFR BLD AUTO: 0 % (ref 0–1)
BILIRUB SERPL-MCNC: 0.63 MG/DL (ref 0.2–1)
BILIRUB UR QL STRIP: NEGATIVE
BUN SERPL-MCNC: 10 MG/DL (ref 5–25)
CALCIUM ALBUM COR SERPL-MCNC: 9 MG/DL (ref 8.3–10.1)
CALCIUM SERPL-MCNC: 8.4 MG/DL (ref 8.4–10.2)
CHLORIDE SERPL-SCNC: 107 MMOL/L (ref 96–108)
CLARITY UR: CLEAR
CO2 SERPL-SCNC: 24 MMOL/L (ref 21–32)
COLOR UR: YELLOW
CREAT SERPL-MCNC: 0.75 MG/DL (ref 0.6–1.3)
EOSINOPHIL # BLD AUTO: 0.16 THOUSAND/ÂΜL (ref 0–0.61)
EOSINOPHIL NFR BLD AUTO: 2 % (ref 0–6)
ERYTHROCYTE [DISTWIDTH] IN BLOOD BY AUTOMATED COUNT: 12.5 % (ref 11.6–15.1)
EXT PREGNANCY TEST URINE: NEGATIVE
EXT. CONTROL: NORMAL
GFR SERPL CREATININE-BSD FRML MDRD: 98 ML/MIN/1.73SQ M
GLUCOSE SERPL-MCNC: 159 MG/DL (ref 65–140)
GLUCOSE UR STRIP-MCNC: ABNORMAL MG/DL
HCG SERPL QL: NEGATIVE
HCT VFR BLD AUTO: 38.7 % (ref 34.8–46.1)
HGB BLD-MCNC: 12.4 G/DL (ref 11.5–15.4)
HGB UR QL STRIP.AUTO: NEGATIVE
HYALINE CASTS #/AREA URNS LPF: ABNORMAL /LPF
IMM GRANULOCYTES # BLD AUTO: 0.05 THOUSAND/UL (ref 0–0.2)
IMM GRANULOCYTES NFR BLD AUTO: 1 % (ref 0–2)
KETONES UR STRIP-MCNC: NEGATIVE MG/DL
LEUKOCYTE ESTERASE UR QL STRIP: ABNORMAL
LYMPHOCYTES # BLD AUTO: 3.75 THOUSANDS/ÂΜL (ref 0.6–4.47)
LYMPHOCYTES NFR BLD AUTO: 34 % (ref 14–44)
MAGNESIUM SERPL-MCNC: 1.7 MG/DL (ref 1.9–2.7)
MCH RBC QN AUTO: 28.6 PG (ref 26.8–34.3)
MCHC RBC AUTO-ENTMCNC: 32 G/DL (ref 31.4–37.4)
MCV RBC AUTO: 89 FL (ref 82–98)
MONOCYTES # BLD AUTO: 0.91 THOUSAND/ÂΜL (ref 0.17–1.22)
MONOCYTES NFR BLD AUTO: 8 % (ref 4–12)
MUCOUS THREADS UR QL AUTO: ABNORMAL
NEUTROPHILS # BLD AUTO: 5.99 THOUSANDS/ÂΜL (ref 1.85–7.62)
NEUTS SEG NFR BLD AUTO: 55 % (ref 43–75)
NITRITE UR QL STRIP: NEGATIVE
NON-SQ EPI CELLS URNS QL MICRO: ABNORMAL /HPF
NRBC BLD AUTO-RTO: 0 /100 WBCS
PH UR STRIP.AUTO: 5.5 [PH]
PLATELET # BLD AUTO: 344 THOUSANDS/UL (ref 149–390)
PMV BLD AUTO: 8.7 FL (ref 8.9–12.7)
POTASSIUM SERPL-SCNC: 3.6 MMOL/L (ref 3.5–5.3)
PROT SERPL-MCNC: 6.4 G/DL (ref 6.4–8.4)
PROT UR STRIP-MCNC: ABNORMAL MG/DL
RBC # BLD AUTO: 4.34 MILLION/UL (ref 3.81–5.12)
RBC #/AREA URNS AUTO: ABNORMAL /HPF
SODIUM SERPL-SCNC: 136 MMOL/L (ref 135–147)
SP GR UR STRIP.AUTO: 1.03 (ref 1–1.03)
UROBILINOGEN UR STRIP-ACNC: <2 MG/DL
WBC # BLD AUTO: 10.89 THOUSAND/UL (ref 4.31–10.16)
WBC #/AREA URNS AUTO: ABNORMAL /HPF

## 2023-12-18 PROCEDURE — 84703 CHORIONIC GONADOTROPIN ASSAY: CPT | Performed by: EMERGENCY MEDICINE

## 2023-12-18 PROCEDURE — 96361 HYDRATE IV INFUSION ADD-ON: CPT

## 2023-12-18 PROCEDURE — 87591 N.GONORRHOEAE DNA AMP PROB: CPT | Performed by: EMERGENCY MEDICINE

## 2023-12-18 PROCEDURE — 99284 EMERGENCY DEPT VISIT MOD MDM: CPT

## 2023-12-18 PROCEDURE — 81001 URINALYSIS AUTO W/SCOPE: CPT | Performed by: EMERGENCY MEDICINE

## 2023-12-18 PROCEDURE — 87491 CHLMYD TRACH DNA AMP PROBE: CPT | Performed by: EMERGENCY MEDICINE

## 2023-12-18 PROCEDURE — 96374 THER/PROPH/DIAG INJ IV PUSH: CPT

## 2023-12-18 PROCEDURE — 99285 EMERGENCY DEPT VISIT HI MDM: CPT | Performed by: EMERGENCY MEDICINE

## 2023-12-18 PROCEDURE — 83735 ASSAY OF MAGNESIUM: CPT | Performed by: EMERGENCY MEDICINE

## 2023-12-18 PROCEDURE — 85025 COMPLETE CBC W/AUTO DIFF WBC: CPT | Performed by: EMERGENCY MEDICINE

## 2023-12-18 PROCEDURE — G1004 CDSM NDSC: HCPCS

## 2023-12-18 PROCEDURE — 80053 COMPREHEN METABOLIC PANEL: CPT | Performed by: EMERGENCY MEDICINE

## 2023-12-18 PROCEDURE — 81514 NFCT DS BV&VAGINITIS DNA ALG: CPT | Performed by: EMERGENCY MEDICINE

## 2023-12-18 PROCEDURE — 81025 URINE PREGNANCY TEST: CPT | Performed by: EMERGENCY MEDICINE

## 2023-12-18 PROCEDURE — 36415 COLL VENOUS BLD VENIPUNCTURE: CPT | Performed by: EMERGENCY MEDICINE

## 2023-12-18 PROCEDURE — 96376 TX/PRO/DX INJ SAME DRUG ADON: CPT

## 2023-12-18 PROCEDURE — 74177 CT ABD & PELVIS W/CONTRAST: CPT

## 2023-12-18 PROCEDURE — 96375 TX/PRO/DX INJ NEW DRUG ADDON: CPT

## 2023-12-18 RX ORDER — DICYCLOMINE HCL 20 MG
20 TABLET ORAL 2 TIMES DAILY PRN
Qty: 4 TABLET | Refills: 0 | Status: SHIPPED | OUTPATIENT
Start: 2023-12-18 | End: 2023-12-20

## 2023-12-18 RX ORDER — ONDANSETRON 4 MG/1
4 TABLET, ORALLY DISINTEGRATING ORAL EVERY 6 HOURS PRN
Qty: 16 TABLET | Refills: 0 | Status: SHIPPED | OUTPATIENT
Start: 2023-12-18 | End: 2023-12-22

## 2023-12-18 RX ORDER — ONDANSETRON 2 MG/ML
4 INJECTION INTRAMUSCULAR; INTRAVENOUS ONCE
Status: COMPLETED | OUTPATIENT
Start: 2023-12-18 | End: 2023-12-18

## 2023-12-18 RX ORDER — KETOROLAC TROMETHAMINE 30 MG/ML
30 INJECTION, SOLUTION INTRAMUSCULAR; INTRAVENOUS ONCE
Status: COMPLETED | OUTPATIENT
Start: 2023-12-18 | End: 2023-12-18

## 2023-12-18 RX ORDER — DICYCLOMINE HCL 20 MG
20 TABLET ORAL ONCE
Status: COMPLETED | OUTPATIENT
Start: 2023-12-18 | End: 2023-12-18

## 2023-12-18 RX ORDER — LANOLIN ALCOHOL/MO/W.PET/CERES
800 CREAM (GRAM) TOPICAL ONCE
Status: COMPLETED | OUTPATIENT
Start: 2023-12-18 | End: 2023-12-18

## 2023-12-18 RX ADMIN — ONDANSETRON 4 MG: 2 INJECTION INTRAMUSCULAR; INTRAVENOUS at 18:56

## 2023-12-18 RX ADMIN — IOHEXOL 100 ML: 350 INJECTION, SOLUTION INTRAVENOUS at 21:37

## 2023-12-18 RX ADMIN — KETOROLAC TROMETHAMINE 30 MG: 30 INJECTION, SOLUTION INTRAMUSCULAR; INTRAVENOUS at 20:37

## 2023-12-18 RX ADMIN — SODIUM CHLORIDE 1000 ML: 0.9 INJECTION, SOLUTION INTRAVENOUS at 18:55

## 2023-12-18 RX ADMIN — ONDANSETRON 4 MG: 2 INJECTION INTRAMUSCULAR; INTRAVENOUS at 23:30

## 2023-12-18 RX ADMIN — MAGNESIUM OXIDE TAB 400 MG (241.3 MG ELEMENTAL MG) 800 MG: 400 (241.3 MG) TAB at 21:31

## 2023-12-18 RX ADMIN — DICYCLOMINE HYDROCHLORIDE 20 MG: 20 TABLET ORAL at 23:32

## 2023-12-18 NOTE — ED PROVIDER NOTES
"History  Chief Complaint   Patient presents with    Vaginal Discharge     Pt c/o \"watery vaginal discharge\" x1 week. Denies itchiness and order. Pt also c/o n/v x1 day    Vomiting     Patient is a 43-year-old female otherwise healthy coming in today complaining of vaginal discharge that started about a week ago.  She states that the discharge is clear and watery.  There is no yellow-greenish discharge.  There is no itching or burning.  She has no dysuria urinary frequency or hematuria.  She is sexually active with 1 partner.  She has never had an STD or abnormal Pap smear.  She states that there last week she was sick with an upper respiratory infection and was not on any antibiotics.    Patient also reports that she woke up today and has persistent nausea with constant stabbing sensation to the right lower quadrant.  She has no abdominal surgeries.  She reports that she has been vomiting throughout the day without any hematemesis or coffee-ground emesis.  She is unable to tolerate any p.o.      History provided by:  Patient and medical records   used: No    Abdominal Pain  Pain location:  RLQ  Pain quality: sharp and stabbing    Pain radiates to:  Does not radiate  Pain severity:  Moderate  Onset quality:  Gradual  Duration: hours.  Timing:  Constant  Progression:  Waxing and waning  Chronicity:  New  Context: not alcohol use, not awakening from sleep, not diet changes, not eating, not laxative use, not medication withdrawal, not previous surgeries, not recent illness, not recent sexual activity, not recent travel, not retching, not sick contacts, not suspicious food intake and not trauma    Relieved by:  Nothing  Worsened by:  Nothing  Ineffective treatments:  None tried  Associated symptoms: anorexia, nausea, vaginal discharge and vomiting    Associated symptoms: no belching, no chest pain, no chills, no constipation, no cough, no diarrhea, no dysuria, no fatigue, no fever, no flatus, no " hematemesis, no hematochezia, no hematuria, no melena, no shortness of breath, no sore throat and no vaginal bleeding    Risk factors: no alcohol abuse, no aspirin use, not elderly, has not had multiple surgeries, no NSAID use, not obese and no recent hospitalization        Prior to Admission Medications   Prescriptions Last Dose Informant Patient Reported? Taking?   Accu-Chek FastClix Lancets MISC  Self No No   Sig: Test BG up to 3x daily as directed   Accu-Chek Guide test strip  Self No No   Sig: TEST BG UP TO 3X DAILY AS DIRECTED   Continuous Blood Gluc  (Dexcom G7 ) ENRICO   No No   Sig: Use 1 Units daily   Continuous Blood Gluc Sensor (Dexcom G7 Sensor)   No No   Sig: Use 1 Device every 10 days   Injection Device for Insulin (B-D PEN MINI) ENRICO  Self No No   Sig: Use 4 (four) times a day Please use for Lantus and Humalog. 4 pen needles daily Dx: Diabetes   Insulin Pen Needle (BD Pen Needle Em U/F) 32G X 4 MM MISC  Self No No   Sig: Use daily   acetaminophen (TYLENOL) 500 mg tablet  Self No No   Sig: Take 1 tablet (500 mg total) by mouth every 6 (six) hours as needed for mild pain or moderate pain   gabapentin (NEURONTIN) 300 mg capsule  Self No No   Sig: Take 1 capsule (300 mg total) by mouth daily at bedtime   ibuprofen (MOTRIN) 400 mg tablet  Self No No   Sig: Take 1 tablet (400 mg total) by mouth every 6 (six) hours as needed for mild pain or moderate pain   insulin aspart (NovoLOG FlexPen) 100 UNIT/ML injection pen  Self No No   Sig: Inject 5 Units under the skin 3 (three) times a day with meals   insulin detemir (LEVEMIR FLEXTOUCH) 100 Units/mL injection pen  Self No No   Sig: Inject 25 Units under the skin daily at bedtime   lidocaine (Lidoderm) 5 %  Self No No   Sig: Apply 1 patch topically over 12 hours daily Remove & Discard patch within 12 hours or as directed by MD   methocarbamol (ROBAXIN) 500 mg tablet   No No   Sig: Take 1 tablet (500 mg total) by mouth 2 (two) times a day    naproxen (Naprosyn) 500 mg tablet  Self No No   Sig: Take 1 tablet (500 mg total) by mouth 2 (two) times a day with meals   Patient not taking: Reported on 2023   prochlorperazine (COMPAZINE) 10 mg tablet  Self No No   Si tab at onset of migraine, can repeat in 8 hours, can take with NSAID. Take with Benadryl if there are side effects.   semaglutide, 2 mg/dose, (Ozempic) 8 mg/ mL injection pen  Self No No   Sig: Inject 0.75 mL (2 mg total) under the skin every 7 days   topiramate (TOPAMAX) 50 MG tablet  Self No No   Sig: Take 1 tablet (50 mg total) by mouth daily at bedtime      Facility-Administered Medications: None       Past Medical History:   Diagnosis Date    Diabetes mellitus (HCC)        Past Surgical History:   Procedure Laterality Date    HERNIA REPAIR      TUBAL LIGATION         Family History   Problem Relation Age of Onset    Hypertension Mother     Arthritis Mother     Asthma Sister     Bipolar disorder Brother     Schizophrenia Brother     Asthma Brother     Asthma Brother     Diabetes Maternal Grandmother     Diabetes Paternal Grandmother     No Known Problems Maternal Grandfather     No Known Problems Paternal Grandfather     Breast cancer Neg Hx      I have reviewed and agree with the history as documented.    E-Cigarette/Vaping    E-Cigarette Use Never User      E-Cigarette/Vaping Substances    Nicotine No     THC No     CBD No     Flavoring No     Other No     Unknown No      Social History     Tobacco Use    Smoking status: Never    Smokeless tobacco: Never   Vaping Use    Vaping status: Never Used   Substance Use Topics    Alcohol use: Not Currently     Comment: ocassionally    Drug use: Never       Review of Systems   Constitutional: Negative.  Negative for chills, fatigue and fever.   HENT: Negative.  Negative for ear pain and sore throat.    Eyes: Negative.  Negative for pain and visual disturbance.   Respiratory: Negative.  Negative for cough and shortness of breath.     Cardiovascular: Negative.  Negative for chest pain and palpitations.   Gastrointestinal:  Positive for abdominal pain, anorexia, nausea and vomiting. Negative for constipation, diarrhea, flatus, hematemesis, hematochezia and melena.   Genitourinary:  Positive for vaginal discharge. Negative for dysuria, hematuria and vaginal bleeding.   Musculoskeletal: Negative.  Negative for arthralgias and back pain.   Skin:  Negative for color change and rash.   Neurological: Negative.  Negative for seizures and syncope.   Hematological: Negative.    Psychiatric/Behavioral: Negative.     All other systems reviewed and are negative.      Physical Exam  Physical Exam  Vitals and nursing note reviewed.   Constitutional:       General: She is not in acute distress.     Appearance: She is well-developed.   HENT:      Head: Normocephalic and atraumatic.      Comments: Patient maintaining airway and secretions. No stridor . No brawniness under tongue.          Mouth/Throat:      Mouth: Mucous membranes are moist.   Eyes:      Extraocular Movements: Extraocular movements intact.      Conjunctiva/sclera: Conjunctivae normal.      Pupils: Pupils are equal, round, and reactive to light.   Cardiovascular:      Rate and Rhythm: Normal rate and regular rhythm.      Pulses:           Radial pulses are 2+ on the right side and 2+ on the left side.        Dorsalis pedis pulses are 2+ on the right side and 2+ on the left side.      Heart sounds: Normal heart sounds, S1 normal and S2 normal. No murmur heard.  Pulmonary:      Effort: Pulmonary effort is normal. No respiratory distress.      Breath sounds: Normal breath sounds.   Abdominal:      General: Abdomen is flat. Bowel sounds are normal.      Palpations: Abdomen is soft.      Tenderness: There is abdominal tenderness in the right lower quadrant. There is no right CVA tenderness, left CVA tenderness, guarding or rebound. Positive signs include McBurney's sign. Negative signs include  Riggins's sign and Rovsing's sign.   Musculoskeletal:         General: No swelling.      Cervical back: Neck supple.      Right lower leg: No edema.      Left lower leg: No edema.   Skin:     General: Skin is warm and dry.      Capillary Refill: Capillary refill takes less than 2 seconds.   Neurological:      General: No focal deficit present.      Mental Status: She is alert and oriented to person, place, and time.      GCS: GCS eye subscore is 4. GCS verbal subscore is 5. GCS motor subscore is 6.      Cranial Nerves: Cranial nerves 2-12 are intact.      Sensory: Sensation is intact.      Motor: Motor function is intact.      Coordination: Coordination is intact.      Comments: No slurred speech.  No facial asymmetry.  No tongue deviation.  Patient can move bilateral upper extremities and lower extremities spontaneously each other independently and pain-free   Psychiatric:         Mood and Affect: Mood normal.         Behavior: Behavior normal.         Thought Content: Thought content normal.         Judgment: Judgment normal.         Vital Signs  ED Triage Vitals   Temperature Pulse Respirations Blood Pressure SpO2   12/18/23 1733 12/18/23 1733 12/18/23 1733 12/18/23 1733 12/18/23 1733   98.8 °F (37.1 °C) 96 16 126/64 100 %      Temp Source Heart Rate Source Patient Position - Orthostatic VS BP Location FiO2 (%)   12/18/23 1733 12/18/23 1733 12/18/23 1955 12/18/23 1955 --   Oral Monitor Lying Right arm       Pain Score       12/18/23 2037       7           Vitals:    12/18/23 1733 12/18/23 1955   BP: 126/64 128/74   Pulse: 96 85   Patient Position - Orthostatic VS:  Lying         Visual Acuity      ED Medications  Medications   dicyclomine (BENTYL) tablet 20 mg (has no administration in time range)   ondansetron (ZOFRAN) injection 4 mg (has no administration in time range)   sodium chloride 0.9 % bolus 1,000 mL (0 mL Intravenous Stopped 12/18/23 2131)   ondansetron (ZOFRAN) injection 4 mg (4 mg Intravenous Given  12/18/23 1856)   ketorolac (TORADOL) injection 30 mg (30 mg Intravenous Given 12/18/23 2037)   magnesium Oxide (MAG-OX) tablet 800 mg (800 mg Oral Given 12/18/23 2131)   iohexol (OMNIPAQUE) 350 MG/ML injection (MULTI-DOSE) 100 mL (100 mL Intravenous Given 12/18/23 2137)       Diagnostic Studies  Results Reviewed       Procedure Component Value Units Date/Time    hCG, qualitative pregnancy [[id number]]  (Normal) Collected: 12/18/23 2048    Lab Status: Final result Specimen: Blood from Arm, Left Updated: 12/18/23 2241     Preg, Serum Negative    Comprehensive metabolic panel [[id number]]  (Abnormal) Collected: 12/18/23 2048    Lab Status: Final result Specimen: Blood from Arm, Left Updated: 12/18/23 2126     Sodium 136 mmol/L      Potassium 3.6 mmol/L      Chloride 107 mmol/L      CO2 24 mmol/L      ANION GAP 5 mmol/L      BUN 10 mg/dL      Creatinine 0.75 mg/dL      Glucose 159 mg/dL      Calcium 8.4 mg/dL      Corrected Calcium 9.0 mg/dL      AST 12 U/L      ALT 9 U/L      Alkaline Phosphatase 61 U/L      Total Protein 6.4 g/dL      Albumin 3.3 g/dL      Total Bilirubin 0.63 mg/dL      eGFR 98 ml/min/1.73sq m     Narrative:      National Kid[employer] guidelines for Chronic Kidney Disease (CKD):     Stage 1 with normal or high GFR (GFR > 90 mL/min/1.73 square meters)    Stage 2 Mild CKD (GFR = 60-89 mL/min/1.73 square meters)    Stage 3A Moderate CKD (GFR = 45-59 mL/min/1.73 square meters)    Stage 3B Moderate CKD (GFR = 30-44 mL/min/1.73 square meters)    Stage 4 Severe CKD (GFR = 15-29 mL/min/1.73 square meters)    Stage 5 End Stage CKD (GFR <15 mL/min/1.73 square meters)  Note: GFR calculation is accurate only with a steady state creatinine    Magnesium [[id number]]  (Abnormal) Collected: 12/18/23 2048    Lab Status: Final result Specimen: Blood from Arm, Left Updated: 12/18/23 2126     Magnesium 1.7 mg/dL     Urine Microscopic [[id number]]  (Abnormal) Collected: 12/18/23 2012    Lab Status: Final result  Specimen: Urine, Clean Catch Updated: 12/18/23 2059     RBC, UA 1-2 /hpf      WBC, UA 4-10 /hpf      Epithelial Cells Occasional /hpf      Bacteria, UA None Seen /hpf      MUCUS THREADS Innumerable     Hyaline Casts, UA 0-3 /lpf     UA (URINE) with reflex to Scope [[id number]]  (Abnormal) Collected: 12/18/23 2012    Lab Status: Final result Specimen: Urine, Clean Catch Updated: 12/18/23 2049     Color, UA Yellow     Clarity, UA Clear     Specific Gravity, UA 1.029     pH, UA 5.5     Leukocytes, UA Small     Nitrite, UA Negative     Protein, UA 30 (1+) mg/dl      Glucose,  (1/5%) mg/dl      Ketones, UA Negative mg/dl      Urobilinogen, UA <2.0 mg/dl      Bilirubin, UA Negative     Occult Blood, UA Negative    Molecular Vaginal Panel [[id number]] Collected: 12/18/23 2035    Lab Status: In process Specimen: Genital from Vaginal Updated: 12/18/23 2040    Chlamydia/GC amplified DNA by PCR [[id number]] Collected: 12/18/23 2012    Lab Status: In process Specimen: Urine, Other Updated: 12/18/23 2020    POCT pregnancy, urine [[id number]]  (Normal) Resulted: 12/18/23 2014    Lab Status: Final result Updated: 12/18/23 2014     EXT Preg Test, Ur Negative     Control Valid    CBC and differential [[id number]]  (Abnormal) Collected: 12/18/23 1855    Lab Status: Final result Specimen: Blood from Arm, Left Updated: 12/18/23 1910     WBC 10.89 Thousand/uL      RBC 4.34 Million/uL      Hemoglobin 12.4 g/dL      Hematocrit 38.7 %      MCV 89 fL      MCH 28.6 pg      MCHC 32.0 g/dL      RDW 12.5 %      MPV 8.7 fL      Platelets 344 Thousands/uL      nRBC 0 /100 WBCs      Neutrophils Relative 55 %      Immat GRANS % 1 %      Lymphocytes Relative 34 %      Monocytes Relative 8 %      Eosinophils Relative 2 %      Basophils Relative 0 %      Neutrophils Absolute 5.99 Thousands/µL      Immature Grans Absolute 0.05 Thousand/uL      Lymphocytes Absolute 3.75 Thousands/µL      Monocytes Absolute 0.91 Thousand/µL      Eosinophils Absolute  "0.16 Thousand/µL      Basophils Absolute 0.03 Thousands/µL                    CT abdomen pelvis with contrast   Final Result by Sai Haywood MD (12/18 2257)      No acute intra-abdominal abnormality. Trace right lower quadrant free fluid.      Normal appendix visualized.      Relatively collapsed urinary bladder, limiting evaluation. Correlation with a urinalysis is recommended.      Small hiatal hernia with questionable mild circumferential wall thickening of the distal esophagus which may be secondary to a mild esophagitis. If there is clinical concern, a nonemergent esophagram or endoscopy could be performed for further    evaluation.         Workstation performed: [id number]                    Procedures  Procedures         ED Course  ED Course as of 12/18/23 2319   Mon Dec 18, 2023   1805 Patient is a 43-year-old female coming in today with nausea and vomiting that started today as well as abnormal vaginal discharge for the past week.  However on exam patient does have right lower quadrant tenderness with positive McBurney's point.  Will obtain urine, urine pregnancy, affirm swab as well as GC chlamydia and CT with labs    Disclosure: Voice to text software was used in the preparation of this document and could have resulted in translational errors.      Occasional wrong word or \"sound a like\" substitutions may have occurred due to the inherent limitations of voice recognition software.  Read the chart carefully and recognize, using context, where substitutions have occurred.       I have independently reviewed external records are available to me to the level of detail possible within the time constraints of my patient care responsibilities in the ED.       2016 Pregnancy negative.  Will give Toradol and progressed to CT   2050 Pending CMP. Glucose in urine and small leukocytes.  No Ketones   2136 Patient to CT   2301 No acute intra-abdominal abnormality. Trace right lower quadrant free fluid.   "   Normal appendix visualized.     Relatively collapsed urinary bladder, limiting evaluation. Correlation with a urinalysis is recommended.     Small hiatal hernia with questionable mild circumferential wall thickening of the distal esophagus which may be secondary to a mild esophagitis. If there is clinical concern, a nonemergent esophagram or endoscopy could be performed for further   evaluation.        2309 Patient sleeping.  Upon awakening patient states that she feels minimal improvement.  Reviewed labs and CT.  Patient still has mild tenderness right lower quadrant.  Patient has no urinary symptoms.  Discussed with her results.  Vaginal swab will be resulted this week.  Call for results.  She can also follow-up MyChart.  Will give Zofran and Bentyl for symptomatic control.  Will give note for work as well.  Return to ER instructions given    Counseling: I had a detailed discussion with the patient and/or guardian regarding: the historical points, exam findings, and any diagnostic results supporting the discharge diagnosis, lab results, radiology results, discharge instructions reviewed with patient and/or family/caregiver and understanding was verbalized. Instructions given to return to the emergency department if symptoms worsen or persist, or if there are any questions or concerns that arise at home.     All imaging and/or lab testing discussed with patient, strict return to ED precautions discussed. Patient recommended to follow up promptly with appropriate outpatient provider. Patient and/or family members verbalizes understanding and agrees with plan. Patient and/or family members were given opportunity to ask questions, all questions were answered at this time. Patient is stable for discharge                                   SBIRT 20yo+      Flowsheet Row Most Recent Value   Initial Alcohol Screen: US AUDIT-C     1. How often do you have a drink containing alcohol? 0 Filed at: 12/18/2023 8093   2. How  many drinks containing alcohol do you have on a typical day you are drinking?  0 Filed at: 2023   3b. FEMALE Any Age, or MALE 65+: How often do you have 4 or more drinks on one occassion? 0 Filed at: 2023   Audit-C Score 0 Filed at: 2023   FAMILIA: How many times in the past year have you...    Used an illegal drug or used a prescription medication for non-medical reasons? Never Filed at: 2023                      Medical Decision Making      Differential diagnosis includes but not limited to:  Appendicitis, viral syndrome, constipation, AMI, NSTEMI, pneumonia, pneuothorax, gerd, gastritis,  mesenteric ischemia, mesenteric adenitis, pancreatitis, cholecystitis, choledocholithiasis, hepatitis, bowel obstruction, ileus, gastroenteritis, colitis, malignancy, AAA, perforation, toxicologic poisoning, renal infarct, acute kidney injury, splenic infarct, splenic injury, nephrolithiasis, UTI, muscular strain, intra-abdominal hematoma, hernia, ovarian cyst, ovarian torsion, ectopic pregnancy, rectal prolapse, pain no rectal fistula, a no rectal fissures, hemorrhoids, perirectal abscess, threatened , PID, abruption, molar pregnancy, dislodged IUD, fibroid uterus, salpingitis, tubo-ovarian abscess, dysmenorrhea, cervicitis,      Problems Addressed:  Hypomagnesemia: acute illness or injury  Vaginal discharge: acute illness or injury    Amount and/or Complexity of Data Reviewed  External Data Reviewed: notes.  Labs: ordered. Decision-making details documented in ED Course.     Details: Mild leukocytosis without bands.  No anemia or thrombocytopenia  Urine pregnancy negative  Hypomagnesia and will replace  No acute kidney injury.  Glucose 159 without any evidence of anion gap acidosis  Patient's urine does have glucose and leukocytes but no bacteria or nitrates  Radiology: ordered. Decision-making details documented in ED Course.     Details: CT abdomen pelvis interpreted by  radiologist as no acute intra-abdominal pathology.  Trace right lower quadrant pain.  Normal appendix.  Collapsed urinary bladder.  Small hiatal hernia with questionable wall thickening of the distal esophagus.    Risk  OTC drugs.  Prescription drug management.             Disposition  Final diagnoses:   Vaginal discharge   Hypomagnesemia   Abdominal pain   Vomiting     Time reflects when diagnosis was documented in both MDM as applicable and the Disposition within this note       Time User Action Codes Description Comment    12/18/2023  9:29 PM Bendock, Thelma L Add [N89.8] Vaginal discharge     12/18/2023  9:30 PM Bendock, Thelma L Add [E83.42] Hypomagnesemia     12/18/2023 11:10 PM Bendock, Thelma L Add [R10.9] Abdominal pain     12/18/2023 11:10 PM Bendock, Thelma L Add [R11.10] Vomiting           ED Disposition       ED Disposition   Discharge    Condition   Stable    Date/Time   Mon Dec 18, 2023 1894    Comment   Tari Shannon discharge to home/self care.                   Follow-up Information       Follow up With Specialties Details Why Contact Info Additional Information    [facility] Schedule an appointment as soon as possible for a visit in 1 week  [address] [place] [id number] [phone] [facility], [address] [place] [address], [address] [phone]            Patient's Medications   Discharge Prescriptions    DICYCLOMINE (BENTYL) 20 MG TABLET    Take 1 tablet (20 mg total) by mouth 2 (two) times a day as needed (abd pain) for up to 2 days       Start Date: 12/18/2023End Date: 12/20/2023       Order Dose: 20 mg       Quantity: 4 tablet    Refills: 0    ONDANSETRON (ZOFRAN ODT) 4 MG DISINTEGRATING TABLET    Take 1 tablet (4 mg total) by mouth every 6 (six) hours as needed for nausea or vomiting for up to 4 days       Start Date: 12/18/2023End Date: 12/22/2023        Order Dose: 4 mg       Quantity: 16 tablet    Refills: 0       No discharge procedures on file.    PDMP Review         Value Time User    PDMP Reviewed  Yes 7/20/2021  8:33 AM James Martinez III, DO            ED Provider  Electronically Signed by             Thelma Patrick DO  12/18/23 1010

## 2023-12-18 NOTE — ED NOTES
Pt attempted to urinate but was unsuccessful at this time.      Sara HANSON [facility]  12/18/23 1830

## 2023-12-18 NOTE — Clinical Note
Tari Shannon was seen and treated in our emergency department on 12/18/2023.                Diagnosis:     Tari  may return to work on return date.    She may return on this date: 12/20/2023         If you have any questions or concerns, please don't hesitate to call.      Thelma Patrick, DO    ______________________________           _______________          _______________  Hospital Representative                              Date                                Time

## 2023-12-19 LAB
C GLABRATA DNA VAG QL NAA+PROBE: NEGATIVE
C KRUSEI DNA VAG QL NAA+PROBE: NEGATIVE
C TRACH DNA SPEC QL NAA+PROBE: NEGATIVE
CANDIDA SP 6 PNL VAG NAA+PROBE: NEGATIVE
N GONORRHOEA DNA SPEC QL NAA+PROBE: NEGATIVE
T VAGINALIS DNA VAG QL NAA+PROBE: POSITIVE
VAGINOSIS/ITIS DNA PNL VAG PROBE+SIG AMP: POSITIVE

## 2023-12-19 RX ORDER — METRONIDAZOLE 500 MG/1
500 TABLET ORAL EVERY 12 HOURS SCHEDULED
Qty: 14 TABLET | Refills: 0 | Status: SHIPPED | OUTPATIENT
Start: 2023-12-19 | End: 2023-12-26

## 2023-12-19 NOTE — DISCHARGE INSTRUCTIONS
B.R.A.T. DIET Your doctor has recommended the B.R.A.T. diet for you or your child until his or her condition improves. This is often used to help control diarrhea and vomiting symptoms. If you or your child can tolerate clear liquids, you may offer: · Bananas · Rice · Applesauce · Toast (and other simple starches such as crackers, potatoes, noodles) Be sure to avoid dairy products, meats, and fatty foods until you or  your child's symptoms are completely better.     IF YOU USE GATORADE - SPLIT A BOTTLE IN HALF AND WATER DOWN.  TAKE SIPS OF FLUIDS AND DO NOT CHUG    IF YOU USE PEPTO-BISMOL, IT CAN TURN YOUR TONGUE AND OR STOOLS BLACK

## 2023-12-19 NOTE — RESULT ENCOUNTER NOTE
Called and informed patient of +BV and +trichomonas. Prescription for Flagyl sent to patient's pharmacy. Instructed patient to have her partner get tested and treated also. Patient agreeable.

## 2024-01-04 ENCOUNTER — OFFICE VISIT (OUTPATIENT)
Dept: FAMILY MEDICINE CLINIC | Facility: CLINIC | Age: 44
End: 2024-01-04
Payer: COMMERCIAL

## 2024-01-04 VITALS
BODY MASS INDEX: 31.12 KG/M2 | SYSTOLIC BLOOD PRESSURE: 128 MMHG | WEIGHT: 187 LBS | HEART RATE: 81 BPM | TEMPERATURE: 98.6 F | DIASTOLIC BLOOD PRESSURE: 80 MMHG | RESPIRATION RATE: 16 BRPM | OXYGEN SATURATION: 99 %

## 2024-01-04 DIAGNOSIS — E11.65 UNCONTROLLED TYPE 2 DIABETES MELLITUS WITH HYPERGLYCEMIA, WITHOUT LONG-TERM CURRENT USE OF INSULIN (HCC): ICD-10-CM

## 2024-01-04 DIAGNOSIS — R13.19 OTHER DYSPHAGIA: ICD-10-CM

## 2024-01-04 DIAGNOSIS — N39.0 URINARY TRACT INFECTION WITHOUT HEMATURIA, SITE UNSPECIFIED: Primary | ICD-10-CM

## 2024-01-04 DIAGNOSIS — Z79.4 CURRENT USE OF INSULIN (HCC): ICD-10-CM

## 2024-01-04 PROCEDURE — 87186 SC STD MICRODIL/AGAR DIL: CPT | Performed by: FAMILY MEDICINE

## 2024-01-04 PROCEDURE — 87077 CULTURE AEROBIC IDENTIFY: CPT | Performed by: FAMILY MEDICINE

## 2024-01-04 PROCEDURE — 87086 URINE CULTURE/COLONY COUNT: CPT | Performed by: FAMILY MEDICINE

## 2024-01-04 PROCEDURE — 99214 OFFICE O/P EST MOD 30 MIN: CPT | Performed by: FAMILY MEDICINE

## 2024-01-04 RX ORDER — INSULIN ASPART 100 [IU]/ML
5 INJECTION, SOLUTION INTRAVENOUS; SUBCUTANEOUS
Qty: 15 ML | Refills: 2 | Status: SHIPPED | OUTPATIENT
Start: 2024-01-04

## 2024-01-04 RX ORDER — NITROFURANTOIN 25; 75 MG/1; MG/1
100 CAPSULE ORAL 2 TIMES DAILY
Qty: 10 CAPSULE | Refills: 0 | Status: SHIPPED | OUTPATIENT
Start: 2024-01-04 | End: 2024-01-09

## 2024-01-04 RX ORDER — PANTOPRAZOLE SODIUM 40 MG/1
40 TABLET, DELAYED RELEASE ORAL DAILY
Qty: 90 TABLET | Refills: 1 | Status: SHIPPED | OUTPATIENT
Start: 2024-01-04

## 2024-01-04 RX ORDER — PEN NEEDLE, DIABETIC 32GX 5/32"
NEEDLE, DISPOSABLE MISCELLANEOUS DAILY
Qty: 100 EACH | Refills: 0 | Status: SHIPPED | OUTPATIENT
Start: 2024-01-04

## 2024-01-06 LAB — BACTERIA UR CULT: ABNORMAL

## 2024-01-08 NOTE — RESULT ENCOUNTER NOTE
Please call patient. Urine culture was positive, should be susceptible to macrobid that he was prescribed

## 2024-01-08 NOTE — PROGRESS NOTES
Assessment/Plan:    44 y/o woman with: DM2, dysphagia and UTI, will add macrobid will continue med and refer to to GI. Discussed supportive care and return parameters.     No problem-specific Assessment & Plan notes found for this encounter.       Diagnoses and all orders for this visit:    Urinary tract infection without hematuria, site unspecified  -     nitrofurantoin (MACROBID) 100 mg capsule; Take 1 capsule (100 mg total) by mouth 2 (two) times a day for 5 days  -     Cancel: UA/M w/rflx Culture, Comp  -     Urine culture; Future  -     Urine culture    Other dysphagia  -     pantoprazole (PROTONIX) 40 mg tablet; Take 1 tablet (40 mg total) by mouth daily  -     Ambulatory Referral to Gastroenterology; Future    Uncontrolled type 2 diabetes mellitus with hyperglycemia, without long-term current use of insulin ([facility])  -     insulin aspart (NovoLOG FlexPen) 100 UNIT/ML injection pen; Inject 5 Units under the skin 3 (three) times a day with meals  -     Insulin Pen Needle (BD Pen Needle Em U/F) 32G X 4 MM MISC; Use daily  -     insulin detemir (LEVEMIR FLEXTOUCH) 100 Units/mL injection pen; Inject 25 Units under the skin daily at bedtime    Current use of insulin ([facility])  -     insulin detemir (LEVEMIR FLEXTOUCH) 100 Units/mL injection pen; Inject 25 Units under the skin daily at bedtime          Subjective:     Chief Complaint   Patient presents with    Possible UTI     Patient states to have urgency to go, Pain on right side, Patient was seen in the EMERGENCY ROOM on 12/18 for same symptoms. JT    Medication Refill        Patient ID: Tari Shannon is a 43 y.o. female.    Patient is a 44 y/o woman who presents for follow-up on DM2, dysphagia and UTI, no fevers chills nausea or vomiting.    Medication Refill        The following portions of the patient's history were reviewed and updated as appropriate: allergies, current medications, past family history, past medical history, past social history, past surgical  history and problem list.    Review of Systems   Constitutional: Negative.    HENT: Negative.     Eyes: Negative.    Respiratory: Negative.     Cardiovascular: Negative.    Gastrointestinal: Negative.    Endocrine: Negative.    Genitourinary:  Positive for dysuria.   Musculoskeletal: Negative.    Allergic/Immunologic: Negative.    Neurological: Negative.    Hematological: Negative.    Psychiatric/Behavioral: Negative.     All other systems reviewed and are negative.        Objective:      /80 (BP Location: Left arm, Patient Position: Sitting, Cuff Size: Standard)   Pulse 81   Temp 98.6 °F (37 °C) (Tympanic)   Resp 16   Wt 84.8 kg (187 lb)   LMP 12/04/2023 (Approximate)   SpO2 99%   BMI 31.12 kg/m²          Physical Exam  Constitutional:       Appearance: She is well-developed.   HENT:      Head: Atraumatic.      Right Ear: External ear normal.      Left Ear: External ear normal.   Eyes:      Conjunctiva/sclera: Conjunctivae normal.      Pupils: Pupils are equal, round, and reactive to light.   Cardiovascular:      Rate and Rhythm: Normal rate and regular rhythm.      Heart sounds: Normal heart sounds.   Pulmonary:      Effort: Pulmonary effort is normal. No respiratory distress.      Breath sounds: Normal breath sounds.   Abdominal:      General: Bowel sounds are normal. There is no distension.      Palpations: Abdomen is soft.      Tenderness: There is no abdominal tenderness. There is no guarding or rebound.   Musculoskeletal:         General: Normal range of motion.      Cervical back: Normal range of motion.   Skin:     General: Skin is warm and dry.   Neurological:      Mental Status: She is alert and oriented to person, place, and time.      Cranial Nerves: No cranial nerve deficit.   Psychiatric:         Behavior: Behavior normal.         Thought Content: Thought content normal.         Judgment: Judgment normal.

## 2024-01-10 ENCOUNTER — CONSULT (OUTPATIENT)
Dept: GASTROENTEROLOGY | Facility: CLINIC | Age: 44
End: 2024-01-10
Payer: COMMERCIAL

## 2024-01-10 VITALS
HEART RATE: 76 BPM | SYSTOLIC BLOOD PRESSURE: 108 MMHG | WEIGHT: 187 LBS | BODY MASS INDEX: 31.16 KG/M2 | HEIGHT: 65 IN | DIASTOLIC BLOOD PRESSURE: 72 MMHG

## 2024-01-10 DIAGNOSIS — R10.31 RLQ ABDOMINAL PAIN: ICD-10-CM

## 2024-01-10 DIAGNOSIS — R13.19 OTHER DYSPHAGIA: Primary | ICD-10-CM

## 2024-01-10 DIAGNOSIS — R11.2 NAUSEA AND VOMITING, UNSPECIFIED VOMITING TYPE: ICD-10-CM

## 2024-01-10 DIAGNOSIS — R19.7 DIARRHEA, UNSPECIFIED TYPE: ICD-10-CM

## 2024-01-10 PROCEDURE — 99244 OFF/OP CNSLTJ NEW/EST MOD 40: CPT | Performed by: PHYSICIAN ASSISTANT

## 2024-01-10 NOTE — PROGRESS NOTES
[facility] - Outpatient Consultation  Tari Shannon 43 y.o. female MRN: [id number]  Encounter: [id number]          ASSESSMENT AND PLAN:      1. Other dysphagia  Patient with new trouble swallowing solids and liquids for 3 weeks. Recent CT from the ED shows small hiatal hernia with mild wall thickening of the distal esophagus and 1 cm hyperdensity within the lumen of the distal esophagus which could represent recently ingested m plan for EGD to assess for reflux esophagitis, eosinophilic esophagitis, stricture, ring, malignancy. Continue pantoprazole 40 mg daily.  Take small bites and chew food carefully.    - Ambulatory Referral to Gastroenterology  - EGD; Future    2. Nausea and vomiting, unspecified vomiting type  Her nausea and vomiting may be related to infectious gastroenteritis, GERD, peptic ulcer disease, H. pylori infection, gastroparesis from poorly controlled diabetes, or side effect of Ozempic. Plan for EGD with gastric biopsies to rule out H. pylori. Fortunately symptoms improving with PPI. Continue pantoprazole 40 mg daily.    - EGD; Future    3. RLQ abdominal pain  4. Diarrhea, unspecified type  Patient with new RLQ pain and diarrhea for 3 weeks. Labs from the ED with mild leukocytosis and hypomagnesemia, otherwise electrolytes and liver function normal. CT shows normal appendix, no explanation for RLQ pain. Plan for stool studies to rule out infection given recent antibiotics. Check inflammatory markers and celiac serologies. Plan for colonoscopy with TI intubation and random biopsies to rule out IBD and microscopic colitis. Advised avoiding antidiarrheals until results of stool studies come back.  I will call her with results.    - Clostridium difficile toxin by PCR with EIA; Future  - Stool Enteric Bacterial Panel by PCR  - Ova and parasite examination; Future  - Giardia antigen  - C-reactive protein; Future  - Calprotectin,Fecal  - Tissue transglutaminase, IgA;  Future  - IgA; Future  - Colonoscopy; Future    Follow-up after procedures  ______________________________________________________________________    HPI: 43-year-old female with uncontrolled type 2 diabetes on insulin and Ozempic (A1c 13.6), diabetic nephropathy, polyneuropathy, migraines referred by PCP for dysphagia and multiple other GI symptoms.    She describes onset of multiple GI symptoms about 3 weeks ago. She has never had the symptoms before.  She describes frequent nausea and vomiting, right lower quadrant pain, and diarrhea.  She describes the pain as cramping and nonradiating.  The pain  comes and goes and does not seem associated necessarily with bowel movements. She is having multiple loose/watery bowel movements daily and also during the nighttime. She denies blood in the stool.  She lost a few pounds during this time. She started pantoprazole a few days ago which does seem to be improving the nausea and vomiting. She also describes trouble swallowing both solids and liquids intermittently for the past 3 weeks. She feels them get stuck in her chest. Sometimes she needs to pound on her chest to help it go down. Other times she brings up the food. She feels burning in her chest as well.    She takes NSAIDs 1 to 2 tablets a few times per week for aches and pains. She has been on Ozempic since 2021 and denies GI side effects.    She was in the ED for the symptoms in December. CT A/P with IV contrast from 12/18/2023 shows incidental finding of small hiatal hernia with mild wall thickening of the distal esophagus, 1 cm hyperdensity within the lumen of the distal esophagus which may represent recently ingested medication or tablet. She was prescribed metronidazole for vaginal discharge and also just completed a course of Macrobid for UTI.    She had colonoscopy in 2010 for bleeding, this was unremarkable except for hemorrhoids apparently.    No family history of GI diseases or malignancy.    REVIEW OF  SYSTEMS:    CONSTITUTIONAL: Denies any fever, chills, rigors, and weight loss. + weakness  HEENT: No earache or tinnitus. Denies hearing loss or visual disturbances.  CARDIOVASCULAR: No chest pain or palpitations.   RESPIRATORY: Denies any cough, hemoptysis, shortness of breath or dyspnea on exertion.  GASTROINTESTINAL: As noted in the History of Present Illness.   GENITOURINARY: No problems with urination. Denies any hematuria or dysuria.  NEUROLOGIC: No dizziness or vertigo, denies headaches.   MUSCULOSKELETAL: Denies any muscle or joint pain.   SKIN: Denies skin rashes or itching.   ENDOCRINE: Denies excessive thirst. Denies intolerance to heat or cold.  PSYCHOSOCIAL: Denies depression or anxiety. Denies any recent memory loss.       Historical Information   Past Medical History:   Diagnosis Date    Diabetes mellitus (HCC)      Past Surgical History:   Procedure Laterality Date    HERNIA REPAIR      TUBAL LIGATION  2017     Social History   Social History     Substance and Sexual Activity   Alcohol Use Not Currently    Comment: ocassionally     Social History     Substance and Sexual Activity   Drug Use Never     Social History     Tobacco Use   Smoking Status Never   Smokeless Tobacco Never     Family History   Problem Relation Age of Onset    Hypertension Mother     Arthritis Mother     Asthma Sister     Bipolar disorder Brother     Schizophrenia Brother     Asthma Brother     Asthma Brother     Diabetes Maternal Grandmother     Diabetes Paternal Grandmother     No Known Problems Maternal Grandfather     No Known Problems Paternal Grandfather     Breast cancer Neg Hx        Meds/Allergies       Current Outpatient Medications:     Accu-Chek FastClix Lancets MISC    Accu-Chek Guide test strip    acetaminophen (TYLENOL) 500 mg tablet    Continuous Blood Gluc  (Dexcom G7 ) ENRICO    Continuous Blood Gluc Sensor (Dexcom G7 Sensor)    dicyclomine (BENTYL) 20 mg tablet    gabapentin (NEURONTIN) 300 mg  capsule    ibuprofen (MOTRIN) 400 mg tablet    Injection Device for Insulin (B-D PEN MINI) ENRICO    insulin aspart (NovoLOG FlexPen) 100 UNIT/ML injection pen    insulin detemir (LEVEMIR FLEXTOUCH) 100 Units/mL injection pen    Insulin Pen Needle (BD Pen Needle Em U/F) 32G X 4 MM MISC    lidocaine (Lidoderm) 5 %    methocarbamol (ROBAXIN) 500 mg tablet    naproxen (Naprosyn) 500 mg tablet    ondansetron (Zofran ODT) 4 mg disintegrating tablet    pantoprazole (PROTONIX) 40 mg tablet    prochlorperazine (COMPAZINE) 10 mg tablet    semaglutide, 2 mg/dose, (Ozempic) 8 mg/ mL injection pen    topiramate (TOPAMAX) 50 MG tablet    Allergies   Allergen Reactions    Metformin And Related Hives           Objective     Last menstrual period 12/04/2023, unknown if currently breastfeeding. There is no height or weight on file to calculate BMI.        PHYSICAL EXAM:      General Appearance:   Alert, cooperative, no distress   HEENT:   Normocephalic, atraumatic, anicteric.     Neck:  Supple, symmetrical, trachea midline   Lungs:   Clear to auscultation bilaterally   Heart::   Regular rate and rhythm   Abdomen:   Soft, generalized abdominal tenderness, non-distended; normal bowel sounds   Genitalia:   Deferred    Rectal:   Deferred    Extremities:  No cyanosis, clubbing or edema    Pulses:  2+ and symmetric    Skin:  No jaundice, rashes, or lesions    Lymph nodes:  No palpable cervical lymphadenopathy        Lab Results:   No visits with results within 1 Day(s) from this visit.   Latest known visit with results is:   Office Visit on 01/04/2024   Component Date Value    Urine Culture 01/04/2024 >100,000 cfu/ml Klebsiella variicola (A)          Radiology Results:   CT abdomen pelvis with contrast    Result Date: 12/18/2023  Narrative: CT ABDOMEN AND PELVIS WITH IV CONTRAST INDICATION:   RLQ abdominal pain (Age >= 14y) RLQ pain. COMPARISON:  None. TECHNIQUE:  CT examination of the abdomen and pelvis was performed. Multiplanar 2D  reformatted images were created from the source data. This examination, like all CT scans performed in the [facility], was performed utilizing techniques to minimize radiation dose exposure, including the use of iterative reconstruction and automated exposure control. Radiation dose length product (DLP) for this visit:  516.22 mGy-cm IV Contrast:  100 mL of iohexol (OMNIPAQUE) Enteric Contrast:  Enteric contrast was not administered. FINDINGS: ABDOMEN LOWER CHEST:  Atelectatic changes are noted at the lung bases. LIVER/BILIARY TREE:  Unremarkable. GALLBLADDER:  No calcified gallstones. No pericholecystic inflammatory change. SPLEEN:  Unremarkable. PANCREAS:  Unremarkable. ADRENAL GLANDS:  Unremarkable. KIDNEYS/URETERS:  Unremarkable. No hydronephrosis. STOMACH AND BOWEL: There is a small hiatal hernia with mild wall thickening of the distal esophagus. There is a 1 cm hyperdensity within the lumen of the distal esophagus which may represent a recently ingested medication or other tablet. No evidence of large or small bowel obstruction. APPENDIX:  A normal appendix was visualized. ABDOMINOPELVIC CAVITY: There is trace right lower quadrant free fluid no pneumoperitoneum.  No lymphadenopathy. VESSELS:  Unremarkable for patient's age. PELVIS REPRODUCTIVE ORGANS: There is a 2 cm left ovarian cyst. URINARY BLADDER: Urinary bladder is relatively collapsed limiting evaluation. ABDOMINAL WALL/INGUINAL REGIONS:  Unremarkable. OSSEOUS STRUCTURES:  No acute fracture or destructive osseous lesion.     Impression: No acute intra-abdominal abnormality. Trace right lower quadrant free fluid. Normal appendix visualized. Relatively collapsed urinary bladder, limiting evaluation. Correlation with a urinalysis is recommended. Small hiatal hernia with questionable mild circumferential wall thickening of the distal esophagus which may be secondary to a mild esophagitis. If there is clinical concern, a nonemergent  esophagram or endoscopy could be performed for further evaluation. Workstation performed: [id number]

## 2024-01-11 NOTE — PATIENT INSTRUCTIONS
Scheduled date of colonoscopy/EGD (as of today): 02/22/2024  Physician performing colonoscopy: Dr. Brown   Location of colonoscopy: [place]  Bowel prep reviewed with patient: Miralax  Instructions reviewed with patient by: Ai MENG   Clearances:  N/A     Pt is diabetic - provided medication information sheet, knows to hold Ozempic 7 days

## 2024-01-15 ENCOUNTER — APPOINTMENT (OUTPATIENT)
Dept: LAB | Facility: HOSPITAL | Age: 44
End: 2024-01-15
Payer: COMMERCIAL

## 2024-01-15 DIAGNOSIS — R19.7 DIARRHEA, UNSPECIFIED TYPE: ICD-10-CM

## 2024-01-15 DIAGNOSIS — R10.31 RLQ ABDOMINAL PAIN: ICD-10-CM

## 2024-01-15 LAB
CRP SERPL QL: 2 MG/L
IGA SERPL-MCNC: 327 MG/DL (ref 66–433)

## 2024-01-15 PROCEDURE — 86140 C-REACTIVE PROTEIN: CPT

## 2024-01-15 PROCEDURE — 82784 ASSAY IGA/IGD/IGG/IGM EACH: CPT

## 2024-01-15 PROCEDURE — 86364 TISS TRNSGLTMNASE EA IG CLAS: CPT

## 2024-01-15 PROCEDURE — 36415 COLL VENOUS BLD VENIPUNCTURE: CPT

## 2024-01-16 LAB — TTG IGA SER-ACNC: <2 U/ML (ref 0–3)

## 2024-01-18 RX ORDER — SODIUM CHLORIDE 9 MG/ML
125 INJECTION, SOLUTION INTRAVENOUS CONTINUOUS
Status: CANCELLED | OUTPATIENT
Start: 2024-01-18

## 2024-01-22 ENCOUNTER — ANESTHESIA EVENT (OUTPATIENT)
Dept: GASTROENTEROLOGY | Facility: HOSPITAL | Age: 44
End: 2024-01-22

## 2024-01-22 ENCOUNTER — HOSPITAL ENCOUNTER (OUTPATIENT)
Dept: GASTROENTEROLOGY | Facility: HOSPITAL | Age: 44
Setting detail: OUTPATIENT SURGERY
Discharge: HOME | End: 2024-01-22
Attending: INTERNAL MEDICINE
Payer: COMMERCIAL

## 2024-01-22 ENCOUNTER — ANESTHESIA (OUTPATIENT)
Dept: GASTROENTEROLOGY | Facility: HOSPITAL | Age: 44
End: 2024-01-22

## 2024-01-22 VITALS
WEIGHT: 187 LBS | TEMPERATURE: 98.1 F | RESPIRATION RATE: 16 BRPM | OXYGEN SATURATION: 98 % | HEIGHT: 65 IN | HEART RATE: 78 BPM | BODY MASS INDEX: 31.16 KG/M2 | DIASTOLIC BLOOD PRESSURE: 70 MMHG | SYSTOLIC BLOOD PRESSURE: 135 MMHG

## 2024-01-22 DIAGNOSIS — R13.19 OTHER DYSPHAGIA: ICD-10-CM

## 2024-01-22 DIAGNOSIS — R11.2 NAUSEA AND VOMITING, UNSPECIFIED VOMITING TYPE: ICD-10-CM

## 2024-01-22 DIAGNOSIS — R19.7 DIARRHEA, UNSPECIFIED TYPE: ICD-10-CM

## 2024-01-22 DIAGNOSIS — R13.19 ESOPHAGEAL DYSPHAGIA: Primary | ICD-10-CM

## 2024-01-22 DIAGNOSIS — R10.31 RLQ ABDOMINAL PAIN: ICD-10-CM

## 2024-01-22 LAB — GLUCOSE SERPL-MCNC: 180 MG/DL (ref 65–140)

## 2024-01-22 PROCEDURE — 88305 TISSUE EXAM BY PATHOLOGIST: CPT | Performed by: STUDENT IN AN ORGANIZED HEALTH CARE EDUCATION/TRAINING PROGRAM

## 2024-01-22 PROCEDURE — 45380 COLONOSCOPY AND BIOPSY: CPT | Performed by: INTERNAL MEDICINE

## 2024-01-22 PROCEDURE — 82948 REAGENT STRIP/BLOOD GLUCOSE: CPT

## 2024-01-22 PROCEDURE — 43251 EGD REMOVE LESION SNARE: CPT | Performed by: INTERNAL MEDICINE

## 2024-01-22 PROCEDURE — 43239 EGD BIOPSY SINGLE/MULTIPLE: CPT | Performed by: INTERNAL MEDICINE

## 2024-01-22 RX ORDER — SODIUM CHLORIDE 9 MG/ML
125 INJECTION, SOLUTION INTRAVENOUS CONTINUOUS
Status: DISCONTINUED | OUTPATIENT
Start: 2024-01-22 | End: 2024-01-26 | Stop reason: HOSPADM

## 2024-01-22 RX ORDER — PROPOFOL 10 MG/ML
INJECTION, EMULSION INTRAVENOUS AS NEEDED
Status: DISCONTINUED | OUTPATIENT
Start: 2024-01-22 | End: 2024-01-22

## 2024-01-22 RX ORDER — LIDOCAINE HYDROCHLORIDE 20 MG/ML
INJECTION, SOLUTION EPIDURAL; INFILTRATION; INTRACAUDAL; PERINEURAL AS NEEDED
Status: DISCONTINUED | OUTPATIENT
Start: 2024-01-22 | End: 2024-01-22

## 2024-01-22 RX ADMIN — PROPOFOL 100 MG: 10 INJECTION, EMULSION INTRAVENOUS at 07:38

## 2024-01-22 RX ADMIN — PROPOFOL 50 MG: 10 INJECTION, EMULSION INTRAVENOUS at 07:57

## 2024-01-22 RX ADMIN — SODIUM CHLORIDE 125 ML/HR: 0.9 INJECTION, SOLUTION INTRAVENOUS at 07:16

## 2024-01-22 RX ADMIN — PROPOFOL 50 MG: 10 INJECTION, EMULSION INTRAVENOUS at 07:51

## 2024-01-22 RX ADMIN — PROPOFOL 50 MG: 10 INJECTION, EMULSION INTRAVENOUS at 07:46

## 2024-01-22 RX ADMIN — LIDOCAINE HYDROCHLORIDE 80 MG: 20 INJECTION, SOLUTION EPIDURAL; INFILTRATION; INTRACAUDAL at 07:38

## 2024-01-22 RX ADMIN — PROPOFOL 50 MG: 10 INJECTION, EMULSION INTRAVENOUS at 07:41

## 2024-01-22 NOTE — ANESTHESIA POSTPROCEDURE EVALUATION
"Post-Op Assessment Note    CV Status:  Stable    Pain management: adequate       Mental Status:  Alert and awake   Hydration Status:  Euvolemic   PONV Controlled:  Controlled   Airway Patency:  Patent     Post Op Vitals Reviewed: Yes      Staff: Anesthesiologist, CRNA               /70 (01/22/24 0826)    Temp      Pulse 78 (01/22/24 0826)   Resp 16 (01/22/24 0826)    SpO2 98 % (01/22/24 0826)    /70   Pulse 78   Temp 98.1 °F (36.7 °C) (Temporal)   Resp 16   Ht 5' 5\" (1.651 m)   Wt 84.8 kg (187 lb)   LMP 01/01/2024   SpO2 98%   BMI 31.12 kg/m²     "

## 2024-01-22 NOTE — ANESTHESIA PREPROCEDURE EVALUATION
Procedure:  EGD  COLONOSCOPY    Relevant Problems   CARDIO   (+) Chest pain   (+) Migraine without aura and without status migrainosus, not intractable      ENDO   (+) Type 2 diabetes mellitus with microalbuminuria, with long-term current use of insulin (HCC)   (+) Uncontrolled type 2 diabetes mellitus with hyperglycemia, without long-term current use of insulin (HCC)      GI/HEPATIC   (+) Other dysphagia      /RENAL   (+) Microalbuminuric diabetic nephropathy (HCC)      MUSCULOSKELETAL   (+) Adhesive capsulitis of right shoulder      NEURO/PSYCH   (+) Migraine without aura and without status migrainosus, not intractable   (+) Numbness and tingling of both feet   (+) Numbness and tingling of right arm   (+) Right leg weakness        Physical Exam    Airway    Mallampati score: II  TM Distance: >3 FB  Neck ROM: full     Dental   No notable dental hx     Cardiovascular  Rhythm: regular, Rate: normal    Pulmonary   Breath sounds clear to auscultation    Other Findings  post-pubertal.      Anesthesia Plan  ASA Score- 2     Anesthesia Type- IV sedation with anesthesia with ASA Monitors.         Additional Monitors:     Airway Plan:     Comment: GA prn.       Plan Factors-    Chart reviewed.    Patient summary reviewed.    Patient is not a current smoker. Patient not instructed to abstain from smoking on day of procedure. Patient did not smoke on day of surgery.    There is medical exclusion for perioperative obstructive sleep apnea risk education.        Induction- intravenous.    Postoperative Plan-     Informed Consent- Anesthetic plan and risks discussed with patient.  I personally reviewed this patient with the CRNA. Discussed and agreed on the Anesthesia Plan with the CRNA..

## 2024-01-22 NOTE — H&P
History and Physical - [facility]  Tari Shannon 43 y.o. female MRN: [id number]                  HPI: Tari Shannon is a 43 y.o. year old female who presents for EGD and colonoscopy evaluation.    EGD is for evaluation for dysphagia.  Colonoscopy is for evaluation for change in bowel habits.      REVIEW OF SYSTEMS: Per the HPI, and otherwise unremarkable.    Historical Information   Past Medical History:   Diagnosis Date    Diabetes mellitus (HCC)      Past Surgical History:   Procedure Laterality Date    COLONOSCOPY      HERNIA REPAIR      TUBAL LIGATION  2017     Social History   Social History     Substance and Sexual Activity   Alcohol Use Not Currently    Comment: ocassionally     Social History     Substance and Sexual Activity   Drug Use Never     Social History     Tobacco Use   Smoking Status Never   Smokeless Tobacco Never     Family History   Problem Relation Age of Onset    Hypertension Mother     Arthritis Mother     Asthma Sister     Bipolar disorder Brother     Schizophrenia Brother     Asthma Brother     Asthma Brother     Diabetes Maternal Grandmother     Diabetes Paternal Grandmother     No Known Problems Maternal Grandfather     No Known Problems Paternal Grandfather     Breast cancer Neg Hx        Meds/Allergies       Current Outpatient Medications:     Accu-Chek FastClix Lancets MISC    Accu-Chek Guide test strip    acetaminophen (TYLENOL) 500 mg tablet    Continuous Blood Gluc  (Dexcom G7 ) ENRICO    Continuous Blood Gluc Sensor (Dexcom G7 Sensor)    gabapentin (NEURONTIN) 300 mg capsule    ibuprofen (MOTRIN) 400 mg tablet    lidocaine (Lidoderm) 5 %    methocarbamol (ROBAXIN) 500 mg tablet    ondansetron (Zofran ODT) 4 mg disintegrating tablet    topiramate (TOPAMAX) 50 MG tablet    dicyclomine (BENTYL) 20 mg tablet    Injection Device for Insulin (B-D PEN MINI) ENRICO    insulin aspart (NovoLOG FlexPen) 100 UNIT/ML injection pen    insulin detemir (LEVEMIR  "FLEXTOUCH) 100 Units/mL injection pen    Insulin Pen Needle (BD Pen Needle Em U/F) 32G X 4 MM MISC    naproxen (Naprosyn) 500 mg tablet    pantoprazole (PROTONIX) 40 mg tablet    prochlorperazine (COMPAZINE) 10 mg tablet    semaglutide, 2 mg/dose, (Ozempic) 8 mg/ mL injection pen    Current Facility-Administered Medications:     sodium chloride 0.9 % infusion, 125 mL/hr, Intravenous, Continuous    Allergies   Allergen Reactions    Metformin And Related Hives       Objective     /66   Pulse 89   Temp 98.1 °F (36.7 °C) (Temporal)   Resp 16   Ht 5' 5\" (1.651 m)   Wt 84.8 kg (187 lb)   LMP 01/01/2024   SpO2 98%   BMI 31.12 kg/m²       PHYSICAL EXAM    Gen: NAD  Head: NCAT  CV: RRR  CHEST: Clear  ABD: soft, NT/ND  EXT: no edema      ASSESSMENT/PLAN:  This is a 43 y.o. year old female here for EGD and colonoscopy evaluation, and she is stable and optimized for her procedure.        "

## 2024-01-24 PROCEDURE — 88305 TISSUE EXAM BY PATHOLOGIST: CPT | Performed by: STUDENT IN AN ORGANIZED HEALTH CARE EDUCATION/TRAINING PROGRAM

## 2024-01-30 ENCOUNTER — HOSPITAL ENCOUNTER (OUTPATIENT)
Dept: ULTRASOUND IMAGING | Facility: CLINIC | Age: 44
Discharge: HOME | End: 2024-01-30
Payer: COMMERCIAL

## 2024-01-30 ENCOUNTER — HOSPITAL ENCOUNTER (OUTPATIENT)
Dept: MAMMOGRAPHY | Facility: CLINIC | Age: 44
Discharge: HOME | End: 2024-01-30
Payer: COMMERCIAL

## 2024-01-30 DIAGNOSIS — N63.10 MASS OF RIGHT BREAST, UNSPECIFIED QUADRANT: ICD-10-CM

## 2024-01-30 PROCEDURE — 77066 DX MAMMO INCL CAD BI: CPT

## 2024-01-30 PROCEDURE — 76642 ULTRASOUND BREAST LIMITED: CPT

## 2024-01-30 PROCEDURE — G0279 TOMOSYNTHESIS, MAMMO: HCPCS

## 2024-01-31 ENCOUNTER — OFFICE VISIT (OUTPATIENT)
Dept: PAIN MEDICINE | Facility: CLINIC | Age: 44
End: 2024-01-31
Payer: COMMERCIAL

## 2024-01-31 VITALS
HEIGHT: 65 IN | DIASTOLIC BLOOD PRESSURE: 81 MMHG | BODY MASS INDEX: 31.16 KG/M2 | WEIGHT: 187 LBS | SYSTOLIC BLOOD PRESSURE: 135 MMHG | HEART RATE: 79 BPM

## 2024-01-31 DIAGNOSIS — M54.12 CERVICAL RADICULOPATHY: Primary | ICD-10-CM

## 2024-01-31 DIAGNOSIS — M50.223 HERNIATION OF INTERVERTEBRAL DISC AT C6-C7 LEVEL: ICD-10-CM

## 2024-01-31 PROCEDURE — 99214 OFFICE O/P EST MOD 30 MIN: CPT | Performed by: NURSE PRACTITIONER

## 2024-01-31 RX ORDER — PREGABALIN 50 MG/1
CAPSULE ORAL
Qty: 90 CAPSULE | Refills: 1 | Status: SHIPPED | OUTPATIENT
Start: 2024-01-31

## 2024-01-31 NOTE — PATIENT INSTRUCTIONS
Pregabalin (By mouth)   Pregabalin (pre-GA-ba-suresh)  Treats nerve and muscle pain, including fibromyalgia. Also treats partial-onset seizures.   Brand Name(s): Lyrica, Lyrica CR   There may be other brand names for this medicine.  When This Medicine Should Not Be Used:   This medicine is not right for everyone. Do not use it if you had an allergic reaction to pregabalin.  How to Use This Medicine:   Capsule, Liquid, Long Acting Tablet  Take your medicine as directed. Your dose may need to be changed several times to find what works best for you.  Extended-release tablet: Swallow the extended-release tablet whole. Do not crush, break, or chew it. Take it after an evening meal.  Oral liquid: Measure the oral liquid medicine with a marked measuring spoon, oral syringe, or medicine cup.  This medicine should come with a Medication Guide. Ask your pharmacist for a copy if you do not have one.  Missed dose: Take a dose as soon as you remember. If it is almost time for your next dose, wait until then and take a regular dose. Do not take extra medicine to make up for a missed dose. If you miss a dose of the extended-release tablet after your evening meal, take it before bedtime after a snack. If you miss the dose before bedtime, take it after your morning meal. If you do not take the dose the following morning, then take the next dose at your regular time after your evening meal. Do not take 2 doses at the same time.  Store the medicine in a closed container at room temperature, away from heat, moisture, and direct light.  Drugs and Foods to Avoid:   Ask your doctor or pharmacist before using any other medicine, including over-the-counter medicines, vitamins, and herbal products.  Some medicines can affect how pregabalin works. Tell your doctor if you are using any of the following:   ACE inhibitor (including benazepril, enalapril, lisinopril, quinapril, ramipril)  Oral diabetes medicine (including metformin, pioglitazone,  rosiglitazone)  Do not drink alcohol while you are using this medicine.  Tell your doctor if you use anything else that makes you sleepy. Some examples are allergy medicine, narcotic pain medicine, and alcohol. Tell your doctor if you are also using oxycodone, lorazepam, or zolpidem.  Warnings While Using This Medicine:   Tell your doctor if you are pregnant or breastfeeding, or if you have kidney disease, heart failure, heart rhythm problems, lung or breathing problems, a bleeding disorder, diabetes, sores or skin problems, or a low blood platelet count. Tell your doctor if you have a history of angioedema (severe swelling), alcohol or drug abuse, depression, or other mood problems.  This medicine may cause the following problems:   Angioedema (severe swelling), which may be life-threatening  Changes in mood or behavior, including suicidal thoughts or behavior  Respiratory depression (serious breathing problem that can be life-threatening), when used with narcotic pain medicines  Peripheral edema (swelling of your hands, ankles, feet, or lower legs)  Increased risk for cancer and bleeding  Serious muscle problems  Heart rhythm changes  This medicine may make you dizzy or drowsy. It may also cause blurry or double vision. Do not drive or do anything else that could be dangerous until you know how this medicine affects you.  Do not stop using this medicine suddenly. Your doctor will need to slowly decrease your dose before you stop it completely.  Your doctor will do lab tests at regular visits to check on the effects of this medicine. Keep all appointments.  Keep all medicine out of the reach of children. Never share your medicine with anyone.  Possible Side Effects While Using This Medicine:   Call your doctor right away if you notice any of these side effects:  Allergic reaction: Itching or hives, swelling in your face or hands, swelling or tingling in your mouth or throat, chest tightness, trouble  breathing  Blistering, peeling, red skin rash  Blue lips, fingernails, or skin, trouble breathing, chest pain  Blurry or double vision  Fever, chills, cough, sore throat, body aches  Muscle pain, tenderness, or weakness, general feeling of illness  Rapid weight gain, swelling in your hands, ankles, or feet  Severe dizziness or drowsiness  Sudden mood changes, unusual moods or behavior, including extreme happiness or depression, thoughts or attempts of killing oneself  Swelling in your throat, head, or neck  Uneven heartbeat  Unusual bleeding, bruising, or weakness  If you notice these less serious side effects, talk with your doctor:   Confusion, trouble concentrating  Constipation  Dry mouth  If you notice other side effects that you think are caused by this medicine, tell your doctor.   Call your doctor for medical advice about side effects. You may report side effects to FDA at 6-166-FDA-5289  © Copyright [employer]ative 2023 Information is for End User's use only and may not be sold, redistributed or otherwise used for commercial purposes.  The above information is an  only. It is not intended as medical advice for individual conditions or treatments. Talk to your doctor, nurse or pharmacist before following any medical regimen to see if it is safe and effective for you.

## 2024-01-31 NOTE — PROGRESS NOTES
Assessment:  1. Cervical radiculopathy    2. Herniation of intervertebral disc at C6-C7 level        Plan:  I will initiate pregabalin 50 mg nightly and titrate up to 3 times daily dosing for neuropathic complaints.  I discussed with the patient the type of medication it is, how it works, and that it requires a titration process that is specific to each individual. I reviewed with the patient that it may take 3-4 weeks for the medication's effects to be noticed and that it should never be abruptly stopped. Possible side effects include but are not limited to; vertigo, lethargy, nausea, and edema of the extremities. Advised the patient to call our office if they experience any side effects. The patient verbalized an understanding.  Patient is not an interventional candidate until hemoglobin A1c is below 10.  As of September 2023 it was 13.6.  She is currently following with endocrinology.  Once hemoglobin A1c is 10 or under we can consider a C6-7 cervical epidural steroid injection  Continue with exercise program as taught by physical therapy  Follow-up in 6 weeks or sooner if needed    History of Present Illness:    The patient is a 43 y.o. female with a history of diabetes with her current hemoglobin A1c of 13.6 as of September 2023 and history of migraines last seen on 06/24/2022  who presents for a follow up office visit in regards to chronic neck pain that radiates into the upper extremity no specific dermatomal distribution with associated numbness and paresthesias and subjective weakness.  She denies left-sided symptoms, bowel or bladder incontinence or balance issues.  MRI of the cervical spine does reveal a disc herniation at C6-7 resulting in moderate canal stenosis and mild cord impingement.  EMG revealed potential changes secondary to a brachial plexopathy.  It did not show any evidence of a cervical radiculopathy.  MRI of the brachial plexopathy was normal, however.  She has taken gabapentin 300 mg  nightly in the past without much relief.  She finds minimal relief with Tylenol and ibuprofen.  She has completed physical therapy which she states only worsened her condition    The patient rates her pain an 8 out of 10 on the numeric pain rating scale.  Her pain is constant throughout the day and it is described as burning, sharp, cramping, shooting, numbness and pins-and-needles    I have personally reviewed and/or updated the patient's past medical history, past surgical history, family history, social history, current medications, allergies, and vital signs today.       Review of Systems:    Review of Systems      Past Medical History:   Diagnosis Date    Diabetes mellitus (HCC)        Past Surgical History:   Procedure Laterality Date    COLONOSCOPY      HERNIA REPAIR      TUBAL LIGATION  2017       Family History   Problem Relation Age of Onset    Hypertension Mother     Arthritis Mother     Asthma Sister     Bipolar disorder Brother     Schizophrenia Brother     Asthma Brother     Asthma Brother     Diabetes Maternal Grandmother     Diabetes Paternal Grandmother     No Known Problems Maternal Grandfather     No Known Problems Paternal Grandfather     Breast cancer Neg Hx        Social History     Occupational History     Employer: [employer]     Employer: [employer]     Employer: [employer]   Tobacco Use    Smoking status: Never    Smokeless tobacco: Never   Vaping Use    Vaping status: Never Used   Substance and Sexual Activity    Alcohol use: Not Currently     Comment: ocassionally    Drug use: Never    Sexual activity: Yes     Partners: Male         Current Outpatient Medications:     acetaminophen (TYLENOL) 500 mg tablet, Take 1 tablet (500 mg total) by mouth every 6 (six) hours as needed for mild pain or moderate pain, Disp: 30 tablet, Rfl: 0    ibuprofen (MOTRIN) 400 mg tablet, Take 1 tablet (400 mg total) by mouth every 6 (six) hours as needed for mild  pain or moderate pain, Disp: 30 tablet, Rfl: 0    methocarbamol (ROBAXIN) 500 mg tablet, Take 1 tablet (500 mg total) by mouth 2 (two) times a day, Disp: 20 tablet, Rfl: 0    naproxen (Naprosyn) 500 mg tablet, Take 1 tablet (500 mg total) by mouth 2 (two) times a day with meals, Disp: 30 tablet, Rfl: 0    pregabalin (LYRICA) 50 mg capsule, Take 1 PO HS x 5 days, then 1 PO BID x 5 days, then 1 PO TID, Disp: 90 capsule, Rfl: 1    topiramate (TOPAMAX) 50 MG tablet, Take 1 tablet (50 mg total) by mouth daily at bedtime, Disp: 90 tablet, Rfl: 1    Accu-Chek FastClix Lancets MISC, Test BG up to 3x daily as directed, Disp: 300 each, Rfl: 1    Accu-Chek Guide test strip, TEST BG UP TO 3X DAILY AS DIRECTED, Disp: 100 each, Rfl: 1    Continuous Blood Gluc  (Dexcom G7 ) ENRICO, Use 1 Units daily, Disp: 1 each, Rfl: 1    Continuous Blood Gluc Sensor (Dexcom G7 Sensor), Use 1 Device every 10 days, Disp: 9 each, Rfl: 3    dicyclomine (BENTYL) 20 mg tablet, Take 1 tablet (20 mg total) by mouth 2 (two) times a day as needed (abd pain) for up to 2 days, Disp: 4 tablet, Rfl: 0    Injection Device for Insulin (B-D PEN MINI) ENRICO, Use 4 (four) times a day Please use for Lantus and Humalog. 4 pen needles daily Dx: Diabetes (Patient not taking: Reported on 1/31/2024), Disp: 400 each, Rfl: 0    insulin aspart (NovoLOG FlexPen) 100 UNIT/ML injection pen, Inject 5 Units under the skin 3 (three) times a day with meals, Disp: 15 mL, Rfl: 2    insulin detemir (LEVEMIR FLEXTOUCH) 100 Units/mL injection pen, Inject 25 Units under the skin daily at bedtime, Disp: 15 mL, Rfl: 2    Insulin Pen Needle (BD Pen Needle Em U/F) 32G X 4 MM MISC, Use daily, Disp: 100 each, Rfl: 0    lidocaine (Lidoderm) 5 %, Apply 1 patch topically over 12 hours daily Remove & Discard patch within 12 hours or as directed by MD, Disp: 5 patch, Rfl: 0    ondansetron (Zofran ODT) 4 mg disintegrating tablet, Take 1 tablet (4 mg total) by mouth every 6 (six)  "hours as needed for nausea or vomiting for up to 4 days, Disp: 16 tablet, Rfl: 0    pantoprazole (PROTONIX) 40 mg tablet, Take 1 tablet (40 mg total) by mouth daily, Disp: 90 tablet, Rfl: 1    prochlorperazine (COMPAZINE) 10 mg tablet, 1 tab at onset of migraine, can repeat in 8 hours, can take with NSAID. Take with Benadryl if there are side effects., Disp: 20 tablet, Rfl: 1    semaglutide, 2 mg/dose, (Ozempic) 8 mg/ mL injection pen, Inject 0.75 mL (2 mg total) under the skin every 7 days, Disp: 9 mL, Rfl: 1    Allergies   Allergen Reactions    Metformin And Related Hives       Physical Exam:    /81   Pulse 79   Ht 5' 5\" (1.651 m)   Wt 84.8 kg (187 lb)   LMP 01/30/2024   BMI 31.12 kg/m²     Constitutional:normal, well developed, well nourished, alert, in no distress and non-toxic and no overt pain behavior.  Eyes:anicteric  HEENT:grossly intact  Neck:supple, symmetric, trachea midline and no masses   Pulmonary:even and unlabored  Cardiovascular:No edema or pitting edema present  Skin:Normal without rashes or lesions and well hydrated  Psychiatric:Mood and affect appropriate  Neurologic:Cranial Nerves II-XII grossly intact  Musculoskeletal:normal gait.  Limited range of motion of the cervical spine.  Right cervical paraspinal trapezium musculature tender to palpation. Decreased sensation to light touch in the right upper extremity.  Left upper extremity strength 5-5 all muscle groups.  Right upper extremity strength 5 out of 5 with the exception of right  strength in right deltoid strength which is 4 out of 5.  Positive Spurling's to the right.  Negative Alford's reflex      Imaging  No orders to display       Narrative & Impression  MRI CERVICAL SPINE WITHOUT CONTRAST     INDICATION: M54.2: Cervicalgia  R20.0: Anesthesia of skin  R20.2: Paresthesia of skin.     COMPARISON:  None.     TECHNIQUE:  Sagittal T1, sagittal T2, sagittal inversion recovery, axial T2, axial  2D merge     IMAGE QUALITY:  " Diagnostic     FINDINGS:     ALIGNMENT:  Normal alignment of the cervical spine.  No compression fracture.  No subluxation.  No scoliosis.     MARROW SIGNAL:  Normal marrow signal is identified within the visualized bony structures.  No discrete marrow lesion.     CERVICAL AND VISUALIZED THORACIC CORD:  Normal signal within the visualized cord.     PREVERTEBRAL AND PARASPINAL SOFT TISSUES:  Normal.     VISUALIZED POSTERIOR FOSSA:  The visualized posterior fossa demonstrates no abnormal signal.     CERVICAL DISC SPACES:     C2-C3:  Normal.     C3-C4:  Normal.     C4-C5:  Normal.     C5-C6:  Normal.     C6-C7:  There is a bulging annulus with a superimposed central disc protrusion.  There is facet arthrosis with hypertrophy of the posterior arch.  There is moderate canal stenosis with mild cord impingement.  There is moderate left foraminal narrowing.     C7-T1:  Normal.     UPPER THORACIC DISC SPACES:  Normal.     IMPRESSION:     Multifactorial disease results in moderate canal stenosis with mild cord impingement at C6-C7.  Moderate left foraminal narrowing is present at this level.    No orders of the defined types were placed in this encounter.

## 2024-02-02 ENCOUNTER — OFFICE VISIT (OUTPATIENT)
Dept: ENDOCRINOLOGY | Facility: CLINIC | Age: 44
End: 2024-02-02
Payer: COMMERCIAL

## 2024-02-02 VITALS
HEART RATE: 91 BPM | BODY MASS INDEX: 32.42 KG/M2 | HEIGHT: 65 IN | SYSTOLIC BLOOD PRESSURE: 110 MMHG | DIASTOLIC BLOOD PRESSURE: 80 MMHG | WEIGHT: 194.6 LBS

## 2024-02-02 DIAGNOSIS — Z79.4 CURRENT USE OF INSULIN (HCC): ICD-10-CM

## 2024-02-02 DIAGNOSIS — E11.65 UNCONTROLLED TYPE 2 DIABETES MELLITUS WITH HYPERGLYCEMIA, WITHOUT LONG-TERM CURRENT USE OF INSULIN (HCC): Primary | ICD-10-CM

## 2024-02-02 LAB — SL AMB POCT HEMOGLOBIN AIC: 9.6 (ref ?–6.5)

## 2024-02-02 PROCEDURE — 99214 OFFICE O/P EST MOD 30 MIN: CPT

## 2024-02-02 PROCEDURE — 83036 HEMOGLOBIN GLYCOSYLATED A1C: CPT

## 2024-02-02 PROCEDURE — 95251 CONT GLUC MNTR ANALYSIS I&R: CPT

## 2024-02-02 RX ORDER — INSULIN DEGLUDEC INJECTION 100 U/ML
30 INJECTION, SOLUTION SUBCUTANEOUS DAILY
Qty: 15 ML | Refills: 1 | Status: SHIPPED | OUTPATIENT
Start: 2024-02-02

## 2024-02-02 RX ORDER — INSULIN ASPART 100 [IU]/ML
10 INJECTION, SOLUTION INTRAVENOUS; SUBCUTANEOUS
Qty: 15 ML | Refills: 2 | Status: SHIPPED | OUTPATIENT
Start: 2024-02-02

## 2024-02-02 RX ORDER — ACYCLOVIR 400 MG/1
1 TABLET ORAL
Qty: 9 EACH | Refills: 3 | Status: SHIPPED | OUTPATIENT
Start: 2024-02-02

## 2024-02-02 NOTE — ASSESSMENT & PLAN NOTE
HGA1C improving but remains above goal. BGs uncontrolled, primarily high.   Treatment regimen:   - Continue with Ozempic at current, she is tolerating well without any issues.  - Increase Novolog to 10 units before meals.  - Change Levemir to Tresiba and will increase to 30 units daily.  - Continue with CGM to monitor BGs.   - Focus on lifestyle modifications.   Discussed risks/complications associated with uncontrolled diabetes.   Advised to adhere to diabetic diet, and recommended staying active/exercising routinely.  Keep carbohydrates consistent to limit blood glucose fluctuations.  Advised to call if blood sugars less than 70 mg/dl or over 300 mg/dl.   Discussed symptoms and treatment of hypoglycemia.    Recommended routine follow-up with podiatry and ophthalmology.   Ordered blood work to complete prior to next visit.   Lab Results   Component Value Date    HGBA1C 9.6 (A) 02/02/2024

## 2024-02-02 NOTE — LETTER
February 2, 2024     Patient: Tari Shannon  YOB: 1980  Date of Visit: 2/2/2024      To Whom it May Concern:    Tari Shannon is under my professional care. Tari was seen in my office on 2/2/2024. Please excuse Tari from work today while she was at her appointment. She may return to work on 2/2/24 after appointment .    If you have any questions or concerns, please don't hesitate to call.         Sincerely,          KETURAH Chan

## 2024-02-02 NOTE — PROGRESS NOTES
Established Patient Progress Note      Chief Complaint   Patient presents with    Diabetes Type 2        Impression & Plan:    Problem List Items Addressed This Visit          Endocrine    Uncontrolled type 2 diabetes mellitus with hyperglycemia, without long-term current use of insulin (HCC) - Primary     HGA1C improving but remains above goal. BGs uncontrolled, primarily high.   Treatment regimen:   - Continue with Ozempic at current, she is tolerating well without any issues.  - Increase Novolog to 10 units before meals.  - Change Levemir to Tresiba and will increase to 30 units daily.  - Continue with CGM to monitor BGs.   - Focus on lifestyle modifications.   Discussed risks/complications associated with uncontrolled diabetes.   Advised to adhere to diabetic diet, and recommended staying active/exercising routinely.  Keep carbohydrates consistent to limit blood glucose fluctuations.  Advised to call if blood sugars less than 70 mg/dl or over 300 mg/dl.   Discussed symptoms and treatment of hypoglycemia.    Recommended routine follow-up with podiatry and ophthalmology.   Ordered blood work to complete prior to next visit.   Lab Results   Component Value Date    HGBA1C 9.6 (A) 02/02/2024            Relevant Medications    insulin degludec (Tresiba FlexTouch) 100 units/mL injection pen    Continuous Blood Gluc Sensor (Dexcom G7 Sensor)    insulin aspart (NovoLOG FlexPen) 100 UNIT/ML injection pen    Other Relevant Orders    POCT hemoglobin A1c (Completed)    Hemoglobin A1C    Comprehensive metabolic panel    Lipid panel    Albumin / creatinine urine ratio       Other    Current use of insulin (HCC)    Relevant Medications    Continuous Blood Gluc Sensor (Dexcom G7 Sensor)       History of Present Illness:   Tari Shannon is a 43 y.o. female with type 2 diabetes seen in follow up. Reports complications of neuropathy. Denies recent severe hypoglycemic or severe hyperglycemic episodes. Denies any issues with her  current regimen. POCT A1C was 9.6. Home glucose monitoring: are performed regularly with CGM.     She has been checked for C-peptide and CHAPITO 65 in the past which were negative. Allergy to Metformin (hives).    Tari Henry   Device used: Dexcom G7  Home use   Indication: Type 2 Diabetes  More than 72 hours of data was reviewed. Report to be scanned to chart.   Date Range: 1/20/24-2/2/24  Analysis of data:   Average Glucose: 277  SD : 72   Time in Target Range: 12%   Time Above Range: 19% high, 69% very high    Time Below Range: 0%   Interpretation of data: BGs overall high.      Current regimen:   Ozempic 2mg weekly   Levemir 25 units   Novolog 5units with meals.     Last Eye Exam: needs to schedule   Last Foot Exam: UTD      Patient Active Problem List   Diagnosis    Encounter for annual routine gynecological examination    Abnormal thyroid blood test    Papanicolaou smear of cervix with positive high risk human papilloma virus (HPV) test    Type 2 diabetes mellitus with microalbuminuria, with long-term current use of insulin (HCC)    Stress at home    Diabetes mellitus (HCC)    Obesity (BMI 30-39.9)    Numbness and tingling of both feet    Recurrent episodes of unresponsiveness    Migraine without aura and without status migrainosus, not intractable    Current use of insulin (HCC)    Chest pain    Uncontrolled type 2 diabetes mellitus with hyperglycemia, without long-term current use of insulin (HCC)    Numbness and tingling of right arm    Adhesive capsulitis of right shoulder    Herniation of intervertebral disc at C6-C7 level    Neck pain    Brachial plexopathy    Right leg weakness    Radiculopathy, lumbar region    Polyneuropathy    Chronotropic incompetence    Microalbuminuric diabetic nephropathy (HCC)    Other dysphagia      Past Medical History:   Diagnosis Date    Diabetes mellitus (HCC)       Past Surgical History:   Procedure Laterality Date    COLONOSCOPY      HERNIA REPAIR      TUBAL LIGATION  2017       Social History     Tobacco Use    Smoking status: Never    Smokeless tobacco: Never   Substance Use Topics    Alcohol use: Not Currently     Comment: ocassionally     Allergies   Allergen Reactions    Metformin And Related Hives         Current Outpatient Medications:     Accu-Chek FastClix Lancets MISC, Test BG up to 3x daily as directed, Disp: 300 each, Rfl: 1    Accu-Chek Guide test strip, TEST BG UP TO 3X DAILY AS DIRECTED, Disp: 100 each, Rfl: 1    acetaminophen (TYLENOL) 500 mg tablet, Take 1 tablet (500 mg total) by mouth every 6 (six) hours as needed for mild pain or moderate pain, Disp: 30 tablet, Rfl: 0    Continuous Blood Gluc  (Dexcom G7 ) ENRICO, Use 1 Units daily, Disp: 1 each, Rfl: 1    Continuous Blood Gluc Sensor (Dexcom G7 Sensor), Use 1 Device every 10 days, Disp: 9 each, Rfl: 3    dicyclomine (BENTYL) 20 mg tablet, Take 1 tablet (20 mg total) by mouth 2 (two) times a day as needed (abd pain) for up to 2 days, Disp: 4 tablet, Rfl: 0    ibuprofen (MOTRIN) 400 mg tablet, Take 1 tablet (400 mg total) by mouth every 6 (six) hours as needed for mild pain or moderate pain, Disp: 30 tablet, Rfl: 0    insulin aspart (NovoLOG FlexPen) 100 UNIT/ML injection pen, Inject 10 Units under the skin 3 (three) times a day with meals, Disp: 15 mL, Rfl: 2    insulin degludec (Tresiba FlexTouch) 100 units/mL injection pen, Inject 30 Units under the skin daily, Disp: 15 mL, Rfl: 1    Insulin Pen Needle (BD Pen Needle Em U/F) 32G X 4 MM MISC, Use daily, Disp: 100 each, Rfl: 0    lidocaine (Lidoderm) 5 %, Apply 1 patch topically over 12 hours daily Remove & Discard patch within 12 hours or as directed by MD, Disp: 5 patch, Rfl: 0    methocarbamol (ROBAXIN) 500 mg tablet, Take 1 tablet (500 mg total) by mouth 2 (two) times a day, Disp: 20 tablet, Rfl: 0    naproxen (Naprosyn) 500 mg tablet, Take 1 tablet (500 mg total) by mouth 2 (two) times a day with meals, Disp: 30 tablet, Rfl: 0    ondansetron  "(Zofran ODT) 4 mg disintegrating tablet, Take 1 tablet (4 mg total) by mouth every 6 (six) hours as needed for nausea or vomiting for up to 4 days, Disp: 16 tablet, Rfl: 0    pantoprazole (PROTONIX) 40 mg tablet, Take 1 tablet (40 mg total) by mouth daily, Disp: 90 tablet, Rfl: 1    pregabalin (LYRICA) 50 mg capsule, Take 1 PO HS x 5 days, then 1 PO BID x 5 days, then 1 PO TID, Disp: 90 capsule, Rfl: 1    prochlorperazine (COMPAZINE) 10 mg tablet, 1 tab at onset of migraine, can repeat in 8 hours, can take with NSAID. Take with Benadryl if there are side effects., Disp: 20 tablet, Rfl: 1    semaglutide, 2 mg/dose, (Ozempic) 8 mg/ mL injection pen, Inject 0.75 mL (2 mg total) under the skin every 7 days, Disp: 9 mL, Rfl: 1    topiramate (TOPAMAX) 50 MG tablet, Take 1 tablet (50 mg total) by mouth daily at bedtime, Disp: 90 tablet, Rfl: 1    Injection Device for Insulin (B-D PEN MINI) ENRICO, Use 4 (four) times a day Please use for Lantus and Humalog. 4 pen needles daily Dx: Diabetes (Patient not taking: Reported on 1/31/2024), Disp: 400 each, Rfl: 0    Review of Systems   Constitutional:  Negative for activity change, appetite change, chills, diaphoresis, fatigue, fever and unexpected weight change.   Eyes:  Negative for visual disturbance.   Respiratory:  Negative for chest tightness and shortness of breath.    Cardiovascular:  Negative for chest pain, palpitations and leg swelling.   Gastrointestinal:  Negative for abdominal pain, constipation, diarrhea, nausea and vomiting.   Endocrine: Negative for polydipsia, polyphagia and polyuria.   Skin:  Negative for color change, pallor, rash and wound.   Neurological:  Negative for dizziness, tremors, weakness, light-headedness and numbness.   All other systems reviewed and are negative.      Physical Exam:  Body mass index is 32.38 kg/m².  /80   Pulse 91   Ht 5' 5\" (1.651 m)   Wt 88.3 kg (194 lb 9.6 oz)   LMP 01/30/2024   BMI 32.38 kg/m²    Wt Readings from Last " 3 Encounters:   02/02/24 88.3 kg (194 lb 9.6 oz)   01/31/24 84.8 kg (187 lb)   01/22/24 84.8 kg (187 lb)       Physical Exam  Vitals reviewed.   Constitutional:       Appearance: Normal appearance. She is obese.   Neurological:      Mental Status: She is alert and oriented to person, place, and time.   Psychiatric:         Mood and Affect: Mood normal.         Thought Content: Thought content normal.       Labs:   Lab Results   Component Value Date    HGBA1C 9.6 (A) 02/02/2024    HGBA1C 13.6 (A) 09/28/2023    HGBA1C 13.3 (A) 03/30/2023     Lab Results   Component Value Date    CREATININE 0.75 12/18/2023    CREATININE 0.89 05/23/2023    CREATININE 0.94 11/09/2022    BUN 10 12/18/2023     (L) 01/06/2014    K 3.6 12/18/2023     12/18/2023    CO2 24 12/18/2023     GFR, Calculated   Date Value Ref Range Status   04/20/2018 79 >60 mL/min/1.73m2 Final     Comment:     mL/min per 1.73 square meters                                            Normal Function or Mild Renal    Disease (if clinically at risk):  >or=60  Moderately Decreased:                30-59  Severely Decreased:                  15-29  Renal Failure:                         <15                                            -American GFR: multiply reported GFR by 1.16    Please note that the eGFR is based on the CKD-EPI calculation, and is not intended to be used for drug dosing.                                            Note: Calculated GFR may not be an accurate indicator of renal function if the patient's renal function is not in a steady state.     eGFR   Date Value Ref Range Status   12/18/2023 98 ml/min/1.73sq m Final     Lab Results   Component Value Date    HDL 66 03/18/2022    TRIG 83 03/18/2022     Lab Results   Component Value Date    ALT 9 12/18/2023    AST 12 (L) 12/18/2023    ALKPHOS 61 12/18/2023     Lab Results   Component Value Date    LJO4DXBIDRYX 2.230 03/18/2022    INR1EXBNXMBI 1.105 11/21/2020    ENU0OIYAVHWT 2.900  09/23/2019       Patient Instructions   Increase novolog to 10 units before each meal.  Change Levemir to Tresiba - increase to 30 units once daily.  Continue with ozempic at 2 mg weekly.     Discussed with the patient and all questioned fully answered. She will call me if any problems arise.    KETURAH Maldonado

## 2024-02-02 NOTE — PATIENT INSTRUCTIONS
Increase novolog to 10 units before each meal.  Change Levemir to Tresiba - increase to 30 units once daily.  Continue with ozempic at 2 mg weekly.

## 2024-02-08 ENCOUNTER — TELEPHONE (OUTPATIENT)
Dept: GASTROENTEROLOGY | Facility: AMBULATORY SURGERY CENTER | Age: 44
End: 2024-02-08

## 2024-02-13 ENCOUNTER — TELEPHONE (OUTPATIENT)
Age: 44
End: 2024-02-13
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
## 2024-02-21 ENCOUNTER — OFFICE VISIT (OUTPATIENT)
Dept: GASTROENTEROLOGY | Facility: CLINIC | Age: 44
End: 2024-02-21
Payer: COMMERCIAL

## 2024-02-21 VITALS
BODY MASS INDEX: 32.02 KG/M2 | SYSTOLIC BLOOD PRESSURE: 122 MMHG | WEIGHT: 192.2 LBS | DIASTOLIC BLOOD PRESSURE: 80 MMHG | HEIGHT: 65 IN

## 2024-02-21 DIAGNOSIS — R10.31 RLQ ABDOMINAL PAIN: ICD-10-CM

## 2024-02-21 DIAGNOSIS — R11.2 NAUSEA AND VOMITING, UNSPECIFIED VOMITING TYPE: ICD-10-CM

## 2024-02-21 DIAGNOSIS — R13.19 OTHER DYSPHAGIA: Primary | ICD-10-CM

## 2024-02-21 DIAGNOSIS — R19.7 DIARRHEA, UNSPECIFIED TYPE: ICD-10-CM

## 2024-02-21 DIAGNOSIS — Z12.11 SCREENING FOR COLON CANCER: ICD-10-CM

## 2024-02-21 PROBLEM — Z01.419 ENCOUNTER FOR ANNUAL ROUTINE GYNECOLOGICAL EXAMINATION: Status: RESOLVED | Noted: 2019-05-02 | Resolved: 2024-02-21

## 2024-02-21 PROCEDURE — 99213 OFFICE O/P EST LOW 20 MIN: CPT | Performed by: PHYSICIAN ASSISTANT

## 2024-02-21 NOTE — PROGRESS NOTES
[facility] - Outpatient Follow-up Note  Tari Shannon 43 y.o. female MRN: [id number]  Encounter: [id number]          ASSESSMENT AND PLAN:      1. Other dysphagia  Persistent difficulty swallowing solids and liquids for over 2 months. EGD 1/22/24 showed normal esophagus and biopsies negative for eosinophilic esophagitis. Next step in evaluation is esophageal manometry to rule out motility disorder. I explained the test in detail. She is already scheduled next week for manometry. We will contact her with results and plan of care moving forward. Continue pantoprazole 40 mg daily. Take small bites and chew food carefully. She should go to the emergency room in the event of food impaction.     2. Nausea and vomiting, unspecified vomiting type  Fortunately, her nausea and vomiting has largely resolved. She has type 2 diabetes and her A1c has improved from 13.6-9.6, so this may be helping improve gastric emptying. She does take Ozempic which  works by delaying gastric emptying, so if her nausea and vomiting would recur or become a persistent issue again, we may need to consider switching her to a different diabetic medication.     3. RLQ abdominal pain  4. Diarrhea, unspecified type  Fortunately, her RLQ pain and diarrhea have largely resolved. CT A/P with IV contrast negative for acute abnormality.  EGD/colonoscopy with biopsies negative for celiac disease, IBD, microscopic colitis, malignancy. I explained if her symptoms would return, we should check stool studies to rule out infection.    5. Screening for colon cancer  Due for colonoscopy in 10 years.     Follow-up in 3 months  ______________________________________________________________________    SUBJECTIVE:  43-year-old female with uncontrolled type 2 diabetes on insulin and Ozempic (A1c 9.6), diabetic nephropathy, polyneuropathy, migraines presenting for follow-up after EGD and colonoscopy.     I saw her in January for multiple  symptoms including difficulty swallowing solids and liquids, nausea and vomiting, RLQ pain and diarrhea. She underwent procedures on 1/22/2024. EGD showed normal esophagus, stomach, and duodenum. There was a fundic gland polyp in the stomach. Esophageal, gastric, and duodenal biopsies were normal. Colonoscopy with TI intubation was completely normal. Random colon biopsies were negative for microscopic colitis.     She continues to report difficulty swallowing solids and liquids. She was at the [facility] recently and French fries got stuck in her esophagus. She eventually had to bring up the French fries and there was some blood that came up too, but she also had a nosebleed so suspect swallowed blood. This has not recurred. She still has difficulty swallowing liquids like water. She is taking small sips and small bites of food. She continues to take Protonix daily. Her nausea and vomiting has largely resolved. She denies significant abdominal pain. Her diarrhea has also resolved. She is now having regular formed BMs. No black or bloody stools.    REVIEW OF SYSTEMS IS OTHERWISE NEGATIVE.      Historical Information   Past Medical History:   Diagnosis Date    Diabetes mellitus (HCC)      Past Surgical History:   Procedure Laterality Date    COLONOSCOPY      HERNIA REPAIR      TUBAL LIGATION  2017     Social History   Social History     Substance and Sexual Activity   Alcohol Use Not Currently    Comment: ocassionally     Social History     Substance and Sexual Activity   Drug Use Never     Social History     Tobacco Use   Smoking Status Never   Smokeless Tobacco Never     Family History   Problem Relation Age of Onset    Hypertension Mother     Arthritis Mother     Asthma Sister     Bipolar disorder Brother     Schizophrenia Brother     Asthma Brother     Asthma Brother     Diabetes Maternal Grandmother     Diabetes Paternal Grandmother     No Known Problems Maternal Grandfather     No Known Problems Paternal  Grandfather     Breast cancer Neg Hx        Meds/Allergies       Current Outpatient Medications:     Accu-Chek FastClix Lancets MISC    Accu-Chek Guide test strip    acetaminophen (TYLENOL) 500 mg tablet    Continuous Blood Gluc  (Dexcom G7 ) ENRICO    Continuous Blood Gluc Sensor (Dexcom G7 Sensor)    dicyclomine (BENTYL) 20 mg tablet    ibuprofen (MOTRIN) 400 mg tablet    Injection Device for Insulin (B-D PEN MINI) ENRICO    insulin aspart (NovoLOG FlexPen) 100 UNIT/ML injection pen    insulin degludec (Tresiba FlexTouch) 100 units/mL injection pen    Insulin Pen Needle (BD Pen Needle Em U/F) 32G X 4 MM MISC    lidocaine (Lidoderm) 5 %    methocarbamol (ROBAXIN) 500 mg tablet    naproxen (Naprosyn) 500 mg tablet    ondansetron (Zofran ODT) 4 mg disintegrating tablet    pantoprazole (PROTONIX) 40 mg tablet    pregabalin (LYRICA) 50 mg capsule    prochlorperazine (COMPAZINE) 10 mg tablet    semaglutide, 2 mg/dose, (Ozempic) 8 mg/ mL injection pen    topiramate (TOPAMAX) 50 MG tablet    Allergies   Allergen Reactions    Metformin And Related Hives           Objective     Last menstrual period 01/30/2024, unknown if currently breastfeeding. There is no height or weight on file to calculate BMI.      PHYSICAL EXAM:      General Appearance:   Alert, cooperative, no distress   HEENT:   Normocephalic, atraumatic, anicteric.     Neck:  Supple, symmetrical, trachea midline   Lungs:   Clear to auscultation bilaterally; no rales, rhonchi or wheezing; respirations unlabored    Heart::   Regular rate and rhythm; no murmur, rub, or gallop.   Abdomen:   Soft, non-tender, non-distended; normal bowel sounds; no masses, no organomegaly    Genitalia:   Deferred    Rectal:   Deferred    Extremities:  No cyanosis, clubbing or edema    Pulses:  2+ and symmetric    Skin:  No jaundice, rashes, or lesions    Lymph nodes:  No palpable cervical lymphadenopathy        Lab Results:   No visits with results within 1 Day(s) from  this visit.   Latest known visit with results is:   Office Visit on 02/02/2024   Component Date Value    Hemoglobin A1C 02/02/2024 9.6 (A)          Radiology Results:   Mammo diagnostic bilateral w 3d & cad, US breast right limited (diagnostic)    Result Date: 1/30/2024  Narrative: DIAGNOSIS: Mass of right breast, unspecified quadrant TECHNIQUE: Digital diagnostic mammography was performed. Computer Aided Detection (CAD) analyzed all applicable images. Right breast ultrasound was performed. COMPARISONS: Prior breast imaging dated: 11/03/2022, 11/03/2022, 05/02/2022, 05/02/2022, 11/01/2021, 11/01/2021, 10/01/2021, 07/09/2015, 07/09/2015, and 07/09/2015 RELEVANT HISTORY: Family Breast Cancer History: History of breast cancer in Neg Hx. Family Medical History: No known relevant family medical history. Personal History: No known relevant hormone history. No known relevant surgical history. No known relevant medical history. RISK ASSESSMENT: 5 Year Tyrer-Cuzick: 0.56% 10 Year Tyrer-Cuzick: 1.37% Lifetime Tyrer-Cuzick: 9.33% TISSUE DENSITY: The breasts are heterogeneously dense, which may obscure small masses. INDICATION: Tari Shannon is a 43 y.o. female presenting for Hx of R breast mass. FINDINGS: RIGHT 1) MASS [A] Mammo diagnostic bilateral w 3d & cad: There is a 6 mm equal density, oval mass with circumscribed margins seen in the right breast at 9 o'clock, 7 cm from the nipple. Compared to the previous study, there are no significant changes. US breast right limited (diagnostic): There is a 5 mm x 3 mm x 4 mm oval, parallel, hypoechoic mass with circumscribed margins with no posterior features seen in the right breast at 9 o'clock, 7 cm from the nipple. The mass correlates with the prior mammogram finding and ultrasound finding. Compared to the previous study, there are no significant changes.  Peripheral and internal vascularity again noted, morphology favoring benign fibroadenoma.  With stability for greater than  24 months, no further surveillance required. Left Mammo diagnostic bilateral w 3d & cad There are no suspicious masses, grouped microcalcifications or areas of unexplained architectural distortion. The skin and nipple areolar complex are unremarkable.     Impression: Stable benign morphology mass 9:00 right breast probable fibroadenoma.  Patient can return to routine screening mammography. No evidence for malignancy. ASSESSMENT/BI-RADS CATEGORY: Left: 1 - Negative Right: 2 - Benign Overall: 2 - Benign RECOMMENDATION:      - Routine screening mammogram in 1 year for both breasts. Workstation ID: [id number]

## 2024-02-27 ENCOUNTER — HOSPITAL ENCOUNTER (OUTPATIENT)
Dept: GASTROENTEROLOGY | Facility: AMBULATORY SURGERY CENTER | Age: 44
Discharge: HOME | End: 2024-02-27
Payer: COMMERCIAL

## 2024-02-27 VITALS
OXYGEN SATURATION: 100 % | SYSTOLIC BLOOD PRESSURE: 129 MMHG | HEART RATE: 72 BPM | DIASTOLIC BLOOD PRESSURE: 69 MMHG | TEMPERATURE: 97 F | RESPIRATION RATE: 18 BRPM

## 2024-02-27 DIAGNOSIS — R13.19 ESOPHAGEAL DYSPHAGIA: ICD-10-CM

## 2024-02-27 NOTE — PERIOPERATIVE NURSING NOTE
Patient brought in the room and explained the esophageal manometry procedure. After the confirmation of allergies, lidocaine 2 % viscous given via nostrils and  a transnasal insertion of the High Resolution esophageal manometry catheter was inserted via right nostril. Patient given water to drink during the insertion and once the catheter inserted pressure bands of both Upper esophageal sphincter  (UES) and Lower esophageal sphincter ( LES) were observed on the color contour. Patient instructed to take a deep breath to verify placement of the catheter, diaphragmatic pinch noted on inspiration. Catheter was secured to right cheek. Patient was assisted to supine position .Patient was instructed to relax  while acclimating the catheter for about 5 minutes. A 30 second baseline resting pressure was obtained to identify the UES and LES followed by a series of 10 liquid swallows using 5 cc room temperature normal saline to assess esophageal motility and bolus transit. Patient administered 10 viscous swallows using 5 cc viscous solution, 1 multiple rapid drink swallow using 2 cc room temperature normal saline given a total of 5 drinks. Patient instructed to sit up at the edge of the stretcher and given 5 upright liquid swallows using 5 cc room temperature normal saline and 1 rapid drink challenge using 130 cc room temperature water. During procedure patient needed frequent breaks, ivon full, gags, and vomited multiple times.  At the end of the procedure the high resolution esophageal manometry catheter was removed from the nostril intact. Patient given discharge instructions and patient left in stable condition.

## 2024-02-28 PROCEDURE — 91020 GASTRIC MOTILITY STUDIES: CPT | Performed by: INTERNAL MEDICINE

## 2024-02-29 ENCOUNTER — HOSPITAL ENCOUNTER (EMERGENCY)
Facility: HOSPITAL | Age: 44
Discharge: HOME | End: 2024-02-29
Attending: EMERGENCY MEDICINE
Payer: COMMERCIAL

## 2024-02-29 ENCOUNTER — APPOINTMENT (EMERGENCY)
Dept: CT IMAGING | Facility: HOSPITAL | Age: 44
End: 2024-02-29
Payer: COMMERCIAL

## 2024-02-29 VITALS
BODY MASS INDEX: 32.32 KG/M2 | TEMPERATURE: 98.6 F | WEIGHT: 194.2 LBS | DIASTOLIC BLOOD PRESSURE: 96 MMHG | SYSTOLIC BLOOD PRESSURE: 163 MMHG | OXYGEN SATURATION: 100 % | HEART RATE: 70 BPM | RESPIRATION RATE: 18 BRPM

## 2024-02-29 DIAGNOSIS — R10.9 RIGHT FLANK PAIN: Primary | ICD-10-CM

## 2024-02-29 LAB
ALBUMIN SERPL BCP-MCNC: 4 G/DL (ref 3.5–5)
ALP SERPL-CCNC: 55 U/L (ref 34–104)
ALT SERPL W P-5'-P-CCNC: 16 U/L (ref 7–52)
ANION GAP SERPL CALCULATED.3IONS-SCNC: 4 MMOL/L
AST SERPL W P-5'-P-CCNC: 26 U/L (ref 13–39)
ATRIAL RATE: 70 BPM
BACTERIA UR QL AUTO: NORMAL /HPF
BASOPHILS # BLD AUTO: 0.04 THOUSANDS/ÂΜL (ref 0–0.1)
BASOPHILS NFR BLD AUTO: 1 % (ref 0–1)
BILIRUB SERPL-MCNC: 0.58 MG/DL (ref 0.2–1)
BILIRUB UR QL STRIP: NEGATIVE
BUN SERPL-MCNC: 9 MG/DL (ref 5–25)
CALCIUM SERPL-MCNC: 9.4 MG/DL (ref 8.4–10.2)
CHLORIDE SERPL-SCNC: 98 MMOL/L (ref 96–108)
CLARITY UR: CLEAR
CO2 SERPL-SCNC: 29 MMOL/L (ref 21–32)
COLOR UR: ABNORMAL
CREAT SERPL-MCNC: 0.79 MG/DL (ref 0.6–1.3)
EOSINOPHIL # BLD AUTO: 0.22 THOUSAND/ÂΜL (ref 0–0.61)
EOSINOPHIL NFR BLD AUTO: 3 % (ref 0–6)
ERYTHROCYTE [DISTWIDTH] IN BLOOD BY AUTOMATED COUNT: 13 % (ref 11.6–15.1)
EXT PREGNANCY TEST URINE: NEGATIVE
EXT. CONTROL: NORMAL
GFR SERPL CREATININE-BSD FRML MDRD: 92 ML/MIN/1.73SQ M
GLUCOSE SERPL-MCNC: 258 MG/DL (ref 65–140)
GLUCOSE UR STRIP-MCNC: ABNORMAL MG/DL
HCT VFR BLD AUTO: 39.1 % (ref 34.8–46.1)
HGB BLD-MCNC: 13 G/DL (ref 11.5–15.4)
HGB UR QL STRIP.AUTO: NEGATIVE
IMM GRANULOCYTES # BLD AUTO: 0.04 THOUSAND/UL (ref 0–0.2)
IMM GRANULOCYTES NFR BLD AUTO: 1 % (ref 0–2)
KETONES UR STRIP-MCNC: NEGATIVE MG/DL
LEUKOCYTE ESTERASE UR QL STRIP: NEGATIVE
LIPASE SERPL-CCNC: 57 U/L (ref 11–82)
LYMPHOCYTES # BLD AUTO: 3.57 THOUSANDS/ÂΜL (ref 0.6–4.47)
LYMPHOCYTES NFR BLD AUTO: 43 % (ref 14–44)
MCH RBC QN AUTO: 29.3 PG (ref 26.8–34.3)
MCHC RBC AUTO-ENTMCNC: 33.2 G/DL (ref 31.4–37.4)
MCV RBC AUTO: 88 FL (ref 82–98)
MONOCYTES # BLD AUTO: 0.7 THOUSAND/ÂΜL (ref 0.17–1.22)
MONOCYTES NFR BLD AUTO: 9 % (ref 4–12)
NEUTROPHILS # BLD AUTO: 3.48 THOUSANDS/ÂΜL (ref 1.85–7.62)
NEUTS SEG NFR BLD AUTO: 43 % (ref 43–75)
NITRITE UR QL STRIP: NEGATIVE
NON-SQ EPI CELLS URNS QL MICRO: NORMAL /HPF
NRBC BLD AUTO-RTO: 0 /100 WBCS
P AXIS: 57 DEGREES
PH UR STRIP.AUTO: 6 [PH]
PLATELET # BLD AUTO: 389 THOUSANDS/UL (ref 149–390)
PMV BLD AUTO: 9.3 FL (ref 8.9–12.7)
POTASSIUM SERPL-SCNC: 5.7 MMOL/L (ref 3.5–5.3)
PR INTERVAL: 130 MS
PROT SERPL-MCNC: 8.2 G/DL (ref 6.4–8.4)
PROT UR STRIP-MCNC: NEGATIVE MG/DL
QRS AXIS: -25 DEGREES
QRSD INTERVAL: 76 MS
QT INTERVAL: 394 MS
QTC INTERVAL: 425 MS
RBC # BLD AUTO: 4.44 MILLION/UL (ref 3.81–5.12)
RBC #/AREA URNS AUTO: NORMAL /HPF
SODIUM SERPL-SCNC: 131 MMOL/L (ref 135–147)
SP GR UR STRIP.AUTO: 1.01 (ref 1–1.04)
T WAVE AXIS: -5 DEGREES
UROBILINOGEN UA: NEGATIVE MG/DL
VENTRICULAR RATE: 70 BPM
WBC # BLD AUTO: 8.05 THOUSAND/UL (ref 4.31–10.16)
WBC #/AREA URNS AUTO: NORMAL /HPF

## 2024-02-29 PROCEDURE — 81025 URINE PREGNANCY TEST: CPT | Performed by: EMERGENCY MEDICINE

## 2024-02-29 PROCEDURE — 96361 HYDRATE IV INFUSION ADD-ON: CPT

## 2024-02-29 PROCEDURE — 81003 URINALYSIS AUTO W/O SCOPE: CPT | Performed by: EMERGENCY MEDICINE

## 2024-02-29 PROCEDURE — 93005 ELECTROCARDIOGRAM TRACING: CPT

## 2024-02-29 PROCEDURE — 85025 COMPLETE CBC W/AUTO DIFF WBC: CPT | Performed by: EMERGENCY MEDICINE

## 2024-02-29 PROCEDURE — 74177 CT ABD & PELVIS W/CONTRAST: CPT

## 2024-02-29 PROCEDURE — 99285 EMERGENCY DEPT VISIT HI MDM: CPT | Performed by: EMERGENCY MEDICINE

## 2024-02-29 PROCEDURE — 96374 THER/PROPH/DIAG INJ IV PUSH: CPT

## 2024-02-29 PROCEDURE — 36415 COLL VENOUS BLD VENIPUNCTURE: CPT | Performed by: EMERGENCY MEDICINE

## 2024-02-29 PROCEDURE — 99284 EMERGENCY DEPT VISIT MOD MDM: CPT

## 2024-02-29 PROCEDURE — 80053 COMPREHEN METABOLIC PANEL: CPT | Performed by: EMERGENCY MEDICINE

## 2024-02-29 PROCEDURE — 93010 ELECTROCARDIOGRAM REPORT: CPT | Performed by: STUDENT IN AN ORGANIZED HEALTH CARE EDUCATION/TRAINING PROGRAM

## 2024-02-29 PROCEDURE — 81001 URINALYSIS AUTO W/SCOPE: CPT | Performed by: EMERGENCY MEDICINE

## 2024-02-29 PROCEDURE — 83690 ASSAY OF LIPASE: CPT | Performed by: EMERGENCY MEDICINE

## 2024-02-29 RX ORDER — ACETAMINOPHEN 325 MG/1
650 TABLET ORAL ONCE
Status: COMPLETED | OUTPATIENT
Start: 2024-02-29 | End: 2024-02-29

## 2024-02-29 RX ORDER — KETOROLAC TROMETHAMINE 30 MG/ML
15 INJECTION, SOLUTION INTRAMUSCULAR; INTRAVENOUS ONCE
Status: COMPLETED | OUTPATIENT
Start: 2024-02-29 | End: 2024-02-29

## 2024-02-29 RX ORDER — MAGNESIUM HYDROXIDE/ALUMINUM HYDROXICE/SIMETHICONE 120; 1200; 1200 MG/30ML; MG/30ML; MG/30ML
30 SUSPENSION ORAL ONCE
Status: COMPLETED | OUTPATIENT
Start: 2024-02-29 | End: 2024-02-29

## 2024-02-29 RX ORDER — SUCRALFATE 1 G/1
1 TABLET ORAL ONCE
Status: COMPLETED | OUTPATIENT
Start: 2024-02-29 | End: 2024-02-29

## 2024-02-29 RX ADMIN — IOHEXOL 100 ML: 350 INJECTION, SOLUTION INTRAVENOUS at 15:10

## 2024-02-29 RX ADMIN — KETOROLAC TROMETHAMINE 15 MG: 30 INJECTION, SOLUTION INTRAMUSCULAR; INTRAVENOUS at 13:54

## 2024-02-29 RX ADMIN — SUCRALFATE 1 G: 1 TABLET ORAL at 13:44

## 2024-02-29 RX ADMIN — SODIUM CHLORIDE 1000 ML: 0.9 INJECTION, SOLUTION INTRAVENOUS at 13:54

## 2024-02-29 RX ADMIN — ALUMINUM HYDROXIDE, MAGNESIUM HYDROXIDE, AND DIMETHICONE 30 ML: 200; 20; 200 SUSPENSION ORAL at 13:45

## 2024-02-29 RX ADMIN — ACETAMINOPHEN 650 MG: 325 TABLET ORAL at 13:44

## 2024-02-29 NOTE — ED PROVIDER NOTES
History  Chief Complaint   Patient presents with    Flank Pain     Right flank pain started yesterday. Denies urinary symptoms. Took tylenol last night      HPI  Patient 43 year old female with insulin dependent diabetes presenting with right flank pain. States that the symptom started since yesterday. Reports no prior history of flank pain. Reports no urinary symptoms including hematuria or dysuria. Patient took tylenol last night with minimal relief. Reports no prior abdominal surgery. Denies any fever, chills n/v, diarrhea. Reports no other complalints.       Prior to Admission Medications   Prescriptions Last Dose Informant Patient Reported? Taking?   Accu-Chek FastClix Lancets MISC  Self No No   Sig: Test BG up to 3x daily as directed   Accu-Chek Guide test strip  Self No No   Sig: TEST BG UP TO 3X DAILY AS DIRECTED   Continuous Blood Gluc  (Dexcom G7 ) ENRICO  Self No No   Sig: Use 1 Units daily   Continuous Blood Gluc Sensor (Dexcom G7 Sensor)  Self No No   Sig: Use 1 Device every 10 days   Injection Device for Insulin (B-D PEN MINI) ENRICO  Self No No   Sig: Use 4 (four) times a day Please use for Lantus and Humalog. 4 pen needles daily Dx: Diabetes   Insulin Pen Needle (BD Pen Needle Em U/F) 32G X 4 MM MISC  Self No No   Sig: Use daily   acetaminophen (TYLENOL) 500 mg tablet  Self No No   Sig: Take 1 tablet (500 mg total) by mouth every 6 (six) hours as needed for mild pain or moderate pain   dicyclomine (BENTYL) 20 mg tablet   No No   Sig: Take 1 tablet (20 mg total) by mouth 2 (two) times a day as needed (abd pain) for up to 2 days   ibuprofen (MOTRIN) 400 mg tablet  Self No No   Sig: Take 1 tablet (400 mg total) by mouth every 6 (six) hours as needed for mild pain or moderate pain   insulin aspart (NovoLOG FlexPen) 100 UNIT/ML injection pen  Self No No   Sig: Inject 10 Units under the skin 3 (three) times a day with meals   insulin degludec (Tresiba FlexTouch) 100 units/mL injection pen   Self No No   Sig: Inject 30 Units under the skin daily   lidocaine (Lidoderm) 5 %  Self No No   Sig: Apply 1 patch topically over 12 hours daily Remove & Discard patch within 12 hours or as directed by MD   methocarbamol (ROBAXIN) 500 mg tablet  Self No No   Sig: Take 1 tablet (500 mg total) by mouth 2 (two) times a day   naproxen (Naprosyn) 500 mg tablet  Self No No   Sig: Take 1 tablet (500 mg total) by mouth 2 (two) times a day with meals   ondansetron (Zofran ODT) 4 mg disintegrating tablet   No No   Sig: Take 1 tablet (4 mg total) by mouth every 6 (six) hours as needed for nausea or vomiting for up to 4 days   pantoprazole (PROTONIX) 40 mg tablet  Self No No   Sig: Take 1 tablet (40 mg total) by mouth daily   pregabalin (LYRICA) 50 mg capsule  Self No No   Sig: Take 1 PO HS x 5 days, then 1 PO BID x 5 days, then 1 PO TID   prochlorperazine (COMPAZINE) 10 mg tablet  Self No No   Si tab at onset of migraine, can repeat in 8 hours, can take with NSAID. Take with Benadryl if there are side effects.   semaglutide, 2 mg/dose, (Ozempic) 8 mg/ mL injection pen  Self No No   Sig: Inject 0.75 mL (2 mg total) under the skin every 7 days   topiramate (TOPAMAX) 50 MG tablet  Self No No   Sig: Take 1 tablet (50 mg total) by mouth daily at bedtime      Facility-Administered Medications: None       Past Medical History:   Diagnosis Date    Diabetes mellitus (HCC)        Past Surgical History:   Procedure Laterality Date    COLONOSCOPY      HERNIA REPAIR      TUBAL LIGATION         Family History   Problem Relation Age of Onset    Hypertension Mother     Arthritis Mother     Asthma Sister     Bipolar disorder Brother     Schizophrenia Brother     Asthma Brother     Asthma Brother     Diabetes Maternal Grandmother     Diabetes Paternal Grandmother     No Known Problems Maternal Grandfather     No Known Problems Paternal Grandfather     Breast cancer Neg Hx      I have reviewed and agree with the history as  documented.    E-Cigarette/Vaping    E-Cigarette Use Never User      E-Cigarette/Vaping Substances    Nicotine No     THC No     CBD No     Flavoring No     Other No     Unknown No      Social History     Tobacco Use    Smoking status: Never    Smokeless tobacco: Never   Vaping Use    Vaping status: Never Used   Substance Use Topics    Alcohol use: Not Currently     Comment: ocassionally    Drug use: Never       Review of Systems   Constitutional:  Negative for chills, diaphoresis, fever and unexpected weight change.   HENT:  Negative for ear pain and sore throat.    Eyes:  Negative for visual disturbance.   Respiratory:  Negative for cough, chest tightness and shortness of breath.    Cardiovascular:  Negative for chest pain and leg swelling.   Gastrointestinal:  Negative for abdominal distention, abdominal pain, constipation, diarrhea, nausea and vomiting.   Endocrine: Negative.    Genitourinary:  Positive for flank pain. Negative for difficulty urinating and dysuria.   Skin: Negative.    Allergic/Immunologic: Negative.    Neurological: Negative.    Hematological: Negative.    Psychiatric/Behavioral: Negative.     All other systems reviewed and are negative.      Physical Exam  Physical Exam  Vitals and nursing note reviewed.   Constitutional:       General: She is not in acute distress.     Appearance: Normal appearance. She is not ill-appearing.   HENT:      Head: Normocephalic and atraumatic.      Right Ear: External ear normal.      Left Ear: External ear normal.      Nose: Nose normal.      Mouth/Throat:      Mouth: Mucous membranes are moist.      Pharynx: Oropharynx is clear.   Eyes:      General: No scleral icterus.        Right eye: No discharge.         Left eye: No discharge.      Extraocular Movements: Extraocular movements intact.      Conjunctiva/sclera: Conjunctivae normal.      Pupils: Pupils are equal, round, and reactive to light.   Cardiovascular:      Rate and Rhythm: Normal rate and regular  rhythm.      Pulses: Normal pulses.      Heart sounds: Normal heart sounds.   Pulmonary:      Effort: Pulmonary effort is normal.      Breath sounds: Normal breath sounds.   Abdominal:      General: Abdomen is flat. Bowel sounds are normal. There is no distension.      Palpations: Abdomen is soft.      Tenderness: There is abdominal tenderness (RLQ abdominal pain). There is right CVA tenderness. There is no guarding or rebound.   Musculoskeletal:         General: Normal range of motion.      Cervical back: Normal range of motion and neck supple.   Skin:     General: Skin is warm and dry.      Capillary Refill: Capillary refill takes less than 2 seconds.   Neurological:      General: No focal deficit present.      Mental Status: She is alert and oriented to person, place, and time. Mental status is at baseline.   Psychiatric:         Mood and Affect: Mood normal.         Behavior: Behavior normal.         Thought Content: Thought content normal.         Judgment: Judgment normal.         Vital Signs  ED Triage Vitals   Temperature Pulse Respirations Blood Pressure SpO2   02/29/24 1232 02/29/24 1232 02/29/24 1232 02/29/24 1232 02/29/24 1232   98.6 °F (37 °C) 74 18 154/91 100 %      Temp Source Heart Rate Source Patient Position - Orthostatic VS BP Location FiO2 (%)   02/29/24 1232 02/29/24 1232 02/29/24 1232 02/29/24 1232 --   Tympanic Monitor Lying Left arm       Pain Score       02/29/24 1354       9           Vitals:    02/29/24 1232 02/29/24 1519   BP: 154/91 163/96   Pulse: 74 70   Patient Position - Orthostatic VS: Lying Lying         Visual Acuity      ED Medications  Medications   sodium chloride 0.9 % bolus 1,000 mL (0 mL Intravenous Stopped 2/29/24 1518)   ketorolac (TORADOL) injection 15 mg (15 mg Intravenous Given 2/29/24 1354)   aluminum-magnesium hydroxide-simethicone (MAALOX) oral suspension 30 mL (30 mL Oral Given 2/29/24 1345)   sucralfate (CARAFATE) tablet 1 g (1 g Oral Given 2/29/24 1344)    acetaminophen (TYLENOL) tablet 650 mg (650 mg Oral Given 2/29/24 1344)   iohexol (OMNIPAQUE) 350 MG/ML injection (MULTI-DOSE) 100 mL (100 mL Intravenous Given 2/29/24 1510)       Diagnostic Studies  Results Reviewed       Procedure Component Value Units Date/Time    Comprehensive metabolic panel [[id number]]  (Abnormal) Collected: 02/29/24 1352    Lab Status: Final result Specimen: Blood from Arm, Right Updated: 02/29/24 1422     Sodium 131 mmol/L      Potassium 5.7 mmol/L      Chloride 98 mmol/L      CO2 29 mmol/L      ANION GAP 4 mmol/L      BUN 9 mg/dL      Creatinine 0.79 mg/dL      Glucose 258 mg/dL      Calcium 9.4 mg/dL      AST 26 U/L      ALT 16 U/L      Alkaline Phosphatase 55 U/L      Total Protein 8.2 g/dL      Albumin 4.0 g/dL      Total Bilirubin 0.58 mg/dL      eGFR 92 ml/min/1.73sq m     Narrative:      National Kid[employer] Foundation guidelines for Chronic Kidney Disease (CKD):     Stage 1 with normal or high GFR (GFR > 90 mL/min/1.73 square meters)    Stage 2 Mild CKD (GFR = 60-89 mL/min/1.73 square meters)    Stage 3A Moderate CKD (GFR = 45-59 mL/min/1.73 square meters)    Stage 3B Moderate CKD (GFR = 30-44 mL/min/1.73 square meters)    Stage 4 Severe CKD (GFR = 15-29 mL/min/1.73 square meters)    Stage 5 End Stage CKD (GFR <15 mL/min/1.73 square meters)  Note: GFR calculation is accurate only with a steady state creatinine    Lipase [[id number]]  (Normal) Collected: 02/29/24 1352    Lab Status: Final result Specimen: Blood from Arm, Right Updated: 02/29/24 1422     Lipase 57 u/L     Urine Microscopic [[id number]]  (Normal) Collected: 02/29/24 1323    Lab Status: Final result Specimen: Urine, Other Updated: 02/29/24 1418     RBC, UA 0-1 /hpf      WBC, UA None Seen /hpf      Epithelial Cells Occasional /hpf      Bacteria, UA Occasional /hpf     CBC and differential [[id number]] Collected: 02/29/24 1352    Lab Status: Final result Specimen: Blood from Arm, Right Updated: 02/29/24 1409     WBC 8.05  Thousand/uL      RBC 4.44 Million/uL      Hemoglobin 13.0 g/dL      Hematocrit 39.1 %      MCV 88 fL      MCH 29.3 pg      MCHC 33.2 g/dL      RDW 13.0 %      MPV 9.3 fL      Platelets 389 Thousands/uL      nRBC 0 /100 WBCs      Neutrophils Relative 43 %      Immat GRANS % 1 %      Lymphocytes Relative 43 %      Monocytes Relative 9 %      Eosinophils Relative 3 %      Basophils Relative 1 %      Neutrophils Absolute 3.48 Thousands/µL      Immature Grans Absolute 0.04 Thousand/uL      Lymphocytes Absolute 3.57 Thousands/µL      Monocytes Absolute 0.70 Thousand/µL      Eosinophils Absolute 0.22 Thousand/µL      Basophils Absolute 0.04 Thousands/µL     UA w Reflex to Microscopic w Reflex to Culture [[id number]]  (Abnormal) Collected: 02/29/24 1323    Lab Status: Final result Specimen: Urine, Other Updated: 02/29/24 1353     Color, UA Straw     Clarity, UA Clear     Specific Gravity, UA 1.015     pH, UA 6.0     Leukocytes, UA Negative     Nitrite, UA Negative     Protein, UA Negative mg/dl      Glucose, UA >=1000 (1%) mg/dl      Ketones, UA Negative mg/dl      Bilirubin, UA Negative     Occult Blood, UA Negative     UROBILINOGEN UA Negative mg/dL     POCT pregnancy, urine [[id number]]  (Normal) Resulted: 02/29/24 1320    Lab Status: Final result Updated: 02/29/24 1320     EXT Preg Test, Ur Negative     Control Valid                   CT abdomen pelvis with contrast   Final Result by Chase Buenrostro MD (02/29 1550)      No acute findings with a normal appendix identified.         Workstation performed: [id number]                    Procedures  Procedures         ED Course                               SBIRT 20yo+      Flowsheet Row Most Recent Value   Initial Alcohol Screen: US AUDIT-C     1. How often do you have a drink containing alcohol? 0 Filed at: 02/29/2024 1259   2. How many drinks containing alcohol do you have on a typical day you are drinking?  0 Filed at: 02/29/2024 1259   3a. Male UNDER 65: How often do you  have five or more drinks on one occasion? 0 Filed at: 02/29/2024 1259   3b. FEMALE Any Age, or MALE 65+: How often do you have 4 or more drinks on one occassion? 0 Filed at: 02/29/2024 1259   Audit-C Score 0 Filed at: 02/29/2024 1259   FAMILIA: How many times in the past year have you...    Used an illegal drug or used a prescription medication for non-medical reasons? Never Filed at: 02/29/2024 1259                      Medical Decision Making  43 year old female presents with right flank pain and RLQ abdominal pain   Will assess for appendicitis, hepatitis, cholecystitis, diverticulitis, cystitis via abdominal labs and CT AP   Patient with normal labs and CT study   Patient discharged with possible muscular pain and return precaution provided     Problems Addressed:  Right flank pain: acute illness or injury    Amount and/or Complexity of Data Reviewed  Labs: ordered.  Radiology: ordered.    Risk  OTC drugs.  Prescription drug management.             Disposition  Final diagnoses:   Right flank pain     Time reflects when diagnosis was documented in both MDM as applicable and the Disposition within this note       Time User Action Codes Description Comment    2/29/2024  4:00 PM Reginaldo Guallpa Add [R10.9] Right flank pain           ED Disposition       ED Disposition   Discharge    Condition   Stable    Date/Time   u Feb 29, 2024 1600    Comment   Tari Shannon discharge to home/self care.                   Follow-up Information       Follow up With Specialties Details Why Contact Info Additional Information    [facility] Emergency Medicine Go to  If symptoms worsen [address] [facility] [address] [phone] [facility]            Discharge Medication List as of 2/29/2024  4:02 PM        CONTINUE these medications which have NOT CHANGED    Details   Accu-Chek FastClix Lancets MISC Test BG up to 3x daily as directed,  Normal      Accu-Chek Guide test strip TEST BG UP TO 3X DAILY AS DIRECTED, Normal      acetaminophen (TYLENOL) 500 mg tablet Take 1 tablet (500 mg total) by mouth every 6 (six) hours as needed for mild pain or moderate pain, Starting Sun 8/13/2023, Print      Continuous Blood Gluc  (Dexcom G7 ) ENRICO Use 1 Units daily, Starting Thu 11/2/2023, Normal      Continuous Blood Gluc Sensor (Dexcom G7 Sensor) Use 1 Device every 10 days, Starting Fri 2/2/2024, Normal      dicyclomine (BENTYL) 20 mg tablet Take 1 tablet (20 mg total) by mouth 2 (two) times a day as needed (abd pain) for up to 2 days, Starting Mon 12/18/2023, Until Fri 2/2/2024 at 2359, Normal      ibuprofen (MOTRIN) 400 mg tablet Take 1 tablet (400 mg total) by mouth every 6 (six) hours as needed for mild pain or moderate pain, Starting Sun 8/13/2023, Print      Injection Device for Insulin (B-D PEN MINI) ENRICO Use 4 (four) times a day Please use for Lantus and Humalog. 4 pen needles daily Dx: Diabetes, Starting Thu 3/18/2021, Normal      insulin aspart (NovoLOG FlexPen) 100 UNIT/ML injection pen Inject 10 Units under the skin 3 (three) times a day with meals, Starting Fri 2/2/2024, Normal      insulin degludec (Tresiba FlexTouch) 100 units/mL injection pen Inject 30 Units under the skin daily, Starting Fri 2/2/2024, Normal      Insulin Pen Needle (BD Pen Needle Em U/F) 32G X 4 MM MISC Use daily, Starting Thu 1/4/2024, Normal      lidocaine (Lidoderm) 5 % Apply 1 patch topically over 12 hours daily Remove & Discard patch within 12 hours or as directed by MD, Starting Tue 5/23/2023, Normal      methocarbamol (ROBAXIN) 500 mg tablet Take 1 tablet (500 mg total) by mouth 2 (two) times a day, Starting Thu 11/23/2023, Normal      naproxen (Naprosyn) 500 mg tablet Take 1 tablet (500 mg total) by mouth 2 (two) times a day with meals, Starting Tue 5/23/2023, Normal      ondansetron (Zofran ODT) 4 mg disintegrating tablet Take 1 tablet (4 mg total) by  mouth every 6 (six) hours as needed for nausea or vomiting for up to 4 days, Starting Mon 12/18/2023, Until Fri 2/2/2024 at 2359, Normal      pantoprazole (PROTONIX) 40 mg tablet Take 1 tablet (40 mg total) by mouth daily, Starting Thu 1/4/2024, Normal      pregabalin (LYRICA) 50 mg capsule Take 1 PO HS x 5 days, then 1 PO BID x 5 days, then 1 PO TID, Normal      prochlorperazine (COMPAZINE) 10 mg tablet 1 tab at onset of migraine, can repeat in 8 hours, can take with NSAID. Take with Benadryl if there are side effects., Normal      semaglutide, 2 mg/dose, (Ozempic) 8 mg/ mL injection pen Inject 0.75 mL (2 mg total) under the skin every 7 days, Starting Thu 9/28/2023, Normal      topiramate (TOPAMAX) 50 MG tablet Take 1 tablet (50 mg total) by mouth daily at bedtime, Starting Tue 5/16/2023, Normal             No discharge procedures on file.    PDMP Review         Value Time User    PDMP Reviewed  Yes 7/20/2021  8:33 AM James Martinez III, DO            ED Provider  Electronically Signed by             Reginaldo Guallpa MD  02/29/24 5153

## 2024-04-20 ENCOUNTER — APPOINTMENT (EMERGENCY)
Dept: CT IMAGING | Facility: HOSPITAL | Age: 44
End: 2024-04-20
Payer: COMMERCIAL

## 2024-04-20 ENCOUNTER — HOSPITAL ENCOUNTER (EMERGENCY)
Facility: HOSPITAL | Age: 44
Discharge: HOME | End: 2024-04-20
Attending: EMERGENCY MEDICINE | Admitting: EMERGENCY MEDICINE
Payer: COMMERCIAL

## 2024-04-20 VITALS
OXYGEN SATURATION: 99 % | HEART RATE: 79 BPM | DIASTOLIC BLOOD PRESSURE: 63 MMHG | SYSTOLIC BLOOD PRESSURE: 122 MMHG | TEMPERATURE: 97.7 F | BODY MASS INDEX: 31.44 KG/M2 | WEIGHT: 188.93 LBS | RESPIRATION RATE: 18 BRPM

## 2024-04-20 DIAGNOSIS — R11.10 VOMITING: ICD-10-CM

## 2024-04-20 DIAGNOSIS — R42 DIZZINESS: Primary | ICD-10-CM

## 2024-04-20 DIAGNOSIS — R51.9 HEADACHE: ICD-10-CM

## 2024-04-20 LAB
ALBUMIN SERPL BCP-MCNC: 3.7 G/DL (ref 3.5–5)
ALP SERPL-CCNC: 64 U/L (ref 34–104)
ALT SERPL W P-5'-P-CCNC: 10 U/L (ref 7–52)
ANION GAP SERPL CALCULATED.3IONS-SCNC: 6 MMOL/L (ref 4–13)
AST SERPL W P-5'-P-CCNC: 15 U/L (ref 13–39)
BASOPHILS # BLD AUTO: 0.04 THOUSANDS/ÂΜL (ref 0–0.1)
BASOPHILS NFR BLD AUTO: 1 % (ref 0–1)
BILIRUB SERPL-MCNC: 0.7 MG/DL (ref 0.2–1)
BUN SERPL-MCNC: 11 MG/DL (ref 5–25)
CALCIUM SERPL-MCNC: 9.3 MG/DL (ref 8.4–10.2)
CHLORIDE SERPL-SCNC: 103 MMOL/L (ref 96–108)
CO2 SERPL-SCNC: 27 MMOL/L (ref 21–32)
CREAT SERPL-MCNC: 0.84 MG/DL (ref 0.6–1.3)
EOSINOPHIL # BLD AUTO: 0.19 THOUSAND/ÂΜL (ref 0–0.61)
EOSINOPHIL NFR BLD AUTO: 3 % (ref 0–6)
ERYTHROCYTE [DISTWIDTH] IN BLOOD BY AUTOMATED COUNT: 12.4 % (ref 11.6–15.1)
GFR SERPL CREATININE-BSD FRML MDRD: 86 ML/MIN/1.73SQ M
GLUCOSE SERPL-MCNC: 111 MG/DL (ref 65–140)
HCT VFR BLD AUTO: 40.4 % (ref 34.8–46.1)
HGB BLD-MCNC: 13.1 G/DL (ref 11.5–15.4)
IMM GRANULOCYTES # BLD AUTO: 0.02 THOUSAND/UL (ref 0–0.2)
IMM GRANULOCYTES NFR BLD AUTO: 0 % (ref 0–2)
LYMPHOCYTES # BLD AUTO: 4.11 THOUSANDS/ÂΜL (ref 0.6–4.47)
LYMPHOCYTES NFR BLD AUTO: 55 % (ref 14–44)
MCH RBC QN AUTO: 28.7 PG (ref 26.8–34.3)
MCHC RBC AUTO-ENTMCNC: 32.4 G/DL (ref 31.4–37.4)
MCV RBC AUTO: 89 FL (ref 82–98)
MONOCYTES # BLD AUTO: 0.58 THOUSAND/ÂΜL (ref 0.17–1.22)
MONOCYTES NFR BLD AUTO: 8 % (ref 4–12)
NEUTROPHILS # BLD AUTO: 2.39 THOUSANDS/ÂΜL (ref 1.85–7.62)
NEUTS SEG NFR BLD AUTO: 33 % (ref 43–75)
NRBC BLD AUTO-RTO: 0 /100 WBCS
PLATELET # BLD AUTO: 325 THOUSANDS/UL (ref 149–390)
PMV BLD AUTO: 8.8 FL (ref 8.9–12.7)
POTASSIUM SERPL-SCNC: 3.3 MMOL/L (ref 3.5–5.3)
PROT SERPL-MCNC: 7.3 G/DL (ref 6.4–8.4)
RBC # BLD AUTO: 4.56 MILLION/UL (ref 3.81–5.12)
SODIUM SERPL-SCNC: 136 MMOL/L (ref 135–147)
WBC # BLD AUTO: 7.33 THOUSAND/UL (ref 4.31–10.16)

## 2024-04-20 PROCEDURE — 96375 TX/PRO/DX INJ NEW DRUG ADDON: CPT

## 2024-04-20 PROCEDURE — 70498 CT ANGIOGRAPHY NECK: CPT

## 2024-04-20 PROCEDURE — 85025 COMPLETE CBC W/AUTO DIFF WBC: CPT | Performed by: PHYSICIAN ASSISTANT

## 2024-04-20 PROCEDURE — 36415 COLL VENOUS BLD VENIPUNCTURE: CPT | Performed by: PHYSICIAN ASSISTANT

## 2024-04-20 PROCEDURE — 96361 HYDRATE IV INFUSION ADD-ON: CPT

## 2024-04-20 PROCEDURE — 99284 EMERGENCY DEPT VISIT MOD MDM: CPT

## 2024-04-20 PROCEDURE — 70496 CT ANGIOGRAPHY HEAD: CPT

## 2024-04-20 PROCEDURE — 99285 EMERGENCY DEPT VISIT HI MDM: CPT | Performed by: PHYSICIAN ASSISTANT

## 2024-04-20 PROCEDURE — 96365 THER/PROPH/DIAG IV INF INIT: CPT

## 2024-04-20 PROCEDURE — 80053 COMPREHEN METABOLIC PANEL: CPT | Performed by: PHYSICIAN ASSISTANT

## 2024-04-20 RX ORDER — MECLIZINE HCL 12.5 MG/1
12.5 TABLET ORAL 3 TIMES DAILY PRN
Qty: 30 TABLET | Refills: 0 | Status: SHIPPED | OUTPATIENT
Start: 2024-04-20

## 2024-04-20 RX ORDER — KETOROLAC TROMETHAMINE 30 MG/ML
15 INJECTION, SOLUTION INTRAMUSCULAR; INTRAVENOUS ONCE
Status: COMPLETED | OUTPATIENT
Start: 2024-04-20 | End: 2024-04-20

## 2024-04-20 RX ORDER — ONDANSETRON 2 MG/ML
4 INJECTION INTRAMUSCULAR; INTRAVENOUS ONCE
Status: COMPLETED | OUTPATIENT
Start: 2024-04-20 | End: 2024-04-20

## 2024-04-20 RX ORDER — ACETAMINOPHEN 325 MG/1
975 TABLET ORAL ONCE
Status: COMPLETED | OUTPATIENT
Start: 2024-04-20 | End: 2024-04-20

## 2024-04-20 RX ORDER — MECLIZINE HYDROCHLORIDE 25 MG/1
25 TABLET ORAL ONCE
Status: COMPLETED | OUTPATIENT
Start: 2024-04-20 | End: 2024-04-20

## 2024-04-20 RX ORDER — MAGNESIUM SULFATE HEPTAHYDRATE 40 MG/ML
2 INJECTION, SOLUTION INTRAVENOUS ONCE
Status: COMPLETED | OUTPATIENT
Start: 2024-04-20 | End: 2024-04-20

## 2024-04-20 RX ORDER — METOCLOPRAMIDE HYDROCHLORIDE 5 MG/ML
10 INJECTION INTRAMUSCULAR; INTRAVENOUS ONCE
Status: COMPLETED | OUTPATIENT
Start: 2024-04-20 | End: 2024-04-20

## 2024-04-20 RX ADMIN — MECLIZINE HYDROCHLORIDE 25 MG: 25 TABLET ORAL at 20:13

## 2024-04-20 RX ADMIN — IOHEXOL 85 ML: 350 INJECTION, SOLUTION INTRAVENOUS at 21:23

## 2024-04-20 RX ADMIN — KETOROLAC TROMETHAMINE 15 MG: 30 INJECTION, SOLUTION INTRAMUSCULAR; INTRAVENOUS at 20:18

## 2024-04-20 RX ADMIN — MAGNESIUM SULFATE HEPTAHYDRATE 2 G: 40 INJECTION, SOLUTION INTRAVENOUS at 21:29

## 2024-04-20 RX ADMIN — METOCLOPRAMIDE 10 MG: 5 INJECTION, SOLUTION INTRAMUSCULAR; INTRAVENOUS at 21:30

## 2024-04-20 RX ADMIN — SODIUM CHLORIDE 1000 ML: 0.9 INJECTION, SOLUTION INTRAVENOUS at 20:19

## 2024-04-20 RX ADMIN — ACETAMINOPHEN 325MG 975 MG: 325 TABLET ORAL at 21:30

## 2024-04-20 RX ADMIN — ONDANSETRON 4 MG: 2 INJECTION INTRAMUSCULAR; INTRAVENOUS at 20:19

## 2024-04-20 NOTE — Clinical Note
Tari Shannon was seen and treated in our emergency department on 4/20/2024.    No restrictions            Diagnosis: Dizziness/Vomiting    Tari  may return to work on return date.    She may return on this date: 04/22/2024         If you have any questions or concerns, please don't hesitate to call.      Christine Yun PA-C    ______________________________           _______________          _______________  Hospital Representative                              Date                                Time

## 2024-04-21 NOTE — ED PROVIDER NOTES
"History  Chief Complaint   Patient presents with    Vomiting     \"Throwing up, everything spinning\" for one day     This is a 43-year-old female presenting to ED for evaluation of headache, vomiting and dizziness.  Patient states symptoms started yesterday evening around 6 to 7 PM.  Patient states she started with a headache first and then developed room spinning dizziness.  Patient states that dizziness is not positional and nothing is better or worse.  She states the headache is located to the front of her head.  Patient states she has a history of migraines but has not had a migraine in many years.  This does feel similar to when she used to have migraines in the past.  She denies any fevers.  She denies any visual disturbance, numbness/tingling/weakness.      History provided by:  Patient   used: No        Prior to Admission Medications   Prescriptions Last Dose Informant Patient Reported? Taking?   Accu-Chek FastClix Lancets MISC  Self No No   Sig: Test BG up to 3x daily as directed   Accu-Chek Guide test strip  Self No No   Sig: TEST BG UP TO 3X DAILY AS DIRECTED   Continuous Blood Gluc  (Dexcom G7 ) ENRICO  Self No No   Sig: Use 1 Units daily   Continuous Blood Gluc Sensor (Dexcom G7 Sensor)  Self No No   Sig: Use 1 Device every 10 days   Injection Device for Insulin (B-D PEN MINI) ENRICO  Self No No   Sig: Use 4 (four) times a day Please use for Lantus and Humalog. 4 pen needles daily Dx: Diabetes   Insulin Pen Needle (BD Pen Needle Em U/F) 32G X 4 MM MISC  Self No No   Sig: Use daily   acetaminophen (TYLENOL) 500 mg tablet  Self No No   Sig: Take 1 tablet (500 mg total) by mouth every 6 (six) hours as needed for mild pain or moderate pain   dicyclomine (BENTYL) 20 mg tablet   No No   Sig: Take 1 tablet (20 mg total) by mouth 2 (two) times a day as needed (abd pain) for up to 2 days   ibuprofen (MOTRIN) 400 mg tablet  Self No No   Sig: Take 1 tablet (400 mg total) by mouth " every 6 (six) hours as needed for mild pain or moderate pain   insulin aspart (NovoLOG FlexPen) 100 UNIT/ML injection pen  Self No No   Sig: Inject 10 Units under the skin 3 (three) times a day with meals   insulin degludec (Tresiba FlexTouch) 100 units/mL injection pen  Self No No   Sig: Inject 30 Units under the skin daily   lidocaine (Lidoderm) 5 %  Self No No   Sig: Apply 1 patch topically over 12 hours daily Remove & Discard patch within 12 hours or as directed by MD   methocarbamol (ROBAXIN) 500 mg tablet  Self No No   Sig: Take 1 tablet (500 mg total) by mouth 2 (two) times a day   naproxen (Naprosyn) 500 mg tablet  Self No No   Sig: Take 1 tablet (500 mg total) by mouth 2 (two) times a day with meals   ondansetron (Zofran ODT) 4 mg disintegrating tablet   No No   Sig: Take 1 tablet (4 mg total) by mouth every 6 (six) hours as needed for nausea or vomiting for up to 4 days   pantoprazole (PROTONIX) 40 mg tablet  Self No No   Sig: Take 1 tablet (40 mg total) by mouth daily   pregabalin (LYRICA) 50 mg capsule  Self No No   Sig: Take 1 PO HS x 5 days, then 1 PO BID x 5 days, then 1 PO TID   prochlorperazine (COMPAZINE) 10 mg tablet  Self No No   Si tab at onset of migraine, can repeat in 8 hours, can take with NSAID. Take with Benadryl if there are side effects.   semaglutide, 2 mg/dose, (Ozempic) 8 mg/ mL injection pen  Self No No   Sig: Inject 0.75 mL (2 mg total) under the skin every 7 days   topiramate (TOPAMAX) 50 MG tablet  Self No No   Sig: Take 1 tablet (50 mg total) by mouth daily at bedtime      Facility-Administered Medications: None       Past Medical History:   Diagnosis Date    Diabetes mellitus (HCC)        Past Surgical History:   Procedure Laterality Date    COLONOSCOPY      HERNIA REPAIR      TUBAL LIGATION         Family History   Problem Relation Age of Onset    Hypertension Mother     Arthritis Mother     Asthma Sister     Bipolar disorder Brother     Schizophrenia Brother     Asthma  Brother     Asthma Brother     Diabetes Maternal Grandmother     Diabetes Paternal Grandmother     No Known Problems Maternal Grandfather     No Known Problems Paternal Grandfather     Breast cancer Neg Hx      I have reviewed and agree with the history as documented.    E-Cigarette/Vaping    E-Cigarette Use Never User      E-Cigarette/Vaping Substances    Nicotine No     THC No     CBD No     Flavoring No     Other No     Unknown No      Social History     Tobacco Use    Smoking status: Never    Smokeless tobacco: Never   Vaping Use    Vaping status: Never Used   Substance Use Topics    Alcohol use: Not Currently     Comment: ocassionally    Drug use: Never       Review of Systems   Constitutional:  Negative for fever.   Eyes:  Negative for visual disturbance.   Gastrointestinal:  Positive for vomiting. Negative for abdominal pain and nausea.   Neurological:  Positive for dizziness and headaches.   All other systems reviewed and are negative.      Physical Exam  Physical Exam  Vitals reviewed.   Constitutional:       General: She is not in acute distress.     Appearance: Normal appearance. She is well-developed and well-groomed. She is not ill-appearing, toxic-appearing or diaphoretic.   HENT:      Head: Normocephalic and atraumatic.      Right Ear: External ear normal.      Left Ear: External ear normal.      Nose: Nose normal. No congestion or rhinorrhea.      Mouth/Throat:      Mouth: Mucous membranes are moist.      Pharynx: Oropharynx is clear. No oropharyngeal exudate or posterior oropharyngeal erythema.   Eyes:      General: Lids are normal. No scleral icterus.        Right eye: No discharge.         Left eye: No discharge.      Extraocular Movements: Extraocular movements intact.      Right eye: Nystagmus present.      Left eye: Nystagmus present.      Conjunctiva/sclera: Conjunctivae normal.      Comments: Bidirectional horizontal nystagmus.   Cardiovascular:      Rate and Rhythm: Normal rate and regular  rhythm.      Pulses: Normal pulses.      Heart sounds: No murmur heard.     No friction rub. No gallop.   Pulmonary:      Effort: Pulmonary effort is normal. No respiratory distress.      Breath sounds: Normal breath sounds. No wheezing, rhonchi or rales.   Abdominal:      General: Abdomen is flat. There is no distension.      Palpations: Abdomen is soft.      Tenderness: There is no abdominal tenderness. There is no right CVA tenderness, left CVA tenderness, guarding or rebound.   Musculoskeletal:         General: No deformity. Normal range of motion.      Cervical back: Normal range of motion and neck supple.   Skin:     General: Skin is warm and dry.      Coloration: Skin is not jaundiced or pale.      Findings: No rash.   Neurological:      General: No focal deficit present.      Mental Status: She is alert and oriented to person, place, and time.      GCS: GCS eye subscore is 4. GCS verbal subscore is 5. GCS motor subscore is 6.      Cranial Nerves: Cranial nerves 2-12 are intact. No dysarthria or facial asymmetry.      Sensory: Sensation is intact.      Motor: Motor function is intact. No weakness or abnormal muscle tone.      Coordination: Coordination is intact. Finger-Nose-Finger Test and Heel to Shin Test normal.      Comments: Patient without neurologic deficit; CN II-XII grossly intact, EOMI, PERRLA, strength 5/5 in all extremities, sensation grossly intact. Patient a and o x 3. Coordination intact.   Psychiatric:         Mood and Affect: Mood normal.         Behavior: Behavior normal. Behavior is cooperative.         Vital Signs  ED Triage Vitals   Temperature Pulse Respirations Blood Pressure SpO2   04/20/24 1939 04/20/24 1939 04/20/24 1939 04/20/24 1939 04/20/24 1939   97.7 °F (36.5 °C) 89 18 134/81 99 %      Temp src Heart Rate Source Patient Position - Orthostatic VS BP Location FiO2 (%)   -- 04/20/24 1939 -- -- --    Monitor         Pain Score       04/20/24 2018 7           Vitals:     04/20/24 1939 04/20/24 2130 04/20/24 2313   BP: 134/81 131/61 122/63   Pulse: 89  79         Visual Acuity      ED Medications  Medications   sodium chloride 0.9 % bolus 1,000 mL (0 mL Intravenous Stopped 4/20/24 2119)   meclizine (ANTIVERT) tablet 25 mg (25 mg Oral Given 4/20/24 2013)   ketorolac (TORADOL) injection 15 mg (15 mg Intravenous Given 4/20/24 2018)   ondansetron (ZOFRAN) injection 4 mg (4 mg Intravenous Given 4/20/24 2019)   acetaminophen (TYLENOL) tablet 975 mg (975 mg Oral Given 4/20/24 2130)   magnesium sulfate 2 g/50 mL IVPB (premix) 2 g (0 g Intravenous Stopped 4/20/24 2229)   metoclopramide (REGLAN) injection 10 mg (10 mg Intravenous Given 4/20/24 2130)   iohexol (OMNIPAQUE) 350 MG/ML injection (MULTI-DOSE) 85 mL (85 mL Intravenous Given 4/20/24 2123)       Diagnostic Studies  Results Reviewed       Procedure Component Value Units Date/Time    Comprehensive metabolic panel [[id number]]  (Abnormal) Collected: 04/20/24 2018    Lab Status: Final result Specimen: Blood from Arm, Right Updated: 04/20/24 2050     Sodium 136 mmol/L      Potassium 3.3 mmol/L      Chloride 103 mmol/L      CO2 27 mmol/L      ANION GAP 6 mmol/L      BUN 11 mg/dL      Creatinine 0.84 mg/dL      Glucose 111 mg/dL      Calcium 9.3 mg/dL      AST 15 U/L      ALT 10 U/L      Alkaline Phosphatase 64 U/L      Total Protein 7.3 g/dL      Albumin 3.7 g/dL      Total Bilirubin 0.70 mg/dL      eGFR 86 ml/min/1.73sq m     Narrative:      National Kid[employer] Foundation guidelines for Chronic Kidney Disease (CKD):     Stage 1 with normal or high GFR (GFR > 90 mL/min/1.73 square meters)    Stage 2 Mild CKD (GFR = 60-89 mL/min/1.73 square meters)    Stage 3A Moderate CKD (GFR = 45-59 mL/min/1.73 square meters)    Stage 3B Moderate CKD (GFR = 30-44 mL/min/1.73 square meters)    Stage 4 Severe CKD (GFR = 15-29 mL/min/1.73 square meters)    Stage 5 End Stage CKD (GFR <15 mL/min/1.73 square meters)  Note: GFR calculation is accurate only  with a steady state creatinine    CBC and differential [[id number]]  (Abnormal) Collected: 04/20/24 2018    Lab Status: Final result Specimen: Blood from Arm, Right Updated: 04/20/24 2026     WBC 7.33 Thousand/uL      RBC 4.56 Million/uL      Hemoglobin 13.1 g/dL      Hematocrit 40.4 %      MCV 89 fL      MCH 28.7 pg      MCHC 32.4 g/dL      RDW 12.4 %      MPV 8.8 fL      Platelets 325 Thousands/uL      nRBC 0 /100 WBCs      Segmented % 33 %      Immature Grans % 0 %      Lymphocytes % 55 %      Monocytes % 8 %      Eosinophils Relative 3 %      Basophils Relative 1 %      Absolute Neutrophils 2.39 Thousands/µL      Absolute Immature Grans 0.02 Thousand/uL      Absolute Lymphocytes 4.11 Thousands/µL      Absolute Monocytes 0.58 Thousand/µL      Eosinophils Absolute 0.19 Thousand/µL      Basophils Absolute 0.04 Thousands/µL                    CTA head and neck with and without contrast   ED Interpretation by Christine Yun PA-C (04/20 2249)   [facility]  FINDINGS:     ANTERIOR CIRCULATION:   Right internal carotid artery: Intracranial segment is patent with no significant stenosis or occlusion. No aneurysm.   Right middle cerebral artery: No occlusion or significant stenosis. No aneurysm.   Right anterior cerebral artery: No occlusion or significant stenosis. No aneurysm.   Left internal carotid artery: Intracranial segment is patent with no significant stenosis. No aneurysm.   Left middle cerebral artery: No occlusion or significant stenosis. No aneurysm.   Left anterior cerebral artery: No occlusion or significant stenosis. No aneurysm.   POSTERIOR CIRCULATION:   Right vertebral artery: No occlusion or significant stenosis. No aneurysm.   Left vertebral artery: No occlusion or significant stenosis. No aneurysm.   Basilar artery: No occlusion or significant stenosis. No aneurysm.   Right posterior cerebral artery: No occlusion or significant stenosis. No aneurysm.   Left posterior cerebral artery: No occlusion or  significant st   enosis. No aneurysm.   HEAD:   Brain: Normal. No hemorrhage. Unremarkable white matter. No mass effect.   Cerebral ventricles: Normal. No ventriculomegaly. There is a persistent cavum septum pellucidum.   Bones/joints: Unremarkable. No acute fracture.   Paranasal sinuses: Visualized sinuses are normal. No fluid levels.   Mastoid air cells: Visualized mastoids are normal. No mastoid effusion.   Soft tissues: Unremarkable.   IMPRESSION: 1. No large vessel occlusion. 2. Unremarkable CT head.     FINDINGS:   Right common carotid artery: No stenosis. No dissection or occlusion.   Right internal carotid artery: No stenosis of the extracranial segment. No dissection or occlusion.   Right external carotid artery: No occlusion or stenosis of the origin.   Left common carotid artery: No stenosis. No dissection or occlusion.   Left internal carotid artery: No stenosis of the extracranial segment. No dissection or occlusion.   Left external carotid artery: No occlusion or stenosis of the origin.      Right vertebral artery: No stenosis. No dissection or occlusion.   Left vertebral artery: No stenosis. No dissection or occlusion.   Soft tissues: Normal. No significant soft tissue swelling.   Bones/joints: No acute fracture.   IMPRESSION: No stenosis or occlusion.                  Procedures  Procedures         ED Course                                             Medical Decision Making    Patient presenting to ED for evaluation of headache, dizziness and vomiting.  Patient has had migraines in the past and states that this does feel similar however has not had migraines in many years.  Given persistent dizziness and bidirectional horizontal nystagmus, will order CTA head and neck for evaluation of intracranial abnormalities.  Will check CBC, CMP for leukocytosis, electrolyte abnormalities, kidney function, LFTs.  Will treat symptomatically with Toradol, IV fluids, meclizine, Zofran.  Will continue to monitor  and manage headache.    Symptoms improved but not completely resolved.  Discussed additional management versus being discharged at this time.  Patient opted for discharge.  Feel this is reasonable this time.  Return precautions discussed.     Recommendations: lie in darkened room and apply cold packs prn for pain, episodic therapy with NSAID's and Tylenol due to low frequency of pain, side effect profile discussed in detail, and patient reassured that neurodiagnostic workup not indicated from benign H & P.    Prior to discharge, discharge instructions were discussed with patient at bedside. Patient was provided both verbal and written instructions. Patient is understanding of the discharge instructions and is agreeable to plan of care. Return precautions were discussed with patient for concerning red flags, patient verbalized understanding of signs and symptoms that would necessitate return to the ED. All questions were answered. Patient was comfortable with the plan of care and discharged to home.     Dispo: discharge home with follow up to PCP. Patient stable, in no acute distress and non-toxic at discharge.    Problems Addressed:  Dizziness: acute illness or injury  Headache: acute illness or injury  Vomiting: acute illness or injury    Amount and/or Complexity of Data Reviewed  Labs: ordered.  Radiology: ordered and independent interpretation performed.    Risk  OTC drugs.  Prescription drug management.             Disposition  Final diagnoses:   Dizziness   Headache   Vomiting     Time reflects when diagnosis was documented in both MDM as applicable and the Disposition within this note       Time User Action Codes Description Comment    4/20/2024 11:06 PM Christine Yun [R42] Dizziness     4/20/2024 11:06 PM Christine Yun [R51.9] Headache     4/20/2024 11:06 PM Christine Yun [R11.10] Vomiting           ED Disposition       ED Disposition   Discharge    Condition   Stable    Date/Time   Sat  Apr 20, 2024 11:06 PM    Comment   Tari Henry discharge to home/self care.             Follow-up Information       Follow up With Specialties Details Why Contact Info Additional Information    [facility] [place] [facility]   [address], [address]  [facility] [address]  [phone] [facility] [place], [phone]     Via the [address] the [place] [address] (North/South) Take [address] toward [place]. Take the [facility] [address]. Keep right and follow signs for [address]/ [place]. Merge onto [address]. In a half mile, take the exit for [address] toward [place]. In 0.7 miles take the [facility] Exit. Merge onto [facility]. In 500 feet, turn left on [address] and drive 0.3 miles. [facility] will be on your left.    [address]ia [address] ([address]) Take [address] toward [place]. Take the [facility] Exit. Merge onto [facility]. In 500 feet, turn left on [address] and drive 0.3 miles. [facility] will be on your left.  [address]) Take [address] towards [place]. In 0.7 miles take the [facility] Exit. Merge onto [facility]. In 500 feet, turn left on [address] and drive 0.3 miles. [facility] will be on your left.            Discharge Medication List as of 4/20/2024 11:08 PM        START taking these medications    Details   meclizine (ANTIVERT) 12.5 MG tablet Take 1 tablet (12.5 mg total) by mouth 3 (three) times a day as needed for dizziness, Starting Sat 4/20/2024, Normal           CONTINUE these medications which have NOT CHANGED    Details   Accu-Chek FastClix Lancets MISC Test BG up to 3x daily as directed, Normal      Accu-Chek Guide test strip TEST BG UP TO 3X DAILY AS DIRECTED, Normal      acetaminophen (TYLENOL) 500 mg tablet Take 1 tablet (500 mg total) by mouth every 6 (six) hours as needed  for mild pain or moderate pain, Starting Sun 8/13/2023, Print      Continuous Blood Gluc  (Dexcom G7 ) ENRICO Use 1 Units daily, Starting Thu 11/2/2023, Normal      Continuous Blood Gluc Sensor (Dexcom G7 Sensor) Use 1 Device every 10 days, Starting Fri 2/2/2024, Normal      dicyclomine (BENTYL) 20 mg tablet Take 1 tablet (20 mg total) by mouth 2 (two) times a day as needed (abd pain) for up to 2 days, Starting Mon 12/18/2023, Until Fri 2/2/2024 at 2359, Normal      ibuprofen (MOTRIN) 400 mg tablet Take 1 tablet (400 mg total) by mouth every 6 (six) hours as needed for mild pain or moderate pain, Starting Sun 8/13/2023, Print      Injection Device for Insulin (B-D PEN MINI) ENRICO Use 4 (four) times a day Please use for Lantus and Humalog. 4 pen needles daily Dx: Diabetes, Starting Thu 3/18/2021, Normal      insulin aspart (NovoLOG FlexPen) 100 UNIT/ML injection pen Inject 10 Units under the skin 3 (three) times a day with meals, Starting Fri 2/2/2024, Normal      insulin degludec (Tresiba FlexTouch) 100 units/mL injection pen Inject 30 Units under the skin daily, Starting Fri 2/2/2024, Normal      Insulin Pen Needle (BD Pen Needle Em U/F) 32G X 4 MM MISC Use daily, Starting Thu 1/4/2024, Normal      lidocaine (Lidoderm) 5 % Apply 1 patch topically over 12 hours daily Remove & Discard patch within 12 hours or as directed by MD, Starting Tue 5/23/2023, Normal      methocarbamol (ROBAXIN) 500 mg tablet Take 1 tablet (500 mg total) by mouth 2 (two) times a day, Starting Thu 11/23/2023, Normal      naproxen (Naprosyn) 500 mg tablet Take 1 tablet (500 mg total) by mouth 2 (two) times a day with meals, Starting Tue 5/23/2023, Normal      ondansetron (Zofran ODT) 4 mg disintegrating tablet Take 1 tablet (4 mg total) by mouth every 6 (six) hours as needed for nausea or vomiting for up to 4 days, Starting Mon 12/18/2023, Until Fri 2/2/2024 at 2359, Normal      pantoprazole (PROTONIX) 40 mg tablet Take 1 tablet  (40 mg total) by mouth daily, Starting Thu 1/4/2024, Normal      pregabalin (LYRICA) 50 mg capsule Take 1 PO HS x 5 days, then 1 PO BID x 5 days, then 1 PO TID, Normal      prochlorperazine (COMPAZINE) 10 mg tablet 1 tab at onset of migraine, can repeat in 8 hours, can take with NSAID. Take with Benadryl if there are side effects., Normal      semaglutide, 2 mg/dose, (Ozempic) 8 mg/ mL injection pen Inject 0.75 mL (2 mg total) under the skin every 7 days, Starting Thu 9/28/2023, Normal      topiramate (TOPAMAX) 50 MG tablet Take 1 tablet (50 mg total) by mouth daily at bedtime, Starting Tue 5/16/2023, Normal             No discharge procedures on file.    PDMP Review         Value Time User    PDMP Reviewed  Yes 7/20/2021  8:33 AM James Martinez III, DO            ED Provider  Electronically Signed by TAY Chaudhary PA-C  04/21/24 0113

## 2024-05-03 ENCOUNTER — OFFICE VISIT (OUTPATIENT)
Dept: ENDOCRINOLOGY | Facility: CLINIC | Age: 44
End: 2024-05-03
Payer: COMMERCIAL

## 2024-05-03 VITALS
HEIGHT: 65 IN | OXYGEN SATURATION: 99 % | DIASTOLIC BLOOD PRESSURE: 80 MMHG | WEIGHT: 189.1 LBS | BODY MASS INDEX: 31.5 KG/M2 | HEART RATE: 70 BPM | SYSTOLIC BLOOD PRESSURE: 120 MMHG

## 2024-05-03 DIAGNOSIS — Z79.4 CURRENT USE OF INSULIN (HCC): ICD-10-CM

## 2024-05-03 DIAGNOSIS — E11.65 UNCONTROLLED TYPE 2 DIABETES MELLITUS WITH HYPERGLYCEMIA, WITHOUT LONG-TERM CURRENT USE OF INSULIN (HCC): Primary | ICD-10-CM

## 2024-05-03 LAB — SL AMB POCT HEMOGLOBIN AIC: 12 (ref ?–6.5)

## 2024-05-03 PROCEDURE — 83036 HEMOGLOBIN GLYCOSYLATED A1C: CPT

## 2024-05-03 PROCEDURE — 95251 CONT GLUC MNTR ANALYSIS I&R: CPT

## 2024-05-03 PROCEDURE — 99214 OFFICE O/P EST MOD 30 MIN: CPT

## 2024-05-03 RX ORDER — ACYCLOVIR 400 MG/1
1 TABLET ORAL
Qty: 9 EACH | Refills: 3 | Status: SHIPPED | OUTPATIENT
Start: 2024-05-03

## 2024-05-03 NOTE — PROGRESS NOTES
Established Patient Progress Note      Chief Complaint   Patient presents with    Diabetes Type 2        Impression & Plan:    Problem List Items Addressed This Visit          Endocrine    Uncontrolled type 2 diabetes mellitus with hyperglycemia, without long-term current use of insulin (HCC) - Primary     HGA1C and BGs severely uncontrolled. Due to socioeconomic barriers and health issues with her children she has not been taking her prescribed medications.   Treatment regimen:   - She is currently working with patient outreach to help with finding a home to stay. She is currently living with family. She has trouble finding proper storage for insulin and medications. She recently picked up medications and they are properly stored. She plans to restart on medications.  - Continue with current medication regimen at this time.   - Continue with CGM to monitor BGs.   Discussed risks/complications associated with uncontrolled diabetes.    Advised to adhere to diabetic diet, and recommended staying active/exercising routinely.  Keep carbohydrates consistent to limit blood glucose fluctuations.  Advised to call if blood sugars less than 70 mg/dl or over 300 mg/dl.   Discussed symptoms and treatment of hypoglycemia.    Recommended routine follow-up with podiatry and ophthalmology.   Ordered blood work to complete prior to next visit.   Lab Results   Component Value Date    HGBA1C 12.0 (A) 05/03/2024            Relevant Medications    Continuous Glucose Sensor (Dexcom G7 Sensor)    Other Relevant Orders    POCT hemoglobin A1c (Completed)    Hemoglobin A1C    Comprehensive metabolic panel    Lipid panel    Albumin / creatinine urine ratio       Other    Current use of insulin (HCC)     Continue insulin at current dose as well as CGM to monitor BGs continuously.          Relevant Medications    Continuous Glucose Sensor (Dexcom G7 Sensor)       History of Present Illness:   Tari Shannon is a 43 y.o. female with type 2  diabetes seen in follow up. Reports complications of neuropathy. Denies recent severe hypoglycemic or severe hyperglycemic episodes. Denies any issues with her current regimen. POCT A1C was 12. Home glucose monitoring: are performed regularly with CGM.     She has been checked for C-peptide and CHAPITO 65 in the past which were negative. Allergy to Metformin (hives).     She is currently homeless and is undergoing a lot of issues with her children. She has not been taking her prescribed diabetes medications     Tari Shannon   Device used: Dexcom G7  Home use   Indication: Type 2 Diabetes  More than 72 hours of data was reviewed. Report to be scanned to chart.   Date Range: 4/13/24-4/26/24  Analysis of data:   Average Glucose: 275  SD : 68   Time in Target Range: 8%   Time Above Range: 26% high, 66% very high    Time Below Range: 0%   Interpretation of data:  BGs very high.    Current regimen:   Ozempic 2mg weekly   Tresiba 30 units daily   Novolog 10units with meals     Last Eye Exam: had exam today, [facility]   Last Foot Exam: UTD, [facility], apt 5/14    Patient Active Problem List   Diagnosis    Abnormal thyroid blood test    Papanicolaou smear of cervix with positive high risk human papilloma virus (HPV) test    Type 2 diabetes mellitus with microalbuminuria, with long-term current use of insulin (HCC)    Stress at home    Diabetes mellitus (HCC)    Obesity (BMI 30-39.9)    Numbness and tingling of both feet    Recurrent episodes of unresponsiveness    Migraine without aura and without status migrainosus, not intractable    Current use of insulin (HCC)    Chest pain    Uncontrolled type 2 diabetes mellitus with hyperglycemia, without long-term current use of insulin (HCC)    Numbness and tingling of right arm    Adhesive capsulitis of right shoulder    Herniation of intervertebral disc at C6-C7 level    Neck pain    Brachial plexopathy    Right leg weakness    Radiculopathy, lumbar region     Polyneuropathy    Chronotropic incompetence    Microalbuminuric diabetic nephropathy (HCC)    Other dysphagia      Past Medical History:   Diagnosis Date    Diabetes mellitus (HCC)     Migraine headache     Vertigo, aural, unspecified laterality       Past Surgical History:   Procedure Laterality Date    COLONOSCOPY      HERNIA REPAIR      TUBAL LIGATION  2017      Social History     Tobacco Use    Smoking status: Never    Smokeless tobacco: Never   Substance Use Topics    Alcohol use: Not Currently     Comment: ocassionally     Allergies   Allergen Reactions    Metformin And Related Hives         Current Outpatient Medications:     Accu-Chek FastClix Lancets MISC, Test BG up to 3x daily as directed, Disp: 300 each, Rfl: 1    Accu-Chek Guide test strip, TEST BG UP TO 3X DAILY AS DIRECTED, Disp: 100 each, Rfl: 1    acetaminophen (TYLENOL) 500 mg tablet, Take 1 tablet (500 mg total) by mouth every 6 (six) hours as needed for mild pain or moderate pain, Disp: 30 tablet, Rfl: 0    Continuous Blood Gluc  (Dexcom G7 ) ENRICO, Use 1 Units daily, Disp: 1 each, Rfl: 1    Continuous Glucose Sensor (Dexcom G7 Sensor), Use 1 Device every 10 days, Disp: 9 each, Rfl: 3    ibuprofen (MOTRIN) 400 mg tablet, Take 1 tablet (400 mg total) by mouth every 6 (six) hours as needed for mild pain or moderate pain, Disp: 30 tablet, Rfl: 0    Injection Device for Insulin (B-D PEN MINI) ENRICO, Use 4 (four) times a day Please use for Lantus and Humalog. 4 pen needles daily Dx: Diabetes, Disp: 400 each, Rfl: 0    insulin aspart (NovoLOG FlexPen) 100 UNIT/ML injection pen, Inject 10 Units under the skin 3 (three) times a day with meals, Disp: 15 mL, Rfl: 2    insulin degludec (Tresiba FlexTouch) 100 units/mL injection pen, Inject 30 Units under the skin daily, Disp: 15 mL, Rfl: 1    Insulin Pen Needle (BD Pen Needle Em U/F) 32G X 4 MM MISC, Use daily, Disp: 100 each, Rfl: 0    lidocaine (Lidoderm) 5 %, Apply 1 patch topically over  12 hours daily Remove & Discard patch within 12 hours or as directed by MD, Disp: 5 patch, Rfl: 0    meclizine (ANTIVERT) 12.5 MG tablet, Take 1 tablet (12.5 mg total) by mouth 3 (three) times a day as needed for dizziness, Disp: 30 tablet, Rfl: 0    naproxen (Naprosyn) 500 mg tablet, Take 1 tablet (500 mg total) by mouth 2 (two) times a day with meals, Disp: 30 tablet, Rfl: 0    ondansetron (Zofran ODT) 4 mg disintegrating tablet, Take 1 tablet (4 mg total) by mouth every 6 (six) hours as needed for nausea or vomiting for up to 4 days, Disp: 16 tablet, Rfl: 0    pantoprazole (PROTONIX) 40 mg tablet, Take 1 tablet (40 mg total) by mouth daily, Disp: 90 tablet, Rfl: 1    pregabalin (LYRICA) 50 mg capsule, Take 1 PO HS x 5 days, then 1 PO BID x 5 days, then 1 PO TID, Disp: 90 capsule, Rfl: 1    semaglutide, 2 mg/dose, (Ozempic) 8 mg/ mL injection pen, Inject 0.75 mL (2 mg total) under the skin every 7 days, Disp: 9 mL, Rfl: 1    topiramate (TOPAMAX) 50 MG tablet, Take 1 tablet (50 mg total) by mouth daily at bedtime, Disp: 90 tablet, Rfl: 1    dicyclomine (BENTYL) 20 mg tablet, Take 1 tablet (20 mg total) by mouth 2 (two) times a day as needed (abd pain) for up to 2 days (Patient not taking: Reported on 5/3/2024), Disp: 4 tablet, Rfl: 0    methocarbamol (ROBAXIN) 500 mg tablet, Take 1 tablet (500 mg total) by mouth 2 (two) times a day (Patient not taking: Reported on 5/3/2024), Disp: 20 tablet, Rfl: 0    prochlorperazine (COMPAZINE) 10 mg tablet, 1 tab at onset of migraine, can repeat in 8 hours, can take with NSAID. Take with Benadryl if there are side effects. (Patient not taking: Reported on 5/3/2024), Disp: 20 tablet, Rfl: 1    Review of Systems   Constitutional:  Negative for activity change, appetite change, chills, diaphoresis, fatigue, fever and unexpected weight change.   Eyes:  Negative for visual disturbance.   Respiratory:  Negative for chest tightness and shortness of breath.    Cardiovascular:  Negative  "for chest pain, palpitations and leg swelling.   Gastrointestinal:  Negative for abdominal pain, constipation, diarrhea, nausea and vomiting.   Endocrine: Negative for polydipsia, polyphagia and polyuria.   Skin:  Negative for color change, pallor, rash and wound.   Neurological:  Positive for numbness (b/l feet). Negative for dizziness, tremors, weakness and light-headedness.   All other systems reviewed and are negative.      Physical Exam:  Body mass index is 31.47 kg/m².  /80   Pulse 70   Ht 5' 5\" (1.651 m)   Wt 85.8 kg (189 lb 1.6 oz)   SpO2 99%   BMI 31.47 kg/m²    Wt Readings from Last 3 Encounters:   05/03/24 85.8 kg (189 lb 1.6 oz)   04/20/24 85.7 kg (188 lb 15 oz)   02/29/24 88.1 kg (194 lb 3.2 oz)       Physical Exam  Vitals reviewed.   Constitutional:       Appearance: Normal appearance. She is obese.   Cardiovascular:      Rate and Rhythm: Normal rate.   Pulmonary:      Effort: Pulmonary effort is normal.   Neurological:      Mental Status: She is alert and oriented to person, place, and time.   Psychiatric:         Mood and Affect: Mood normal.         Thought Content: Thought content normal.         Labs:   Lab Results   Component Value Date    HGBA1C 12.0 (A) 05/03/2024    HGBA1C 9.6 (A) 02/02/2024    HGBA1C 13.6 (A) 09/28/2023     Lab Results   Component Value Date    CREATININE 0.84 04/20/2024    CREATININE 0.79 02/29/2024    CREATININE 0.75 12/18/2023    BUN 11 04/20/2024     (L) 01/06/2014    K 3.3 (L) 04/20/2024     04/20/2024    CO2 27 04/20/2024     GFR, Calculated   Date Value Ref Range Status   04/20/2018 79 >60 mL/min/1.73m2 Final     Comment:     mL/min per 1.73 square meters                                            Normal Function or Mild Renal    Disease (if clinically at risk):  >or=60  Moderately Decreased:                30-59  Severely Decreased:                  15-29  Renal Failure:                         <15                                          "   -American GFR: multiply reported GFR by 1.16    Please note that the eGFR is based on the CKD-EPI calculation, and is not intended to be used for drug dosing.                                            Note: Calculated GFR may not be an accurate indicator of renal function if the patient's renal function is not in a steady state.     eGFR   Date Value Ref Range Status   04/20/2024 86 ml/min/1.73sq m Final     Lab Results   Component Value Date    HDL 66 03/18/2022    TRIG 83 03/18/2022     Lab Results   Component Value Date    ALT 10 04/20/2024    AST 15 04/20/2024    ALKPHOS 64 04/20/2024     Lab Results   Component Value Date    BLQ4KTGNMEVU 2.230 03/18/2022    LGZ5IAGYKVDC 1.105 11/21/2020    RMM6FMTIKRTV 2.900 09/23/2019       There are no Patient Instructions on file for this visit.    Discussed with the patient and all questioned fully answered. She will call me if any problems arise.    KETURAH Maldonado

## 2024-05-03 NOTE — ASSESSMENT & PLAN NOTE
HGA1C and BGs severely uncontrolled. Due to socioeconomic barriers and health issues with her children she has not been taking her prescribed medications.   Treatment regimen:   - She is currently working with patient outreach to help with finding a home to stay. She is currently living with family. She has trouble finding proper storage for insulin and medications. She recently picked up medications and they are properly stored. She plans to restart on medications.  - Continue with current medication regimen at this time.   - Continue with CGM to monitor BGs.   Discussed risks/complications associated with uncontrolled diabetes.    Advised to adhere to diabetic diet, and recommended staying active/exercising routinely.  Keep carbohydrates consistent to limit blood glucose fluctuations.  Advised to call if blood sugars less than 70 mg/dl or over 300 mg/dl.   Discussed symptoms and treatment of hypoglycemia.    Recommended routine follow-up with podiatry and ophthalmology.   Ordered blood work to complete prior to next visit.   Lab Results   Component Value Date    HGBA1C 12.0 (A) 05/03/2024

## 2024-06-03 ENCOUNTER — OFFICE VISIT (OUTPATIENT)
Dept: GASTROENTEROLOGY | Facility: CLINIC | Age: 44
End: 2024-06-03
Payer: COMMERCIAL

## 2024-06-03 ENCOUNTER — TELEPHONE (OUTPATIENT)
Dept: FAMILY MEDICINE CLINIC | Facility: CLINIC | Age: 44
End: 2024-06-03

## 2024-06-03 VITALS
DIASTOLIC BLOOD PRESSURE: 64 MMHG | WEIGHT: 187 LBS | HEIGHT: 65 IN | SYSTOLIC BLOOD PRESSURE: 120 MMHG | BODY MASS INDEX: 31.16 KG/M2 | TEMPERATURE: 99.1 F

## 2024-06-03 DIAGNOSIS — Z12.11 SCREENING FOR COLON CANCER: ICD-10-CM

## 2024-06-03 DIAGNOSIS — K21.9 GASTROESOPHAGEAL REFLUX DISEASE WITHOUT ESOPHAGITIS: ICD-10-CM

## 2024-06-03 DIAGNOSIS — R13.19 OTHER DYSPHAGIA: Primary | ICD-10-CM

## 2024-06-03 PROCEDURE — 99213 OFFICE O/P EST LOW 20 MIN: CPT | Performed by: PHYSICIAN ASSISTANT

## 2024-06-03 NOTE — PROGRESS NOTES
[facility] - Outpatient Follow-up Note  Tari Shannon 43 y.o. female MRN: [id number]  Encounter: [id number]          ASSESSMENT AND PLAN:      1. Other dysphagia  She has had intermittent difficulty swallowing both solids and liquids for months. EGD January 2024 showed normal esophagus, and biopsies negative for EOE. Esophageal manometry showed nonspecific esophageal dysmotility. Achalasia was ruled out with LES relaxation visualized on high-resolution topography tracings. I will discuss with our motility specialist about possible next steps, potentially Endoflip to get a more clear diagnosis. I will call patient with recommendations and plan moving forward. Recommend sitting upright while eating, taking small bites of food, chewing carefully, eating slowly.    2. Gastroesophageal reflux disease without esophagitis  No evidence of esophagitis on recent EGD. Continue Protonix 40 mg daily, especially in the setting of motility disorder. She takes Ozempic for diabetes which works by delaying gastric emptying, thereby likely exacerbating her underlying reflux. Endocrinology would like to continue Ozempic for now. Encourage GERD diet and lifestyle modifications.    3. Screening for colon cancer  Due for screening colonoscopy in 10 years    Follow-up in a few months  ______________________________________________________________________    SUBJECTIVE: 43-year-old female with uncontrolled type 2 diabetes on insulin and Ozempic, diabetic nephropathy, polyneuropathy, migraines presenting for follow-up of dysphagia.    She has had ongoing issues swallowing solids and liquids.She underwent procedures on 1/22/2024. EGD showed normal esophagus, stomach, and duodenum. There was a fundic gland polyp in the stomach. Esophageal, gastric, and duodenal biopsies were normal.  She underwent esophageal manometry which showed nonspecific esophageal dysmotility.    She continues to have intermittent, sporadic  episodes of difficulty swallowing. This can happen with water as well as solid foods. She pounds her chest to get the food down. She continues to take Protonix daily. She reports some intermittent heartburn and regurgitation.  She occasionally has nausea and vomiting, but this is not a frequent occurrence. She states that her bowel movements are okay. No complaints of abdominal pain, diarrhea, constipation, bloody or black stools.    REVIEW OF SYSTEMS IS OTHERWISE NEGATIVE.      Historical Information   Past Medical History:   Diagnosis Date    Diabetes mellitus (HCC)     Migraine headache     Vertigo, aural, unspecified laterality      Past Surgical History:   Procedure Laterality Date    COLONOSCOPY      HERNIA REPAIR      TUBAL LIGATION  2017    UPPER GASTROINTESTINAL ENDOSCOPY       Social History   Social History     Substance and Sexual Activity   Alcohol Use Not Currently    Comment: ocassionally     Social History     Substance and Sexual Activity   Drug Use Never     Social History     Tobacco Use   Smoking Status Never   Smokeless Tobacco Never     Family History   Problem Relation Age of Onset    Hypertension Mother     Arthritis Mother     Asthma Sister     Bipolar disorder Brother     Schizophrenia Brother     Asthma Brother     Asthma Brother     Diabetes Maternal Grandmother     Diabetes Paternal Grandmother     No Known Problems Maternal Grandfather     No Known Problems Paternal Grandfather     Breast cancer Neg Hx        Meds/Allergies       Current Outpatient Medications:     ibuprofen (MOTRIN) 400 mg tablet    Injection Device for Insulin (B-D PEN MINI) ENRICO    insulin aspart (NovoLOG FlexPen) 100 UNIT/ML injection pen    insulin degludec (Tresiba FlexTouch) 100 units/mL injection pen    Insulin Pen Needle (BD Pen Needle Em U/F) 32G X 4 MM MISC    lidocaine (Lidoderm) 5 %    meclizine (ANTIVERT) 12.5 MG tablet    naproxen (Naprosyn) 500 mg tablet    pantoprazole (PROTONIX) 40 mg tablet     "pregabalin (LYRICA) 50 mg capsule    semaglutide, 2 mg/dose, (Ozempic) 8 mg/ mL injection pen    topiramate (TOPAMAX) 50 MG tablet    Accu-Chek FastClix Lancets MISC    Accu-Chek Guide test strip    acetaminophen (TYLENOL) 500 mg tablet    Continuous Blood Gluc  (Dexcom G7 ) ENRICO    Continuous Glucose Sensor (Dexcom G7 Sensor)    dicyclomine (BENTYL) 20 mg tablet    methocarbamol (ROBAXIN) 500 mg tablet    ondansetron (Zofran ODT) 4 mg disintegrating tablet    prochlorperazine (COMPAZINE) 10 mg tablet    Allergies   Allergen Reactions    Metformin And Related Hives           Objective     Blood pressure 120/64, temperature 99.1 °F (37.3 °C), height 5' 5\" (1.651 m), weight 84.8 kg (187 lb), unknown if currently breastfeeding. Body mass index is 31.12 kg/m².      PHYSICAL EXAM:      General Appearance:   Alert, cooperative, no distress   HEENT:   Normocephalic, atraumatic, anicteric.     Neck:  Supple, symmetrical, trachea midline   Lungs:   Clear to auscultation bilaterally; no rales, rhonchi or wheezing; respirations unlabored    Heart::   Regular rate and rhythm; no murmur, rub, or gallop.   Abdomen:   Soft, non-tender, non-distended; normal bowel sounds; no masses, no organomegaly    Genitalia:   Deferred    Rectal:   Deferred    Extremities:  No cyanosis, clubbing or edema    Pulses:  2+ and symmetric    Skin:  No jaundice, rashes, or lesions    Lymph nodes:  No palpable cervical lymphadenopathy        Lab Results:   No visits with results within 1 Day(s) from this visit.   Latest known visit with results is:   Office Visit on 05/03/2024   Component Date Value    Hemoglobin A1C 05/03/2024 12.0 (A)          Radiology Results:   No results found.   "

## 2024-06-03 NOTE — TELEPHONE ENCOUNTER
PT dropped off FMLA forms for Dr. Foote to fill out, pt was informed that Dr. Foote is not in the office today but will be here tomorrow, 6/4. Forms were placed on 's desk. Will follow up with pt once completed

## 2024-06-04 ENCOUNTER — TELEPHONE (OUTPATIENT)
Dept: GASTROENTEROLOGY | Facility: CLINIC | Age: 44
End: 2024-06-04

## 2024-06-04 DIAGNOSIS — R13.19 OTHER DYSPHAGIA: Primary | ICD-10-CM

## 2024-06-04 NOTE — TELEPHONE ENCOUNTER
----- Message from Niesha Michael PA-C sent at 6/4/2024 12:39 PM EDT -----  I sent patient a MyChart email today. I want to ensure she receives the message. Could someone please call her with the information?  Thanks, please let me know if there are any questions.

## 2024-06-04 NOTE — TELEPHONE ENCOUNTER
Jorden Marc,     I spoke to Dr. Brown, and he agrees with referring you to [facility] in [place] for a procedure called Endoflip. This is a highly specialized upper endoscopy, like you have had in the past, which helps to diagnose swallowing disorders. I have entered the referral, here is the information:     Jessee Cano MD  Phone: [phone]  Fax: [phone] [address] [address]     Please call the phone number listed above to schedule an appointment. If you need any additional information from us, please let me know.     Sincerely,  Niesha

## 2024-06-04 NOTE — TELEPHONE ENCOUNTER
I called and spoke with patient advised we have sent her a [employer] message to please review and let us know if she has any further questions

## 2024-06-06 NOTE — TELEPHONE ENCOUNTER
Pt called, is kindly requesting that once the [facility] paperwork is completed, signed by Dr. Foote, kindly fax to Human Resources - fax# is on the paperwork. Kindly call patient once completed, she would like to  a copy for her records as well.

## 2024-06-19 ENCOUNTER — TELEPHONE (OUTPATIENT)
Dept: FAMILY MEDICINE CLINIC | Facility: CLINIC | Age: 44
End: 2024-06-19

## 2024-06-19 ENCOUNTER — PATIENT MESSAGE (OUTPATIENT)
Dept: FAMILY MEDICINE CLINIC | Facility: CLINIC | Age: 44
End: 2024-06-19

## 2024-06-19 NOTE — TELEPHONE ENCOUNTER
Pt returning callback regarding FMLA forms. Lili transferred pt to Melly in the office who will transfer to Aicha Castro

## 2024-06-19 NOTE — TELEPHONE ENCOUNTER
The above form was completed, signed and dated then faxed and scanned into patients' chart today 06/19/2024.  Patient was also notified via [employer]. The original form was filed at our front at [address] [facility].

## 2024-06-19 NOTE — TELEPHONE ENCOUNTER
Left VM today 06/19/24 as to why she going out on FMLA Intermittence Leave or or duration leave for than 3 days. In addition to, I also requested what is the condition she is going out on.

## 2024-07-28 ENCOUNTER — HOSPITAL ENCOUNTER (EMERGENCY)
Facility: HOSPITAL | Age: 44
Discharge: HOME | End: 2024-07-28
Attending: EMERGENCY MEDICINE
Payer: COMMERCIAL

## 2024-07-28 ENCOUNTER — APPOINTMENT (EMERGENCY)
Dept: CT IMAGING | Facility: HOSPITAL | Age: 44
End: 2024-07-28
Payer: COMMERCIAL

## 2024-07-28 VITALS
BODY MASS INDEX: 30.74 KG/M2 | HEART RATE: 88 BPM | OXYGEN SATURATION: 99 % | DIASTOLIC BLOOD PRESSURE: 60 MMHG | TEMPERATURE: 98.5 F | WEIGHT: 184.75 LBS | RESPIRATION RATE: 16 BRPM | SYSTOLIC BLOOD PRESSURE: 127 MMHG

## 2024-07-28 DIAGNOSIS — R11.2 NAUSEA AND VOMITING: ICD-10-CM

## 2024-07-28 DIAGNOSIS — R10.9 ABDOMINAL PAIN: ICD-10-CM

## 2024-07-28 DIAGNOSIS — N39.0 ACUTE URINARY TRACT INFECTION: Primary | ICD-10-CM

## 2024-07-28 LAB
ALBUMIN SERPL BCG-MCNC: 3.5 G/DL (ref 3.5–5)
ALP SERPL-CCNC: 70 U/L (ref 34–104)
ALT SERPL W P-5'-P-CCNC: 11 U/L (ref 7–52)
ANION GAP SERPL CALCULATED.3IONS-SCNC: 7 MMOL/L (ref 4–13)
AST SERPL W P-5'-P-CCNC: 15 U/L (ref 13–39)
BACTERIA UR QL AUTO: ABNORMAL /HPF
BASE EX.OXY STD BLDV CALC-SCNC: 62.7 % (ref 60–80)
BASE EXCESS BLDV CALC-SCNC: -1.8 MMOL/L
BASOPHILS # BLD AUTO: 0.04 THOUSANDS/ÂΜL (ref 0–0.1)
BASOPHILS NFR BLD AUTO: 1 % (ref 0–1)
BILIRUB SERPL-MCNC: 0.81 MG/DL (ref 0.2–1)
BILIRUB UR QL STRIP: ABNORMAL
BUN SERPL-MCNC: 10 MG/DL (ref 5–25)
CALCIUM SERPL-MCNC: 8.9 MG/DL (ref 8.4–10.2)
CHLORIDE SERPL-SCNC: 100 MMOL/L (ref 96–108)
CLARITY UR: CLEAR
CO2 SERPL-SCNC: 24 MMOL/L (ref 21–32)
COLOR UR: YELLOW
CREAT SERPL-MCNC: 0.92 MG/DL (ref 0.6–1.3)
EOSINOPHIL # BLD AUTO: 0.17 THOUSAND/ÂΜL (ref 0–0.61)
EOSINOPHIL NFR BLD AUTO: 2 % (ref 0–6)
ERYTHROCYTE [DISTWIDTH] IN BLOOD BY AUTOMATED COUNT: 12.7 % (ref 11.6–15.1)
EXT PREGNANCY TEST URINE: NEGATIVE
EXT. CONTROL: NORMAL
GFR SERPL CREATININE-BSD FRML MDRD: 77 ML/MIN/1.73SQ M
GLUCOSE SERPL-MCNC: 342 MG/DL (ref 65–140)
GLUCOSE UR STRIP-MCNC: ABNORMAL MG/DL
GRAN CASTS #/AREA URNS LPF: ABNORMAL /[LPF]
HCO3 BLDV-SCNC: 23.6 MMOL/L (ref 24–30)
HCT VFR BLD AUTO: 40 % (ref 34.8–46.1)
HGB BLD-MCNC: 13.1 G/DL (ref 11.5–15.4)
HGB UR QL STRIP.AUTO: ABNORMAL
HYALINE CASTS #/AREA URNS LPF: ABNORMAL /LPF
IMM GRANULOCYTES # BLD AUTO: 0.03 THOUSAND/UL (ref 0–0.2)
IMM GRANULOCYTES NFR BLD AUTO: 0 % (ref 0–2)
KETONES UR STRIP-MCNC: ABNORMAL MG/DL
LEUKOCYTE ESTERASE UR QL STRIP: NEGATIVE
LIPASE SERPL-CCNC: 12 U/L (ref 11–82)
LYMPHOCYTES # BLD AUTO: 3.41 THOUSANDS/ÂΜL (ref 0.6–4.47)
LYMPHOCYTES NFR BLD AUTO: 43 % (ref 14–44)
MCH RBC QN AUTO: 28.6 PG (ref 26.8–34.3)
MCHC RBC AUTO-ENTMCNC: 32.8 G/DL (ref 31.4–37.4)
MCV RBC AUTO: 87 FL (ref 82–98)
MONOCYTES # BLD AUTO: 0.64 THOUSAND/ÂΜL (ref 0.17–1.22)
MONOCYTES NFR BLD AUTO: 8 % (ref 4–12)
MUCOUS THREADS UR QL AUTO: ABNORMAL
NEUTROPHILS # BLD AUTO: 3.56 THOUSANDS/ÂΜL (ref 1.85–7.62)
NEUTS SEG NFR BLD AUTO: 46 % (ref 43–75)
NITRITE UR QL STRIP: NEGATIVE
NON-SQ EPI CELLS URNS QL MICRO: ABNORMAL /HPF
NRBC BLD AUTO-RTO: 0 /100 WBCS
O2 CT BLDV-SCNC: 13 ML/DL
PCO2 BLDV: 42.8 MM HG (ref 42–50)
PH BLDV: 7.36 [PH] (ref 7.3–7.4)
PH UR STRIP.AUTO: 6 [PH] (ref 4.5–8)
PLATELET # BLD AUTO: 283 THOUSANDS/UL (ref 149–390)
PMV BLD AUTO: 9.4 FL (ref 8.9–12.7)
PO2 BLDV: 32 MM HG (ref 35–45)
POTASSIUM SERPL-SCNC: 4.1 MMOL/L (ref 3.5–5.3)
PROT SERPL-MCNC: 7.2 G/DL (ref 6.4–8.4)
PROT UR STRIP-MCNC: ABNORMAL MG/DL
RBC # BLD AUTO: 4.58 MILLION/UL (ref 3.81–5.12)
RBC #/AREA URNS AUTO: ABNORMAL /HPF
SODIUM SERPL-SCNC: 131 MMOL/L (ref 135–147)
SP GR UR STRIP.AUTO: 1.02 (ref 1–1.03)
UROBILINOGEN UR QL STRIP.AUTO: 1 E.U./DL
WBC # BLD AUTO: 7.85 THOUSAND/UL (ref 4.31–10.16)
WBC #/AREA URNS AUTO: ABNORMAL /HPF

## 2024-07-28 PROCEDURE — 36415 COLL VENOUS BLD VENIPUNCTURE: CPT | Performed by: EMERGENCY MEDICINE

## 2024-07-28 PROCEDURE — 99284 EMERGENCY DEPT VISIT MOD MDM: CPT

## 2024-07-28 PROCEDURE — 81001 URINALYSIS AUTO W/SCOPE: CPT

## 2024-07-28 PROCEDURE — 85025 COMPLETE CBC W/AUTO DIFF WBC: CPT | Performed by: EMERGENCY MEDICINE

## 2024-07-28 PROCEDURE — 96365 THER/PROPH/DIAG IV INF INIT: CPT

## 2024-07-28 PROCEDURE — 82805 BLOOD GASES W/O2 SATURATION: CPT | Performed by: EMERGENCY MEDICINE

## 2024-07-28 PROCEDURE — 81025 URINE PREGNANCY TEST: CPT | Performed by: EMERGENCY MEDICINE

## 2024-07-28 PROCEDURE — 96361 HYDRATE IV INFUSION ADD-ON: CPT

## 2024-07-28 PROCEDURE — 74177 CT ABD & PELVIS W/CONTRAST: CPT

## 2024-07-28 PROCEDURE — 99285 EMERGENCY DEPT VISIT HI MDM: CPT | Performed by: EMERGENCY MEDICINE

## 2024-07-28 PROCEDURE — 96375 TX/PRO/DX INJ NEW DRUG ADDON: CPT

## 2024-07-28 PROCEDURE — 80053 COMPREHEN METABOLIC PANEL: CPT | Performed by: EMERGENCY MEDICINE

## 2024-07-28 PROCEDURE — 83690 ASSAY OF LIPASE: CPT | Performed by: EMERGENCY MEDICINE

## 2024-07-28 RX ORDER — KETOROLAC TROMETHAMINE 30 MG/ML
15 INJECTION, SOLUTION INTRAMUSCULAR; INTRAVENOUS ONCE
Status: COMPLETED | OUTPATIENT
Start: 2024-07-28 | End: 2024-07-28

## 2024-07-28 RX ORDER — ONDANSETRON 4 MG/1
4 TABLET, ORALLY DISINTEGRATING ORAL EVERY 6 HOURS PRN
Qty: 20 TABLET | Refills: 0 | Status: SHIPPED | OUTPATIENT
Start: 2024-07-28

## 2024-07-28 RX ORDER — ACETAMINOPHEN 10 MG/ML
1000 INJECTION, SOLUTION INTRAVENOUS ONCE
Status: COMPLETED | OUTPATIENT
Start: 2024-07-28 | End: 2024-07-28

## 2024-07-28 RX ORDER — ONDANSETRON 2 MG/ML
4 INJECTION INTRAMUSCULAR; INTRAVENOUS ONCE
Status: COMPLETED | OUTPATIENT
Start: 2024-07-28 | End: 2024-07-28

## 2024-07-28 RX ORDER — CEPHALEXIN 500 MG/1
500 CAPSULE ORAL 2 TIMES DAILY
Qty: 10 CAPSULE | Refills: 0 | Status: SHIPPED | OUTPATIENT
Start: 2024-07-28 | End: 2024-08-02

## 2024-07-28 RX ORDER — NAPROXEN 500 MG/1
500 TABLET ORAL 2 TIMES DAILY WITH MEALS
Qty: 20 TABLET | Refills: 0 | Status: SHIPPED | OUTPATIENT
Start: 2024-07-28

## 2024-07-28 RX ORDER — CEPHALEXIN 250 MG/1
500 CAPSULE ORAL ONCE
Status: COMPLETED | OUTPATIENT
Start: 2024-07-28 | End: 2024-07-28

## 2024-07-28 RX ADMIN — CEPHALEXIN 500 MG: 250 CAPSULE ORAL at 20:52

## 2024-07-28 RX ADMIN — ACETAMINOPHEN 1000 MG: 10 INJECTION INTRAVENOUS at 20:25

## 2024-07-28 RX ADMIN — SODIUM CHLORIDE 1000 ML: 0.9 INJECTION, SOLUTION INTRAVENOUS at 18:27

## 2024-07-28 RX ADMIN — MORPHINE SULFATE 6 MG: 2 INJECTION, SOLUTION INTRAMUSCULAR; INTRAVENOUS at 19:09

## 2024-07-28 RX ADMIN — ONDANSETRON 4 MG: 2 INJECTION INTRAMUSCULAR; INTRAVENOUS at 18:23

## 2024-07-28 RX ADMIN — KETOROLAC TROMETHAMINE 15 MG: 30 INJECTION, SOLUTION INTRAMUSCULAR; INTRAVENOUS at 18:23

## 2024-07-28 RX ADMIN — IOHEXOL 100 ML: 350 INJECTION, SOLUTION INTRAVENOUS at 19:51

## 2024-07-28 NOTE — Clinical Note
Tari Shannon was seen and treated in our emergency department on 7/28/2024.                Diagnosis:     Tari  may return to work on return date.    She may return on this date: 07/30/2024         If you have any questions or concerns, please don't hesitate to call.      Ira Miramontes, DO    ______________________________           _______________          _______________  Hospital Representative                              Date                                Time

## 2024-07-28 NOTE — ED PROVIDER NOTES
History  Chief Complaint   Patient presents with    Abdominal Pain     Pt c/o abdominal pain and pressure since Friday. Denies fevers.      43 y.o. F w/h/o DM, hernia repair, tubal ligation p/w abd pain x 2 days. Constant.  Diffuse, crampy.  Nothing makes it better or worse.  Associated with N/V.      History provided by:  Patient   used: No    Abdominal Pain  Associated symptoms: nausea and vomiting    Associated symptoms: no chills, no constipation, no cough, no diarrhea, no dysuria, no fever and no sore throat        Prior to Admission Medications   Prescriptions Last Dose Informant Patient Reported? Taking?   Accu-Chek FastClix Lancets MISC  Self No No   Sig: Test BG up to 3x daily as directed   Accu-Chek Guide test strip  Self No No   Sig: TEST BG UP TO 3X DAILY AS DIRECTED   Continuous Blood Gluc  (Dexcom G7 ) ENRICO  Self No No   Sig: Use 1 Units daily   Continuous Glucose Sensor (Dexcom G7 Sensor)  Self No No   Sig: Use 1 Device every 10 days   Injection Device for Insulin (B-D PEN MINI) ENRICO  Self No No   Sig: Use 4 (four) times a day Please use for Lantus and Humalog. 4 pen needles daily Dx: Diabetes   Insulin Pen Needle (BD Pen Needle Em U/F) 32G X 4 MM MISC  Self No No   Sig: Use daily   acetaminophen (TYLENOL) 500 mg tablet  Self No No   Sig: Take 1 tablet (500 mg total) by mouth every 6 (six) hours as needed for mild pain or moderate pain   dicyclomine (BENTYL) 20 mg tablet   No No   Sig: Take 1 tablet (20 mg total) by mouth 2 (two) times a day as needed (abd pain) for up to 2 days   Patient not taking: Reported on 5/3/2024   ibuprofen (MOTRIN) 400 mg tablet  Self No No   Sig: Take 1 tablet (400 mg total) by mouth every 6 (six) hours as needed for mild pain or moderate pain   insulin aspart (NovoLOG FlexPen) 100 UNIT/ML injection pen  Self No No   Sig: Inject 10 Units under the skin 3 (three) times a day with meals   insulin degludec (Tresiba FlexTouch) 100 units/mL  injection pen  Self No No   Sig: Inject 30 Units under the skin daily   lidocaine (Lidoderm) 5 %  Self No No   Sig: Apply 1 patch topically over 12 hours daily Remove & Discard patch within 12 hours or as directed by MD   meclizine (ANTIVERT) 12.5 MG tablet  Self No No   Sig: Take 1 tablet (12.5 mg total) by mouth 3 (three) times a day as needed for dizziness   methocarbamol (ROBAXIN) 500 mg tablet  Self No No   Sig: Take 1 tablet (500 mg total) by mouth 2 (two) times a day   Patient not taking: Reported on 5/3/2024   naproxen (Naprosyn) 500 mg tablet  Self No No   Sig: Take 1 tablet (500 mg total) by mouth 2 (two) times a day with meals   ondansetron (Zofran ODT) 4 mg disintegrating tablet   No No   Sig: Take 1 tablet (4 mg total) by mouth every 6 (six) hours as needed for nausea or vomiting for up to 4 days   pantoprazole (PROTONIX) 40 mg tablet  Self No No   Sig: Take 1 tablet (40 mg total) by mouth daily   pregabalin (LYRICA) 50 mg capsule  Self No No   Sig: Take 1 PO HS x 5 days, then 1 PO BID x 5 days, then 1 PO TID   prochlorperazine (COMPAZINE) 10 mg tablet  Self No No   Si tab at onset of migraine, can repeat in 8 hours, can take with NSAID. Take with Benadryl if there are side effects.   Patient not taking: Reported on 5/3/2024   semaglutide, 2 mg/dose, (Ozempic) 8 mg/ mL injection pen  Self No No   Sig: Inject 0.75 mL (2 mg total) under the skin every 7 days   topiramate (TOPAMAX) 50 MG tablet  Self No No   Sig: Take 1 tablet (50 mg total) by mouth daily at bedtime      Facility-Administered Medications: None       Past Medical History:   Diagnosis Date    Diabetes mellitus (HCC)     Migraine headache     Vertigo, aural, unspecified laterality        Past Surgical History:   Procedure Laterality Date    COLONOSCOPY      HERNIA REPAIR      TUBAL LIGATION  2017    UPPER GASTROINTESTINAL ENDOSCOPY         Family History   Problem Relation Age of Onset    Hypertension Mother     Arthritis Mother      Asthma Sister     Bipolar disorder Brother     Schizophrenia Brother     Asthma Brother     Asthma Brother     Diabetes Maternal Grandmother     Diabetes Paternal Grandmother     No Known Problems Maternal Grandfather     No Known Problems Paternal Grandfather     Breast cancer Neg Hx      I have reviewed and agree with the history as documented.    E-Cigarette/Vaping    E-Cigarette Use Never User      E-Cigarette/Vaping Substances    Nicotine No     THC No     CBD No     Flavoring No     Other No     Unknown No      Social History     Tobacco Use    Smoking status: Never    Smokeless tobacco: Never   Vaping Use    Vaping status: Never Used   Substance Use Topics    Alcohol use: Not Currently     Comment: ocassionally    Drug use: Never       Review of Systems   Constitutional:  Negative for chills and fever.   HENT:  Negative for rhinorrhea and sore throat.    Respiratory:  Negative for cough.    Gastrointestinal:  Positive for abdominal pain, nausea and vomiting. Negative for constipation and diarrhea.   Genitourinary:  Negative for dysuria and frequency.   Musculoskeletal:  Negative for myalgias.   Neurological:  Negative for headaches.       Physical Exam  Physical Exam  Vitals and nursing note reviewed.   Constitutional:       General: She is not in acute distress.     Appearance: Normal appearance. She is well-developed. She is not ill-appearing, toxic-appearing or diaphoretic.   HENT:      Head: Normocephalic and atraumatic.   Eyes:      General: No scleral icterus.  Neck:      Vascular: No JVD.   Cardiovascular:      Rate and Rhythm: Normal rate and regular rhythm.      Heart sounds: Normal heart sounds. No murmur heard.  Pulmonary:      Effort: Pulmonary effort is normal. No accessory muscle usage or respiratory distress.      Breath sounds: Normal breath sounds. No stridor. No wheezing, rhonchi or rales.   Abdominal:      General: There is no distension.      Palpations: Abdomen is soft. Abdomen is not  rigid. There is no mass.      Tenderness: There is generalized abdominal tenderness. There is no guarding or rebound.   Skin:     General: Skin is warm and dry.      Coloration: Skin is not jaundiced or pale.      Findings: No rash.   Neurological:      Mental Status: She is alert.      GCS: GCS eye subscore is 4. GCS verbal subscore is 5. GCS motor subscore is 6.         Vital Signs  ED Triage Vitals   Temperature Pulse Respirations Blood Pressure SpO2   07/28/24 1804 07/28/24 1804 07/28/24 1804 07/28/24 1804 07/28/24 1804   98.5 °F (36.9 °C) 89 18 136/69 98 %      Temp src Heart Rate Source Patient Position - Orthostatic VS BP Location FiO2 (%)   -- 07/28/24 1804 07/28/24 1913 07/28/24 1913 --    Monitor Lying Left arm       Pain Score       07/28/24 1804       7           Vitals:    07/28/24 1804 07/28/24 1913   BP: 136/69 127/60   Pulse: 89 88   Patient Position - Orthostatic VS:  Lying         Visual Acuity      ED Medications  Medications   sodium chloride 0.9 % bolus 1,000 mL (0 mL Intravenous Stopped 7/28/24 2054)   ketorolac (TORADOL) injection 15 mg (15 mg Intravenous Given 7/28/24 1823)   ondansetron (ZOFRAN) injection 4 mg (4 mg Intravenous Given 7/28/24 1823)   morphine injection 6 mg (6 mg Intravenous Given 7/28/24 1909)   iohexol (OMNIPAQUE) 350 MG/ML injection (MULTI-DOSE) 100 mL (100 mL Intravenous Given 7/28/24 1951)   acetaminophen (Ofirmev) injection 1,000 mg (0 mg Intravenous Stopped 7/28/24 2054)   cephalexin (KEFLEX) capsule 500 mg (500 mg Oral Given 7/28/24 2052)       Diagnostic Studies  Results Reviewed       Procedure Component Value Units Date/Time    Urine Microscopic [[id number]]  (Abnormal) Collected: 07/28/24 1814    Lab Status: Final result Specimen: Urine, Clean Catch Updated: 07/28/24 1901     RBC, UA 1-2 /hpf      WBC, UA 4-10 /hpf      Epithelial Cells Occasional /hpf      Bacteria, UA Occasional /hpf      MUCUS THREADS Occasional     Hyaline Casts, UA 5-10 /lpf      Granular  Casts, UA 0-3    Comprehensive metabolic panel [[id number]]  (Abnormal) Collected: 07/28/24 1822    Lab Status: Final result Specimen: Blood from Arm, Right Updated: 07/28/24 1854     Sodium 131 mmol/L      Potassium 4.1 mmol/L      Chloride 100 mmol/L      CO2 24 mmol/L      ANION GAP 7 mmol/L      BUN 10 mg/dL      Creatinine 0.92 mg/dL      Glucose 342 mg/dL      Calcium 8.9 mg/dL      AST 15 U/L      ALT 11 U/L      Alkaline Phosphatase 70 U/L      Total Protein 7.2 g/dL      Albumin 3.5 g/dL      Total Bilirubin 0.81 mg/dL      eGFR 77 ml/min/1.73sq m     Narrative:      National Kid[employer] Foundation guidelines for Chronic Kidney Disease (CKD):     Stage 1 with normal or high GFR (GFR > 90 mL/min/1.73 square meters)    Stage 2 Mild CKD (GFR = 60-89 mL/min/1.73 square meters)    Stage 3A Moderate CKD (GFR = 45-59 mL/min/1.73 square meters)    Stage 3B Moderate CKD (GFR = 30-44 mL/min/1.73 square meters)    Stage 4 Severe CKD (GFR = 15-29 mL/min/1.73 square meters)    Stage 5 End Stage CKD (GFR <15 mL/min/1.73 square meters)  Note: GFR calculation is accurate only with a steady state creatinine    Lipase [[id number]]  (Normal) Collected: 07/28/24 1822    Lab Status: Final result Specimen: Blood from Arm, Right Updated: 07/28/24 1854     Lipase 12 u/L     Blood gas, venous [[id number]]  (Abnormal) Collected: 07/28/24 1822    Lab Status: Final result Specimen: Blood from Arm, Right Updated: 07/28/24 1844     pH, Roe 7.360     pCO2, Roe 42.8 mm Hg      pO2, Roe 32.0 mm Hg      HCO3, Roe 23.6 mmol/L      Base Excess, Roe -1.8 mmol/L      O2 Content, Roe 13.0 ml/dL      O2 HGB, VENOUS 62.7 %     CBC and differential [[id number]] Collected: 07/28/24 1822    Lab Status: Final result Specimen: Blood from Arm, Right Updated: 07/28/24 1836     WBC 7.85 Thousand/uL      RBC 4.58 Million/uL      Hemoglobin 13.1 g/dL      Hematocrit 40.0 %      MCV 87 fL      MCH 28.6 pg      MCHC 32.8 g/dL      RDW 12.7 %      MPV 9.4 fL       Platelets 283 Thousands/uL      nRBC 0 /100 WBCs      Segmented % 46 %      Immature Grans % 0 %      Lymphocytes % 43 %      Monocytes % 8 %      Eosinophils Relative 2 %      Basophils Relative 1 %      Absolute Neutrophils 3.56 Thousands/µL      Absolute Immature Grans 0.03 Thousand/uL      Absolute Lymphocytes 3.41 Thousands/µL      Absolute Monocytes 0.64 Thousand/µL      Eosinophils Absolute 0.17 Thousand/µL      Basophils Absolute 0.04 Thousands/µL     POCT pregnancy, urine [[id number]]  (Normal) Resulted: 07/28/24 1816    Lab Status: Final result Updated: 07/28/24 1816     EXT Preg Test, Ur Negative     Control Valid    Urine Macroscopic, POC [[id number]]  (Abnormal) Collected: 07/28/24 1814    Lab Status: Final result Specimen: Urine Updated: 07/28/24 1815     Color, UA Yellow     Clarity, UA Clear     pH, UA 6.0     Leukocytes, UA Negative     Nitrite, UA Negative     Protein,  (2+) mg/dl      Glucose,  (1/2%) mg/dl      Ketones, UA 15 (1+) mg/dl      Urobilinogen, UA 1.0 E.U./dl      Bilirubin, UA Small     Occult Blood, UA Trace     Specific Gravity, UA 1.025    Narrative:      CLINITEK RESULT                   CT abdomen pelvis with contrast   Final Result by Liang Gutierrez MD (07/28 2038)      Normal appendix.      Small hiatal hernia with mild wall thickening of the visualized distal esophagus. Correlation for esophagitis advised.      Mild bladder wall thickening. Correlation for cystitis is advised.         Workstation performed: [id number]                    Procedures  Procedures         ED Course  ED Course as of 07/28/24 2055   Sun Jul 28, 2024 1806 Temperature: 98.5 °F (36.9 °C)  Afebrile   1817 PREGNANCY TEST URINE: Negative  Negative   1837 CBC and differential  Normal   1844 pH, Roe: 7.360  No acidosis   1858 LIPASE: 12  Normal   1859 Comprehensive metabolic panel(!)  Hyperglycemia without AG   1906 Updated pt on labs.  Still having pain. Morphine ordered. Nausea is better.   A/w CT scan.   2010 Pt reports pain is the same.  Ofirmev ordered.   2048 Updated pt on CT result.  Will treat for UTI.  Pt also being followed by GI for esophagitis.                                   SBIRT 22yo+      Flowsheet Row Most Recent Value   Initial Alcohol Screen: US AUDIT-C     1. How often do you have a drink containing alcohol? 0 Filed at: 07/28/2024 1808   2. How many drinks containing alcohol do you have on a typical day you are drinking?  0 Filed at: 07/28/2024 1808   3b. FEMALE Any Age, or MALE 65+: How often do you have 4 or more drinks on one occassion? 0 Filed at: 07/28/2024 1808   Audit-C Score 0 Filed at: 07/28/2024 1808   FAMILIA: How many times in the past year have you...    Used an illegal drug or used a prescription medication for non-medical reasons? Never Filed at: 07/28/2024 1808                      Medical Decision Making  Abd pain - Will check CBC as marker of infection, CMP to r/o hepatitis/biliary disease, lipase to r/o pancreatitis, VBG to r/o DKA, urine to r/o UTI, urine preg to r/o ectopic pregnancy, CT A/P to r/o appendicitis/colitis/diverticulitis.    Amount and/or Complexity of Data Reviewed  Labs: ordered. Decision-making details documented in ED Course.  Radiology: ordered.    Risk  Prescription drug management.                 Disposition  Final diagnoses:   Acute urinary tract infection   Abdominal pain   Nausea and vomiting     Time reflects when diagnosis was documented in both MDM as applicable and the Disposition within this note       Time User Action Codes Description Comment    7/28/2024  8:47 PM Ira Miramontes Add [N39.0] Acute urinary tract infection     7/28/2024  8:47 PM Ira Miramontes Add [R10.9] Abdominal pain     7/28/2024  8:47 PM Ira Miramontes Add [R11.2] Nausea and vomiting           ED Disposition       ED Disposition   Discharge    Condition   Stable    Date/Time   Sun Jul 28, 2024 2048    Comment   Tari Shannon discharge to home/self care.                    Follow-up Information    None         Patient's Medications   Discharge Prescriptions    CEPHALEXIN (KEFLEX) 500 MG CAPSULE    Take 1 capsule (500 mg total) by mouth 2 (two) times a day for 5 days       Start Date: 7/28/2024 End Date: 8/2/2024       Order Dose: 500 mg       Quantity: 10 capsule    Refills: 0    NAPROXEN (NAPROSYN) 500 MG TABLET    Take 1 tablet (500 mg total) by mouth 2 (two) times a day with meals       Start Date: 7/28/2024 End Date: --       Order Dose: 500 mg       Quantity: 20 tablet    Refills: 0    ONDANSETRON (ZOFRAN ODT) 4 MG DISINTEGRATING TABLET    Take 1 tablet (4 mg total) by mouth every 6 (six) hours as needed for nausea or vomiting       Start Date: 7/28/2024 End Date: --       Order Dose: 4 mg       Quantity: 20 tablet    Refills: 0       No discharge procedures on file.    PDMP Review         Value Time User    PDMP Reviewed  Yes 7/20/2021  8:33 AM James Martinez III, DO            ED Provider  Electronically Signed by             Ira Miramontes DO  07/28/24 2056

## 2024-07-29 ENCOUNTER — OFFICE VISIT (OUTPATIENT)
Dept: OBGYN CLINIC | Facility: CLINIC | Age: 44
End: 2024-07-29

## 2024-07-29 ENCOUNTER — TELEPHONE (OUTPATIENT)
Dept: OBGYN CLINIC | Facility: CLINIC | Age: 44
End: 2024-07-29

## 2024-07-29 VITALS
WEIGHT: 187.2 LBS | HEART RATE: 81 BPM | BODY MASS INDEX: 31.19 KG/M2 | HEIGHT: 65 IN | SYSTOLIC BLOOD PRESSURE: 149 MMHG | DIASTOLIC BLOOD PRESSURE: 85 MMHG

## 2024-07-29 DIAGNOSIS — N92.6 MISSED PERIOD: Primary | ICD-10-CM

## 2024-07-29 PROCEDURE — 99213 OFFICE O/P EST LOW 20 MIN: CPT | Performed by: NURSE PRACTITIONER

## 2024-07-29 NOTE — PROGRESS NOTES
"Ambulatory Visit  Name: Tari Shannon      : 1980      MRN: [id number]  Encounter Provider: KETURAH Boykin  Encounter Date: 2024   Encounter department: [facility]    Assessment & Plan   1. Missed period    Plan  Call with further needs or concerns  Annual GYN exam is due after 2024    History of Present Illness     Tari Shannon is a 43 y.o. female who presents with concerns last period was 1 month ago, pt states she has some breast tenderness and abdominal bloating  Pt had a negative UPT in the ED yesterday and had a Hx of sterilization procedure   Pt states she was in the ED with C/O abdominal pain, pt states she has had numerous GI tests and was Dx with a Hiatal Hernia and is scheduled for a procedure in August at [facility]  2021 WNL PAP and negative HPV    Explained sometimes periods are missed, explained uterine cramping and breast pain even with a missed period  Explained when period does occur may be heavier and can cause more cramps  Discussed safe and effective use of Motrin    Depression Screening Follow-up Plan: Patient's depression screening was negative with a PHQ-2 score of 1. Their PHQ-9 score was 5. Clinically patient does not have depression. No treatment is required.      Review of Systems  .Pertinent items are note in the HPI      Objective     /85 (BP Location: Right arm, Patient Position: Sitting)   Pulse 81   Ht 5' 5\" (1.651 m)   Wt 84.9 kg (187 lb 3.2 oz)   LMP 2024 (Approximate)   BMI 31.15 kg/m²     Physical Exam  Vitals reviewed.   Constitutional:       Appearance: Normal appearance.   Eyes:      General:         Right eye: No discharge.         Left eye: No discharge.   Pulmonary:      Effort: Pulmonary effort is normal. No respiratory distress.   Musculoskeletal:         General: Normal range of motion.      Cervical back: Normal range of motion.   Neurological:      Mental Status: She is alert " and oriented to person, place, and time.   Psychiatric:         Mood and Affect: Mood normal.         Behavior: Behavior normal.         Thought Content: Thought content normal.     Negative cough or SOB  Administrative Statements

## 2024-09-07 ENCOUNTER — HOSPITAL ENCOUNTER (EMERGENCY)
Facility: HOSPITAL | Age: 44
Discharge: HOME | End: 2024-09-07
Attending: EMERGENCY MEDICINE
Payer: COMMERCIAL

## 2024-09-07 VITALS
WEIGHT: 185.85 LBS | OXYGEN SATURATION: 100 % | TEMPERATURE: 98.3 F | DIASTOLIC BLOOD PRESSURE: 67 MMHG | HEART RATE: 88 BPM | SYSTOLIC BLOOD PRESSURE: 141 MMHG | RESPIRATION RATE: 18 BRPM | BODY MASS INDEX: 30.93 KG/M2

## 2024-09-07 DIAGNOSIS — N39.0 UTI (URINARY TRACT INFECTION): Primary | ICD-10-CM

## 2024-09-07 LAB
BACTERIA UR QL AUTO: ABNORMAL /HPF
BILIRUB UR QL STRIP: NEGATIVE
CLARITY UR: ABNORMAL
COLOR UR: YELLOW
EXT PREGNANCY TEST URINE: NEGATIVE
EXT. CONTROL: NORMAL
GLUCOSE UR STRIP-MCNC: ABNORMAL MG/DL
HGB UR QL STRIP.AUTO: ABNORMAL
KETONES UR STRIP-MCNC: NEGATIVE MG/DL
LEUKOCYTE ESTERASE UR QL STRIP: ABNORMAL
MUCOUS THREADS UR QL AUTO: ABNORMAL
NITRITE UR QL STRIP: POSITIVE
NON-SQ EPI CELLS URNS QL MICRO: ABNORMAL /HPF
PH UR STRIP.AUTO: 5.5 [PH] (ref 4.5–8)
PROT UR STRIP-MCNC: ABNORMAL MG/DL
RBC #/AREA URNS AUTO: ABNORMAL /HPF
SP GR UR STRIP.AUTO: 1.02 (ref 1–1.03)
UROBILINOGEN UR QL STRIP.AUTO: 0.2 E.U./DL
WBC #/AREA URNS AUTO: ABNORMAL /HPF
WBC CLUMPS # UR AUTO: PRESENT /UL

## 2024-09-07 PROCEDURE — 99284 EMERGENCY DEPT VISIT MOD MDM: CPT | Performed by: EMERGENCY MEDICINE

## 2024-09-07 PROCEDURE — 81025 URINE PREGNANCY TEST: CPT | Performed by: EMERGENCY MEDICINE

## 2024-09-07 PROCEDURE — 87186 SC STD MICRODIL/AGAR DIL: CPT

## 2024-09-07 PROCEDURE — 87077 CULTURE AEROBIC IDENTIFY: CPT

## 2024-09-07 PROCEDURE — 87491 CHLMYD TRACH DNA AMP PROBE: CPT | Performed by: EMERGENCY MEDICINE

## 2024-09-07 PROCEDURE — 81001 URINALYSIS AUTO W/SCOPE: CPT

## 2024-09-07 PROCEDURE — 87591 N.GONORRHOEAE DNA AMP PROB: CPT | Performed by: EMERGENCY MEDICINE

## 2024-09-07 PROCEDURE — 87086 URINE CULTURE/COLONY COUNT: CPT

## 2024-09-07 PROCEDURE — 99283 EMERGENCY DEPT VISIT LOW MDM: CPT

## 2024-09-07 RX ORDER — CEPHALEXIN 500 MG/1
500 CAPSULE ORAL 2 TIMES DAILY
Qty: 10 CAPSULE | Refills: 0 | Status: SHIPPED | OUTPATIENT
Start: 2024-09-07 | End: 2024-09-12

## 2024-09-07 RX ADMIN — CEPHALEXIN 500 MG: 250 CAPSULE ORAL at 16:37

## 2024-09-07 NOTE — ED PROVIDER NOTES
History  Chief Complaint   Patient presents with    Medical Problem     Pt reports sneezing and 'clot of mucous and blood' came out of her vagina.  Reports pressure to pelvic area     44 y.o. F p/w suprapubic pressure/cloudy urine. Denies dysuria. Reports she was in the shower today and sneezed. Noticed a ball of mucus with blood mixed in came out of her vagina.      History provided by:  Patient   used: No    Medical Problem  Associated symptoms: abdominal pain (suprapubic) and nausea    Associated symptoms: no diarrhea, no fever and no vomiting        Prior to Admission Medications   Prescriptions Last Dose Informant Patient Reported? Taking?   Accu-Chek FastClix Lancets MISC  Self No No   Sig: Test BG up to 3x daily as directed   Accu-Chek Guide test strip  Self No No   Sig: TEST BG UP TO 3X DAILY AS DIRECTED   Continuous Blood Gluc  (Dexcom G7 ) ENRICO  Self No No   Sig: Use 1 Units daily   Continuous Glucose Sensor (Dexcom G7 Sensor)  Self No No   Sig: Use 1 Device every 10 days   Injection Device for Insulin (B-D PEN MINI) ENRICO  Self No No   Sig: Use 4 (four) times a day Please use for Lantus and Humalog. 4 pen needles daily Dx: Diabetes   Insulin Pen Needle (BD Pen Needle Em U/F) 32G X 4 MM MISC  Self No No   Sig: Use daily   acetaminophen (TYLENOL) 500 mg tablet  Self No No   Sig: Take 1 tablet (500 mg total) by mouth every 6 (six) hours as needed for mild pain or moderate pain   dicyclomine (BENTYL) 20 mg tablet   No No   Sig: Take 1 tablet (20 mg total) by mouth 2 (two) times a day as needed (abd pain) for up to 2 days   Patient not taking: Reported on 5/3/2024   ibuprofen (MOTRIN) 400 mg tablet  Self No No   Sig: Take 1 tablet (400 mg total) by mouth every 6 (six) hours as needed for mild pain or moderate pain   insulin aspart (NovoLOG FlexPen) 100 UNIT/ML injection pen  Self No No   Sig: Inject 10 Units under the skin 3 (three) times a day with meals   insulin degludec  (Tresiba FlexTouch) 100 units/mL injection pen  Self No No   Sig: Inject 30 Units under the skin daily   lidocaine (Lidoderm) 5 %  Self No No   Sig: Apply 1 patch topically over 12 hours daily Remove & Discard patch within 12 hours or as directed by MD   meclizine (ANTIVERT) 12.5 MG tablet  Self No No   Sig: Take 1 tablet (12.5 mg total) by mouth 3 (three) times a day as needed for dizziness   methocarbamol (ROBAXIN) 500 mg tablet  Self No No   Sig: Take 1 tablet (500 mg total) by mouth 2 (two) times a day   naproxen (NAPROSYN) 500 mg tablet   No No   Sig: Take 1 tablet (500 mg total) by mouth 2 (two) times a day with meals   naproxen (Naprosyn) 500 mg tablet  Self No No   Sig: Take 1 tablet (500 mg total) by mouth 2 (two) times a day with meals   ondansetron (Zofran ODT) 4 mg disintegrating tablet   No No   Sig: Take 1 tablet (4 mg total) by mouth every 6 (six) hours as needed for nausea or vomiting for up to 4 days   ondansetron (Zofran ODT) 4 mg disintegrating tablet   No No   Sig: Take 1 tablet (4 mg total) by mouth every 6 (six) hours as needed for nausea or vomiting   pantoprazole (PROTONIX) 40 mg tablet  Self No No   Sig: Take 1 tablet (40 mg total) by mouth daily   pregabalin (LYRICA) 50 mg capsule  Self No No   Sig: Take 1 PO HS x 5 days, then 1 PO BID x 5 days, then 1 PO TID   prochlorperazine (COMPAZINE) 10 mg tablet  Self No No   Si tab at onset of migraine, can repeat in 8 hours, can take with NSAID. Take with Benadryl if there are side effects.   semaglutide, 2 mg/dose, (Ozempic) 8 mg/ mL injection pen  Self No No   Sig: Inject 0.75 mL (2 mg total) under the skin every 7 days   topiramate (TOPAMAX) 50 MG tablet  Self No No   Sig: Take 1 tablet (50 mg total) by mouth daily at bedtime      Facility-Administered Medications: None       Past Medical History:   Diagnosis Date    Diabetes mellitus (HCC)     Migraine headache     Vertigo, aural, unspecified laterality        Past Surgical History:    Procedure Laterality Date    COLONOSCOPY      HERNIA REPAIR      TUBAL LIGATION  2017    UPPER GASTROINTESTINAL ENDOSCOPY         Family History   Problem Relation Age of Onset    Hypertension Mother     Arthritis Mother     Asthma Sister     Bipolar disorder Brother     Schizophrenia Brother     Asthma Brother     Asthma Brother     Diabetes Maternal Grandmother     Diabetes Paternal Grandmother     No Known Problems Maternal Grandfather     No Known Problems Paternal Grandfather     Breast cancer Neg Hx      I have reviewed and agree with the history as documented.    E-Cigarette/Vaping    E-Cigarette Use Never User      E-Cigarette/Vaping Substances    Nicotine No     THC No     CBD No     Flavoring No     Other No     Unknown No      Social History     Tobacco Use    Smoking status: Never    Smokeless tobacco: Never   Vaping Use    Vaping status: Never Used   Substance Use Topics    Alcohol use: Not Currently     Comment: ocassionally    Drug use: Never       Review of Systems   Constitutional:  Negative for chills and fever.   Gastrointestinal:  Positive for abdominal pain (suprapubic) and nausea. Negative for diarrhea and vomiting.   Genitourinary:  Positive for pelvic pain and vaginal discharge. Negative for dysuria.        Cloudy urine         Physical Exam  Physical Exam  Vitals and nursing note reviewed.   Constitutional:       General: She is not in acute distress.     Appearance: Normal appearance. She is well-developed. She is not ill-appearing, toxic-appearing or diaphoretic.   HENT:      Head: Normocephalic and atraumatic.   Eyes:      General: No scleral icterus.  Neck:      Vascular: No JVD.   Cardiovascular:      Rate and Rhythm: Normal rate and regular rhythm.      Heart sounds: Normal heart sounds. No murmur heard.  Pulmonary:      Effort: Pulmonary effort is normal. No accessory muscle usage or respiratory distress.      Breath sounds: Normal breath sounds. No stridor. No wheezing, rhonchi or  rales.   Abdominal:      General: There is no distension.      Palpations: Abdomen is soft. Abdomen is not rigid. There is no mass.      Tenderness: There is abdominal tenderness in the suprapubic area. There is no guarding or rebound. Negative signs include Riggins's sign and McBurney's sign.   Skin:     General: Skin is warm and dry.      Coloration: Skin is not jaundiced or pale.      Findings: No rash.   Neurological:      Mental Status: She is alert.      GCS: GCS eye subscore is 4. GCS verbal subscore is 5. GCS motor subscore is 6.         Vital Signs  ED Triage Vitals   Temperature Pulse Respirations Blood Pressure SpO2   09/07/24 1600 09/07/24 1559 09/07/24 1559 09/07/24 1559 09/07/24 1559   98.3 °F (36.8 °C) 88 18 141/67 100 %      Temp Source Heart Rate Source Patient Position - Orthostatic VS BP Location FiO2 (%)   09/07/24 1600 09/07/24 1559 09/07/24 1559 09/07/24 1559 --   Oral Monitor Sitting Right arm       Pain Score       --                  Vitals:    09/07/24 1559   BP: 141/67   Pulse: 88   Patient Position - Orthostatic VS: Sitting         Visual Acuity      ED Medications  Medications   cephalexin (KEFLEX) capsule 500 mg (500 mg Oral Given 9/7/24 1637)       Diagnostic Studies  Results Reviewed       Procedure Component Value Units Date/Time    Urine Microscopic [[id number]]  (Abnormal) Collected: 09/07/24 1610    Lab Status: Final result Specimen: Urine, Clean Catch Updated: 09/07/24 1644     RBC, UA 20-30 /hpf      WBC, UA Innumerable /hpf      Epithelial Cells Occasional /hpf      Bacteria, UA Occasional /hpf      MUCUS THREADS Occasional     WBC Clumps Present    Urine culture [[id number]] Collected: 09/07/24 1610    Lab Status: In process Specimen: Urine, Clean Catch Updated: 09/07/24 1644    Chlamydia/GC amplified DNA by PCR [[id number]] Collected: 09/07/24 1614    Lab Status: In process Specimen: Urine, Other Updated: 09/07/24 1617    POCT pregnancy, urine [[id number]]  (Normal) Resulted:  09/07/24 1615    Lab Status: Final result Updated: 09/07/24 1615     EXT Preg Test, Ur Negative     Control Valid    Urine Macroscopic, POC [[id number]]  (Abnormal) Collected: 09/07/24 1610    Lab Status: Final result Specimen: Urine Updated: 09/07/24 1612     Color, UA Yellow     Clarity, UA Cloudy     pH, UA 5.5     Leukocytes, UA Moderate     Nitrite, UA Positive     Protein,  (2+) mg/dl      Glucose, UA >=1000 (1%) mg/dl      Ketones, UA Negative mg/dl      Urobilinogen, UA 0.2 E.U./dl      Bilirubin, UA Negative     Occult Blood, UA Moderate     Specific Gravity, UA 1.025    Narrative:      CLINITEK RESULT                   No orders to display              Procedures  Procedures         ED Course  ED Course as of 09/07/24 1654   Sat Sep 07, 2024   1600 Temperature: 98.3 °F (36.8 °C)  Afebrile   1627 PREGNANCY TEST URINE: Negative  Negative                                 SBIRT 20yo+      Flowsheet Row Most Recent Value   Initial Alcohol Screen: US AUDIT-C     1. How often do you have a drink containing alcohol? 0 Filed at: 09/07/2024 1615   2. How many drinks containing alcohol do you have on a typical day you are drinking?  0 Filed at: 09/07/2024 1615   3b. FEMALE Any Age, or MALE 65+: How often do you have 4 or more drinks on one occassion? 0 Filed at: 09/07/2024 1615   Audit-C Score 0 Filed at: 09/07/2024 1615   FAMILIA: How many times in the past year have you...    Used an illegal drug or used a prescription medication for non-medical reasons? Never Filed at: 09/07/2024 1615                      Medical Decision Making  Urinary complaints/vaginal discharge - Will check urine to r/o UTI, urine preg to r/o pregnancy, send GC/chlamydia.    Amount and/or Complexity of Data Reviewed  Labs: ordered. Decision-making details documented in ED Course.    Risk  Prescription drug management.                 Disposition  Final diagnoses:   UTI (urinary tract infection)     Time reflects when diagnosis was documented  in both MDM as applicable and the Disposition within this note       Time User Action Codes Description Comment    9/7/2024  4:33 PM Ira Miramontes Add [N39.0] UTI (urinary tract infection)           ED Disposition       ED Disposition   Discharge    Condition   Stable    Date/Time   Sat Sep 7, 2024 1634    Comment   Tari Shiva discharge to home/self care.                   Follow-up Information    None         Discharge Medication List as of 9/7/2024  4:34 PM        START taking these medications    Details   cephalexin (KEFLEX) 500 mg capsule Take 1 capsule (500 mg total) by mouth 2 (two) times a day for 5 days, Starting Sat 9/7/2024, Until Thu 9/12/2024, Normal           CONTINUE these medications which have NOT CHANGED    Details   Accu-Chek FastClix Lancets MISC Test BG up to 3x daily as directed, Normal      Accu-Chek Guide test strip TEST BG UP TO 3X DAILY AS DIRECTED, Normal      acetaminophen (TYLENOL) 500 mg tablet Take 1 tablet (500 mg total) by mouth every 6 (six) hours as needed for mild pain or moderate pain, Starting Sun 8/13/2023, Print      Continuous Blood Gluc  (Dexcom G7 ) ENRICO Use 1 Units daily, Starting Thu 11/2/2023, Normal      Continuous Glucose Sensor (Dexcom G7 Sensor) Use 1 Device every 10 days, Starting Fri 5/3/2024, Normal      dicyclomine (BENTYL) 20 mg tablet Take 1 tablet (20 mg total) by mouth 2 (two) times a day as needed (abd pain) for up to 2 days, Starting Mon 12/18/2023, Until Fri 2/2/2024 at 2359, Normal      ibuprofen (MOTRIN) 400 mg tablet Take 1 tablet (400 mg total) by mouth every 6 (six) hours as needed for mild pain or moderate pain, Starting Sun 8/13/2023, Print      Injection Device for Insulin (B-D PEN MINI) ENRICO Use 4 (four) times a day Please use for Lantus and Humalog. 4 pen needles daily Dx: Diabetes, Starting Thu 3/18/2021, Normal      insulin aspart (NovoLOG FlexPen) 100 UNIT/ML injection pen Inject 10 Units under the skin 3 (three) times a  day with meals, Starting Fri 2/2/2024, Normal      insulin degludec (Tresiba FlexTouch) 100 units/mL injection pen Inject 30 Units under the skin daily, Starting Fri 2/2/2024, Normal      Insulin Pen Needle (BD Pen Needle Em U/F) 32G X 4 MM MISC Use daily, Starting Thu 1/4/2024, Normal      lidocaine (Lidoderm) 5 % Apply 1 patch topically over 12 hours daily Remove & Discard patch within 12 hours or as directed by MD, Starting Tue 5/23/2023, Normal      meclizine (ANTIVERT) 12.5 MG tablet Take 1 tablet (12.5 mg total) by mouth 3 (three) times a day as needed for dizziness, Starting Sat 4/20/2024, Normal      methocarbamol (ROBAXIN) 500 mg tablet Take 1 tablet (500 mg total) by mouth 2 (two) times a day, Starting Thu 11/23/2023, Normal      !! naproxen (Naprosyn) 500 mg tablet Take 1 tablet (500 mg total) by mouth 2 (two) times a day with meals, Starting Tue 5/23/2023, Normal      !! naproxen (NAPROSYN) 500 mg tablet Take 1 tablet (500 mg total) by mouth 2 (two) times a day with meals, Starting Sun 7/28/2024, Normal      ondansetron (Zofran ODT) 4 mg disintegrating tablet Take 1 tablet (4 mg total) by mouth every 6 (six) hours as needed for nausea or vomiting, Starting Sun 7/28/2024, Normal      pantoprazole (PROTONIX) 40 mg tablet Take 1 tablet (40 mg total) by mouth daily, Starting Thu 1/4/2024, Normal      pregabalin (LYRICA) 50 mg capsule Take 1 PO HS x 5 days, then 1 PO BID x 5 days, then 1 PO TID, Normal      prochlorperazine (COMPAZINE) 10 mg tablet 1 tab at onset of migraine, can repeat in 8 hours, can take with NSAID. Take with Benadryl if there are side effects., Normal      semaglutide, 2 mg/dose, (Ozempic) 8 mg/ mL injection pen Inject 0.75 mL (2 mg total) under the skin every 7 days, Starting Thu 9/28/2023, Normal      topiramate (TOPAMAX) 50 MG tablet Take 1 tablet (50 mg total) by mouth daily at bedtime, Starting Tue 5/16/2023, Normal       !! - Potential duplicate medications found. Please discuss  with provider.          No discharge procedures on file.    PDMP Review         Value Time User    PDMP Reviewed  Yes 7/20/2021  8:33 AM James Martinez III, DO            ED Provider  Electronically Signed by             Ira Miramontes DO  09/07/24 4799

## 2024-09-10 LAB — BACTERIA UR CULT: ABNORMAL

## 2024-09-11 LAB
C TRACH DNA SPEC QL NAA+PROBE: NEGATIVE
N GONORRHOEA DNA SPEC QL NAA+PROBE: NEGATIVE

## 2024-10-20 ENCOUNTER — APPOINTMENT (OUTPATIENT)
Dept: RADIOLOGY | Facility: MEDICAL CENTER | Age: 44
End: 2024-10-20
Attending: PHYSICIAN ASSISTANT
Payer: COMMERCIAL

## 2024-10-20 ENCOUNTER — OFFICE VISIT (OUTPATIENT)
Dept: URGENT CARE | Facility: MEDICAL CENTER | Age: 44
End: 2024-10-20
Payer: COMMERCIAL

## 2024-10-20 VITALS
OXYGEN SATURATION: 100 % | TEMPERATURE: 98.4 F | DIASTOLIC BLOOD PRESSURE: 74 MMHG | HEART RATE: 83 BPM | RESPIRATION RATE: 18 BRPM | SYSTOLIC BLOOD PRESSURE: 133 MMHG

## 2024-10-20 DIAGNOSIS — M79.645 THUMB PAIN, LEFT: Primary | ICD-10-CM

## 2024-10-20 DIAGNOSIS — M79.645 THUMB PAIN, LEFT: ICD-10-CM

## 2024-10-20 PROCEDURE — 73140 X-RAY EXAM OF FINGER(S): CPT

## 2024-10-20 PROCEDURE — 99213 OFFICE O/P EST LOW 20 MIN: CPT | Performed by: PHYSICIAN ASSISTANT

## 2024-10-20 NOTE — PROGRESS NOTES
[facility]        NAME: Tari Shannon is a 44 y.o. female  : 1980    MRN: [id number]  DATE: 2024  TIME: 6:04 PM    Assessment and Plan   Thumb pain, left [M79.645]  1. Thumb pain, left  XR thumb left first digit-min 2v            Patient Instructions       Follow up with PCP as needed.  Ice and Advil.    If tests have been performed at [facility], our office will contact you with results if changes need to be made to the care plan discussed with you at the visit.  You can review your full results on [facility].    Chief Complaint     Chief Complaint   Patient presents with    Thumb Injury     Patient c/o left thumb pain after closing her trunk on it.          History of Present Illness       Patient slammed the car trunk accidentally onto her left nondominant thumb earlier today.  She complains of pain and throbbing though this is starting to subside.  No other complaints.  No wound.        Review of Systems   Review of Systems   All other systems reviewed and are negative.        Current Medications       Current Outpatient Medications:     Accu-Chek FastClix Lancets MISC, Test BG up to 3x daily as directed, Disp: 300 each, Rfl: 1    Accu-Chek Guide test strip, TEST BG UP TO 3X DAILY AS DIRECTED, Disp: 100 each, Rfl: 1    acetaminophen (TYLENOL) 500 mg tablet, Take 1 tablet (500 mg total) by mouth every 6 (six) hours as needed for mild pain or moderate pain, Disp: 30 tablet, Rfl: 0    Continuous Blood Gluc  (Dexcom G7 ) ENRICO, Use 1 Units daily, Disp: 1 each, Rfl: 1    Continuous Glucose Sensor (Dexcom G7 Sensor), Use 1 Device every 10 days, Disp: 9 each, Rfl: 3    dicyclomine (BENTYL) 20 mg tablet, Take 1 tablet (20 mg total) by mouth 2 (two) times a day as needed (abd pain) for up to 2 days (Patient not taking: Reported on 5/3/2024), Disp: 4 tablet, Rfl: 0    ibuprofen (MOTRIN) 400 mg tablet, Take 1 tablet (400 mg total) by mouth every 6 (six) hours as needed  for mild pain or moderate pain, Disp: 30 tablet, Rfl: 0    Injection Device for Insulin (B-D PEN MINI) ENRICO, Use 4 (four) times a day Please use for Lantus and Humalog. 4 pen needles daily Dx: Diabetes, Disp: 400 each, Rfl: 0    insulin aspart (NovoLOG FlexPen) 100 UNIT/ML injection pen, Inject 10 Units under the skin 3 (three) times a day with meals, Disp: 15 mL, Rfl: 2    insulin degludec (Tresiba FlexTouch) 100 units/mL injection pen, Inject 30 Units under the skin daily, Disp: 15 mL, Rfl: 1    Insulin Pen Needle (BD Pen Needle Em U/F) 32G X 4 MM MISC, Use daily, Disp: 100 each, Rfl: 0    lidocaine (Lidoderm) 5 %, Apply 1 patch topically over 12 hours daily Remove & Discard patch within 12 hours or as directed by MD, Disp: 5 patch, Rfl: 0    meclizine (ANTIVERT) 12.5 MG tablet, Take 1 tablet (12.5 mg total) by mouth 3 (three) times a day as needed for dizziness, Disp: 30 tablet, Rfl: 0    methocarbamol (ROBAXIN) 500 mg tablet, Take 1 tablet (500 mg total) by mouth 2 (two) times a day, Disp: 20 tablet, Rfl: 0    naproxen (Naprosyn) 500 mg tablet, Take 1 tablet (500 mg total) by mouth 2 (two) times a day with meals, Disp: 30 tablet, Rfl: 0    naproxen (NAPROSYN) 500 mg tablet, Take 1 tablet (500 mg total) by mouth 2 (two) times a day with meals, Disp: 20 tablet, Rfl: 0    ondansetron (Zofran ODT) 4 mg disintegrating tablet, Take 1 tablet (4 mg total) by mouth every 6 (six) hours as needed for nausea or vomiting for up to 4 days, Disp: 16 tablet, Rfl: 0    ondansetron (Zofran ODT) 4 mg disintegrating tablet, Take 1 tablet (4 mg total) by mouth every 6 (six) hours as needed for nausea or vomiting, Disp: 20 tablet, Rfl: 0    pantoprazole (PROTONIX) 40 mg tablet, Take 1 tablet (40 mg total) by mouth daily, Disp: 90 tablet, Rfl: 1    pregabalin (LYRICA) 50 mg capsule, Take 1 PO HS x 5 days, then 1 PO BID x 5 days, then 1 PO TID, Disp: 90 capsule, Rfl: 1    prochlorperazine (COMPAZINE) 10 mg tablet, 1 tab at onset of  migraine, can repeat in 8 hours, can take with NSAID. Take with Benadryl if there are side effects., Disp: 20 tablet, Rfl: 1    semaglutide, 2 mg/dose, (Ozempic) 8 mg/ mL injection pen, Inject 0.75 mL (2 mg total) under the skin every 7 days, Disp: 9 mL, Rfl: 1    topiramate (TOPAMAX) 50 MG tablet, Take 1 tablet (50 mg total) by mouth daily at bedtime, Disp: 90 tablet, Rfl: 1    Current Allergies     Allergies as of 10/20/2024 - Reviewed 10/20/2024   Allergen Reaction Noted    Metformin and related Hives 02/29/2016            The following portions of the patient's history were reviewed and updated as appropriate: allergies, current medications, past family history, past medical history, past social history, past surgical history and problem list.     Past Medical History:   Diagnosis Date    Diabetes mellitus (HCC)     Migraine headache     Vertigo, aural, unspecified laterality        Past Surgical History:   Procedure Laterality Date    COLONOSCOPY      HERNIA REPAIR      TUBAL LIGATION  2017    UPPER GASTROINTESTINAL ENDOSCOPY         Family History   Problem Relation Age of Onset    Hypertension Mother     Arthritis Mother     Asthma Sister     Bipolar disorder Brother     Schizophrenia Brother     Asthma Brother     Asthma Brother     Diabetes Maternal Grandmother     Diabetes Paternal Grandmother     No Known Problems Maternal Grandfather     No Known Problems Paternal Grandfather     Breast cancer Neg Hx          Medications have been verified.        Objective   /74   Pulse 83   Temp 98.4 °F (36.9 °C)   Resp 18   SpO2 100%   No LMP recorded.       Physical Exam     Physical Exam  Vitals and nursing note reviewed.   Constitutional:       Appearance: Normal appearance. She is normal weight.   Cardiovascular:      Rate and Rhythm: Normal rate and regular rhythm.      Pulses: Normal pulses.      Heart sounds: Normal heart sounds.   Pulmonary:      Effort: Pulmonary effort is normal.      Breath  sounds: Normal breath sounds.   Neurological:      Mental Status: She is alert.     Left thumb tender distally.  No swelling.  No ecchymosis.  No subungual hematoma.  Neurovascularly intact.  Full range of motion both active and passive at the IP joint and MCP joint.

## 2024-11-17 ENCOUNTER — APPOINTMENT (EMERGENCY)
Dept: CT IMAGING | Facility: HOSPITAL | Age: 44
End: 2024-11-17
Payer: COMMERCIAL

## 2024-11-17 ENCOUNTER — HOSPITAL ENCOUNTER (EMERGENCY)
Facility: HOSPITAL | Age: 44
Discharge: HOME | End: 2024-11-17
Attending: EMERGENCY MEDICINE | Admitting: EMERGENCY MEDICINE
Payer: COMMERCIAL

## 2024-11-17 VITALS
BODY MASS INDEX: 30.63 KG/M2 | HEART RATE: 66 BPM | RESPIRATION RATE: 16 BRPM | SYSTOLIC BLOOD PRESSURE: 155 MMHG | OXYGEN SATURATION: 100 % | DIASTOLIC BLOOD PRESSURE: 92 MMHG | TEMPERATURE: 98.8 F | WEIGHT: 184.08 LBS

## 2024-11-17 DIAGNOSIS — E86.0 DEHYDRATION: ICD-10-CM

## 2024-11-17 DIAGNOSIS — R11.2 NAUSEA AND VOMITING: ICD-10-CM

## 2024-11-17 DIAGNOSIS — R10.9 ABDOMINAL PAIN: Primary | ICD-10-CM

## 2024-11-17 DIAGNOSIS — R59.1 LYMPHADENOPATHY: ICD-10-CM

## 2024-11-17 LAB
ALBUMIN SERPL BCG-MCNC: 3.4 G/DL (ref 3.5–5)
ALP SERPL-CCNC: 84 U/L (ref 34–104)
ALT SERPL W P-5'-P-CCNC: 10 U/L (ref 7–52)
ANION GAP SERPL CALCULATED.3IONS-SCNC: 7 MMOL/L (ref 4–13)
APTT PPP: 32 SECONDS (ref 23–34)
AST SERPL W P-5'-P-CCNC: 11 U/L (ref 13–39)
B-OH-BUTYR SERPL-MCNC: <0.05 MMOL/L (ref 0.02–0.27)
BACTERIA UR QL AUTO: NORMAL /HPF
BASE EX.OXY STD BLDV CALC-SCNC: 80 % (ref 60–80)
BASE EXCESS BLDV CALC-SCNC: 1.8 MMOL/L
BASOPHILS # BLD AUTO: 0.04 THOUSANDS/ÂΜL (ref 0–0.1)
BASOPHILS NFR BLD AUTO: 1 % (ref 0–1)
BILIRUB SERPL-MCNC: 0.33 MG/DL (ref 0.2–1)
BILIRUB UR QL STRIP: NEGATIVE
BUN SERPL-MCNC: 8 MG/DL (ref 5–25)
CALCIUM ALBUM COR SERPL-MCNC: 9.4 MG/DL (ref 8.3–10.1)
CALCIUM SERPL-MCNC: 8.9 MG/DL (ref 8.4–10.2)
CHLORIDE SERPL-SCNC: 102 MMOL/L (ref 96–108)
CLARITY UR: CLEAR
CO2 SERPL-SCNC: 26 MMOL/L (ref 21–32)
COLOR UR: COLORLESS
CREAT SERPL-MCNC: 0.85 MG/DL (ref 0.6–1.3)
EOSINOPHIL # BLD AUTO: 0.17 THOUSAND/ÂΜL (ref 0–0.61)
EOSINOPHIL NFR BLD AUTO: 2 % (ref 0–6)
ERYTHROCYTE [DISTWIDTH] IN BLOOD BY AUTOMATED COUNT: 11.9 % (ref 11.6–15.1)
EXT PREGNANCY TEST URINE: NEGATIVE
EXT. CONTROL: NORMAL
GFR SERPL CREATININE-BSD FRML MDRD: 84 ML/MIN/1.73SQ M
GLUCOSE SERPL-MCNC: 281 MG/DL (ref 65–140)
GLUCOSE SERPL-MCNC: 404 MG/DL (ref 65–140)
GLUCOSE UR STRIP-MCNC: ABNORMAL MG/DL
HCG SERPL QL: NEGATIVE
HCO3 BLDV-SCNC: 27 MMOL/L (ref 24–30)
HCT VFR BLD AUTO: 35.8 % (ref 34.8–46.1)
HGB BLD-MCNC: 11.8 G/DL (ref 11.5–15.4)
HGB UR QL STRIP.AUTO: ABNORMAL
IMM GRANULOCYTES # BLD AUTO: 0.04 THOUSAND/UL (ref 0–0.2)
IMM GRANULOCYTES NFR BLD AUTO: 1 % (ref 0–2)
INR PPP: 1.06 (ref 0.85–1.19)
KETONES UR STRIP-MCNC: NEGATIVE MG/DL
LACTATE SERPL-SCNC: 1.2 MMOL/L (ref 0.5–2)
LEUKOCYTE ESTERASE UR QL STRIP: ABNORMAL
LYMPHOCYTES # BLD AUTO: 3.57 THOUSANDS/ÂΜL (ref 0.6–4.47)
LYMPHOCYTES NFR BLD AUTO: 44 % (ref 14–44)
MAGNESIUM SERPL-MCNC: 1.7 MG/DL (ref 1.9–2.7)
MCH RBC QN AUTO: 28.6 PG (ref 26.8–34.3)
MCHC RBC AUTO-ENTMCNC: 33 G/DL (ref 31.4–37.4)
MCV RBC AUTO: 87 FL (ref 82–98)
MONOCYTES # BLD AUTO: 0.91 THOUSAND/ÂΜL (ref 0.17–1.22)
MONOCYTES NFR BLD AUTO: 12 % (ref 4–12)
NEUTROPHILS # BLD AUTO: 3.16 THOUSANDS/ÂΜL (ref 1.85–7.62)
NEUTS SEG NFR BLD AUTO: 40 % (ref 43–75)
NITRITE UR QL STRIP: NEGATIVE
NON-SQ EPI CELLS URNS QL MICRO: NORMAL /HPF
NRBC BLD AUTO-RTO: 0 /100 WBCS
O2 CT BLDV-SCNC: 13.8 ML/DL
PCO2 BLDV: 44.2 MM HG (ref 42–50)
PH BLDV: 7.4 [PH] (ref 7.3–7.4)
PH UR STRIP.AUTO: 6.5 [PH]
PLATELET # BLD AUTO: 317 THOUSANDS/UL (ref 149–390)
PMV BLD AUTO: 9.2 FL (ref 8.9–12.7)
PO2 BLDV: 46 MM HG (ref 35–45)
POTASSIUM SERPL-SCNC: 3.8 MMOL/L (ref 3.5–5.3)
PROT SERPL-MCNC: 7 G/DL (ref 6.4–8.4)
PROT UR STRIP-MCNC: NEGATIVE MG/DL
PROTHROMBIN TIME: 14 SECONDS (ref 12.3–15)
RBC # BLD AUTO: 4.13 MILLION/UL (ref 3.81–5.12)
RBC #/AREA URNS AUTO: NORMAL /HPF
SODIUM SERPL-SCNC: 135 MMOL/L (ref 135–147)
SP GR UR STRIP.AUTO: 1.04 (ref 1–1.03)
UROBILINOGEN UR STRIP-ACNC: <2 MG/DL
WBC # BLD AUTO: 7.89 THOUSAND/UL (ref 4.31–10.16)
WBC #/AREA URNS AUTO: NORMAL /HPF

## 2024-11-17 PROCEDURE — 85610 PROTHROMBIN TIME: CPT | Performed by: EMERGENCY MEDICINE

## 2024-11-17 PROCEDURE — 96374 THER/PROPH/DIAG INJ IV PUSH: CPT

## 2024-11-17 PROCEDURE — 85025 COMPLETE CBC W/AUTO DIFF WBC: CPT | Performed by: EMERGENCY MEDICINE

## 2024-11-17 PROCEDURE — 84703 CHORIONIC GONADOTROPIN ASSAY: CPT | Performed by: EMERGENCY MEDICINE

## 2024-11-17 PROCEDURE — 99284 EMERGENCY DEPT VISIT MOD MDM: CPT

## 2024-11-17 PROCEDURE — 36415 COLL VENOUS BLD VENIPUNCTURE: CPT | Performed by: EMERGENCY MEDICINE

## 2024-11-17 PROCEDURE — 96375 TX/PRO/DX INJ NEW DRUG ADDON: CPT

## 2024-11-17 PROCEDURE — 81001 URINALYSIS AUTO W/SCOPE: CPT | Performed by: EMERGENCY MEDICINE

## 2024-11-17 PROCEDURE — 81025 URINE PREGNANCY TEST: CPT | Performed by: EMERGENCY MEDICINE

## 2024-11-17 PROCEDURE — 80053 COMPREHEN METABOLIC PANEL: CPT | Performed by: EMERGENCY MEDICINE

## 2024-11-17 PROCEDURE — 82805 BLOOD GASES W/O2 SATURATION: CPT | Performed by: EMERGENCY MEDICINE

## 2024-11-17 PROCEDURE — 83605 ASSAY OF LACTIC ACID: CPT | Performed by: EMERGENCY MEDICINE

## 2024-11-17 PROCEDURE — 82010 KETONE BODYS QUAN: CPT | Performed by: EMERGENCY MEDICINE

## 2024-11-17 PROCEDURE — 96372 THER/PROPH/DIAG INJ SC/IM: CPT

## 2024-11-17 PROCEDURE — 74177 CT ABD & PELVIS W/CONTRAST: CPT

## 2024-11-17 PROCEDURE — 82948 REAGENT STRIP/BLOOD GLUCOSE: CPT

## 2024-11-17 PROCEDURE — 96361 HYDRATE IV INFUSION ADD-ON: CPT

## 2024-11-17 PROCEDURE — 85730 THROMBOPLASTIN TIME PARTIAL: CPT | Performed by: EMERGENCY MEDICINE

## 2024-11-17 PROCEDURE — 83735 ASSAY OF MAGNESIUM: CPT | Performed by: EMERGENCY MEDICINE

## 2024-11-17 PROCEDURE — 99285 EMERGENCY DEPT VISIT HI MDM: CPT | Performed by: EMERGENCY MEDICINE

## 2024-11-17 RX ORDER — ONDANSETRON 2 MG/ML
4 INJECTION INTRAMUSCULAR; INTRAVENOUS EVERY 6 HOURS PRN
Status: DISCONTINUED | OUTPATIENT
Start: 2024-11-17 | End: 2024-11-17 | Stop reason: HOSPADM

## 2024-11-17 RX ORDER — ONDANSETRON 4 MG/1
4 TABLET, FILM COATED ORAL EVERY 6 HOURS
Qty: 12 TABLET | Refills: 0 | Status: SHIPPED | OUTPATIENT
Start: 2024-11-17

## 2024-11-17 RX ORDER — KETOROLAC TROMETHAMINE 30 MG/ML
30 INJECTION, SOLUTION INTRAMUSCULAR; INTRAVENOUS ONCE
Status: COMPLETED | OUTPATIENT
Start: 2024-11-17 | End: 2024-11-17

## 2024-11-17 RX ORDER — FENTANYL CITRATE 50 UG/ML
50 INJECTION, SOLUTION INTRAMUSCULAR; INTRAVENOUS
Refills: 0 | Status: DISCONTINUED | OUTPATIENT
Start: 2024-11-17 | End: 2024-11-17

## 2024-11-17 RX ADMIN — FENTANYL CITRATE 50 MCG: 50 INJECTION INTRAMUSCULAR; INTRAVENOUS at 14:33

## 2024-11-17 RX ADMIN — KETOROLAC TROMETHAMINE 30 MG: 30 INJECTION, SOLUTION INTRAMUSCULAR; INTRAVENOUS at 18:33

## 2024-11-17 RX ADMIN — TRIMETHOBENZAMIDE HYDROCHLORIDE 200 MG: 100 INJECTION INTRAMUSCULAR at 18:31

## 2024-11-17 RX ADMIN — ONDANSETRON 4 MG: 2 INJECTION INTRAMUSCULAR; INTRAVENOUS at 14:34

## 2024-11-17 RX ADMIN — SODIUM CHLORIDE 1000 ML: 0.9 INJECTION, SOLUTION INTRAVENOUS at 14:20

## 2024-11-17 RX ADMIN — SODIUM CHLORIDE 1000 ML: 0.9 INJECTION, SOLUTION INTRAVENOUS at 17:07

## 2024-11-17 RX ADMIN — IOHEXOL 100 ML: 350 INJECTION, SOLUTION INTRAVENOUS at 16:22

## 2024-11-17 NOTE — ED NOTES
Per ED provider, ok to give ordered fentanyl prior to urine pregnancy test.     Silva Tinoco RN  11/17/24 0099

## 2024-11-17 NOTE — INCIDENTAL FINDINGS
Acute Care - Physical Therapy Initial Evaluation  [facility]     Patient Name: Magdi Arriaga  : 1944  MRN: [id number]  Today's Date: 2017   Onset of Illness/Injury or Date of Surgery Date: 17  Date of Referral to PT: 17  Referring Physician: REI Kemp      Admit Date: 2017     Visit Dx:    ICD-10-CM ICD-9-CM   1. Hypotension, unspecified hypotension type I95.9 458.9   2. Near syncope R55 780.2   3. Elevated LFTs R79.89 790.6   4. Ischemic cardiomyopathy, most recent LVEF 40% I25.5 414.8     Patient Active Problem List   Diagnosis   • Abnormal CT scan, chest   • Allergic rhinitis   • Asthma   • Bronchiectasis   • Dementia   • Depression   • Diastolic dysfunction   • Dyslipidemia   • Hypertension   • Obesity   • JONNY (obstructive sleep apnea)   • Osteoarthritis   • Vitamin D deficiency   • NSTEMI (non-ST elevated myocardial infarction)   • Left bundle branch block   • Normocytic anemia   • COPD (chronic obstructive pulmonary disease)   • Coronary artery disease involving coronary bypass graft of native heart without angina pectoris   • Ischemic cardiomyopathy, most recent LVEF 40%   • Atrial flutter   • Acute deep vein thrombosis (DVT) of right lower extremity   • Hematoma, calf   • History of exposure to infectious disease-Patient received platelet transfusion for CABG, 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   • Chronic congestive heart failure   • CKD (chronic kidney disease), stage III   • Cellulitis of left lower leg   • Elevated LFTs   • Hypotension     Past Medical History:   Diagnosis Date   • Abnormal finding on lung imaging      CT scan of the chest to be wary 2013 reports interstitial lung disease with marked diffuse interstitial and peripheral scarring and significant bronchiectasis in both upper   and lower lung zones.    • Allergic rhinitis     History of allergy injections as a child.   • Arthritis    • Asthma      Patient has had a history  The following findings require follow up:  Radiographic finding   Finding: Multiple prominent to mildly enlarged right lower abdominal mesenteric lymph nodes, a few of which appear necrotic centrally. These findings are new when compared to the prior exam. The findings are nonspecific and may be infectious, inflammatory or   neoplastic. Further evaluation with PET/CT scan or possible tissue sampling is suggested, as is a short-term follow-up enhanced CT abdomen/pelvis in 3 months. Metastatic disease cannot be excluded.     Normal appendix visualized.     Mild circumferential wall thickening of the visualized distal esophagus, similar to the prior exam. Small hiatal hernia.   Follow up required: yes     Follow up should be done within 2 week(s)    Please notify the following clinician to assist with the follow up:   Your family doctor      Incidental finding results were discussed with the Patient by Thelma Patrick DO on 11/17/24.   They expressed understanding and all questions answered.   of asthma since childhood.   • CHF (congestive heart failure) 2017    ECHO - EF 25%   • COPD (chronic obstructive pulmonary disease)    • Coronary artery disease 2017    CABG X 5   • DVT (deep venous thrombosis)     Bilateral   • Exposure to hepatitis B 2017    Transfusion - managed by ID   • Falls     A.  History of falling traumatic injuries in April 2014 and May 2014 resulting and left rib  fracture and left clavicle fracture.   • Fracture of rib    • History of chest x-ray 02/19/2016    Extensive, but stable postinflammatory pulmonary changes. There has been no change since the previous examination of 12/03/2015   • History of chest x-ray 12/03/2015    Slight increased markings bilaterally, may be progression of his bronchiectasis   • History of chest x-ray 09/06/2014    There has been no change since 09/05/2014   • History of echocardiogram 02/19/2016    Mild concentric LVH is observed.Estimated EF is 50-55%.E to A reversal in the mitral valve flow pattern suggestive of diastolic dysfunction.RV is mildly dilated.Moderate aortic cusp sclerosis is present.Mitral annular calcification.Mild MR.Evidence of borderline pulmonary hypertension.   • History of PFTs 12/03/2015    Values are slightly worse.TLC is c/w mild restriction.Values reduced from 02/16/13.Diffusion capacity is slightly worse   • History of PFTs 02/06/2013    Mild decrease in FVC, may be due to less maximal effort.TLC is within normal levels. Mild reduction in diffusion in absolute value   • History of transfusion    • Hypertension    • Lumbar spinal stenosis 2013    Spinal surgery   • Patellar tendon rupture     A.  Status post primary repair of the left periprosthetic patellar tendon tear 6/11/2014, post fall at the end of April 2014.   • Pneumonia      A.  Left lower lobe pneumonia treated at [facility], 5/8/2014 through 5/20/2014.   • Pneumothorax      A.  Status post chest tube placed 5/13/2014 and discontinued 5/15/2014 with stable chest x-ray.   •  Sleep apnea with use of continuous positive airway pressure (CPAP)    • Traumatic hemorrhagic shock      A.  Secondary to right renal retroperitoneal hemorrhage, 9/2014.     Past Surgical History:   Procedure Laterality Date   • APPENDECTOMY  1959   • CARDIAC CATHETERIZATION N/A 4/18/2017    Procedure: Left Heart Cath;  Surgeon: Kvng Stauffer MD;  Location:  [facility];  Service:    • COLONOSCOPY W/ POLYPECTOMY     • CORONARY ARTERY BYPASS GRAFT N/A 4/24/2017    Procedure: MEDIAN STERNOTOMY, CORONARY ARTERY BYPASS GRAFT X5 UTILIZING THE INTERNAL MAMMARY ARTERY, ENDOVASCULAR VEING HARVEST UTILIZING RIGHT SAPHENOUS VEIN,TRANSESOPHAGEAL ECHO;  Surgeon: Kanu Berger MD;  Location: [facility] OR;  Service:    • FRACTURE SURGERY     • KNEE SURGERY  2006, 2014    primary repair -left periprosthetic patellar tendon tear 6/11/2014   • LUMBAR LAMINECTOMY  2013    With discectomy   • REPLACEMENT TOTAL KNEE Bilateral 2009, 2013    Left - 2009 and right - 2013.   • SKIN BIOPSY     • TONSILLECTOMY  1957   • VENA CAVA FILTER PLACEMENT  2014          PT ASSESSMENT (last 72 hours)      PT Evaluation       07/20/17 0830 07/20/17 0800    Rehab Evaluation    Document Type evaluation  -DM     Subjective Information agree to therapy;no complaints  -DM     Patient Effort, Rehab Treatment excellent  -DM     Symptoms Noted During/After Treatment significant change in vital signs   signif decr. SBP  -DM     General Information    Patient Profile Review yes  -DM     Onset of Illness/Injury or Date of Surgery Date 07/17/17  -DM     Referring Physician REI Kemp  -DM     General Observations sup in bed;tele;RA; iv hep locked; gown wet (spilled H20);P.T. changed it;nsg in/gave mult meds   -DM     Pertinent History Of Current Problem 2 day h/o weakness, SOB, hypot, dizziness & low  BP (67/46 & s/p drinking fluids,was 79/60);wife brought to ER; DX:hypot, low HGB, elev LFT's (possibly shock to liver from hypot); h/o DEM,  MI 4/'17, CABG 4-24-17, DVT RLE(IVC filter),A flutt, low plts (given transfus.,w/ result. 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 LLE 5/'17;;completed OP card rehab   -DM     Precautions/Limitations cardiac precautions;sternal precautions;fall precautions   Dem.;hypot; +HBV; CABG 4/'17; HAS ivc filter RLE  -DM     Prior Level of Function independent:;gait;transfer;bed mobility;ADL's;mod assist:;home management;dependent:;cooking;cleaning;driving;shopping;yard work   indep gt w/ R wx (occ QC when out)  -DM     Equipment Currently Used at Home cane, quad;bipap/ cpap;walker, rolling;shower chair;grab bar;raised toilet   bars W.I. shower & at toilet ;uses CPAP @ night  -DM     Plans/Goals Discussed With patient;agreed upon  -DM     Risks Reviewed patient:;LOB;change in vital signs;lines disloged  -DM     Benefits Reviewed patient:;improve function;increase independence;increase strength;increase balance;increase knowledge  -DM     Barriers to Rehab cognitive status  -DM     Living Environment    Lives With spouse  -DM     Living Arrangements house  -DM     Home Accessibility grab bars present (bathtub);grab bars present (toilet);stairs to enter home   both types showers(uses W.I.);Moved bed  to [facility] Fl.temporrar  -DM     Number of Stairs to Enter Home 3  -DM     Stair Railings at Home none  -DM     Type of Financial/Environmental Concern none  -DM     Transportation Available car;family or friend will provide  -DM     Clinical Impression    Date of Referral to PT 07/19/17  -DM     PT Diagnosis impaired funct mobil.  -DM     Criteria for Skilled Therapeutic Interventions Met yes;treatment indicated  -DM     Pathology/Pathophysiology Noted (Describe Specifically for Each System) cardiovascular  -DM     Impairments Found (describe specific impairments) gait, locomotion, and balance  -DM     Rehab Potential good, to achieve stated therapy goals  -DM     Vital Signs    Pre Systolic  BP Rehab 178  -DM     Pre Treatment Diastolic BP 82  -DM     Intra Systolic BP Rehab 138  -DM     Intra Treatment Diastolic BP 84  -DM     Post Systolic BP Rehab 142  -DM     Post Treatment Diastolic BP 83  -DM     Pretreatment Heart Rate (beats/min) 65  -DM     Intratreatment Heart Rate (beats/min) 79   occ PVC's  -DM     Posttreatment Heart Rate (beats/min) 71  -DM     Pre SpO2 (%) 95  -DM     O2 Delivery Pre Treatment room air  -DM     Intra SpO2 (%) 92  -DM     O2 Delivery Intra Treatment room air  -DM     Post SpO2 (%) 93  -DM     O2 Delivery Post Treatment room air  -DM     Pre Patient Position Supine  -DM     Intra Patient Position Standing  -DM     Post Patient Position Sitting  -DM     Pain Assessment    Pain Assessment No/denies pain  -DM     Vision Assessment/Intervention    Visual Impairment WFL with corrective lenses  -DM     Cognitive Assessment/Intervention    Current Cognitive/Communication Assessment impaired  -DM     Orientation Status oriented to;person;place;time  -DM     Follows Commands/Answers Questions 100% of the time;able to follow single-step instructions;needs cueing;needs increased time  -DM     Personal Safety mild impairment;decreased awareness, need for assist;decreased awareness, need for safety;decreased insight to deficits  -DM     Personal Safety Interventions fall prevention program maintained;gait belt;nonskid shoes/slippers when out of bed  -DM     ROM (Range of Motion)    General ROM upper extremity range of motion deficits identified   B shldrs limited 25% by sternal precaut.  -DM     MMT (Manual Muscle Testing)    General MMT Assessment upper extremity strength deficits identified;lower extremity strength deficits identified   B shldrs grossly 3-/5 d/t sternal precaut.;Bquads 4+/5  -DM     Muscle Tone Assessment    Muscle Tone Assessment  Bilateral Upper Extremities;Bilateral Lower Extremities  -MIGUEL    Bilateral Upper Extremities Muscle Tone Assessment  associated movements  noted  -MIGUEL    Bilateral Lower Extremities Muscle Tone Assessment  associated movements noted  -MIUGEL    Bed Mobility, Assessment/Treatment    Bed Mobility, Assistive Device bed rails;head of bed elevated  -DM     Bed Mobility, Roll Left, [place] conditional independence  -DM     Bed Mobility, Scoot/Bridge, [place] conditional independence  -DM     Bed Mob, Supine to Sit, [place] conditional independence  -DM     Bed Mobility, Impairments strength decreased;postural control impaired   LOB post x 2;CGA to recover   -DM     Bed Mobility, Comment cues for sternal precaut. & hand placement  -DM     Transfer Assessment/Treatment    Transfers, Sit-Stand [place] contact guard assist;verbal cues required  -DM     Transfers, Stand-Sit [place] minimum assist (75% patient effort);verbal cues required   knees giving way upon descent to recliner  -DM     Transfers, Sit-Stand-Sit, Assist Device rolling walker  -DM     Transfer, Safety Issues weight-shifting ability decreased;knees buckling  -DM     Transfer, Impairments strength decreased;impaired balance  -DM     Transfer, Comment cues for hand placement  -DM     Gait Assessment/Treatment    Gait, [place] Level contact guard assist;verbal cues required   followed w/ recliner; 2 stand. rests(decl. sit.rest)  -DM     Gait, Assistive Device rolling walker  -DM     Gait, Distance (Feet) 180  -DM     Gait, Gait Pattern Analysis swing-to gait  -DM     Gait, Gait Deviations kasi decreased;decreased heel strike;forward flexed posture;narrow base;step length decreased;weight-shifting ability decreased  -DM     Gait, Safety Issues step length decreased  -DM     Gait, Impairments strength decreased;impaired balance;postural control impaired  -DM     Gait, Comment cues for incr. step length,trunk ext/focusing ahead,PLB   narrow PIPER & sl. LOB on turns  -DM     Motor Skills/Interventions    Additional Documentation Balance Skills Training (Group)  -DM     Balance  Skills Training    Sitting-Level of Assistance Contact guard  -DM     Sitting-Balance Support Feet supported  -DM     Sitting-Balance Activities Trunk control activities  -DM     Sitting # of Minutes 4  -DM     Standing-Level of Assistance Contact guard  -DM     Static Standing Balance Support assistive device  -DM     Standing-Balance Activities Weight Shift A-P;Weight Shift R-L   MIP; trunk ext/gazing ahead in mirror  -DM     Standing Balance # of Minutes 2  -DM     Gait Balance-Level of Assistance Contact guard  -DM     Gait Balance Support assistive device  -DM     Gait Balance Activities side-stepping  -DM     Therapy Exercises    Bilateral Lower Extremities AROM:;10 reps;sitting;standing;ankle pumps/circles;glut sets;heel slides;hip abduction/adduction;hip ER;hip flexion;hip IR;LAQ;quad sets  -DM     Bilateral Upper Extremity AROM:;10 reps;sitting;elbow flexion/extension;shoulder abduction/adduction;shoulder extension/flexion  -DM     Positioning and Restraints    Pre-Treatment Position in bed  -DM     Post Treatment Position chair  -DM     In Chair notified nsg;reclined;call light within reach;encouraged to call for assist;with other staff;legs elevated   PCT in for stand.wt(P.T. assist.on/off scale)  -DM       07/19/17 2000 07/19/17 0800    Muscle Tone Assessment    Muscle Tone Assessment Bilateral Upper Extremities;Bilateral Lower Extremities  -WB Bilateral Upper Extremities;Bilateral Lower Extremities  -CH    Bilateral Upper Extremities Muscle Tone Assessment associated movements noted  -WB associated movements noted  -CH    Bilateral Lower Extremities Muscle Tone Assessment associated movements noted  -WB associated movements noted  -CH      07/18/17 2000 07/18/17 0959    General Information    Equipment Currently Used at Home  bipap/ cpap;cane, quad;walker, rolling  -SP    Living Environment    Lives With  spouse  -SP    Living Arrangements  house  -SP    Home Accessibility  no concerns  -SP    Stair  Railings at Home  none  -SP    Type of Financial/Environmental Concern  none  -SP    Transportation Available  car;family or friend will provide  -SP    Muscle Tone Assessment    Muscle Tone Assessment Bilateral Upper Extremities;Bilateral Lower Extremities  -DF     Bilateral Upper Extremities Muscle Tone Assessment associated movements noted  -DF     Bilateral Lower Extremities Muscle Tone Assessment associated movements noted  -DF       07/18/17 0800 07/17/17 2032    Muscle Tone Assessment    Muscle Tone Assessment Bilateral Upper Extremities;Bilateral Lower Extremities  -CF Bilateral Upper Extremities;Bilateral Lower Extremities  -JS    Bilateral Upper Extremities Muscle Tone Assessment associated movements noted  -CF associated movements noted  -JS    Bilateral Lower Extremities Muscle Tone Assessment associated movements noted  -CF associated movements noted  -JS      07/17/17 1800       General Information    Equipment Currently Used at Home walker, standard  -KI     Living Environment    Lives With spouse  -KI     Living Arrangements house  -KI     Home Accessibility no concerns  -KI     Stair Railings at Home inside, present on left side  -KI     Type of Financial/Environmental Concern none  -KI     Transportation Available car  -KI       User Key  (r) = Recorded By, (t) = Taken By, (c) = Cosigned By    Initials Name Provider Type    DM Loretta Canela, PT Physical Therapist    SP Tracee Tatum, YURI Case Manager    MIGUEL Linda Patino, RN Registered Nurse    MANFRED Rosas LPN Licensed Nurse    RAVINDER Jones, RN Registered Nurse    PAULINA Garcia, RN Registered Nurse    ADOLFO Robertson, RN Registered Nurse    ALEXANDRA Payne, RN Registered Nurse    WB Tere Schneider, RN Registered Nurse          Physical Therapy Education     Title: PT OT SLP Therapies (Active)     Topic: Physical Therapy (Active)     Point: Mobility training (Active)    Learning Progress Summary    Learner  Readiness Method Response Comment Documented by Status   Patient SAYRA Guevara NR  DM 07/20/17 1012 Active               Point: Home exercise program (Active)    Learning Progress Summary    Learner Readiness Method Response Comment Documented by Status   Patient SAYRA Guevara NR  DM 07/20/17 1012 Active               Point: Body mechanics (Active)    Learning Progress Summary    Learner Readiness Method Response Comment Documented by Status   Patient SAYRA Guevara NR  DM 07/20/17 1012 Active               Point: Precautions (Active)    Learning Progress Summary    Learner Readiness Method Response Comment Documented by Status   Patient SAYRA Guevara NR  DM 07/20/17 1012 Active                      User Key     Initials Effective Dates Name Provider Type Discipline    DM 06/19/15 -  Loretta Canela, PT Physical Therapist PT                PT Recommendation and Plan  Anticipated Discharge Disposition: home with home health  PT Frequency: daily  Plan of Care Review  Plan Of Care Reviewed With: patient  Progress: progress toward functional goals as expected  Outcome Summary/Follow up Plan: Adm w/ hypot, elev LFT's, & recent CABG/+HBV dx/RLE DVT/IVC filter; presents w/ evolving sympt, incl. low HGB (decr from 10.3  at adm to 10.1 yest), low plts, weakness/fatigue w/ mobil. & signif decr. SBP w/ gt ; able to amb. 180 ft w/ R wx but unable to attempt 3 stairs this session; recommend HH f/u to meet all mobil.goals & ret. to PLOF             IP PT Goals       07/20/17 1013          Bed Mobility PT LTG    Bed Mobility PT LTG, Date Established 07/20/17  -DM      Bed Mobility PT LTG, Time to Achieve 5 days  -DM      Bed Mobility PT LTG, Activity Type all bed mobility  -DM      Bed Mobility PT LTG, [place] Level independent  -DM      Transfer Training PT LTG    Transfer Training PT LTG, Date Established 07/20/17  -DM      Transfer Training PT LTG, Time to Achieve 5 days  -DM      Transfer Training PT LTG, Activity Type bed to chair /chair  to bed;sit to stand/stand to sit;toilet  -DM      Transfer Training PT LTG, Falls Level independent  -DM      Transfer Training PT LTG, Assist Device walker, rolling  -DM      Gait Training PT LTG    Gait Training Goal PT LTG, Date Established 07/20/17  -DM      Gait Training Goal PT LTG, Time to Achieve 5 days  -DM      Gait Training Goal PT LTG, Falls Level independent  -DM      Gait Training Goal PT LTG, Assist Device walker, rolling   stable VS  -DM      Gait Training Goal PT LTG, Distance to Achieve 300  -DM      Stair Training PT LTG    Stair Training Goal PT LTG, Date Established 07/20/17  -DM      Stair Training Goal PT LTG, Time to Achieve 5 days  -DM      Stair Training Goal PT LTG, Number of Steps 3  -DM      Stair Training Goal PT LTG, Falls Level supervision required   stable VS  -DM      Stair Training Goal PT LTG, Assist Device cane, quad  -DM        User Key  (r) = Recorded By, (t) = Taken By, (c) = Cosigned By    Initials Name Provider Type    MAJO Canela, PT Physical Therapist                Outcome Measures       07/20/17 0830          How much help from another person do you currently need...    Turning from your back to your side while in flat bed without using bedrails? 4  -DM      Moving from lying on back to sitting on the side of a flat bed without bedrails? 4  -DM      Moving to and from a bed to a chair (including a wheelchair)? 3  -DM      Standing up from a chair using your arms (e.g., wheelchair, bedside chair)? 3  -DM      Climbing 3-5 steps with a railing? 3  -DM      To walk in hospital room? 3  -DM      AM-PAC 6 Clicks Score 20  -DM      Functional Assessment    Outcome Measure Options AM-PAC 6 Clicks Basic Mobility (PT)  -DM        User Key  (r) = Recorded By, (t) = Taken By, (c) = Cosigned By    Initials Name Provider Type    MAJO Canela, PT Physical Therapist           Time Calculation:         PT Charges       07/20/17 1023          Time  Calculation    Start Time 0830  -DM      PT Received On 07/20/17  -DM      PT Goal Re-Cert Due Date 07/30/17  -DM      Time Calculation- PT    Total Timed Code Minutes- PT 37 minute(s)  -DM        User Key  (r) = Recorded By, (t) = Taken By, (c) = Cosigned By    Initials Name Provider Type    DM Loretta Canela, PT Physical Therapist          Therapy Charges for Today     Code Description Service Date Service Provider Modifiers Qty    75902327188 HC PT EVAL MOD COMPLEXITY 4 7/20/2017 Loretta Canela, PT GP 1    38137571575 HC PT THER PROC EA 15 MIN 7/20/2017 Loretta Canela, PT GP 1    39647173446 HC GAIT TRAINING EA 15 MIN 7/20/2017 Loretta Canela, PT GP 1    04216435001 HC PT MOBILITY CURRENT 7/20/2017 Loretta Canela, PT GP, CJ 1    29773048608 HC PT MOBILITY PROJECTED 7/20/2017 Loretta Canela, PT GP, CI 1          PT G-Codes  PT Professional Judgement Used?: Yes  Outcome Measure Options: AM-PAC 6 Clicks Basic Mobility (PT)  Score: 20  Functional Limitation: Mobility: Walking and moving around  Mobility: Walking and Moving Around Current Status (): At least 20 percent but less than 40 percent impaired, limited or restricted  Mobility: Walking and Moving Around Goal Status (): At least 1 percent but less than 20 percent impaired, limited or restricted      Loretta Canela, PT  7/20/2017

## 2024-11-17 NOTE — DISCHARGE INSTRUCTIONS
As discussed, your lab work did not reveal any evidence of infection.  Your glucose was markedly high in the 400s  Please call your family doctor for closer diabetic control  On your CAT scan it did show some lymph nodes in your right abdomen that were necrotic.  This can be infectious, inflammatory or unknown etiology that you need to follow-up with your family doctor  You will need a repeat of the CAT scan in 3 months performed by her family doctor

## 2024-11-17 NOTE — ED PROVIDER NOTES
Time reflects when diagnosis was documented in both MDM as applicable and the Disposition within this note       Time User Action Codes Description Comment    2024  6:18 PM TristianThelma armas L Add [R10.9] Abdominal pain     2024  6:18 PM Bendock, Thelma L Add [R59.1] Lymphadenopathy     2024  6:21 PM Bendock, Thelma L Add [R11.2] Nausea and vomiting     2024  6:58 PM Bendock, Thelma L Add [E86.0] Dehydration           ED Disposition       ED Disposition   Discharge    Condition   Stable    Date/Time   Sun 2024  6:18 PM    Comment   Tari Shannon discharge to home/self care.                   Assessment & Plan       Medical Decision Making      Differential diagnosis includes but not limited to:  Appendicitis, viral syndrome, constipation, AMI, NSTEMI, pneumonia, pneuothorax, gerd, gastritis,  mesenteric ischemia, mesenteric adenitis, pancreatitis, cholecystitis, choledocholithiasis, hepatitis, bowel obstruction, ileus, gastroenteritis, colitis, malignancy, AAA, perforation, toxicologic poisoning, renal infarct, acute kidney injury, splenic infarct, splenic injury, nephrolithiasis, UTI, muscular strain, intra-abdominal hematoma, hernia, ovarian cyst, ovarian torsion, ectopic pregnancy, rectal prolapse, pain no rectal fistula, a no rectal fissures, hemorrhoids, perirectal abscess, threatened , PID, abruption, molar pregnancy, dislodged IUD, fibroid uterus, salpingitis, tubo-ovarian abscess, dysmenorrhea, cervicitis,    Will check labs to evaluate for electrolyte abnormality, LEX, anemia, significant leukocytosis, pancreatitis, hepatitis and biliary etiology. Will also check urine for pregnancy and UTI.  Will also check CT scan to rule out intra-abdominal pathology      Amount and/or Complexity of Data Reviewed  External Data Reviewed: notes.     Details:     Chart review does show that patient did have a telemedicine visit in August with [facility] for her chronic  "dysphagia this been ongoing for over a year.  Per documentation EGD at [facility] was normal and negative for EOE.  Esophageal manometry \"findings most consistent with nonspecific esophageal dysmotility\"    Per documentation patient is scheduled for EGD with [facility]      Labs: ordered. Decision-making details documented in ED Course.     Details:   No anemia, thrombocytopenia or leukocytosis  No acute kidney injury or electrolyte abnormality except for glucose greater than 400.  No anion gap acidosis  Lactic acid within normal range  pH normal at 7.4  Coags within normal range  Beta hydroxybutyrate undetectable  Qualitative pregnancy negative    Radiology: ordered. Decision-making details documented in ED Course.     Details:     CT of abdomen pelvis interpreted by radiologist as No acute findings in the abdomen or pelvis.     Multiple prominent to mildly enlarged right lower abdominal mesenteric lymph nodes, a few of which appear necrotic centrally. These findings are new when compared to the prior exam. The findings are nonspecific and may be infectious, inflammatory or   neoplastic. Further evaluation with PET/CT scan or possible tissue sampling is suggested, as is a short-term follow-up enhanced CT abdomen/pelvis in 3 months. Metastatic disease cannot be excluded.     Normal appendix visualized.     Mild circumferential wall thickening of the visualized distal esophagus, similar to the prior exam. Small hiatal hernia.        Risk  Prescription drug management.        ED Course as of 11/17/24 1951   Sun Nov 17, 2024   1654 Patient is a 44-year-old female coming in today with abdominal pain that is progressively worsening over the past 5 days.  On exam she appears uncomfortable nonperitoneal with right lower quadrant, right upper quadrant and suprapubic discomfort.  Will obtain urine, urine pregnancy, CBC CMP lactic acid Pro-Jose Manuel and lipase.  Will also give Zofran and fentanyl as needed for pain control.  Will CT " "scan.    Disclosure: Voice to text software was used in the preparation of this document and could have resulted in translational errors.      Occasional wrong word or \"sound a like\" substitutions may have occurred due to the inherent limitations of voice recognition software.  Read the chart carefully and recognize, using context, where substitutions have occurred.       I have independently reviewed external records are available to me to the level of detail possible within the time constraints of my patient care responsibilities in the ED.       1443 Ph 7.4 patient with documented poorly controlled DM.        1443 Blood gas, venous(!)   1443 CBC and differential(!)   1515 Noted glucose 404.  No anion gap acidosis.  Will add on beta quant.   1515 Comprehensive metabolic panel(!!)  Noted last hemoglobin A1c in May 2024 was 12       1616 hCG, qualitative pregnancy  Negative       1636 Pending beta hydroxybutyrate.  Will give second liter IV fluid while waiting for CT results.  Will check fingerstick glucose as well       1646 Beta Hydroxybutyrate  Non detectable       1647 Given patient's history and physical including lab work unlikely and HHNK and DKA.  Pending CT results as well as urine       1706 Fingerstick glucose 281.  Pending CT results as well as urine       1749 No acute findings in the abdomen or pelvis.     Multiple prominent to mildly enlarged right lower abdominal mesenteric lymph nodes, a few of which appear necrotic centrally. These findings are new when compared to the prior exam. The findings are nonspecific and may be infectious, inflammatory or   neoplastic. Further evaluation with PET/CT scan or possible tissue sampling is suggested, as is a short-term follow-up enhanced CT abdomen/pelvis in 3 months. Metastatic disease cannot be excluded.     Normal appendix visualized.     Mild circumferential wall thickening of the visualized distal esophagus, similar to the prior exam. Small hiatal " hernia.         1807 Patient resting in bed.  Patient states that she still has abdominal pain.  She has no evidence of septicemia or evidence of metastatic disease based on CT scan.  She has no evidence of HHNK or DKA.  Pending urine.  Will give Toradol and Tigan for symptomatic control       1857 UA (URINE) with reflex to Scope(!)  Urine with blood but no evidence of UTI    No ketones in urine           Medications   ondansetron (ZOFRAN) injection 4 mg (4 mg Intravenous Given 11/17/24 1434)   sodium chloride 0.9 % bolus 1,000 mL (0 mL Intravenous Stopped 11/17/24 1652)   iohexol (OMNIPAQUE) 350 MG/ML injection (MULTI-DOSE) 100 mL (100 mL Intravenous Given 11/17/24 1622)   sodium chloride 0.9 % bolus 1,000 mL (0 mL Intravenous Stopped 11/17/24 1943)   ketorolac (TORADOL) injection 30 mg (30 mg Intravenous Given 11/17/24 1833)   trimethobenzamide (TIGAN) IM injection 200 mg (200 mg Intramuscular Given 11/17/24 1831)       ED Risk Strat Scores                           SBIRT 22yo+      Flowsheet Row Most Recent Value   Initial Alcohol Screen: US AUDIT-C     1. How often do you have a drink containing alcohol? 0 Filed at: 11/17/2024 1410   2. How many drinks containing alcohol do you have on a typical day you are drinking?  0 Filed at: 11/17/2024 1410   3a. Male UNDER 65: How often do you have five or more drinks on one occasion? 0 Filed at: 11/17/2024 1410   3b. FEMALE Any Age, or MALE 65+: How often do you have 4 or more drinks on one occassion? 0 Filed at: 11/17/2024 1410   Audit-C Score 0 Filed at: 11/17/2024 1410   FAMILIA: How many times in the past year have you...    Used an illegal drug or used a prescription medication for non-medical reasons? Never Filed at: 11/17/2024 1410                            History of Present Illness       Chief Complaint   Patient presents with    Abdominal Pain     Reports abdominal pain x5days. C/o nausea        Past Medical History:   Diagnosis Date    Diabetes mellitus (HCC)      "Migraine headache     Vertigo, aural, unspecified laterality       Past Surgical History:   Procedure Laterality Date    COLONOSCOPY      HERNIA REPAIR      TUBAL LIGATION  2017    UPPER GASTROINTESTINAL ENDOSCOPY        Family History   Problem Relation Age of Onset    Hypertension Mother     Arthritis Mother     Asthma Sister     Bipolar disorder Brother     Schizophrenia Brother     Asthma Brother     Asthma Brother     Diabetes Maternal Grandmother     Diabetes Paternal Grandmother     No Known Problems Maternal Grandfather     No Known Problems Paternal Grandfather     Breast cancer Neg Hx       Social History     Tobacco Use    Smoking status: Never    Smokeless tobacco: Never   Vaping Use    Vaping status: Never Used   Substance Use Topics    Alcohol use: Not Currently     Comment: ocassionally    Drug use: Never      E-Cigarette/Vaping    E-Cigarette Use Never User       E-Cigarette/Vaping Substances    Nicotine No     THC No     CBD No     Flavoring No     Other No     Unknown No       I have reviewed and agree with the history as documented.     Patient is a 44-year-old female with a history of diabetes, migraines and \"esophagus problem\" coming in today with 5 days of worsening pain.  Patient states that the pain started and is into the right lower quadrant.  She reports that it is progressively worsened.  She has persistent nausea and vomiting and cannot keep anything down.  She has no fevers or chills.  She has no diarrhea or melena.  She denies any bright red blood per rectum, dysuria, urinary frequency or urgency.  She is never had abdominal surgeries except for hernia repair years ago.  She has no trauma to her abdomen.  She reports that she is due for a procedure to esophagus at [facility] in December.  She has no recent travel, sick contacts or recent antibiotic use.      History provided by:  Medical records and patient   used: No    Abdominal Pain  Pain location:  RUQ, RLQ, " suprapubic and periumbilical  Pain quality: aching, cramping, fullness, pressure, sharp and stabbing    Pain severity:  Moderate  Onset quality:  Gradual  Duration:  5 days  Timing:  Constant  Progression:  Worsening  Chronicity:  New  Context: not alcohol use, not awakening from sleep, not diet changes, not eating, not laxative use, not medication withdrawal, not previous surgeries, not recent illness, not recent sexual activity, not recent travel, not retching, not sick contacts, not suspicious food intake and not trauma    Relieved by:  Nothing  Worsened by:  Movement and eating  Ineffective treatments:  None tried  Associated symptoms: anorexia, fatigue and nausea    Associated symptoms: no belching, no chest pain, no chills, no constipation, no cough, no diarrhea, no dysuria, no fever, no flatus, no hematemesis, no hematochezia, no hematuria, no melena, no shortness of breath, no sore throat and no vomiting    Risk factors: obesity    Risk factors: no alcohol abuse, no aspirin use, not elderly, has not had multiple surgeries, no NSAID use, not pregnant and no recent hospitalization        Review of Systems   Constitutional:  Positive for fatigue. Negative for chills and fever.   HENT: Negative.  Negative for ear pain and sore throat.    Eyes: Negative.  Negative for pain and visual disturbance.   Respiratory: Negative.  Negative for cough and shortness of breath.    Cardiovascular: Negative.  Negative for chest pain and palpitations.   Gastrointestinal:  Positive for abdominal pain, anorexia and nausea. Negative for constipation, diarrhea, flatus, hematemesis, hematochezia, melena and vomiting.   Genitourinary: Negative.  Negative for dysuria and hematuria.   Musculoskeletal: Negative.  Negative for arthralgias and back pain.   Skin:  Negative for color change and rash.   Neurological: Negative.  Negative for seizures and syncope.   Hematological: Negative.    Psychiatric/Behavioral: Negative.     All other  systems reviewed and are negative.          Objective       ED Triage Vitals   Temperature Pulse Blood Pressure Respirations SpO2 Patient Position - Orthostatic VS   11/17/24 1402 11/17/24 1402 11/17/24 1402 11/17/24 1402 11/17/24 1402 11/17/24 1402   98.8 °F (37.1 °C) 85 146/78 16 98 % Sitting      Temp Source Heart Rate Source BP Location FiO2 (%) Pain Score    11/17/24 1402 11/17/24 1402 11/17/24 1402 -- 11/17/24 1433    Oral Monitor Right arm  8      Vitals      Date and Time Temp Pulse SpO2 Resp BP Pain Score FACES Pain Rating User   11/17/24 1915 -- 66 100 % 16 155/92 6 -- TMS   11/17/24 1833 -- -- -- -- -- 7 -- JL   11/17/24 1829 -- 69 100 % 16 131/64 -- -- JL   11/17/24 1648 -- 70 100 % -- 131/67 8 -- HI   11/17/24 1433 -- -- -- -- -- 8 -- EK   11/17/24 1402 98.8 °F (37.1 °C) 85 98 % 16 146/78 -- -- KH            Physical Exam  Vitals and nursing note reviewed.   Constitutional:       General: She is awake. She is not in acute distress.     Appearance: Normal appearance. She is well-developed and overweight.   HENT:      Head: Normocephalic and atraumatic.      Right Ear: External ear normal.      Left Ear: External ear normal.      Mouth/Throat:      Mouth: Mucous membranes are moist.   Eyes:      Extraocular Movements: Extraocular movements intact.      Conjunctiva/sclera: Conjunctivae normal.      Pupils: Pupils are equal, round, and reactive to light.   Neck:      Trachea: Trachea and phonation normal.   Cardiovascular:      Rate and Rhythm: Normal rate and regular rhythm.      Pulses:           Radial pulses are 2+ on the right side and 2+ on the left side.        Dorsalis pedis pulses are 2+ on the right side.      Heart sounds: Normal heart sounds, S1 normal and S2 normal. No murmur heard.  Pulmonary:      Effort: Pulmonary effort is normal. No respiratory distress.      Breath sounds: Normal breath sounds.   Abdominal:      General: Bowel sounds are normal.      Palpations: Abdomen is soft.       Tenderness: There is abdominal tenderness in the right upper quadrant, right lower quadrant and suprapubic area. There is guarding. There is no rebound. Negative signs include Riggins's sign, Rovsing's sign and McBurney's sign.   Musculoskeletal:         General: No swelling.      Cervical back: Normal range of motion and neck supple. No rigidity.      Right lower leg: No edema.      Left lower leg: No edema.   Skin:     General: Skin is warm and dry.      Capillary Refill: Capillary refill takes less than 2 seconds.   Neurological:      General: No focal deficit present.      Mental Status: She is alert and oriented to person, place, and time.      GCS: GCS eye subscore is 4. GCS verbal subscore is 5. GCS motor subscore is 6.      Cranial Nerves: Cranial nerves 2-12 are intact.      Sensory: Sensation is intact.      Motor: Motor function is intact.      Coordination: Coordination is intact.   Psychiatric:         Mood and Affect: Mood normal.         Behavior: Behavior is cooperative.         Results Reviewed       Procedure Component Value Units Date/Time    Urine Microscopic [[id number]]  (Normal) Collected: 11/17/24 1829    Lab Status: Final result Specimen: Urine, Other Updated: 11/17/24 1856     RBC, UA 1-2 /hpf      WBC, UA 1-2 /hpf      Epithelial Cells Occasional /hpf      Bacteria, UA None Seen /hpf     UA (URINE) with reflex to Scope [[id number]]  (Abnormal) Collected: 11/17/24 1829    Lab Status: Final result Specimen: Urine, Other Updated: 11/17/24 1855     Color, UA Colorless     Clarity, UA Clear     Specific Gravity, UA 1.043     pH, UA 6.5     Leukocytes, UA Elevated glucose may cause decreased leukocyte values. See urine microscopic for UWBC result     Nitrite, UA Negative     Protein, UA Negative mg/dl      Glucose, UA >=1000 (1%) mg/dl      Ketones, UA Negative mg/dl      Urobilinogen, UA <2.0 mg/dl      Bilirubin, UA Negative     Occult Blood, UA Moderate    POCT pregnancy, urine [[id number]]   (Normal) Collected: 11/17/24 1833    Lab Status: Final result Updated: 11/17/24 1833     EXT Preg Test, Ur Negative     Control Valid    Fingerstick Glucose (POCT) [[id number]]  (Abnormal) Collected: 11/17/24 1648    Lab Status: Final result Specimen: Blood Updated: 11/17/24 1648     POC Glucose 281 mg/dl     Beta Hydroxybutyrate [[id number]]  (Normal) Collected: 11/17/24 1428    Lab Status: Final result Specimen: Blood from Arm, Right Updated: 11/17/24 1643     Beta- Hydroxybutyrate <0.05 mmol/L     hCG, qualitative pregnancy [[id number]]  (Normal) Collected: 11/17/24 1428    Lab Status: Final result Specimen: Blood from Arm, Right Updated: 11/17/24 1610     Preg, Serum Negative    Comprehensive metabolic panel [[id number]]  (Abnormal) Collected: 11/17/24 1428    Lab Status: Final result Specimen: Blood from Arm, Right Updated: 11/17/24 1512     Sodium 135 mmol/L      Potassium 3.8 mmol/L      Chloride 102 mmol/L      CO2 26 mmol/L      ANION GAP 7 mmol/L      BUN 8 mg/dL      Creatinine 0.85 mg/dL      Glucose 404 mg/dL      Calcium 8.9 mg/dL      Corrected Calcium 9.4 mg/dL      AST 11 U/L      ALT 10 U/L      Alkaline Phosphatase 84 U/L      Total Protein 7.0 g/dL      Albumin 3.4 g/dL      Total Bilirubin 0.33 mg/dL      eGFR 84 ml/min/1.73sq m     Narrative:      National Kidney Disease Foundation guidelines for Chronic Kidney Disease (CKD):     Stage 1 with normal or high GFR (GFR > 90 mL/min/1.73 square meters)    Stage 2 Mild CKD (GFR = 60-89 mL/min/1.73 square meters)    Stage 3A Moderate CKD (GFR = 45-59 mL/min/1.73 square meters)    Stage 3B Moderate CKD (GFR = 30-44 mL/min/1.73 square meters)    Stage 4 Severe CKD (GFR = 15-29 mL/min/1.73 square meters)    Stage 5 End Stage CKD (GFR <15 mL/min/1.73 square meters)  Note: GFR calculation is accurate only with a steady state creatinine    Magnesium [[id number]]  (Abnormal) Collected: 11/17/24 1428    Lab Status: Final result Specimen: Blood from Arm, Right  Updated: 11/17/24 1508     Magnesium 1.7 mg/dL     Lactic acid, plasma (w/reflex if result > 2.0) [[id number]]  (Normal) Collected: 11/17/24 1428    Lab Status: Final result Specimen: Blood from Arm, Right Updated: 11/17/24 1507     LACTIC ACID 1.2 mmol/L     Narrative:      Result may be elevated if tourniquet was used during collection.    Protime-INR [[id number]]  (Normal) Collected: 11/17/24 1428    Lab Status: Final result Specimen: Blood from Arm, Right Updated: 11/17/24 1456     Protime 14.0 seconds      INR 1.06    Narrative:      INR Therapeutic Range    Indication                                             INR Range      Atrial Fibrillation                                               2.0-3.0  Hypercoagulable State                                    2.0.2.3  Left Ventricular Asist Device                            2.0-3.0  Mechanical Heart Valve                                  -    Aortic(with afib, MI, embolism, HF, LA enlargement,    and/or coagulopathy)                                     2.0-3.0 (2.5-3.5)     Mitral                                                             2.5-3.5  Prosthetic/Bioprosthetic Heart Valve               2.0-3.0  Venous thromboembolism (VTE: VT, PE        2.0-3.0    APTT [[id number]]  (Normal) Collected: 11/17/24 1428    Lab Status: Final result Specimen: Blood from Arm, Right Updated: 11/17/24 1456     PTT 32 seconds     CBC and differential [[id number]]  (Abnormal) Collected: 11/17/24 1428    Lab Status: Final result Specimen: Blood from Arm, Right Updated: 11/17/24 1443     WBC 7.89 Thousand/uL      RBC 4.13 Million/uL      Hemoglobin 11.8 g/dL      Hematocrit 35.8 %      MCV 87 fL      MCH 28.6 pg      MCHC 33.0 g/dL      RDW 11.9 %      MPV 9.2 fL      Platelets 317 Thousands/uL      nRBC 0 /100 WBCs      Segmented % 40 %      Immature Grans % 1 %      Lymphocytes % 44 %      Monocytes % 12 %      Eosinophils Relative 2 %      Basophils Relative 1 %      Absolute  Neutrophils 3.16 Thousands/µL      Absolute Immature Grans 0.04 Thousand/uL      Absolute Lymphocytes 3.57 Thousands/µL      Absolute Monocytes 0.91 Thousand/µL      Eosinophils Absolute 0.17 Thousand/µL      Basophils Absolute 0.04 Thousands/µL     Blood gas, venous [[id number]]  (Abnormal) Collected: 11/17/24 1428    Lab Status: Final result Specimen: Blood from Arm, Right Updated: 11/17/24 1443     pH, Roe 7.403     pCO2, Roe 44.2 mm Hg      pO2, Roe 46.0 mm Hg      HCO3, Roe 27.0 mmol/L      Base Excess, Roe 1.8 mmol/L      O2 Content, Roe 13.8 ml/dL      O2 HGB, VENOUS 80.0 %             CT abdomen pelvis with contrast   Final Interpretation by Sai Haywood MD (11/17 1745)      No acute findings in the abdomen or pelvis.      Multiple prominent to mildly enlarged right lower abdominal mesenteric lymph nodes, a few of which appear necrotic centrally. These findings are new when compared to the prior exam. The findings are nonspecific and may be infectious, inflammatory or    neoplastic. Further evaluation with PET/CT scan or possible tissue sampling is suggested, as is a short-term follow-up enhanced CT abdomen/pelvis in 3 months. Metastatic disease cannot be excluded.      Normal appendix visualized.      Mild circumferential wall thickening of the visualized distal esophagus, similar to the prior exam. Small hiatal hernia.      Workstation performed: [id number]             Procedures    ED Medication and Procedure Management   Prior to Admission Medications   Prescriptions Last Dose Informant Patient Reported? Taking?   Accu-Chek FastClix Lancets MISC  Self No No   Sig: Test BG up to 3x daily as directed   Accu-Chek Guide test strip  Self No No   Sig: TEST BG UP TO 3X DAILY AS DIRECTED   Continuous Blood Gluc  (Dexcom G7 ) ENRICO  Self No No   Sig: Use 1 Units daily   Continuous Glucose Sensor (Dexcom G7 Sensor)  Self No No   Sig: Use 1 Device every 10 days   Injection Device for Insulin  (B-D PEN MINI) ENRICO  Self No No   Sig: Use 4 (four) times a day Please use for Lantus and Humalog. 4 pen needles daily Dx: Diabetes   Insulin Pen Needle (BD Pen Needle Em U/F) 32G X 4 MM MISC  Self No No   Sig: Use daily   acetaminophen (TYLENOL) 500 mg tablet  Self No No   Sig: Take 1 tablet (500 mg total) by mouth every 6 (six) hours as needed for mild pain or moderate pain   dicyclomine (BENTYL) 20 mg tablet   No No   Sig: Take 1 tablet (20 mg total) by mouth 2 (two) times a day as needed (abd pain) for up to 2 days   Patient not taking: Reported on 5/3/2024   ibuprofen (MOTRIN) 400 mg tablet  Self No No   Sig: Take 1 tablet (400 mg total) by mouth every 6 (six) hours as needed for mild pain or moderate pain   insulin aspart (NovoLOG FlexPen) 100 UNIT/ML injection pen  Self No No   Sig: Inject 10 Units under the skin 3 (three) times a day with meals   insulin degludec (Tresiba FlexTouch) 100 units/mL injection pen  Self No No   Sig: Inject 30 Units under the skin daily   lidocaine (Lidoderm) 5 %  Self No No   Sig: Apply 1 patch topically over 12 hours daily Remove & Discard patch within 12 hours or as directed by MD   meclizine (ANTIVERT) 12.5 MG tablet  Self No No   Sig: Take 1 tablet (12.5 mg total) by mouth 3 (three) times a day as needed for dizziness   methocarbamol (ROBAXIN) 500 mg tablet  Self No No   Sig: Take 1 tablet (500 mg total) by mouth 2 (two) times a day   naproxen (NAPROSYN) 500 mg tablet   No No   Sig: Take 1 tablet (500 mg total) by mouth 2 (two) times a day with meals   naproxen (Naprosyn) 500 mg tablet  Self No No   Sig: Take 1 tablet (500 mg total) by mouth 2 (two) times a day with meals   ondansetron (Zofran ODT) 4 mg disintegrating tablet   No No   Sig: Take 1 tablet (4 mg total) by mouth every 6 (six) hours as needed for nausea or vomiting for up to 4 days   ondansetron (Zofran ODT) 4 mg disintegrating tablet   No No   Sig: Take 1 tablet (4 mg total) by mouth every 6 (six) hours as  needed for nausea or vomiting   pantoprazole (PROTONIX) 40 mg tablet  Self No No   Sig: Take 1 tablet (40 mg total) by mouth daily   pregabalin (LYRICA) 50 mg capsule  Self No No   Sig: Take 1 PO HS x 5 days, then 1 PO BID x 5 days, then 1 PO TID   prochlorperazine (COMPAZINE) 10 mg tablet  Self No No   Si tab at onset of migraine, can repeat in 8 hours, can take with NSAID. Take with Benadryl if there are side effects.   semaglutide, 2 mg/dose, (Ozempic) 8 mg/ mL injection pen  Self No No   Sig: Inject 0.75 mL (2 mg total) under the skin every 7 days   topiramate (TOPAMAX) 50 MG tablet  Self No No   Sig: Take 1 tablet (50 mg total) by mouth daily at bedtime      Facility-Administered Medications: None     Discharge Medication List as of 2024  7:28 PM        START taking these medications    Details   ondansetron (ZOFRAN) 4 mg tablet Take 1 tablet (4 mg total) by mouth every 6 (six) hours, Starting Sun 2024, Normal           CONTINUE these medications which have NOT CHANGED    Details   Accu-Chek FastClix Lancets MISC Test BG up to 3x daily as directed, Normal      Accu-Chek Guide test strip TEST BG UP TO 3X DAILY AS DIRECTED, Normal      acetaminophen (TYLENOL) 500 mg tablet Take 1 tablet (500 mg total) by mouth every 6 (six) hours as needed for mild pain or moderate pain, Starting Sun 2023, Print      Continuous Blood Gluc  (Dexcom G7 ) ENRICO Use 1 Units daily, Starting Thu 2023, Normal      Continuous Glucose Sensor (Dexcom G7 Sensor) Use 1 Device every 10 days, Starting Fri 5/3/2024, Normal      dicyclomine (BENTYL) 20 mg tablet Take 1 tablet (20 mg total) by mouth 2 (two) times a day as needed (abd pain) for up to 2 days, Starting Mon 2023, Until 2024 at 2359, Normal      ibuprofen (MOTRIN) 400 mg tablet Take 1 tablet (400 mg total) by mouth every 6 (six) hours as needed for mild pain or moderate pain, Starting Sun 2023, Print      Injection Device for  Insulin (B-D PEN MINI) ENRICO Use 4 (four) times a day Please use for Lantus and Humalog. 4 pen needles daily Dx: Diabetes, Starting Thu 3/18/2021, Normal      insulin aspart (NovoLOG FlexPen) 100 UNIT/ML injection pen Inject 10 Units under the skin 3 (three) times a day with meals, Starting Fri 2/2/2024, Normal      insulin degludec (Tresiba FlexTouch) 100 units/mL injection pen Inject 30 Units under the skin daily, Starting Fri 2/2/2024, Normal      Insulin Pen Needle (BD Pen Needle Em U/F) 32G X 4 MM MISC Use daily, Starting Thu 1/4/2024, Normal      lidocaine (Lidoderm) 5 % Apply 1 patch topically over 12 hours daily Remove & Discard patch within 12 hours or as directed by MD, Starting Tue 5/23/2023, Normal      meclizine (ANTIVERT) 12.5 MG tablet Take 1 tablet (12.5 mg total) by mouth 3 (three) times a day as needed for dizziness, Starting Sat 4/20/2024, Normal      methocarbamol (ROBAXIN) 500 mg tablet Take 1 tablet (500 mg total) by mouth 2 (two) times a day, Starting Thu 11/23/2023, Normal      !! naproxen (Naprosyn) 500 mg tablet Take 1 tablet (500 mg total) by mouth 2 (two) times a day with meals, Starting Tue 5/23/2023, Normal      !! naproxen (NAPROSYN) 500 mg tablet Take 1 tablet (500 mg total) by mouth 2 (two) times a day with meals, Starting Sun 7/28/2024, Normal      ondansetron (Zofran ODT) 4 mg disintegrating tablet Take 1 tablet (4 mg total) by mouth every 6 (six) hours as needed for nausea or vomiting, Starting Sun 7/28/2024, Normal      pantoprazole (PROTONIX) 40 mg tablet Take 1 tablet (40 mg total) by mouth daily, Starting u 1/4/2024, Normal      pregabalin (LYRICA) 50 mg capsule Take 1 PO HS x 5 days, then 1 PO BID x 5 days, then 1 PO TID, Normal      prochlorperazine (COMPAZINE) 10 mg tablet 1 tab at onset of migraine, can repeat in 8 hours, can take with NSAID. Take with Benadryl if there are side effects., Normal      semaglutide, 2 mg/dose, (Ozempic) 8 mg/ mL injection pen Inject 0.75  mL (2 mg total) under the skin every 7 days, Starting Thu 9/28/2023, Normal      topiramate (TOPAMAX) 50 MG tablet Take 1 tablet (50 mg total) by mouth daily at bedtime, Starting Tue 5/16/2023, Normal       !! - Potential duplicate medications found. Please discuss with provider.        No discharge procedures on file.  ED SEPSIS DOCUMENTATION   Time reflects when diagnosis was documented in both MDM as applicable and the Disposition within this note       Time User Action Codes Description Comment    11/17/2024  6:18 PM Thelma Patrick Add [R10.9] Abdominal pain     11/17/2024  6:18 PM Thelma Patrick Add [R59.1] Lymphadenopathy     11/17/2024  6:21 PM Thelma Patrick Add [R11.2] Nausea and vomiting     11/17/2024  6:58 PM Darnell Thelma L Add [E86.0] Dehydration                  Thelma Patrick, DO  11/17/24 1952

## 2024-11-17 NOTE — ED NOTES
Patient aware of need for urine sample but is unable to provide one at this time.      Silva Tinoco RN  11/17/24 [id number]

## 2024-11-21 ENCOUNTER — OFFICE VISIT (OUTPATIENT)
Age: 44
End: 2024-11-21
Payer: COMMERCIAL

## 2024-11-21 VITALS
SYSTOLIC BLOOD PRESSURE: 118 MMHG | BODY MASS INDEX: 30.82 KG/M2 | DIASTOLIC BLOOD PRESSURE: 82 MMHG | WEIGHT: 185 LBS | HEART RATE: 68 BPM | HEIGHT: 65 IN | RESPIRATION RATE: 16 BRPM | OXYGEN SATURATION: 98 % | TEMPERATURE: 97.9 F

## 2024-11-21 DIAGNOSIS — R59.0 MESENTERIC LYMPHADENOPATHY: ICD-10-CM

## 2024-11-21 DIAGNOSIS — E11.21 MICROALBUMINURIC DIABETIC NEPHROPATHY (HCC): ICD-10-CM

## 2024-11-21 DIAGNOSIS — E11.65 UNCONTROLLED TYPE 2 DIABETES MELLITUS WITH HYPERGLYCEMIA, WITHOUT LONG-TERM CURRENT USE OF INSULIN (HCC): ICD-10-CM

## 2024-11-21 DIAGNOSIS — Z79.4 TYPE 2 DIABETES MELLITUS WITH MICROALBUMINURIA, WITH LONG-TERM CURRENT USE OF INSULIN (HCC): Primary | ICD-10-CM

## 2024-11-21 DIAGNOSIS — R80.9 TYPE 2 DIABETES MELLITUS WITH MICROALBUMINURIA, WITH LONG-TERM CURRENT USE OF INSULIN (HCC): Primary | ICD-10-CM

## 2024-11-21 DIAGNOSIS — E11.29 TYPE 2 DIABETES MELLITUS WITH MICROALBUMINURIA, WITH LONG-TERM CURRENT USE OF INSULIN (HCC): Primary | ICD-10-CM

## 2024-11-21 LAB
CREAT UR-MCNC: 68.2 MG/DL
MICROALBUMIN UR-MCNC: 222.2 MG/L
MICROALBUMIN/CREAT 24H UR: 326 MG/G CREATININE (ref 0–30)
SL AMB POCT HEMOGLOBIN AIC: 14 (ref ?–6.5)

## 2024-11-21 PROCEDURE — 99214 OFFICE O/P EST MOD 30 MIN: CPT | Performed by: FAMILY MEDICINE

## 2024-11-21 PROCEDURE — 82043 UR ALBUMIN QUANTITATIVE: CPT | Performed by: FAMILY MEDICINE

## 2024-11-21 PROCEDURE — 83036 HEMOGLOBIN GLYCOSYLATED A1C: CPT | Performed by: FAMILY MEDICINE

## 2024-11-21 PROCEDURE — 82570 ASSAY OF URINE CREATININE: CPT | Performed by: FAMILY MEDICINE

## 2024-11-21 RX ORDER — PEN NEEDLE, DIABETIC 32GX 5/32"
NEEDLE, DISPOSABLE MISCELLANEOUS DAILY
Qty: 100 EACH | Refills: 0 | Status: SHIPPED | OUTPATIENT
Start: 2024-11-21

## 2024-11-21 NOTE — PROGRESS NOTES
Name: Tari Shannon      : 1980      MRN: [id number]  Encounter Provider: Chaim Foote MD  Encounter Date: 2024   Encounter department: [facility]IMARY [facility]  :  Assessment & Plan  Type 2 diabetes mellitus with microalbuminuria, with long-term current use of insulin (HCC)  Discussed workup and treatment options, will restart mealtime insulin as pt has not been taking it. Discussed supportive care and return parameters.   Lab Results   Component Value Date    HGBA1C 14.0 (A) 2024       Orders:    POCT hemoglobin A1c    Albumin / creatinine urine ratio; Future    Comprehensive metabolic panel; Future    CBC and differential; Future    Albumin / creatinine urine ratio; Future    Lipid Panel with Direct LDL reflex; Future    TSH, 3rd generation with Free T4 reflex; Future    Mesenteric lymphadenopathy  Check PET/CT and refer for biopsy Discussed supportive care and return parameters.   Orders:    NM PET CT skull base to mid thigh; Future    Ambulatory Referral to Interventional Radiology; Future    Comprehensive metabolic panel; Future    CBC and differential; Future    Albumin / creatinine urine ratio; Future    Lipid Panel with Direct LDL reflex; Future    TSH, 3rd generation with Free T4 reflex; Future    Uncontrolled type 2 diabetes mellitus with hyperglycemia, without long-term current use of insulin (HCC)    Lab Results   Component Value Date    HGBA1C 14.0 (A) 2024       Orders:    Insulin Pen Needle (BD Pen Needle Em U/F) 32G X 4 MM MISC; Use daily    Comprehensive metabolic panel; Future    CBC and differential; Future    Albumin / creatinine urine ratio; Future    Lipid Panel with Direct LDL reflex; Future    TSH, 3rd generation with Free T4 reflex; Future    Microalbuminuric diabetic nephropathy (HCC)    Lab Results   Component Value Date    HGBA1C 14.0 (A) 2024                   History of Present Illness     Patient is a 43 y/o woman who presents for  "follow-up on DM2 with diabetic microalbuminuria, along with recently detected mesenteric lymphadenopathy with necrosis seen during imaging no fevers chills nausea or vomiting.      Review of Systems   Constitutional: Negative.    HENT: Negative.     Eyes: Negative.    Respiratory: Negative.     Cardiovascular: Negative.    Gastrointestinal: Negative.    Endocrine: Negative.    Genitourinary: Negative.    Musculoskeletal: Negative.    Allergic/Immunologic: Negative.    Neurological: Negative.    Hematological: Negative.    Psychiatric/Behavioral: Negative.     All other systems reviewed and are negative.         Objective   /82 (BP Location: Left arm, Patient Position: Sitting, Cuff Size: Adult)   Pulse 68   Temp 97.9 °F (36.6 °C) (Temporal)   Resp 16   Ht 5' 5\" (1.651 m)   Wt 83.9 kg (185 lb)   LMP 11/17/2024   SpO2 98%   BMI 30.79 kg/m²      Physical Exam  Constitutional:       General: She is not in acute distress.     Appearance: She is well-developed. She is not diaphoretic.   HENT:      Head: Normocephalic and atraumatic.      Right Ear: External ear normal.      Left Ear: External ear normal.      Nose: Nose normal.      Mouth/Throat:      Pharynx: No oropharyngeal exudate.   Eyes:      General:         Right eye: No discharge.         Left eye: No discharge.      Conjunctiva/sclera: Conjunctivae normal.      Pupils: Pupils are equal, round, and reactive to light.   Neck:      Thyroid: No thyromegaly.      Trachea: No tracheal deviation.   Cardiovascular:      Rate and Rhythm: Normal rate and regular rhythm.      Pulses: no weak pulses.           Dorsalis pedis pulses are 2+ on the right side and 2+ on the left side.        Posterior tibial pulses are 2+ on the right side and 2+ on the left side.      Heart sounds: Normal heart sounds. No murmur heard.     No friction rub. No gallop.   Pulmonary:      Effort: Pulmonary effort is normal. No respiratory distress.      Breath sounds: Normal breath " sounds.   Abdominal:      General: There is no distension.      Palpations: Abdomen is soft.      Tenderness: There is no abdominal tenderness. There is no guarding or rebound.   Musculoskeletal:         General: Normal range of motion.        Feet:    Feet:      Right foot:      Skin integrity: No ulcer, skin breakdown, erythema, warmth, callus or dry skin.      Left foot:      Skin integrity: No ulcer, skin breakdown, erythema, warmth, callus or dry skin.   Lymphadenopathy:      Cervical: No cervical adenopathy.   Skin:     General: Skin is warm.   Neurological:      Mental Status: She is alert and oriented to person, place, and time.      Cranial Nerves: No cranial nerve deficit.   Psychiatric:         Behavior: Behavior normal.         Thought Content: Thought content normal.         Judgment: Judgment normal.           Diabetic Foot Exam    Patient's shoes and socks removed.    Right Foot/Ankle   Right Foot Inspection  Skin Exam: skin normal and skin intact. No dry skin, no warmth, no callus, no erythema, no maceration, no abnormal color, no pre-ulcer, no ulcer and no callus.     Toe Exam: ROM and strength within normal limits.     Sensory   Monofilament testing: intact    Vascular  The right DP pulse is 2+. The right PT pulse is 2+.     Left Foot/Ankle  Left Foot Inspection  Skin Exam: skin normal and skin intact. No dry skin, no warmth, no erythema, no maceration, normal color, no pre-ulcer, no ulcer and no callus.     Toe Exam: ROM and strength within normal limits.     Sensory   Monofilament testing: intact    Vascular  The left DP pulse is 2+. The left PT pulse is 2+.     Assign Risk Category  No deformity present  No loss of protective sensation  No weak pulses  Risk: 0

## 2024-11-22 ENCOUNTER — RESULTS FOLLOW-UP (OUTPATIENT)
Dept: ENDOCRINOLOGY | Facility: CLINIC | Age: 44
End: 2024-11-22

## 2024-11-22 DIAGNOSIS — R59.0 MESENTERIC LYMPHADENOPATHY: Primary | ICD-10-CM

## 2024-11-22 NOTE — ASSESSMENT & PLAN NOTE
Lab Results   Component Value Date    HGBA1C 14.0 (A) 11/21/2024       Orders:    Insulin Pen Needle (BD Pen Needle Em U/F) 32G X 4 MM MISC; Use daily    Comprehensive metabolic panel; Future    CBC and differential; Future    Albumin / creatinine urine ratio; Future    Lipid Panel with Direct LDL reflex; Future    TSH, 3rd generation with Free T4 reflex; Future

## 2024-11-22 NOTE — ASSESSMENT & PLAN NOTE
Check PET/CT and refer for biopsy Discussed supportive care and return parameters.   Orders:    NM PET CT skull base to mid thigh; Future    Ambulatory Referral to Interventional Radiology; Future    Comprehensive metabolic panel; Future    CBC and differential; Future    Albumin / creatinine urine ratio; Future    Lipid Panel with Direct LDL reflex; Future    TSH, 3rd generation with Free T4 reflex; Future

## 2024-11-22 NOTE — ASSESSMENT & PLAN NOTE
Discussed workup and treatment options, will restart mealtime insulin as pt has not been taking it. Discussed supportive care and return parameters.   Lab Results   Component Value Date    HGBA1C 14.0 (A) 11/21/2024       Orders:    POCT hemoglobin A1c    Albumin / creatinine urine ratio; Future    Comprehensive metabolic panel; Future    CBC and differential; Future    Albumin / creatinine urine ratio; Future    Lipid Panel with Direct LDL reflex; Future    TSH, 3rd generation with Free T4 reflex; Future

## 2024-11-22 NOTE — PROGRESS NOTES
Please call pt. Received message from IR that they feel that they cannot reach the lymph nodes easily to biopsy. She is still recommended for the PET scan, followed by referral to surgical oncology to discuss further and whether they are suggesting some other kind of biopsy and how. I ordered referral. Call with other questions or concerns.

## 2024-11-26 ENCOUNTER — OFFICE VISIT (OUTPATIENT)
Dept: ENDOCRINOLOGY | Facility: CLINIC | Age: 44
End: 2024-11-26
Payer: COMMERCIAL

## 2024-11-26 ENCOUNTER — HOSPITAL ENCOUNTER (INPATIENT)
Facility: HOSPITAL | Age: 44
LOS: 9 days | Discharge: HOME | DRG: 872 | End: 2024-12-06
Attending: EMERGENCY MEDICINE | Admitting: STUDENT IN AN ORGANIZED HEALTH CARE EDUCATION/TRAINING PROGRAM
Payer: COMMERCIAL

## 2024-11-26 VITALS
OXYGEN SATURATION: 99 % | HEART RATE: 81 BPM | DIASTOLIC BLOOD PRESSURE: 70 MMHG | HEIGHT: 65 IN | WEIGHT: 184.2 LBS | BODY MASS INDEX: 30.69 KG/M2 | SYSTOLIC BLOOD PRESSURE: 106 MMHG

## 2024-11-26 DIAGNOSIS — R50.9 FEVER: ICD-10-CM

## 2024-11-26 DIAGNOSIS — D72.829 LEUKOCYTOSIS: ICD-10-CM

## 2024-11-26 DIAGNOSIS — R59.0 LAD (LYMPHADENOPATHY), INTRA-ABDOMINAL: ICD-10-CM

## 2024-11-26 DIAGNOSIS — Z79.4 CURRENT USE OF INSULIN (HCC): ICD-10-CM

## 2024-11-26 DIAGNOSIS — A53.9 SYPHILIS: ICD-10-CM

## 2024-11-26 DIAGNOSIS — R59.0 ABDOMINAL LYMPHADENOPATHY: ICD-10-CM

## 2024-11-26 DIAGNOSIS — E11.21 MICROALBUMINURIC DIABETIC NEPHROPATHY (HCC): Primary | ICD-10-CM

## 2024-11-26 DIAGNOSIS — A41.9 SEPSIS WITHOUT ACUTE ORGAN DYSFUNCTION (HCC): Primary | ICD-10-CM

## 2024-11-26 DIAGNOSIS — R10.9 INTRACTABLE ABDOMINAL PAIN: ICD-10-CM

## 2024-11-26 DIAGNOSIS — E11.65 UNCONTROLLED TYPE 2 DIABETES MELLITUS WITH HYPERGLYCEMIA, WITHOUT LONG-TERM CURRENT USE OF INSULIN (HCC): ICD-10-CM

## 2024-11-26 PROCEDURE — 99285 EMERGENCY DEPT VISIT HI MDM: CPT

## 2024-11-26 PROCEDURE — 99214 OFFICE O/P EST MOD 30 MIN: CPT | Performed by: INTERNAL MEDICINE

## 2024-11-26 RX ORDER — ACYCLOVIR 400 MG/1
1 TABLET ORAL
Qty: 9 EACH | Refills: 3 | Status: SHIPPED | OUTPATIENT
Start: 2024-11-26

## 2024-11-26 NOTE — LETTER
[place] [address]  [address] [facility] [address]  Dept: [phone]    December 6, 2024     Patient: Tari Shannon   YOB: 1980   Date of Visit: 11/26/2024       To Whom it May Concern:    Tari Shannon is under my professional care. She was seen in the hospital from 11/26/2024 to 12/06/24. She may return to work on December 9, 2024 without limitations.    If you have any questions or concerns, please don't hesitate to call.         Sincerely,          Manolo Fuentes MD

## 2024-11-26 NOTE — ASSESSMENT & PLAN NOTE
Lab Results   Component Value Date    HGBA1C 14.0 (A) 11/21/2024     Proteinuria is increasing. However, with her frequent UTIs, I do not recommend an SGLT2 at this time.

## 2024-11-26 NOTE — PROGRESS NOTES
Established Patient Progress Note      Chief Complaint   Patient presents with    Diabetes Type 2        History of Present Illness:   Tari Shannon is a 44 y.o. female with a history of type 2 diabetes without long term use of insulin for about 9-10 years. Last seen in the office in May 2024 with Madisyn REBOLLAR  Followup and diabetes care has had housing insecurity.  Now she is undergoing evaluation for lymphadenopathy     She was dx with DM after her first pregnancy. Last seen in the office. Reports complications of neuropathy. A1C today was 12.8 up from 10.1. She has been checked for C-peptide and CHAPITO 65 in the past which were negative.      She reports taking metformin, Januvia and glimiperide in the past. In the past, has also tried, Prandin, Levemir, and ozempic.Had allergy to metformin (hives).      Denies any GI side effects or worsening abdominal pain from ozempic.  Has had three ED vists this year for UTIs    Last regimen:   Ozempic 2mg weekly   Tresiba 30 units   Novolog 10units with meals.     Has not been taking any medications for about 2mos. She is picking up her RX from [facility] and will resume her medications.      Continues to have abdominal pain. She has very irregular eating patterns. However, she will eat when taking the Novolog    Not currently checking her Bgs.Has used Dexcom G7 which is in storage. Will resume using this.      Eyes: at [facility]. No report, but reports   Pod: Dr. Marroquin 2mos ago and then at Dr. Foote.     Patient Active Problem List   Diagnosis    Abnormal thyroid blood test    Papanicolaou smear of cervix with positive high risk human papilloma virus (HPV) test    Type 2 diabetes mellitus with microalbuminuria, with long-term current use of insulin (HCC)    Stress at home    Diabetes mellitus (HCC)    Obesity (BMI 30-39.9)    Numbness and tingling of both feet    Recurrent episodes of unresponsiveness    Migraine without aura and without status migrainosus,  not intractable    Current use of insulin (HCC)    Chest pain    Uncontrolled type 2 diabetes mellitus with hyperglycemia, without long-term current use of insulin (HCC)    Numbness and tingling of right arm    Adhesive capsulitis of right shoulder    Herniation of intervertebral disc at C6-C7 level    Neck pain    Brachial plexopathy    Right leg weakness    Radiculopathy, lumbar region    Polyneuropathy    Chronotropic incompetence    Microalbuminuric diabetic nephropathy (HCC)    Other dysphagia    Mesenteric lymphadenopathy      Past Medical History:   Diagnosis Date    Diabetes mellitus (HCC)     Migraine headache     Vertigo, aural, unspecified laterality       Past Surgical History:   Procedure Laterality Date    COLONOSCOPY      HERNIA REPAIR      TUBAL LIGATION  2017    UPPER GASTROINTESTINAL ENDOSCOPY        Family History   Problem Relation Age of Onset    Hypertension Mother     Arthritis Mother     Asthma Sister     Bipolar disorder Brother     Schizophrenia Brother     Asthma Brother     Asthma Brother     Diabetes Maternal Grandmother     Diabetes Paternal Grandmother     No Known Problems Maternal Grandfather     No Known Problems Paternal Grandfather     Breast cancer Neg Hx      Social History     Tobacco Use    Smoking status: Never    Smokeless tobacco: Never   Substance Use Topics    Alcohol use: Not Currently     Comment: ocassionally     Allergies   Allergen Reactions    Metformin And Related Hives         Current Outpatient Medications:     Accu-Chek FastClix Lancets MISC, Test BG up to 3x daily as directed, Disp: 300 each, Rfl: 1    Accu-Chek Guide test strip, TEST BG UP TO 3X DAILY AS DIRECTED, Disp: 100 each, Rfl: 1    acetaminophen (TYLENOL) 500 mg tablet, Take 1 tablet (500 mg total) by mouth every 6 (six) hours as needed for mild pain or moderate pain, Disp: 30 tablet, Rfl: 0    Continuous Blood Gluc  (Dexcom G7 ) ENRICO, Use 1 Units daily, Disp: 1 each, Rfl: 1     Continuous Glucose Sensor (Dexcom G7 Sensor), Use 1 Device every 10 days, Disp: 9 each, Rfl: 3    ibuprofen (MOTRIN) 400 mg tablet, Take 1 tablet (400 mg total) by mouth every 6 (six) hours as needed for mild pain or moderate pain, Disp: 30 tablet, Rfl: 0    Injection Device for Insulin (B-D PEN MINI) ENRICO, Use 4 (four) times a day Please use for Lantus and Humalog. 4 pen needles daily Dx: Diabetes, Disp: 400 each, Rfl: 0    insulin aspart (NovoLOG FlexPen) 100 UNIT/ML injection pen, Inject 10 Units under the skin 3 (three) times a day with meals, Disp: 15 mL, Rfl: 2    insulin degludec (Tresiba FlexTouch) 100 units/mL injection pen, Inject 30 Units under the skin daily, Disp: 15 mL, Rfl: 1    Insulin Pen Needle (BD Pen Needle Em U/F) 32G X 4 MM MISC, Use daily, Disp: 100 each, Rfl: 0    lidocaine (Lidoderm) 5 %, Apply 1 patch topically over 12 hours daily Remove & Discard patch within 12 hours or as directed by MD, Disp: 5 patch, Rfl: 0    meclizine (ANTIVERT) 12.5 MG tablet, Take 1 tablet (12.5 mg total) by mouth 3 (three) times a day as needed for dizziness, Disp: 30 tablet, Rfl: 0    methocarbamol (ROBAXIN) 500 mg tablet, Take 1 tablet (500 mg total) by mouth 2 (two) times a day, Disp: 20 tablet, Rfl: 0    naproxen (NAPROSYN) 500 mg tablet, Take 1 tablet (500 mg total) by mouth 2 (two) times a day with meals, Disp: 20 tablet, Rfl: 0    ondansetron (ZOFRAN) 4 mg tablet, Take 1 tablet (4 mg total) by mouth every 6 (six) hours, Disp: 12 tablet, Rfl: 0    pantoprazole (PROTONIX) 40 mg tablet, Take 1 tablet (40 mg total) by mouth daily, Disp: 90 tablet, Rfl: 1    pregabalin (LYRICA) 50 mg capsule, Take 1 PO HS x 5 days, then 1 PO BID x 5 days, then 1 PO TID, Disp: 90 capsule, Rfl: 1    semaglutide, 2 mg/dose, (Ozempic) 8 mg/ mL injection pen, Inject 0.75 mL (2 mg total) under the skin every 7 days, Disp: 9 mL, Rfl: 1    topiramate (TOPAMAX) 50 MG tablet, Take 1 tablet (50 mg total) by mouth daily at bedtime, Disp:  "90 tablet, Rfl: 1    dicyclomine (BENTYL) 20 mg tablet, Take 1 tablet (20 mg total) by mouth 2 (two) times a day as needed (abd pain) for up to 2 days (Patient not taking: Reported on 11/26/2024), Disp: 4 tablet, Rfl: 0    Review of Systems   Constitutional:  Negative for unexpected weight change.   HENT:  Negative for hearing loss and voice change.    Eyes:  Positive for visual disturbance (see HPI).   Gastrointestinal:  Negative for constipation, diarrhea and nausea.   Endocrine: Negative for polydipsia and polyuria.   Musculoskeletal:  Negative for gait problem.   Skin:  Positive for rash.   Neurological:  Positive for headaches (gets with highs). Negative for tremors and weakness.       Physical Exam:  Body mass index is 30.65 kg/m².  /70   Pulse 81   Ht 5' 5\" (1.651 m)   Wt 83.6 kg (184 lb 3.2 oz)   LMP 11/17/2024   SpO2 99%   BMI 30.65 kg/m²    Wt Readings from Last 3 Encounters:   11/26/24 83.6 kg (184 lb 3.2 oz)   11/21/24 83.9 kg (185 lb)   11/17/24 83.5 kg (184 lb 1.4 oz)       Physical Exam   Gen: appears well-developed and well-nourished. No apparent distress.   Head: Normocephalic and atraumatic.   Eyes: no stare or proptosis, no periorbital edema  E/N/M nl facies, hearing grossly intact  Neck: range of motion nl.   Pulmonary/Chest: breathing  comfortably, no accessory muscle use, effort normal.   Musculoskeletal: moves all 4 extremities, gait nl  Neurological: alert and oriented to person, place, and time. No upper ext tremor appreciated  Skin: does not appear diaphoretic, no facial plethora  Psychiatric: normal mood and affect; behavior is normal; no gross lapses in memory, answer questions appropriately        Labs:   Lab Results   Component Value Date    HGBA1C 14.0 (A) 11/21/2024    HGBA1C 12.0 (A) 05/03/2024    HGBA1C 9.6 (A) 02/02/2024     Lab Results   Component Value Date    CREATININE 0.85 11/17/2024    CREATININE 0.92 07/28/2024    CREATININE 0.84 04/20/2024    BUN 8 11/17/2024    "  (L) 01/06/2014    K 3.8 11/17/2024     11/17/2024    CO2 26 11/17/2024         Lab Results   Component Value Date    HDL 66 03/18/2022    TRIG 83 03/18/2022     Lab Results   Component Value Date    ALT 10 11/17/2024    AST 11 (L) 11/17/2024    ALKPHOS 84 11/17/2024     Lab Results   Component Value Date    HNJ0XOIHNAOZ 2.230 03/18/2022    [id number] 1.105 11/21/2020    [id number] 2.900 09/23/2019         Impression & Plan:  Problem List Items Addressed This Visit       Current use of insulin (HCC)    Relevant Medications    Continuous Glucose Sensor (Dexcom G7 Sensor)    Uncontrolled type 2 diabetes mellitus with hyperglycemia, without long-term current use of insulin ([facility])      Lab Results   Component Value Date    HGBA1C 14.0 (A) 11/21/2024     Planning to resume her regimen today. Will get Dexcom G7 out of storage to use. New sensors sent. She states her Ozempic did not impact her abdominal issues. Advised getting better BG prior to PETCT.          Relevant Medications    Continuous Glucose Sensor (Dexcom G7 Sensor)    Microalbuminuric diabetic nephropathy (HCC) - Primary      Lab Results   Component Value Date    HGBA1C 14.0 (A) 11/21/2024     Proteinuria is increasing. However, with her frequent UTIs, I do not recommend an SGLT2 at this time.                   Discussed with the patient and all questioned fully answered. She will call me if any problems arise.    Maria Luisa Razo MD

## 2024-11-26 NOTE — ASSESSMENT & PLAN NOTE
Lab Results   Component Value Date    HGBA1C 14.0 (A) 11/21/2024     Planning to resume her regimen today. Will get Dexcom G7 out of storage to use. New sensors sent. She states her Ozempic did not impact her abdominal issues. Advised getting better BG prior to PETCT.

## 2024-11-27 ENCOUNTER — TELEPHONE (OUTPATIENT)
Dept: HEMATOLOGY ONCOLOGY | Facility: CLINIC | Age: 44
End: 2024-11-27

## 2024-11-27 ENCOUNTER — APPOINTMENT (INPATIENT)
Dept: CT IMAGING | Facility: HOSPITAL | Age: 44
DRG: 872 | End: 2024-11-27
Payer: COMMERCIAL

## 2024-11-27 ENCOUNTER — APPOINTMENT (EMERGENCY)
Dept: CT IMAGING | Facility: HOSPITAL | Age: 44
DRG: 872 | End: 2024-11-27
Payer: COMMERCIAL

## 2024-11-27 LAB
ALBUMIN SERPL BCG-MCNC: 3.5 G/DL (ref 3.5–5)
ALP SERPL-CCNC: 95 U/L (ref 34–104)
ALT SERPL W P-5'-P-CCNC: 10 U/L (ref 7–52)
ANION GAP SERPL CALCULATED.3IONS-SCNC: 8 MMOL/L (ref 4–13)
AST SERPL W P-5'-P-CCNC: 12 U/L (ref 13–39)
BACTERIA UR QL AUTO: ABNORMAL /HPF
BASOPHILS # BLD AUTO: 0.07 THOUSANDS/ΜL (ref 0–0.1)
BASOPHILS NFR BLD AUTO: 1 % (ref 0–1)
BILIRUB SERPL-MCNC: 0.49 MG/DL (ref 0.2–1)
BILIRUB UR QL STRIP: NEGATIVE
BUN SERPL-MCNC: 11 MG/DL (ref 5–25)
CALCIUM SERPL-MCNC: 9.3 MG/DL (ref 8.4–10.2)
CHLORIDE SERPL-SCNC: 98 MMOL/L (ref 96–108)
CLARITY UR: CLEAR
CO2 SERPL-SCNC: 27 MMOL/L (ref 21–32)
COLOR UR: COLORLESS
CREAT SERPL-MCNC: 0.95 MG/DL (ref 0.6–1.3)
EOSINOPHIL # BLD AUTO: 0.24 THOUSAND/ΜL (ref 0–0.61)
EOSINOPHIL NFR BLD AUTO: 2 % (ref 0–6)
ERYTHROCYTE [DISTWIDTH] IN BLOOD BY AUTOMATED COUNT: 12 % (ref 11.6–15.1)
EXT PREGNANCY TEST URINE: NEGATIVE
EXT. CONTROL: NORMAL
GFR SERPL CREATININE-BSD FRML MDRD: 73 ML/MIN/1.73SQ M
GLUCOSE SERPL-MCNC: 199 MG/DL (ref 65–140)
GLUCOSE SERPL-MCNC: 223 MG/DL (ref 65–140)
GLUCOSE SERPL-MCNC: 247 MG/DL (ref 65–140)
GLUCOSE SERPL-MCNC: 288 MG/DL (ref 65–140)
GLUCOSE SERPL-MCNC: 345 MG/DL (ref 65–140)
GLUCOSE SERPL-MCNC: 391 MG/DL (ref 65–140)
GLUCOSE UR STRIP-MCNC: ABNORMAL MG/DL
HCT VFR BLD AUTO: 37 % (ref 34.8–46.1)
HGB BLD-MCNC: 12.3 G/DL (ref 11.5–15.4)
HGB UR QL STRIP.AUTO: NEGATIVE
IMM GRANULOCYTES # BLD AUTO: 0.03 THOUSAND/UL (ref 0–0.2)
IMM GRANULOCYTES NFR BLD AUTO: 0 % (ref 0–2)
KETONES UR STRIP-MCNC: NEGATIVE MG/DL
LEUKOCYTE ESTERASE UR QL STRIP: ABNORMAL
LIPASE SERPL-CCNC: 17 U/L (ref 11–82)
LYMPHOCYTES # BLD AUTO: 3.67 THOUSANDS/ΜL (ref 0.6–4.47)
LYMPHOCYTES NFR BLD AUTO: 30 % (ref 14–44)
MCH RBC QN AUTO: 29.1 PG (ref 26.8–34.3)
MCHC RBC AUTO-ENTMCNC: 33.2 G/DL (ref 31.4–37.4)
MCV RBC AUTO: 88 FL (ref 82–98)
MONOCYTES # BLD AUTO: 1.04 THOUSAND/ΜL (ref 0.17–1.22)
MONOCYTES NFR BLD AUTO: 9 % (ref 4–12)
NEUTROPHILS # BLD AUTO: 7.15 THOUSANDS/ΜL (ref 1.85–7.62)
NEUTS SEG NFR BLD AUTO: 58 % (ref 43–75)
NITRITE UR QL STRIP: NEGATIVE
NON-SQ EPI CELLS URNS QL MICRO: ABNORMAL /HPF
NRBC BLD AUTO-RTO: 0 /100 WBCS
PH UR STRIP.AUTO: 8 [PH]
PLATELET # BLD AUTO: 482 THOUSANDS/UL (ref 149–390)
PMV BLD AUTO: 9.5 FL (ref 8.9–12.7)
POTASSIUM SERPL-SCNC: 4.3 MMOL/L (ref 3.5–5.3)
PROT SERPL-MCNC: 7.5 G/DL (ref 6.4–8.4)
PROT UR STRIP-MCNC: NEGATIVE MG/DL
RBC # BLD AUTO: 4.22 MILLION/UL (ref 3.81–5.12)
RBC #/AREA URNS AUTO: ABNORMAL /HPF
SODIUM SERPL-SCNC: 133 MMOL/L (ref 135–147)
SP GR UR STRIP.AUTO: 1.02 (ref 1–1.03)
UROBILINOGEN UR STRIP-ACNC: <2 MG/DL
WBC # BLD AUTO: 12.2 THOUSAND/UL (ref 4.31–10.16)
WBC #/AREA URNS AUTO: ABNORMAL /HPF

## 2024-11-27 PROCEDURE — 96375 TX/PRO/DX INJ NEW DRUG ADDON: CPT

## 2024-11-27 PROCEDURE — 82948 REAGENT STRIP/BLOOD GLUCOSE: CPT

## 2024-11-27 PROCEDURE — 36415 COLL VENOUS BLD VENIPUNCTURE: CPT

## 2024-11-27 PROCEDURE — 99449 NTRPROF PH1/NTRNET/EHR 31/>: CPT | Performed by: NURSE PRACTITIONER

## 2024-11-27 PROCEDURE — NC001 PR NO CHARGE: Performed by: INTERNAL MEDICINE

## 2024-11-27 PROCEDURE — 85025 COMPLETE CBC W/AUTO DIFF WBC: CPT

## 2024-11-27 PROCEDURE — 96374 THER/PROPH/DIAG INJ IV PUSH: CPT

## 2024-11-27 PROCEDURE — 74177 CT ABD & PELVIS W/CONTRAST: CPT

## 2024-11-27 PROCEDURE — 83690 ASSAY OF LIPASE: CPT

## 2024-11-27 PROCEDURE — 81025 URINE PREGNANCY TEST: CPT

## 2024-11-27 PROCEDURE — 80053 COMPREHEN METABOLIC PANEL: CPT

## 2024-11-27 PROCEDURE — 99254 IP/OBS CNSLTJ NEW/EST MOD 60: CPT | Performed by: PHYSICIAN ASSISTANT

## 2024-11-27 PROCEDURE — 81001 URINALYSIS AUTO W/SCOPE: CPT

## 2024-11-27 PROCEDURE — 71260 CT THORAX DX C+: CPT

## 2024-11-27 PROCEDURE — 99255 IP/OBS CONSLTJ NEW/EST HI 80: CPT | Performed by: SURGERY

## 2024-11-27 PROCEDURE — 99223 1ST HOSP IP/OBS HIGH 75: CPT | Performed by: INTERNAL MEDICINE

## 2024-11-27 PROCEDURE — 96361 HYDRATE IV INFUSION ADD-ON: CPT

## 2024-11-27 RX ORDER — OXYCODONE HYDROCHLORIDE 5 MG/1
5 TABLET ORAL EVERY 6 HOURS PRN
Refills: 0 | Status: DISCONTINUED | OUTPATIENT
Start: 2024-11-27 | End: 2024-12-06 | Stop reason: HOSPADM

## 2024-11-27 RX ORDER — HEPARIN SODIUM 5000 [USP'U]/ML
5000 INJECTION, SOLUTION INTRAVENOUS; SUBCUTANEOUS EVERY 8 HOURS SCHEDULED
Status: DISCONTINUED | OUTPATIENT
Start: 2024-11-27 | End: 2024-12-06 | Stop reason: HOSPADM

## 2024-11-27 RX ORDER — LIDOCAINE 50 MG/G
1 PATCH TOPICAL DAILY
Status: DISCONTINUED | OUTPATIENT
Start: 2024-11-27 | End: 2024-12-06 | Stop reason: HOSPADM

## 2024-11-27 RX ORDER — INSULIN LISPRO 100 [IU]/ML
10 INJECTION, SOLUTION INTRAVENOUS; SUBCUTANEOUS
Status: DISCONTINUED | OUTPATIENT
Start: 2024-11-27 | End: 2024-12-02

## 2024-11-27 RX ORDER — NAPROXEN 250 MG/1
500 TABLET ORAL 2 TIMES DAILY WITH MEALS
Status: DISCONTINUED | OUTPATIENT
Start: 2024-11-27 | End: 2024-11-28

## 2024-11-27 RX ORDER — INSULIN LISPRO 100 [IU]/ML
5 INJECTION, SOLUTION INTRAVENOUS; SUBCUTANEOUS ONCE
Status: COMPLETED | OUTPATIENT
Start: 2024-11-27 | End: 2024-11-27

## 2024-11-27 RX ORDER — ACETAMINOPHEN 325 MG/1
650 TABLET ORAL EVERY 6 HOURS PRN
Status: DISCONTINUED | OUTPATIENT
Start: 2024-11-27 | End: 2024-12-06 | Stop reason: HOSPADM

## 2024-11-27 RX ORDER — PREGABALIN 50 MG/1
50 CAPSULE ORAL 3 TIMES DAILY
Status: DISCONTINUED | OUTPATIENT
Start: 2024-11-27 | End: 2024-12-06 | Stop reason: HOSPADM

## 2024-11-27 RX ORDER — SODIUM CHLORIDE, SODIUM LACTATE, POTASSIUM CHLORIDE, CALCIUM CHLORIDE 600; 310; 30; 20 MG/100ML; MG/100ML; MG/100ML; MG/100ML
75 INJECTION, SOLUTION INTRAVENOUS CONTINUOUS
Status: DISCONTINUED | OUTPATIENT
Start: 2024-11-27 | End: 2024-11-27

## 2024-11-27 RX ORDER — HALOPERIDOL 5 MG/ML
5 INJECTION INTRAMUSCULAR ONCE
Status: COMPLETED | OUTPATIENT
Start: 2024-11-27 | End: 2024-11-27

## 2024-11-27 RX ORDER — INSULIN LISPRO 100 [IU]/ML
1-6 INJECTION, SOLUTION INTRAVENOUS; SUBCUTANEOUS
Status: DISCONTINUED | OUTPATIENT
Start: 2024-11-27 | End: 2024-12-06 | Stop reason: HOSPADM

## 2024-11-27 RX ORDER — HYDROMORPHONE HCL/PF 1 MG/ML
0.3 SYRINGE (ML) INJECTION
Status: DISCONTINUED | OUTPATIENT
Start: 2024-11-27 | End: 2024-11-28

## 2024-11-27 RX ORDER — ONDANSETRON 2 MG/ML
4 INJECTION INTRAMUSCULAR; INTRAVENOUS EVERY 4 HOURS PRN
Status: DISCONTINUED | OUTPATIENT
Start: 2024-11-27 | End: 2024-12-06 | Stop reason: HOSPADM

## 2024-11-27 RX ORDER — KETOROLAC TROMETHAMINE 30 MG/ML
30 INJECTION, SOLUTION INTRAMUSCULAR; INTRAVENOUS ONCE
Status: COMPLETED | OUTPATIENT
Start: 2024-11-27 | End: 2024-11-27

## 2024-11-27 RX ORDER — INSULIN GLARGINE 100 [IU]/ML
30 INJECTION, SOLUTION SUBCUTANEOUS
Status: DISCONTINUED | OUTPATIENT
Start: 2024-11-27 | End: 2024-12-03

## 2024-11-27 RX ORDER — MECLIZINE HCL 12.5 MG 12.5 MG/1
12.5 TABLET ORAL 3 TIMES DAILY PRN
Status: DISCONTINUED | OUTPATIENT
Start: 2024-11-27 | End: 2024-12-02

## 2024-11-27 RX ORDER — MORPHINE SULFATE 4 MG/ML
4 INJECTION, SOLUTION INTRAMUSCULAR; INTRAVENOUS ONCE
Status: COMPLETED | OUTPATIENT
Start: 2024-11-27 | End: 2024-11-27

## 2024-11-27 RX ORDER — PANTOPRAZOLE SODIUM 40 MG/1
40 TABLET, DELAYED RELEASE ORAL
Status: DISCONTINUED | OUTPATIENT
Start: 2024-11-27 | End: 2024-12-06 | Stop reason: HOSPADM

## 2024-11-27 RX ADMIN — NAPROXEN 500 MG: 250 TABLET ORAL at 16:48

## 2024-11-27 RX ADMIN — PREGABALIN 50 MG: 50 CAPSULE ORAL at 21:25

## 2024-11-27 RX ADMIN — SODIUM CHLORIDE, SODIUM LACTATE, POTASSIUM CHLORIDE, AND CALCIUM CHLORIDE 75 ML/HR: .6; .31; .03; .02 INJECTION, SOLUTION INTRAVENOUS at 08:11

## 2024-11-27 RX ADMIN — HEPARIN SODIUM 5000 UNITS: 5000 INJECTION INTRAVENOUS; SUBCUTANEOUS at 21:25

## 2024-11-27 RX ADMIN — INSULIN LISPRO 3 UNITS: 100 INJECTION, SOLUTION INTRAVENOUS; SUBCUTANEOUS at 21:34

## 2024-11-27 RX ADMIN — PANTOPRAZOLE SODIUM 40 MG: 40 TABLET, DELAYED RELEASE ORAL at 05:36

## 2024-11-27 RX ADMIN — HEPARIN SODIUM 5000 UNITS: 5000 INJECTION INTRAVENOUS; SUBCUTANEOUS at 05:37

## 2024-11-27 RX ADMIN — PREGABALIN 50 MG: 50 CAPSULE ORAL at 08:14

## 2024-11-27 RX ADMIN — SODIUM CHLORIDE 1000 ML: 0.9 INJECTION, SOLUTION INTRAVENOUS at 00:34

## 2024-11-27 RX ADMIN — INSULIN LISPRO 5 UNITS: 100 INJECTION, SOLUTION INTRAVENOUS; SUBCUTANEOUS at 05:36

## 2024-11-27 RX ADMIN — INSULIN LISPRO 10 UNITS: 100 INJECTION, SOLUTION INTRAVENOUS; SUBCUTANEOUS at 16:48

## 2024-11-27 RX ADMIN — MORPHINE SULFATE 4 MG: 4 INJECTION INTRAVENOUS at 01:28

## 2024-11-27 RX ADMIN — INSULIN GLARGINE 30 UNITS: 100 INJECTION, SOLUTION SUBCUTANEOUS at 21:25

## 2024-11-27 RX ADMIN — INSULIN LISPRO 4 UNITS: 100 INJECTION, SOLUTION INTRAVENOUS; SUBCUTANEOUS at 08:13

## 2024-11-27 RX ADMIN — IOHEXOL 85 ML: 350 INJECTION, SOLUTION INTRAVENOUS at 21:55

## 2024-11-27 RX ADMIN — HEPARIN SODIUM 5000 UNITS: 5000 INJECTION INTRAVENOUS; SUBCUTANEOUS at 14:23

## 2024-11-27 RX ADMIN — NAPROXEN 500 MG: 250 TABLET ORAL at 08:14

## 2024-11-27 RX ADMIN — OXYCODONE 5 MG: 5 TABLET ORAL at 19:28

## 2024-11-27 RX ADMIN — ACETAMINOPHEN 650 MG: 325 TABLET, FILM COATED ORAL at 21:29

## 2024-11-27 RX ADMIN — PREGABALIN 50 MG: 50 CAPSULE ORAL at 16:48

## 2024-11-27 RX ADMIN — KETOROLAC TROMETHAMINE 30 MG: 30 INJECTION, SOLUTION INTRAMUSCULAR; INTRAVENOUS at 00:32

## 2024-11-27 RX ADMIN — LIDOCAINE 1 PATCH: 50 PATCH TOPICAL at 08:14

## 2024-11-27 RX ADMIN — HALOPERIDOL LACTATE 5 MG: 5 INJECTION, SOLUTION INTRAMUSCULAR at 02:35

## 2024-11-27 RX ADMIN — OXYCODONE 5 MG: 5 TABLET ORAL at 05:16

## 2024-11-27 RX ADMIN — INSULIN LISPRO 2 UNITS: 100 INJECTION, SOLUTION INTRAVENOUS; SUBCUTANEOUS at 16:48

## 2024-11-27 RX ADMIN — IOHEXOL 100 ML: 350 INJECTION, SOLUTION INTRAVENOUS at 02:08

## 2024-11-27 NOTE — TELEPHONE ENCOUNTER
Per Elsa Chaudhry PA-C of [facility], the patient will not require her upcoming PET CT. I reached out to Central Scheduling, relayed the above, and they were able to cancel the patient's upcoming PET.

## 2024-11-27 NOTE — H&P
H&P - Hospitalist   Name: Tari Shannon 44 y.o. female I MRN: [id number]  Unit/Bed#: [place] [address] -02 I Date of Admission: 11/26/2024   Date of Service: 11/27/2024 I Hospital Day: 0     Assessment & Plan  LAD (lymphadenopathy), intra-abdominal  Intra-abdominal lymph node necrosis noted on CT abdomen and pelvis 11/27, suspicion for cancer.  No sign of sepsis or infection. No leukocytosis, fever, hypotension.   Obtain lymph node biopsy with IR to rule out cancer etiology  Follow up outpatient for PET scan with surgical oncology.  Radiology reccommended PET/CT scan, however previous endocrine note 11/26 states that she wants better management of diabetes prior to PET/CT scan.  DVT ppx with Heparin injection 5000 units subcutaneous, q8 hours.   Manage with pain with haloperidol lactate, ketorolac and morphine.  Continue Pantoprazole 40 mg tablet once a day  Continue Zofran 4 mg injection, PRN for nausea    Type 2 diabetes mellitus with microalbuminuria, with long-term current use of insulin ([facility])  Patient met with Endocrinology yesterday 11/26. She admits that she has not been taking her insulin and Ozempic in 2 months due to her abdominal pain and overall health.   Continue recommended regimen by the Endocrinologist Tribesa 30 units at bedtime and Novolog 10 units TID before meals.  Hold Ozempic   Continue to monitor glucose levels before meals and at bed time.   Continue pregabalin 50 mg TID  Lab Results   Component Value Date    HGBA1C 14.0 (A) 11/21/2024      Glucose    Random glucose  391  Random glucose           VTE Pharmacologic Prophylaxis:  Heparin   Code Status: Full code status, Level 1       Anticipated Length of Stay:Patient will be admitted for a hospital stay <2 days. Patient admitted for acute pain control, and biopsy of intra-abdominal lymph node. Patient will follow up with outpatient oncology.     History of Present Illness   Chief Complaint: Worsening right lower abdominal pain x 2  "weeks.    Tari Shannon is a 44 y.o. female with a PMH of Type 2 Diabetes who presents to the Emergency room 11/26 with abdominal pain for the last two weeks. Patient states that she was at the Emergency Department on 11/17 for abdominal pain with nausea. Currently she states that has worsening abdominal pain in the right lower quadrant. She states that it is a constant pain that feels like a \"really bad stomach ache.\" She states that nothing makes her abdominal pain better or worse. She states that she has been taking Tylenol and using a heating bad on her abdomen, however it provides no relief. She states the Zofran PRN has alleviated her nausea symptoms.     Diabetes Management  Patient states that because of her abdominal pain she has not been compliant with her at home medications with her Diabetes. She states that she has not taken her diabetes medications in over two months. Specifically her insulin regimen as well as the Semaglutide.     Review of Systems   Constitutional:  Positive for appetite change. Negative for chills, diaphoresis, fatigue, fever and unexpected weight change.   Respiratory:  Negative for chest tightness and shortness of breath.    Cardiovascular:  Negative for chest pain.   Gastrointestinal:  Positive for abdominal pain. Negative for abdominal distention, anal bleeding, blood in stool, constipation, diarrhea, nausea and vomiting.   Musculoskeletal:  Negative for arthralgias and myalgias.       Historical Information   Past Medical History:   Diagnosis Date    Diabetes mellitus (HCC)     Migraine headache     Vertigo, aural, unspecified laterality      Past Surgical History:   Procedure Laterality Date    COLONOSCOPY      HERNIA REPAIR      TUBAL LIGATION  2017    UPPER GASTROINTESTINAL ENDOSCOPY       Social History     Tobacco Use    Smoking status: Never    Smokeless tobacco: Never   Vaping Use    Vaping status: Never Used   Substance and Sexual Activity    Alcohol use: Not Currently "     Comment: ocassionally    Drug use: Never    Sexual activity: Yes     Partners: Male     E-Cigarette/Vaping    E-Cigarette Use Never User      E-Cigarette/Vaping Substances    Nicotine No     THC No     CBD No     Flavoring No     Other No     Unknown No      Patient denies any family hx of cancer.  Social History:  Marital Status: Single   Occupation: PACU nurse aid at [employer]  Patient Pre-hospital Level of Mobility: walks      Meds/Allergies   I have reviewed home medications with patient personally.  Prior to Admission medications    Medication Sig Start Date End Date Taking? Authorizing Provider   Accu-Chek FastClix Lancets MISC Test BG up to 3x daily as directed 3/18/21  Yes Maria Luisa Razo MD   Accu-Chek Guide test strip TEST BG UP TO 3X DAILY AS DIRECTED 3/18/21  Yes Maria Luisa Razo MD   acetaminophen (TYLENOL) 500 mg tablet Take 1 tablet (500 mg total) by mouth every 6 (six) hours as needed for mild pain or moderate pain 8/13/23  Yes Micheal Murcia PA-C   Continuous Blood Gluc  (Dexcom G7 ) ENRICO Use 1 Units daily 11/2/23  Yes Maria Luisa Razo MD   Continuous Glucose Sensor (Dexcom G7 Sensor) Use 1 Device every 10 days 11/26/24  Yes Maria Luisa Razo MD   ibuprofen (MOTRIN) 400 mg tablet Take 1 tablet (400 mg total) by mouth every 6 (six) hours as needed for mild pain or moderate pain 8/13/23  Yes Micheal Murcia PA-C   Injection Device for Insulin (B-D PEN MINI) ENRICO Use 4 (four) times a day Please use for Lantus and Humalog. 4 pen needles daily Dx: Diabetes 3/18/21  Yes Chaim Foote MD   insulin aspart (NovoLOG FlexPen) 100 UNIT/ML injection pen Inject 10 Units under the skin 3 (three) times a day with meals 2/2/24  Yes KETURAH Chan   insulin degludec (Tresiba FlexTouch) 100 units/mL injection pen Inject 30 Units under the skin daily 2/2/24  Yes KETURAH Chan   Insulin Pen Needle (BD Pen Needle Em U/F) 32G X 4 MM MISC Use daily 11/21/24  Yes  Chaim Foote MD   lidocaine (Lidoderm) 5 % Apply 1 patch topically over 12 hours daily Remove & Discard patch within 12 hours or as directed by MD 5/23/23  Yes Jany De Souza MD   meclizine (ANTIVERT) 12.5 MG tablet Take 1 tablet (12.5 mg total) by mouth 3 (three) times a day as needed for dizziness 4/20/24  Yes Christine Yun PA-C   methocarbamol (ROBAXIN) 500 mg tablet Take 1 tablet (500 mg total) by mouth 2 (two) times a day 11/23/23  Yes Michelle Anderson, DO   naproxen (NAPROSYN) 500 mg tablet Take 1 tablet (500 mg total) by mouth 2 (two) times a day with meals 7/28/24  Yes Ira Miramontes, DO   ondansetron (ZOFRAN) 4 mg tablet Take 1 tablet (4 mg total) by mouth every 6 (six) hours 11/17/24  Yes Thelma Patrick DO   pantoprazole (PROTONIX) 40 mg tablet Take 1 tablet (40 mg total) by mouth daily 1/4/24  Yes Chaim Foote MD   pregabalin (LYRICA) 50 mg capsule Take 1 PO HS x 5 days, then 1 PO BID x 5 days, then 1 PO TID 1/31/24  Yes KETURAH Cox   semaglutide, 2 mg/dose, (Ozempic) 8 mg/ mL injection pen Inject 0.75 mL (2 mg total) under the skin every 7 days 9/28/23  Yes Chaim Foote MD   dicyclomine (BENTYL) 20 mg tablet Take 1 tablet (20 mg total) by mouth 2 (two) times a day as needed (abd pain) for up to 2 days  Patient not taking: Reported on 11/26/2024 12/18/23 2/2/24  Thelma Patrick DO   topiramate (TOPAMAX) 50 MG tablet Take 1 tablet (50 mg total) by mouth daily at bedtime 5/16/23   KETURAH Hanks     Allergies   Allergen Reactions    Metformin And Related Hives       Objective :  Temp:  [98.2 °F (36.8 °C)-99.5 °F (37.5 °C)] 98.2 °F (36.8 °C)  HR:  [74-94] 74  BP: ()/(48-79) 123/79  Resp:  [16-18] 17  SpO2:  [96 %-100 %] 97 %  O2 Device: None (Room air)    Physical Exam  Constitutional:       General: She is not in acute distress.     Appearance: Normal appearance. She is obese. She is not ill-appearing or toxic-appearing.   Eyes:      Pupils: Pupils are equal,  round, and reactive to light.   Cardiovascular:      Rate and Rhythm: Normal rate and regular rhythm.      Heart sounds: No murmur heard.  Pulmonary:      Effort: Pulmonary effort is normal. No respiratory distress.      Breath sounds: No wheezing or rales.   Abdominal:      General: There is no distension.      Tenderness: There is abdominal tenderness. There is no guarding or rebound.      Comments: Mild abdominal tenderness to the right lower abdomen. No guarding, rebound tenderness, or rigidity noted.    Neurological:      Mental Status: She is alert.   Psychiatric:         Mood and Affect: Mood normal.          Lines/Drains:    Right, peripheral line.             Lab Results: I have reviewed the following results:  Results from last 7 days   Lab Units 11/27/24  0029   WBC Thousand/uL 12.20*   HEMOGLOBIN g/dL 12.3   HEMATOCRIT % 37.0   PLATELETS Thousands/uL 482*   SEGS PCT % 58   LYMPHO PCT % 30   MONO PCT % 9   EOS PCT % 2     Results from last 7 days   Lab Units 11/27/24  0029   SODIUM mmol/L 133*   POTASSIUM mmol/L 4.3   CHLORIDE mmol/L 98   CO2 mmol/L 27   BUN mg/dL 11   CREATININE mg/dL 0.95   ANION GAP mmol/L 8   CALCIUM mg/dL 9.3   ALBUMIN g/dL 3.5   TOTAL BILIRUBIN mg/dL 0.49   ALK PHOS U/L 95   ALT U/L 10   AST U/L 12*   GLUCOSE RANDOM mg/dL 391*             Lab Results   Component Value Date    HGBA1C 14.0 (A) 11/21/2024    HGBA1C 12.0 (A) 05/03/2024    HGBA1C 9.6 (A) 02/02/2024           Imaging Results Review: I reviewed radiology reports from this admission including: CT abdomen/pelvis.    CT of abdomen and pelvis with IV contrast 11/27 notes inflammatory and necrotic lymphadenopathy in the right lower quadrant of the abdomen. No pneumoperitoneum noted. Hiatal hernia noted with minimal fluid visualized in distal esophageus, consistent with GERD. Mild colonic diverticulosis noted, no evidence of acute diverticulitis. In comparison to CT on 11/17, necrosis has worsened.    Administrative Statements        ** Please Note: This note has been constructed using a voice recognition system. **

## 2024-11-27 NOTE — ASSESSMENT & PLAN NOTE
44 year old female with RLQ pain x 2 weeks with progressive symptoms. CT findings show worsening LAD with necrosis. Patient has f/u appointment with surgical oncology x 1 week. Pmhx of DM and umbilical hernia repair. No family history or personal history of cancer.    Plan:  - Consult IR for lymph node biopsy  - Admit to internal medicine team  - F/u with surgical oncology   - Pain and nausea control PRN  - DVT ppx  - Remainder of care per primary team

## 2024-11-27 NOTE — CONSULTS
"Consultation - Surgery-General   Name: Tari Shannon 44 y.o. female I MRN: [id number]  Unit/Bed#: ED-25 I Date of Admission: 11/26/2024   Date of Service: 11/27/2024 I Hospital Day: 0   Consult to Surgery - General  Consult performed by: Ant Orlando PA-C  Consult ordered by: Terence Chavarria PA-C        Physician Requesting Evaluation: Thelma Patrick DO   Reason for Evaluation / Principal Problem: RLQ pain    Assessment & Plan  LAD (lymphadenopathy), intra-abdominal  44 year old female with RLQ pain x 2 weeks with progressive symptoms. CT findings show worsening LAD with necrosis. Patient has f/u appointment with surgical oncology x 1 week. Pmhx of DM and umbilical hernia repair. No family history or personal history of cancer.    Plan:  - Consult IR for lymph node biopsy  - Follow-up outpatient with surgical oncology for PET scan  - Recommend glycemic control  - Admit to internal medicine team  - F/u with surgical oncology   - Pain and nausea control PRN  - DVT ppx  - Remainder of care per primary team  Please contact the SecureChat role,\"Surgical oncology\", with any questions/concerns.    History of Present Illness   Tari Shannon is a 44 y.o. female with past medical history of DM and hernia repair multiple years ago with x 2 weeks of RLQ pain. Originally presented to [facility] ED on 11/17 with similar symptoms and instructed to follow-up outpatient with surgical oncology. Currently, she presents with worsening RLQ pain that she described as deep without any radiation. She has associated N/V where she's taken Zofran and Tylenol without relief. Patient denies recent, unexpected weight loss within the last year. She denies any family history of cancers. She denies fevers, chills, SOB or chest pain.    Review of Systems   Constitutional:  Negative for activity change, chills, fever and unexpected weight change.   Respiratory:  Negative for apnea, cough, choking and shortness of breath.    Cardiovascular:  Negative " for chest pain, palpitations and leg swelling.   Gastrointestinal:  Positive for nausea and vomiting. Negative for constipation and diarrhea.   Skin:  Negative for color change and pallor.   Neurological:  Negative for dizziness, tremors, seizures, syncope, light-headedness and numbness.     I have reviewed the patient's PMH, PSH, Social History, Family History, Meds, and Allergies  Historical Information   Past Medical History:   Diagnosis Date    Diabetes mellitus (HCC)     Migraine headache     Vertigo, aural, unspecified laterality      Past Surgical History:   Procedure Laterality Date    COLONOSCOPY      HERNIA REPAIR      TUBAL LIGATION  2017    UPPER GASTROINTESTINAL ENDOSCOPY       Social History     Tobacco Use    Smoking status: Never    Smokeless tobacco: Never   Vaping Use    Vaping status: Never Used   Substance and Sexual Activity    Alcohol use: Not Currently     Comment: ocassionally    Drug use: Never    Sexual activity: Yes     Partners: Male     E-Cigarette/Vaping    E-Cigarette Use Never User      E-Cigarette/Vaping Substances    Nicotine No     THC No     CBD No     Flavoring No     Other No     Unknown No      Family History   Problem Relation Age of Onset    Hypertension Mother     Arthritis Mother     Asthma Sister     Bipolar disorder Brother     Schizophrenia Brother     Asthma Brother     Asthma Brother     Diabetes Maternal Grandmother     Diabetes Paternal Grandmother     No Known Problems Maternal Grandfather     No Known Problems Paternal Grandfather     Breast cancer Neg Hx      Social History     Tobacco Use    Smoking status: Never    Smokeless tobacco: Never   Vaping Use    Vaping status: Never Used   Substance and Sexual Activity    Alcohol use: Not Currently     Comment: ocassionally    Drug use: Never    Sexual activity: Yes     Partners: Male     No current facility-administered medications for this encounter.  Prior to Admission Medications   Prescriptions Last Dose Informant  Patient Reported? Taking?   Accu-Chek FastClix Lancets MISC  Self No Yes   Sig: Test BG up to 3x daily as directed   Accu-Chek Guide test strip  Self No Yes   Sig: TEST BG UP TO 3X DAILY AS DIRECTED   Continuous Blood Gluc  (Dexcom G7 ) ENRICO  Self No Yes   Sig: Use 1 Units daily   Continuous Glucose Sensor (Dexcom G7 Sensor)   No Yes   Sig: Use 1 Device every 10 days   Injection Device for Insulin (B-D PEN MINI) ENRICO  Self No Yes   Sig: Use 4 (four) times a day Please use for Lantus and Humalog. 4 pen needles daily Dx: Diabetes   Insulin Pen Needle (BD Pen Needle Em U/F) 32G X 4 MM MISC  Self No Yes   Sig: Use daily   acetaminophen (TYLENOL) 500 mg tablet  Self No Yes   Sig: Take 1 tablet (500 mg total) by mouth every 6 (six) hours as needed for mild pain or moderate pain   dicyclomine (BENTYL) 20 mg tablet   No No   Sig: Take 1 tablet (20 mg total) by mouth 2 (two) times a day as needed (abd pain) for up to 2 days   Patient not taking: Reported on 11/26/2024   ibuprofen (MOTRIN) 400 mg tablet  Self No Yes   Sig: Take 1 tablet (400 mg total) by mouth every 6 (six) hours as needed for mild pain or moderate pain   insulin aspart (NovoLOG FlexPen) 100 UNIT/ML injection pen  Self No Yes   Sig: Inject 10 Units under the skin 3 (three) times a day with meals   insulin degludec (Tresiba FlexTouch) 100 units/mL injection pen  Self No Yes   Sig: Inject 30 Units under the skin daily   lidocaine (Lidoderm) 5 %  Self No Yes   Sig: Apply 1 patch topically over 12 hours daily Remove & Discard patch within 12 hours or as directed by MD sanfordzine (ANTIVERT) 12.5 MG tablet  Self No Yes   Sig: Take 1 tablet (12.5 mg total) by mouth 3 (three) times a day as needed for dizziness   methocarbamol (ROBAXIN) 500 mg tablet  Self No Yes   Sig: Take 1 tablet (500 mg total) by mouth 2 (two) times a day   naproxen (NAPROSYN) 500 mg tablet  Self No Yes   Sig: Take 1 tablet (500 mg total) by mouth 2 (two) times a day with  meals   ondansetron (ZOFRAN) 4 mg tablet  Self No Yes   Sig: Take 1 tablet (4 mg total) by mouth every 6 (six) hours   pantoprazole (PROTONIX) 40 mg tablet  Self No Yes   Sig: Take 1 tablet (40 mg total) by mouth daily   pregabalin (LYRICA) 50 mg capsule  Self No Yes   Sig: Take 1 PO HS x 5 days, then 1 PO BID x 5 days, then 1 PO TID   semaglutide, 2 mg/dose, (Ozempic) 8 mg/ mL injection pen  Self No Yes   Sig: Inject 0.75 mL (2 mg total) under the skin every 7 days   topiramate (TOPAMAX) 50 MG tablet  Self No No   Sig: Take 1 tablet (50 mg total) by mouth daily at bedtime      Facility-Administered Medications: None     Metformin and related    Objective :  Temp:  [99.5 °F (37.5 °C)] 99.5 °F (37.5 °C)  HR:  [81-94] 84  BP: ()/(48-70) 120/61  Resp:  [16-18] 16  SpO2:  [96 %-100 %] 96 %  O2 Device: None (Room air)      Physical Exam  Constitutional:       Appearance: Normal appearance. She is obese. She is not ill-appearing or diaphoretic.   Cardiovascular:      Rate and Rhythm: Normal rate and regular rhythm.      Pulses: Normal pulses.      Heart sounds: Normal heart sounds. No murmur heard.     No gallop.   Pulmonary:      Effort: Pulmonary effort is normal. No respiratory distress.      Breath sounds: Normal breath sounds. No wheezing or rales.   Abdominal:      General: Abdomen is flat. There is no distension.      Palpations: Abdomen is soft. There is no mass.      Tenderness: There is abdominal tenderness (RLQ tenderness to palpation without guarding or rigidity. No fluid wave tenderness. Negative Riggins's or McBurney's.). There is no right CVA tenderness, left CVA tenderness, guarding or rebound.      Hernia: No hernia is present.   Skin:     General: Skin is warm and dry.   Neurological:      General: No focal deficit present.      Mental Status: She is alert and oriented to person, place, and time.   Psychiatric:         Mood and Affect: Mood normal.         Behavior: Behavior normal.         Lab  Results: I have reviewed the following results:  Recent Labs     11/27/24  0029   WBC 12.20*   HGB 12.3   HCT 37.0   *   SODIUM 133*   K 4.3   CL 98   CO2 27   BUN 11   CREATININE 0.95   GLUC 391*   AST 12*   ALT 10   ALB 3.5   TBILI 0.49   ALKPHOS 95       11/26 CT: Inflammatory change and necrotic appearing lymphadenopathy in the right lower quadrant appears worse compared with November 17, 2024. This could be infectious, inflammatory, neoplastic, or a combination. Clinical correlation and follow-up recommended.   PET/CT recommended.     There is a small hiatal hernia. There is some fluid within the visualized distal esophagus, suggesting gastroesophageal reflux. The wall of the visualized distal esophagus appears thickened. Clinical correlation and follow-up recommended.     Mild hepatomegaly.     Mild colonic diverticulosis without evidence of acute diverticulitis. No bowel obstruction.    VTE Pharmacologic Prophylaxis: VTE covered by:    None     VTE Mechanical Prophylaxis: sequential compression device    Administrative Statements   I have spent a total time of 30 minutes in caring for this patient on the day of the visit/encounter including Diagnostic results, Counseling / Coordination of care, Documenting in the medical record, and Communicating with other healthcare professionals .

## 2024-11-27 NOTE — CONSULTS
Medical Oncology/Hematology Consult Note  Tari Shannon, female, 44 y.o., 1980,  [place] [address] /[place] [address] M*, [phone]     Assessment and Plan  1.  Lymphadenopathy  Patient was seen by surgical oncology and will be undergoing excisional biopsy to determine if the adenopathy is infectious, inflammatory/autoimmune process, malignancy  I reviewed with patient that if biopsy is consistent with malignancy then surgical oncology will refer her to our office should we need to be involved  Patient already has CT chest imaging ordered, recommend completing this inpatient  PET/CT will be canceled as this will not provide additional information at this time and endocrinology is not recommending this secondary to her poor glucose control.  Our staff will cancel this.    Was reviewed with patient, daughter, sister all in the room today.    Reason for consultation: Lymphadenopathy    History of present illness:   44-year-old female presented to the hospital secondary to abdominal pain.    Patient had a CT of the abdomen and pelvis on 11/17/2024.  Secondary to right lower quadrant and right upper quadrant pain with associated anorexia and nausea.    This showed several prominent to mildly enlarged lymph nodes within the right lower quadrant mesentery measuring up to 1.4 cm with a few having central necrosis.  There was no other findings in the CT of the abdomen.    Patient was then scheduled a PET/CT by her primary care provider.    This was not yet completed.    Yesterday she was seen by her endocrinologist who recommended tighter glucose control prior to proceeding with PET/CT secondary to the administration of radioactive glucose material for PET/CT imaging.    She then presented back to the emergency room late yesterday for continued and worsening abdominal pain x 2 weeks.  CT imaging of the abdomen pelvis showed worsening of the adenopathy in the right lower quadrant though a exact measurement was not given.  Noted to  have mild hepatomegaly and mild colonic diverticulosis without evidence of diverticulitis.    IR reviewed patient's imaging.  She is not amendable to IR biopsy secondary to the location.    Patient will require excisional biopsy by surgical oncology.     Review of Systems:   Review of Systems   Constitutional:  Positive for appetite change (decreased) and fatigue.   Respiratory:  Negative for cough and shortness of breath.    Cardiovascular:  Negative for leg swelling.   Gastrointestinal:  Positive for abdominal pain (RLQ). Negative for blood in stool, nausea and vomiting.   Genitourinary:  Negative for difficulty urinating.   Musculoskeletal:  Negative for arthralgias and myalgias.   Skin: Negative.    Neurological:  Negative for dizziness, light-headedness and headaches.   Hematological: Negative.    Psychiatric/Behavioral: Negative.         Past Medical History:   Diagnosis Date    Diabetes mellitus (HCC)     Migraine headache     Vertigo, aural, unspecified laterality        Past Surgical History:   Procedure Laterality Date    COLONOSCOPY      HERNIA REPAIR      TUBAL LIGATION  2017    UPPER GASTROINTESTINAL ENDOSCOPY         Family History   Problem Relation Age of Onset    Hypertension Mother     Arthritis Mother     Asthma Sister     Bipolar disorder Brother     Schizophrenia Brother     Asthma Brother     Asthma Brother     Diabetes Maternal Grandmother     Diabetes Paternal Grandmother     No Known Problems Maternal Grandfather     No Known Problems Paternal Grandfather     Breast cancer Neg Hx        Social History     Socioeconomic History    Marital status: Single     Spouse name: None    Number of children: None    Years of education: None    Highest education level: None   Occupational History     Employer: Merged with [employer]     Employer: Merged with [employer]     Employer: [employer] ALL EMPLOYEES   Tobacco Use    Smoking status: Never    Smokeless tobacco: Never   Vaping Use     Vaping status: Never Used   Substance and Sexual Activity    Alcohol use: Not Currently     Comment: ocassionally    Drug use: Never    Sexual activity: Yes     Partners: Male   Other Topics Concern    None   Social History Narrative    None     Social Drivers of Health     Financial Resource Strain: High Risk (7/29/2024)    Overall Financial Resource Strain (CARDIA)     Difficulty of Paying Living Expenses: Hard   Food Insecurity: Patient Declined (11/27/2024)    Nursing - Inadequate Food Risk Classification     Worried About Running Out of Food in the Last Year: Never true     Ran Out of Food in the Last Year: Never true     Ran Out of Food in the Last Year: 98   Transportation Needs: No Transportation Needs (11/27/2024)    Nursing - Transportation Risk Classification     Lack of Transportation: Not on file     Lack of Transportation: 2   Physical Activity: Unknown (11/27/2020)    Exercise Vital Sign     Days of Exercise per Week: 0 days     Minutes of Exercise per Session: Not on file   Stress: No Stress Concern Present (11/27/2020)    [employer] - Occupational Stress Questionnaire     Feeling of Stress : Only a little   Social Connections: Moderately Isolated (11/27/2020)    Social Connection and Isolation Panel [NHANES]     Frequency of Communication with Friends and Family: More than three times a week     Frequency of Social Gatherings with Friends and Family: More than three times a week     Attends Taoist Services: 1 to 4 times per year     Active Member of Clubs or Organizations: No     Attends Club or Organization Meetings: Never     Marital Status: Never    Intimate Partner Violence: Not At Risk (11/27/2020)    Humiliation, Afraid, Rape, and Kick questionnaire     Fear of Current or Ex-Partner: No     Emotionally Abused: No     Physically Abused: No     Sexually Abused: No   Housing Stability: At Risk (11/27/2024)    Nursing: Inadequate Housing Risk Classification      Has Housing: Not on file     Worried About Losing Housing: Not on file     Unable to Get Utilities: Not on file     Unable to Pay for Housing in the Last Year: 1     Has Housin         Current Facility-Administered Medications:     acetaminophen (TYLENOL) tablet 650 mg, 650 mg, Oral, Q6H PRN, Olga Diaz MD    heparin (porcine) subcutaneous injection 5,000 Units, 5,000 Units, Subcutaneous, Q8H CARMELO, Olga Diaz MD, 5,000 Units at 24 1423    HYDROmorphone (DILAUDID) injection 0.3 mg, 0.3 mg, Intravenous, Q3H PRN, Olga Diaz MD    insulin glargine (LANTUS) subcutaneous injection 30 Units 0.3 mL, 30 Units, Subcutaneous, HS, Olga Diaz MD    insulin lispro (HumALOG/ADMELOG) 100 units/mL subcutaneous injection 1-6 Units, 1-6 Units, Subcutaneous, 4x Daily (AC & HS), 4 Units at 24 0813 **AND** Fingerstick Glucose (POCT), , , 4x Daily AC and at bedtime, Olga Diaz MD    insulin lispro (HumALOG/ADMELOG) 100 units/mL subcutaneous injection 10 Units, 10 Units, Subcutaneous, TID With Meals, Lars Navas MD    lidocaine (LIDODERM) 5 % patch 1 patch, 1 patch, Topical, Daily, Olga Diaz MD, 1 patch at 24 0814    meclizine (ANTIVERT) tablet 12.5 mg, 12.5 mg, Oral, TID PRN, Olga Diaz MD    naproxen (NAPROSYN) tablet 500 mg, 500 mg, Oral, BID With Meals, Olga Diaz MD, 500 mg at 24 0814    ondansetron (ZOFRAN) injection 4 mg, 4 mg, Intravenous, Q4H PRN, Olga Diaz MD    oxyCODONE (ROXICODONE) IR tablet 5 mg, 5 mg, Oral, Q6H PRN, Olga Diaz MD, 5 mg at 24 0516    oxyCODONE (ROXICODONE) split tablet 2.5 mg, 2.5 mg, Oral, Q6H PRN, Olga Diaz MD    pantoprazole (PROTONIX) EC tablet 40 mg, 40 mg, Oral, Daily Before Breakfast, Olga Diaz MD, 40 mg at 24 0536    pregabalin (LYRICA) capsule 50 mg, 50 mg, Oral, TID, Olga Diaz MD, 50 mg at 24 0814      Medications Prior to Admission:     acetaminophen (TYLENOL) 500 mg tablet    naproxen  "(NAPROSYN) 500 mg tablet    pantoprazole (PROTONIX) 40 mg tablet    pregabalin (LYRICA) 50 mg capsule    Accu-Chek FastClix Lancets MISC    Accu-Chek Guide test strip    Continuous Blood Gluc  (Dexcom G7 ) ENRICO    Continuous Glucose Sensor (Dexcom G7 Sensor)    dicyclomine (BENTYL) 20 mg tablet    Injection Device for Insulin (B-D PEN MINI) ENRICO    insulin aspart (NovoLOG FlexPen) 100 UNIT/ML injection pen    insulin degludec (Tresiba FlexTouch) 100 units/mL injection pen    Insulin Pen Needle (BD Pen Needle Em U/F) 32G X 4 MM MISC    lidocaine (Lidoderm) 5 %    meclizine (ANTIVERT) 12.5 MG tablet    ondansetron (ZOFRAN) 4 mg tablet    semaglutide, 2 mg/dose, (Ozempic) 8 mg/ mL injection pen    topiramate (TOPAMAX) 50 MG tablet    Allergies   Allergen Reactions    Metformin And Related Hives         Physical Exam:    /70   Pulse 78   Temp 99.1 °F (37.3 °C)   Resp 17   Ht 5' 5\" (1.651 m)   Wt 83 kg (183 lb)   LMP 11/17/2024 (Approximate)   SpO2 97%   BMI 30.45 kg/m²     Physical Exam  Constitutional:       General: She is not in acute distress.     Appearance: She is well-developed. She is not ill-appearing.   HENT:      Head: Normocephalic and atraumatic.   Eyes:      General: No scleral icterus.     Conjunctiva/sclera: Conjunctivae normal.   Cardiovascular:      Rate and Rhythm: Normal rate and regular rhythm.      Heart sounds: Normal heart sounds. No murmur heard.  Pulmonary:      Effort: Pulmonary effort is normal. No respiratory distress.      Breath sounds: Normal breath sounds.   Abdominal:      Palpations: Abdomen is soft.      Tenderness: There is abdominal tenderness.   Musculoskeletal:         General: No tenderness. Normal range of motion.      Cervical back: Normal range of motion and neck supple.      Right lower leg: No edema.      Left lower leg: No edema.   Lymphadenopathy:      Cervical: No cervical adenopathy.   Skin:     General: Skin is warm and dry.      " Coloration: Skin is not pale.   Neurological:      Mental Status: She is alert and oriented to person, place, and time.      Cranial Nerves: No cranial nerve deficit.   Psychiatric:         Mood and Affect: Mood normal.         Behavior: Behavior normal.         Recent Results (from the past 48 hours)   CBC and differential    Collection Time: 11/27/24 12:29 AM   Result Value Ref Range    WBC 12.20 (H) 4.31 - 10.16 Thousand/uL    RBC 4.22 3.81 - 5.12 Million/uL    Hemoglobin 12.3 11.5 - 15.4 g/dL    Hematocrit 37.0 34.8 - 46.1 %    MCV 88 82 - 98 fL    MCH 29.1 26.8 - 34.3 pg    MCHC 33.2 31.4 - 37.4 g/dL    RDW 12.0 11.6 - 15.1 %    MPV 9.5 8.9 - 12.7 fL    Platelets 482 (H) 149 - 390 Thousands/uL    nRBC 0 /100 WBCs    Segmented % 58 43 - 75 %    Immature Grans % 0 0 - 2 %    Lymphocytes % 30 14 - 44 %    Monocytes % 9 4 - 12 %    Eosinophils Relative 2 0 - 6 %    Basophils Relative 1 0 - 1 %    Absolute Neutrophils 7.15 1.85 - 7.62 Thousands/µL    Absolute Immature Grans 0.03 0.00 - 0.20 Thousand/uL    Absolute Lymphocytes 3.67 0.60 - 4.47 Thousands/µL    Absolute Monocytes 1.04 0.17 - 1.22 Thousand/µL    Eosinophils Absolute 0.24 0.00 - 0.61 Thousand/µL    Basophils Absolute 0.07 0.00 - 0.10 Thousands/µL   Comprehensive metabolic panel    Collection Time: 11/27/24 12:29 AM   Result Value Ref Range    Sodium 133 (L) 135 - 147 mmol/L    Potassium 4.3 3.5 - 5.3 mmol/L    Chloride 98 96 - 108 mmol/L    CO2 27 21 - 32 mmol/L    ANION GAP 8 4 - 13 mmol/L    BUN 11 5 - 25 mg/dL    Creatinine 0.95 0.60 - 1.30 mg/dL    Glucose 391 (H) 65 - 140 mg/dL    Calcium 9.3 8.4 - 10.2 mg/dL    AST 12 (L) 13 - 39 U/L    ALT 10 7 - 52 U/L    Alkaline Phosphatase 95 34 - 104 U/L    Total Protein 7.5 6.4 - 8.4 g/dL    Albumin 3.5 3.5 - 5.0 g/dL    Total Bilirubin 0.49 0.20 - 1.00 mg/dL    eGFR 73 ml/min/1.73sq m   Lipase    Collection Time: 11/27/24 12:29 AM   Result Value Ref Range    Lipase 17 11 - 82 u/L   POCT pregnancy, urine     Collection Time: 11/27/24 12:30 AM   Result Value Ref Range    EXT Preg Test, Ur Negative     Control Valid    UA w Reflex to Microscopic w Reflex to Culture    Collection Time: 11/27/24 12:31 AM    Specimen: Urine, Clean Catch   Result Value Ref Range    Color, UA Colorless     Clarity, UA Clear     Specific Gravity, UA 1.024 1.003 - 1.030    pH, UA 8.0 4.5, 5.0, 5.5, 6.0, 6.5, 7.0, 7.5, 8.0    Leukocytes, UA (A) Negative     Elevated glucose may cause decreased leukocyte values. See urine microscopic for UWBC result    Nitrite, UA Negative Negative    Protein, UA Negative Negative mg/dl    Glucose, UA >=1000 (1%) (A) Negative mg/dl    Ketones, UA Negative Negative mg/dl    Urobilinogen, UA <2.0 <2.0 mg/dl mg/dl    Bilirubin, UA Negative Negative    Occult Blood, UA Negative Negative   Urine Microscopic    Collection Time: 11/27/24 12:31 AM   Result Value Ref Range    RBC, UA None Seen None Seen, 1-2 /hpf    WBC, UA 1-2 None Seen, 1-2 /hpf    Epithelial Cells Moderate (A) None Seen, Occasional /hpf    Bacteria, UA None Seen None Seen, Occasional /hpf   Fingerstick Glucose (POCT)    Collection Time: 11/27/24  5:11 AM   Result Value Ref Range    POC Glucose 345 (H) 65 - 140 mg/dl   Fingerstick Glucose (POCT)    Collection Time: 11/27/24  7:56 AM   Result Value Ref Range    POC Glucose 288 (H) 65 - 140 mg/dl   Fingerstick Glucose (POCT)    Collection Time: 11/27/24 11:08 AM   Result Value Ref Range    POC Glucose 199 (H) 65 - 140 mg/dl     Labs and pertinent reports reviewed.      This note has been generated by voice recognition software system.  Therefore, there may be spelling, grammar, and or syntax errors. Please contact if questions arise.

## 2024-11-27 NOTE — ASSESSMENT & PLAN NOTE
Patient met with Endocrinology yesterday 11/26. She admits that she has not been taking her insulin and Ozempic in 2 months due to her abdominal pain and overall health.   Continue recommended regimen by the Endocrinologist Tribesa 30 units at bedtime and Novolog 10 units TID before meals.  Hold Ozempic   Continue to monitor glucose levels before meals and at bed time.   Continue pregabalin 50 mg TID  Lab Results   Component Value Date    HGBA1C 14.0 (A) 11/21/2024      Glucose    Random glucose  391  Random glucose

## 2024-11-27 NOTE — PLAN OF CARE
Problem: PAIN - ADULT  Goal: Verbalizes/displays adequate comfort level or baseline comfort level  Description: Interventions:  - Encourage patient to monitor pain and request assistance  - Assess pain using appropriate pain scale  - Administer analgesics based on type and severity of pain and evaluate response  - Implement non-pharmacological measures as appropriate and evaluate response  - Consider cultural and social influences on pain and pain management  - Notify physician/advanced practitioner if interventions unsuccessful or patient reports new pain  Outcome: Progressing     Problem: INFECTION - ADULT  Goal: Absence or prevention of progression during hospitalization  Description: INTERVENTIONS:  - Assess and monitor for signs and symptoms of infection  - Monitor lab/diagnostic results  - Monitor all insertion sites, i.e. indwelling lines, tubes, and drains  - Monitor endotracheal if appropriate and nasal secretions for changes in amount and color  - [place] appropriate cooling/warming therapies per order  - Administer medications as ordered  - Instruct and encourage patient and family to use good hand hygiene technique  - Identify and instruct in appropriate isolation precautions for identified infection/condition  Outcome: Progressing     Problem: SAFETY ADULT  Goal: Patient will remain free of falls  Description: INTERVENTIONS:  - Educate patient/family on patient safety including physical limitations  - Instruct patient to call for assistance with activity   - Consult OT/PT to assist with strengthening/mobility   - Keep Call bell within reach  - Keep bed low and locked with side rails adjusted as appropriate  - Keep care items and personal belongings within reach  - Initiate and maintain comfort rounds  - Make Fall Risk Sign visible to staff  - Offer Toileting every 2 Hours, in advance of need  - Initiate/Maintain bed alarm  - Obtain necessary fall risk management equipment: alarm   - Apply yellow  socks and bracelet for high fall risk patients  - Consider moving patient to room near nurses station  Outcome: Progressing  Goal: Maintain or return to baseline ADL function  Description: INTERVENTIONS:  -  Assess patient's ability to carry out ADLs; assess patient's baseline for ADL function and identify physical deficits which impact ability to perform ADLs (bathing, care of mouth/teeth, toileting, grooming, dressing, etc.)  - Assess/evaluate cause of self-care deficits   - Assess range of motion  - Assess patient's mobility; develop plan if impaired  - Assess patient's need for assistive devices and provide as appropriate  - Encourage maximum independence but intervene and supervise when necessary  - Involve family in performance of ADLs  - Assess for home care needs following discharge   - Consider OT consult to assist with ADL evaluation and planning for discharge  - Provide patient education as appropriate  Outcome: Progressing  Goal: Maintains/Returns to pre admission functional level  Description: INTERVENTIONS:  - Perform AM-PAC 6 Click Basic Mobility/ Daily Activity assessment daily.  - Set and communicate daily mobility goal to care team and patient/family/caregiver.   - Collaborate with rehabilitation services on mobility goals if consulted  - Perform Range of Motion 4 times a day.  - Reposition patient every 2 hours.  - Dangle patient 3 times a day  - Stand patient 3 times a day  - Ambulate patient 3 times a day  - Out of bed to chair 3 times a day   - Out of bed for meals 3 times a day  - Out of bed for toileting  - Record patient progress and toleration of activity level   Outcome: Progressing     Problem: DISCHARGE PLANNING  Goal: Discharge to home or other facility with appropriate resources  Description: INTERVENTIONS:  - Identify barriers to discharge w/patient and caregiver  - Arrange for needed discharge resources and transportation as appropriate  - Identify discharge learning needs (meds,  wound care, etc.)  - Arrange for interpretive services to assist at discharge as needed  - Refer to Case Management Department for coordinating discharge planning if the patient needs post-hospital services based on physician/advanced practitioner order or complex needs related to functional status, cognitive ability, or social support system  Outcome: Progressing     Problem: Knowledge Deficit  Goal: Patient/family/caregiver demonstrates understanding of disease process, treatment plan, medications, and discharge instructions  Description: Complete learning assessment and assess knowledge base.  Interventions:  - Provide teaching at level of understanding  - Provide teaching via preferred learning methods  Outcome: Progressing     Problem: GASTROINTESTINAL - ADULT  Goal: Minimal or absence of nausea and/or vomiting  Description: INTERVENTIONS:  - Administer IV fluids if ordered to ensure adequate hydration  - Maintain NPO status until nausea and vomiting are resolved  - Nasogastric tube if ordered  - Administer ordered antiemetic medications as needed  - Provide nonpharmacologic comfort measures as appropriate  - Advance diet as tolerated, if ordered  - Consider nutrition services referral to assist patient with adequate nutrition and appropriate food choices  Outcome: Progressing

## 2024-11-27 NOTE — ASSESSMENT & PLAN NOTE
Intra-abdominal lymph node necrosis noted on CT abdomen and pelvis 11/27, suspicion for cancer.  No sign of sepsis or infection. No leukocytosis, fever, hypotension.   Obtain lymph node biopsy with IR to rule out cancer etiology  Follow up outpatient for PET scan with surgical oncology.  Radiology reccommended PET/CT scan, however previous endocrine note 11/26 states that she wants better management of diabetes prior to PET/CT scan.  DVT ppx with Heparin injection 5000 units subcutaneous, q8 hours.   Manage with pain with haloperidol lactate, ketorolac and morphine.  Continue Pantoprazole 40 mg tablet once a day  Continue Zofran 4 mg injection, PRN for nausea

## 2024-11-27 NOTE — PLAN OF CARE
Problem: PAIN - ADULT  Goal: Verbalizes/displays adequate comfort level or baseline comfort level  Description: Interventions:  - Encourage patient to monitor pain and request assistance  - Assess pain using appropriate pain scale  - Administer analgesics based on type and severity of pain and evaluate response  - Implement non-pharmacological measures as appropriate and evaluate response  - Consider cultural and social influences on pain and pain management  - Notify physician/advanced practitioner if interventions unsuccessful or patient reports new pain  Outcome: Progressing     Problem: INFECTION - ADULT  Goal: Absence or prevention of progression during hospitalization  Description: INTERVENTIONS:  - Assess and monitor for signs and symptoms of infection  - Monitor lab/diagnostic results  - Monitor all insertion sites, i.e. indwelling lines, tubes, and drains  - Monitor endotracheal if appropriate and nasal secretions for changes in amount and color  - [place] appropriate cooling/warming therapies per order  - Administer medications as ordered  - Instruct and encourage patient and family to use good hand hygiene technique  - Identify and instruct in appropriate isolation precautions for identified infection/condition  Outcome: Progressing     Problem: SAFETY ADULT  Goal: Patient will remain free of falls  Description: INTERVENTIONS:  - Educate patient/family on patient safety including physical limitations  - Instruct patient to call for assistance with activity   - Consult OT/PT to assist with strengthening/mobility   - Keep Call bell within reach  - Keep bed low and locked with side rails adjusted as appropriate  - Keep care items and personal belongings within reach  - Initiate and maintain comfort rounds  - Make Fall Risk Sign visible to staff  - Obtain necessary fall risk management equipment  - Apply yellow socks and bracelet for high fall risk patients  - Consider moving patient to room near nurses  station  Outcome: Progressing  Goal: Maintain or return to baseline ADL function  Description: INTERVENTIONS:  -  Assess patient's ability to carry out ADLs; assess patient's baseline for ADL function and identify physical deficits which impact ability to perform ADLs (bathing, care of mouth/teeth, toileting, grooming, dressing, etc.)  - Assess/evaluate cause of self-care deficits   - Assess range of motion  - Assess patient's mobility; develop plan if impaired  - Assess patient's need for assistive devices and provide as appropriate  - Encourage maximum independence but intervene and supervise when necessary  - Involve family in performance of ADLs  - Assess for home care needs following discharge   - Consider OT consult to assist with ADL evaluation and planning for discharge  - Provide patient education as appropriate  Outcome: Progressing  Goal: Maintains/Returns to pre admission functional level  Description: INTERVENTIONS:  - Perform AM-PAC 6 Click Basic Mobility/ Daily Activity assessment daily.  - Set and communicate daily mobility goal to care team and patient/family/caregiver.   - Collaborate with rehabilitation services on mobility goals if consulted  - Perform Range of Motion 3 times a day.  - Reposition patient every 2 hours.  - Dangle patient 3 times a day  - Stand patient 3 times a day  - Ambulate patient 3 times a day  - Out of bed to chair 3 times a day   - Out of bed for meals 3 times a day  - Out of bed for toileting  - Record patient progress and toleration of activity level   Outcome: Progressing     Problem: DISCHARGE PLANNING  Goal: Discharge to home or other facility with appropriate resources  Description: INTERVENTIONS:  - Identify barriers to discharge w/patient and caregiver  - Arrange for needed discharge resources and transportation as appropriate  - Identify discharge learning needs (meds, wound care, etc.)  - Arrange for interpretive services to assist at discharge as needed  - Refer  to Case Management Department for coordinating discharge planning if the patient needs post-hospital services based on physician/advanced practitioner order or complex needs related to functional status, cognitive ability, or social support system  Outcome: Progressing     Problem: Knowledge Deficit  Goal: Patient/family/caregiver demonstrates understanding of disease process, treatment plan, medications, and discharge instructions  Description: Complete learning assessment and assess knowledge base.  Interventions:  - Provide teaching at level of understanding  - Provide teaching via preferred learning methods  Outcome: Progressing     Problem: GASTROINTESTINAL - ADULT  Goal: Minimal or absence of nausea and/or vomiting  Description: INTERVENTIONS:  - Administer IV fluids if ordered to ensure adequate hydration  - Maintain NPO status until nausea and vomiting are resolved  - Nasogastric tube if ordered  - Administer ordered antiemetic medications as needed  - Provide nonpharmacologic comfort measures as appropriate  - Advance diet as tolerated, if ordered  - Consider nutrition services referral to assist patient with adequate nutrition and appropriate food choices  Outcome: Progressing     Problem: Prexisting or High Potential for Compromised Skin Integrity  Goal: Skin integrity is maintained or improved  Description: INTERVENTIONS:  - Identify patients at risk for skin breakdown  - Assess and monitor skin integrity  - Assess and monitor nutrition and hydration status  - Monitor labs   - Assess for incontinence   - Turn and reposition patient  - Assist with mobility/ambulation  - Relieve pressure over bony prominences  - Avoid friction and shearing  - Provide appropriate hygiene as needed including keeping skin clean and dry  - Evaluate need for skin moisturizer/barrier cream  - Collaborate with interdisciplinary team   - Patient/family teaching  - Consider wound care consult   Outcome: Progressing     Problem:  METABOLIC, FLUID AND ELECTROLYTES - ADULT  Goal: Electrolytes maintained within normal limits  Description: INTERVENTIONS:  - Monitor labs and assess patient for signs and symptoms of electrolyte imbalances  - Administer electrolyte replacement as ordered  - Monitor response to electrolyte replacements, including repeat lab results as appropriate  - Instruct patient on fluid and nutrition as appropriate  Outcome: Progressing  Goal: Fluid balance maintained  Description: INTERVENTIONS:  - Monitor labs   - Monitor I/O and WT  - Instruct patient on fluid and nutrition as appropriate  - Assess for signs & symptoms of volume excess or deficit  Outcome: Progressing  Goal: Glucose maintained within target range  Description: INTERVENTIONS:  - Monitor Blood Glucose as ordered  - Assess for signs and symptoms of hyperglycemia and hypoglycemia  - Administer ordered medications to maintain glucose within target range  - Assess nutritional intake and initiate nutrition service referral as needed  Outcome: Progressing

## 2024-11-27 NOTE — ED PROVIDER NOTES
Time reflects when diagnosis was documented in both MDM as applicable and the Disposition within this note       Time User Action Codes Description Comment    11/27/2024  4:00 AM Terence Chavarria Add [R59.0] Abdominal lymphadenopathy     11/27/2024  4:01 AM Terence Chavarria Add [R10.9] Intractable abdominal pain     11/27/2024  4:01 AM Terence Chavarria Add [D72.829] Leukocytosis     11/27/2024  4:30 AM Ant Orlando Add [R59.0] LAD (lymphadenopathy), intra-abdominal     12/1/2024  8:24 AM Silva Silva Modify [R59.0] Abdominal lymphadenopathy     12/2/2024 12:07 PM Manolo Fuentes Modify [R59.0] Abdominal lymphadenopathy     12/2/2024 12:07 PM Manolo Fuentes Add [A41.9] Sepsis without acute organ dysfunction (HCC)           ED Disposition       ED Disposition   Admit    Condition   Stable    Date/Time   Wed Nov 27, 2024  4:15 AM    Comment   Case was discussed with Dr. Diaz and the patient's admission status was agreed to be Admission Status: inpatient status to the service of Dr. Diaz .               Assessment & Plan       Medical Decision Making  44-year-old female presents for evaluation of continued/worsening abdominal pain for nearly 2 weeks.  Evaluated 10 days ago in this ER and found to have evidence of possible necrotic intra-abdominal lymph nodes, scheduled to see surgical oncology for evaluation, possible PET scan.  Exam: Patient appears uncomfortable, vitals WNL (brief hypotension only while sleeping); abdomen diffusely tender to palpation but worst in right lower quadrant.    Workup: CBC, CMP, lipase, UA.  Due to patient's persistent/worsening pain we will obtain repeat CT scan.  Management: Begin analgesia with Toradol, give fluids.    Patient's pain was not well-controlled with Toradol nor morphine.  Was administered Haldol with some relief.  Lab work only remarkable for a new leukocytosis of 12, mild hyponatremia of 133, and hyperglycemia of 391.  CT scan with concerning findings of worsening necrotic  appearing lymphadenopathy in right lower abdomen.  Discussed with reading radiologist on the phone.  There is significant concern for malignancy versus infection and she recommends admission to the hospital for workup.  Patient also experiencing intractable abdominal pain.  Discussed with SLIM.  Patient also evaluated by general surgery LUCIE Ant.  No emergent surgical interventions.  Patient will be admitted to medicine.    Amount and/or Complexity of Data Reviewed  Labs: ordered. Decision-making details documented in ED Course.  Radiology: ordered.    Risk  Prescription drug management.  Decision regarding hospitalization.        ED Course as of 12/03/24 0035   Wed Nov 27, 2024   0041 WBC(!): 12.20       Medications   sodium chloride 0.9 % bolus 1,000 mL (0 mL Intravenous Stopped 11/27/24 0236)   ketorolac (TORADOL) injection 30 mg (30 mg Intravenous Given 11/27/24 0032)   morphine injection 4 mg (4 mg Intravenous Given 11/27/24 0128)   iohexol (OMNIPAQUE) 350 MG/ML injection (MULTI-DOSE) 100 mL (100 mL Intravenous Given 11/27/24 0208)   haloperidol lactate (HALDOL) injection 5 mg (5 mg Intravenous Given 11/27/24 0235)       ED Risk Strat Scores                           SBIRT 22yo+      Flowsheet Row Most Recent Value   Initial Alcohol Screen: US AUDIT-C     1. How often do you have a drink containing alcohol? 0 Filed at: 11/26/2024 2345   2. How many drinks containing alcohol do you have on a typical day you are drinking?  0 Filed at: 11/26/2024 2345   3b. FEMALE Any Age, or MALE 65+: How often do you have 4 or more drinks on one occassion? 0 Filed at: 11/26/2024 2345   Audit-C Score 0 Filed at: 11/26/2024 2345   FAMILIA: How many times in the past year have you...    Used an illegal drug or used a prescription medication for non-medical reasons? Never Filed at: 11/26/2024 2345                            History of Present Illness       Chief Complaint   Patient presents with    Abdominal Pain     Patient c/o of  lower abdominal pain that has been going on for the past several weeks. Patient states she was seen here last week for the same issue and stomach pain has got worse.  Denies n/v/d        Past Medical History:   Diagnosis Date    Diabetes mellitus (HCC)     Migraine headache     Vertigo, aural, unspecified laterality       Past Surgical History:   Procedure Laterality Date    COLONOSCOPY      HERNIA REPAIR      NV LAPS ABD PRTM&OMENTUM DX W/WO SPEC BR/WA SPX N/A 12/1/2024    Procedure: LAPAROSCOPY DIAGNOSTIC, MESENTERIC BIOPSIES;  Surgeon: Akiko Lamb MD;  Location: [facility];  Service: Surgical Oncology    TUBAL LIGATION  2017    UPPER GASTROINTESTINAL ENDOSCOPY        Family History   Problem Relation Age of Onset    Hypertension Mother     Arthritis Mother     Asthma Sister     Bipolar disorder Brother     Schizophrenia Brother     Asthma Brother     Asthma Brother     Diabetes Maternal Grandmother     Diabetes Paternal Grandmother     No Known Problems Maternal Grandfather     No Known Problems Paternal Grandfather     Breast cancer Neg Hx       Social History     Tobacco Use    Smoking status: Never    Smokeless tobacco: Never   Vaping Use    Vaping status: Never Used   Substance Use Topics    Alcohol use: Not Currently     Comment: ocassionally    Drug use: Never      E-Cigarette/Vaping    E-Cigarette Use Never User       E-Cigarette/Vaping Substances    Nicotine No     THC No     CBD No     Flavoring No     Other No     Unknown No       I have reviewed and agree with the history as documented.     44-year-old female with PMH of diabetes presents for evaluation of persistent/worsening abdominal pain.  Patient evaluated in this ER about 10 days ago for abdominal pain, worse in the RLQ.  Had a workup that showed possible necrotic intra-abdominal lymph nodes concerning for inflammatory versus infectious versus malignancy.  At that time patient was also experiencing nausea and vomiting.  Today she is not  feeling nauseous but her pain has been continuing/worsening since last ED visit.  Poorly controlled at home with medications like Tylenol.  Has surgical oncology evaluation in a couple of weeks.        Review of Systems   Constitutional:  Negative for chills and fever.   HENT:  Negative for ear pain and sore throat.    Eyes:  Negative for pain and visual disturbance.   Respiratory:  Negative for cough and shortness of breath.    Cardiovascular:  Negative for chest pain and palpitations.   Gastrointestinal:  Positive for abdominal pain. Negative for vomiting.   Genitourinary:  Negative for dysuria and hematuria.   Musculoskeletal:  Negative for arthralgias and back pain.   Skin:  Negative for color change and rash.   Neurological:  Negative for seizures and syncope.   All other systems reviewed and are negative.          Objective       ED Triage Vitals   Temperature Pulse Blood Pressure Respirations SpO2 Patient Position - Orthostatic VS   11/26/24 2343 11/26/24 2343 11/26/24 2343 11/26/24 2343 11/26/24 2343 11/27/24 0149   99.5 °F (37.5 °C) 94 136/70 18 100 % Lying      Temp Source Heart Rate Source BP Location FiO2 (%) Pain Score    11/26/24 2343 11/27/24 0149 11/27/24 0149 -- 11/26/24 2343    Oral Monitor Right arm  10 - Worst Possible Pain      Vitals      Date and Time Temp Pulse SpO2 Resp BP Pain Score FACES Pain Rating User   12/03/24 0003 -- -- -- -- -- No Pain --    12/02/24 2204 -- -- -- -- -- Med Not Given for Pain - for MAR use only --    12/02/24 2130 -- -- -- -- 136/72 -- -- TS   12/02/24 2130 99.4 °F (37.4 °C) 94 100 % 18 -- -- -- DII   12/02/24 2012 -- -- -- 20 -- -- -- TS   12/02/24 2012 102.8 °F (39.3 °C) 96 97 % -- 128/81 -- -- DII   12/02/24 1936 -- -- -- -- -- 8 -- BG   12/02/24 1750 100.3 °F (37.9 °C) 89 99 % -- -- -- -- DII   12/02/24 1555 100.9 °F (38.3 °C) 84 96 % -- -- -- -- DII   12/02/24 1528 101.3 °F (38.5 °C) 85 96 % 16 114/63 -- -- DII   12/02/24 1248 -- -- -- -- -- Med Not Given  for Pain - for MAR use only -- BG   12/02/24 1217 -- -- -- 18 -- -- -- BG   12/02/24 1217 101.1 °F (38.4 °C) 94 98 % -- 136/80 -- -- DII   12/02/24 1124 -- 89 97 % -- 140/79 -- -- DII   12/02/24 0939 -- 95 95 % -- 119/74 -- -- DII   12/02/24 0740 -- -- -- -- -- 3 -- BG   12/02/24 0733 -- -- -- 16 -- -- -- WM   12/02/24 0733 99.8 °F (37.7 °C) 85 93 % -- 90/53 -- -- DII   12/02/24 0732 99.8 °F (37.7 °C) 81 94 % -- 89/53 -- -- DII   12/02/24 0600 -- -- -- -- -- 9 -- MS   12/01/24 2238 99.6 °F (37.6 °C) 85 95 % -- -- -- -- AS   12/01/24 2141 102.4 °F (39.1 °C) 89 95 % -- -- -- -- DII   12/01/24 2047 -- -- -- -- -- 8 -- MS   12/01/24 1951 -- -- -- -- -- 7 -- MS   12/01/24 1936 -- -- -- 18 -- -- -- AS   12/01/24 1936 103 °F (39.4 °C) 98 94 % -- 154/91 -- -- DII   12/01/24 1523 -- -- -- -- -- 8 -- DS   12/01/24 1452 99.5 °F (37.5 °C) 79 96 % 15 124/75 -- -- DII   12/01/24 1306 -- 78 97 % -- 110/71 -- -- DS   12/01/24 1236 -- 74 97 % -- 110/70 -- -- DS   12/01/24 1135 -- 75 96 % -- 108/68 -- -- DS   12/01/24 1105 -- 74 96 % -- 106/67 -- -- DS   12/01/24 1035 -- 75 95 % -- 108/68 -- -- DS   12/01/24 1003 99.3 °F (37.4 °C) 79 97 % -- 117/70 -- -- DII   12/01/24 0949 98.8 °F (37.1 °C) 80 96 % 14 105/60 No Pain -- KW   12/01/24 0933 -- 78 97 % 14 106/58 No Pain -- KW   12/01/24 0918 97.6 °F (36.4 °C) 80 96 % 15 105/64 -- -- KW   12/01/24 0715 100.7 °F (38.2 °C) 88 97 % 16 117/67 -- -- DII   12/01/24 0142 98.2 °F (36.8 °C) 79 97 % -- -- -- -- DII   11/30/24 2200 -- -- -- -- -- Med Not Given for Pain - for MAR use only -- MS   11/30/24 2158 102.4 °F (39.1 °C) 97 97 % -- -- -- -- DII   11/30/24 2032 -- -- -- 18 -- -- -- TS   11/30/24 2032 102.7 °F (39.3 °C) 102 98 % -- 139/87 -- -- DII   11/30/24 1951 -- -- -- -- -- No Pain -- MS   11/30/24 1521 98.8 °F (37.1 °C) 83 96 % 18 116/68 -- -- DII   11/30/24 1023 -- -- -- -- -- Med Not Given for Pain - for MAR use only -- DS   11/30/24 0730 -- -- -- -- -- 7 -- DS   11/30/24 0730 101.6 °F  (38.7 °C) 92 96 % 18 135/81 -- -- DII   11/29/24 2303 99.7 °F (37.6 °C) 87 97 % -- -- -- -- DII   11/29/24 2200 -- 98 95 % -- -- -- -- LM   11/29/24 2125 -- -- -- -- -- Med Not Given for Pain - for MAR use only -- LM   11/29/24 2059 101.6 °F (38.7 °C) 100 96 % 16 144/83 -- -- DII   11/29/24 2028 -- -- -- -- -- 4 -- LM   11/29/24 1746 -- -- -- -- -- 7 -- AM   11/29/24 1714 101.9 °F (38.8 °C) 101 99 % -- -- -- -- AM   11/29/24 1622 102 °F (38.9 °C) 103 98 % 19 154/85 -- -- DII   11/29/24 1552 -- -- -- -- -- 7 Temp 101.5 F -- AM   11/29/24 1538 101.5 °F (38.6 °C) 105 98 % 16 114/64 -- -- AM   11/29/24 1355 -- -- -- -- -- 7 -- AM   11/29/24 1300 -- -- -- -- -- 7 -- AM   11/29/24 0842 99.8 °F (37.7 °C) -- -- -- -- 6 -- AM   11/29/24 0810 -- -- -- 16 -- -- -- AM   11/29/24 0810 100.9 °F (38.3 °C) 93 96 % -- 111/68 -- -- DII   11/28/24 2122 100.4 °F (38 °C) 94 96 % 16 105/64 -- -- DII   11/28/24 2000 -- -- -- -- -- 5 -- MS   11/28/24 1956 -- -- -- -- -- 5 -- MS   11/28/24 1528 99.3 °F (37.4 °C) 90 97 % -- 104/63 -- -- DII   11/28/24 1010 -- -- -- -- -- No Pain -- AL   11/28/24 0721 99.7 °F (37.6 °C) 85 97 % 16 103/63 -- -- DII   11/27/24 2241 99.4 °F (37.4 °C) 82 98 % -- -- -- -- DII   11/27/24 2129 101.4 °F (38.6 °C) 97 95 % -- 98/53 -- -- DII   11/27/24 2129 -- -- -- -- -- Med Not Given for Pain - for MAR use only -- JJ   11/27/24 2000 -- -- -- -- -- 8 -- JJ   11/27/24 1928 -- -- -- -- -- 8 -- JJ   11/27/24 1437 99.1 °F (37.3 °C) 78 97 % 17 102/70 -- -- DII   11/27/24 0814 -- -- -- -- -- 7 -- HP   11/27/24 0810 -- -- -- -- -- 7 -- HP   11/27/24 0754 97.9 °F (36.6 °C) 73 96 % 19 122/79 -- -- DII   11/27/24 0520 -- -- -- -- -- 8 -- BLB   11/27/24 0516 -- -- -- -- -- 8 -- BLB   11/27/24 0501 -- -- -- -- -- 8 -- BLB   11/27/24 0450 98.2 °F (36.8 °C) 74 97 % 17 123/79 -- -- DII   11/27/24 0338 -- -- -- -- -- 7 -- AA   11/27/24 0234 -- 84 96 % 16 120/61 -- -- AA   11/27/24 0158 -- -- -- -- -- 10 - Worst Possible Pain -- AA    11/27/24 0149 -- 89 96 % 18 96/48 -- -- AA   11/27/24 0128 -- -- -- -- -- 10 - Worst Possible Pain -- AA   11/27/24 0059 -- -- -- -- -- 10 - Worst Possible Pain -- AA   11/27/24 0032 -- -- -- -- -- 10 - Worst Possible Pain --    11/26/24 2343 99.5 °F (37.5 °C) 94 100 % 18 136/70 10 - Worst Possible Pain -- ZC            Physical Exam  Vitals and nursing note reviewed.   Constitutional:       General: She is in acute distress.      Appearance: She is well-developed.   HENT:      Head: Normocephalic and atraumatic.   Eyes:      Conjunctiva/sclera: Conjunctivae normal.   Cardiovascular:      Rate and Rhythm: Normal rate and regular rhythm.      Heart sounds: No murmur heard.  Pulmonary:      Effort: Pulmonary effort is normal. No respiratory distress.      Breath sounds: Normal breath sounds.   Abdominal:      Palpations: Abdomen is soft.      Tenderness: There is generalized abdominal tenderness and tenderness in the right lower quadrant. There is guarding.   Musculoskeletal:         General: No swelling.      Cervical back: Neck supple.   Skin:     General: Skin is warm and dry.      Capillary Refill: Capillary refill takes less than 2 seconds.   Neurological:      Mental Status: She is alert.   Psychiatric:         Mood and Affect: Mood normal.         Results Reviewed       Procedure Component Value Units Date/Time    Urine Microscopic [[id number]]  (Abnormal) Collected: 11/27/24 0031    Lab Status: Final result Specimen: Urine, Clean Catch Updated: 11/27/24 0116     RBC, UA None Seen /hpf      WBC, UA 1-2 /hpf      Epithelial Cells Moderate /hpf      Bacteria, UA None Seen /hpf     UA w Reflex to Microscopic w Reflex to Culture [[id number]]  (Abnormal) Collected: 11/27/24 0031    Lab Status: Final result Specimen: Urine, Clean Catch Updated: 11/27/24 0102     Color, UA Colorless     Clarity, UA Clear     Specific Gravity, UA 1.024     pH, UA 8.0     Leukocytes, UA Elevated glucose may cause decreased leukocyte  values. See urine microscopic for UWBC result     Nitrite, UA Negative     Protein, UA Negative mg/dl      Glucose, UA >=1000 (1%) mg/dl      Ketones, UA Negative mg/dl      Urobilinogen, UA <2.0 mg/dl      Bilirubin, UA Negative     Occult Blood, UA Negative    Comprehensive metabolic panel [[id number]]  (Abnormal) Collected: 11/27/24 0029    Lab Status: Final result Specimen: Blood from Arm, Left Updated: 11/27/24 0050     Sodium 133 mmol/L      Potassium 4.3 mmol/L      Chloride 98 mmol/L      CO2 27 mmol/L      ANION GAP 8 mmol/L      BUN 11 mg/dL      Creatinine 0.95 mg/dL      Glucose 391 mg/dL      Calcium 9.3 mg/dL      AST 12 U/L      ALT 10 U/L      Alkaline Phosphatase 95 U/L      Total Protein 7.5 g/dL      Albumin 3.5 g/dL      Total Bilirubin 0.49 mg/dL      eGFR 73 ml/min/1.73sq m     Narrative:      [employer] guidelines for Chronic Kidney Disease (CKD):     Stage 1 with normal or high GFR (GFR > 90 mL/min/1.73 square meters)    Stage 2 Mild CKD (GFR = 60-89 mL/min/1.73 square meters)    Stage 3A Moderate CKD (GFR = 45-59 mL/min/1.73 square meters)    Stage 3B Moderate CKD (GFR = 30-44 mL/min/1.73 square meters)    Stage 4 Severe CKD (GFR = 15-29 mL/min/1.73 square meters)    Stage 5 End Stage CKD (GFR <15 mL/min/1.73 square meters)  Note: GFR calculation is accurate only with a steady state creatinine    Lipase [[id number]]  (Normal) Collected: 11/27/24 0029    Lab Status: Final result Specimen: Blood from Arm, Left Updated: 11/27/24 0050     Lipase 17 u/L     POCT pregnancy, urine [[id number]]  (Normal) Collected: 11/27/24 0030    Lab Status: Edited Result - FINAL Updated: 11/27/24 0035     EXT Preg Test, Ur Negative     Control Valid    CBC and differential [[id number]]  (Abnormal) Collected: 11/27/24 0029    Lab Status: Final result Specimen: Blood from Arm, Left Updated: 11/27/24 0035     WBC 12.20 Thousand/uL      RBC 4.22 Million/uL      Hemoglobin 12.3 g/dL      Hematocrit  37.0 %      MCV 88 fL      MCH 29.1 pg      MCHC 33.2 g/dL      RDW 12.0 %      MPV 9.5 fL      Platelets 482 Thousands/uL      nRBC 0 /100 WBCs      Segmented % 58 %      Immature Grans % 0 %      Lymphocytes % 30 %      Monocytes % 9 %      Eosinophils Relative 2 %      Basophils Relative 1 %      Absolute Neutrophils 7.15 Thousands/µL      Absolute Immature Grans 0.03 Thousand/uL      Absolute Lymphocytes 3.67 Thousands/µL      Absolute Monocytes 1.04 Thousand/µL      Eosinophils Absolute 0.24 Thousand/µL      Basophils Absolute 0.07 Thousands/µL             CT abdomen pelvis w contrast   Final Interpretation by Fide De La Rosa MD (11/30 4485)   No significant interval change since CT abdomen and pelvis 3 days ago.   Acute right lower quadrant intra-abdominal pathology, as detailed above. As previously, infectious, inflammatory or neoplastic process are considered. Recommend PET/CT.            Workstation performed: [id number]         CT chest w contrast   Final Interpretation by Pricila Watson MD (11/27 9476)      There is a small hiatal hernia. The wall of the esophagus appears thickened; this is most pronounced involving the distal esophagus. Clinical correlation and follow-up recommended.               Workstation performed: [id number]         CT abdomen pelvis with contrast   Final Interpretation by Pricila Watson MD (11/27 7399)      Inflammatory change and necrotic appearing lymphadenopathy in the right lower quadrant appears worse compared with November 17, 2024. This could be infectious, inflammatory, neoplastic, or a combination. Clinical correlation and follow-up recommended.    PET/CT recommended.      There is a small hiatal hernia. There is some fluid within the visualized distal esophagus, suggesting gastroesophageal reflux. The wall of the visualized distal esophagus appears thickened. Clinical correlation and follow-up recommended.      Mild hepatomegaly.      Mild colonic  diverticulosis without evidence of acute diverticulitis. No bowel obstruction.      This examination demonstrates findings for which clinical and imaging follow-up is recommended and was logged as such in EP[facility].         I personally discussed this study with AZAM VERDIN on 11/27/2024 2:47 AM.               Workstation performed: [id number]             Procedures    ED Medication and Procedure Management   Prior to Admission Medications   Prescriptions Last Dose Informant Patient Reported? Taking?   Accu-Chek FastClix Lancets MISC More than a month Self No No   Sig: Test BG up to 3x daily as directed   Accu-Chek Guide test strip More than a month Self No No   Sig: TEST BG UP TO 3X DAILY AS DIRECTED   Continuous Blood Gluc  (Dexcom G7 ) ENRICO More than a month Self No No   Sig: Use 1 Units daily   Continuous Glucose Sensor (Dexcom G7 Sensor) More than a month  No No   Sig: Use 1 Device every 10 days   Injection Device for Insulin (B-D PEN MINI) ENRICO More than a month Self No No   Sig: Use 4 (four) times a day Please use for Lantus and Humalog. 4 pen needles daily Dx: Diabetes   Insulin Pen Needle (BD Pen Needle Em U/F) 32G X 4 MM MISC More than a month Self No No   Sig: Use daily   acetaminophen (TYLENOL) 500 mg tablet 11/26/2024 Self No Yes   Sig: Take 1 tablet (500 mg total) by mouth every 6 (six) hours as needed for mild pain or moderate pain   dicyclomine (BENTYL) 20 mg tablet   No No   Sig: Take 1 tablet (20 mg total) by mouth 2 (two) times a day as needed (abd pain) for up to 2 days   Patient not taking: Reported on 11/26/2024   insulin aspart (NovoLOG FlexPen) 100 UNIT/ML injection pen More than a month Self No No   Sig: Inject 10 Units under the skin 3 (three) times a day with meals   insulin degludec (Tresiba FlexTouch) 100 units/mL injection pen More than a month Self No No   Sig: Inject 30 Units under the skin daily   lidocaine (Lidoderm) 5 % More than a month Self No No   Sig: Apply 1  patch topically over 12 hours daily Remove & Discard patch within 12 hours or as directed by MD   meclizine (ANTIVERT) 12.5 MG tablet More than a month Self No No   Sig: Take 1 tablet (12.5 mg total) by mouth 3 (three) times a day as needed for dizziness   naproxen (NAPROSYN) 500 mg tablet Past Week Self No Yes   Sig: Take 1 tablet (500 mg total) by mouth 2 (two) times a day with meals   ondansetron (ZOFRAN) 4 mg tablet Not Taking Self No No   Sig: Take 1 tablet (4 mg total) by mouth every 6 (six) hours   Patient not taking: Reported on 11/27/2024   pantoprazole (PROTONIX) 40 mg tablet 11/26/2024 Self No Yes   Sig: Take 1 tablet (40 mg total) by mouth daily   pregabalin (LYRICA) 50 mg capsule 11/26/2024 Self No Yes   Sig: Take 1 PO HS x 5 days, then 1 PO BID x 5 days, then 1 PO TID   semaglutide, 2 mg/dose, (Ozempic) 8 mg/ mL injection pen More than a month Self No No   Sig: Inject 0.75 mL (2 mg total) under the skin every 7 days   Patient not taking: Reported on 11/27/2024   topiramate (TOPAMAX) 50 MG tablet Not Taking Self No No   Sig: Take 1 tablet (50 mg total) by mouth daily at bedtime   Patient not taking: Reported on 11/27/2024      Facility-Administered Medications: None     Current Discharge Medication List        CONTINUE these medications which have NOT CHANGED    Details   acetaminophen (TYLENOL) 500 mg tablet Take 1 tablet (500 mg total) by mouth every 6 (six) hours as needed for mild pain or moderate pain  Qty: 30 tablet, Refills: 0    Associated Diagnoses: Acute pain of right knee; Fall, initial encounter      naproxen (NAPROSYN) 500 mg tablet Take 1 tablet (500 mg total) by mouth 2 (two) times a day with meals  Qty: 20 tablet, Refills: 0    Associated Diagnoses: Abdominal pain      pantoprazole (PROTONIX) 40 mg tablet Take 1 tablet (40 mg total) by mouth daily  Qty: 90 tablet, Refills: 1    Associated Diagnoses: Other dysphagia      pregabalin (LYRICA) 50 mg capsule Take 1 PO HS x 5 days, then 1 PO  BID x 5 days, then 1 PO TID  Qty: 90 capsule, Refills: 1    Associated Diagnoses: Cervical radiculopathy      Accu-Chek FastClix Lancets MISC Test BG up to 3x daily as directed  Qty: 300 each, Refills: 1    Associated Diagnoses: Uncontrolled type 2 diabetes mellitus with hyperglycemia, without long-term current use of insulin ([facility]); Current use of insulin ([facility])      Accu-Chek Guide test strip TEST BG UP TO 3X DAILY AS DIRECTED  Qty: 100 each, Refills: 1    Associated Diagnoses: Uncontrolled type 2 diabetes mellitus with hyperglycemia, without long-term current use of insulin ([facility]); Current use of insulin ([facility])      Continuous Blood Gluc  (Dexcom G7 ) ENRICO Use 1 Units daily  Qty: 1 each, Refills: 1    Associated Diagnoses: Uncontrolled type 2 diabetes mellitus with hyperglycemia, without long-term current use of insulin ([facility]); Current use of insulin ([facility])      Continuous Glucose Sensor (Dexcom G7 Sensor) Use 1 Device every 10 days  Qty: 9 each, Refills: 3    Associated Diagnoses: Uncontrolled type 2 diabetes mellitus with hyperglycemia, without long-term current use of insulin ([facility]); Current use of insulin ([facility])      dicyclomine (BENTYL) 20 mg tablet Take 1 tablet (20 mg total) by mouth 2 (two) times a day as needed (abd pain) for up to 2 days  Qty: 4 tablet, Refills: 0    Associated Diagnoses: Abdominal pain; Vomiting      Injection Device for Insulin (B-D PEN MINI) ENRICO Use 4 (four) times a day Please use for Lantus and Humalog. 4 pen needles daily Dx: Diabetes  Qty: 400 each, Refills: 0    Associated Diagnoses: Type 2 diabetes mellitus with other specified complication, with long-term current use of insulin ([facility])      insulin aspart (NovoLOG FlexPen) 100 UNIT/ML injection pen Inject 10 Units under the skin 3 (three) times a day with meals  Qty: 15 mL, Refills: 2    Associated Diagnoses: Uncontrolled type 2 diabetes mellitus with hyperglycemia, without long-term current use of insulin ([facility])       insulin degludec (Tresiba FlexTouch) 100 units/mL injection pen Inject 30 Units under the skin daily  Qty: 15 mL, Refills: 1    Associated Diagnoses: Uncontrolled type 2 diabetes mellitus with hyperglycemia, without long-term current use of insulin ([facility])      Insulin Pen Needle (BD Pen Needle Em U/F) 32G X 4 MM MISC Use daily  Qty: 100 each, Refills: 0    Associated Diagnoses: Uncontrolled type 2 diabetes mellitus with hyperglycemia, without long-term current use of insulin ([facility])      lidocaine (Lidoderm) 5 % Apply 1 patch topically over 12 hours daily Remove & Discard patch within 12 hours or as directed by MD  Qty: 5 patch, Refills: 0    Associated Diagnoses: Chest pain      meclizine (ANTIVERT) 12.5 MG tablet Take 1 tablet (12.5 mg total) by mouth 3 (three) times a day as needed for dizziness  Qty: 30 tablet, Refills: 0    Associated Diagnoses: Dizziness      ondansetron (ZOFRAN) 4 mg tablet Take 1 tablet (4 mg total) by mouth every 6 (six) hours  Qty: 12 tablet, Refills: 0    Associated Diagnoses: Abdominal pain      semaglutide, 2 mg/dose, (Ozempic) 8 mg/ mL injection pen Inject 0.75 mL (2 mg total) under the skin every 7 days  Qty: 9 mL, Refills: 1    Associated Diagnoses: Uncontrolled type 2 diabetes mellitus with hyperglycemia, without long-term current use of insulin ([facility])      topiramate (TOPAMAX) 50 MG tablet Take 1 tablet (50 mg total) by mouth daily at bedtime  Qty: 90 tablet, Refills: 1    Associated Diagnoses: Migraine without aura and without status migrainosus, not intractable           No discharge procedures on file.  ED SEPSIS DOCUMENTATION   Time reflects when diagnosis was documented in both MDM as applicable and the Disposition within this note       Time User Action Codes Description Comment    11/27/2024  4:00 AM Terence Chavarria [R59.0] Abdominal lymphadenopathy     11/27/2024  4:01 AM Terence Chavarria [R10.9] Intractable abdominal pain     11/27/2024  4:01 AM Terence Chavarria [D72.829]  Leukocytosis     11/27/2024  4:30 AM Ant Orlando Add [R59.0] LAD (lymphadenopathy), intra-abdominal     12/1/2024  8:24 AM Silva Silva Modify [R59.0] Abdominal lymphadenopathy     12/2/2024 12:07 PM Manolo Fuentes Modify [R59.0] Abdominal lymphadenopathy     12/2/2024 12:07 PM Manolo Fuentes Add [A41.9] Sepsis without acute organ dysfunction (HCC)                  Terence Chavarria PA-C  12/03/24 0035

## 2024-11-27 NOTE — QUICK NOTE
POST Admit note  Findings discussed with patient.  CT images shown and discussed in simple terms.  IR evaluation and recommendations reviewed.  Lymph nodes are not amenable to  needle guided biopsy due to overlying bowel and vessels.  Discussed with surgery Dr. Armstrong.  She will have biopsy done either this weekend by Dr. Lamb or OP if her pain is controlled enough (The patient prefers to have it done while in here). Will restart diet and observe.  Updated sister and daughter at bedside

## 2024-11-27 NOTE — ASSESSMENT & PLAN NOTE
44 year old female with RLQ pain x 2 weeks with progressive symptoms. CT findings show worsening LAD with necrosis. Patient has f/u appointment with surgical oncology x 1 week. Pmhx of DM and umbilical hernia repair. No family history or personal history of cancer.    Plan:  - Consult IR for lymph node biopsy  - Follow-up outpatient with surgical oncology for PET scan  - Recommend glycemic control  - Admit to internal medicine team  - F/u with surgical oncology   - Pain and nausea control PRN  - DVT ppx  - Remainder of care per primary team

## 2024-11-28 PROBLEM — R10.9 ABDOMINAL PAIN: Status: ACTIVE | Noted: 2024-11-28

## 2024-11-28 PROBLEM — Z79.4 TYPE 2 DIABETES MELLITUS WITH HYPERGLYCEMIA, WITH LONG-TERM CURRENT USE OF INSULIN (HCC): Status: ACTIVE | Noted: 2022-01-05

## 2024-11-28 PROBLEM — K22.89 ESOPHAGEAL THICKENING: Status: ACTIVE | Noted: 2024-11-28

## 2024-11-28 LAB
ANION GAP SERPL CALCULATED.3IONS-SCNC: 5 MMOL/L (ref 4–13)
BASOPHILS # BLD AUTO: 0.06 THOUSANDS/ΜL (ref 0–0.1)
BASOPHILS NFR BLD AUTO: 1 % (ref 0–1)
BUN SERPL-MCNC: 13 MG/DL (ref 5–25)
CALCIUM SERPL-MCNC: 8.6 MG/DL (ref 8.4–10.2)
CHLORIDE SERPL-SCNC: 103 MMOL/L (ref 96–108)
CO2 SERPL-SCNC: 26 MMOL/L (ref 21–32)
CREAT SERPL-MCNC: 0.96 MG/DL (ref 0.6–1.3)
EOSINOPHIL # BLD AUTO: 0.18 THOUSAND/ΜL (ref 0–0.61)
EOSINOPHIL NFR BLD AUTO: 2 % (ref 0–6)
ERYTHROCYTE [DISTWIDTH] IN BLOOD BY AUTOMATED COUNT: 12 % (ref 11.6–15.1)
GFR SERPL CREATININE-BSD FRML MDRD: 72 ML/MIN/1.73SQ M
GLUCOSE SERPL-MCNC: 102 MG/DL (ref 65–140)
GLUCOSE SERPL-MCNC: 181 MG/DL (ref 65–140)
GLUCOSE SERPL-MCNC: 185 MG/DL (ref 65–140)
GLUCOSE SERPL-MCNC: 189 MG/DL (ref 65–140)
GLUCOSE SERPL-MCNC: 199 MG/DL (ref 65–140)
GLUCOSE SERPL-MCNC: 265 MG/DL (ref 65–140)
HCT VFR BLD AUTO: 33 % (ref 34.8–46.1)
HGB BLD-MCNC: 10.7 G/DL (ref 11.5–15.4)
IMM GRANULOCYTES # BLD AUTO: 0.05 THOUSAND/UL (ref 0–0.2)
IMM GRANULOCYTES NFR BLD AUTO: 1 % (ref 0–2)
LYMPHOCYTES # BLD AUTO: 3.46 THOUSANDS/ΜL (ref 0.6–4.47)
LYMPHOCYTES NFR BLD AUTO: 33 % (ref 14–44)
MCH RBC QN AUTO: 28.4 PG (ref 26.8–34.3)
MCHC RBC AUTO-ENTMCNC: 32.4 G/DL (ref 31.4–37.4)
MCV RBC AUTO: 88 FL (ref 82–98)
MONOCYTES # BLD AUTO: 1.35 THOUSAND/ΜL (ref 0.17–1.22)
MONOCYTES NFR BLD AUTO: 13 % (ref 4–12)
NEUTROPHILS # BLD AUTO: 5.3 THOUSANDS/ΜL (ref 1.85–7.62)
NEUTS SEG NFR BLD AUTO: 50 % (ref 43–75)
NRBC BLD AUTO-RTO: 0 /100 WBCS
PLATELET # BLD AUTO: 428 THOUSANDS/UL (ref 149–390)
PMV BLD AUTO: 9.2 FL (ref 8.9–12.7)
POTASSIUM SERPL-SCNC: 3.9 MMOL/L (ref 3.5–5.3)
RBC # BLD AUTO: 3.77 MILLION/UL (ref 3.81–5.12)
SODIUM SERPL-SCNC: 134 MMOL/L (ref 135–147)
WBC # BLD AUTO: 10.4 THOUSAND/UL (ref 4.31–10.16)

## 2024-11-28 PROCEDURE — 85025 COMPLETE CBC W/AUTO DIFF WBC: CPT | Performed by: INTERNAL MEDICINE

## 2024-11-28 PROCEDURE — 99232 SBSQ HOSP IP/OBS MODERATE 35: CPT | Performed by: INTERNAL MEDICINE

## 2024-11-28 PROCEDURE — 99254 IP/OBS CNSLTJ NEW/EST MOD 60: CPT | Performed by: INTERNAL MEDICINE

## 2024-11-28 PROCEDURE — 82948 REAGENT STRIP/BLOOD GLUCOSE: CPT

## 2024-11-28 PROCEDURE — NC001 PR NO CHARGE: Performed by: STUDENT IN AN ORGANIZED HEALTH CARE EDUCATION/TRAINING PROGRAM

## 2024-11-28 PROCEDURE — 80048 BASIC METABOLIC PNL TOTAL CA: CPT | Performed by: INTERNAL MEDICINE

## 2024-11-28 RX ORDER — POLYETHYLENE GLYCOL 3350 17 G/17G
17 POWDER, FOR SOLUTION ORAL DAILY PRN
Status: DISCONTINUED | OUTPATIENT
Start: 2024-11-28 | End: 2024-11-30

## 2024-11-28 RX ADMIN — HEPARIN SODIUM 5000 UNITS: 5000 INJECTION INTRAVENOUS; SUBCUTANEOUS at 21:51

## 2024-11-28 RX ADMIN — INSULIN LISPRO 1 UNITS: 100 INJECTION, SOLUTION INTRAVENOUS; SUBCUTANEOUS at 12:31

## 2024-11-28 RX ADMIN — INSULIN GLARGINE 30 UNITS: 100 INJECTION, SOLUTION SUBCUTANEOUS at 21:51

## 2024-11-28 RX ADMIN — INSULIN LISPRO 3 UNITS: 100 INJECTION, SOLUTION INTRAVENOUS; SUBCUTANEOUS at 10:08

## 2024-11-28 RX ADMIN — NAPROXEN 500 MG: 250 TABLET ORAL at 10:10

## 2024-11-28 RX ADMIN — PREGABALIN 50 MG: 50 CAPSULE ORAL at 16:59

## 2024-11-28 RX ADMIN — HEPARIN SODIUM 5000 UNITS: 5000 INJECTION INTRAVENOUS; SUBCUTANEOUS at 05:10

## 2024-11-28 RX ADMIN — INSULIN LISPRO 1 UNITS: 100 INJECTION, SOLUTION INTRAVENOUS; SUBCUTANEOUS at 21:51

## 2024-11-28 RX ADMIN — PANTOPRAZOLE SODIUM 40 MG: 40 TABLET, DELAYED RELEASE ORAL at 05:10

## 2024-11-28 RX ADMIN — PREGABALIN 50 MG: 50 CAPSULE ORAL at 10:10

## 2024-11-28 RX ADMIN — HEPARIN SODIUM 5000 UNITS: 5000 INJECTION INTRAVENOUS; SUBCUTANEOUS at 14:49

## 2024-11-28 RX ADMIN — INSULIN LISPRO 10 UNITS: 100 INJECTION, SOLUTION INTRAVENOUS; SUBCUTANEOUS at 10:09

## 2024-11-28 RX ADMIN — INSULIN LISPRO 10 UNITS: 100 INJECTION, SOLUTION INTRAVENOUS; SUBCUTANEOUS at 12:31

## 2024-11-28 RX ADMIN — PREGABALIN 50 MG: 50 CAPSULE ORAL at 21:51

## 2024-11-28 RX ADMIN — INSULIN LISPRO 10 UNITS: 100 INJECTION, SOLUTION INTRAVENOUS; SUBCUTANEOUS at 16:59

## 2024-11-28 RX ADMIN — ACETAMINOPHEN 650 MG: 325 TABLET, FILM COATED ORAL at 19:56

## 2024-11-28 NOTE — PROGRESS NOTES
Progress Note - Hospitalist   Name: Tari Shannon 44 y.o. female I MRN: [id number]  Unit/Bed#: [place] [address] -02 I Date of Admission: 11/26/2024   Date of Service: 11/28/2024 I Hospital Day: 1    Assessment & Plan  LAD (lymphadenopathy), intra-abdominal  Inflammatory and necrotic appearing lymphadenopathy in the right lower quadrant, worse compared to November 17   IR was consulted for biopsy however this could not be performed safely due to overlying bowel and vessels  Surgery was asked to perform excisional biopsy, timing to be determined.  They are leaning towards outpatient  Type 2 diabetes mellitus with hyperglycemia, with long-term current use of insulin ([facility])  Lab Results   Component Value Date    HGBA1C 14.0 (A) 11/21/2024       Recent Labs     11/27/24  2129 11/28/24  0719 11/28/24  1007 11/28/24  1229   POCGLU 247* 185* 265* 181*     Uncontrolled diabetes with A1c mentioned above  She just saw endocrinology in the office on 11/26 and 11/26/2024 and was restarted on Lantus 30 units at night and Humalog 10 - 10 - 10.  She reportedly was off all medications for 2 months prior to that appointment.  Adjust insulin regimen to achieve goal blood sugars    Abdominal pain  Could be from gastroparesis versus dyspepsia versus lymphadenopathy  Hold/DC NSAID  DC Dilaudid.  Minimize narcotics  Continue PPI  Surgical follow-up regarding biopsy  Glucose control  Esophageal thickening  Consulted GI due to esophageal thickening seen on CT of chest.  This could be all reflux related to gastroparesis however given the lymphadenopathy need to rule out if this is related  Hold NSAIDs    VTE Pharmacologic Prophylaxis: VTE Score: 4 heparin  Patient Centered Rounds: Discussed with his nurse  Discussions with Specialists or Other Care Team Provider: Discussed with surgery and GI    Current Length of Stay: 1 day(s)  Current Patient Status: Inpatient   Certification Statement: The patient will continue to require additional inpatient  hospital stay due to abdominal pain and lymphadenopathy  Discharge Plan: Anticipate discharge in 24-48 hrs to home.    Code Status: Level 1 - Full Code    Subjective   Seen and examined during rounds.  We discussed about the possible reasons of her abdominal pain and nausea which includes gastroparesis and her lymphadenopathy  We talked about the risk of narcotics worsening gastroparesis and  surgery's plan to do the biopsy as an outpatient    Objective :  Temp:  [99.1 °F (37.3 °C)-101.4 °F (38.6 °C)] 99.7 °F (37.6 °C)  HR:  [78-97] 85  BP: ()/(53-70) 103/63  Resp:  [16-17] 16  SpO2:  [95 %-98 %] 97 %  O2 Device: None (Room air)    Body mass index is 30.45 kg/m².     Input and Output Summary (last 24 hours):     Intake/Output Summary (Last 24 hours) at 11/28/2024 1412  Last data filed at 11/28/2024 1257  Gross per 24 hour   Intake 1230 ml   Output --   Net 1230 ml       Physical Exam  Vitals reviewed.   Constitutional:       General: She is not in acute distress.     Appearance: She is not ill-appearing.   HENT:      Nose: No congestion or rhinorrhea.   Eyes:      General: No scleral icterus.  Cardiovascular:      Rate and Rhythm: Normal rate and regular rhythm.   Pulmonary:      Breath sounds: No wheezing or rhonchi.   Abdominal:      Palpations: Abdomen is soft.      Tenderness: There is no abdominal tenderness. There is no guarding.   Musculoskeletal:      Right lower leg: No edema.      Left lower leg: No edema.   Skin:     General: Skin is warm and dry.   Neurological:      Mental Status: She is oriented to person, place, and time.   Psychiatric:         Mood and Affect: Mood normal.         Behavior: Behavior normal.       Lab Results: I have reviewed the following results:   Results from last 7 days   Lab Units 11/28/24  0445   WBC Thousand/uL 10.40*   HEMOGLOBIN g/dL 10.7*   HEMATOCRIT % 33.0*   PLATELETS Thousands/uL 428*   SEGS PCT % 50   LYMPHO PCT % 33   MONO PCT % 13*   EOS PCT % 2     Results  from last 7 days   Lab Units 11/28/24  0445 11/27/24  0029   SODIUM mmol/L 134* 133*   POTASSIUM mmol/L 3.9 4.3   CHLORIDE mmol/L 103 98   CO2 mmol/L 26 27   BUN mg/dL 13 11   CREATININE mg/dL 0.96 0.95   ANION GAP mmol/L 5 8   CALCIUM mg/dL 8.6 9.3   ALBUMIN g/dL  --  3.5   TOTAL BILIRUBIN mg/dL  --  0.49   ALK PHOS U/L  --  95   ALT U/L  --  10   AST U/L  --  12*   GLUCOSE RANDOM mg/dL 189* 391*         Results from last 7 days   Lab Units 11/28/24  1229 11/28/24  1007 11/28/24  0719 11/27/24  2129 11/27/24  1615 11/27/24  1108 11/27/24  0756 11/27/24  0511   POC GLUCOSE mg/dl 181* 265* 185* 247* 223* 199* 288* 345*     Results from last 7 days   Lab Units 11/21/24  1523   HEMOGLOBIN A1C  14.0*           Recent Cultures (last 7 days):         Imaging Results Review: I reviewed radiology reports from this admission including: CT chest and CT abdomen/pelvis.      Last 24 Hours Medication List:     Current Facility-Administered Medications:     acetaminophen (TYLENOL) tablet 650 mg, Q6H PRN    heparin (porcine) subcutaneous injection 5,000 Units, Q8H CARMELO    HYDROmorphone (DILAUDID) injection 0.3 mg, Q3H PRN    insulin glargine (LANTUS) subcutaneous injection 30 Units 0.3 mL, HS    insulin lispro (HumALOG/ADMELOG) 100 units/mL subcutaneous injection 1-6 Units, 4x Daily (AC & HS) **AND** Fingerstick Glucose (POCT), 4x Daily AC and at bedtime    insulin lispro (HumALOG/ADMELOG) 100 units/mL subcutaneous injection 10 Units, TID With Meals    lidocaine (LIDODERM) 5 % patch 1 patch, Daily    meclizine (ANTIVERT) tablet 12.5 mg, TID PRN    naproxen (NAPROSYN) tablet 500 mg, BID With Meals    ondansetron (ZOFRAN) injection 4 mg, Q4H PRN    oxyCODONE (ROXICODONE) IR tablet 5 mg, Q6H PRN    oxyCODONE (ROXICODONE) split tablet 2.5 mg, Q6H PRN    pantoprazole (PROTONIX) EC tablet 40 mg, Daily Before Breakfast    pregabalin (LYRICA) capsule 50 mg, TID    Administrative Statements   Today, Patient Was Seen By: Lars Richardson  Juliane Navas MD      **Please Note: This note may have been constructed using a voice recognition system.**

## 2024-11-28 NOTE — ASSESSMENT & PLAN NOTE
Lab Results   Component Value Date    HGBA1C 14.0 (A) 11/21/2024       Recent Labs     11/27/24  2129 11/28/24  0719 11/28/24  1007 11/28/24  1229   POCGLU 247* 185* 265* 181*     Uncontrolled diabetes with A1c mentioned above  She just saw endocrinology in the office on 11/26 and 11/26/2024 and was restarted on Lantus 30 units at night and Humalog 10 - 10 - 10.  She reportedly was off all medications for 2 months prior to that appointment.  Adjust insulin regimen to achieve goal blood sugars

## 2024-11-28 NOTE — ASSESSMENT & PLAN NOTE
Could be from gastroparesis versus dyspepsia versus lymphadenopathy  Hold/DC NSAID  DC Dilaudid.  Minimize narcotics  Continue PPI  Surgical follow-up regarding biopsy  Glucose control

## 2024-11-28 NOTE — PLAN OF CARE
Problem: PAIN - ADULT  Goal: Verbalizes/displays adequate comfort level or baseline comfort level  Description: Interventions:  - Encourage patient to monitor pain and request assistance  - Assess pain using appropriate pain scale  - Administer analgesics based on type and severity of pain and evaluate response  - Implement non-pharmacological measures as appropriate and evaluate response  - Consider cultural and social influences on pain and pain management  - Notify physician/advanced practitioner if interventions unsuccessful or patient reports new pain  Outcome: Progressing     Problem: INFECTION - ADULT  Goal: Absence or prevention of progression during hospitalization  Description: INTERVENTIONS:  - Assess and monitor for signs and symptoms of infection  - Monitor lab/diagnostic results  - Monitor all insertion sites, i.e. indwelling lines, tubes, and drains  - Monitor endotracheal if appropriate and nasal secretions for changes in amount and color  - [place] appropriate cooling/warming therapies per order  - Administer medications as ordered  - Instruct and encourage patient and family to use good hand hygiene technique  - Identify and instruct in appropriate isolation precautions for identified infection/condition  Outcome: Progressing     Problem: SAFETY ADULT  Goal: Patient will remain free of falls  Description: INTERVENTIONS:  - Educate patient/family on patient safety including physical limitations  - Instruct patient to call for assistance with activity   - Consult OT/PT to assist with strengthening/mobility   - Keep Call bell within reach  - Keep bed low and locked with side rails adjusted as appropriate  - Keep care items and personal belongings within reach  - Initiate and maintain comfort rounds  - Make Fall Risk Sign visible to staff  - Offer Toileting every 2 Hours, in advance of need  - Initiate/Maintain bed alarm  - Obtain necessary fall risk management equipment: bed alarm  - Apply yellow  socks and bracelet for high fall risk patients  - Consider moving patient to room near nurses station  Outcome: Progressing  Goal: Maintain or return to baseline ADL function  Description: INTERVENTIONS:  -  Assess patient's ability to carry out ADLs; assess patient's baseline for ADL function and identify physical deficits which impact ability to perform ADLs (bathing, care of mouth/teeth, toileting, grooming, dressing, etc.)  - Assess/evaluate cause of self-care deficits   - Assess range of motion  - Assess patient's mobility; develop plan if impaired  - Assess patient's need for assistive devices and provide as appropriate  - Encourage maximum independence but intervene and supervise when necessary  - Involve family in performance of ADLs  - Assess for home care needs following discharge   - Consider OT consult to assist with ADL evaluation and planning for discharge  - Provide patient education as appropriate  Outcome: Progressing  Goal: Maintains/Returns to pre admission functional level  Description: INTERVENTIONS:  - Perform AM-PAC 6 Click Basic Mobility/ Daily Activity assessment daily.  - Set and communicate daily mobility goal to care team and patient/family/caregiver.   - Collaborate with rehabilitation services on mobility goals if consulted  - Perform Range of Motion 3 times a day.  - Reposition patient every 2 hours.  - Dangle patient 3 times a day  - Stand patient 3 times a day  - Ambulate patient 3 times a day  - Out of bed to chair 3 times a day   - Out of bed for meals 3 times a day  - Out of bed for toileting  - Record patient progress and toleration of activity level   Outcome: Progressing     Problem: DISCHARGE PLANNING  Goal: Discharge to home or other facility with appropriate resources  Description: INTERVENTIONS:  - Identify barriers to discharge w/patient and caregiver  - Arrange for needed discharge resources and transportation as appropriate  - Identify discharge learning needs (meds,  wound care, etc.)  - Arrange for interpretive services to assist at discharge as needed  - Refer to Case Management Department for coordinating discharge planning if the patient needs post-hospital services based on physician/advanced practitioner order or complex needs related to functional status, cognitive ability, or social support system  Outcome: Progressing     Problem: Knowledge Deficit  Goal: Patient/family/caregiver demonstrates understanding of disease process, treatment plan, medications, and discharge instructions  Description: Complete learning assessment and assess knowledge base.  Interventions:  - Provide teaching at level of understanding  - Provide teaching via preferred learning methods  Outcome: Progressing     Problem: GASTROINTESTINAL - ADULT  Goal: Minimal or absence of nausea and/or vomiting  Description: INTERVENTIONS:  - Administer IV fluids if ordered to ensure adequate hydration  - Maintain NPO status until nausea and vomiting are resolved  - Nasogastric tube if ordered  - Administer ordered antiemetic medications as needed  - Provide nonpharmacologic comfort measures as appropriate  - Advance diet as tolerated, if ordered  - Consider nutrition services referral to assist patient with adequate nutrition and appropriate food choices  Outcome: Progressing     Problem: Prexisting or High Potential for Compromised Skin Integrity  Goal: Skin integrity is maintained or improved  Description: INTERVENTIONS:  - Identify patients at risk for skin breakdown  - Assess and monitor skin integrity  - Assess and monitor nutrition and hydration status  - Monitor labs   - Assess for incontinence   - Turn and reposition patient  - Assist with mobility/ambulation  - Relieve pressure over bony prominences  - Avoid friction and shearing  - Provide appropriate hygiene as needed including keeping skin clean and dry  - Evaluate need for skin moisturizer/barrier cream  - Collaborate with interdisciplinary team    - Patient/family teaching  - Consider wound care consult   Outcome: Progressing     Problem: METABOLIC, FLUID AND ELECTROLYTES - ADULT  Goal: Electrolytes maintained within normal limits  Description: INTERVENTIONS:  - Monitor labs and assess patient for signs and symptoms of electrolyte imbalances  - Administer electrolyte replacement as ordered  - Monitor response to electrolyte replacements, including repeat lab results as appropriate  - Instruct patient on fluid and nutrition as appropriate  Outcome: Progressing  Goal: Fluid balance maintained  Description: INTERVENTIONS:  - Monitor labs   - Monitor I/O and WT  - Instruct patient on fluid and nutrition as appropriate  - Assess for signs & symptoms of volume excess or deficit  Outcome: Progressing  Goal: Glucose maintained within target range  Description: INTERVENTIONS:  - Monitor Blood Glucose as ordered  - Assess for signs and symptoms of hyperglycemia and hypoglycemia  - Administer ordered medications to maintain glucose within target range  - Assess nutritional intake and initiate nutrition service referral as needed  Outcome: Progressing

## 2024-11-28 NOTE — UTILIZATION REVIEW
Initial Clinical Review    Admission: Date/Time/Statement:   Admission Orders (From admission, onward)       Ordered        11/27/24 0415  INPATIENT ADMISSION  Once                          Orders Placed This Encounter   Procedures    INPATIENT ADMISSION     Standing Status:   Standing     Number of Occurrences:   1     Level of Care:   Med Surg [16]     Estimated length of stay:   More than 2 Midnights     Certification:   I certify that inpatient services are medically necessary for this patient for a duration of greater than two midnights. See H&P and MD Progress Notes for additional information about the patient's course of treatment.     ED Arrival Information       Expected   -    Arrival   11/26/2024 23:19    Acuity   Urgent              Means of arrival   Walk-In    Escorted by   Self    Service   Hospitalist    Admission type   Emergency              Arrival complaint   abdominal pain             Chief Complaint   Patient presents with    Abdominal Pain     Patient c/o of lower abdominal pain that has been going on for the past several weeks. Patient states she was seen here last week for the same issue and stomach pain has got worse.  Denies n/v/d        Initial Presentation: 44 y.o. female presented to ED as inpatient for LAD (lymphadenopathy), intra-abdominal. Past medical history of DM and hernia repair multiple years ago with x 2 weeks of RLQ pain. Originally presented to [facility] ED on 11/17 with similar symptoms and instructed to follow-up outpatient with surgical oncology. Currently, she presents with worsening RLQ pain that she described as deep without any radiation. She has associated N/V where she's taken Zofran and Tylenol without relief. Patient denies recent, unexpected weight loss within the last year. Diabetic gastroparesis is among the differential and with an uncontrolled DM would strongly encourage tight glycemic control.HGBA1C(14.) On exam  abdominal tenderness (RLQ tenderness to palpation  "without guarding or rigidity. No fluid wave tenderness. Negative Riggins's or McBurney's.). Plan consult IR, accu-checks with SSI pain medication prn and IV Zofran prn and supportive care.    IR consult:  Assessment/Recommendation:   43 yo female with DM type 2, and remote h/o hernia repair who presented to ED on 11/26/24 with persistent RLQ pain x 2 weeks.   CT A/P demonstrating \"Inflammatory change and necrotic appearing lymphadenopathy in the right lower quadrant appears worse compared with November 17, 2024. This could be infectious, inflammatory, neoplastic, or a combination. Clinical correlation and follow-up recommended.\"  Discussed case and imaging reviewed with Dr Calderón  -Love espino to perform biopsy due to presence of bowel and multiple mesenteric vessels in the area. Discussed with General Surgery team    Consult Oncology-Medical   Assessment and Plan  1.  Lymphadenopathy  Patient was seen by surgical oncology and will be undergoing excisional biopsy to determine if the adenopathy is infectious, inflammatory/autoimmune process, malignancy  I reviewed with patient that if biopsy is consistent with malignancy then surgical oncology will refer her to our office should we need to be involved  Patient already has CT chest imaging ordered, recommend completing this inpatient      Date: 11-28-24   Day 2: Having clears without issue and small bites of PO. Subjective 8/10 pain at the R upper epigastrium . Diabetic gastroparesis is among the differential and with an uncontrolled DM would strongly encourage tight glycemic control accu-checks with SSI, on exam  soft, non distended, reporting subjective pain with palpation to R epigastrium but is without guarding, rigidity     Certification Statement: The patient will continue to require additional inpatient hospital stay due to abdominal pain and lymphadenopathy       ED Treatment-Medication Administration from 11/26/2024 3463 to 11/27/2024 0728         Date/Time Order " Dose Route Action     11/27/2024 0034 sodium chloride 0.9 % bolus 1,000 mL 1,000 mL Intravenous New Bag     11/27/2024 0032 ketorolac (TORADOL) injection 30 mg 30 mg Intravenous Given     11/27/2024 0128 morphine injection 4 mg 4 mg Intravenous Given     11/27/2024 0208 iohexol (OMNIPAQUE) 350 MG/ML injection (MULTI-DOSE) 100 mL 100 mL Intravenous Given     11/27/2024 0235 haloperidol lactate (HALDOL) injection 5 mg 5 mg Intravenous Given            Scheduled Medications:  heparin (porcine), 5,000 Units, Subcutaneous, Q8H CARMELO  insulin glargine, 30 Units, Subcutaneous, HS  insulin lispro, 1-6 Units, Subcutaneous, 4x Daily (AC & HS)  insulin lispro, 10 Units, Subcutaneous, TID With Meals  lidocaine, 1 patch, Topical, Daily  naproxen, 500 mg, Oral, BID With Meals  pantoprazole, 40 mg, Oral, Daily Before Breakfast  pregabalin, 50 mg, Oral, TID      Continuous IV Infusions:     PRN Meds:  acetaminophen, 650 mg, Oral, Q6H PRN  HYDROmorphone, 0.3 mg, Intravenous, Q3H PRN  meclizine, 12.5 mg, Oral, TID PRN  ondansetron, 4 mg, Intravenous, Q4H PRN  oxyCODONE, 5 mg, Oral, Q6H PRN  oxyCODONE, 2.5 mg, Oral, Q6H PRN      ED Triage Vitals [11/26/24 2343]   Temperature Pulse Respirations Blood Pressure SpO2 Pain Score   99.5 °F (37.5 °C) 94 18 136/70 100 % 10 - Worst Possible Pain     Weight (last 2 days)       Date/Time Weight    11/27/24 04:50:42 83 (183)    11/26/24 2343 84 (185.19)            Vital Signs (last 3 days)       Date/Time Temp Pulse Resp BP MAP (mmHg) SpO2 O2 Device Patient Position - Orthostatic VS [place] Coma Scale Score Pain    11/28/24 07:21:21 99.7 °F (37.6 °C) 85 16 103/63 76 97 % -- -- -- --    11/27/24 22:41:41 99.4 °F (37.4 °C) 82 -- -- -- 98 % -- -- -- --    11/27/24 21:29:50 101.4 °F (38.6 °C) 97 -- 98/53 68 95 % -- -- -- --    11/27/24 2129 -- -- -- -- -- -- -- -- -- Med Not Given for Pain - for MAR use only    11/27/24 2000 -- -- -- -- -- -- None (Room air) -- 15 8 11/27/24 1928 -- -- -- -- -- --  -- -- -- 8    11/27/24 14:37:06 99.1 °F (37.3 °C) 78 17 102/70 81 97 % -- -- -- --    11/27/24 0814 -- -- -- -- -- -- -- -- -- 7 11/27/24 0810 -- -- -- -- -- -- None (Room air) -- 15 7 11/27/24 07:54:36 97.9 °F (36.6 °C) 73 19 122/79 93 96 % -- Lying -- --    11/27/24 0520 -- -- -- -- -- -- -- -- 15 8 11/27/24 0516 -- -- -- -- -- -- -- -- -- 8 11/27/24 0501 -- -- -- -- -- -- -- -- -- 8 11/27/24 04:50:42 98.2 °F (36.8 °C) 74 17 123/79 94 97 % None (Room air) -- -- --    11/27/24 0338 -- -- -- -- -- -- -- -- -- 7 11/27/24 0234 -- 84 16 120/61 -- 96 % None (Room air) Lying -- --    11/27/24 0158 -- -- -- -- -- -- -- -- -- 10 - Worst Possible Pain    11/27/24 0149 -- 89 18 96/48 68 96 % None (Room air) Lying -- --    11/27/24 0128 -- -- -- -- -- -- -- -- -- 10 - Worst Possible Pain    11/27/24 0059 -- -- -- -- -- -- -- -- -- 10 - Worst Possible Pain    11/27/24 0032 -- -- -- -- -- -- -- -- -- 10 - Worst Possible Pain    11/26/24 2357 -- -- -- -- -- -- -- -- 15 --    11/26/24 2343 99.5 °F (37.5 °C) 94 18 136/70 -- 100 % -- -- -- 10 - Worst Possible Pain              Pertinent Labs/Diagnostic Test Results:   Radiology:  CT chest w contrast   Final Interpretation by Pricila Watson MD (11/27 8061)      There is a small hiatal hernia. The wall of the esophagus appears thickened; this is most pronounced involving the distal esophagus. Clinical correlation and follow-up recommended.               Workstation performed: [id number]         CT abdomen pelvis with contrast   Final Interpretation by Pricila Watson MD (11/27 4619)      Inflammatory change and necrotic appearing lymphadenopathy in the right lower quadrant appears worse compared with November 17, 2024. This could be infectious, inflammatory, neoplastic, or a combination. Clinical correlation and follow-up recommended.    PET/CT recommended.      There is a small hiatal hernia. There is some fluid within the visualized distal esophagus,  suggesting gastroesophageal reflux. The wall of the visualized distal esophagus appears thickened. Clinical correlation and follow-up recommended.      Mild hepatomegaly.      Mild colonic diverticulosis without evidence of acute diverticulitis. No bowel obstruction.      This examination demonstrates findings for which clinical and imaging follow-up is recommended and was logged as such in EPIC.         I personally discussed this study with AZAM VERDIN on 11/27/2024 2:47 AM.               Workstation performed: [id number]           Cardiology:  No orders to display     GI:  No orders to display           Results from last 7 days   Lab Units 11/28/24 0445 11/27/24  0029   WBC Thousand/uL 10.40* 12.20*   HEMOGLOBIN g/dL 10.7* 12.3   HEMATOCRIT % 33.0* 37.0   PLATELETS Thousands/uL 428* 482*   TOTAL NEUT ABS Thousands/µL 5.30 7.15         Results from last 7 days   Lab Units 11/28/24 0445 11/27/24  0029   SODIUM mmol/L 134* 133*   POTASSIUM mmol/L 3.9 4.3   CHLORIDE mmol/L 103 98   CO2 mmol/L 26 27   ANION GAP mmol/L 5 8   BUN mg/dL 13 11   CREATININE mg/dL 0.96 0.95   EGFR ml/min/1.73sq m 72 73   CALCIUM mg/dL 8.6 9.3     Results from last 7 days   Lab Units 11/27/24  0029   AST U/L 12*   ALT U/L 10   ALK PHOS U/L 95   TOTAL PROTEIN g/dL 7.5   ALBUMIN g/dL 3.5   TOTAL BILIRUBIN mg/dL 0.49     Results from last 7 days   Lab Units 11/28/24  0719 11/27/24  2129 11/27/24  1615 11/27/24  1108 11/27/24  0756 11/27/24  0511   POC GLUCOSE mg/dl 185* 247* 223* 199* 288* 345*     Results from last 7 days   Lab Units 11/28/24  0445 11/27/24  0029   GLUCOSE RANDOM mg/dL 189* 391*         Results from last 7 days   Lab Units 11/21/24  1523   HEMOGLOBIN A1C  14.0*     Beta- Hydroxybutyrate   Date Value Ref Range Status   11/17/2024 <0.05 0.02 - 0.27 mmol/L Final     BETA-HYDROXYBUTYRATE   Date Value Ref Range Status   11/21/2020 0.1 <0.6 mmol/L Final     Results from last 7 days   Lab Units 11/27/24  0029   LIPASE u/L 17        Results from last 7 days   Lab Units 11/27/24  0031 11/21/24  1530   CLARITY UA  Clear  --    COLOR UA  Colorless  --    SPEC GRAV UA  1.024  --    PH UA  8.0  --    GLUCOSE UA mg/dl >=1000 (1%)*  --    KETONES UA mg/dl Negative  --    BLOOD UA  Negative  --    PROTEIN UA mg/dl Negative  --    NITRITE UA  Negative  --    BILIRUBIN UA  Negative  --    UROBILINOGEN UA (BE) mg/dl <2.0  --    LEUKOCYTES UA  Elevated glucose may cause decreased leukocyte values. See urine microscopic for UWBC result*  --    WBC UA /hpf 1-2  --    RBC UA /hpf None Seen  --    BACTERIA UA /hpf None Seen  --    EPITHELIAL CELLS WET PREP /hpf Moderate*  --    CREATININE UR mg/dL  --  68.2                                                   Past Medical History:   Diagnosis Date    Diabetes mellitus (HCC)     Migraine headache     Vertigo, aural, unspecified laterality      Present on Admission:   LAD (lymphadenopathy), intra-abdominal      Admitting Diagnosis: Leukocytosis [D72.829]  Abdominal pain [R10.9]  Intractable abdominal pain [R10.9]  Abdominal lymphadenopathy [R59.0]  LAD (lymphadenopathy), intra-abdominal [R59.0]  Age/Sex: 44 y.o. female    Network Utilization Review Department  ATTENTION: Please call with any questions or concerns to [phone] and carefully listen to the prompts so that you are directed to the right person. All voicemails are confidential.   For Discharge needs, contact Care Management DC Support Team at [phone] opt. 2  Send all requests for admission clinical reviews, approved or denied determinations and any other requests to dedicated fax number below belonging to the campus where the patient is receiving treatment. List of dedicated fax numbers for the Facilities:  FACILITY NAME UR FAX NUMBER   ADMISSION DENIALS (Administrative/Medical Necessity) [phone]   DISCHARGE SUPPORT TEAM (NETWORK) [phone]   PARENT CHILD HEALTH (Maternity/NICU/Pediatrics) [phone]   [facility] [phone]   [facility] [phone]   [facility] [phone]   General acute hospital [phone]   [facility] [phone]   [facility] [phone]   [facility] [phone]   [facility] [phone]   [facility] [phone]   Critical access hospital [phone]   [facility] [phone]   [facility] [phone]

## 2024-11-28 NOTE — ASSESSMENT & PLAN NOTE
CT chest showing small hiatal hernia with thickening of distal esophageal wall as well as several prominent mildly enlarged lymph nodes in right lower quadrant mesentery with central necrosis

## 2024-11-28 NOTE — ASSESSMENT & PLAN NOTE
Consulted GI due to esophageal thickening seen on CT of chest.  This could be all reflux related to gastroparesis however given the lymphadenopathy need to rule out if this is related  Hold NSAIDs

## 2024-11-28 NOTE — ASSESSMENT & PLAN NOTE
Inflammatory and necrotic appearing lymphadenopathy in the right lower quadrant, worse compared to November 17   IR was consulted for biopsy however this could not be performed safely due to overlying bowel and vessels  Surgery was asked to perform excisional biopsy, timing to be determined.  They are leaning towards outpatient

## 2024-11-28 NOTE — ASSESSMENT & PLAN NOTE
44 y.o. female with history of migraines, type 2 diabetes mellitus and obesity on Ozempic as well as chronic dysphagia who presented on 11/26 due to abdominal pain that is recurrent with previous ED visit on 11/17 for similar symptoms found to have CT chest showing small hiatal hernia with thickening of distal esophageal wall as well as several prominent mildly enlarged lymph nodes in right lower quadrant mesentery with central necrosis with esophageal thickening seen on CT likely in the setting of known esophageal dysmotility versus hiatal hernia and abdominal pain overall concerning for constipation versus side effect of GLP-1 agonist pending GI consult.      - EGD with Endoflip scheduled  - Outpatient GI follow-up scheduled  - Avoid hard/dry foods that may make dysphagia worse  - No further inpatient testing needed for CT finding of esophageal wall thickening or esophageal dysmotility  - Continue pantoprazole 40 mg p.o. daily taken 30 minutes before meal  - Avoid NSAIDs  - Start MiraLAX for constipation  - Can consider Bentyl for abdominal pain

## 2024-11-28 NOTE — PROGRESS NOTES
Progress Note - Oncology-Surgical   Name: Tari Shannon 44 y.o. female I MRN: [id number]  Unit/Bed#: [place] [address] -02 I Date of Admission: 11/26/2024   Date of Service: 11/28/2024 I Hospital Day: 1    Assessment & Plan  LAD (lymphadenopathy), intra-abdominal  44 year old female with RLQ pain x 2 weeks with progressive symptoms. CT findings show worsening LAD with necrosis. Patient has f/u appointment with surgical oncology x 1 week. Pmhx of DM and umbilical hernia repair. No family history or personal history of cancer.    Patient will likely require some diagnostic biopsy for necrotic lymph node, though her symptoms of n/v and abd pain can also be attributed towards non-malignancy related diagnosis. Diabetic gastroparesis is among the differential and with an uncontrolled DM would strongly encourage tight glycemic control.       Plan:  - Recommend glycemic control  - tentative plan for outpt f/u for elective surgical bx  - monitor for symptomatic improvement  - Pain and nausea control PRN  - DVT ppx  - Remainder of care per primary team        Subjective   No acute events overnight. Having clears without issue and small bites of PO. Subjective 8/10 pain at the R upper epigastrium. Appears to be resting comfortably.     Objective :  Temp:  [97.9 °F (36.6 °C)-101.4 °F (38.6 °C)] 99.7 °F (37.6 °C)  HR:  [73-97] 85  BP: ()/(53-79) 103/63  Resp:  [16-19] 16  SpO2:  [95 %-98 %] 97 %  O2 Device: None (Room air)    I/O         11/26 0701  11/27 0700 11/27 0701  11/28 0700 11/28 0701  11/29 0700    P.O. 480 690     IV Piggyback 1000      Total Intake(mL/kg) 1480 (17.8) 690 (8.3)     Urine (mL/kg/hr) 600      Total Output 600      Net +880 +690                    Physical Exam  Physical Exam:  General: No acute distress, alert and oriented  CV: RRR  Lungs: Normal work of breathing   Abdomen: soft, non distended, reporting subjective pain with palpation to R epigastrium but is without guarding, rigidity   Skin: Warm,  dry      Lab Results: I have reviewed the following results:  Recent Labs     11/27/24  0029 11/28/24  0445   WBC 12.20* 10.40*   HGB 12.3 10.7*   HCT 37.0 33.0*   * 428*   SODIUM 133* 134*   K 4.3 3.9   CL 98 103   CO2 27 26   BUN 11 13   CREATININE 0.95 0.96   GLUC 391* 189*   AST 12*  --    ALT 10  --    ALB 3.5  --    TBILI 0.49  --    ALKPHOS 95  --              VTE Pharmacologic Prophylaxis: VTE covered by:  heparin (porcine), Subcutaneous, 5,000 Units at 11/28/24 0510      VTE Mechanical Prophylaxis: sequential compression device

## 2024-11-28 NOTE — ASSESSMENT & PLAN NOTE
44 year old female with RLQ pain x 2 weeks with progressive symptoms. CT findings show worsening LAD with necrosis. Patient has f/u appointment with surgical oncology x 1 week. Pmhx of DM and umbilical hernia repair. No family history or personal history of cancer.    Patient will likely require some diagnostic biopsy for necrotic lymph node, though her symptoms of n/v and abd pain can also be attributed towards non-malignancy related diagnosis. Diabetic gastroparesis is among the differential and with an uncontrolled DM would strongly encourage tight glycemic control.       Plan:  - Recommend glycemic control  - tentative plan for outpt f/u for elective surgical bx  - monitor for symptomatic improvement  - Pain and nausea control PRN  - DVT ppx  - Remainder of care per primary team

## 2024-11-28 NOTE — CONSULTS
Consultation - Gastroenterology   Name: Tari Shannon 44 y.o. female I MRN: [id number]  Unit/Bed#: [place] [address] -02 I Date of Admission: 11/26/2024   Date of Service: 11/28/2024 I Hospital Day: 1       Inpatient consult to gastroenterology     Date/Time  11/28/2024 11:36 AM     Performed by  Derian Dee MD   Authorized by  Lars Navas MD           Physician Requesting Evaluation: Lars Cardozo Pe*   Reason for Evaluation / Principal Problem: Thickened esophagus on CT    Assessment & Plan  Esophageal thickening  44 y.o. female with history of migraines, type 2 diabetes mellitus and obesity on Ozempic as well as chronic dysphagia who presented on 11/26 due to abdominal pain that is recurrent with previous ED visit on 11/17 for similar symptoms found to have CT chest showing small hiatal hernia with thickening of distal esophageal wall as well as several prominent mildly enlarged lymph nodes in right lower quadrant mesentery with central necrosis with esophageal thickening seen on CT likely in the setting of known esophageal dysmotility versus hiatal hernia and abdominal pain overall concerning for constipation versus side effect of GLP-1 agonist pending GI consult.      - EGD with Endoflip scheduled  - Outpatient GI follow-up scheduled  - Avoid hard/dry foods that may make dysphagia worse  - No further inpatient testing needed for CT finding of esophageal wall thickening or esophageal dysmotility  - Continue pantoprazole 40 mg p.o. daily taken 30 minutes before meal  - Avoid NSAIDs  - Start MiraLAX for constipation  - Can consider Bentyl for abdominal pain  LAD (lymphadenopathy), intra-abdominal    Abdominal pain    I have discussed the above management plan in detail with the primary service.     History of Present Illness   HPI:  Tari Shannon is a 44 y.o. female with history of migraines, type 2 diabetes mellitus and obesity on Ozempic as well as chronic dysphagia who presented on  11/26 due to abdominal pain that is recurrent with previous ED visit on 11/17 for similar symptoms.  She reports abdominal pain is in right lower quadrant and has been persistent for the past few weeks.  Does not improve with a bowel movement.  Nausea with occasional vomiting.  Continues to have dysphagia symptoms for the past year and a half.  She states she has been having a bowel movement daily.  Occasionally uses MiraLAX.  Uses naproxen twice a week.  Patient with known history of achalasia following with us, but referred to [facility] for Endoflip.  She recently had barium esophagram outpatient without evidence of achalasia although there was a column of oral contrast persistent on all images, reported small volume none dilated esophagus favoring residual oral contrast related achalasia.  Outpatient manometry from 9/13 concerning for EGJ outflow obstruction with ineffective esophageal motility with , median IRP 30.1 possibly representing early achalasia. It appears she is planned for outpatient EGD with Endoflip on 12/10.  She has known necrotic lymphadenopathy and is scheduled for outpatient biopsy with surgical oncology on 12/12.  She takes pantoprazole 40 mg p.o. daily outpatient.  Baseline hemoglobin 12-13.  Denies alcohol or tobacco use.    EGD 1/22/2024 with normal duodenal, gastric and esophageal biopsies as well as fundic gland polyp    Review of Systems   Constitutional:  Negative for fever.   Gastrointestinal:  Positive for abdominal pain, nausea and vomiting. Negative for blood in stool, constipation, diarrhea and rectal pain.   All other systems reviewed and are negative.    I have reviewed the patient's PMH, PSH, Social History, Family History, Meds, and Allergies    Objective :  Temp:  [99.1 °F (37.3 °C)-101.4 °F (38.6 °C)] 99.7 °F (37.6 °C)  HR:  [78-97] 85  BP: ()/(53-70) 103/63  Resp:  [16-17] 16  SpO2:  [95 %-98 %] 97 %  O2 Device: None (Room air)    Physical Exam  Vitals and  nursing note reviewed.   Constitutional:       General: She is not in acute distress.     Appearance: She is well-developed and normal weight.   Abdominal:      General: Abdomen is flat. There is no distension.      Palpations: Abdomen is soft.      Tenderness: There is no abdominal tenderness.   Musculoskeletal:         General: No swelling.   Neurological:      Mental Status: She is alert.           Lab Results: I have reviewed the following results:none    Imaging Results Review: I personally reviewed the following image studies/reports in PACS and discussed pertinent findings with Radiology: CT abdomen/pelvis. My interpretation of the radiology images/reports is: stool.  Other Study Results Review: No additional pertinent studies reviewed.    Administrative Statements   I have spent a total time of 32 minutes in caring for this patient on the day of the visit/encounter including Diagnostic results, Prognosis, Risks and benefits of tx options, and Instructions for management.

## 2024-11-28 NOTE — PLAN OF CARE
Problem: PAIN - ADULT  Goal: Verbalizes/displays adequate comfort level or baseline comfort level  Description: Interventions:  - Encourage patient to monitor pain and request assistance  - Assess pain using appropriate pain scale  - Administer analgesics based on type and severity of pain and evaluate response  - Implement non-pharmacological measures as appropriate and evaluate response  - Consider cultural and social influences on pain and pain management  - Notify physician/advanced practitioner if interventions unsuccessful or patient reports new pain  Outcome: Progressing     Problem: INFECTION - ADULT  Goal: Absence or prevention of progression during hospitalization  Description: INTERVENTIONS:  - Assess and monitor for signs and symptoms of infection  - Monitor lab/diagnostic results  - Monitor all insertion sites, i.e. indwelling lines, tubes, and drains  - Monitor endotracheal if appropriate and nasal secretions for changes in amount and color  - [place] appropriate cooling/warming therapies per order  - Administer medications as ordered  - Instruct and encourage patient and family to use good hand hygiene technique  - Identify and instruct in appropriate isolation precautions for identified infection/condition  Outcome: Progressing     Problem: SAFETY ADULT  Goal: Patient will remain free of falls  Description: INTERVENTIONS:  - Educate patient/family on patient safety including physical limitations  - Instruct patient to call for assistance with activity   - Consult OT/PT to assist with strengthening/mobility   - Keep Call bell within reach  - Keep bed low and locked with side rails adjusted as appropriate  - Keep care items and personal belongings within reach  - Initiate and maintain comfort rounds  - Make Fall Risk Sign visible to staff  - Offer Toileting every  Hours, in advance of need  - Initiate/Maintain alarm  - Obtain necessary fall risk management equipment:   - Apply yellow socks and  bracelet for high fall risk patients  - Consider moving patient to room near nurses station  Outcome: Progressing  Goal: Maintain or return to baseline ADL function  Description: INTERVENTIONS:  -  Assess patient's ability to carry out ADLs; assess patient's baseline for ADL function and identify physical deficits which impact ability to perform ADLs (bathing, care of mouth/teeth, toileting, grooming, dressing, etc.)  - Assess/evaluate cause of self-care deficits   - Assess range of motion  - Assess patient's mobility; develop plan if impaired  - Assess patient's need for assistive devices and provide as appropriate  - Encourage maximum independence but intervene and supervise when necessary  - Involve family in performance of ADLs  - Assess for home care needs following discharge   - Consider OT consult to assist with ADL evaluation and planning for discharge  - Provide patient education as appropriate  Outcome: Progressing  Goal: Maintains/Returns to pre admission functional level  Description: INTERVENTIONS:  - Perform AM-PAC 6 Click Basic Mobility/ Daily Activity assessment daily.  - Set and communicate daily mobility goal to care team and patient/family/caregiver.   - Collaborate with rehabilitation services on mobility goals if consulted  - Perform Range of Motion  times a day.  - Reposition patient every  hours.  - Dangle patient  times a day  - Stand patient  times a day  - Ambulate patient  times a day  - Out of bed to chair  times a day   - Out of bed for meals  times a day  - Out of bed for toileting  - Record patient progress and toleration of activity level   Outcome: Progressing     Problem: DISCHARGE PLANNING  Goal: Discharge to home or other facility with appropriate resources  Description: INTERVENTIONS:  - Identify barriers to discharge w/patient and caregiver  - Arrange for needed discharge resources and transportation as appropriate  - Identify discharge learning needs (meds, wound care, etc.)  -  Arrange for interpretive services to assist at discharge as needed  - Refer to Case Management Department for coordinating discharge planning if the patient needs post-hospital services based on physician/advanced practitioner order or complex needs related to functional status, cognitive ability, or social support system  Outcome: Progressing     Problem: Knowledge Deficit  Goal: Patient/family/caregiver demonstrates understanding of disease process, treatment plan, medications, and discharge instructions  Description: Complete learning assessment and assess knowledge base.  Interventions:  - Provide teaching at level of understanding  - Provide teaching via preferred learning methods  Outcome: Progressing     Problem: GASTROINTESTINAL - ADULT  Goal: Minimal or absence of nausea and/or vomiting  Description: INTERVENTIONS:  - Administer IV fluids if ordered to ensure adequate hydration  - Maintain NPO status until nausea and vomiting are resolved  - Nasogastric tube if ordered  - Administer ordered antiemetic medications as needed  - Provide nonpharmacologic comfort measures as appropriate  - Advance diet as tolerated, if ordered  - Consider nutrition services referral to assist patient with adequate nutrition and appropriate food choices  Outcome: Progressing     Problem: METABOLIC, FLUID AND ELECTROLYTES - ADULT  Goal: Electrolytes maintained within normal limits  Description: INTERVENTIONS:  - Monitor labs and assess patient for signs and symptoms of electrolyte imbalances  - Administer electrolyte replacement as ordered  - Monitor response to electrolyte replacements, including repeat lab results as appropriate  - Instruct patient on fluid and nutrition as appropriate  Outcome: Progressing  Goal: Fluid balance maintained  Description: INTERVENTIONS:  - Monitor labs   - Monitor I/O and WT  - Instruct patient on fluid and nutrition as appropriate  - Assess for signs & symptoms of volume excess or  deficit  Outcome: Progressing  Goal: Glucose maintained within target range  Description: INTERVENTIONS:  - Monitor Blood Glucose as ordered  - Assess for signs and symptoms of hyperglycemia and hypoglycemia  - Administer ordered medications to maintain glucose within target range  - Assess nutritional intake and initiate nutrition service referral as needed  Outcome: Progressing     Problem: Prexisting or High Potential for Compromised Skin Integrity  Goal: Skin integrity is maintained or improved  Description: INTERVENTIONS:  - Identify patients at risk for skin breakdown  - Assess and monitor skin integrity  - Assess and monitor nutrition and hydration status  - Monitor labs   - Assess for incontinence   - Turn and reposition patient  - Assist with mobility/ambulation  - Relieve pressure over bony prominences  - Avoid friction and shearing  - Provide appropriate hygiene as needed including keeping skin clean and dry  - Evaluate need for skin moisturizer/barrier cream  - Collaborate with interdisciplinary team   - Patient/family teaching  - Consider wound care consult   Outcome: Progressing

## 2024-11-29 ENCOUNTER — ANESTHESIA EVENT (INPATIENT)
Dept: PERIOP | Facility: HOSPITAL | Age: 44
DRG: 872 | End: 2024-11-29
Payer: COMMERCIAL

## 2024-11-29 LAB
GLUCOSE SERPL-MCNC: 127 MG/DL (ref 65–140)
GLUCOSE SERPL-MCNC: 187 MG/DL (ref 65–140)
GLUCOSE SERPL-MCNC: 191 MG/DL (ref 65–140)
GLUCOSE SERPL-MCNC: 97 MG/DL (ref 65–140)

## 2024-11-29 PROCEDURE — 99232 SBSQ HOSP IP/OBS MODERATE 35: CPT | Performed by: INTERNAL MEDICINE

## 2024-11-29 PROCEDURE — 82948 REAGENT STRIP/BLOOD GLUCOSE: CPT

## 2024-11-29 RX ORDER — DOCUSATE SODIUM 100 MG/1
100 CAPSULE, LIQUID FILLED ORAL 2 TIMES DAILY
Status: DISCONTINUED | OUTPATIENT
Start: 2024-11-29 | End: 2024-11-30

## 2024-11-29 RX ORDER — CEFAZOLIN SODIUM 2 G/50ML
2000 SOLUTION INTRAVENOUS
Status: CANCELLED | OUTPATIENT
Start: 2024-11-29

## 2024-11-29 RX ORDER — POLYETHYLENE GLYCOL 3350 17 G/17G
17 POWDER, FOR SOLUTION ORAL 2 TIMES DAILY
Status: DISCONTINUED | OUTPATIENT
Start: 2024-11-29 | End: 2024-12-06 | Stop reason: HOSPADM

## 2024-11-29 RX ADMIN — HEPARIN SODIUM 5000 UNITS: 5000 INJECTION INTRAVENOUS; SUBCUTANEOUS at 21:25

## 2024-11-29 RX ADMIN — ACETAMINOPHEN 650 MG: 325 TABLET, FILM COATED ORAL at 21:25

## 2024-11-29 RX ADMIN — ACETAMINOPHEN 650 MG: 325 TABLET, FILM COATED ORAL at 15:52

## 2024-11-29 RX ADMIN — HEPARIN SODIUM 5000 UNITS: 5000 INJECTION INTRAVENOUS; SUBCUTANEOUS at 13:46

## 2024-11-29 RX ADMIN — INSULIN LISPRO 10 UNITS: 100 INJECTION, SOLUTION INTRAVENOUS; SUBCUTANEOUS at 08:42

## 2024-11-29 RX ADMIN — HEPARIN SODIUM 5000 UNITS: 5000 INJECTION INTRAVENOUS; SUBCUTANEOUS at 06:08

## 2024-11-29 RX ADMIN — ACETAMINOPHEN 650 MG: 325 TABLET, FILM COATED ORAL at 08:42

## 2024-11-29 RX ADMIN — POLYETHYLENE GLYCOL 3350 17 G: 17 POWDER, FOR SOLUTION ORAL at 21:26

## 2024-11-29 RX ADMIN — PREGABALIN 50 MG: 50 CAPSULE ORAL at 21:25

## 2024-11-29 RX ADMIN — INSULIN GLARGINE 30 UNITS: 100 INJECTION, SOLUTION SUBCUTANEOUS at 21:25

## 2024-11-29 RX ADMIN — INSULIN LISPRO 10 UNITS: 100 INJECTION, SOLUTION INTRAVENOUS; SUBCUTANEOUS at 11:27

## 2024-11-29 RX ADMIN — DOCUSATE SODIUM 100 MG: 100 CAPSULE, LIQUID FILLED ORAL at 11:25

## 2024-11-29 RX ADMIN — PREGABALIN 50 MG: 50 CAPSULE ORAL at 15:53

## 2024-11-29 RX ADMIN — POLYETHYLENE GLYCOL 3350 17 G: 17 POWDER, FOR SOLUTION ORAL at 11:25

## 2024-11-29 RX ADMIN — INSULIN LISPRO 2 UNITS: 100 INJECTION, SOLUTION INTRAVENOUS; SUBCUTANEOUS at 08:43

## 2024-11-29 RX ADMIN — PANTOPRAZOLE SODIUM 40 MG: 40 TABLET, DELAYED RELEASE ORAL at 06:08

## 2024-11-29 RX ADMIN — PREGABALIN 50 MG: 50 CAPSULE ORAL at 08:42

## 2024-11-29 RX ADMIN — INSULIN LISPRO 1 UNITS: 100 INJECTION, SOLUTION INTRAVENOUS; SUBCUTANEOUS at 11:27

## 2024-11-29 RX ADMIN — INSULIN LISPRO 10 UNITS: 100 INJECTION, SOLUTION INTRAVENOUS; SUBCUTANEOUS at 17:14

## 2024-11-29 RX ADMIN — LIDOCAINE 1 PATCH: 50 PATCH TOPICAL at 08:42

## 2024-11-29 NOTE — ASSESSMENT & PLAN NOTE
Lab Results   Component Value Date    HGBA1C 14.0 (A) 11/21/2024       Recent Labs     11/28/24  1606 11/28/24  2121 11/29/24  0809 11/29/24  1118   POCGLU 102 199* 191* 187*     Uncontrolled diabetes with A1c mentioned above  She just saw endocrinology in the office on 11/26 and 11/26/2024 and was restarted on Lantus 30 units at night and Humalog 10 - 10 - 10.  She reportedly was off all medications for 2 months prior to that appointment.  Blood sugars are trending down compared to admission.  Adjust insulin regimen to achieve goal blood sugars

## 2024-11-29 NOTE — ASSESSMENT & PLAN NOTE
Lab Results   Component Value Date    HGBA1C 14.0 (A) 11/21/2024       Recent Labs     11/28/24  1606 11/28/24  2121 11/29/24  0809 11/29/24  1118   POCGLU 102 199* 191* 187*       Blood Sugar Average: Last 72 hrs:  (P) [id number]

## 2024-11-29 NOTE — ASSESSMENT & PLAN NOTE
44 y.o. female with history of migraines, type 2 diabetes mellitus and obesity on Ozempic as well as chronic dysphagia who presented on 11/26 due to abdominal pain that is recurrent with previous ED visit on 11/17 for similar symptoms found to have CT chest showing small hiatal hernia with thickening of distal esophageal wall with esophageal thickening seen on CT likely in the setting of known esophageal dysmotility versus hiatal hernia and abdominal pain overall concerning for constipation versus side effect of GLP-1 agonist pending GI consult.      - EGD with Endoflip scheduled at [facility] on 12/10  - Outpatient GI follow-up scheduled  - Avoid hard/dry foods that may make dysphagia worse  - No further inpatient testing needed for CT finding of esophageal wall thickening or esophageal dysmotility  - Continue pantoprazole 40 mg p.o. daily taken 30 minutes before meal  - Avoid NSAIDs  - Start MiraLAX and senna for constipation  - Can consider Bentyl for abdominal pain

## 2024-11-29 NOTE — PLAN OF CARE
Problem: PAIN - ADULT  Goal: Verbalizes/displays adequate comfort level or baseline comfort level  Description: Interventions:  - Encourage patient to monitor pain and request assistance  - Assess pain using appropriate pain scale  - Administer analgesics based on type and severity of pain and evaluate response  - Implement non-pharmacological measures as appropriate and evaluate response  - Consider cultural and social influences on pain and pain management  - Notify physician/advanced practitioner if interventions unsuccessful or patient reports new pain  Outcome: Progressing     Problem: INFECTION - ADULT  Goal: Absence or prevention of progression during hospitalization  Description: INTERVENTIONS:  - Assess and monitor for signs and symptoms of infection  - Monitor lab/diagnostic results  - Monitor all insertion sites, i.e. indwelling lines, tubes, and drains  - Monitor endotracheal if appropriate and nasal secretions for changes in amount and color  - [place] appropriate cooling/warming therapies per order  - Administer medications as ordered  - Instruct and encourage patient and family to use good hand hygiene technique  - Identify and instruct in appropriate isolation precautions for identified infection/condition  Outcome: Progressing     Problem: SAFETY ADULT  Goal: Patient will remain free of falls  Description: INTERVENTIONS:  - Educate patient/family on patient safety including physical limitations  - Instruct patient to call for assistance with activity   - Consult OT/PT to assist with strengthening/mobility   - Keep Call bell within reach  - Keep bed low and locked with side rails adjusted as appropriate  - Keep care items and personal belongings within reach  - Initiate and maintain comfort rounds  - Make Fall Risk Sign visible to staff  - Offer Toileting every 2 Hours, in advance of need  - Initiate/Maintain bed alarm  - Obtain necessary fall risk management equipment.  - Apply yellow socks and  bracelet for high fall risk patients  - Consider moving patient to room near nurses station  Outcome: Progressing  Goal: Maintain or return to baseline ADL function  Description: INTERVENTIONS:  -  Assess patient's ability to carry out ADLs; assess patient's baseline for ADL function and identify physical deficits which impact ability to perform ADLs (bathing, care of mouth/teeth, toileting, grooming, dressing, etc.)  - Assess/evaluate cause of self-care deficits   - Assess range of motion  - Assess patient's mobility; develop plan if impaired  - Assess patient's need for assistive devices and provide as appropriate  - Encourage maximum independence but intervene and supervise when necessary  - Involve family in performance of ADLs  - Assess for home care needs following discharge   - Consider OT consult to assist with ADL evaluation and planning for discharge  - Provide patient education as appropriate  Outcome: Progressing  Goal: Maintains/Returns to pre admission functional level  Description: INTERVENTIONS:  - Perform AM-PAC 6 Click Basic Mobility/ Daily Activity assessment daily.  - Set and communicate daily mobility goal to care team and patient/family/caregiver.   - Collaborate with rehabilitation services on mobility goals if consulted  - Perform Range of Motion 2 times a day.  - Reposition patient every 2 hours.  - Dangle patient 2 times a day  - Stand patient 2 times a day  - Ambulate patient 2 times a day  - Out of bed to chair 2 times a day   - Out of bed for meals 2 times a day  - Out of bed for toileting  - Record patient progress and toleration of activity level   Outcome: Progressing     Problem: DISCHARGE PLANNING  Goal: Discharge to home or other facility with appropriate resources  Description: INTERVENTIONS:  - Identify barriers to discharge w/patient and caregiver  - Arrange for needed discharge resources and transportation as appropriate  - Identify discharge learning needs (meds, wound care,  etc.)  - Arrange for interpretive services to assist at discharge as needed  - Refer to Case Management Department for coordinating discharge planning if the patient needs post-hospital services based on physician/advanced practitioner order or complex needs related to functional status, cognitive ability, or social support system  Outcome: Progressing     Problem: Knowledge Deficit  Goal: Patient/family/caregiver demonstrates understanding of disease process, treatment plan, medications, and discharge instructions  Description: Complete learning assessment and assess knowledge base.  Interventions:  - Provide teaching at level of understanding  - Provide teaching via preferred learning methods  Outcome: Progressing     Problem: GASTROINTESTINAL - ADULT  Goal: Minimal or absence of nausea and/or vomiting  Description: INTERVENTIONS:  - Administer IV fluids if ordered to ensure adequate hydration  - Maintain NPO status until nausea and vomiting are resolved  - Nasogastric tube if ordered  - Administer ordered antiemetic medications as needed  - Provide nonpharmacologic comfort measures as appropriate  - Advance diet as tolerated, if ordered  - Consider nutrition services referral to assist patient with adequate nutrition and appropriate food choices  Outcome: Progressing     Problem: Prexisting or High Potential for Compromised Skin Integrity  Goal: Skin integrity is maintained or improved  Description: INTERVENTIONS:  - Identify patients at risk for skin breakdown  - Assess and monitor skin integrity  - Assess and monitor nutrition and hydration status  - Monitor labs   - Assess for incontinence   - Turn and reposition patient  - Assist with mobility/ambulation  - Relieve pressure over bony prominences  - Avoid friction and shearing  - Provide appropriate hygiene as needed including keeping skin clean and dry  - Evaluate need for skin moisturizer/barrier cream  - Collaborate with interdisciplinary team   -  Patient/family teaching  - Consider wound care consult   Outcome: Progressing     Problem: METABOLIC, FLUID AND ELECTROLYTES - ADULT  Goal: Electrolytes maintained within normal limits  Description: INTERVENTIONS:  - Monitor labs and assess patient for signs and symptoms of electrolyte imbalances  - Administer electrolyte replacement as ordered  - Monitor response to electrolyte replacements, including repeat lab results as appropriate  - Instruct patient on fluid and nutrition as appropriate  Outcome: Progressing  Goal: Fluid balance maintained  Description: INTERVENTIONS:  - Monitor labs   - Monitor I/O and WT  - Instruct patient on fluid and nutrition as appropriate  - Assess for signs & symptoms of volume excess or deficit  Outcome: Progressing  Goal: Glucose maintained within target range  Description: INTERVENTIONS:  - Monitor Blood Glucose as ordered  - Assess for signs and symptoms of hyperglycemia and hypoglycemia  - Administer ordered medications to maintain glucose within target range  - Assess nutritional intake and initiate nutrition service referral as needed  Outcome: Progressing

## 2024-11-29 NOTE — PROGRESS NOTES
Progress Note - Hospitalist   Name: Tari Shannon 44 y.o. female I MRN: [id number]  Unit/Bed#: [place] [address] -02 I Date of Admission: 11/26/2024   Date of Service: 11/29/2024 I Hospital Day: 2    Assessment & Plan  LAD (lymphadenopathy), intra-abdominal  Inflammatory and necrotic appearing lymphadenopathy in the right lower quadrant, worse compared to November 17  IR was consulted for biopsy however this could not be performed safely due to overlying bowel and vessels  Discussed with patient regarding elective biopsy per surgery however she continues to have right lower quadrant pain and does not feel she is able to go home and get this done electively.  Surgery notified.  Type 2 diabetes mellitus with hyperglycemia, with long-term current use of insulin ([facility])  Lab Results   Component Value Date    HGBA1C 14.0 (A) 11/21/2024       Recent Labs     11/28/24  1606 11/28/24  2121 11/29/24  0809 11/29/24  1118   POCGLU 102 199* 191* 187*     Uncontrolled diabetes with A1c mentioned above  She just saw endocrinology in the office on 11/26 and 11/26/2024 and was restarted on Lantus 30 units at night and Humalog 10 - 10 - 10.  She reportedly was off all medications for 2 months prior to that appointment.  Blood sugars are trending down compared to admission.  Adjust insulin regimen to achieve goal blood sugars    Abdominal pain  Multifactorial, could be from gastroparesis, dyspepsia, constipation versus lymphadenopathy   NSAIDs discontinued  Narcotics minimized  Continue Protonix  Bowel regimen per GI  Notified surgery regarding ongoing pain and inpatient biopsy   Continue glucose control   Esophageal thickening  Likely due to  gastroesophageal reflux.  Continue PPI.  Follow-up with GI as scheduled    VTE Pharmacologic Prophylaxis: VTE Score: 4 heparin    Patient Centered Rounds: I performed bedside rounds with nursing staff today.   Discussions with Specialists or Other Care Team Provider:         Current Length of Stay: 2  day(s)  Current Patient Status: Inpatient   Certification Statement: The patient will continue to require additional inpatient hospital stay due to pain  Discharge Plan: Anticipate discharge in 24-48 hrs to home.    Code Status: Level 1 - Full Code    Subjective   Still with 7/10 pain in the right lower abdomen  She said she cannot go home for elective biopsy and would likely return to the ER before that    Objective :  Temp:  [99.3 °F (37.4 °C)-100.9 °F (38.3 °C)] 99.8 °F (37.7 °C)  HR:  [90-94] 93  BP: (104-111)/(63-68) 111/68  Resp:  [16] 16  SpO2:  [96 %-97 %] 96 %  O2 Device: None (Room air)    Body mass index is 30.45 kg/m².     Input and Output Summary (last 24 hours):     Intake/Output Summary (Last 24 hours) at 11/29/2024 1432  Last data filed at 11/29/2024 0843  Gross per 24 hour   Intake 598 ml   Output 600 ml   Net -2 ml       Physical Exam  Vitals reviewed.   Constitutional:       Appearance: She is not ill-appearing.   HENT:      Head: Normocephalic and atraumatic.      Nose: No congestion or rhinorrhea.   Eyes:      General: No scleral icterus.  Cardiovascular:      Rate and Rhythm: Normal rate and regular rhythm.   Pulmonary:      Breath sounds: No wheezing or rhonchi.   Abdominal:      General: There is no distension.      Palpations: Abdomen is soft.      Tenderness: There is abdominal tenderness.   Musculoskeletal:      Right lower leg: No edema.      Left lower leg: No edema.   Skin:     General: Skin is warm and dry.   Neurological:      Mental Status: She is oriented to person, place, and time.   Psychiatric:         Mood and Affect: Mood normal.         Behavior: Behavior normal.       Lab Results: I have reviewed the following results:   Results from last 7 days   Lab Units 11/28/24  0445   WBC Thousand/uL 10.40*   HEMOGLOBIN g/dL 10.7*   HEMATOCRIT % 33.0*   PLATELETS Thousands/uL 428*   SEGS PCT % 50   LYMPHO PCT % 33   MONO PCT % 13*   EOS PCT % 2     Results from last 7 days   Lab Units  11/28/24  0445 11/27/24  0029   SODIUM mmol/L 134* 133*   POTASSIUM mmol/L 3.9 4.3   CHLORIDE mmol/L 103 98   CO2 mmol/L 26 27   BUN mg/dL 13 11   CREATININE mg/dL 0.96 0.95   ANION GAP mmol/L 5 8   CALCIUM mg/dL 8.6 9.3   ALBUMIN g/dL  --  3.5   TOTAL BILIRUBIN mg/dL  --  0.49   ALK PHOS U/L  --  95   ALT U/L  --  10   AST U/L  --  12*   GLUCOSE RANDOM mg/dL 189* 391*         Results from last 7 days   Lab Units 11/29/24  1118 11/29/24  0809 11/28/24  2121 11/28/24  1606 11/28/24  1229 11/28/24  1007 11/28/24  0719 11/27/24  2129 11/27/24  1615 11/27/24  1108 11/27/24  0756 11/27/24  0511   POC GLUCOSE mg/dl 187* 191* 199* 102 181* 265* 185* 247* 223* 199* 288* 345*               Recent Cultures (last 7 days):         Imaging Results Review: I reviewed radiology reports from this admission including: CT abdomen/pelvis.      Last 24 Hours Medication List:     Current Facility-Administered Medications:     acetaminophen (TYLENOL) tablet 650 mg, Q6H PRN    docusate sodium (COLACE) capsule 100 mg, BID    heparin (porcine) subcutaneous injection 5,000 Units, Q8H CARMELO    insulin glargine (LANTUS) subcutaneous injection 30 Units 0.3 mL, HS    insulin lispro (HumALOG/ADMELOG) 100 units/mL subcutaneous injection 1-6 Units, 4x Daily (AC & HS) **AND** Fingerstick Glucose (POCT), 4x Daily AC and at bedtime    insulin lispro (HumALOG/ADMELOG) 100 units/mL subcutaneous injection 10 Units, TID With Meals    lidocaine (LIDODERM) 5 % patch 1 patch, Daily    meclizine (ANTIVERT) tablet 12.5 mg, TID PRN    ondansetron (ZOFRAN) injection 4 mg, Q4H PRN    oxyCODONE (ROXICODONE) IR tablet 5 mg, Q6H PRN    oxyCODONE (ROXICODONE) split tablet 2.5 mg, Q6H PRN    pantoprazole (PROTONIX) EC tablet 40 mg, Daily Before Breakfast    polyethylene glycol (MIRALAX) packet 17 g, Daily PRN    polyethylene glycol (MIRALAX) packet 17 g, BID    pregabalin (LYRICA) capsule 50 mg, TID    Administrative Statements   Today, Patient Was Seen By: Lars Richardson  Juliane Navas MD      **Please Note: This note may have been constructed using a voice recognition system.**

## 2024-11-29 NOTE — PLAN OF CARE
Problem: PAIN - ADULT  Goal: Verbalizes/displays adequate comfort level or baseline comfort level  Description: Interventions:  - Encourage patient to monitor pain and request assistance  - Assess pain using appropriate pain scale  - Administer analgesics based on type and severity of pain and evaluate response  - Implement non-pharmacological measures as appropriate and evaluate response  - Consider cultural and social influences on pain and pain management  - Notify physician/advanced practitioner if interventions unsuccessful or patient reports new pain  Outcome: Progressing     Problem: INFECTION - ADULT  Goal: Absence or prevention of progression during hospitalization  Description: INTERVENTIONS:  - Assess and monitor for signs and symptoms of infection  - Monitor lab/diagnostic results  - Monitor all insertion sites, i.e. indwelling lines, tubes, and drains  - Monitor endotracheal if appropriate and nasal secretions for changes in amount and color  - [place] appropriate cooling/warming therapies per order  - Administer medications as ordered  - Instruct and encourage patient and family to use good hand hygiene technique  - Identify and instruct in appropriate isolation precautions for identified infection/condition  Outcome: Progressing     Problem: SAFETY ADULT  Goal: Patient will remain free of falls  Description: INTERVENTIONS:  - Educate patient/family on patient safety including physical limitations  - Instruct patient to call for assistance with activity   - Consult OT/PT to assist with strengthening/mobility   - Keep Call bell within reach  - Keep bed low and locked with side rails adjusted as appropriate  - Keep care items and personal belongings within reach  - Initiate and maintain comfort rounds  - Make Fall Risk Sign visible to staff  - Offer Toileting every 2 Hours, in advance of need  - Apply yellow socks and bracelet for high fall risk patients  - Consider moving patient to room near nurses  station  Outcome: Progressing  Goal: Maintain or return to baseline ADL function  Description: INTERVENTIONS:  -  Assess patient's ability to carry out ADLs; assess patient's baseline for ADL function and identify physical deficits which impact ability to perform ADLs (bathing, care of mouth/teeth, toileting, grooming, dressing, etc.)  - Assess/evaluate cause of self-care deficits   - Assess range of motion  - Assess patient's mobility; develop plan if impaired  - Assess patient's need for assistive devices and provide as appropriate  - Encourage maximum independence but intervene and supervise when necessary  - Involve family in performance of ADLs  - Assess for home care needs following discharge   - Consider OT consult to assist with ADL evaluation and planning for discharge  - Provide patient education as appropriate  Outcome: Progressing  Goal: Maintains/Returns to pre admission functional level  Description: INTERVENTIONS:  - Perform AM-PAC 6 Click Basic Mobility/ Daily Activity assessment daily.  - Set and communicate daily mobility goal to care team and patient/family/caregiver.   - Collaborate with rehabilitation services on mobility goals if consulted  - Perform Range of Motion 3 times a day.  - Reposition patient every 2 hours.  - Dangle patient 3 times a day  - Stand patient 3 times a day  - Ambulate patient 3 times a day  - Out of bed to chair 3 times a day   - Out of bed for meals 3 times a day  - Out of bed for toileting  - Record patient progress and toleration of activity level   Outcome: Progressing     Problem: DISCHARGE PLANNING  Goal: Discharge to home or other facility with appropriate resources  Description: INTERVENTIONS:  - Identify barriers to discharge w/patient and caregiver  - Arrange for needed discharge resources and transportation as appropriate  - Identify discharge learning needs (meds, wound care, etc.)  - Arrange for interpretive services to assist at discharge as needed  - Refer  to Case Management Department for coordinating discharge planning if the patient needs post-hospital services based on physician/advanced practitioner order or complex needs related to functional status, cognitive ability, or social support system  Outcome: Progressing     Problem: Knowledge Deficit  Goal: Patient/family/caregiver demonstrates understanding of disease process, treatment plan, medications, and discharge instructions  Description: Complete learning assessment and assess knowledge base.  Interventions:  - Provide teaching at level of understanding  - Provide teaching via preferred learning methods  Outcome: Progressing     Problem: GASTROINTESTINAL - ADULT  Goal: Minimal or absence of nausea and/or vomiting  Description: INTERVENTIONS:  - Administer IV fluids if ordered to ensure adequate hydration  - Maintain NPO status until nausea and vomiting are resolved  - Nasogastric tube if ordered  - Administer ordered antiemetic medications as needed  - Provide nonpharmacologic comfort measures as appropriate  - Advance diet as tolerated, if ordered  - Consider nutrition services referral to assist patient with adequate nutrition and appropriate food choices  Outcome: Progressing     Problem: METABOLIC, FLUID AND ELECTROLYTES - ADULT  Goal: Electrolytes maintained within normal limits  Description: INTERVENTIONS:  - Monitor labs and assess patient for signs and symptoms of electrolyte imbalances  - Administer electrolyte replacement as ordered  - Monitor response to electrolyte replacements, including repeat lab results as appropriate  - Instruct patient on fluid and nutrition as appropriate  Outcome: Progressing  Goal: Fluid balance maintained  Description: INTERVENTIONS:  - Monitor labs   - Monitor I/O and WT  - Instruct patient on fluid and nutrition as appropriate  - Assess for signs & symptoms of volume excess or deficit  Outcome: Progressing  Goal: Glucose maintained within target range  Description:  INTERVENTIONS:  - Monitor Blood Glucose as ordered  - Assess for signs and symptoms of hyperglycemia and hypoglycemia  - Administer ordered medications to maintain glucose within target range  - Assess nutritional intake and initiate nutrition service referral as needed  Outcome: Progressing     Problem: Prexisting or High Potential for Compromised Skin Integrity  Goal: Skin integrity is maintained or improved  Description: INTERVENTIONS:  - Identify patients at risk for skin breakdown  - Assess and monitor skin integrity  - Assess and monitor nutrition and hydration status  - Monitor labs   - Assess for incontinence   - Turn and reposition patient  - Assist with mobility/ambulation  - Relieve pressure over bony prominences  - Avoid friction and shearing  - Provide appropriate hygiene as needed including keeping skin clean and dry  - Evaluate need for skin moisturizer/barrier cream  - Collaborate with interdisciplinary team   - Patient/family teaching  - Consider wound care consult   Outcome: Progressing

## 2024-11-29 NOTE — PROGRESS NOTES
Progress Note - Gastroenterology   Name: Tari Shannon 44 y.o. female I MRN: [id number]  Unit/Bed#: [place] [address] -02 I Date of Admission: 11/26/2024   Date of Service: 11/29/2024 I Hospital Day: 2    Assessment & Plan  Abdominal pain  Necrotic appearing lymphadenopathy right lower quadrant  Patient is endorsing ongoing right lower quadrant pain for 2 weeks.  CT imaging on 11/17 shows concern for right lower abdominal mesenteric lymph node which are necrotic centrally.  Repeat CT imaging obtained this admission shows worsening necrosis.  Patient at that time was recommended to have a PET/CT scan. Patient has a PET scan ordered by her family medicine physician and is scheduled to see Dr. Armstrong of surgical oncology on December 12.    -Would recommend obtaining PET scan to further visualize ongoing lymph node necrosis.  -Follow-up with surgical oncology  Esophageal thickening  44 y.o. female with history of migraines, type 2 diabetes mellitus and obesity on Ozempic as well as chronic dysphagia who presented on 11/26 due to abdominal pain that is recurrent with previous ED visit on 11/17 for similar symptoms found to have CT chest showing small hiatal hernia with thickening of distal esophageal wall with esophageal thickening seen on CT likely in the setting of known esophageal dysmotility versus hiatal hernia and abdominal pain overall concerning for constipation versus side effect of GLP-1 agonist pending GI consult.      - EGD with Endoflip scheduled at [facility] on 12/10  - Outpatient GI follow-up scheduled  - Avoid hard/dry foods that may make dysphagia worse  - No further inpatient testing needed for CT finding of esophageal wall thickening or esophageal dysmotility  - Continue pantoprazole 40 mg p.o. daily taken 30 minutes before meal  - Avoid NSAIDs  - Start MiraLAX and senna for constipation  - Can consider Bentyl for abdominal pain  LAD (lymphadenopathy), intra-abdominal    Type 2 diabetes mellitus with  hyperglycemia, with long-term current use of insulin ([facility])  Lab Results   Component Value Date    HGBA1C 14.0 (A) 11/21/2024       Recent Labs     11/28/24  1606 11/28/24  2121 11/29/24  0809 11/29/24  1118   POCGLU 102 199* 191* 187*       Blood Sugar Average: Last 72 hrs:  (P) [id number]          Subjective   Patient was seen and examined at bedside.  Patient states she is having ongoing right lower quadrant abdominal pain.  Patient's dysphagia remains similar to prior.    Objective :  Temp:  [99.3 °F (37.4 °C)-100.9 °F (38.3 °C)] 100.9 °F (38.3 °C)  HR:  [90-94] 93  BP: (104-111)/(63-68) 111/68  Resp:  [16] 16  SpO2:  [96 %-97 %] 96 %  O2 Device: None (Room air)    Physical Exam  Pulmonary:      Effort: Pulmonary effort is normal.   Abdominal:      Tenderness: There is abdominal tenderness in the right lower quadrant.   Skin:     General: Skin is warm.   Neurological:      Mental Status: She is alert.   Psychiatric:         Mood and Affect: Mood normal.         Lab Results: I have reviewed the following results:

## 2024-11-29 NOTE — UTILIZATION REVIEW
Notification of Unplanned, Urgent, or   Emergency Inpatient Admission   AUTHORIZATION REQUEST   [place] [address]  Tax ID: [id number]  NPI: [id number]  Place of Service: Acute [facility]  Admission Level of Care: Inpatient  Place of Service Code: 21     Attending Physician Information  Attending Name and NPI#: Lars Navas Md [[id number]]  Phone: [phone]     Admission Information  Inpatient Admission Date/Time: 11/27/24  4:15 AM  Discharge Date/Time: No discharge date for patient encounter.  Admitting Diagnosis Code/Description:  Leukocytosis [D72.829]  Abdominal pain [R10.9]  Intractable abdominal pain [R10.9]  Abdominal lymphadenopathy [R59.0]  LAD (lymphadenopathy), intra-abdominal [R59.0]     Utilization Review Contact  Lisa Call Utilization   Phone: [phone]  Fax: [phone]  Email: [email or web address]  Contact for approvals/pending authorizations, clinical reviews, and discharge.     Physician Advisory Services Contact  Medical Necessity Denial & Egbn-gk-Yjfg Discussion  Phone: [phone]  Fax: [phone]  Email: [email or web address]     DISCHARGE SUPPORT TEAM:  For Patients Discharge Needs & Updates  Phone: [phone] opt. 2 Fax: [phone]  Email: [email or web address]

## 2024-11-29 NOTE — ASSESSMENT & PLAN NOTE
Multifactorial, could be from gastroparesis, dyspepsia, constipation versus lymphadenopathy   NSAIDs discontinued  Narcotics minimized  Continue Protonix  Bowel regimen per GI  Notified surgery regarding ongoing pain and inpatient biopsy   Continue glucose control

## 2024-11-29 NOTE — ASSESSMENT & PLAN NOTE
Inflammatory and necrotic appearing lymphadenopathy in the right lower quadrant, worse compared to November 17  IR was consulted for biopsy however this could not be performed safely due to overlying bowel and vessels  Discussed with patient regarding elective biopsy per surgery however she continues to have right lower quadrant pain and does not feel she is able to go home and get this done electively.  Surgery notified.

## 2024-11-29 NOTE — ASSESSMENT & PLAN NOTE
Necrotic appearing lymphadenopathy right lower quadrant  Patient is endorsing ongoing right lower quadrant pain for 2 weeks.  CT imaging on 11/17 shows concern for right lower abdominal mesenteric lymph node which are necrotic centrally.  Repeat CT imaging obtained this admission shows worsening necrosis.  Patient at that time was recommended to have a PET/CT scan. Patient has a PET scan ordered by her family medicine physician and is scheduled to see Dr. Armstrong of surgical oncology on December 12.    -Would recommend obtaining PET scan to further visualize ongoing lymph node necrosis.  -Follow-up with surgical oncology

## 2024-11-30 ENCOUNTER — ANESTHESIA (INPATIENT)
Dept: PERIOP | Facility: HOSPITAL | Age: 44
DRG: 872 | End: 2024-11-30
Payer: COMMERCIAL

## 2024-11-30 ENCOUNTER — APPOINTMENT (INPATIENT)
Dept: CT IMAGING | Facility: HOSPITAL | Age: 44
DRG: 872 | End: 2024-11-30
Payer: COMMERCIAL

## 2024-11-30 PROBLEM — A41.9 SEPSIS WITHOUT ACUTE ORGAN DYSFUNCTION (HCC): Status: ACTIVE | Noted: 2024-11-30

## 2024-11-30 PROBLEM — R73.09 HEMOGLOBIN A1C GREATER THAN 9%, INDICATING POOR DIABETIC CONTROL: Status: ACTIVE | Noted: 2024-11-30

## 2024-11-30 LAB
ABO GROUP BLD: NORMAL
ABO GROUP BLD: NORMAL
ANION GAP SERPL CALCULATED.3IONS-SCNC: 8 MMOL/L (ref 4–13)
BASOPHILS # BLD AUTO: 0.05 THOUSANDS/ΜL (ref 0–0.1)
BASOPHILS NFR BLD AUTO: 0 % (ref 0–1)
BLD GP AB SCN SERPL QL: NEGATIVE
BUN SERPL-MCNC: 8 MG/DL (ref 5–25)
CALCIUM SERPL-MCNC: 9.5 MG/DL (ref 8.4–10.2)
CHLORIDE SERPL-SCNC: 103 MMOL/L (ref 96–108)
CO2 SERPL-SCNC: 28 MMOL/L (ref 21–32)
CREAT SERPL-MCNC: 0.84 MG/DL (ref 0.6–1.3)
EOSINOPHIL # BLD AUTO: 0.07 THOUSAND/ΜL (ref 0–0.61)
EOSINOPHIL NFR BLD AUTO: 1 % (ref 0–6)
ERYTHROCYTE [DISTWIDTH] IN BLOOD BY AUTOMATED COUNT: 12.2 % (ref 11.6–15.1)
FLUAV RNA RESP QL NAA+PROBE: NEGATIVE
FLUBV RNA RESP QL NAA+PROBE: NEGATIVE
GFR SERPL CREATININE-BSD FRML MDRD: 85 ML/MIN/1.73SQ M
GLUCOSE SERPL-MCNC: 100 MG/DL (ref 65–140)
GLUCOSE SERPL-MCNC: 160 MG/DL (ref 65–140)
GLUCOSE SERPL-MCNC: 165 MG/DL (ref 65–140)
GLUCOSE SERPL-MCNC: 78 MG/DL (ref 65–140)
GLUCOSE SERPL-MCNC: 98 MG/DL (ref 65–140)
HCT VFR BLD AUTO: 37.8 % (ref 34.8–46.1)
HGB BLD-MCNC: 12.3 G/DL (ref 11.5–15.4)
IMM GRANULOCYTES # BLD AUTO: 0.13 THOUSAND/UL (ref 0–0.2)
IMM GRANULOCYTES NFR BLD AUTO: 1 % (ref 0–2)
LACTATE SERPL-SCNC: 0.7 MMOL/L (ref 0.5–2)
LYMPHOCYTES # BLD AUTO: 2.55 THOUSANDS/ΜL (ref 0.6–4.47)
LYMPHOCYTES NFR BLD AUTO: 17 % (ref 14–44)
MCH RBC QN AUTO: 29 PG (ref 26.8–34.3)
MCHC RBC AUTO-ENTMCNC: 32.5 G/DL (ref 31.4–37.4)
MCV RBC AUTO: 89 FL (ref 82–98)
MONOCYTES # BLD AUTO: 1.98 THOUSAND/ΜL (ref 0.17–1.22)
MONOCYTES NFR BLD AUTO: 13 % (ref 4–12)
NEUTROPHILS # BLD AUTO: 10.45 THOUSANDS/ΜL (ref 1.85–7.62)
NEUTS SEG NFR BLD AUTO: 68 % (ref 43–75)
NRBC BLD AUTO-RTO: 0 /100 WBCS
PLATELET # BLD AUTO: 513 THOUSANDS/UL (ref 149–390)
PMV BLD AUTO: 9.5 FL (ref 8.9–12.7)
POTASSIUM SERPL-SCNC: 3.7 MMOL/L (ref 3.5–5.3)
PROCALCITONIN SERPL-MCNC: 0.61 NG/ML
RBC # BLD AUTO: 4.24 MILLION/UL (ref 3.81–5.12)
RH BLD: POSITIVE
RH BLD: POSITIVE
RSV RNA RESP QL NAA+PROBE: NEGATIVE
SARS-COV-2 RNA RESP QL NAA+PROBE: NEGATIVE
SODIUM SERPL-SCNC: 139 MMOL/L (ref 135–147)
SPECIMEN EXPIRATION DATE: NORMAL
WBC # BLD AUTO: 15.23 THOUSAND/UL (ref 4.31–10.16)

## 2024-11-30 PROCEDURE — 83605 ASSAY OF LACTIC ACID: CPT | Performed by: INTERNAL MEDICINE

## 2024-11-30 PROCEDURE — 99232 SBSQ HOSP IP/OBS MODERATE 35: CPT | Performed by: INTERNAL MEDICINE

## 2024-11-30 PROCEDURE — 99233 SBSQ HOSP IP/OBS HIGH 50: CPT | Performed by: INTERNAL MEDICINE

## 2024-11-30 PROCEDURE — 86901 BLOOD TYPING SEROLOGIC RH(D): CPT | Performed by: SURGERY

## 2024-11-30 PROCEDURE — 84145 PROCALCITONIN (PCT): CPT | Performed by: INTERNAL MEDICINE

## 2024-11-30 PROCEDURE — 85025 COMPLETE CBC W/AUTO DIFF WBC: CPT | Performed by: INTERNAL MEDICINE

## 2024-11-30 PROCEDURE — 82948 REAGENT STRIP/BLOOD GLUCOSE: CPT

## 2024-11-30 PROCEDURE — 80048 BASIC METABOLIC PNL TOTAL CA: CPT | Performed by: INTERNAL MEDICINE

## 2024-11-30 PROCEDURE — 87040 BLOOD CULTURE FOR BACTERIA: CPT | Performed by: INTERNAL MEDICINE

## 2024-11-30 PROCEDURE — 86900 BLOOD TYPING SEROLOGIC ABO: CPT | Performed by: SURGERY

## 2024-11-30 PROCEDURE — 86850 RBC ANTIBODY SCREEN: CPT | Performed by: SURGERY

## 2024-11-30 PROCEDURE — 74177 CT ABD & PELVIS W/CONTRAST: CPT

## 2024-11-30 PROCEDURE — NC001 PR NO CHARGE: Performed by: STUDENT IN AN ORGANIZED HEALTH CARE EDUCATION/TRAINING PROGRAM

## 2024-11-30 PROCEDURE — 0241U HB NFCT DS VIR RESP RNA 4 TRGT: CPT | Performed by: INTERNAL MEDICINE

## 2024-11-30 RX ORDER — BISACODYL 10 MG
10 SUPPOSITORY, RECTAL RECTAL DAILY
Status: DISCONTINUED | OUTPATIENT
Start: 2024-11-30 | End: 2024-12-06 | Stop reason: HOSPADM

## 2024-11-30 RX ORDER — SODIUM CHLORIDE, SODIUM LACTATE, POTASSIUM CHLORIDE, CALCIUM CHLORIDE 600; 310; 30; 20 MG/100ML; MG/100ML; MG/100ML; MG/100ML
100 INJECTION, SOLUTION INTRAVENOUS CONTINUOUS
Status: DISCONTINUED | OUTPATIENT
Start: 2024-12-01 | End: 2024-12-01

## 2024-11-30 RX ORDER — SODIUM CHLORIDE, SODIUM LACTATE, POTASSIUM CHLORIDE, CALCIUM CHLORIDE 600; 310; 30; 20 MG/100ML; MG/100ML; MG/100ML; MG/100ML
125 INJECTION, SOLUTION INTRAVENOUS CONTINUOUS
Status: DISCONTINUED | OUTPATIENT
Start: 2024-11-30 | End: 2024-11-30

## 2024-11-30 RX ORDER — AMOXICILLIN 250 MG
1 CAPSULE ORAL
Status: DISCONTINUED | OUTPATIENT
Start: 2024-11-30 | End: 2024-11-30

## 2024-11-30 RX ORDER — AMOXICILLIN 250 MG
1 CAPSULE ORAL 3 TIMES DAILY
Status: DISCONTINUED | OUTPATIENT
Start: 2024-11-30 | End: 2024-12-06 | Stop reason: HOSPADM

## 2024-11-30 RX ADMIN — POLYETHYLENE GLYCOL 3350 17 G: 17 POWDER, FOR SOLUTION ORAL at 10:24

## 2024-11-30 RX ADMIN — PANTOPRAZOLE SODIUM 40 MG: 40 TABLET, DELAYED RELEASE ORAL at 06:13

## 2024-11-30 RX ADMIN — ACETAMINOPHEN 650 MG: 325 TABLET, FILM COATED ORAL at 22:00

## 2024-11-30 RX ADMIN — IOHEXOL 100 ML: 350 INJECTION, SOLUTION INTRAVENOUS at 13:51

## 2024-11-30 RX ADMIN — HEPARIN SODIUM 5000 UNITS: 5000 INJECTION INTRAVENOUS; SUBCUTANEOUS at 22:01

## 2024-11-30 RX ADMIN — SODIUM CHLORIDE, SODIUM LACTATE, POTASSIUM CHLORIDE, AND CALCIUM CHLORIDE 100 ML/HR: .6; .31; .03; .02 INJECTION, SOLUTION INTRAVENOUS at 23:35

## 2024-11-30 RX ADMIN — BISACODYL 10 MG: 10 SUPPOSITORY RECTAL at 10:24

## 2024-11-30 RX ADMIN — SENNOSIDES AND DOCUSATE SODIUM 1 TABLET: 8.6; 5 TABLET ORAL at 17:36

## 2024-11-30 RX ADMIN — INSULIN LISPRO 1 UNITS: 100 INJECTION, SOLUTION INTRAVENOUS; SUBCUTANEOUS at 22:07

## 2024-11-30 RX ADMIN — PREGABALIN 50 MG: 50 CAPSULE ORAL at 22:00

## 2024-11-30 RX ADMIN — CEFEPIME 2000 MG: 2 INJECTION, POWDER, FOR SOLUTION INTRAVENOUS at 10:26

## 2024-11-30 RX ADMIN — SENNOSIDES AND DOCUSATE SODIUM 1 TABLET: 8.6; 5 TABLET ORAL at 22:00

## 2024-11-30 RX ADMIN — SENNOSIDES AND DOCUSATE SODIUM 1 TABLET: 8.6; 5 TABLET ORAL at 10:23

## 2024-11-30 RX ADMIN — PREGABALIN 50 MG: 50 CAPSULE ORAL at 17:36

## 2024-11-30 RX ADMIN — HEPARIN SODIUM 5000 UNITS: 5000 INJECTION INTRAVENOUS; SUBCUTANEOUS at 06:13

## 2024-11-30 RX ADMIN — ACETAMINOPHEN 650 MG: 325 TABLET, FILM COATED ORAL at 10:23

## 2024-11-30 RX ADMIN — HEPARIN SODIUM 5000 UNITS: 5000 INJECTION INTRAVENOUS; SUBCUTANEOUS at 14:19

## 2024-11-30 RX ADMIN — SODIUM CHLORIDE, SODIUM LACTATE, POTASSIUM CHLORIDE, AND CALCIUM CHLORIDE 125 ML/HR: .6; .31; .03; .02 INJECTION, SOLUTION INTRAVENOUS at 06:13

## 2024-11-30 RX ADMIN — LIDOCAINE 1 PATCH: 50 PATCH TOPICAL at 10:24

## 2024-11-30 RX ADMIN — INSULIN LISPRO 10 UNITS: 100 INJECTION, SOLUTION INTRAVENOUS; SUBCUTANEOUS at 17:36

## 2024-11-30 RX ADMIN — PREGABALIN 50 MG: 50 CAPSULE ORAL at 10:23

## 2024-11-30 RX ADMIN — POLYETHYLENE GLYCOL 3350 17 G: 17 POWDER, FOR SOLUTION ORAL at 22:00

## 2024-11-30 RX ADMIN — AMPICILLIN SODIUM AND SULBACTAM SODIUM 3 G: 100; 50 INJECTION, POWDER, FOR SOLUTION INTRAVENOUS at 22:11

## 2024-11-30 RX ADMIN — INSULIN LISPRO 1 UNITS: 100 INJECTION, SOLUTION INTRAVENOUS; SUBCUTANEOUS at 17:36

## 2024-11-30 RX ADMIN — IOHEXOL 50 ML: 240 INJECTION, SOLUTION INTRATHECAL; INTRAVASCULAR; INTRAVENOUS; ORAL at 13:51

## 2024-11-30 RX ADMIN — INSULIN GLARGINE 30 UNITS: 100 INJECTION, SOLUTION SUBCUTANEOUS at 22:00

## 2024-11-30 NOTE — PLAN OF CARE
Problem: PAIN - ADULT  Goal: Verbalizes/displays adequate comfort level or baseline comfort level  Description: Interventions:  - Encourage patient to monitor pain and request assistance  - Assess pain using appropriate pain scale  - Administer analgesics based on type and severity of pain and evaluate response  - Implement non-pharmacological measures as appropriate and evaluate response  - Consider cultural and social influences on pain and pain management  - Notify physician/advanced practitioner if interventions unsuccessful or patient reports new pain  11/30/2024 1601 by Yesica Serrano RN  Outcome: Progressing  11/30/2024 1600 by Yesica Serrano RN  Outcome: Progressing     Problem: INFECTION - ADULT  Goal: Absence or prevention of progression during hospitalization  Description: INTERVENTIONS:  - Assess and monitor for signs and symptoms of infection  - Monitor lab/diagnostic results  - Monitor all insertion sites, i.e. indwelling lines, tubes, and drains  - Monitor endotracheal if appropriate and nasal secretions for changes in amount and color  - [place] appropriate cooling/warming therapies per order  - Administer medications as ordered  - Instruct and encourage patient and family to use good hand hygiene technique  - Identify and instruct in appropriate isolation precautions for identified infection/condition  11/30/2024 1601 by Yesica Serrano RN  Outcome: Progressing  11/30/2024 1600 by Yesica Serrano RN  Outcome: Progressing     Problem: SAFETY ADULT  Goal: Patient will remain free of falls  Description: INTERVENTIONS:  - Educate patient/family on patient safety including physical limitations  - Instruct patient to call for assistance with activity   - Consult OT/PT to assist with strengthening/mobility   - Keep Call bell within reach  - Keep bed low and locked with side rails adjusted as appropriate  - Keep care items and personal belongings within reach  - Initiate and maintain comfort rounds  -  Apply yellow socks and bracelet for high fall risk patients  - Consider moving patient to room near nurses station  11/30/2024 1601 by Yesica Serrano RN  Outcome: Progressing  11/30/2024 1600 by Yesica Serrano RN  Outcome: Progressing  Goal: Maintain or return to baseline ADL function  Description: INTERVENTIONS:  -  Assess patient's ability to carry out ADLs; assess patient's baseline for ADL function and identify physical deficits which impact ability to perform ADLs (bathing, care of mouth/teeth, toileting, grooming, dressing, etc.)  - Assess/evaluate cause of self-care deficits   - Assess range of motion  - Assess patient's mobility; develop plan if impaired  - Assess patient's need for assistive devices and provide as appropriate  - Encourage maximum independence but intervene and supervise when necessary  - Involve family in performance of ADLs  - Assess for home care needs following discharge   - Consider OT consult to assist with ADL evaluation and planning for discharge  - Provide patient education as appropriate  11/30/2024 1601 by Yesica Serrano RN  Outcome: Progressing  11/30/2024 1600 by Yesiac Serrano RN  Outcome: Progressing  Goal: Maintains/Returns to pre admission functional level  Description: INTERVENTIONS:  - Perform AM-PAC 6 Click Basic Mobility/ Daily Activity assessment daily.  - Set and communicate daily mobility goal to care team and patient/family/caregiver.   - Collaborate with rehabilitation services on mobility goals if consulted  - Out of bed to chair   - Out of bed for meals   - Out of bed for toileting  - Record patient progress and toleration of activity level   11/30/2024 1601 by Yesica Serrano RN  Outcome: Progressing  11/30/2024 1600 by Yesica Serrano RN  Outcome: Progressing     Problem: DISCHARGE PLANNING  Goal: Discharge to home or other facility with appropriate resources  Description: INTERVENTIONS:  - Identify barriers to discharge w/patient and caregiver  - Arrange for  needed discharge resources and transportation as appropriate  - Identify discharge learning needs (meds, wound care, etc.)  - Arrange for interpretive services to assist at discharge as needed  - Refer to Case Management Department for coordinating discharge planning if the patient needs post-hospital services based on physician/advanced practitioner order or complex needs related to functional status, cognitive ability, or social support system  11/30/2024 1601 by Yesica Serrano RN  Outcome: Progressing  11/30/2024 1600 by Yesica Serrano RN  Outcome: Progressing     Problem: Knowledge Deficit  Goal: Patient/family/caregiver demonstrates understanding of disease process, treatment plan, medications, and discharge instructions  Description: Complete learning assessment and assess knowledge base.  Interventions:  - Provide teaching at level of understanding  - Provide teaching via preferred learning methods  11/30/2024 1601 by Yesica Serrano RN  Outcome: Progressing  11/30/2024 1600 by Yesica Serrano RN  Outcome: Progressing     Problem: GASTROINTESTINAL - ADULT  Goal: Minimal or absence of nausea and/or vomiting  Description: INTERVENTIONS:  - Administer IV fluids if ordered to ensure adequate hydration  - Maintain NPO status until nausea and vomiting are resolved  - Nasogastric tube if ordered  - Administer ordered antiemetic medications as needed  - Provide nonpharmacologic comfort measures as appropriate  - Advance diet as tolerated, if ordered  - Consider nutrition services referral to assist patient with adequate nutrition and appropriate food choices  11/30/2024 1601 by Yesica Serrano RN  Outcome: Progressing  11/30/2024 1600 by Yesica Serrano RN  Outcome: Progressing     Problem: METABOLIC, FLUID AND ELECTROLYTES - ADULT  Goal: Electrolytes maintained within normal limits  Description: INTERVENTIONS:  - Monitor labs and assess patient for signs and symptoms of electrolyte imbalances  - Administer electrolyte  replacement as ordered  - Monitor response to electrolyte replacements, including repeat lab results as appropriate  - Instruct patient on fluid and nutrition as appropriate  11/30/2024 1601 by Yesica Serrano RN  Outcome: Progressing  11/30/2024 1600 by Yesica Serrano RN  Outcome: Progressing  Goal: Fluid balance maintained  Description: INTERVENTIONS:  - Monitor labs   - Monitor I/O and WT  - Instruct patient on fluid and nutrition as appropriate  - Assess for signs & symptoms of volume excess or deficit  11/30/2024 1601 by Yesica Serrano RN  Outcome: Progressing  11/30/2024 1600 by Yesica Serrano RN  Outcome: Progressing  Goal: Glucose maintained within target range  Description: INTERVENTIONS:  - Monitor Blood Glucose as ordered  - Assess for signs and symptoms of hyperglycemia and hypoglycemia  - Administer ordered medications to maintain glucose within target range  - Assess nutritional intake and initiate nutrition service referral as needed  11/30/2024 1601 by Yesica Serrano RN  Outcome: Progressing  11/30/2024 1600 by Yesica Serrano RN  Outcome: Progressing     Problem: Prexisting or High Potential for Compromised Skin Integrity  Goal: Skin integrity is maintained or improved  Description: INTERVENTIONS:  - Identify patients at risk for skin breakdown  - Assess and monitor skin integrity  - Assess and monitor nutrition and hydration status  - Monitor labs   - Assess for incontinence   - Turn and reposition patient  - Assist with mobility/ambulation  - Relieve pressure over bony prominences  - Avoid friction and shearing  - Provide appropriate hygiene as needed including keeping skin clean and dry  - Evaluate need for skin moisturizer/barrier cream  - Collaborate with interdisciplinary team   - Patient/family teaching  - Consider wound care consult   11/30/2024 1601 by Yesica Serrano RN  Outcome: Progressing  11/30/2024 1600 by Yesica Serrano RN  Outcome: Progressing

## 2024-11-30 NOTE — PROGRESS NOTES
Progress Note - Oncology-Surgical   Name: Tari Shannon 44 y.o. female I MRN: [id number]  Unit/Bed#: [place] [address] -02 I Date of Admission: 11/26/2024   Date of Service: 11/30/2024 I Hospital Day: 3    Assessment & Plan  LAD (lymphadenopathy), intra-abdominal  44 year old female with RLQ pain x 2 weeks with progressive symptoms. CT findings show worsening LAD with necrosis. Patient has f/u appointment with surgical oncology x 1 week. Pmhx of DM and umbilical hernia repair. No family history or personal history of cancer.    Plan:  - NPO since midnight  - cont IVF  - preop abx planned  - recommend fever work up per primary  - Pain and nausea control PRN  - DVT ppx  - Remainder of care per primary team        Subjective   Pt febrile overnight, pt rates R sided hemiadominal pain 7/10 this morning, denies n/v. Had hard BM overnight and passed some flatus    Objective :  Temp:  [99.7 °F (37.6 °C)-102 °F (38.9 °C)] 99.7 °F (37.6 °C)  HR:  [] 87  BP: (111-154)/(64-85) 144/83  Resp:  [16-19] 16  SpO2:  [95 %-99 %] 97 %  O2 Device: None (Room air)    I/O         11/28 0701  11/29 0700 11/29 0701  11/30 0700 11/30 0701  12/01 0700    P.O. 1020 298     I.V. (mL/kg)  1000 (12)     Total Intake(mL/kg) 1020 (12.3) 1298 (15.6)     Urine (mL/kg/hr) 600 (0.3)      Total Output 600      Net +420 +1298            Unmeasured Urine Occurrence 1 x              Physical Exam  Physical Exam:  General: No acute distress, alert and oriented  CV: RRR  Lungs: Normal work of breathing   Abdomen: soft, nondistended tender to palpation in R hemiabdomen  Skin: Warm, dry      Lab Results: I have reviewed the following results:  Recent Labs     11/30/24  0501   WBC 15.23*   HGB 12.3   HCT 37.8   *   SODIUM 139   K 3.7      CO2 28   BUN 8   CREATININE 0.84   GLUC 78             VTE Pharmacologic Prophylaxis: VTE covered by:  heparin (porcine), Subcutaneous, 5,000 Units at 11/30/24 0613      VTE Mechanical Prophylaxis: sequential  compression device

## 2024-11-30 NOTE — ASSESSMENT & PLAN NOTE
Lab Results   Component Value Date    HGBA1C 14.0 (A) 11/21/2024       Recent Labs     11/29/24  1118 11/29/24  1622 11/29/24  2101 11/30/24  0729   POCGLU 187* 97 127 98     Uncontrolled diabetes with A1c mentioned above  She reportedly  was not taking medication for at least 2 months prior to seeing her endocrinologist a few days ago  She just saw endocrinology in the office on 11/26 and 11/26/2024 and was restarted on Lantus 30 units at night and Humalog 10 - 10 - 10.  She reportedly was off all medications for 2 months prior to that appointment.  Blood sugars are trending down compared to admission.  Hold standing dose Humalog while NPO.

## 2024-11-30 NOTE — ASSESSMENT & PLAN NOTE
"Inflammatory and necrotic appearing lymphadenopathy in the right lower quadrant, worse compared to November 17  IR was consulted for biopsy however this could not be performed safely due to overlying bowel and vessels  Biopsy was initially scheduled for today but due to \" pathology availability and scheduling, this had to be rescheduled for tomorrow tentatively\" per discussion with surgery Dr. Angel Luis Alcaraz.  She continues to have right lower quadrant pain and now has fever, leukocytosis meeting sepsis criteria  "

## 2024-11-30 NOTE — PROGRESS NOTES
Progress Note - Gastroenterology   Name: Tari Shannon 44 y.o. female I MRN: [id number]  Unit/Bed#: [place] [facility] [address] -02 I Date of Admission: 11/26/2024   Date of Service: 11/30/2024 I Hospital Day: 3    Assessment & Plan  Abdominal pain  Necrotic appearing lymphadenopathy right lower quadrant  Patient is endorsing ongoing right lower quadrant pain for 2 weeks.  CT imaging on 11/17 shows concern for right lower abdominal mesenteric lymph node which are necrotic centrally.  Repeat CT imaging obtained this admission shows worsening necrosis.  Patient at that time was recommended to have a PET/CT scan. Patient has a PET scan ordered by her family medicine physician and is scheduled to see Dr. Armstrong of surgical oncology on December 12.    -Management per surgical and medical oncology, to undergo biopsy with surgical oncology tomorrow    Esophageal thickening  44 y.o. female with history of migraines, type 2 diabetes mellitus and obesity on Ozempic as well as chronic dysphagia who presented on 11/26 due to abdominal pain that is recurrent with previous ED visit on 11/17 for similar symptoms found to have CT chest showing small hiatal hernia with thickening of distal esophageal wall with esophageal thickening seen on CT likely in the setting of known esophageal dysmotility versus hiatal hernia and abdominal pain overall concerning for constipation    - EGD with Endoflip scheduled at [place] on 12/10  -  GI will sign off, please reach out with any questions  - Outpatient GI follow-up scheduled  - Avoid hard/dry foods that may make dysphagia worse  - No further inpatient testing needed for CT finding of esophageal wall thickening or esophageal dysmotility  - Continue pantoprazole 40 mg p.o. daily taken 30 minutes before meal  - Avoid NSAIDs  - Start MiraLAX and senna for constipation  - Can consider Bentyl for abdominal pain  LAD (lymphadenopathy), intra-abdominal  See above        Subjective   Patient was seen and examined  at bedside.  States that her abdominal pain remains similar as well as her dysphagia.    Objective :  Temp:  [99.7 °F (37.6 °C)-102 °F (38.9 °C)] 101.6 °F (38.7 °C)  HR:  [] 92  BP: (114-154)/(64-85) 135/81  Resp:  [16-19] 18  SpO2:  [95 %-99 %] 96 %  O2 Device: None (Room air)    Physical Exam  Abdominal:      Tenderness: There is abdominal tenderness in the right lower quadrant.   Skin:     General: Skin is warm.   Neurological:      Mental Status: She is alert.   Psychiatric:         Mood and Affect: Mood normal.           Lab Results: I have reviewed the following results:

## 2024-11-30 NOTE — PROGRESS NOTES
"Progress Note - Hospitalist   Name: Tari Shannon 44 y.o. female I MRN: [id number]  Unit/Bed#: [place] [address] -02 I Date of Admission: 11/26/2024   Date of Service: 11/30/2024 I Hospital Day: 3    Assessment & Plan  LAD (lymphadenopathy), intra-abdominal  Inflammatory and necrotic appearing lymphadenopathy in the right lower quadrant, worse compared to November 17  IR was consulted for biopsy however this could not be performed safely due to overlying bowel and vessels  Biopsy was initially scheduled for today but due to \" pathology availability and scheduling, this had to be rescheduled for tomorrow tentatively\" per discussion with surgery Dr. Angel Luis Alcaraz.  She continues to have right lower quadrant pain and now has fever, leukocytosis meeting sepsis criteria  Sepsis without acute organ dysfunction (HCC)  She has fever, leukocytosis, tachycardia meeting sepsis criteria  No obvious sign of infection.  Could be related to lymphadenopathy with necrotic areas.  Check flu/COVID/blood cultures/lactic acid/procalcitonin per sepsis protocol  Will order 1 dose of cefepime after blood cultures are obtained for possible intra-abdominal infection per sepsis protocol  Patient denies dysuria or new diarrhea.  Type 2 diabetes mellitus with hyperglycemia, with long-term current use of insulin (HCC)  Lab Results   Component Value Date    HGBA1C 14.0 (A) 11/21/2024       Recent Labs     11/29/24  1118 11/29/24  1622 11/29/24  2101 11/30/24  0729   POCGLU 187* 97 127 98     Uncontrolled diabetes with A1c mentioned above  She reportedly  was not taking medication for at least 2 months prior to seeing her endocrinologist a few days ago  She just saw endocrinology in the office on 11/26 and 11/26/2024 and was restarted on Lantus 30 units at night and Humalog 10 - 10 - 10.  She reportedly was off all medications for 2 months prior to that appointment.  Blood sugars are trending down compared to admission.  Hold standing dose Humalog while " NPO.    Abdominal pain  Multifactorial, could be from gastroparesis, dyspepsia, constipation versus lymphadenopathy  Her pain is localized to the right lower quadrant where the the prominent lymph nodes are located.  NSAIDs discontinued  Narcotics minimized  Continue Protonix  Bowel regimen per GI  Continue glucose control   Lymph node biopsy rescheduled for tomorrow  Esophageal thickening  Likely due to  gastroesophageal reflux.  Continue PPI.  Follow-up with GI as scheduled    VTE Pharmacologic Prophylaxis: VTE Score: 4 Moderate Risk (Score 3-4) - Pharmacological DVT Prophylaxis Ordered: heparin.    Patient Centered Rounds: I performed bedside rounds with nursing staff today.   Discussions with Specialists or Other Care Team Provider: Discussed with Dr. Angel Luis Alcaraz from surgery.  I was informed that her surgery was supposedly for today but this had to be rescheduled for tomorrow due to pathology availability. He is ordering a CT of the abdomen and pelvis as part of the fever workup.    Education and Discussions with Family / Patient: Discussed plan with patient and rationale for workup and treatment. She works in the medical field so I told her that she met sepsis criteria and we have to check for sources of fever including flu/COVID/blood cultures.    Current Length of Stay: 3 day(s)  Current Patient Status: Inpatient   Certification Statement: The patient will continue to require additional inpatient hospital stay due to fever and lymphadenopathy  Discharge Plan: Anticipate discharge in 48-72 hrs to home.    Code Status: Level 1 - Full Code    Subjective   Seen and examined this morning.  She continues to have the same 7/10 right lower quadrant pain, unchanged  She denies any dysuria or burning on urination  She denies diarrhea  She recalled having her flu shot done this year.  She had multiple episodes of fever overnight    Objective :  Temp:  [99.7 °F (37.6 °C)-102 °F (38.9 °C)] 101.6 °F (38.7 °C)  HR:   [] 92  BP: (114-154)/(64-85) 135/81  Resp:  [16-19] 18  SpO2:  [95 %-99 %] 96 %  O2 Device: None (Room air)    Body mass index is 30.45 kg/m².     Input and Output Summary (last 24 hours):     Intake/Output Summary (Last 24 hours) at 11/30/2024 0841  Last data filed at 11/30/2024 0613  Gross per 24 hour   Intake 1298 ml   Output --   Net 1298 ml       Physical Exam  Vitals reviewed.   HENT:      Head: Normocephalic and atraumatic.      Nose: No congestion or rhinorrhea.   Eyes:      General: No scleral icterus.  Cardiovascular:      Rate and Rhythm: Normal rate and regular rhythm.   Pulmonary:      Breath sounds: No wheezing or rhonchi.   Abdominal:      Palpations: Abdomen is soft.      Tenderness: There is abdominal tenderness. There is no guarding.      Comments: Right lower quadrant tenderness   Musculoskeletal:      Right lower leg: No edema.      Left lower leg: No edema.   Skin:     General: Skin is warm and dry.   Neurological:      Mental Status: She is oriented to person, place, and time.   Psychiatric:         Mood and Affect: Mood normal.         Behavior: Behavior normal.       Lab Results: I have reviewed the following results:   Results from last 7 days   Lab Units 11/30/24  0501   WBC Thousand/uL 15.23*   HEMOGLOBIN g/dL 12.3   HEMATOCRIT % 37.8   PLATELETS Thousands/uL 513*   SEGS PCT % 68   LYMPHO PCT % 17   MONO PCT % 13*   EOS PCT % 1     Results from last 7 days   Lab Units 11/30/24  0501 11/28/24  0445 11/27/24  0029   SODIUM mmol/L 139   < > 133*   POTASSIUM mmol/L 3.7   < > 4.3   CHLORIDE mmol/L 103   < > 98   CO2 mmol/L 28   < > 27   BUN mg/dL 8   < > 11   CREATININE mg/dL 0.84   < > 0.95   ANION GAP mmol/L 8   < > 8   CALCIUM mg/dL 9.5   < > 9.3   ALBUMIN g/dL  --   --  3.5   TOTAL BILIRUBIN mg/dL  --   --  0.49   ALK PHOS U/L  --   --  95   ALT U/L  --   --  10   AST U/L  --   --  12*   GLUCOSE RANDOM mg/dL 78   < > 391*    < > = values in this interval not displayed.         Results  from last 7 days   Lab Units 11/30/24  0729 11/29/24  2101 11/29/24  1622 11/29/24  1118 11/29/24  0809 11/28/24  2121 11/28/24  1606 11/28/24  1229 11/28/24  1007 11/28/24  0719 11/27/24 2129 11/27/24  1615   POC GLUCOSE mg/dl 98 127 97 187* 191* 199* 102 181* 265* 185* 247* 223*               Recent Cultures (last 7 days):         Imaging Results Review: I reviewed radiology reports from this admission including: CT chest and CT abdomen/pelvis.      Last 24 Hours Medication List:     Current Facility-Administered Medications:     acetaminophen (TYLENOL) tablet 650 mg, Q6H PRN    ceFEPime (MAXIPIME) 2,000 mg in dextrose 5 % 50 mL IVPB, Once    docusate sodium (COLACE) capsule 100 mg, BID    heparin (porcine) subcutaneous injection 5,000 Units, Q8H CARMELO    insulin glargine (LANTUS) subcutaneous injection 30 Units 0.3 mL, HS    insulin lispro (HumALOG/ADMELOG) 100 units/mL subcutaneous injection 1-6 Units, 4x Daily (AC & HS) **AND** Fingerstick Glucose (POCT), 4x Daily AC and at bedtime    insulin lispro (HumALOG/ADMELOG) 100 units/mL subcutaneous injection 10 Units, TID With Meals    lactated ringers infusion, Continuous, Last Rate: 125 mL/hr (11/30/24 0613)    lidocaine (LIDODERM) 5 % patch 1 patch, Daily    meclizine (ANTIVERT) tablet 12.5 mg, TID PRN    ondansetron (ZOFRAN) injection 4 mg, Q4H PRN    oxyCODONE (ROXICODONE) IR tablet 5 mg, Q6H PRN    oxyCODONE (ROXICODONE) split tablet 2.5 mg, Q6H PRN    pantoprazole (PROTONIX) EC tablet 40 mg, Daily Before Breakfast    polyethylene glycol (MIRALAX) packet 17 g, Daily PRN    polyethylene glycol (MIRALAX) packet 17 g, BID    pregabalin (LYRICA) capsule 50 mg, TID    Administrative Statements   Today, Patient Was Seen By: Lars Navas MD      **Please Note: This note may have been constructed using a voice recognition system.**

## 2024-11-30 NOTE — ASSESSMENT & PLAN NOTE
She has fever, leukocytosis, tachycardia meeting sepsis criteria  No obvious sign of infection.  Could be related to lymphadenopathy with necrotic areas.  Check flu/COVID/blood cultures/lactic acid/procalcitonin per sepsis protocol  Will order 1 dose of cefepime after blood cultures are obtained for possible intra-abdominal infection per sepsis protocol  Patient denies dysuria or new diarrhea.

## 2024-11-30 NOTE — ASSESSMENT & PLAN NOTE
Multifactorial, could be from gastroparesis, dyspepsia, constipation versus lymphadenopathy  Her pain is localized to the right lower quadrant where the the prominent lymph nodes are located.  NSAIDs discontinued  Narcotics minimized  Continue Protonix  Bowel regimen per GI  Continue glucose control   Lymph node biopsy rescheduled for tomorrow

## 2024-11-30 NOTE — ASSESSMENT & PLAN NOTE
44 y.o. female with history of migraines, type 2 diabetes mellitus and obesity on Ozempic as well as chronic dysphagia who presented on 11/26 due to abdominal pain that is recurrent with previous ED visit on 11/17 for similar symptoms found to have CT chest showing small hiatal hernia with thickening of distal esophageal wall with esophageal thickening seen on CT likely in the setting of known esophageal dysmotility versus hiatal hernia and abdominal pain overall concerning for constipation    - EGD with Endoflip scheduled at [facility] on 12/10  -  GI will sign off, please reach out with any questions  - Outpatient GI follow-up scheduled  - Avoid hard/dry foods that may make dysphagia worse  - No further inpatient testing needed for CT finding of esophageal wall thickening or esophageal dysmotility  - Continue pantoprazole 40 mg p.o. daily taken 30 minutes before meal  - Avoid NSAIDs  - Start MiraLAX and senna for constipation  - Can consider Bentyl for abdominal pain

## 2024-11-30 NOTE — ANESTHESIA PREPROCEDURE EVALUATION
Procedure:  LAPAROSCOPY DIAGNOSTIC (Abdomen)    Relevant Problems   CARDIO   (+) Chest pain   (+) Migraine without aura and without status migrainosus, not intractable      ENDO   (+) Type 2 diabetes mellitus with hyperglycemia, with long-term current use of insulin (HCC)      GI/HEPATIC   (+) Other dysphagia      /RENAL   (+) Microalbuminuric diabetic nephropathy (HCC)      MUSCULOSKELETAL   (+) Adhesive capsulitis of right shoulder      NEURO/PSYCH   (+) Migraine without aura and without status migrainosus, not intractable   (+) Numbness and tingling of both feet   (+) Numbness and tingling of right arm   (+) Right leg weakness      Other   (+) LAD (lymphadenopathy), intra-abdominal        Physical Exam    Airway    Mallampati score: II  TM Distance: >3 FB  Neck ROM: full     Dental   No notable dental hx     Cardiovascular  Rhythm: regular, Rate: normal, Cardiovascular exam normal    Pulmonary  Pulmonary exam normal Breath sounds clear to auscultation    Other Findings  post-pubertal.      Anesthesia Plan  ASA Score- 3     Anesthesia Type- general with ASA Monitors.         Additional Monitors:     Airway Plan: ETT.           Plan Factors-    Chart reviewed. EKG reviewed.  Existing labs reviewed. Patient summary reviewed.    Patient is not a current smoker.              Induction- intravenous and rapid sequence induction.    Postoperative Plan- Plan for postoperative opioid use.     Perioperative Resuscitation Plan - Level 1 - Full Code.       Informed Consent- Anesthetic plan and risks discussed with patient.

## 2024-11-30 NOTE — ASSESSMENT & PLAN NOTE
Necrotic appearing lymphadenopathy right lower quadrant  Patient is endorsing ongoing right lower quadrant pain for 2 weeks.  CT imaging on 11/17 shows concern for right lower abdominal mesenteric lymph node which are necrotic centrally.  Repeat CT imaging obtained this admission shows worsening necrosis.  Patient at that time was recommended to have a PET/CT scan. Patient has a PET scan ordered by her family medicine physician and is scheduled to see Dr. Armstrong of surgical oncology on December 12.    -Management per surgical and medical oncology, to undergo biopsy with surgical oncology tomorrow

## 2024-11-30 NOTE — ASSESSMENT & PLAN NOTE
44 year old female with RLQ pain x 2 weeks with progressive symptoms. CT findings show worsening LAD with necrosis. Patient has f/u appointment with surgical oncology x 1 week. Pmhx of DM and umbilical hernia repair. No family history or personal history of cancer.    Plan:  - NPO since midnight  - cont IVF  - preop abx planned  - recommend fever work up per primary  - Pain and nausea control PRN  - DVT ppx  - Remainder of care per primary team

## 2024-11-30 NOTE — PLAN OF CARE
Problem: PAIN - ADULT  Goal: Verbalizes/displays adequate comfort level or baseline comfort level  Description: Interventions:  - Encourage patient to monitor pain and request assistance  - Assess pain using appropriate pain scale  - Administer analgesics based on type and severity of pain and evaluate response  - Implement non-pharmacological measures as appropriate and evaluate response  - Consider cultural and social influences on pain and pain management  - Notify physician/advanced practitioner if interventions unsuccessful or patient reports new pain  Outcome: Progressing     Problem: INFECTION - ADULT  Goal: Absence or prevention of progression during hospitalization  Description: INTERVENTIONS:  - Assess and monitor for signs and symptoms of infection  - Monitor lab/diagnostic results  - Monitor all insertion sites, i.e. indwelling lines, tubes, and drains  - Monitor endotracheal if appropriate and nasal secretions for changes in amount and color  - [place] appropriate cooling/warming therapies per order  - Administer medications as ordered  - Instruct and encourage patient and family to use good hand hygiene technique  - Identify and instruct in appropriate isolation precautions for identified infection/condition  Outcome: Progressing     Problem: SAFETY ADULT  Goal: Patient will remain free of falls  Description: INTERVENTIONS:  - Educate patient/family on patient safety including physical limitations  - Instruct patient to call for assistance with activity   - Consult OT/PT to assist with strengthening/mobility   - Keep Call bell within reach  - Keep bed low and locked with side rails adjusted as appropriate  - Keep care items and personal belongings within reach  - Initiate and maintain comfort rounds  - Make Fall Risk Sign visible to staff  - Offer Toileting every 2 Hours in advance of need  - Initiate/Maintain bed alarm  - Obtain necessary fall risk management equipment: yellow socks  - Apply  yellow socks and bracelet for high fall risk patients  - Consider moving patient to room near nurses station  Outcome: Progressing  Goal: Maintain or return to baseline ADL function  Description: INTERVENTIONS:  -  Assess patient's ability to carry out ADLs; assess patient's baseline for ADL function and identify physical deficits which impact ability to perform ADLs (bathing, care of mouth/teeth, toileting, grooming, dressing, etc.)  - Assess/evaluate cause of self-care deficits   - Assess range of motion  - Assess patient's mobility; develop plan if impaired  - Assess patient's need for assistive devices and provide as appropriate  - Encourage maximum independence but intervene and supervise when necessary  - Involve family in performance of ADLs  - Assess for home care needs following discharge   - Consider OT consult to assist with ADL evaluation and planning for discharge  - Provide patient education as appropriate  Outcome: Progressing  Goal: Maintains/Returns to pre admission functional level  Description: INTERVENTIONS:  - Perform AM-PAC 6 Click Basic Mobility/ Daily Activity assessment daily.  - Set and communicate daily mobility goal to care team and patient/family/caregiver.   - Collaborate with rehabilitation services on mobility goals if consulted  - Perform Range of Motion 3 times a day.  - Reposition patient every 2 hours.  - Dangle patient 3 times a day  - Stand patient 3 times a day  - Ambulate patient 3 times a day  - Out of bed to chair 3 times a day   - Out of bed for meals 3 times a day  - Out of bed for toileting  - Record patient progress and toleration of activity level   Outcome: Progressing     Problem: DISCHARGE PLANNING  Goal: Discharge to home or other facility with appropriate resources  Description: INTERVENTIONS:  - Identify barriers to discharge w/patient and caregiver  - Arrange for needed discharge resources and transportation as appropriate  - Identify discharge learning needs  (meds, wound care, etc.)  - Arrange for interpretive services to assist at discharge as needed  - Refer to Case Management Department for coordinating discharge planning if the patient needs post-hospital services based on physician/advanced practitioner order or complex needs related to functional status, cognitive ability, or social support system  Outcome: Progressing     Problem: Knowledge Deficit  Goal: Patient/family/caregiver demonstrates understanding of disease process, treatment plan, medications, and discharge instructions  Description: Complete learning assessment and assess knowledge base.  Interventions:  - Provide teaching at level of understanding  - Provide teaching via preferred learning methods  Outcome: Progressing     Problem: GASTROINTESTINAL - ADULT  Goal: Minimal or absence of nausea and/or vomiting  Description: INTERVENTIONS:  - Administer IV fluids if ordered to ensure adequate hydration  - Maintain NPO status until nausea and vomiting are resolved  - Nasogastric tube if ordered  - Administer ordered antiemetic medications as needed  - Provide nonpharmacologic comfort measures as appropriate  - Advance diet as tolerated, if ordered  - Consider nutrition services referral to assist patient with adequate nutrition and appropriate food choices  Outcome: Progressing     Problem: Prexisting or High Potential for Compromised Skin Integrity  Goal: Skin integrity is maintained or improved  Description: INTERVENTIONS:  - Identify patients at risk for skin breakdown  - Assess and monitor skin integrity  - Assess and monitor nutrition and hydration status  - Monitor labs   - Assess for incontinence   - Turn and reposition patient  - Assist with mobility/ambulation  - Relieve pressure over bony prominences  - Avoid friction and shearing  - Provide appropriate hygiene as needed including keeping skin clean and dry  - Evaluate need for skin moisturizer/barrier cream  - Collaborate with interdisciplinary  team   - Patient/family teaching  - Consider wound care consult   Outcome: Progressing     Problem: METABOLIC, FLUID AND ELECTROLYTES - ADULT  Goal: Electrolytes maintained within normal limits  Description: INTERVENTIONS:  - Monitor labs and assess patient for signs and symptoms of electrolyte imbalances  - Administer electrolyte replacement as ordered  - Monitor response to electrolyte replacements, including repeat lab results as appropriate  - Instruct patient on fluid and nutrition as appropriate  Outcome: Progressing  Goal: Fluid balance maintained  Description: INTERVENTIONS:  - Monitor labs   - Monitor I/O and WT  - Instruct patient on fluid and nutrition as appropriate  - Assess for signs & symptoms of volume excess or deficit  Outcome: Progressing  Goal: Glucose maintained within target range  Description: INTERVENTIONS:  - Monitor Blood Glucose as ordered  - Assess for signs and symptoms of hyperglycemia and hypoglycemia  - Administer ordered medications to maintain glucose within target range  - Assess nutritional intake and initiate nutrition service referral as needed  Outcome: Progressing

## 2024-12-01 LAB
ANION GAP SERPL CALCULATED.3IONS-SCNC: 8 MMOL/L (ref 4–13)
BASOPHILS # BLD MANUAL: 0 THOUSAND/UL (ref 0–0.1)
BASOPHILS NFR MAR MANUAL: 0 % (ref 0–1)
BUN SERPL-MCNC: 7 MG/DL (ref 5–25)
CALCIUM SERPL-MCNC: 8.7 MG/DL (ref 8.4–10.2)
CHLORIDE SERPL-SCNC: 104 MMOL/L (ref 96–108)
CO2 SERPL-SCNC: 25 MMOL/L (ref 21–32)
CREAT SERPL-MCNC: 0.83 MG/DL (ref 0.6–1.3)
EOSINOPHIL # BLD MANUAL: 0.15 THOUSAND/UL (ref 0–0.4)
EOSINOPHIL NFR BLD MANUAL: 1 % (ref 0–6)
ERYTHROCYTE [DISTWIDTH] IN BLOOD BY AUTOMATED COUNT: 12.4 % (ref 11.6–15.1)
GFR SERPL CREATININE-BSD FRML MDRD: 86 ML/MIN/1.73SQ M
GLUCOSE SERPL-MCNC: 107 MG/DL (ref 65–140)
GLUCOSE SERPL-MCNC: 115 MG/DL (ref 65–140)
GLUCOSE SERPL-MCNC: 118 MG/DL (ref 65–140)
GLUCOSE SERPL-MCNC: 126 MG/DL (ref 65–140)
GLUCOSE SERPL-MCNC: 133 MG/DL (ref 65–140)
GLUCOSE SERPL-MCNC: 91 MG/DL (ref 65–140)
HCT VFR BLD AUTO: 31.6 % (ref 34.8–46.1)
HGB BLD-MCNC: 10 G/DL (ref 11.5–15.4)
LYMPHOCYTES # BLD AUTO: 21 % (ref 14–44)
LYMPHOCYTES # BLD AUTO: 3.63 THOUSAND/UL (ref 0.6–4.47)
MCH RBC QN AUTO: 27.9 PG (ref 26.8–34.3)
MCHC RBC AUTO-ENTMCNC: 31.6 G/DL (ref 31.4–37.4)
MCV RBC AUTO: 88 FL (ref 82–98)
MONOCYTES # BLD AUTO: 1.02 THOUSAND/UL (ref 0–1.22)
MONOCYTES NFR BLD: 7 % (ref 4–12)
NEUTROPHILS # BLD MANUAL: 9.72 THOUSAND/UL (ref 1.85–7.62)
NEUTS BAND NFR BLD MANUAL: 4 % (ref 0–8)
NEUTS SEG NFR BLD AUTO: 63 % (ref 43–75)
PLATELET # BLD AUTO: 471 THOUSANDS/UL (ref 149–390)
PLATELET BLD QL SMEAR: ABNORMAL
PMV BLD AUTO: 9.2 FL (ref 8.9–12.7)
POTASSIUM SERPL-SCNC: 3.9 MMOL/L (ref 3.5–5.3)
PROCALCITONIN SERPL-MCNC: 0.22 NG/ML
RBC # BLD AUTO: 3.58 MILLION/UL (ref 3.81–5.12)
RBC MORPH BLD: NORMAL
SODIUM SERPL-SCNC: 137 MMOL/L (ref 135–147)
VARIANT LYMPHS # BLD AUTO: 4 %
WBC # BLD AUTO: 14.51 THOUSAND/UL (ref 4.31–10.16)

## 2024-12-01 PROCEDURE — 84145 PROCALCITONIN (PCT): CPT | Performed by: INTERNAL MEDICINE

## 2024-12-01 PROCEDURE — 88307 TISSUE EXAM BY PATHOLOGIST: CPT | Performed by: PATHOLOGY

## 2024-12-01 PROCEDURE — 82948 REAGENT STRIP/BLOOD GLUCOSE: CPT

## 2024-12-01 PROCEDURE — 99255 IP/OBS CONSLTJ NEW/EST HI 80: CPT | Performed by: STUDENT IN AN ORGANIZED HEALTH CARE EDUCATION/TRAINING PROGRAM

## 2024-12-01 PROCEDURE — 49321 LAPAROSCOPY BIOPSY: CPT | Performed by: STUDENT IN AN ORGANIZED HEALTH CARE EDUCATION/TRAINING PROGRAM

## 2024-12-01 PROCEDURE — 99253 IP/OBS CNSLTJ NEW/EST LOW 45: CPT | Performed by: SURGERY

## 2024-12-01 PROCEDURE — 88331 PATH CONSLTJ SURG 1 BLK 1SPC: CPT | Performed by: PATHOLOGY

## 2024-12-01 PROCEDURE — 88341 IMHCHEM/IMCYTCHM EA ADD ANTB: CPT | Performed by: PATHOLOGY

## 2024-12-01 PROCEDURE — 88184 FLOWCYTOMETRY/ TC 1 MARKER: CPT | Performed by: STUDENT IN AN ORGANIZED HEALTH CARE EDUCATION/TRAINING PROGRAM

## 2024-12-01 PROCEDURE — 80048 BASIC METABOLIC PNL TOTAL CA: CPT | Performed by: INTERNAL MEDICINE

## 2024-12-01 PROCEDURE — 85027 COMPLETE CBC AUTOMATED: CPT | Performed by: INTERNAL MEDICINE

## 2024-12-01 PROCEDURE — 88342 IMHCHEM/IMCYTCHM 1ST ANTB: CPT | Performed by: PATHOLOGY

## 2024-12-01 PROCEDURE — NC001 PR NO CHARGE: Performed by: STUDENT IN AN ORGANIZED HEALTH CARE EDUCATION/TRAINING PROGRAM

## 2024-12-01 PROCEDURE — 0DBV4ZX EXCISION OF MESENTERY, PERCUTANEOUS ENDOSCOPIC APPROACH, DIAGNOSTIC: ICD-10-PCS | Performed by: STUDENT IN AN ORGANIZED HEALTH CARE EDUCATION/TRAINING PROGRAM

## 2024-12-01 PROCEDURE — 85007 BL SMEAR W/DIFF WBC COUNT: CPT | Performed by: INTERNAL MEDICINE

## 2024-12-01 PROCEDURE — 99232 SBSQ HOSP IP/OBS MODERATE 35: CPT | Performed by: INTERNAL MEDICINE

## 2024-12-01 RX ORDER — FENTANYL CITRATE 50 UG/ML
INJECTION, SOLUTION INTRAMUSCULAR; INTRAVENOUS AS NEEDED
Status: DISCONTINUED | OUTPATIENT
Start: 2024-12-01 | End: 2024-12-01

## 2024-12-01 RX ORDER — FENTANYL CITRATE/PF 50 MCG/ML
25 SYRINGE (ML) INJECTION
Status: DISCONTINUED | OUTPATIENT
Start: 2024-12-01 | End: 2024-12-01 | Stop reason: HOSPADM

## 2024-12-01 RX ORDER — MAGNESIUM HYDROXIDE 1200 MG/15ML
LIQUID ORAL AS NEEDED
Status: DISCONTINUED | OUTPATIENT
Start: 2024-12-01 | End: 2024-12-01 | Stop reason: HOSPADM

## 2024-12-01 RX ORDER — KETOROLAC TROMETHAMINE 30 MG/ML
INJECTION, SOLUTION INTRAMUSCULAR; INTRAVENOUS AS NEEDED
Status: DISCONTINUED | OUTPATIENT
Start: 2024-12-01 | End: 2024-12-01

## 2024-12-01 RX ORDER — PROPOFOL 10 MG/ML
INJECTION, EMULSION INTRAVENOUS AS NEEDED
Status: DISCONTINUED | OUTPATIENT
Start: 2024-12-01 | End: 2024-12-01

## 2024-12-01 RX ORDER — ROCURONIUM BROMIDE 10 MG/ML
INJECTION, SOLUTION INTRAVENOUS AS NEEDED
Status: DISCONTINUED | OUTPATIENT
Start: 2024-12-01 | End: 2024-12-01

## 2024-12-01 RX ORDER — HYDROMORPHONE HCL/PF 1 MG/ML
0.5 SYRINGE (ML) INJECTION
Status: DISCONTINUED | OUTPATIENT
Start: 2024-12-01 | End: 2024-12-01 | Stop reason: HOSPADM

## 2024-12-01 RX ORDER — LIDOCAINE HYDROCHLORIDE 20 MG/ML
INJECTION, SOLUTION EPIDURAL; INFILTRATION; INTRACAUDAL; PERINEURAL AS NEEDED
Status: DISCONTINUED | OUTPATIENT
Start: 2024-12-01 | End: 2024-12-01

## 2024-12-01 RX ORDER — ONDANSETRON 2 MG/ML
INJECTION INTRAMUSCULAR; INTRAVENOUS AS NEEDED
Status: DISCONTINUED | OUTPATIENT
Start: 2024-12-01 | End: 2024-12-01

## 2024-12-01 RX ORDER — ACETAMINOPHEN 10 MG/ML
1000 INJECTION, SOLUTION INTRAVENOUS ONCE
Status: COMPLETED | OUTPATIENT
Start: 2024-12-01 | End: 2024-12-01

## 2024-12-01 RX ORDER — MEPERIDINE HYDROCHLORIDE 25 MG/ML
12.5 INJECTION INTRAMUSCULAR; INTRAVENOUS; SUBCUTANEOUS
Status: DISCONTINUED | OUTPATIENT
Start: 2024-12-01 | End: 2024-12-01 | Stop reason: HOSPADM

## 2024-12-01 RX ORDER — ONDANSETRON 2 MG/ML
4 INJECTION INTRAMUSCULAR; INTRAVENOUS ONCE AS NEEDED
Status: DISCONTINUED | OUTPATIENT
Start: 2024-12-01 | End: 2024-12-01 | Stop reason: HOSPADM

## 2024-12-01 RX ORDER — CEFAZOLIN SODIUM 1 G/3ML
INJECTION, POWDER, FOR SOLUTION INTRAMUSCULAR; INTRAVENOUS AS NEEDED
Status: DISCONTINUED | OUTPATIENT
Start: 2024-12-01 | End: 2024-12-01

## 2024-12-01 RX ORDER — PROPOFOL 10 MG/ML
INJECTION, EMULSION INTRAVENOUS CONTINUOUS PRN
Status: DISCONTINUED | OUTPATIENT
Start: 2024-12-01 | End: 2024-12-01

## 2024-12-01 RX ORDER — MIDAZOLAM HYDROCHLORIDE 2 MG/2ML
INJECTION, SOLUTION INTRAMUSCULAR; INTRAVENOUS AS NEEDED
Status: DISCONTINUED | OUTPATIENT
Start: 2024-12-01 | End: 2024-12-01

## 2024-12-01 RX ADMIN — HEPARIN SODIUM 5000 UNITS: 5000 INJECTION INTRAVENOUS; SUBCUTANEOUS at 15:23

## 2024-12-01 RX ADMIN — PANTOPRAZOLE SODIUM 40 MG: 40 TABLET, DELAYED RELEASE ORAL at 05:12

## 2024-12-01 RX ADMIN — ROCURONIUM 40 MG: 50 INJECTION, SOLUTION INTRAVENOUS at 07:53

## 2024-12-01 RX ADMIN — SODIUM CHLORIDE, SODIUM LACTATE, POTASSIUM CHLORIDE, AND CALCIUM CHLORIDE: .6; .31; .03; .02 INJECTION, SOLUTION INTRAVENOUS at 08:39

## 2024-12-01 RX ADMIN — AMPICILLIN SODIUM AND SULBACTAM SODIUM 3 G: 100; 50 INJECTION, POWDER, FOR SOLUTION INTRAVENOUS at 16:54

## 2024-12-01 RX ADMIN — PREGABALIN 50 MG: 50 CAPSULE ORAL at 15:23

## 2024-12-01 RX ADMIN — FENTANYL CITRATE 50 MCG: 50 INJECTION INTRAMUSCULAR; INTRAVENOUS at 08:04

## 2024-12-01 RX ADMIN — FENTANYL CITRATE 50 MCG: 50 INJECTION INTRAMUSCULAR; INTRAVENOUS at 07:53

## 2024-12-01 RX ADMIN — Medication 50 MCG: at 08:52

## 2024-12-01 RX ADMIN — Medication 50 MCG: at 08:49

## 2024-12-01 RX ADMIN — ACETAMINOPHEN 1000 MG: 10 INJECTION INTRAVENOUS at 08:06

## 2024-12-01 RX ADMIN — PROPOFOL 70 MCG/KG/MIN: 10 INJECTION, EMULSION INTRAVENOUS at 07:56

## 2024-12-01 RX ADMIN — ONDANSETRON 4 MG: 2 INJECTION INTRAMUSCULAR; INTRAVENOUS at 09:01

## 2024-12-01 RX ADMIN — HEPARIN SODIUM 5000 UNITS: 5000 INJECTION INTRAVENOUS; SUBCUTANEOUS at 22:26

## 2024-12-01 RX ADMIN — SENNOSIDES AND DOCUSATE SODIUM 1 TABLET: 8.6; 5 TABLET ORAL at 22:19

## 2024-12-01 RX ADMIN — AMPICILLIN SODIUM AND SULBACTAM SODIUM 3 G: 100; 50 INJECTION, POWDER, FOR SOLUTION INTRAVENOUS at 22:19

## 2024-12-01 RX ADMIN — SENNOSIDES AND DOCUSATE SODIUM 1 TABLET: 8.6; 5 TABLET ORAL at 15:23

## 2024-12-01 RX ADMIN — MORPHINE SULFATE 2 MG: 2 INJECTION, SOLUTION INTRAMUSCULAR; INTRAVENOUS at 20:47

## 2024-12-01 RX ADMIN — MIDAZOLAM 2 MG: 1 INJECTION INTRAMUSCULAR; INTRAVENOUS at 07:48

## 2024-12-01 RX ADMIN — LIDOCAINE HYDROCHLORIDE 100 MG: 20 INJECTION, SOLUTION EPIDURAL; INFILTRATION; INTRACAUDAL at 07:53

## 2024-12-01 RX ADMIN — AMPICILLIN SODIUM AND SULBACTAM SODIUM 3 G: 100; 50 INJECTION, POWDER, FOR SOLUTION INTRAVENOUS at 05:12

## 2024-12-01 RX ADMIN — Medication 100 MCG: at 08:59

## 2024-12-01 RX ADMIN — ACETAMINOPHEN 650 MG: 325 TABLET, FILM COATED ORAL at 20:47

## 2024-12-01 RX ADMIN — SUGAMMADEX 200 MG: 100 INJECTION, SOLUTION INTRAVENOUS at 09:01

## 2024-12-01 RX ADMIN — OXYCODONE 5 MG: 5 TABLET ORAL at 15:23

## 2024-12-01 RX ADMIN — ROCURONIUM 10 MG: 50 INJECTION, SOLUTION INTRAVENOUS at 08:30

## 2024-12-01 RX ADMIN — PROPOFOL 150 MG: 10 INJECTION, EMULSION INTRAVENOUS at 07:53

## 2024-12-01 RX ADMIN — POLYETHYLENE GLYCOL 3350 17 G: 17 POWDER, FOR SOLUTION ORAL at 22:19

## 2024-12-01 RX ADMIN — KETOROLAC TROMETHAMINE 15 MG: 30 INJECTION, SOLUTION INTRAMUSCULAR; INTRAVENOUS at 09:01

## 2024-12-01 RX ADMIN — INSULIN GLARGINE 30 UNITS: 100 INJECTION, SOLUTION SUBCUTANEOUS at 22:19

## 2024-12-01 RX ADMIN — PREGABALIN 50 MG: 50 CAPSULE ORAL at 22:19

## 2024-12-01 RX ADMIN — CEFAZOLIN 2000 MG: 1 INJECTION, POWDER, FOR SOLUTION INTRAMUSCULAR; INTRAVENOUS; PARENTERAL at 08:01

## 2024-12-01 NOTE — ASSESSMENT & PLAN NOTE
44 year old female with RLQ pain x 2 weeks with progressive symptoms. CT findings show worsening LAD with necrosis. Patient has f/u appointment with surgical oncology x 1 week. Pmhx of DM and umbilical hernia repair. No family history or personal history of cancer.      Plan:  - plan for diagnostic lap and LN bx today w/  - Pain and nausea control PRN  - DVT ppx  - Remainder of care per primary team

## 2024-12-01 NOTE — ASSESSMENT & PLAN NOTE
44 year old female with RLQ pain x 2 weeks with progressive symptoms. CT findings show worsening LAD with necrosis. Patient has f/u appointment with surgical oncology x 1 week. Pmhx of DM and umbilical hernia repair. No family history or personal history of cancer.    Normal appearing appendix, no signs of acute appendicitis, re demonstrated soft tissue mass w/ likely lymph node necrosis    Plan:  - no acute general surgery intervention  - Pain and nausea control PRN  - DVT ppx  - Remainder of care per primary team

## 2024-12-01 NOTE — OP NOTE
OPERATIVE REPORT  PATIENT NAME: Tari Shannon    :  1980  MRN: [id number]  Pt Location: [facility] OR ROOM 06    SURGERY DATE: 2024    Surgeons and Role:     * Akiko Lamb MD - Primary     * Angel Luis Alcaraz MD - Assisting    Preop Diagnosis:  Abdominal lymphadenopathy [R59.0]    Post-Op Diagnosis Codes:     * Abdominal lymphadenopathy [R59.0]    Procedure(s):  LAPAROSCOPY DIAGNOSTIC. MESENTERIC BIOPSIES    Specimen(s):  ID Type Source Tests Collected by Time Destination   1 : RLQ mesentery lymph node biopsy Tissue Lymph Node - Lymphoma Prtocol TISSUE EXAM, LEUKEMIA/LYMPHOMA FLOW CYTOMETRY Akiko Lamb MD 2024 0821        Estimated Blood Loss:   Minimal    Drains:  [REMOVED] NG/OG/Enteral Tube Orogastric 18 Fr Center mouth (Removed)   Number of days: 0       Anesthesia Type:   General    Operative Indications:  Abdominal lymphadenopathy [R59.0]    Operative Findings:  Stomach, small bowel, colon / rectum & reproductive organs all within normal limits. RLQ mesenteric adenopathy. Several specimens sent    Complications:   None    Procedure and Technique:  Pt identified in OR. Anesthesia induced. OG placed. Arms padded and tucked. Abd prepped and draped in usual sterile fashion. Timeout performed. Local infused at palmers point and 5 mm stab incision made with 0 degree 5 mm scope advanced under direct visualization. Abdomen insufflated. Two additional ports placed at midline and left lower quadrant. No evidence of injury or peritoneal disease noted in all 4 quadrants. Bowel run and no evidence of stomach, small bowel, colon, or rectum abnormality. Reproductive organs within normal limits. Ruptured ovarian cyst with small volume cyst contents noted in right lower quadrant. Lymphadenopathy along ileocolic noted. Peritoneum incised and 12-15 bites of abnormal and firm appearing lymph node tissue removed with lap biopsy forceps. This was sent to pathology with adequate viable tissue noted. Hemostasis  achieved with cautery and surgicel. Air evacuated. Port sites closed with 4-0 monocryl and skin glue. Pt tolerated the procedure well.      I was present for all critical portions of the procedure.    Patient Disposition:  PACU     This procedure was not performed to treat colon cancer through resection           SIGNATURE: Akiko Lamb MD  DATE: December 1, 2024  TIME: 9:45 AM

## 2024-12-01 NOTE — ANESTHESIA POSTPROCEDURE EVALUATION
Post-Op Assessment Note    CV Status:  Stable  Pain Score: 0    Pain management: adequate       Mental Status:  Sleepy and arousable   Hydration Status:  Stable and euvolemic   PONV Controlled:  None   Airway Patency:  Patent and adequate     Post Op Vitals Reviewed: Yes    No anethesia notable event occurred.    Staff: CRNA           Last Filed PACU Vitals:  Vitals Value Taken Time   Temp 37.7    Pulse 82    /64    Resp 16    SpO2 93        Modified Viridiana:  No data recorded

## 2024-12-01 NOTE — ASSESSMENT & PLAN NOTE
Multifactorial, could be from gastroparesis, dyspepsia, constipation versus lymphadenopathy  Status post laparoscopy and lymph node biopsy  Avoid NSAIDs and minimize narcotic  Continue blood sugar control  Continue Protonix  NSAIDs discontinued

## 2024-12-01 NOTE — CONSULTS
Consultation - General Surgery  Name: Tari Shannon 44 y.o. female I MRN: [id number]  Unit/Bed#: [place] [address] -02 I Date of Admission: 11/26/2024   Date of Service: 12/1/2024 I Hospital Day: 4   Consults  Physician Requesting Evaluation: Lars Cardozo Pe*   Reason for Evaluation / Principal Problem: evaluation of RLQ pain    Assessment & Plan  LAD (lymphadenopathy), intra-abdominal  44 year old female with RLQ pain x 2 weeks with progressive symptoms. CT findings show worsening LAD with necrosis. Patient has f/u appointment with surgical oncology x 1 week. Pmhx of DM and umbilical hernia repair. No family history or personal history of cancer.    Normal appearing appendix, no signs of acute appendicitis, re demonstrated soft tissue mass w/ likely lymph node necrosis    Plan:  - no acute general surgery intervention  - Pain and nausea control PRN  - DVT ppx  - Remainder of care per primary team      History of Present Illness   Tari Shannon is a 44 y.o. female who presents with hx of uncontrolled diabetes, constipation, Esophageal dysmotility found to have intractable RLQ pain. CT findings noting necrotic appearing lymph node int he RLQ mesentery, pt seen by GI who recommended outpt f/u with [facility] team for esophageal dysmotility and recommended bowel regimen for pts chronic constipation. Pt has begun spiking fevers of 102 and WBC increased 11/30 to 15. Pt continues to have RLQ pain, has been able to tolerate some po though multiple episodes of nausea, poor appetite and generalized fatigue. Pt has had BM since admission. On review of pain, pt has not used narcotic since 11/27 and patient declined medication on 11/29 though reporting pain in chart review.     Review of Systems  I have reviewed the patient's PMH, PSH, Social History, Family History, Meds, and Allergies    Objective :  Temp:  [98.2 °F (36.8 °C)-102.7 °F (39.3 °C)] 100.7 °F (38.2 °C)  HR:  [] 88  BP: (116-139)/(67-87) 117/67  Resp:   [16-18] 16  SpO2:  [96 %-98 %] 97 %  O2 Device: None (Room air)      Physical Exam    Lab Results: I have reviewed the following results:  Recent Labs     11/30/24  0930 12/01/24  0408   WBC  --  14.51*   HGB  --  10.0*   HCT  --  31.6*   PLT  --  471*   BANDSPCT  --  4   SODIUM  --  137   K  --  3.9   CL  --  104   CO2  --  25   BUN  --  7   CREATININE  --  0.83   GLUC  --  118   LACTICACID 0.7  --        Imaging Results Review: I reviewed radiology reports from this admission including: CT abdomen/pelvis.      VTE Pharmacologic Prophylaxis: VTE covered by:  heparin (porcine), Subcutaneous, 5,000 Units at 11/30/24 2201      VTE Mechanical Prophylaxis: sequential compression device

## 2024-12-01 NOTE — PROGRESS NOTES
Progress Note - Hospitalist   Name: Tari Shannon 44 y.o. female I MRN: [id number]  Unit/Bed#: [place] [address] -02 I Date of Admission: 11/26/2024   Date of Service: 12/1/2024 I Hospital Day: 4    Assessment & Plan  LAD (lymphadenopathy), intra-abdominal  Inflammatory and necrotic appearing lymphadenopathy in the right lower quadrant, worse compared to November 17  Image guided biopsy by IR was not possible due to overlying bowel and vessels  She  there went diagnostic  laparoscopy and excision biopsy today by surgery  Follow-up biopsy results  Sepsis without acute organ dysfunction (HCC)  She had fever, leukocytosis, tachycardia meeting sepsis criteria on 11/30/2024  Workup so far negative for infection   Possibly related to intra-abdominal lymphadenopathy  But her fever resolved and WBC is trending down on empiric Unasyn  Follow-up on blood culture and biopsy results  Type 2 diabetes mellitus with hyperglycemia, with long-term current use of insulin (HCC)  Lab Results   Component Value Date    HGBA1C 14.0 (A) 11/21/2024       Recent Labs     11/30/24  2030 12/01/24  0618 12/01/24  0923 12/01/24  1054   POCGLU 165* 91 107 115     Uncontrolled diabetes with A1c mentioned above  She reportedly  was not taking medication for at least 2 months prior to seeing her endocrinologist a few days ago  She just saw endocrinology in the office on 11/26 and 11/26/2024 and was restarted on Lantus 30 units at night and Humalog 10 - 10 - 10.  She reportedly was off all medications for 2 months prior to that appointment.  Blood sugars are trending down compared to admission.  Monitor and follow-up with Dr. Razo on discharge    Abdominal pain  Multifactorial, could be from gastroparesis, dyspepsia, constipation versus lymphadenopathy  Status post laparoscopy and lymph node biopsy  Avoid NSAIDs and minimize narcotic  Continue blood sugar control  Continue Protonix  NSAIDs discontinued  Esophageal thickening  Likely due to   gastroesophageal reflux.  Continue PPI.  Follow-up with GI as scheduled    VTE Pharmacologic Prophylaxis: VTE Score: 4 heparin  Patient Centered Rounds: I performed bedside rounds with nursing staff today.   Education and Discussions with Family / Patient: Patient declined call to .  Offered to call her sister.  She declined.    Current Length of Stay: 4 day(s)  Current Patient Status: Inpatient   Certification Statement: The patient will continue to require additional inpatient hospital stay due to abdominal lymphadenopathy and sepsis from unclear etiology  Discharge Plan: Anticipate discharge in 24-48 hrs to home.    Code Status: Level 1 - Full Code    Subjective   S/p surgery this am  Mild pain as expected  Tolerated food  Fever resolved    Objective :  Temp:  [97.6 °F (36.4 °C)-102.7 °F (39.3 °C)] 99.5 °F (37.5 °C)  HR:  [] 79  BP: (105-139)/(58-87) 124/75  Resp:  [14-18] 15  SpO2:  [95 %-98 %] 96 %  O2 Device: Nasal cannula  Nasal Cannula O2 Flow Rate (L/min):  [2 L/min-4 L/min] 2 L/min    Body mass index is 30.45 kg/m².     Input and Output Summary (last 24 hours):     Intake/Output Summary (Last 24 hours) at 12/1/2024 1559  Last data filed at 12/1/2024 1445  Gross per 24 hour   Intake 2480 ml   Output 300 ml   Net 2180 ml       Physical Exam  Vitals reviewed.   HENT:      Head: Normocephalic and atraumatic.      Nose: No congestion or rhinorrhea.   Eyes:      General: No scleral icterus.  Cardiovascular:      Rate and Rhythm: Normal rate and regular rhythm.   Pulmonary:      Breath sounds: No wheezing or rhonchi.   Abdominal:      Tenderness: There is abdominal tenderness.   Musculoskeletal:      Right lower leg: No edema.      Left lower leg: No edema.   Skin:     General: Skin is warm and dry.   Neurological:      Mental Status: She is oriented to person, place, and time.   Psychiatric:         Mood and Affect: Mood normal.         Behavior: Behavior normal.       Lab Results: I have  reviewed the following results:   Results from last 7 days   Lab Units 12/01/24  0408 11/30/24  0501   WBC Thousand/uL 14.51* 15.23*   HEMOGLOBIN g/dL 10.0* 12.3   HEMATOCRIT % 31.6* 37.8   PLATELETS Thousands/uL 471* 513*   BANDS PCT % 4  --    SEGS PCT %  --  68   LYMPHO PCT % 21 17   MONO PCT % 7 13*   EOS PCT % 1 1     Results from last 7 days   Lab Units 12/01/24  0408 11/28/24  0445 11/27/24  0029   SODIUM mmol/L 137   < > 133*   POTASSIUM mmol/L 3.9   < > 4.3   CHLORIDE mmol/L 104   < > 98   CO2 mmol/L 25   < > 27   BUN mg/dL 7   < > 11   CREATININE mg/dL 0.83   < > 0.95   ANION GAP mmol/L 8   < > 8   CALCIUM mg/dL 8.7   < > 9.3   ALBUMIN g/dL  --   --  3.5   TOTAL BILIRUBIN mg/dL  --   --  0.49   ALK PHOS U/L  --   --  95   ALT U/L  --   --  10   AST U/L  --   --  12*   GLUCOSE RANDOM mg/dL 118   < > 391*    < > = values in this interval not displayed.         Results from last 7 days   Lab Units 12/01/24  1054 12/01/24  0923 12/01/24  0618 11/30/24  2030 11/30/24  1614 11/30/24  1106 11/30/24  0729 11/29/24  2101 11/29/24  1622 11/29/24  1118 11/29/24  0809 11/28/24  2121   POC GLUCOSE mg/dl 115 107 91 165* 160* 100 98 127 97 187* 191* 199*         Results from last 7 days   Lab Units 12/01/24  0408 11/30/24  0930   LACTIC ACID mmol/L  --  0.7   PROCALCITONIN ng/ml 0.22 0.61*       Recent Cultures (last 7 days):   Results from last 7 days   Lab Units 11/30/24  0928   BLOOD CULTURE  Received in Microbiology Lab. Culture in Progress.  Received in Microbiology Lab. Culture in Progress.       Imaging Results Review: I reviewed radiology reports from this admission including: CT abdomen/pelvis and procedure reports.      Last 24 Hours Medication List:     Current Facility-Administered Medications:     acetaminophen (TYLENOL) tablet 650 mg, Q6H PRN    ampicillin-sulbactam (UNASYN) 3 g in sodium chloride 0.9 % 100 mL IVPB, Q6H, Last Rate: 3 g (12/01/24 0512)    bisacodyl (DULCOLAX) rectal suppository 10 mg,  Daily    heparin (porcine) subcutaneous injection 5,000 Units, Q8H CARMELO    insulin glargine (LANTUS) subcutaneous injection 30 Units 0.3 mL, HS    insulin lispro (HumALOG/ADMELOG) 100 units/mL subcutaneous injection 1-6 Units, 4x Daily (AC & HS) **AND** Fingerstick Glucose (POCT), 4x Daily AC and at bedtime    insulin lispro (HumALOG/ADMELOG) 100 units/mL subcutaneous injection 10 Units, TID With Meals    lidocaine (LIDODERM) 5 % patch 1 patch, Daily    meclizine (ANTIVERT) tablet 12.5 mg, TID PRN    ondansetron (ZOFRAN) injection 4 mg, Q4H PRN    oxyCODONE (ROXICODONE) IR tablet 5 mg, Q6H PRN    oxyCODONE (ROXICODONE) split tablet 2.5 mg, Q6H PRN    pantoprazole (PROTONIX) EC tablet 40 mg, Daily Before Breakfast    polyethylene glycol (MIRALAX) packet 17 g, BID    pregabalin (LYRICA) capsule 50 mg, TID    senna-docusate sodium (SENOKOT S) 8.6-50 mg per tablet 1 tablet, TID    Administrative Statements   Today, Patient Was Seen By: Lars Navas MD      **Please Note: This note may have been constructed using a voice recognition system.**

## 2024-12-01 NOTE — CONSULTS
Consultation - General Surgery  Name: Tari Shannon 44 y.o. female I MRN: [id number]  Unit/Bed#: [place] [address] -02 I Date of Admission: 11/26/2024   Date of Service: 12/1/2024 I Hospital Day: 4   Consults  Physician Requesting Evaluation: Lars Cardozo Pe*   Reason for Evaluation / Principal Problem: evaluation of RLQ pain    Assessment & Plan  LAD (lymphadenopathy), intra-abdominal  44 year old female with RLQ pain x 2 weeks with progressive symptoms. CT findings show worsening LAD with necrosis. Patient has f/u appointment with surgical oncology x 1 week. Pmhx of DM and umbilical hernia repair. No family history or personal history of cancer.    Normal appearing appendix, no signs of acute appendicitis, re demonstrated soft tissue mass w/ likely lymph node necrosis    Plan:  - no acute general surgery intervention  - Pain and nausea control PRN  - DVT ppx  - Remainder of care per primary team      History of Present Illness   Tari Shannon is a 44 y.o. female who presents with hx of uncontrolled diabetes, constipation, Esophageal dysmotility found to have intractable RLQ pain. CT findings noting necrotic appearing lymph node int he RLQ mesentery, pt seen by GI who recommended outpt f/u with [facility] team for esophageal dysmotility and recommended bowel regimen for pts chronic constipation. Pt has begun spiking fevers of 102 and WBC increased 11/30 to 15. Pt continues to have RLQ pain, has been able to tolerate some po though multiple episodes of nausea, poor appetite and generalized fatigue. Pt has had BM since admission. On review of pain, pt has not used narcotic since 11/27 and patient declined medication on 11/29 though reporting pain in chart review.     Review of Systems  I have reviewed the patient's PMH, PSH, Social History, Family History, Meds, and Allergies    Objective :  Temp:  [98.2 °F (36.8 °C)-102.7 °F (39.3 °C)] 100.7 °F (38.2 °C)  HR:  [] 88  BP: (116-139)/(67-87) 117/67  Resp:   [16-18] 16  SpO2:  [96 %-98 %] 97 %  O2 Device: None (Room air)      Physical Exam    Lab Results: I have reviewed the following results:  Recent Labs     11/30/24  0930 12/01/24  0408   WBC  --  14.51*   HGB  --  10.0*   HCT  --  31.6*   PLT  --  471*   BANDSPCT  --  4   SODIUM  --  137   K  --  3.9   CL  --  104   CO2  --  25   BUN  --  7   CREATININE  --  0.83   GLUC  --  118   LACTICACID 0.7  --        Imaging Results Review: I reviewed radiology reports from this admission including: CT abdomen/pelvis.      VTE Pharmacologic Prophylaxis: VTE covered by:  heparin (porcine), Subcutaneous, 5,000 Units at 11/30/24 2201      VTE Mechanical Prophylaxis: sequential compression device

## 2024-12-01 NOTE — PLAN OF CARE
Problem: PAIN - ADULT  Goal: Verbalizes/displays adequate comfort level or baseline comfort level  Description: Interventions:  - Encourage patient to monitor pain and request assistance  - Assess pain using appropriate pain scale  - Administer analgesics based on type and severity of pain and evaluate response  - Implement non-pharmacological measures as appropriate and evaluate response  - Consider cultural and social influences on pain and pain management  - Notify physician/advanced practitioner if interventions unsuccessful or patient reports new pain  Outcome: Progressing     Problem: INFECTION - ADULT  Goal: Absence or prevention of progression during hospitalization  Description: INTERVENTIONS:  - Assess and monitor for signs and symptoms of infection  - Monitor lab/diagnostic results  - Monitor all insertion sites, i.e. indwelling lines, tubes, and drains  - Monitor endotracheal if appropriate and nasal secretions for changes in amount and color  - [place] appropriate cooling/warming therapies per order  - Administer medications as ordered  - Instruct and encourage patient and family to use good hand hygiene technique  - Identify and instruct in appropriate isolation precautions for identified infection/condition  Outcome: Progressing     Problem: SAFETY ADULT  Goal: Patient will remain free of falls  Description: INTERVENTIONS:  - Educate patient/family on patient safety including physical limitations  - Instruct patient to call for assistance with activity   - Consult OT/PT to assist with strengthening/mobility   - Keep Call bell within reach  - Keep bed low and locked with side rails adjusted as appropriate  - Keep care items and personal belongings within reach  - Initiate and maintain comfort rounds  - Apply yellow socks and bracelet for high fall risk patients  - Consider moving patient to room near nurses station  Outcome: Progressing  Goal: Maintain or return to baseline ADL  function  Description: INTERVENTIONS:  -  Assess patient's ability to carry out ADLs; assess patient's baseline for ADL function and identify physical deficits which impact ability to perform ADLs (bathing, care of mouth/teeth, toileting, grooming, dressing, etc.)  - Assess/evaluate cause of self-care deficits   - Assess range of motion  - Assess patient's mobility; develop plan if impaired  - Assess patient's need for assistive devices and provide as appropriate  - Encourage maximum independence but intervene and supervise when necessary  - Involve family in performance of ADLs  - Assess for home care needs following discharge   - Consider OT consult to assist with ADL evaluation and planning for discharge  - Provide patient education as appropriate  Outcome: Progressing  Goal: Maintains/Returns to pre admission functional level  Description: INTERVENTIONS:  - Perform AM-PAC 6 Click Basic Mobility/ Daily Activity assessment daily.  - Set and communicate daily mobility goal to care team and patient/family/caregiver.   - Collaborate with rehabilitation services on mobility goals if consulted  - Out of bed to chair   - Out of bed for meals   - Out of bed for toileting  - Record patient progress and toleration of activity level   Outcome: Progressing     Problem: DISCHARGE PLANNING  Goal: Discharge to home or other facility with appropriate resources  Description: INTERVENTIONS:  - Identify barriers to discharge w/patient and caregiver  - Arrange for needed discharge resources and transportation as appropriate  - Identify discharge learning needs (meds, wound care, etc.)  - Arrange for interpretive services to assist at discharge as needed  - Refer to Case Management Department for coordinating discharge planning if the patient needs post-hospital services based on physician/advanced practitioner order or complex needs related to functional status, cognitive ability, or social support system  Outcome: Progressing      Problem: Knowledge Deficit  Goal: Patient/family/caregiver demonstrates understanding of disease process, treatment plan, medications, and discharge instructions  Description: Complete learning assessment and assess knowledge base.  Interventions:  - Provide teaching at level of understanding  - Provide teaching via preferred learning methods  Outcome: Progressing     Problem: GASTROINTESTINAL - ADULT  Goal: Minimal or absence of nausea and/or vomiting  Description: INTERVENTIONS:  - Administer IV fluids if ordered to ensure adequate hydration  - Maintain NPO status until nausea and vomiting are resolved  - Nasogastric tube if ordered  - Administer ordered antiemetic medications as needed  - Provide nonpharmacologic comfort measures as appropriate  - Advance diet as tolerated, if ordered  - Consider nutrition services referral to assist patient with adequate nutrition and appropriate food choices  Outcome: Progressing     Problem: Prexisting or High Potential for Compromised Skin Integrity  Goal: Skin integrity is maintained or improved  Description: INTERVENTIONS:  - Identify patients at risk for skin breakdown  - Assess and monitor skin integrity  - Assess and monitor nutrition and hydration status  - Monitor labs   - Assess for incontinence   - Turn and reposition patient  - Assist with mobility/ambulation  - Relieve pressure over bony prominences  - Avoid friction and shearing  - Provide appropriate hygiene as needed including keeping skin clean and dry  - Evaluate need for skin moisturizer/barrier cream  - Collaborate with interdisciplinary team   - Patient/family teaching  - Consider wound care consult   Outcome: Progressing     Problem: METABOLIC, FLUID AND ELECTROLYTES - ADULT  Goal: Electrolytes maintained within normal limits  Description: INTERVENTIONS:  - Monitor labs and assess patient for signs and symptoms of electrolyte imbalances  - Administer electrolyte replacement as ordered  - Monitor response  to electrolyte replacements, including repeat lab results as appropriate  - Instruct patient on fluid and nutrition as appropriate  Outcome: Progressing  Goal: Fluid balance maintained  Description: INTERVENTIONS:  - Monitor labs   - Monitor I/O and WT  - Instruct patient on fluid and nutrition as appropriate  - Assess for signs & symptoms of volume excess or deficit  Outcome: Progressing  Goal: Glucose maintained within target range  Description: INTERVENTIONS:  - Monitor Blood Glucose as ordered  - Assess for signs and symptoms of hyperglycemia and hypoglycemia  - Administer ordered medications to maintain glucose within target range  - Assess nutritional intake and initiate nutrition service referral as needed  Outcome: Progressing

## 2024-12-01 NOTE — ANESTHESIA POSTPROCEDURE EVALUATION
Post-Op Assessment Note    CV Status:  Stable    Pain management: adequate       Mental Status:  Alert and awake   Hydration Status:  Euvolemic   PONV Controlled:  Controlled   Airway Patency:  Patent     Post Op Vitals Reviewed: Yes    No anethesia notable event occurred.      Reason for prolonged intubation > 24 hours:  Extubated in orReason for prolonged intubation > 48 hours:  Extubated in or      Last Filed PACU Vitals:  Vitals Value Taken Time   Temp 98.8 °F (37.1 °C) 12/01/24 0949   Pulse 80 12/01/24 0949   /60 12/01/24 0949   Resp 14 12/01/24 0949   SpO2 96 % 12/01/24 0949       Modified Viridiana:  Activity: 2 (12/1/2024  9:49 AM)  Respiration: 2 (12/1/2024  9:49 AM)  Circulation: 1 (12/1/2024  9:49 AM)  Consciousness: 1 (12/1/2024  9:49 AM)  Oxygen Saturation: 1 (12/1/2024  9:49 AM)  Modified Vir[place]iana Score: 7 (12/1/2024  9:49 AM)

## 2024-12-01 NOTE — PROGRESS NOTES
Progress Note - Oncology-Surgical   Name: Tari Shannon 44 y.o. female I MRN: [id number]  Unit/Bed#: [place] [address] -02 I Date of Admission: 11/26/2024   Date of Service: 12/1/2024 I Hospital Day: 4    Assessment & Plan  LAD (lymphadenopathy), intra-abdominal  44 year old female with RLQ pain x 2 weeks with progressive symptoms. CT findings show worsening LAD with necrosis. Patient has f/u appointment with surgical oncology x 1 week. Pmhx of DM and umbilical hernia repair. No family history or personal history of cancer.      Plan:  - plan for diagnostic lap and LN bx today w/  - Pain and nausea control PRN  - DVT ppx  - Remainder of care per primary team        Subjective   No n/v passing flatus and small pebble like stools. Pt reporting 7/10 pain. Of note, pt is not taking all of prescribed medications available to her stating she is concerned that it will worsen her constipation, pt was educated that we would like to accurately control her pain and have a thorough bowel regimen to assist with regular function.     Objective :  Temp:  [98.2 °F (36.8 °C)-102.7 °F (39.3 °C)] 100.7 °F (38.2 °C)  HR:  [] 88  BP: (116-139)/(67-87) 117/67  Resp:  [16-18] 16  SpO2:  [96 %-98 %] 97 %  O2 Device: None (Room air)    I/O         11/29 0701 11/30 0700 11/30 0701 12/01 0700 12/01 0701 12/02 0700    P.O. 298 320     I.V. (mL/kg) 1000 (12) 160 (1.9)     Total Intake(mL/kg) 1298 (15.6) 480 (5.8)     Urine (mL/kg/hr)  300 (0.2)     Total Output  300     Net +1298 +180                    Physical Exam  Physical Exam:  General: No acute distress, alert and oriented  CV: RRR  Lungs: Normal work of breathing   Abdomen: soft, ttp in R hemiadbomen, non distended  Skin: Warm, dry      Lab Results: I have reviewed the following results:  Recent Labs     11/30/24  0930 12/01/24  0408   WBC  --  14.51*   HGB  --  10.0*   HCT  --  31.6*   PLT  --  471*   BANDSPCT  --  4   SODIUM  --  137   K  --  3.9   CL  --  104   CO2  --  25   BUN   --  7   CREATININE  --  0.83   GLUC  --  118   LACTICACID 0.7  --              VTE Pharmacologic Prophylaxis: VTE covered by:  heparin (porcine), Subcutaneous, 5,000 Units at 11/30/24 2201     VTE Mechanical Prophylaxis: sequential compression device

## 2024-12-01 NOTE — ASSESSMENT & PLAN NOTE
She had fever, leukocytosis, tachycardia meeting sepsis criteria on 11/30/2024  Workup so far negative for infection   Possibly related to intra-abdominal lymphadenopathy  But her fever resolved and WBC is trending down on empiric Unasyn  Follow-up on blood culture and biopsy results

## 2024-12-01 NOTE — ASSESSMENT & PLAN NOTE
Inflammatory and necrotic appearing lymphadenopathy in the right lower quadrant, worse compared to November 17  Image guided biopsy by IR was not possible due to overlying bowel and vessels  She  there went diagnostic  laparoscopy and excision biopsy today by surgery  Follow-up biopsy results

## 2024-12-01 NOTE — ASSESSMENT & PLAN NOTE
Lab Results   Component Value Date    HGBA1C 14.0 (A) 11/21/2024       Recent Labs     11/30/24  2030 12/01/24  0618 12/01/24  0923 12/01/24  1054   POCGLU 165* 91 107 115     Uncontrolled diabetes with A1c mentioned above  She reportedly  was not taking medication for at least 2 months prior to seeing her endocrinologist a few days ago  She just saw endocrinology in the office on 11/26 and 11/26/2024 and was restarted on Lantus 30 units at night and Humalog 10 - 10 - 10.  She reportedly was off all medications for 2 months prior to that appointment.  Blood sugars are trending down compared to admission.  Monitor and follow-up with Dr. Razo on discharge

## 2024-12-01 NOTE — QUICK NOTE
Post Op Check:    Patient resting comfortably in bed. Has post surgical discomfort at incision sites. No n/v at this moment, has been able to lin some PO intake with liquids.    Temp:  [97.6 °F (36.4 °C)-102.7 °F (39.3 °C)] 99.3 °F (37.4 °C)  HR:  [] 79  BP: (105-139)/(58-87) 117/70  Resp:  [14-18] 14  SpO2:  [96 %-98 %] 97 %  O2 Device: Nasal cannula  Nasal Cannula O2 Flow Rate (L/min):  [2 L/min-4 L/min] 2 L/min      Physical Exam:  General: No acute distress, alert and oriented  CV: Well perfused, regular rate  Lungs: Normal work of breathing, no increased respiratory effort  Abdomen: Soft, appropriately tender, non-distended. Incision(s) clean, dry and intact.  Extremities: No edema, clubbing or cyanosis  Skin: Warm, dry    Angel Luis Alcaraz MD  PGY-2   12/01/24

## 2024-12-02 PROBLEM — R50.9 FEVER: Status: ACTIVE | Noted: 2024-12-02

## 2024-12-02 LAB
GLUCOSE SERPL-MCNC: 114 MG/DL (ref 65–140)
GLUCOSE SERPL-MCNC: 68 MG/DL (ref 65–140)
GLUCOSE SERPL-MCNC: 80 MG/DL (ref 65–140)
GLUCOSE SERPL-MCNC: 89 MG/DL (ref 65–140)
GLUCOSE SERPL-MCNC: 99 MG/DL (ref 65–140)

## 2024-12-02 PROCEDURE — 99232 SBSQ HOSP IP/OBS MODERATE 35: CPT | Performed by: INTERNAL MEDICINE

## 2024-12-02 PROCEDURE — 99254 IP/OBS CNSLTJ NEW/EST MOD 60: CPT | Performed by: INTERNAL MEDICINE

## 2024-12-02 PROCEDURE — 86592 SYPHILIS TEST NON-TREP QUAL: CPT | Performed by: INTERNAL MEDICINE

## 2024-12-02 PROCEDURE — 82948 REAGENT STRIP/BLOOD GLUCOSE: CPT

## 2024-12-02 PROCEDURE — 86593 SYPHILIS TEST NON-TREP QUANT: CPT | Performed by: INTERNAL MEDICINE

## 2024-12-02 PROCEDURE — 99024 POSTOP FOLLOW-UP VISIT: CPT | Performed by: STUDENT IN AN ORGANIZED HEALTH CARE EDUCATION/TRAINING PROGRAM

## 2024-12-02 PROCEDURE — 86780 TREPONEMA PALLIDUM: CPT | Performed by: INTERNAL MEDICINE

## 2024-12-02 RX ORDER — INSULIN LISPRO 100 [IU]/ML
5 INJECTION, SOLUTION INTRAVENOUS; SUBCUTANEOUS
Status: DISCONTINUED | OUTPATIENT
Start: 2024-12-02 | End: 2024-12-06 | Stop reason: HOSPADM

## 2024-12-02 RX ADMIN — PREGABALIN 50 MG: 50 CAPSULE ORAL at 22:04

## 2024-12-02 RX ADMIN — AMPICILLIN SODIUM AND SULBACTAM SODIUM 3 G: 100; 50 INJECTION, POWDER, FOR SOLUTION INTRAVENOUS at 09:30

## 2024-12-02 RX ADMIN — PANTOPRAZOLE SODIUM 40 MG: 40 TABLET, DELAYED RELEASE ORAL at 05:57

## 2024-12-02 RX ADMIN — PREGABALIN 50 MG: 50 CAPSULE ORAL at 17:22

## 2024-12-02 RX ADMIN — PREGABALIN 50 MG: 50 CAPSULE ORAL at 09:30

## 2024-12-02 RX ADMIN — ACETAMINOPHEN 650 MG: 325 TABLET, FILM COATED ORAL at 12:48

## 2024-12-02 RX ADMIN — INSULIN LISPRO 10 UNITS: 100 INJECTION, SOLUTION INTRAVENOUS; SUBCUTANEOUS at 12:48

## 2024-12-02 RX ADMIN — OXYCODONE 5 MG: 5 TABLET ORAL at 06:01

## 2024-12-02 RX ADMIN — AMPICILLIN SODIUM AND SULBACTAM SODIUM 3 G: 100; 50 INJECTION, POWDER, FOR SOLUTION INTRAVENOUS at 03:49

## 2024-12-02 RX ADMIN — INSULIN LISPRO 5 UNITS: 100 INJECTION, SOLUTION INTRAVENOUS; SUBCUTANEOUS at 09:31

## 2024-12-02 RX ADMIN — POLYETHYLENE GLYCOL 3350 17 G: 17 POWDER, FOR SOLUTION ORAL at 09:30

## 2024-12-02 RX ADMIN — HEPARIN SODIUM 5000 UNITS: 5000 INJECTION INTRAVENOUS; SUBCUTANEOUS at 12:49

## 2024-12-02 RX ADMIN — HEPARIN SODIUM 5000 UNITS: 5000 INJECTION INTRAVENOUS; SUBCUTANEOUS at 05:57

## 2024-12-02 RX ADMIN — ACETAMINOPHEN 650 MG: 325 TABLET, FILM COATED ORAL at 06:00

## 2024-12-02 RX ADMIN — SENNOSIDES AND DOCUSATE SODIUM 1 TABLET: 8.6; 5 TABLET ORAL at 09:30

## 2024-12-02 RX ADMIN — LIDOCAINE 1 PATCH: 50 PATCH TOPICAL at 09:30

## 2024-12-02 RX ADMIN — ACETAMINOPHEN 650 MG: 325 TABLET, FILM COATED ORAL at 22:04

## 2024-12-02 RX ADMIN — HEPARIN SODIUM 5000 UNITS: 5000 INJECTION INTRAVENOUS; SUBCUTANEOUS at 22:05

## 2024-12-02 RX ADMIN — AMPICILLIN SODIUM AND SULBACTAM SODIUM 3 G: 100; 50 INJECTION, POWDER, FOR SOLUTION INTRAVENOUS at 16:54

## 2024-12-02 RX ADMIN — AMPICILLIN SODIUM AND SULBACTAM SODIUM 3 G: 100; 50 INJECTION, POWDER, FOR SOLUTION INTRAVENOUS at 22:04

## 2024-12-02 RX ADMIN — INSULIN LISPRO 5 UNITS: 100 INJECTION, SOLUTION INTRAVENOUS; SUBCUTANEOUS at 17:22

## 2024-12-02 RX ADMIN — OXYCODONE 5 MG: 5 TABLET ORAL at 19:36

## 2024-12-02 NOTE — ASSESSMENT & PLAN NOTE
This is most likely secondary to GE reflux.  Patient is being followed by GI.    Position records reviewed in detail.  Discussed with patient and her family in detail regarding the above plan.

## 2024-12-02 NOTE — PROGRESS NOTES
Progress Note - Oncology-Surgical   Name: Tari Shannon 44 y.o. female I MRN: [id number]  Unit/Bed#: [place] [address] -02 I Date of Admission: 11/26/2024   Date of Service: 12/2/2024 I Hospital Day: 5    Assessment & Plan  LAD (lymphadenopathy), intra-abdominal  44 year old female with RLQ pain x 2 weeks with progressive symptoms. CT findings show worsening LAD with necrosis. Patient has f/u appointment with surgical oncology x 1 week. Pmhx of DM and umbilical hernia repair. No family history or personal history of cancer.    Plan:  - F/u with surgical oncology outpt setting  - f/u path  - Pain and nausea control PRN  - DVT ppx  - Remainder of care per primary team    Oncology-Surgical service signing off.    Subjective   Pt with ongoing RLQ pain. Mild incisional pain but these are well approximately and intact. She denies nv and has been able to lin some PO intake and is passing flatus.    Objective :  Temp:  [97.6 °F (36.4 °C)-103 °F (39.4 °C)] 99.6 °F (37.6 °C)  HR:  [74-98] 85  BP: (105-154)/(58-91) 154/91  Resp:  [14-18] 18  SpO2:  [94 %-97 %] 95 %  O2 Device: None (Room air)  Nasal Cannula O2 Flow Rate (L/min):  [2 L/min-4 L/min] 2 L/min    I/O         11/30 0701  12/01 0700 12/01 0701  12/02 0700 12/02 0701  12/03 0700    P.O. 320 540     I.V. (mL/kg) 160 (1.9) 1940 (23.4)     IV Piggyback  100     Total Intake(mL/kg) 480 (5.8) 2580 (31.1)     Urine (mL/kg/hr) 300 (0.2) 600 (0.3)     Total Output 300 600     Net +180 +1980            Unmeasured Stool Occurrence 1 x              Physical Exam  Physical Exam:  General: No acute distress, alert and oriented  CV: RRR  Lungs: Normal work of breathing   Abdomen: soft psttp at incision site, ttp in RLQ, nondistended. Incision/s c/d/i  Skin: Warm, dry      Lab Results: I have reviewed the following results:  Recent Labs     11/30/24  0930 12/01/24  0408   WBC  --  14.51*   HGB  --  10.0*   HCT  --  31.6*   PLT  --  471*   BANDSPCT  --  4   SODIUM  --  137   K  --  3.9    CL  --  104   CO2  --  25   BUN  --  7   CREATININE  --  0.83   GLUC  --  118   LACTICACID 0.7  --              VTE Pharmacologic Prophylaxis: VTE covered by:  heparin (porcine), Subcutaneous, 5,000 Units at 12/02/24 0557      VTE Mechanical Prophylaxis: sequential compression device

## 2024-12-02 NOTE — PROGRESS NOTES
Progress Note - Hospitalist   Name: Tari Shannon 44 y.o. female I MRN: [id number]  Unit/Bed#: [place] [address] -02 I Date of Admission: 11/26/2024   Date of Service: 12/2/2024 I Hospital Day: 5    Assessment & Plan  LAD (lymphadenopathy), intra-abdominal  Inflammatory and necrotic appearing lymphadenopathy in the right lower quadrant, worse compared to November 17  Image guided biopsy by IR was not possible due to overlying bowel and vessels  She  there went diagnostic  laparoscopy and excision biopsy today by surgery  Follow-up biopsy results  Sepsis without acute organ dysfunction (HCC)  She had fever, leukocytosis, tachycardia meeting sepsis criteria on 11/30/2024  Workup so far negative for infection   Possibly related to intra-abdominal lymphadenopathy  But her fever resolved and WBC is trending down on empiric Unasyn  Follow-up on blood culture and biopsy results  Type 2 diabetes mellitus with hyperglycemia, with long-term current use of insulin (HCC)  Lab Results   Component Value Date    HGBA1C 14.0 (A) 11/21/2024       Recent Labs     12/01/24  1644 12/01/24  2130 12/02/24  0730 12/02/24  1214   POCGLU 126 133 80 114     Uncontrolled diabetes with A1c mentioned above    She just saw endocrinology in the office on 11/26 and 11/26/2024 and was restarted on Lantus 30 units at night and Humalog 10 - 10 - 10.  She reportedly was off all medications for 2 months prior to that appointment.    Blood sugars are trending down compared to admission.  Monitor and follow-up with Dr. Razo on discharge    Abdominal pain  Multifactorial, could be from gastroparesis, dyspepsia, constipation versus lymphadenopathy  Status post laparoscopy and lymph node biopsy  Avoid NSAIDs and minimize narcotic  Continue Protonix  Esophageal thickening  Likely due to  gastroesophageal reflux.  Continue PPI.  Follow-up with GI as scheduled    The patient continues to have significant fever.  The etiology of this is obscure.  She underwent a  lymph node resection yesterday to try to try to clarify this.  I will ask ID to evaluate the situation.    Subjective:  The patient has some abdominal discomfort.  She denies any nausea or vomiting.  She denies diarrhea.  She has had no cough or sputum production.  She denies chest pain.    Physical Exam:   Temp:  [99.6 °F (37.6 °C)-103 °F (39.4 °C)] 101.1 °F (38.4 °C)  HR:  [81-98] 94  BP: ()/(53-91) 136/80  Resp:  [16-18] 18  SpO2:  [93 %-98 %] 98 %  O2 Device: None (Room air)    Gen: Well-developed, well-nourished, in no acute distress.  Neck: Supple.  No lymphadenopathy, goiter, or bruit.  Heart: Regular rhythm.  I heard no murmur, gallop, or rub.  Lungs: Clear to auscultation and percussion.  No wheezing, rales, or rhonchi.  Abd: Soft with active bowel sounds.  Expected postoperative tenderness is noted.  There is no mass or organomegaly.  Extremities: No clubbing, cyanosis, or edema.  No calf tenderness.  Neuro: Alert and oriented.  No focal sign.  Skin: Warm and dry.      LABS: No new labs.          VTE Pharmacologic Prophylaxis: Heparin  VTE Mechanical Prophylaxis: sequential compression device

## 2024-12-02 NOTE — ASSESSMENT & PLAN NOTE
Lab Results   Component Value Date    HGBA1C 14.0 (A) 11/21/2024       Recent Labs     12/01/24  1644 12/01/24  2130 12/02/24  0730 12/02/24  1214   POCGLU 126 133 80 114     Uncontrolled diabetes with A1c mentioned above    She just saw endocrinology in the office on 11/26 and 11/26/2024 and was restarted on Lantus 30 units at night and Humalog 10 - 10 - 10.  She reportedly was off all medications for 2 months prior to that appointment.    Blood sugars are trending down compared to admission.  Monitor and follow-up with Dr. Razo on discharge

## 2024-12-02 NOTE — PROCEDURES
Insert Complex Venous Access Line    Date/Time: 12/2/2024 3:24 PM    Performed by: Aicha Benson RN  Authorized by: Manolo Fuentes MD    Patient location:  Bedside  Consent:     Consent obtained: per protocol no consent required.  Universal protocol:     Procedure explained and questions answered to patient or proxy's satisfaction: yes      Immediately prior to procedure, a time out was called: yes      Relevant documents present and verified: yes      Test results available and properly labeled: yes      Radiology Images displayed and confirmed.  If images not available, report reviewed: yes      Required blood products, implants, devices, and special equipment available: yes      Site/side marked: yes      Patient identity confirmed:  Verbally with patient, arm band, provided demographic data and hospital-assigned identification number  Pre-procedure details:     Hand hygiene: Hand hygiene performed prior to insertion      Sterile barrier technique: All elements of maximal sterile technique followed      Skin preparation:  ChloraPrep    Skin preparation agent: Skin preparation agent completely dried prior to procedure    Procedure details:     Complex Venous Access Line Type: Midline      Complex Venous Access Line Indications: no peripheral vascular access      Orientation:  Left    Location:  Cephalic    Catheter size:  18 gauge    Total catheter length (cm):  10    Catheter out on skin (cm):  0    Max flow rate:  999ml/hr    Arm circumference:  39cm    Patient evaluated for contraindications to access (i.e. fistula, thrombosis, etc): Yes      Approach: percutaneous technique used      Patient position:  Flat    Ultrasound image availability:  Not saved    Sterile ultrasound techniques: Sterile gel and sterile probe covers were used      Number of attempts:  1    Successful placement: yes      Landmarks identified: yes      Cath access vessel: midline.  Anesthesia (see MAR for exact dosages):     Anesthesia  method:  None  Post-procedure details:     Post-procedure:  Dressing applied and securement device placed    Assessment:  Blood return through all ports and free fluid flow    Post-procedure complications: none      Patient tolerance of procedure:  Tolerated well, no immediate complications      Lot#[id number] 2025-09-30

## 2024-12-02 NOTE — ASSESSMENT & PLAN NOTE
Lab Results   Component Value Date    HGBA1C 14.0 (A) 11/21/2024     Recent Labs     12/02/24  0730 12/02/24  1214 12/02/24  1557 12/02/24  1637   POCGLU 80 114 68 99       Blood Sugar Average: Last 72 hrs:  (P) [id number]  Poorly controlled DM may contribute to any infection above.  Management per primary service.

## 2024-12-02 NOTE — PLAN OF CARE
Problem: PAIN - ADULT  Goal: Verbalizes/displays adequate comfort level or baseline comfort level  Description: Interventions:  - Encourage patient to monitor pain and request assistance  - Assess pain using appropriate pain scale  - Administer analgesics based on type and severity of pain and evaluate response  - Implement non-pharmacological measures as appropriate and evaluate response  - Consider cultural and social influences on pain and pain management  - Notify physician/advanced practitioner if interventions unsuccessful or patient reports new pain  Outcome: Progressing     Problem: INFECTION - ADULT  Goal: Absence or prevention of progression during hospitalization  Description: INTERVENTIONS:  - Assess and monitor for signs and symptoms of infection  - Monitor lab/diagnostic results  - Monitor all insertion sites, i.e. indwelling lines, tubes, and drains  - Monitor endotracheal if appropriate and nasal secretions for changes in amount and color  - [place] appropriate cooling/warming therapies per order  - Administer medications as ordered  - Instruct and encourage patient and family to use good hand hygiene technique  - Identify and instruct in appropriate isolation precautions for identified infection/condition  Outcome: Progressing     Problem: SAFETY ADULT  Goal: Patient will remain free of falls  Description: INTERVENTIONS:  - Educate patient/family on patient safety including physical limitations  - Instruct patient to call for assistance with activity   - Consult OT/PT to assist with strengthening/mobility   - Keep Call bell within reach  - Keep bed low and locked with side rails adjusted as appropriate  - Keep care items and personal belongings within reach  - Initiate and maintain comfort rounds  - Apply yellow socks and bracelet for high fall risk patients  - Consider moving patient to room near nurses station  Outcome: Progressing  Goal: Maintain or return to baseline ADL  function  Description: INTERVENTIONS:  -  Assess patient's ability to carry out ADLs; assess patient's baseline for ADL function and identify physical deficits which impact ability to perform ADLs (bathing, care of mouth/teeth, toileting, grooming, dressing, etc.)  - Assess/evaluate cause of self-care deficits   - Assess range of motion  - Assess patient's mobility; develop plan if impaired  - Assess patient's need for assistive devices and provide as appropriate  - Encourage maximum independence but intervene and supervise when necessary  - Involve family in performance of ADLs  - Assess for home care needs following discharge   - Consider OT consult to assist with ADL evaluation and planning for discharge  - Provide patient education as appropriate  Outcome: Progressing  Goal: Maintains/Returns to pre admission functional level  Description: INTERVENTIONS:  - Perform AM-PAC 6 Click Basic Mobility/ Daily Activity assessment daily.  - Set and communicate daily mobility goal to care team and patient/family/caregiver.   - Collaborate with rehabilitation services on mobility goals if consulted  - Out of bed to chair   - Out of bed for meals   - Out of bed for toileting  - Record patient progress and toleration of activity level   Outcome: Progressing     Problem: DISCHARGE PLANNING  Goal: Discharge to home or other facility with appropriate resources  Description: INTERVENTIONS:  - Identify barriers to discharge w/patient and caregiver  - Arrange for needed discharge resources and transportation as appropriate  - Identify discharge learning needs (meds, wound care, etc.)  - Arrange for interpretive services to assist at discharge as needed  - Refer to Case Management Department for coordinating discharge planning if the patient needs post-hospital services based on physician/advanced practitioner order or complex needs related to functional status, cognitive ability, or social support system  Outcome: Progressing      Problem: Knowledge Deficit  Goal: Patient/family/caregiver demonstrates understanding of disease process, treatment plan, medications, and discharge instructions  Description: Complete learning assessment and assess knowledge base.  Interventions:  - Provide teaching at level of understanding  - Provide teaching via preferred learning methods  Outcome: Progressing     Problem: GASTROINTESTINAL - ADULT  Goal: Minimal or absence of nausea and/or vomiting  Description: INTERVENTIONS:  - Administer IV fluids if ordered to ensure adequate hydration  - Maintain NPO status until nausea and vomiting are resolved  - Nasogastric tube if ordered  - Administer ordered antiemetic medications as needed  - Provide nonpharmacologic comfort measures as appropriate  - Advance diet as tolerated, if ordered  - Consider nutrition services referral to assist patient with adequate nutrition and appropriate food choices  Outcome: Progressing     Problem: Prexisting or High Potential for Compromised Skin Integrity  Goal: Skin integrity is maintained or improved  Description: INTERVENTIONS:  - Identify patients at risk for skin breakdown  - Assess and monitor skin integrity  - Assess and monitor nutrition and hydration status  - Monitor labs   - Assess for incontinence   - Turn and reposition patient  - Assist with mobility/ambulation  - Relieve pressure over bony prominences  - Avoid friction and shearing  - Provide appropriate hygiene as needed including keeping skin clean and dry  - Evaluate need for skin moisturizer/barrier cream  - Collaborate with interdisciplinary team   - Patient/family teaching  - Consider wound care consult   Outcome: Progressing     Problem: METABOLIC, FLUID AND ELECTROLYTES - ADULT  Goal: Electrolytes maintained within normal limits  Description: INTERVENTIONS:  - Monitor labs and assess patient for signs and symptoms of electrolyte imbalances  - Administer electrolyte replacement as ordered  - Monitor response  to electrolyte replacements, including repeat lab results as appropriate  - Instruct patient on fluid and nutrition as appropriate  Outcome: Progressing  Goal: Fluid balance maintained  Description: INTERVENTIONS:  - Monitor labs   - Monitor I/O and WT  - Instruct patient on fluid and nutrition as appropriate  - Assess for signs & symptoms of volume excess or deficit  Outcome: Progressing  Goal: Glucose maintained within target range  Description: INTERVENTIONS:  - Monitor Blood Glucose as ordered  - Assess for signs and symptoms of hyperglycemia and hypoglycemia  - Administer ordered medications to maintain glucose within target range  - Assess nutritional intake and initiate nutrition service referral as needed  Outcome: Progressing     Problem: HEMATOLOGIC - ADULT  Goal: Maintains hematologic stability  Description: INTERVENTIONS  - Assess for signs and symptoms of bleeding or hemorrhage  - Monitor labs  - Administer supportive blood products/factors as ordered and appropriate  Outcome: Progressing

## 2024-12-02 NOTE — CASE MANAGEMENT
Case Management Assessment & Discharge Planning Note    Patient name Tari Shannon  Location [facility] 2 /[facility] 2 M* MRN [id number]  : 1980 Date 2024       Current Admission Date: 2024  Current Admission Diagnosis:LAD (lymphadenopathy), intra-abdominal   Patient Active Problem List    Diagnosis Date Noted Date Diagnosed    Hemoglobin A1C greater than 9%, indicating poor diabetic control 2024     Sepsis without acute organ dysfunction (HCC) 2024     Abdominal pain 2024     Esophageal thickening 2024     LAD (lymphadenopathy), intra-abdominal 2024     Other dysphagia 2024     Microalbuminuric diabetic nephropathy (HCC) 10/02/2023     Chronotropic incompetence 2023     Radiculopathy, lumbar region      Polyneuropathy      Right leg weakness 2022     Neck pain      Brachial plexopathy      Herniation of intervertebral disc at C6-C7 level 2022     Numbness and tingling of right arm 03/15/2022     Adhesive capsulitis of right shoulder 03/15/2022     Type 2 diabetes mellitus with hyperglycemia, with long-term current use of insulin (HCC) 2022     Chest pain 2021     Current use of insulin ([facility]) 2021     Migraine without aura and without status migrainosus, not intractable 2021     Recurrent episodes of unresponsiveness 2020     Numbness and tingling of both feet 2020     Diabetes mellitus (HCC) 2019     Obesity (BMI 30-39.9) 2019     Stress at home 2017     Abnormal thyroid blood test 10/03/2016     Papanicolaou smear of cervix with positive high risk human papilloma virus (HPV) test 01/15/2016       LOS (days): 5  Geometric Mean LOS (GMLOS) (days): 5  Days to GMLOS:-0.3     OBJECTIVE:    Risk of Unplanned Readmission Score: 9.13         Current admission status: Inpatient       Preferred Pharmacy:   [facility] - [address]  [facility],  [address]  [facility]  Floor [facility] [address]  Phone: [phone] Fax: [phone]    Primary Care Provider: Chaim Foote MD    Primary Insurance: CAPITAL  Secondary Insurance:     ASSESSMENT:  Active Health Care Proxies       ana laura flores Health Care Representative - Sister   Primary Phone: [phone] (Mobile)                 Advance Directives  Does patient have a Health Care POA?:  (No)  Does patient have Advance Directives?:  (No)  Primary Contact: [phone] - sister Ana Laura              Patient Information  Admitted from:: Home  Mental Status: Alert  Assessment information provided by:: Patient  Primary Caregiver: Self  Support Systems: Family members  County of Residence: [place]  [facility] do you live in?: [place]  Type of Current Residence: 3 story home  Upon entering residence, is there a bedroom on the main floor (no further steps)?: No  A bedroom is located on the following floor levels of residence (select all that apply):: 3rd Floor  Upon entering residence, is there a bathroom on the main floor (no further steps)?: Yes (1st and 2nd)  Living Arrangements: Other (Comment) (lives w/ children)    Activities of Daily Living Prior to Admission  Functional Status: Independent  Completes ADLs independently?: Yes  Ambulates independently?: Yes  Does patient use assisted devices?: No  Does patient currently own DME?: No  Does patient have a history of Outpatient Therapy (PT/OT)?: Yes  Does the patient have a history of Short-Term Rehab?: No  Does patient have a history of HHC?: No  Does patient currently have HHC?: No         Patient Information Continued  Income Source: Employed  Does patient have prescription coverage?: Yes  Does patient have a history of substance abuse?: No  Does patient have a history of Mental Health Diagnosis?: No         Means of Transportation  Means of Transport to Appts:: Drives Self          DISCHARGE DETAILS:    Discharge planning discussed with:: Patient        CM contacted  family/caregiver?: No- see comments (declined need at this time)                  Requested Home Health Care         Is the patient interested in HHC at discharge?: No    DME Referral Provided  Referral made for DME?: No    Other Referral/Resources/Interventions Provided:  Interventions: None Indicated         Treatment Team Recommendation: Home  Discharge Destination Plan:: Home  Transport at Discharge : Family

## 2024-12-02 NOTE — ASSESSMENT & PLAN NOTE
Patient had lower abdominal pain with pelvic lymphadenopathy for 2 weeks, without any fever or chills.  She developed fever/chills after lymph node biopsy.  Despite fever, she is clinically and systemically well, without any other focal symptoms other than her stable RLQ abdominal pain.  IV Unasyn was started, without effect on fever thus far. Blood cultures have no growth thus far.  If fever persist despite IV Unasyn and if blood cultures have no growth, will likely discontinue antibiotic.  Continue IV Unasyn for now.  Monitor temperature/WBC.  Follow-up on blood cultures.  If fever persist despite IV Unasyn and if blood cultures have no growth, will likely discontinue antibiotic.

## 2024-12-02 NOTE — PLAN OF CARE
Problem: PAIN - ADULT  Goal: Verbalizes/displays adequate comfort level or baseline comfort level  Description: Interventions:  - Encourage patient to monitor pain and request assistance  - Assess pain using appropriate pain scale  - Administer analgesics based on type and severity of pain and evaluate response  - Implement non-pharmacological measures as appropriate and evaluate response  - Consider cultural and social influences on pain and pain management  - Notify physician/advanced practitioner if interventions unsuccessful or patient reports new pain  Outcome: Progressing     Problem: INFECTION - ADULT  Goal: Absence or prevention of progression during hospitalization  Description: INTERVENTIONS:  - Assess and monitor for signs and symptoms of infection  - Monitor lab/diagnostic results  - Monitor all insertion sites, i.e. indwelling lines, tubes, and drains  - Monitor endotracheal if appropriate and nasal secretions for changes in amount and color  - [place] appropriate cooling/warming therapies per order  - Administer medications as ordered  - Instruct and encourage patient and family to use good hand hygiene technique  - Identify and instruct in appropriate isolation precautions for identified infection/condition  Outcome: Progressing     Problem: SAFETY ADULT  Goal: Patient will remain free of falls  Description: INTERVENTIONS:  - Educate patient/family on patient safety including physical limitations  - Instruct patient to call for assistance with activity   - Consult OT/PT to assist with strengthening/mobility   - Keep Call bell within reach  - Keep bed low and locked with side rails adjusted as appropriate  - Keep care items and personal belongings within reach  - Initiate and maintain comfort rounds  - Apply yellow socks and bracelet for high fall risk patients  - Consider moving patient to room near nurses station  Outcome: Progressing  Goal: Maintain or return to baseline ADL  function  Description: INTERVENTIONS:  -  Assess patient's ability to carry out ADLs; assess patient's baseline for ADL function and identify physical deficits which impact ability to perform ADLs (bathing, care of mouth/teeth, toileting, grooming, dressing, etc.)  - Assess/evaluate cause of self-care deficits   - Assess range of motion  - Assess patient's mobility; develop plan if impaired  - Assess patient's need for assistive devices and provide as appropriate  - Encourage maximum independence but intervene and supervise when necessary  - Involve family in performance of ADLs  - Assess for home care needs following discharge   - Consider OT consult to assist with ADL evaluation and planning for discharge  - Provide patient education as appropriate  Outcome: Progressing  Goal: Maintains/Returns to pre admission functional level  Description: INTERVENTIONS:  - Perform AM-PAC 6 Click Basic Mobility/ Daily Activity assessment daily.  - Set and communicate daily mobility goal to care team and patient/family/caregiver.   - Collaborate with rehabilitation services on mobility goals if consulted  - Out of bed to chair   - Out of bed for meals   - Out of bed for toileting  - Record patient progress and toleration of activity level   Outcome: Progressing     Problem: DISCHARGE PLANNING  Goal: Discharge to home or other facility with appropriate resources  Description: INTERVENTIONS:  - Identify barriers to discharge w/patient and caregiver  - Arrange for needed discharge resources and transportation as appropriate  - Identify discharge learning needs (meds, wound care, etc.)  - Arrange for interpretive services to assist at discharge as needed  - Refer to Case Management Department for coordinating discharge planning if the patient needs post-hospital services based on physician/advanced practitioner order or complex needs related to functional status, cognitive ability, or social support system  Outcome: Progressing      Problem: Knowledge Deficit  Goal: Patient/family/caregiver demonstrates understanding of disease process, treatment plan, medications, and discharge instructions  Description: Complete learning assessment and assess knowledge base.  Interventions:  - Provide teaching at level of understanding  - Provide teaching via preferred learning methods  Outcome: Progressing     Problem: GASTROINTESTINAL - ADULT  Goal: Minimal or absence of nausea and/or vomiting  Description: INTERVENTIONS:  - Administer IV fluids if ordered to ensure adequate hydration  - Maintain NPO status until nausea and vomiting are resolved  - Nasogastric tube if ordered  - Administer ordered antiemetic medications as needed  - Provide nonpharmacologic comfort measures as appropriate  - Advance diet as tolerated, if ordered  - Consider nutrition services referral to assist patient with adequate nutrition and appropriate food choices  Outcome: Progressing     Problem: Prexisting or High Potential for Compromised Skin Integrity  Goal: Skin integrity is maintained or improved  Description: INTERVENTIONS:  - Identify patients at risk for skin breakdown  - Assess and monitor skin integrity  - Assess and monitor nutrition and hydration status  - Monitor labs   - Assess for incontinence   - Turn and reposition patient  - Assist with mobility/ambulation  - Relieve pressure over bony prominences  - Avoid friction and shearing  - Provide appropriate hygiene as needed including keeping skin clean and dry  - Evaluate need for skin moisturizer/barrier cream  - Collaborate with interdisciplinary team   - Patient/family teaching  - Consider wound care consult   Outcome: Progressing     Problem: METABOLIC, FLUID AND ELECTROLYTES - ADULT  Goal: Electrolytes maintained within normal limits  Description: INTERVENTIONS:  - Monitor labs and assess patient for signs and symptoms of electrolyte imbalances  - Administer electrolyte replacement as ordered  - Monitor response  to electrolyte replacements, including repeat lab results as appropriate  - Instruct patient on fluid and nutrition as appropriate  Outcome: Progressing  Goal: Fluid balance maintained  Description: INTERVENTIONS:  - Monitor labs   - Monitor I/O and WT  - Instruct patient on fluid and nutrition as appropriate  - Assess for signs & symptoms of volume excess or deficit  Outcome: Progressing  Goal: Glucose maintained within target range  Description: INTERVENTIONS:  - Monitor Blood Glucose as ordered  - Assess for signs and symptoms of hyperglycemia and hypoglycemia  - Administer ordered medications to maintain glucose within target range  - Assess nutritional intake and initiate nutrition service referral as needed  Outcome: Progressing

## 2024-12-02 NOTE — CONSULTS
Consultation - Infectious Disease   Name: Tari Shannon 44 y.o. female I MRN: [id number]  Unit/Bed#: [place] [address] -02 I Date of Admission: 11/26/2024   Date of Service: 12/2/2024 I Hospital Day: 5   Inpatient consult to Infectious Diseases  Consult performed by: Miguel Ángel Bear MD  Consult ordered by: Manolo Fuentes MD        Physician Requesting Evaluation: Manolo Fuentes MD   Reason for Evaluation / Principal Problem: Fever.  Intra-abdominal lymphadenopathy.    Assessment & Plan  Fever  Patient had lower abdominal pain with pelvic lymphadenopathy for 2 weeks, without any fever or chills.  She developed fever/chills after lymph node biopsy.  Despite fever, she is clinically and systemically well, without any other focal symptoms other than her stable RLQ abdominal pain.  IV Unasyn was started, without effect on fever thus far. Blood cultures have no growth thus far.  If fever persist despite IV Unasyn and if blood cultures have no growth, will likely discontinue antibiotic.  Continue IV Unasyn for now.  Monitor temperature/WBC.  Follow-up on blood cultures.  If fever persist despite IV Unasyn and if blood cultures have no growth, will likely discontinue antibiotic.  LAD (lymphadenopathy), intra-abdominal  Patient has RLQ abdominal pain for 2 weeks and RLQ abdominal lymphadenopathy noted on 9/17 abdomen/pelvis CT. etiology of lymphadenopathy is unclear.  Patient has no history of cat bite or scratch, lower extremity injury or intra-abdominal/chronic infection.  She is status post biopsy of these lymph nodes 12/1, with preliminary pathology not show any malignancy.  Unfortunately, the lymph node biopsy was not sent for culture.  If fever persist, we may need to reimage abdomen/pelvis.  Patient may ultimately need repeat biopsy for culturing.  Monitor temperature/WBC.  Monitor abdominal pain.  Check HIV screen.  Check syphilis screen, toxoplasma serologies, Bartonella serologies.  Check GC/chlamydia screen.  Consider  repeat abdomen/pelvis CT if fever persist.  Patient may need repeat lymph node biopsy for culturing.  Type 2 diabetes mellitus with hyperglycemia, with long-term current use of insulin ([facility])  Lab Results   Component Value Date    HGBA1C 14.0 (A) 11/21/2024     Recent Labs     12/02/24  0730 12/02/24  1214 12/02/24  1557 12/02/24  1637   POCGLU 80 114 68 99       Blood Sugar Average: Last 72 hrs:  (P) [id number]  Poorly controlled DM may contribute to any infection above.  Management per primary service.  Esophageal thickening  This is most likely secondary to GE reflux.  Patient is being followed by GI.    Position records reviewed in detail.  Discussed with patient and her family in detail regarding the above plan.  I have discussed with Dr. Fuentes from primary service regarding the above plan to continue antibiotic for now.  He agrees with the plan.    Antibiotics:  Unasyn # 2    History of Present Illness   Tari Shannon is a 44 y.o. year old female, with history of DM, who presented to ER on 11/17 with a few day history of lower abdominal pain and nausea.  Abdomen/pelvis CT showed RLQ adenopathy.  Patient was discharged home with outpatient follow-up with surgical oncology.  She returned to ER 11/27 with worsening lower abdominal pain.  Abdomen/pelvis CT showed worsening necrotic lymphadenopathy in RLQ.  Patient was admitted.  IR was requested to do percutaneous lymph node biopsy but this lymph nodes were not amenable to percutaneous drainage.  Patient was taken to the OR on 12/1 to undergo laparoscopic lymph node biopsy.  Preliminary pathology did not show any malignancy or lymphoma.  Postop, patient developed fever.  IV Unasyn was started.  Fever persists.  Reasons, we are asked to evaluate the patient.      At present, patient is stable pain in RLQ area.  She has no other focal symptoms.  Patient states that the fever and chills just came on after lymph node biopsy.  She has not had any fever or  chills during the last 2 weeks.    Patient also has history of frequent UTI, requiring multiple antibiotic courses given by her PCP and her endocrinologist.  Has never been evaluated by urology.    Patient denies any animal contact.  She states that she hates animals.  Her daughter has 2 dogs which she does not associate with..  No foreign travel.  No recent gastroenteritis.  No recent trauma or laceration of legs.  A complete review of systems is negative other than that noted in the HPI.    I have reviewed the patient's PMH, PSH, Social History, Family History, Meds, and Allergies    Objective :  Temp:  [99.6 °F (37.6 °C)-103 °F (39.4 °C)] 100.9 °F (38.3 °C)  HR:  [81-98] 84  BP: ()/(53-91) 114/63  Resp:  [16-18] 16  SpO2:  [93 %-98 %] 96 %  O2 Device: None (Room air)    Physical Exam:     General: Awake, alert, cooperative, no distress.   Neck:  Supple. No mass.  No lymphadenopathy.   Lungs: Expansion symmetric, no rales, no wheezing, respirations unlabored.   Heart:  Regular rate and rhythm, S1 and S2 normal, no murmur.   Abdomen: Soft, nondistended, moderate RLQ tenderness, bowel sounds active all four quadrants, no masses, no organomegaly.   Extremities: No edema. No erythema/warmth. No ulcer. Nontender to palpation.   Skin:  No rash.   Neuro: Moves all extremities.        Lab Results: I have reviewed the following results:  Results from last 7 days   Lab Units 12/01/24  0408 11/30/24  0501 11/28/24  0445   WBC Thousand/uL 14.51* 15.23* 10.40*   HEMOGLOBIN g/dL 10.0* 12.3 10.7*   PLATELETS Thousands/uL 471* 513* 428*     Results from last 7 days   Lab Units 12/01/24  0408 11/30/24  0501 11/28/24  0445 11/27/24  0029   SODIUM mmol/L 137 139 134* 133*   POTASSIUM mmol/L 3.9 3.7 3.9 4.3   CHLORIDE mmol/L 104 103 103 98   CO2 mmol/L 25 28 26 27   BUN mg/dL 7 8 13 11   CREATININE mg/dL 0.83 0.84 0.96 0.95   EGFR ml/min/1.73sq m 86 85 72 73   CALCIUM mg/dL 8.7 9.5 8.6 9.3   AST U/L  --   --   --  12*   ALT U/L   --   --   --  10   ALK PHOS U/L  --   --   --  95   ALBUMIN g/dL  --   --   --  3.5     Results from last 7 days   Lab Units 11/30/24  0928   BLOOD CULTURE  No Growth at 48 hrs.  No Growth at 48 hrs.     Results from last 7 days   Lab Units 12/01/24  0408 11/30/24  0930   PROCALCITONIN ng/ml 0.22 0.61*                   Imaging Results Review: I personally reviewed the following image studies in PACS and associated radiology reports: CT chest and CT abdomen/pelvis. My interpretation of the radiology images/reports is: Chest CT with small hiatal hernia.  No consolidation.  Abdomen/pelvis CT with RLL necrotic lymphadenopathy..

## 2024-12-02 NOTE — QUICK NOTE
Pt s/p dx lap w/persistent abd pain for necrotic lymphadenopathy as well as w/gastroparesis and dyspepsia/constipation w/ongoin gpain.  Given pro-inflammatory nature of necrotic lad and postop status increased analgesia requirements are expected will order morphine 2mg IV for persistent abd pain.

## 2024-12-02 NOTE — ASSESSMENT & PLAN NOTE
Patient has RLQ abdominal pain for 2 weeks and RLQ abdominal lymphadenopathy noted on 9/17 abdomen/pelvis CT. etiology of lymphadenopathy is unclear.  Patient has no history of cat bite or scratch, lower extremity injury or intra-abdominal/chronic infection.  She is status post biopsy of these lymph nodes 12/1, with preliminary pathology not show any malignancy.  Unfortunately, the lymph node biopsy was not sent for culture.  If fever persist, we may need to reimage abdomen/pelvis.  Patient may ultimately need repeat biopsy for culturing.  Monitor temperature/WBC.  Monitor abdominal pain.  Check HIV screen.  Check syphilis screen, toxoplasma serologies, Bartonella serologies.  Check GC/chlamydia screen.  Consider repeat abdomen/pelvis CT if fever persist.  Patient may need repeat lymph node biopsy for culturing.

## 2024-12-02 NOTE — ASSESSMENT & PLAN NOTE
44 year old female with RLQ pain x 2 weeks with progressive symptoms. CT findings show worsening LAD with necrosis. Patient has f/u appointment with surgical oncology x 1 week. Pmhx of DM and umbilical hernia repair. No family history or personal history of cancer.    Plan:  - F/u with surgical oncology outpt setting  - f/u path  - Pain and nausea control PRN  - DVT ppx  - Remainder of care per primary team

## 2024-12-02 NOTE — ASSESSMENT & PLAN NOTE
Multifactorial, could be from gastroparesis, dyspepsia, constipation versus lymphadenopathy  Status post laparoscopy and lymph node biopsy  Avoid NSAIDs and minimize narcotic  Continue Protonix

## 2024-12-03 ENCOUNTER — TELEPHONE (OUTPATIENT)
Dept: ENDOCRINOLOGY | Facility: CLINIC | Age: 44
End: 2024-12-03

## 2024-12-03 PROBLEM — A53.9 SYPHILIS: Status: ACTIVE | Noted: 2024-12-03

## 2024-12-03 LAB
ALBUMIN SERPL BCG-MCNC: 2.7 G/DL (ref 3.5–5)
ALP SERPL-CCNC: 104 U/L (ref 34–104)
ALT SERPL W P-5'-P-CCNC: 9 U/L (ref 7–52)
ANION GAP SERPL CALCULATED.3IONS-SCNC: 7 MMOL/L (ref 4–13)
AST SERPL W P-5'-P-CCNC: 15 U/L (ref 13–39)
BASOPHILS # BLD AUTO: 0.04 THOUSANDS/ΜL (ref 0–0.1)
BASOPHILS NFR BLD AUTO: 0 % (ref 0–1)
BILIRUB SERPL-MCNC: 0.37 MG/DL (ref 0.2–1)
BUN SERPL-MCNC: 6 MG/DL (ref 5–25)
CALCIUM ALBUM COR SERPL-MCNC: 9.2 MG/DL (ref 8.3–10.1)
CALCIUM SERPL-MCNC: 8.2 MG/DL (ref 8.4–10.2)
CHLORIDE SERPL-SCNC: 101 MMOL/L (ref 96–108)
CO2 SERPL-SCNC: 27 MMOL/L (ref 21–32)
CREAT SERPL-MCNC: 0.86 MG/DL (ref 0.6–1.3)
EOSINOPHIL # BLD AUTO: 0.24 THOUSAND/ΜL (ref 0–0.61)
EOSINOPHIL NFR BLD AUTO: 2 % (ref 0–6)
ERYTHROCYTE [DISTWIDTH] IN BLOOD BY AUTOMATED COUNT: 12.8 % (ref 11.6–15.1)
GFR SERPL CREATININE-BSD FRML MDRD: 83 ML/MIN/1.73SQ M
GLUCOSE SERPL-MCNC: 147 MG/DL (ref 65–140)
GLUCOSE SERPL-MCNC: 158 MG/DL (ref 65–140)
GLUCOSE SERPL-MCNC: 159 MG/DL (ref 65–140)
GLUCOSE SERPL-MCNC: 184 MG/DL (ref 65–140)
GLUCOSE SERPL-MCNC: 89 MG/DL (ref 65–140)
HCT VFR BLD AUTO: 30.8 % (ref 34.8–46.1)
HGB BLD-MCNC: 9.6 G/DL (ref 11.5–15.4)
HIV 1+2 AB+HIV1 P24 AG SERPL QL IA: NORMAL
HIV 2 AB SERPL QL IA: NORMAL
HIV1 AB SERPL QL IA: NORMAL
HIV1 P24 AG SERPL QL IA: NORMAL
IMM GRANULOCYTES # BLD AUTO: 0.2 THOUSAND/UL (ref 0–0.2)
IMM GRANULOCYTES NFR BLD AUTO: 1 % (ref 0–2)
LYMPHOCYTES # BLD AUTO: 3.25 THOUSANDS/ΜL (ref 0.6–4.47)
LYMPHOCYTES NFR BLD AUTO: 21 % (ref 14–44)
MCH RBC QN AUTO: 27.7 PG (ref 26.8–34.3)
MCHC RBC AUTO-ENTMCNC: 31.2 G/DL (ref 31.4–37.4)
MCV RBC AUTO: 89 FL (ref 82–98)
MONOCYTES # BLD AUTO: 2.27 THOUSAND/ΜL (ref 0.17–1.22)
MONOCYTES NFR BLD AUTO: 15 % (ref 4–12)
NEUTROPHILS # BLD AUTO: 9.4 THOUSANDS/ΜL (ref 1.85–7.62)
NEUTS SEG NFR BLD AUTO: 61 % (ref 43–75)
NRBC BLD AUTO-RTO: 0 /100 WBCS
PLATELET # BLD AUTO: 425 THOUSANDS/UL (ref 149–390)
PMV BLD AUTO: 9 FL (ref 8.9–12.7)
POTASSIUM SERPL-SCNC: 3.7 MMOL/L (ref 3.5–5.3)
PROT SERPL-MCNC: 6.3 G/DL (ref 6.4–8.4)
RBC # BLD AUTO: 3.46 MILLION/UL (ref 3.81–5.12)
RPR SER QL: REACTIVE
RPR SER-TITR: ABNORMAL {TITER}
SODIUM SERPL-SCNC: 135 MMOL/L (ref 135–147)
TREPONEMA PALLIDUM IGG+IGM AB [PRESENCE] IN SERUM OR PLASMA BY IMMUNOASSAY: REACTIVE
WBC # BLD AUTO: 15.4 THOUSAND/UL (ref 4.31–10.16)

## 2024-12-03 PROCEDURE — 99233 SBSQ HOSP IP/OBS HIGH 50: CPT | Performed by: INTERNAL MEDICINE

## 2024-12-03 PROCEDURE — 86778 TOXOPLASMA ANTIBODY IGM: CPT | Performed by: INTERNAL MEDICINE

## 2024-12-03 PROCEDURE — 86611 BARTONELLA ANTIBODY: CPT | Performed by: INTERNAL MEDICINE

## 2024-12-03 PROCEDURE — 87389 HIV-1 AG W/HIV-1&-2 AB AG IA: CPT | Performed by: INTERNAL MEDICINE

## 2024-12-03 PROCEDURE — 80053 COMPREHEN METABOLIC PANEL: CPT | Performed by: INTERNAL MEDICINE

## 2024-12-03 PROCEDURE — 82948 REAGENT STRIP/BLOOD GLUCOSE: CPT

## 2024-12-03 PROCEDURE — 87491 CHLMYD TRACH DNA AMP PROBE: CPT | Performed by: INTERNAL MEDICINE

## 2024-12-03 PROCEDURE — 85027 COMPLETE CBC AUTOMATED: CPT | Performed by: INTERNAL MEDICINE

## 2024-12-03 PROCEDURE — 99232 SBSQ HOSP IP/OBS MODERATE 35: CPT | Performed by: INTERNAL MEDICINE

## 2024-12-03 PROCEDURE — 86777 TOXOPLASMA ANTIBODY: CPT | Performed by: INTERNAL MEDICINE

## 2024-12-03 PROCEDURE — 87591 N.GONORRHOEAE DNA AMP PROB: CPT | Performed by: INTERNAL MEDICINE

## 2024-12-03 RX ORDER — INSULIN GLARGINE 100 [IU]/ML
28 INJECTION, SOLUTION SUBCUTANEOUS
Status: DISCONTINUED | OUTPATIENT
Start: 2024-12-03 | End: 2024-12-04

## 2024-12-03 RX ADMIN — HEPARIN SODIUM 5000 UNITS: 5000 INJECTION INTRAVENOUS; SUBCUTANEOUS at 22:03

## 2024-12-03 RX ADMIN — PREGABALIN 50 MG: 50 CAPSULE ORAL at 22:03

## 2024-12-03 RX ADMIN — INSULIN GLARGINE 28 UNITS: 100 INJECTION, SOLUTION SUBCUTANEOUS at 22:03

## 2024-12-03 RX ADMIN — INSULIN LISPRO 1 UNITS: 100 INJECTION, SOLUTION INTRAVENOUS; SUBCUTANEOUS at 13:00

## 2024-12-03 RX ADMIN — INSULIN LISPRO 5 UNITS: 100 INJECTION, SOLUTION INTRAVENOUS; SUBCUTANEOUS at 13:00

## 2024-12-03 RX ADMIN — HEPARIN SODIUM 5000 UNITS: 5000 INJECTION INTRAVENOUS; SUBCUTANEOUS at 04:30

## 2024-12-03 RX ADMIN — PREGABALIN 50 MG: 50 CAPSULE ORAL at 08:51

## 2024-12-03 RX ADMIN — ACETAMINOPHEN 650 MG: 325 TABLET, FILM COATED ORAL at 08:51

## 2024-12-03 RX ADMIN — AMPICILLIN SODIUM AND SULBACTAM SODIUM 3 G: 100; 50 INJECTION, POWDER, FOR SOLUTION INTRAVENOUS at 04:29

## 2024-12-03 RX ADMIN — PREGABALIN 50 MG: 50 CAPSULE ORAL at 16:59

## 2024-12-03 RX ADMIN — AMPICILLIN SODIUM AND SULBACTAM SODIUM 3 G: 100; 50 INJECTION, POWDER, FOR SOLUTION INTRAVENOUS at 10:07

## 2024-12-03 RX ADMIN — INSULIN LISPRO 1 UNITS: 100 INJECTION, SOLUTION INTRAVENOUS; SUBCUTANEOUS at 22:09

## 2024-12-03 RX ADMIN — LIDOCAINE 1 PATCH: 50 PATCH TOPICAL at 08:53

## 2024-12-03 RX ADMIN — ACETAMINOPHEN 650 MG: 325 TABLET, FILM COATED ORAL at 20:51

## 2024-12-03 RX ADMIN — HEPARIN SODIUM 5000 UNITS: 5000 INJECTION INTRAVENOUS; SUBCUTANEOUS at 14:52

## 2024-12-03 RX ADMIN — ONDANSETRON 4 MG: 2 INJECTION INTRAMUSCULAR; INTRAVENOUS at 20:51

## 2024-12-03 RX ADMIN — AMPICILLIN SODIUM AND SULBACTAM SODIUM 3 G: 100; 50 INJECTION, POWDER, FOR SOLUTION INTRAVENOUS at 16:59

## 2024-12-03 RX ADMIN — AMPICILLIN SODIUM AND SULBACTAM SODIUM 3 G: 100; 50 INJECTION, POWDER, FOR SOLUTION INTRAVENOUS at 22:52

## 2024-12-03 RX ADMIN — PANTOPRAZOLE SODIUM 40 MG: 40 TABLET, DELAYED RELEASE ORAL at 08:51

## 2024-12-03 NOTE — ASSESSMENT & PLAN NOTE
Patient has RLQ abdominal pain for 2 weeks and RLQ abdominal lymphadenopathy noted on 9/17 abdomen/pelvis CT. etiology of lymphadenopathy is unclear.  Patient has no history of cat bite or scratch, lower extremity injury or intra-abdominal/chronic infection.  She is status post biopsy of these lymph nodes 12/1, with preliminary pathology not show any malignancy.  Unfortunately, the lymph node biopsy was not sent for culture.  If fever persist, we may need to reimage abdomen/pelvis.  Patient may ultimately need repeat biopsy for culturing.  HIV screen negative.  Flu screen is positive (see below).  Monitor temperature/WBC.  Monitor abdominal pain.  Follow-up toxoplasma and Bartonella serologies.  Follow-up GC/chlamydia screen.  Consider repeat abdomen/pelvis CT 1 to 2 days if fever persist.  Patient may need repeat lymph node biopsy for culturing.

## 2024-12-03 NOTE — PROGRESS NOTES
Progress Note - Hospitalist   Name: Tari Shannon 44 y.o. female I MRN: [id number]  Unit/Bed#: [place] [address] -02 I Date of Admission: 11/26/2024   Date of Service: 12/3/2024 I Hospital Day: 6    Assessment & Plan  LAD (lymphadenopathy), intra-abdominal  Inflammatory and necrotic appearing lymphadenopathy in the right lower quadrant, worse compared to November 17  Image guided biopsy by IR was not possible due to overlying bowel and vessels  She  there went diagnostic  laparoscopy and excision   Follow-up biopsy results  Unfortunately, no material was submitted for culture.  Sepsis without acute organ dysfunction (HCC)  She had fever, leukocytosis, tachycardia meeting sepsis criteria on 11/30/2024  Syphilis serology is positive.  Other serologic studies are currently pending.  Infectious disease assistance is greatly appreciated.    Type 2 diabetes mellitus with hyperglycemia, with long-term current use of insulin (HCC)  Lab Results   Component Value Date    HGBA1C 14.0 (A) 11/21/2024       Recent Labs     12/02/24  2031 12/03/24  0617 12/03/24  1048 12/03/24  1716   POCGLU 89 147* 158* 89     Uncontrolled diabetes with A1c mentioned above    She just saw endocrinology in the office on 11/26 and 11/26/2024 and was restarted on Lantus 30 units at night and Humalog 10 - 10 - 10.  She reportedly was off all medications for 2 months prior to that appointment.    Blood sugars are trending down compared to admission.  Monitor and follow-up with Dr. Razo on discharge    Abdominal pain  Multifactorial, could be from gastroparesis, dyspepsia, constipation versus lymphadenopathy  Status post laparoscopy and lymph node biopsy  Avoid NSAIDs and minimize narcotic  Continue Protonix  Esophageal thickening  Likely due to  gastroesophageal reflux.  Continue PPI.  Follow-up with GI as scheduled  Fever    Syphilis  Will discuss treatment options with Dr. Alvarenga.    Subjective:  The patient feels generally well.  She has mild abdominal  pain.  She has a bit of a cough but no sputum.  She has had no nausea or vomiting.  Her bowels were loose reflecting the effect of laxatives.  She denies chest pain or shortness of breath.    Physical Exam:   Temp:  [99.3 °F (37.4 °C)-102.8 °F (39.3 °C)] 99.3 °F (37.4 °C)  HR:  [84-96] 84  BP: (104-136)/(65-81) 104/65  Resp:  [16-20] 16  SpO2:  [96 %-100 %] 97 %  O2 Device: None (Room air)    Gen: Well-developed, well-nourished, in no distress.  Neck: Supple.  No lymphadenopathy or goiter.  Heart: Regular rhythm.  I heard no murmur, gallop, or rub.  Lungs: Clear to auscultation and percussion.  No wheezing, rales, or rhonchi.  Abd: Soft with active bowel sounds.  Mild diffuse tenderness.  No mass or rebound.  Extremities: No clubbing, cyanosis, or edema.  No calf tenderness.  Neuro: Alert and oriented.  No focal sign.  Skin: Warm and dry.      LABS:   CBC:   Lab Results   Component Value Date    WBC 15.40 (H) 12/03/2024    HGB 9.6 (L) 12/03/2024    HCT 30.8 (L) 12/03/2024    MCV 89 12/03/2024     (H) 12/03/2024    RBC 3.46 (L) 12/03/2024    MCH 27.7 12/03/2024    MCHC 31.2 (L) 12/03/2024    RDW 12.8 12/03/2024    MPV 9.0 12/03/2024    NRBC 0 12/03/2024   , CMP:   Lab Results   Component Value Date    SODIUM 135 12/03/2024    K 3.7 12/03/2024     12/03/2024    CO2 27 12/03/2024    BUN 6 12/03/2024    CREATININE 0.86 12/03/2024    CALCIUM 8.2 (L) 12/03/2024    AST 15 12/03/2024    ALT 9 12/03/2024    ALKPHOS 104 12/03/2024    EGFR 83 12/03/2024             VTE Pharmacologic Prophylaxis: Heparin  VTE Mechanical Prophylaxis: sequential compression device

## 2024-12-03 NOTE — ASSESSMENT & PLAN NOTE
Inflammatory and necrotic appearing lymphadenopathy in the right lower quadrant, worse compared to November 17  Image guided biopsy by IR was not possible due to overlying bowel and vessels  She  there went diagnostic  laparoscopy and excision   Follow-up biopsy results  Unfortunately, no material was submitted for culture.

## 2024-12-03 NOTE — PROGRESS NOTES
Progress Note - Infectious Disease   Name: Tari Shannon 44 y.o. female I MRN: [id number]  Unit/Bed#: [place] [address] -02 I Date of Admission: 11/26/2024   Date of Service: 12/3/2024 I Hospital Day: 6    Assessment & Plan  Fever  Patient had lower abdominal pain with pelvic lymphadenopathy for 2 weeks, without any fever or chills.  She developed fever/chills after lymph node biopsy.  Despite fever, she is clinically and systemically well, without any other focal symptoms other than her stable RLQ abdominal pain.  IV Unasyn was started, without effect on fever thus far. Blood cultures have no growth thus far.  If fever persist despite IV Unasyn and if blood cultures have no growth, will likely discontinue antibiotic.  Continue IV Unasyn for now.  Monitor temperature/WBC.  Follow-up on blood cultures.  If fever persist despite IV Unasyn and if blood cultures have no growth, will likely discontinue antibiotic.  LAD (lymphadenopathy), intra-abdominal  Patient has RLQ abdominal pain for 2 weeks and RLQ abdominal lymphadenopathy noted on 9/17 abdomen/pelvis CT. etiology of lymphadenopathy is unclear.  Patient has no history of cat bite or scratch, lower extremity injury or intra-abdominal/chronic infection.  She is status post biopsy of these lymph nodes 12/1, with preliminary pathology not show any malignancy.  Unfortunately, the lymph node biopsy was not sent for culture.  If fever persist, we may need to reimage abdomen/pelvis.  Patient may ultimately need repeat biopsy for culturing.  HIV screen negative.  Flu screen is positive (see below).  Monitor temperature/WBC.  Monitor abdominal pain.  Follow-up toxoplasma and Bartonella serologies.  Follow-up GC/chlamydia screen.  Consider repeat abdomen/pelvis CT 1 to 2 days if fever persist.  Patient may need repeat lymph node biopsy for culturing.  Syphilis  Patient's syphilis screen is positive.  Confirmatory test is pending.  At this point, it is unclear whether this is  true syphilis or false positive testing.  Patient may have false positive syphilis screen secondary to what ever process that is causing her lymphadenopathy above.  On the other hand, secondary syphilis can have protean manifestation.  For now, we will wait for syphilis confirmatory testing.  Patient denies history of STD.  No specific treatment for now.  Follow-up on syphilis confirmatory testing.  Type 2 diabetes mellitus with hyperglycemia, with long-term current use of insulin ([facility])  Lab Results   Component Value Date    HGBA1C 14.0 (A) 11/21/2024     Recent Labs     12/02/24  1637 12/02/24  2031 12/03/24  0617 12/03/24  1048   POCGLU 99 89 147* 158*       Blood Sugar Average: Last 72 hrs:  (P) 115.625  Poorly controlled DM may contribute to any infection above.  Management per primary service.  Esophageal thickening  This is most likely secondary to GE reflux.  Patient is being followed by GI.    Discussed with patient in detail regarding the above plan.  I have discussed with Dr. Fuentes from primary service regarding the above plan to continue current antibiotic and monitor temperature and follow-up on syphilis confirmatory testing.  He agrees with the plan.    Antibiotics:  Unasyn # 3    Subjective   Patient with unchanged right-sided lower abdominal pain.  Temperature remains up.  She is tolerating antibiotic well.  No nausea, vomiting or diarrhea.    Objective :  Temp:  [99.3 °F (37.4 °C)-102.8 °F (39.3 °C)] 99.3 °F (37.4 °C)  HR:  [84-96] 84  BP: (104-136)/(65-81) 104/65  Resp:  [16-20] 16  SpO2:  [96 %-100 %] 97 %  O2 Device: None (Room air)    Physical Exam:     General: Awake, alert, cooperative, no distress.   Neck:  Supple. No mass.  No lymphadenopathy.   Lungs: Expansion symmetric, no rales, no wheezing, respirations unlabored.   Heart:  Regular rate and rhythm, S1 and S2 normal, no murmur.   Abdomen: Soft, nondistended, stable RLQ tenderness, bowel sounds active all four quadrants, no masses, no  organomegaly.   Extremities: No edema. No erythema/warmth. No ulcer. Nontender to palpation.   Skin:  No rash.   Neuro: Moves all extremities.        Lab Results: I have reviewed the following results:  Results from last 7 days   Lab Units 12/03/24 0432 12/01/24 0408 11/30/24  0501   WBC Thousand/uL 15.40* 14.51* 15.23*   HEMOGLOBIN g/dL 9.6* 10.0* 12.3   PLATELETS Thousands/uL 425* 471* 513*     Results from last 7 days   Lab Units 12/03/24 0432 12/01/24 0408 11/30/24  0501 11/28/24 0445 11/27/24  0029   SODIUM mmol/L 135 137 139   < > 133*   POTASSIUM mmol/L 3.7 3.9 3.7   < > 4.3   CHLORIDE mmol/L 101 104 103   < > 98   CO2 mmol/L 27 25 28   < > 27   BUN mg/dL 6 7 8   < > 11   CREATININE mg/dL 0.86 0.83 0.84   < > 0.95   EGFR ml/min/1.73sq m 83 86 85   < > 73   CALCIUM mg/dL 8.2* 8.7 9.5   < > 9.3   AST U/L 15  --   --   --  12*   ALT U/L 9  --   --   --  10   ALK PHOS U/L 104  --   --   --  95   ALBUMIN g/dL 2.7*  --   --   --  3.5    < > = values in this interval not displayed.     Results from last 7 days   Lab Units 11/30/24  0928   BLOOD CULTURE  No Growth at 48 hrs.  No Growth at 48 hrs.     Results from last 7 days   Lab Units 12/01/24 0408 11/30/24  0930   PROCALCITONIN ng/ml 0.22 0.61*

## 2024-12-03 NOTE — ASSESSMENT & PLAN NOTE
She had fever, leukocytosis, tachycardia meeting sepsis criteria on 11/30/2024  Syphilis serology is positive.  Other serologic studies are currently pending.  Infectious disease assistance is greatly appreciated.

## 2024-12-03 NOTE — ASSESSMENT & PLAN NOTE
This is most likely secondary to GE reflux.  Patient is being followed by GI.    Discussed with patient in detail regarding the above plan.

## 2024-12-03 NOTE — PLAN OF CARE
Problem: PAIN - ADULT  Goal: Verbalizes/displays adequate comfort level or baseline comfort level  Description: Interventions:  - Encourage patient to monitor pain and request assistance  - Assess pain using appropriate pain scale  - Administer analgesics based on type and severity of pain and evaluate response  - Implement non-pharmacological measures as appropriate and evaluate response  - Consider cultural and social influences on pain and pain management  - Notify physician/advanced practitioner if interventions unsuccessful or patient reports new pain  Outcome: Progressing     Problem: INFECTION - ADULT  Goal: Absence or prevention of progression during hospitalization  Description: INTERVENTIONS:  - Assess and monitor for signs and symptoms of infection  - Monitor lab/diagnostic results  - Monitor all insertion sites, i.e. indwelling lines, tubes, and drains  - Monitor endotracheal if appropriate and nasal secretions for changes in amount and color  - [place] appropriate cooling/warming therapies per order  - Administer medications as ordered  - Instruct and encourage patient and family to use good hand hygiene technique  - Identify and instruct in appropriate isolation precautions for identified infection/condition  Outcome: Progressing     Problem: SAFETY ADULT  Goal: Patient will remain free of falls  Description: INTERVENTIONS:  - Educate patient/family on patient safety including physical limitations  - Instruct patient to call for assistance with activity   - Consult OT/PT to assist with strengthening/mobility   - Keep Call bell within reach  - Keep bed low and locked with side rails adjusted as appropriate  - Keep care items and personal belongings within reach  - Initiate and maintain comfort rounds  - Apply yellow socks and bracelet for high fall risk patients  - Consider moving patient to room near nurses station  Outcome: Progressing  Goal: Maintain or return to baseline ADL  function  Description: INTERVENTIONS:  -  Assess patient's ability to carry out ADLs; assess patient's baseline for ADL function and identify physical deficits which impact ability to perform ADLs (bathing, care of mouth/teeth, toileting, grooming, dressing, etc.)  - Assess/evaluate cause of self-care deficits   - Assess range of motion  - Assess patient's mobility; develop plan if impaired  - Assess patient's need for assistive devices and provide as appropriate  - Encourage maximum independence but intervene and supervise when necessary  - Involve family in performance of ADLs  - Assess for home care needs following discharge   - Consider OT consult to assist with ADL evaluation and planning for discharge  - Provide patient education as appropriate  Outcome: Progressing  Goal: Maintains/Returns to pre admission functional level  Description: INTERVENTIONS:  - Perform AM-PAC 6 Click Basic Mobility/ Daily Activity assessment daily.  - Set and communicate daily mobility goal to care team and patient/family/caregiver.   - Collaborate with rehabilitation services on mobility goals if consulted  - Out of bed to chair   - Out of bed for meals   - Out of bed for toileting  - Record patient progress and toleration of activity level   Outcome: Progressing     Problem: DISCHARGE PLANNING  Goal: Discharge to home or other facility with appropriate resources  Description: INTERVENTIONS:  - Identify barriers to discharge w/patient and caregiver  - Arrange for needed discharge resources and transportation as appropriate  - Identify discharge learning needs (meds, wound care, etc.)  - Arrange for interpretive services to assist at discharge as needed  - Refer to Case Management Department for coordinating discharge planning if the patient needs post-hospital services based on physician/advanced practitioner order or complex needs related to functional status, cognitive ability, or social support system  Outcome: Progressing      Problem: Knowledge Deficit  Goal: Patient/family/caregiver demonstrates understanding of disease process, treatment plan, medications, and discharge instructions  Description: Complete learning assessment and assess knowledge base.  Interventions:  - Provide teaching at level of understanding  - Provide teaching via preferred learning methods  Outcome: Progressing     Problem: GASTROINTESTINAL - ADULT  Goal: Minimal or absence of nausea and/or vomiting  Description: INTERVENTIONS:  - Administer IV fluids if ordered to ensure adequate hydration  - Maintain NPO status until nausea and vomiting are resolved  - Nasogastric tube if ordered  - Administer ordered antiemetic medications as needed  - Provide nonpharmacologic comfort measures as appropriate  - Advance diet as tolerated, if ordered  - Consider nutrition services referral to assist patient with adequate nutrition and appropriate food choices  Outcome: Progressing     Problem: METABOLIC, FLUID AND ELECTROLYTES - ADULT  Goal: Electrolytes maintained within normal limits  Description: INTERVENTIONS:  - Monitor labs and assess patient for signs and symptoms of electrolyte imbalances  - Administer electrolyte replacement as ordered  - Monitor response to electrolyte replacements, including repeat lab results as appropriate  - Instruct patient on fluid and nutrition as appropriate  Outcome: Progressing  Goal: Fluid balance maintained  Description: INTERVENTIONS:  - Monitor labs   - Monitor I/O and WT  - Instruct patient on fluid and nutrition as appropriate  - Assess for signs & symptoms of volume excess or deficit  Outcome: Progressing  Goal: Glucose maintained within target range  Description: INTERVENTIONS:  - Monitor Blood Glucose as ordered  - Assess for signs and symptoms of hyperglycemia and hypoglycemia  - Administer ordered medications to maintain glucose within target range  - Assess nutritional intake and initiate nutrition service referral as  needed  Outcome: Progressing     Problem: HEMATOLOGIC - ADULT  Goal: Maintains hematologic stability  Description: INTERVENTIONS  - Assess for signs and symptoms of bleeding or hemorrhage  - Monitor labs  - Administer supportive blood products/factors as ordered and appropriate  Outcome: Progressing     Problem: Prexisting or High Potential for Compromised Skin Integrity  Goal: Skin integrity is maintained or improved  Description: INTERVENTIONS:  - Identify patients at risk for skin breakdown  - Assess and monitor skin integrity  - Assess and monitor nutrition and hydration status  - Monitor labs   - Assess for incontinence   - Turn and reposition patient  - Assist with mobility/ambulation  - Relieve pressure over bony prominences  - Avoid friction and shearing  - Provide appropriate hygiene as needed including keeping skin clean and dry  - Evaluate need for skin moisturizer/barrier cream  - Collaborate with interdisciplinary team   - Patient/family teaching  - Consider wound care consult   Outcome: Progressing

## 2024-12-03 NOTE — ASSESSMENT & PLAN NOTE
Lab Results   Component Value Date    HGBA1C 14.0 (A) 11/21/2024       Recent Labs     12/02/24  2031 12/03/24  0617 12/03/24  1048 12/03/24  1716   POCGLU 89 147* 158* 89     Uncontrolled diabetes with A1c mentioned above    She just saw endocrinology in the office on 11/26 and 11/26/2024 and was restarted on Lantus 30 units at night and Humalog 10 - 10 - 10.  She reportedly was off all medications for 2 months prior to that appointment.    Blood sugars are trending down compared to admission.  Monitor and follow-up with Dr. Razo on discharge

## 2024-12-03 NOTE — ASSESSMENT & PLAN NOTE
Lab Results   Component Value Date    HGBA1C 14.0 (A) 11/21/2024     Recent Labs     12/02/24  1637 12/02/24  2031 12/03/24  0617 12/03/24  1048   POCGLU 99 89 147* 158*       Blood Sugar Average: Last 72 hrs:  (P) 115.625  Poorly controlled DM may contribute to any infection above.  Management per primary service.

## 2024-12-03 NOTE — PLAN OF CARE
Problem: PAIN - ADULT  Goal: Verbalizes/displays adequate comfort level or baseline comfort level  Description: Interventions:  - Encourage patient to monitor pain and request assistance  - Assess pain using appropriate pain scale  - Administer analgesics based on type and severity of pain and evaluate response  - Implement non-pharmacological measures as appropriate and evaluate response  - Consider cultural and social influences on pain and pain management  - Notify physician/advanced practitioner if interventions unsuccessful or patient reports new pain  12/3/2024 1211 by Perri Greenfield RN  Outcome: Progressing  12/3/2024 1210 by Perri Greenfield RN  Outcome: Progressing

## 2024-12-03 NOTE — ASSESSMENT & PLAN NOTE
Patient's syphilis screen is positive.  Confirmatory test is pending.  At this point, it is unclear whether this is true syphilis or false positive testing.  Patient may have false positive syphilis screen secondary to what ever process that is causing her lymphadenopathy above.  On the other hand, secondary syphilis can have protean manifestation.  For now, we will wait for syphilis confirmatory testing.  Patient denies history of STD.  No specific treatment for now.  Follow-up on syphilis confirmatory testing.

## 2024-12-04 LAB
C TRACH DNA SPEC QL NAA+PROBE: NEGATIVE
GLUCOSE SERPL-MCNC: 101 MG/DL (ref 65–140)
GLUCOSE SERPL-MCNC: 108 MG/DL (ref 65–140)
GLUCOSE SERPL-MCNC: 140 MG/DL (ref 65–140)
GLUCOSE SERPL-MCNC: 156 MG/DL (ref 65–140)
N GONORRHOEA DNA SPEC QL NAA+PROBE: NEGATIVE
T GONDII IGG SER QL IA: NEGATIVE
T GONDII IGM SERPL QL IA: NEGATIVE

## 2024-12-04 PROCEDURE — 99233 SBSQ HOSP IP/OBS HIGH 50: CPT | Performed by: INTERNAL MEDICINE

## 2024-12-04 PROCEDURE — 82948 REAGENT STRIP/BLOOD GLUCOSE: CPT

## 2024-12-04 PROCEDURE — 99232 SBSQ HOSP IP/OBS MODERATE 35: CPT | Performed by: INTERNAL MEDICINE

## 2024-12-04 RX ORDER — INSULIN GLARGINE 100 [IU]/ML
26 INJECTION, SOLUTION SUBCUTANEOUS
Status: DISCONTINUED | OUTPATIENT
Start: 2024-12-04 | End: 2024-12-06

## 2024-12-04 RX ORDER — AZITHROMYCIN 250 MG/1
1000 TABLET, FILM COATED ORAL ONCE
Status: COMPLETED | OUTPATIENT
Start: 2024-12-04 | End: 2024-12-04

## 2024-12-04 RX ADMIN — PREGABALIN 50 MG: 50 CAPSULE ORAL at 08:56

## 2024-12-04 RX ADMIN — INSULIN GLARGINE 26 UNITS: 100 INJECTION, SOLUTION SUBCUTANEOUS at 21:41

## 2024-12-04 RX ADMIN — PREGABALIN 50 MG: 50 CAPSULE ORAL at 16:03

## 2024-12-04 RX ADMIN — INSULIN LISPRO 1 UNITS: 100 INJECTION, SOLUTION INTRAVENOUS; SUBCUTANEOUS at 12:56

## 2024-12-04 RX ADMIN — INSULIN LISPRO 5 UNITS: 100 INJECTION, SOLUTION INTRAVENOUS; SUBCUTANEOUS at 12:58

## 2024-12-04 RX ADMIN — HEPARIN SODIUM 5000 UNITS: 5000 INJECTION INTRAVENOUS; SUBCUTANEOUS at 16:03

## 2024-12-04 RX ADMIN — PENICILLIN G BENZATHINE 2.4 MILLION UNITS: 1200000 INJECTION, SUSPENSION INTRAMUSCULAR at 12:58

## 2024-12-04 RX ADMIN — LIDOCAINE 1 PATCH: 50 PATCH TOPICAL at 08:56

## 2024-12-04 RX ADMIN — PANTOPRAZOLE SODIUM 40 MG: 40 TABLET, DELAYED RELEASE ORAL at 05:12

## 2024-12-04 RX ADMIN — ACETAMINOPHEN 650 MG: 325 TABLET, FILM COATED ORAL at 11:44

## 2024-12-04 RX ADMIN — HEPARIN SODIUM 5000 UNITS: 5000 INJECTION INTRAVENOUS; SUBCUTANEOUS at 05:12

## 2024-12-04 RX ADMIN — AZITHROMYCIN DIHYDRATE 1000 MG: 250 TABLET ORAL at 12:51

## 2024-12-04 RX ADMIN — DEXTROSE 1000 MG: 50 INJECTION, SOLUTION INTRAVENOUS at 12:47

## 2024-12-04 RX ADMIN — AMPICILLIN SODIUM AND SULBACTAM SODIUM 3 G: 100; 50 INJECTION, POWDER, FOR SOLUTION INTRAVENOUS at 03:59

## 2024-12-04 RX ADMIN — AMPICILLIN SODIUM AND SULBACTAM SODIUM 3 G: 100; 50 INJECTION, POWDER, FOR SOLUTION INTRAVENOUS at 10:51

## 2024-12-04 RX ADMIN — PREGABALIN 50 MG: 50 CAPSULE ORAL at 21:41

## 2024-12-04 RX ADMIN — HEPARIN SODIUM 5000 UNITS: 5000 INJECTION INTRAVENOUS; SUBCUTANEOUS at 21:41

## 2024-12-04 NOTE — PROGRESS NOTES
Progress Note - Hospitalist   Name: Tari Shannon 44 y.o. female I MRN: [id number]  Unit/Bed#: [place] [address] -02 I Date of Admission: 11/26/2024   Date of Service: 12/4/2024 I Hospital Day: 7    Assessment & Plan  LAD (lymphadenopathy), intra-abdominal  Inflammatory and necrotic appearing lymphadenopathy in the right lower quadrant, worse compared to November 17  The patient underwent excisional biopsy.  Pathology showed no evidence for lymphoma.  Flow cytometry is pending.  Unfortunately, no material was submitted for culture.  Sepsis without acute organ dysfunction (HCC)  She had fever, leukocytosis, tachycardia meeting sepsis criteria on 11/30/2024  Syphilis serology is positive.  Other serologic studies are currently pending.  Infectious disease assistance is greatly appreciated.  The patient has started penicillin therapy.    Type 2 diabetes mellitus with hyperglycemia, with long-term current use of insulin (HCC)  Lab Results   Component Value Date    HGBA1C 14.0 (A) 11/21/2024       Recent Labs     12/03/24 2028 12/04/24  0627 12/04/24  1146 12/04/24  1609   POCGLU 184* 140 156* 108     Uncontrolled diabetes with A1c mentioned above    She just saw endocrinology in the office on 11/26 and 11/26/2024 and was restarted on Lantus 30 units at night and Humalog 10 - 10 - 10.  She reportedly was off all medications for 2 months prior to that appointment.    Blood sugars are trending down compared to admission.  Insulin will be reduced slightly.  Monitor and follow-up with Dr. Razo on discharge    Esophageal thickening  Likely due to  gastroesophageal reflux.  Continue PPI.  Follow-up with GI as scheduled  Syphilis  Will discuss treatment options with Dr. Bear.  This seems the most likely cause for the patient's fever.          Subjective:  The patient does not feel that well.  She has had some nausea.  She vomited once last evening.  She has been able to eat today.  She has somewhat less abdominal pain.  She has no  chest pain, shortness of breath, etc.    Physical Exam:   Temp:  [98.9 °F (37.2 °C)-102.3 °F (39.1 °C)] 99.4 °F (37.4 °C)  HR:  [79-91] 79  BP: (107-126)/(62-79) 126/79  Resp:  [18-20] 18  SpO2:  [95 %-98 %] 97 %  O2 Device: None (Room air)    Gen: Well-developed, well-nourished, in no distress.  Neck: Supple.  No lymphadenopathy or goiter.  Heart: Regular rhythm.  I heard no murmur, gallop, or rub.  Lungs: Clear to auscultation and percussion.  No wheezing, rales, or rhonchi.  Abd: Soft with active bowel sounds.  No mass or tenderness.  Extremities: No clubbing, cyanosis, or edema.  No calf tenderness.  Neuro: Alert and oriented.  No focal sign.  Skin: Warm and dry.      LABS: No new labs.          VTE Pharmacologic Prophylaxis: Reason for no pharmacologic prophylaxis low risk  VTE Mechanical Prophylaxis: reason for no mechanical VTE prophylaxis low risk

## 2024-12-04 NOTE — ASSESSMENT & PLAN NOTE
Patient has RLQ abdominal pain for 2 weeks and RLQ abdominal lymphadenopathy noted on 9/17 abdomen/pelvis CT. etiology of lymphadenopathy is unclear.  Patient has no history of cat bite or scratch, lower extremity injury or intra-abdominal/chronic infection.  She is status post biopsy of these lymph nodes 12/1, with preliminary pathology not show any malignancy.  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  Patient is getting treatment for her syphilis above.  She should get repeat imaging in 1 to 2 months.  Treatment for syphilis as in above.  Monitor abdominal pain.  Repeat abdomen/pelvis CT in 1 to 2 months.  Follow-up toxoplasma and Bartonella serologies.

## 2024-12-04 NOTE — PROGRESS NOTES
"Progress Note - Infectious Disease   Name: Tari Shannon 44 y.o. female I MRN: [id number]  Unit/Bed#: [place] [address] -02 I Date of Admission: 11/26/2024   Date of Service: 12/4/2024 I Hospital Day: 7    Assessment & Plan  Syphilis  Patient's syphilis screen was positive.  Confirmatory RPR is now also positive, at high titer of 64.  Patient clearly has syphilis.  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  Patient denies prior history of STD but has never had syphilis testing.  According to the patient, she currently has a boyfriend and he is the only one that she has sexual relationship with.  He is currently in [facility].  She is  from \"the father of her children\" 4 years ago.  HIV screen negative.  GC/chlamydia screen pending but will go ahead and treat patient with 1 dose of ceftriaxone and azithromycin.  Benzathine penicillin 2,400,000 units IM weekly x 3 weeks.  Surveillance RPR at 3, 6, 12 and 24 months after treatment.  Treat with 1 dose of IV ceftriaxone and azithromycin 500 mg.  Chronic hepatitis screen.  LAD (lymphadenopathy), intra-abdominal  Patient has RLQ abdominal pain for 2 weeks and RLQ abdominal lymphadenopathy noted on 9/17 abdomen/pelvis CT. etiology of lymphadenopathy is unclear.  Patient has no history of cat bite or scratch, lower extremity injury or intra-abdominal/chronic infection.  She is status post biopsy of these lymph nodes 12/1, with preliminary pathology not show any malignancy.  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  Patient is getting treatment for her syphilis above.  She should get repeat imaging in 1 to 2 months.  Treatment for syphilis as in above.  Monitor abdominal pain.  Repeat abdomen/pelvis CT in 1 to 2 months.  Follow-up toxoplasma and Bartonella serologies.  Fever  Patient fever is most likely secondary to her " lymphadenopathy, which is probably secondary to secondary syphilis.  Will monitor temperature with treatment of syphilis.  Patient remains clinically and systemically well.  Blood cultures have no growth thus far.  Treatment for syphilis as in above.  Monitor temperature/WBC.  Follow-up on pending blood cultures.  Type 2 diabetes mellitus with hyperglycemia, with long-term current use of insulin ([facility])  Lab Results   Component Value Date    HGBA1C 14.0 (A) 11/21/2024     Recent Labs     12/03/24  1716 12/03/24 2028 12/04/24  0627 12/04/24  1146   POCGLU 89 184* 140 156*       Blood Sugar Average: Last 72 hrs:  (P) 118.5  Poorly controlled DM may contribute to any infection above.  Management per primary service.  Esophageal thickening  This is most likely secondary to GE reflux.  Patient is being followed by GI.    Discussed with patient in detail regarding the above plan.  I have discussed with Dr. Fuentes from primary service regarding the above plan to treat patient for syphilis.  He agrees with the plan.    Antibiotics:  Unasyn # 4    Subjective   Patient has unchanged mild RLQ abdominal pain.  Temperature remains up, with intermittent chills.  No other focal complaints.  She is tolerating antibiotic well.  No nausea, vomiting or diarrhea.    Objective :  Temp:  [98.9 °F (37.2 °C)-102.3 °F (39.1 °C)] 100.3 °F (37.9 °C)  HR:  [80-91] 87  BP: (104-111)/(62-65) 111/65  Resp:  [16-20] 18  SpO2:  [95 %-98 %] 98 %  O2 Device: None (Room air)    Physical Exam:     General: Awake, alert, cooperative, no distress.   Neck:  Supple. No mass.  No lymphadenopathy.   Lungs: Expansion symmetric, no rales, no wheezing, respirations unlabored.   Heart:  Regular rate and rhythm, S1 and S2 normal, no murmur.   Abdomen: Soft, nondistended, stable mild RLQ tenderness bowel sounds active all four quadrants, no masses, no organomegaly.   Extremities: No edema. No erythema/warmth. No ulcer. Nontender to palpation.   Skin:  No  rash.   Neuro: Moves all extremities.        Lab Results: I have reviewed the following results:  Results from last 7 days   Lab Units 12/03/24  0432 12/01/24 0408 11/30/24  0501   WBC Thousand/uL 15.40* 14.51* 15.23*   HEMOGLOBIN g/dL 9.6* 10.0* 12.3   PLATELETS Thousands/uL 425* 471* 513*     Results from last 7 days   Lab Units 12/03/24 0432 12/01/24 0408 11/30/24  0501   SODIUM mmol/L 135 137 139   POTASSIUM mmol/L 3.7 3.9 3.7   CHLORIDE mmol/L 101 104 103   CO2 mmol/L 27 25 28   BUN mg/dL 6 7 8   CREATININE mg/dL 0.86 0.83 0.84   EGFR ml/min/1.73sq m 83 86 85   CALCIUM mg/dL 8.2* 8.7 9.5   AST U/L 15  --   --    ALT U/L 9  --   --    ALK PHOS U/L 104  --   --    ALBUMIN g/dL 2.7*  --   --      Results from last 7 days   Lab Units 11/30/24  0928   BLOOD CULTURE  No Growth at 72 hrs.  No Growth at 72 hrs.     Results from last 7 days   Lab Units 12/01/24 0408 11/30/24  0930   PROCALCITONIN ng/ml 0.22 0.61*

## 2024-12-04 NOTE — TELEPHONE ENCOUNTER
PA for Dexcom G7 Sensor SUBMITTED to [employer]    via      [x][employer]-Case ID # [id number]      [x]PA sent as URGENT    All office notes, labs and other pertaining documents and studies sent. Clinical questions answered. Awaiting determination from insurance company.     Turnaround time for your insurance to make a decision on your Prior Authorization can take 7-21 business days.

## 2024-12-04 NOTE — ASSESSMENT & PLAN NOTE
She had fever, leukocytosis, tachycardia meeting sepsis criteria on 11/30/2024  Syphilis serology is positive.  Other serologic studies are currently pending.  Infectious disease assistance is greatly appreciated.  The patient has started penicillin therapy.

## 2024-12-04 NOTE — ASSESSMENT & PLAN NOTE
Inflammatory and necrotic appearing lymphadenopathy in the right lower quadrant, worse compared to November 17  The patient underwent excisional biopsy.  Pathology showed no evidence for lymphoma.  Flow cytometry is pending.  Unfortunately, no material was submitted for culture.

## 2024-12-04 NOTE — UTILIZATION REVIEW
Continued Stay Review    Date: 11/30                          Current Patient Class: Inpatient  Current Level of Care: MS    HPI:44 y.o. female initially admitted on 11/27 worsening RLQ inflammatory and necrotic lymphadenopathy    Assessment/Plan:   Intra-abd lymphadenopathy, Sepsis, IDDM, abd pain, GERD - pt continues to have RLQ abd pain rated 7/10, fever, leukocytosis, Biopsy was scheduled for today but is postponed until 12/1. On IV antibiotics, check blood cultures.  Remains febrile, + abd tenderness in RLQ.  Medical oncology does not have high index of suspicion for lymphoma therefore diag lapa with bx of nodes is unlikely to yield a diagnosis and nodes are likely reactive in nature. Pt is scheduled for additional f/u at [place] in December with EGD and Endoflip she should continue PPI as prescribed. Plan for dx lap on 12/1.      ++++++++++++++++++++++  12/1 OPERATIVE NOTE   Preop Diagnosis:  Abdominal lymphadenopathy [R59.0]     Post-Op Diagnosis Codes:     * Abdominal lymphadenopathy [R59.0]     Procedure(s):  LAPAROSCOPY DIAGNOSTIC. MESENTERIC BIOPSIES    Anesthesia Type:  General     Operative Findings:  Stomach, small bowel, colon / rectum & reproductive organs all within normal limits. RLQ mesenteric adenopathy. Several specimens sent  +++++++++++++++++++++       Medications:   Scheduled Medications:  ampicillin-sulbactam, 3 g, Intravenous, Q6H  bisacodyl, 10 mg, Rectal, Daily  heparin (porcine), 5,000 Units, Subcutaneous, Q8H CARMELO  insulin glargine, 28 Units, Subcutaneous, HS  insulin lispro, 1-6 Units, Subcutaneous, 4x Daily (AC & HS)  insulin lispro, 5 Units, Subcutaneous, TID With Meals  lidocaine, 1 patch, Topical, Daily  pantoprazole, 40 mg, Oral, Daily Before Breakfast  polyethylene glycol, 17 g, Oral, BID  pregabalin, 50 mg, Oral, TID  senna-docusate sodium, 1 tablet, Oral, TID      Continuous IV Infusions:    IV LR @ 125 cc/hr      PRN Meds:  acetaminophen, 650 mg, Oral, Q6H PRN - x 3 11/30, x  2 12/1  ondansetron, 4 mg, Intravenous, Q4H PRN  oxyCODONE, 5 mg, Oral, Q6H PRN  oxyCODONE, 2.5 mg, Oral, Q6H PRN      Discharge Plan: TBD    Vital Signs (last 3 days)      Row Name 12/01/24 0142 11/30/24 2200 11/30/24 21:58:32 11/30/24 20:32:39 11/30/24 1951   Vital Signs   Temp 98.2 °F (36.8 °C)  -DI -- 102.4 °F (39.1 °C) Abnormal   -.7 °F (39.3 °C) Abnormal   -DI --   Temp src -- -- -- Oral  -TS --   Pulse 79  -DI -- 97  -  -DI --   Resp -- -- -- 18  -TS --   BP -- -- -- 139/87  -DI --   MAP (mmHg) -- -- -- 104  -DI --   OTHER   Pain Score -- Med Not Given for Pain - for MAR use only  -MS -- -- No Pain  -MS   Oxygen Therapy   SpO2 97 %  -DI -- 97 %  -DI 98 %  -DI --   SpO2 Activity -- -- -- -- At Rest  -MS   O2 Device -- -- -- -- None (Room air)  -MS   Vitals Timer   Restart Vitals Timer -- -- -- Yes  -TS --   Row Name 11/30/24 15:21:32 11/30/24 1023 11/30/24 07:30:05 11/29/24 23:03:54 11/29/24 2200   Vital Signs   Temp 98.8 °F (37.1 °C)  -DI -- 101.6 °F (38.7 °C) Abnormal   -DI 99.7 °F (37.6 °C)  -DI --   Temp src -- -- Oral  -ZM -- --   Pulse 83  -DI -- 92  -DI 87  -DI 98  -LM   Resp 18  -DI -- 18  -DI -- --   /68  -DI -- 135/81  -DI -- --   MAP (mmHg) 84  -DI -- 99  -DI -- --   BP Location -- -- Right arm  -ZM -- --   Patient Position - Orthostatic VS -- -- Lying  - -- --   OTHER   Pain Score -- Med Not Given for Pain - for MAR use only  -DS 7  -DS -- --   Oxygen Therapy   SpO2 96 %  -DI -- 96 %  -DI 97 %  -DI 95 %  -LM   SpO2 Activity -- -- At Rest  - -- --   O2 Device -- -- None (Room air)  - -- --   Vitals Timer   Restart Vitals Timer Yes  -DI -- Yes  -DI -- --   Row Name 11/29/24 2125 11/29/24 20:59:21 11/29/24 2028 11/29/24 1746 11/29/24 1714   Vital Signs   Temp -- 101.6 °F (38.7 °C) Abnormal   -DI -- -- 101.9 °F (38.8 °C) Abnormal   -AM   Temp src -- -- -- -- Oral  -AM   Pulse -- 100  -DI -- -- 101  -AM   Resp -- 16  -DI -- -- --   BP -- 144/83  -DI -- -- --   MAP (mmHg) -- 103   -DI -- -- --   OTHER   Pain Score Med Not Given for Pain - for MAR use only  -LM -- 4  -LM 7  -AM --   Oxygen Therapy   SpO2 -- 96 %  -DI -- -- 99 %  -AM   Vitals Timer   Restart Vitals Timer -- Yes  -DI -- -- --   Row Name 11/29/24 16:22:49 11/29/24 1552 11/29/24 1538 11/29/24 1355 11/29/24 1300   Vital Signs   Temp 102 °F (38.9 °C) Abnormal   -DI -- 101.5 °F (38.6 °C) Abnormal   -AM -- --   Temp src -- -- Oral  -AM -- --   Pulse 103  -DI -- 105  -AM -- --   Heart Rate Source -- -- Monitor  -AM -- --   Resp 19  -DI -- 16  -AM -- --   /85  -DI -- 114/64  -AM -- --   MAP (mmHg) 108  -DI -- -- -- --      Date/Time Temp Pulse Resp BP MAP (mmHg) SpO2 Calculated FIO2 (%) - Nasal Cannula O2 Flow Rate (L/min) Nasal Cannula O2 Flow Rate (L/min) O2 Device Patient Position - Orthostatic VS [place] Coma Scale Score Pain    12/02/24 12:17:01 101.1 °F (38.4 °C) 94 18 136/80 99 98 % -- -- -- None (Room air) Lying -- --    12/02/24 11:24:20 -- 89 -- 140/79 99 97 % -- -- -- None (Room air) Lying -- --    12/02/24 09:39:35 -- 95 -- 119/74 89 95 % -- -- -- None (Room air) Lying -- --    12/02/24 0740 -- -- -- -- -- -- -- -- -- None (Room air) -- 15 3    12/02/24 07:33:31 99.8 °F (37.7 °C) 85 16 90/53 65 93 % -- -- -- None (Room air) Lying -- --    12/02/24 07:32:22 99.8 °F (37.7 °C) 81 -- 89/53 65 94 % -- -- -- None (Room air) Lying -- --    12/02/24 0600 -- -- -- -- -- -- -- -- -- -- -- -- 9 12/01/24 2238 99.6 °F (37.6 °C) 85 -- -- -- 95 % -- -- -- -- -- -- --    12/01/24 21:41:46 102.4 °F (39.1 °C) 89 -- -- -- 95 % -- -- -- -- -- -- --    12/01/24 2047 -- -- -- -- -- -- -- -- -- -- -- -- 8 12/01/24 1951 -- -- -- -- -- -- -- -- -- None (Room air) -- 15 7 12/01/24 19:36:55 103 °F (39.4 °C) 98 18 154/91 112 94 % -- -- -- None (Room air) Lying -- --    12/01/24 1523 -- -- -- -- -- -- -- -- -- -- -- -- 8 12/01/24 14:52:49 99.5 °F (37.5 °C) 79 15 124/75 91 96 % -- -- -- -- -- -- --    12/01/24 1306 -- 78 -- 110/71 84 97  % -- -- -- -- -- -- --    12/01/24 1236 -- 74 -- 110/70 83 97 % -- -- -- -- -- -- --    12/01/24 1135 -- 75 -- 108/68 81 96 % -- -- -- -- -- -- --    12/01/24 1105 -- 74 -- 106/67 80 96 % -- -- -- -- -- -- --    12/01/24 1035 -- 75 -- 108/68 81 95 % -- -- -- -- -- -- --    12/01/24 10:03:43 99.3 °F (37.4 °C) 79 -- 117/70 86 97 % -- -- -- -- -- -- --    12/01/24 0949 98.8 °F (37.1 °C) 80 14 105/60 73 96 % 28 -- 2 L/min Nasal cannula -- -- No Pain    12/01/24 0945 -- -- -- -- -- -- -- -- -- -- -- 15 --    12/01/24 0933 -- 78 14 106/58 74 97 % 28 -- 2 L/min Nasal cannula -- -- No Pain    12/01/24 0926 -- -- -- -- -- -- 28 -- 2 L/min Nasal cannula -- -- --    12/01/24 0920 -- -- -- -- -- -- 36 -- 4 L/min Nasal cannula -- -- --    12/01/24 0918 97.6 °F (36.4 °C) 80 15 105/64 84 96 % -- 6 L/min -- Simple mask -- -- --    12/01/24 07:15:11 100.7 °F (38.2 °C) 88 16 117/67 84 97 % -- -- -- -- -- -- --    12/01/24 0142 98.2 °F (36.8 °C) 79 -- -- -- 97 % -- -- -- -- -- -- --          Weight (last 2 days)       None            Pertinent Labs/Diagnostic Results:   Radiology:  CT abdomen pelvis w contrast   Final Interpretation by Fide De La Rosa MD (11/30 5517)   No significant interval change since CT abdomen and pelvis 3 days ago.   Acute right lower quadrant intra-abdominal pathology, as detailed above. As previously, infectious, inflammatory or neoplastic process are considered. Recommend PET/CT.            Workstation performed: [id number]         CT chest w contrast   Final Interpretation by Pricila Watson MD (11/27 9411)      There is a small hiatal hernia. The wall of the esophagus appears thickened; this is most pronounced involving the distal esophagus. Clinical correlation and follow-up recommended.               Workstation performed: [id number]         CT abdomen pelvis with contrast   Final Interpretation by Pricila Watson MD (11/27 6067)      Inflammatory change and necrotic appearing lymphadenopathy in  the right lower quadrant appears worse compared with November 17, 2024. This could be infectious, inflammatory, neoplastic, or a combination. Clinical correlation and follow-up recommended.    PET/CT recommended.      There is a small hiatal hernia. There is some fluid within the visualized distal esophagus, suggesting gastroesophageal reflux. The wall of the visualized distal esophagus appears thickened. Clinical correlation and follow-up recommended.      Mild hepatomegaly.      Mild colonic diverticulosis without evidence of acute diverticulitis. No bowel obstruction.      This examination demonstrates findings for which clinical and imaging follow-up is recommended and was logged as such in EPIC.         I personally discussed this study with AZAM VERDIN on 11/27/2024 2:47 AM.               Workstation performed: [id number]           Cardiology:  No orders to display     GI:  No orders to display       Results from last 7 days   Lab Units 11/30/24  0934   SARS-COV-2  Negative     Results from last 7 days   Lab Units 12/03/24 0432 12/01/24 0408 11/30/24  0501 11/28/24  0445   WBC Thousand/uL 15.40* 14.51* 15.23* 10.40*   HEMOGLOBIN g/dL 9.6* 10.0* 12.3 10.7*   HEMATOCRIT % 30.8* 31.6* 37.8 33.0*   PLATELETS Thousands/uL 425* 471* 513* 428*   TOTAL NEUT ABS Thousands/µL 9.40*  --  10.45* 5.30   BANDS PCT %  --  4  --   --          Results from last 7 days   Lab Units 12/03/24 0432 12/01/24 0408 11/30/24  0501 11/28/24  0445   SODIUM mmol/L 135 137 139 134*   POTASSIUM mmol/L 3.7 3.9 3.7 3.9   CHLORIDE mmol/L 101 104 103 103   CO2 mmol/L 27 25 28 26   ANION GAP mmol/L 7 8 8 5   BUN mg/dL 6 7 8 13   CREATININE mg/dL 0.86 0.83 0.84 0.96   EGFR ml/min/1.73sq m 83 86 85 72   CALCIUM mg/dL 8.2* 8.7 9.5 8.6     Results from last 7 days   Lab Units 12/03/24 0432   AST U/L 15   ALT U/L 9   ALK PHOS U/L 104   TOTAL PROTEIN g/dL 6.3*   ALBUMIN g/dL 2.7*   TOTAL BILIRUBIN mg/dL 0.37     Results from last 7 days    Lab Units 12/04/24  0627 12/03/24  2028 12/03/24  1716 12/03/24  1048 12/03/24  0617 12/02/24  2031 12/02/24  1637 12/02/24  1557 12/02/24  1214 12/02/24  0730 12/01/24  2130 12/01/24  1644   POC GLUCOSE mg/dl 140 184* 89 158* 147* 89 99 68 114 80 133 126     Results from last 7 days   Lab Units 12/03/24  0432 12/01/24  0408 11/30/24  0501 11/28/24  0445   GLUCOSE RANDOM mg/dL 159* 118 78 189*             Beta- Hydroxybutyrate   Date Value Ref Range Status   11/17/2024 <0.05 0.02 - 0.27 mmol/L Final     BETA-HYDROXYBUTYRATE   Date Value Ref Range Status   11/21/2020 0.1 <0.6 mmol/L Final                Results from last 7 days   Lab Units 12/01/24  0408 11/30/24  0930   PROCALCITONIN ng/ml 0.22 0.61*     Results from last 7 days   Lab Units 11/30/24  0930   LACTIC ACID mmol/L 0.7       Results from last 7 days   Lab Units 11/30/24  0934   INFLUENZA A PCR  Negative   INFLUENZA B PCR  Negative   RSV PCR  Negative       Results from last 7 days   Lab Units 11/30/24  0928   BLOOD CULTURE  No Growth at 72 hrs.  No Growth at 72 hrs.                   [facility] Utilization Review Department  ATTENTION: Please call with any questions or concerns to [phone] and carefully listen to the prompts so that you are directed to the right person. All voicemails are confidential.   For Discharge needs, contact Care Management DC Support Team at [phone] opt. [phone]  Send all requests for admission clinical reviews, approved or denied determinations and any other requests to dedicated fax number below belonging to the campus where the patient is receiving treatment. List of dedicated fax numbers for the Facilities:  FACILITY NAME UR FAX NUMBER   ADMISSION DENIALS (Administrative/Medical Necessity) [phone]   DISCHARGE SUPPORT TEAM (NETWORK) [phone]   PARENT CHILD HEALTH (Maternity/NICU/Pediatrics) [phone]   [facility] [phone]   [facility] [phone]   [facility]  [facility] [phone]   [facility] [phone]   [facility] [phone]   [facility] [phone]   [facility] [phone]   [facility] [phone]   [facility] [phone]   [facility] [phone]   [facility] [phone]   [facility] [phone]

## 2024-12-04 NOTE — ASSESSMENT & PLAN NOTE
Patient fever is most likely secondary to her lymphadenopathy, which is probably secondary to secondary syphilis.  Will monitor temperature with treatment of syphilis.  Patient remains clinically and systemically well.  Blood cultures have no growth thus far.  Treatment for syphilis as in above.  Monitor temperature/WBC.  Follow-up on pending blood cultures.

## 2024-12-04 NOTE — ASSESSMENT & PLAN NOTE
"Patient's syphilis screen was positive.  Confirmatory RPR is now also positive, at high titer of 64.  Patient clearly has syphilis.  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  Patient denies prior history of STD but has never had syphilis testing.  According to the patient, she currently has a boyfriend and he is the only one that she has sexual relationship with.  He is currently in [facility].  She is  from \"the father of her children\" 4 years ago.  HIV screen negative.  GC/chlamydia screen pending but will go ahead and treat patient with 1 dose of ceftriaxone and azithromycin.  Benzathine penicillin 2,400,000 units IM weekly x 3 weeks.  Surveillance RPR at 3, 6, 12 and 24 months after treatment.  Treat with 1 dose of IV ceftriaxone and azithromycin 500 mg.  Chronic hepatitis screen.  "

## 2024-12-04 NOTE — ASSESSMENT & PLAN NOTE
Will discuss treatment options with Dr. Bear.  This seems the most likely cause for the patient's fever.

## 2024-12-04 NOTE — ASSESSMENT & PLAN NOTE
Lab Results   Component Value Date    HGBA1C 14.0 (A) 11/21/2024     Recent Labs     12/03/24  1716 12/03/24 2028 12/04/24  0627 12/04/24  1146   POCGLU 89 184* 140 156*       Blood Sugar Average: Last 72 hrs:  (P) 118.5  Poorly controlled DM may contribute to any infection above.  Management per primary service.

## 2024-12-04 NOTE — PLAN OF CARE
Problem: PAIN - ADULT  Goal: Verbalizes/displays adequate comfort level or baseline comfort level  Description: Interventions:  - Encourage patient to monitor pain and request assistance  - Assess pain using appropriate pain scale  - Administer analgesics based on type and severity of pain and evaluate response  - Implement non-pharmacological measures as appropriate and evaluate response  - Consider cultural and social influences on pain and pain management  - Notify physician/advanced practitioner if interventions unsuccessful or patient reports new pain  Outcome: Progressing     Problem: INFECTION - ADULT  Goal: Absence or prevention of progression during hospitalization  Description: INTERVENTIONS:  - Assess and monitor for signs and symptoms of infection  - Monitor lab/diagnostic results  - Monitor all insertion sites, i.e. indwelling lines, tubes, and drains  - Monitor endotracheal if appropriate and nasal secretions for changes in amount and color  - [place] appropriate cooling/warming therapies per order  - Administer medications as ordered  - Instruct and encourage patient and family to use good hand hygiene technique  - Identify and instruct in appropriate isolation precautions for identified infection/condition  Outcome: Progressing     Problem: DISCHARGE PLANNING  Goal: Discharge to home or other facility with appropriate resources  Description: INTERVENTIONS:  - Identify barriers to discharge w/patient and caregiver  - Arrange for needed discharge resources and transportation as appropriate  - Identify discharge learning needs (meds, wound care, etc.)  - Arrange for interpretive services to assist at discharge as needed  - Refer to Case Management Department for coordinating discharge planning if the patient needs post-hospital services based on physician/advanced practitioner order or complex needs related to functional status, cognitive ability, or social support system  Outcome: Progressing      Problem: Knowledge Deficit  Goal: Patient/family/caregiver demonstrates understanding of disease process, treatment plan, medications, and discharge instructions  Description: Complete learning assessment and assess knowledge base.  Interventions:  - Provide teaching at level of understanding  - Provide teaching via preferred learning methods  Outcome: Progressing     Problem: GASTROINTESTINAL - ADULT  Goal: Minimal or absence of nausea and/or vomiting  Description: INTERVENTIONS:  - Administer IV fluids if ordered to ensure adequate hydration  - Maintain NPO status until nausea and vomiting are resolved  - Nasogastric tube if ordered  - Administer ordered antiemetic medications as needed  - Provide nonpharmacologic comfort measures as appropriate  - Advance diet as tolerated, if ordered  - Consider nutrition services referral to assist patient with adequate nutrition and appropriate food choices  Outcome: Progressing     Problem: METABOLIC, FLUID AND ELECTROLYTES - ADULT  Goal: Electrolytes maintained within normal limits  Description: INTERVENTIONS:  - Monitor labs and assess patient for signs and symptoms of electrolyte imbalances  - Administer electrolyte replacement as ordered  - Monitor response to electrolyte replacements, including repeat lab results as appropriate  - Instruct patient on fluid and nutrition as appropriate  Outcome: Progressing  Goal: Fluid balance maintained  Description: INTERVENTIONS:  - Monitor labs   - Monitor I/O and WT  - Instruct patient on fluid and nutrition as appropriate  - Assess for signs & symptoms of volume excess or deficit  Outcome: Progressing  Goal: Glucose maintained within target range  Description: INTERVENTIONS:  - Monitor Blood Glucose as ordered  - Assess for signs and symptoms of hyperglycemia and hypoglycemia  - Administer ordered medications to maintain glucose within target range  - Assess nutritional intake and initiate nutrition service referral as  needed  Outcome: Progressing

## 2024-12-05 LAB
B HENSELAE IGG TITR SER IF: NEGATIVE TITER
B HENSELAE IGM TITR SER IF: NEGATIVE TITER
B QUINTANA IGG TITR SER IF: NEGATIVE TITER
B QUINTANA IGM TITR SER IF: NEGATIVE TITER
BACTERIA BLD CULT: NORMAL
BACTERIA BLD CULT: NORMAL
GLUCOSE SERPL-MCNC: 120 MG/DL (ref 65–140)
GLUCOSE SERPL-MCNC: 122 MG/DL (ref 65–140)
GLUCOSE SERPL-MCNC: 123 MG/DL (ref 65–140)
GLUCOSE SERPL-MCNC: 182 MG/DL (ref 65–140)
HBV CORE AB SER QL: NORMAL
HBV CORE IGM SER QL: NORMAL
HBV SURFACE AG SER QL: NORMAL
HCV AB SER QL: NORMAL
SCAN RESULT: NORMAL

## 2024-12-05 PROCEDURE — 86705 HEP B CORE ANTIBODY IGM: CPT | Performed by: INTERNAL MEDICINE

## 2024-12-05 PROCEDURE — 88341 IMHCHEM/IMCYTCHM EA ADD ANTB: CPT | Performed by: PATHOLOGY

## 2024-12-05 PROCEDURE — 82948 REAGENT STRIP/BLOOD GLUCOSE: CPT

## 2024-12-05 PROCEDURE — 99232 SBSQ HOSP IP/OBS MODERATE 35: CPT | Performed by: INTERNAL MEDICINE

## 2024-12-05 PROCEDURE — 87340 HEPATITIS B SURFACE AG IA: CPT | Performed by: INTERNAL MEDICINE

## 2024-12-05 PROCEDURE — 86803 HEPATITIS C AB TEST: CPT | Performed by: INTERNAL MEDICINE

## 2024-12-05 PROCEDURE — 88342 IMHCHEM/IMCYTCHM 1ST ANTB: CPT | Performed by: PATHOLOGY

## 2024-12-05 PROCEDURE — 88307 TISSUE EXAM BY PATHOLOGIST: CPT | Performed by: PATHOLOGY

## 2024-12-05 PROCEDURE — 86704 HEP B CORE ANTIBODY TOTAL: CPT | Performed by: INTERNAL MEDICINE

## 2024-12-05 RX ORDER — IBUPROFEN 400 MG/1
400 TABLET, FILM COATED ORAL ONCE AS NEEDED
Status: COMPLETED | OUTPATIENT
Start: 2024-12-05 | End: 2024-12-05

## 2024-12-05 RX ADMIN — PREGABALIN 50 MG: 50 CAPSULE ORAL at 21:31

## 2024-12-05 RX ADMIN — INSULIN LISPRO 1 UNITS: 100 INJECTION, SOLUTION INTRAVENOUS; SUBCUTANEOUS at 21:32

## 2024-12-05 RX ADMIN — INSULIN LISPRO 5 UNITS: 100 INJECTION, SOLUTION INTRAVENOUS; SUBCUTANEOUS at 11:46

## 2024-12-05 RX ADMIN — LIDOCAINE 1 PATCH: 50 PATCH TOPICAL at 09:01

## 2024-12-05 RX ADMIN — SENNOSIDES AND DOCUSATE SODIUM 1 TABLET: 8.6; 5 TABLET ORAL at 17:04

## 2024-12-05 RX ADMIN — HEPARIN SODIUM 5000 UNITS: 5000 INJECTION INTRAVENOUS; SUBCUTANEOUS at 05:07

## 2024-12-05 RX ADMIN — ACETAMINOPHEN 650 MG: 325 TABLET, FILM COATED ORAL at 10:22

## 2024-12-05 RX ADMIN — ACETAMINOPHEN 650 MG: 325 TABLET, FILM COATED ORAL at 17:05

## 2024-12-05 RX ADMIN — INSULIN LISPRO 5 UNITS: 100 INJECTION, SOLUTION INTRAVENOUS; SUBCUTANEOUS at 17:45

## 2024-12-05 RX ADMIN — HEPARIN SODIUM 5000 UNITS: 5000 INJECTION INTRAVENOUS; SUBCUTANEOUS at 13:49

## 2024-12-05 RX ADMIN — SENNOSIDES AND DOCUSATE SODIUM 1 TABLET: 8.6; 5 TABLET ORAL at 21:31

## 2024-12-05 RX ADMIN — PREGABALIN 50 MG: 50 CAPSULE ORAL at 09:00

## 2024-12-05 RX ADMIN — IBUPROFEN 400 MG: 400 TABLET, FILM COATED ORAL at 23:38

## 2024-12-05 RX ADMIN — PANTOPRAZOLE SODIUM 40 MG: 40 TABLET, DELAYED RELEASE ORAL at 05:07

## 2024-12-05 RX ADMIN — HEPARIN SODIUM 5000 UNITS: 5000 INJECTION INTRAVENOUS; SUBCUTANEOUS at 21:31

## 2024-12-05 RX ADMIN — INSULIN GLARGINE 26 UNITS: 100 INJECTION, SOLUTION SUBCUTANEOUS at 21:39

## 2024-12-05 RX ADMIN — PREGABALIN 50 MG: 50 CAPSULE ORAL at 17:04

## 2024-12-05 RX ADMIN — INSULIN LISPRO 5 UNITS: 100 INJECTION, SOLUTION INTRAVENOUS; SUBCUTANEOUS at 08:00

## 2024-12-05 NOTE — PROGRESS NOTES
Progress Note - Hospitalist   Name: Tari Shannon 44 y.o. female I MRN: [id number]  Unit/Bed#: [place] [address] -02 I Date of Admission: 11/26/2024   Date of Service: 12/5/2024 I Hospital Day: 8    Assessment & Plan  LAD (lymphadenopathy), intra-abdominal  Inflammatory and necrotic appearing lymphadenopathy in the right lower quadrant, worse compared to November 17  The patient underwent excisional biopsy.  Pathology showed no evidence for lymphoma.  Flow cytometry is pending.  Unfortunately, no material was submitted for culture.  Sepsis without acute organ dysfunction (HCC)  She had fever, leukocytosis, tachycardia meeting sepsis criteria on 11/30/2024  Syphilis serology is positive.  Other serologic studies are currently pending.  Infectious disease assistance is greatly appreciated.  The patient has started penicillin therapy.    Type 2 diabetes mellitus with hyperglycemia, with long-term current use of insulin (HCC)  Lab Results   Component Value Date    HGBA1C 14.0 (A) 11/21/2024       Recent Labs     12/04/24  1609 12/04/24  2101 12/05/24  0832 12/05/24  1111   POCGLU 108 101 123 122     Uncontrolled diabetes with A1c mentioned above    She just saw endocrinology in the office on 11/26 and 11/26/2024 and was restarted on Lantus 30 units at night and Humalog 10 - 10 - 10.  She reportedly was off all medications for 2 months prior to that appointment.    Blood sugars are trending down compared to admission.  Insulin will be reduced slightly.  Monitor and follow-up with Dr. Razo on discharge    Esophageal thickening  Likely due to  gastroesophageal reflux.  Continue PPI.  Follow-up with GI as scheduled  Syphilis  Will discuss treatment options with Dr. Bear.  This seems the most likely cause for the patient's fever.  Abdominal pain  Multifactorial, could be from gastroparesis, dyspepsia, constipation versus lymphadenopathy  Status post laparoscopy and lymph node biopsy  Avoid NSAIDs and minimize narcotic  Continue  Protonix  Fever  This is most likely related to syphilis.  If fever continues, repeat abdominal CT may be needed.        Subjective:  The patient is feeling pretty well.  She slept okay.  She is able to eat.  She has no abdominal pain, nausea, or vomiting.    Physical Exam:   Temp:  [101.1 °F (38.4 °C)-101.2 °F (38.4 °C)] 101.2 °F (38.4 °C)  HR:  [83-92] 83  BP: (102-133)/(55-73) 102/55  Resp:  [17-18] 17  SpO2:  [95 %] 95 %  O2 Device: None (Room air)    Gen: Well-developed, well-nourished, in no distress.  Neck: Supple.  No lymphadenopathy or goiter.  Heart: Regular rhythm.  I heard no murmur, gallop, or rub.  Lungs: Clear to auscultation and percussion.  No wheezing, rales, or rhonchi.  Abd: Soft with active bowel sounds.  No mass or tenderness.  Extremities: No clubbing, cyanosis, or edema.  No calf tenderness.  Neuro: Alert and oriented.  No focal sign.  Skin: Warm and dry.      LABS: No new labs.          VTE Pharmacologic Prophylaxis: Reason for no pharmacologic prophylaxis low risk  VTE Mechanical Prophylaxis: reason for no mechanical VTE prophylaxis low risk

## 2024-12-05 NOTE — PLAN OF CARE
Problem: PAIN - ADULT  Goal: Verbalizes/displays adequate comfort level or baseline comfort level  Description: Interventions:  - Encourage patient to monitor pain and request assistance  - Assess pain using appropriate pain scale  - Administer analgesics based on type and severity of pain and evaluate response  - Implement non-pharmacological measures as appropriate and evaluate response  - Consider cultural and social influences on pain and pain management  - Notify physician/advanced practitioner if interventions unsuccessful or patient reports new pain  Outcome: Progressing     Problem: INFECTION - ADULT  Goal: Absence or prevention of progression during hospitalization  Description: INTERVENTIONS:  - Assess and monitor for signs and symptoms of infection  - Monitor lab/diagnostic results  - Monitor all insertion sites, i.e. indwelling lines, tubes, and drains  - Monitor endotracheal if appropriate and nasal secretions for changes in amount and color  - [place] appropriate cooling/warming therapies per order  - Administer medications as ordered  - Instruct and encourage patient and family to use good hand hygiene technique  - Identify and instruct in appropriate isolation precautions for identified infection/condition  Outcome: Progressing     Problem: SAFETY ADULT  Goal: Patient will remain free of falls  Description: INTERVENTIONS:  - Educate patient/family on patient safety including physical limitations  - Instruct patient to call for assistance with activity   - Consult OT/PT to assist with strengthening/mobility   - Keep Call bell within reach  - Keep bed low and locked with side rails adjusted as appropriate  - Keep care items and personal belongings within reach  - Initiate and maintain comfort rounds  - Apply yellow socks and bracelet for high fall risk patients  - Consider moving patient to room near nurses station  Outcome: Progressing  Goal: Maintain or return to baseline ADL  function  Description: INTERVENTIONS:  -  Assess patient's ability to carry out ADLs; assess patient's baseline for ADL function and identify physical deficits which impact ability to perform ADLs (bathing, care of mouth/teeth, toileting, grooming, dressing, etc.)  - Assess/evaluate cause of self-care deficits   - Assess range of motion  - Assess patient's mobility; develop plan if impaired  - Assess patient's need for assistive devices and provide as appropriate  - Encourage maximum independence but intervene and supervise when necessary  - Involve family in performance of ADLs  - Assess for home care needs following discharge   - Consider OT consult to assist with ADL evaluation and planning for discharge  - Provide patient education as appropriate  Outcome: Progressing  Goal: Maintains/Returns to pre admission functional level  Description: INTERVENTIONS:  - Perform AM-PAC 6 Click Basic Mobility/ Daily Activity assessment daily.  - Set and communicate daily mobility goal to care team and patient/family/caregiver.   - Collaborate with rehabilitation services on mobility goals if consulted  - Out of bed to chair   - Out of bed for meals   - Out of bed for toileting  - Record patient progress and toleration of activity level   Outcome: Progressing     Problem: DISCHARGE PLANNING  Goal: Discharge to home or other facility with appropriate resources  Description: INTERVENTIONS:  - Identify barriers to discharge w/patient and caregiver  - Arrange for needed discharge resources and transportation as appropriate  - Identify discharge learning needs (meds, wound care, etc.)  - Arrange for interpretive services to assist at discharge as needed  - Refer to Case Management Department for coordinating discharge planning if the patient needs post-hospital services based on physician/advanced practitioner order or complex needs related to functional status, cognitive ability, or social support system  Outcome: Progressing      Problem: Knowledge Deficit  Goal: Patient/family/caregiver demonstrates understanding of disease process, treatment plan, medications, and discharge instructions  Description: Complete learning assessment and assess knowledge base.  Interventions:  - Provide teaching at level of understanding  - Provide teaching via preferred learning methods  Outcome: Progressing     Problem: GASTROINTESTINAL - ADULT  Goal: Minimal or absence of nausea and/or vomiting  Description: INTERVENTIONS:  - Administer IV fluids if ordered to ensure adequate hydration  - Maintain NPO status until nausea and vomiting are resolved  - Nasogastric tube if ordered  - Administer ordered antiemetic medications as needed  - Provide nonpharmacologic comfort measures as appropriate  - Advance diet as tolerated, if ordered  - Consider nutrition services referral to assist patient with adequate nutrition and appropriate food choices  Outcome: Progressing     Problem: METABOLIC, FLUID AND ELECTROLYTES - ADULT  Goal: Electrolytes maintained within normal limits  Description: INTERVENTIONS:  - Monitor labs and assess patient for signs and symptoms of electrolyte imbalances  - Administer electrolyte replacement as ordered  - Monitor response to electrolyte replacements, including repeat lab results as appropriate  - Instruct patient on fluid and nutrition as appropriate  Outcome: Progressing  Goal: Fluid balance maintained  Description: INTERVENTIONS:  - Monitor labs   - Monitor I/O and WT  - Instruct patient on fluid and nutrition as appropriate  - Assess for signs & symptoms of volume excess or deficit  Outcome: Progressing  Goal: Glucose maintained within target range  Description: INTERVENTIONS:  - Monitor Blood Glucose as ordered  - Assess for signs and symptoms of hyperglycemia and hypoglycemia  - Administer ordered medications to maintain glucose within target range  - Assess nutritional intake and initiate nutrition service referral as  needed  Outcome: Progressing     Problem: HEMATOLOGIC - ADULT  Goal: Maintains hematologic stability  Description: INTERVENTIONS  - Assess for signs and symptoms of bleeding or hemorrhage  - Monitor labs  - Administer supportive blood products/factors as ordered and appropriate  Outcome: Progressing     Problem: Prexisting or High Potential for Compromised Skin Integrity  Goal: Skin integrity is maintained or improved  Description: INTERVENTIONS:  - Identify patients at risk for skin breakdown  - Assess and monitor skin integrity  - Assess and monitor nutrition and hydration status  - Monitor labs   - Assess for incontinence   - Turn and reposition patient  - Assist with mobility/ambulation  - Relieve pressure over bony prominences  - Avoid friction and shearing  - Provide appropriate hygiene as needed including keeping skin clean and dry  - Evaluate need for skin moisturizer/barrier cream  - Collaborate with interdisciplinary team   - Patient/family teaching  - Consider wound care consult   Outcome: Progressing     Problem: Nutrition/Hydration-ADULT  Goal: Nutrient/Hydration intake appropriate for improving, restoring or maintaining nutritional needs  Description: Monitor and assess patient's nutrition/hydration status for malnutrition. Collaborate with interdisciplinary team and initiate plan and interventions as ordered.  Monitor patient's weight and dietary intake as ordered or per policy. Utilize nutrition screening tool and intervene as necessary. Determine patient's food preferences and provide high-protein, high-caloric foods as appropriate.     INTERVENTIONS:  - Monitor oral intake, urinary output, labs, and treatment plans  - Assess nutrition and hydration status and recommend course of action  - Evaluate amount of meals eaten  - Assist patient with eating if necessary   - Allow adequate time for meals  - Recommend/ encourage appropriate diets, oral nutritional supplements, and vitamin/mineral  supplements  - Order, calculate, and assess calorie counts as needed  - Recommend, monitor, and adjust tube feedings and TPN/PPN based on assessed needs  - Assess need for intravenous fluids  - Provide specific nutrition/hydration education as appropriate  - Include patient/family/caregiver in decisions related to nutrition  Outcome: Progressing

## 2024-12-05 NOTE — ASSESSMENT & PLAN NOTE
Lab Results   Component Value Date    HGBA1C 14.0 (A) 11/21/2024       Recent Labs     12/04/24  1609 12/04/24  2101 12/05/24  0832 12/05/24  1111   POCGLU 108 101 123 122     Uncontrolled diabetes with A1c mentioned above    She just saw endocrinology in the office on 11/26 and 11/26/2024 and was restarted on Lantus 30 units at night and Humalog 10 - 10 - 10.  She reportedly was off all medications for 2 months prior to that appointment.    Blood sugars are trending down compared to admission.  Insulin will be reduced slightly.  Monitor and follow-up with Dr. Razo on discharge

## 2024-12-05 NOTE — PLAN OF CARE
Problem: PAIN - ADULT  Goal: Verbalizes/displays adequate comfort level or baseline comfort level  Description: Interventions:  - Encourage patient to monitor pain and request assistance  - Assess pain using appropriate pain scale  - Administer analgesics based on type and severity of pain and evaluate response  - Implement non-pharmacological measures as appropriate and evaluate response  - Consider cultural and social influences on pain and pain management  - Notify physician/advanced practitioner if interventions unsuccessful or patient reports new pain  Outcome: Progressing     Problem: INFECTION - ADULT  Goal: Absence or prevention of progression during hospitalization  Description: INTERVENTIONS:  - Assess and monitor for signs and symptoms of infection  - Monitor lab/diagnostic results  - Monitor all insertion sites, i.e. indwelling lines, tubes, and drains  - Monitor endotracheal if appropriate and nasal secretions for changes in amount and color  - [place] appropriate cooling/warming therapies per order  - Administer medications as ordered  - Instruct and encourage patient and family to use good hand hygiene technique  - Identify and instruct in appropriate isolation precautions for identified infection/condition  Outcome: Progressing     Problem: DISCHARGE PLANNING  Goal: Discharge to home or other facility with appropriate resources  Description: INTERVENTIONS:  - Identify barriers to discharge w/patient and caregiver  - Arrange for needed discharge resources and transportation as appropriate  - Identify discharge learning needs (meds, wound care, etc.)  - Arrange for interpretive services to assist at discharge as needed  - Refer to Case Management Department for coordinating discharge planning if the patient needs post-hospital services based on physician/advanced practitioner order or complex needs related to functional status, cognitive ability, or social support system  Outcome: Progressing

## 2024-12-05 NOTE — ASSESSMENT & PLAN NOTE
Lab Results   Component Value Date    HGBA1C 14.0 (A) 11/21/2024     Recent Labs     12/04/24  1609 12/04/24  2101 12/05/24  0832 12/05/24  1111   POCGLU 108 101 123 122       Blood Sugar Average: Last 72 hrs:  (P) 118.3313827620365399  Poorly controlled DM may contribute to any infection above.  Management per primary service.

## 2024-12-05 NOTE — CASE MANAGEMENT
Case Management Progress Note    Patient name Tari Shannon  Location [facility] M* MRN [id number]  : 1980 Date 2024       LOS (days): 8  Geometric Mean LOS (GMLOS) (days): 5  Days to GMLOS:-3.3        OBJECTIVE:        Current admission status: Inpatient  Preferred Pharmacy:   [facility] [place] [facility]  [facility],  [address] [place] [address],  [facility] [address]  Phone: [phone] Fax: [phone]    Primary Care Provider: Chaim Foote MD    Primary Insurance: CAPITAL  Secondary Insurance:     PROGRESS NOTE: Pt not medically cleared for d/c this day with anticipation of being so Friday.  No dc needs identified at this time.

## 2024-12-05 NOTE — PROGRESS NOTES
"Progress Note - Infectious Disease   Name: Tari Shannon 44 y.o. female I MRN: [id number]  Unit/Bed#: [place] [address] -02 I Date of Admission: 11/26/2024   Date of Service: 12/5/2024 I Hospital Day: 8    Assessment & Plan  Syphilis  Patient's syphilis screen was positive.  Confirmatory RPR is now also positive, at high titer of 64.  Patient clearly has syphilis.  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  Patient denies prior history of STD but has never had syphilis testing.  According to the patient, she currently has a boyfriend and he is the only one that she has sexual relationship with.  He is currently in shelter.  She is  from \"the father of her children\" 4 years ago.  HIV screen negative.  Patient received her first dose of IM benzathine penicillin yesterday.  GC/chlamydia screen subsequently negative but patient already received 1 dose of ceftriaxone and azithromycin.  Benzathine penicillin 2,400,000 units IM weekly x 3 weeks.  Patient received 1 dose.  She will need 2 more doses.  Surveillance RPR at 3, 6, 12 and 24 months after treatment.  Follow-up chronic hepatitis screen.  LAD (lymphadenopathy), intra-abdominal  Patient has RLQ abdominal pain for 2 weeks and RLQ abdominal lymphadenopathy noted on 9/17 abdomen/pelvis CT. etiology of lymphadenopathy is unclear.  Patient has no history of cat bite or scratch, lower extremity injury or intra-abdominal/chronic infection.  She is status post biopsy of these lymph nodes 12/1, with preliminary pathology not show any malignancy.  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  Patient is getting treatment for her syphilis above.  She should get repeat imaging in 1 to 2 months.  Treatment for syphilis as in above.  Monitor abdominal pain.  Repeat abdomen/pelvis CT in 1 to 2 months.  Follow-up toxoplasma and " Bartonella serologies.  Fever  Patient fever is most likely secondary to her lymphadenopathy, which is probably secondary to secondary syphilis.  She is still febrile.  Patient remains clinically and systemically well.  Blood cultures have no growth thus far.  If fever persists in the next 1 to 2 days, will need to repeat abdomen/pelvis CT.  Treatment for syphilis as in above.  Monitor temperature/WBC.  Follow-up on pending blood cultures.  Type 2 diabetes mellitus with hyperglycemia, with long-term current use of insulin ([facility])  Lab Results   Component Value Date    HGBA1C 14.0 (A) 11/21/2024     Recent Labs     12/04/24  1609 12/04/24  2101 12/05/24  0832 12/05/24  1111   POCGLU 108 101 123 122       Blood Sugar Average: Last 72 hrs:  (P) 118.0998580609917476  Poorly controlled DM may contribute to any infection above.  Management per primary service.  Esophageal thickening  This is most likely secondary to GE reflux.  Patient is being followed by GI.    Discussed with patient in detail regarding the above plan.  I have discussed with Dr. Fuentes from primary service regarding the above plan to pleat treatment for syphilis and monitor temperature.  He agrees with the plan.    Antibiotics:  None    Subjective   Patient feels better.  Right lower abdominal pain improving.  Temperature still up but patient feels better, with no further chills.  No diarrhea.    Objective :  Temp:  [99.4 °F (37.4 °C)-101.2 °F (38.4 °C)] 101.2 °F (38.4 °C)  HR:  [79-92] 83  BP: (102-133)/(55-79) 102/55  Resp:  [17-18] 17  SpO2:  [95 %-97 %] 95 %  O2 Device: None (Room air)    Physical Exam:     General: Awake, alert, cooperative, no distress.   Neck:  Supple. No mass.  No lymphadenopathy.   Lungs: Expansion symmetric, no rales, no wheezing, respirations unlabored.   Heart:  Regular rate and rhythm, S1 and S2 normal, no murmur.   Abdomen: Soft, nondistended, mild and much improved RLQ tenderness, bowel sounds active all four quadrants, no  masses, no organomegaly.   Extremities: No edema. No erythema/warmth. No ulcer. Nontender to palpation.   Skin:  No rash.   Neuro: Moves all extremities.        Lab Results: I have reviewed the following results:  Results from last 7 days   Lab Units 12/03/24 0432 12/01/24 0408 11/30/24  0501   WBC Thousand/uL 15.40* 14.51* 15.23*   HEMOGLOBIN g/dL 9.6* 10.0* 12.3   PLATELETS Thousands/uL 425* 471* 513*     Results from last 7 days   Lab Units 12/03/24  0432 12/01/24 0408 11/30/24  0501   SODIUM mmol/L 135 137 139   POTASSIUM mmol/L 3.7 3.9 3.7   CHLORIDE mmol/L 101 104 103   CO2 mmol/L 27 25 28   BUN mg/dL 6 7 8   CREATININE mg/dL 0.86 0.83 0.84   EGFR ml/min/1.73sq m 83 86 85   CALCIUM mg/dL 8.2* 8.7 9.5   AST U/L 15  --   --    ALT U/L 9  --   --    ALK PHOS U/L 104  --   --    ALBUMIN g/dL 2.7*  --   --      Results from last 7 days   Lab Units 11/30/24  0928   BLOOD CULTURE  No Growth After 4 Days.  No Growth After 4 Days.     Results from last 7 days   Lab Units 12/01/24 0408 11/30/24  0930   PROCALCITONIN ng/ml 0.22 0.61*

## 2024-12-05 NOTE — ASSESSMENT & PLAN NOTE
"Patient's syphilis screen was positive.  Confirmatory RPR is now also positive, at high titer of 64.  Patient clearly has syphilis.  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  Patient denies prior history of STD but has never had syphilis testing.  According to the patient, she currently has a boyfriend and he is the only one that she has sexual relationship with.  He is currently in senior care.  She is  from \"the father of her children\" 4 years ago.  HIV screen negative.  Patient received her first dose of IM benzathine penicillin yesterday.  GC/chlamydia screen subsequently negative but patient already received 1 dose of ceftriaxone and azithromycin.  Benzathine penicillin 2,400,000 units IM weekly x 3 weeks.  Patient received 1 dose.  She will need 2 more doses.  Surveillance RPR at 3, 6, 12 and 24 months after treatment.  Follow-up chronic hepatitis screen.  "

## 2024-12-05 NOTE — ASSESSMENT & PLAN NOTE
Patient fever is most likely secondary to her lymphadenopathy, which is probably secondary to secondary syphilis.  She is still febrile.  Patient remains clinically and systemically well.  Blood cultures have no growth thus far.  If fever persists in the next 1 to 2 days, will need to repeat abdomen/pelvis CT.  Treatment for syphilis as in above.  Monitor temperature/WBC.  Follow-up on pending blood cultures.

## 2024-12-05 NOTE — UTILIZATION REVIEW
Continued Stay Review    Date: 12/5                          Current Patient Class: Inpatient  Current Level of Care: MS     HPI:44 y.o. 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  WBC elevated.  Has received 1st dose IM benzathine PCN on 12/4 - continue weekly.  For total 3 wks, surveillance PCR to follow. On exam RLQ abd pain improving.  Still febrile.  No chills, diarrhea.  Abd nondistended, improving RLQ tenderness, active bowel sounds. Excisional biopsy negative  for lymphoma.  Flow cytometry pending.      Medications:   Scheduled Medications:  bisacodyl, 10 mg, Rectal, Daily  heparin (porcine), 5,000 Units, Subcutaneous, Q8H CARMELO  insulin glargine, 26 Units, Subcutaneous, HS  insulin lispro, 1-6 Units, Subcutaneous, 4x Daily (AC & HS)  insulin lispro, 5 Units, Subcutaneous, TID With Meals  lidocaine, 1 patch, Topical, Daily  pantoprazole, 40 mg, Oral, Daily Before Breakfast  polyethylene glycol, 17 g, Oral, BID  pregabalin, 50 mg, Oral, TID  senna-docusate sodium, 1 tablet, Oral, TID      Continuous IV Infusions:     PRN Meds:  acetaminophen, 650 mg, Oral, Q6H PRN - x 1 12/4, x 2 12/5  ondansetron, 4 mg, Intravenous, Q4H PRN  oxyCODONE, 5 mg, Oral, Q6H PRN  oxyCODONE, 2.5 mg, Oral, Q6H PRN      Discharge Plan: home when stable, afebrile.     Vital Signs (last 3 days)       Date/Time Temp Pulse Resp BP MAP (mmHg) SpO2 O2 Device Patient Position - Orthostatic VS [place] Coma Scale Score Pain    12/05/24 1705 -- -- -- -- -- -- -- -- -- Med Not Given for Pain - for MAR use only    12/05/24 16:55:33 101.2 °F (38.4 °C) 92 -- 138/85 103 97 % -- -- -- --    12/05/24 1024 101.2 °F (38.4 °C) -- -- -- -- -- -- -- -- --    12/05/24 1022 -- -- -- -- -- -- -- -- -- Med Not Given for Pain - for MAR use only    12/05/24 0900 -- -- -- -- -- -- None (Room air) -- 15 No Pain    12/05/24 08:35:22 101.1 °F (38.4 °C)  83 17 102/55 71 95 % -- -- -- --    12/04/24 21:51:35 -- 92 18 133/73 93 95 % -- -- -- --    12/04/24 2000 -- -- -- -- -- -- None (Room air) -- 15 No Pain    12/04/24 16:12:35 99.4 °F (37.4 °C) 79 18 126/79 95 97 % None (Room air) Lying -- --    12/04/24 1144 -- -- -- -- -- -- -- -- -- Med Not Given for Pain - for MAR use only    12/04/24 11:36:57 100.3 °F (37.9 °C) 87 -- -- -- 98 % -- -- -- --    12/04/24 0900 -- -- -- -- -- -- None (Room air) -- 15 No Pain    12/04/24 07:43:48 100.5 °F (38.1 °C) 89 18 111/65 80 95 % None (Room air) Lying -- --    12/03/24 22:02:45 98.9 °F (37.2 °C) 80 -- -- -- 96 % -- -- -- --    12/03/24 2051 -- -- -- -- -- -- -- -- -- Med Not Given for Pain - for MAR use only    12/03/24 20:30:43 102.3 °F (39.1 °C) 91 20 107/62 77 96 % -- -- -- --    12/03/24 2000 -- -- -- -- -- -- None (Room air) -- 15 No Pain    12/03/24 14:33:09 99.3 °F (37.4 °C) 84 16 104/65 78 97 % -- -- -- --    12/03/24 1012 100 °F (37.8 °C) 84 -- -- -- 96 % -- -- -- --    12/03/24 0851 -- -- -- -- -- -- -- -- -- Med Not Given for Pain - for MAR use only    12/03/24 08:23:53 101.7 °F (38.7 °C) 88 17 113/69 84 97 % -- -- -- 6    12/03/24 0800 -- -- -- -- -- -- None (Room air) -- 15 --    12/03/24 0003 -- -- -- -- -- -- None (Room air) -- 15 No Pain    12/02/24 2204 -- -- -- -- -- -- -- -- -- Med Not Given for Pain - for MAR use only    12/02/24 21:30:31 99.4 °F (37.4 °C) 94 18 136/72 -- 100 % -- -- -- --    12/02/24 20:12:40 102.8 °F (39.3 °C) 96 20 128/81 97 97 % -- -- -- --    12/02/24 1936 -- -- -- -- -- -- -- -- -- 8    12/02/24 17:50:05 100.3 °F (37.9 °C) 89 -- -- -- 99 % -- -- -- --    12/02/24 15:55:09 100.9 °F (38.3 °C) 84 -- -- -- 96 % None (Room air) -- -- --    12/02/24 15:28:02 101.3 °F (38.5 °C) 85 16 114/63 80 96 % None (Room air) Lying -- --    12/02/24 1248 -- -- -- -- -- -- -- -- -- Med Not Given for Pain - for MAR use only    12/02/24 12:17:01 101.1 °F (38.4 °C) 94 18 136/80 99 98 % None (Room air) Lying  -- --    12/02/24 11:24:20 -- 89 -- 140/79 99 97 % None (Room air) Lying -- --    12/02/24 09:39:35 -- 95 -- 119/74 89 95 % None (Room air) Lying -- --    12/02/24 0740 -- -- -- -- -- -- None (Room air) -- 15 3    12/02/24 07:33:31 99.8 °F (37.7 °C) 85 16 90/53 65 93 % None (Room air) Lying -- --    12/02/24 07:32:22 99.8 °F (37.7 °C) 81 -- 89/53 65 94 % None (Room air) Lying -- --    12/02/24 0600 -- -- -- -- -- -- -- -- -- 9          Weight (last 2 days)       None            Pertinent Labs/Diagnostic Results:   Radiology:  CT abdomen pelvis w contrast   Final Interpretation by Fide De La Rosa MD (11/30 0377)   No significant interval change since CT abdomen and pelvis 3 days ago.   Acute right lower quadrant intra-abdominal pathology, as detailed above. As previously, infectious, inflammatory or neoplastic process are considered. Recommend PET/CT.            Workstation performed: [id number]         CT chest w contrast   Final Interpretation by Pricila Watson MD (11/27 1364)      There is a small hiatal hernia. The wall of the esophagus appears thickened; this is most pronounced involving the distal esophagus. Clinical correlation and follow-up recommended.               Workstation performed: [id number]         CT abdomen pelvis with contrast   Final Interpretation by Pricila Watson MD (11/27 5718)      Inflammatory change and necrotic appearing lymphadenopathy in the right lower quadrant appears worse compared with November 17, 2024. This could be infectious, inflammatory, neoplastic, or a combination. Clinical correlation and follow-up recommended.    PET/CT recommended.      There is a small hiatal hernia. There is some fluid within the visualized distal esophagus, suggesting gastroesophageal reflux. The wall of the visualized distal esophagus appears thickened. Clinical correlation and follow-up recommended.      Mild hepatomegaly.      Mild colonic diverticulosis without evidence of acute  diverticulitis. No bowel obstruction.      This examination demonstrates findings for which clinical and imaging follow-up is recommended and was logged as such in EP[facility].         I personally discussed this study with AZAM VERDIN on 11/27/2024 2:47 AM.               Workstation performed: [id number]           Cardiology:  No orders to display     GI:  No orders to display       Results from last 7 days   Lab Units 11/30/24  0934   SARS-COV-2  Negative     Results from last 7 days   Lab Units 12/03/24  0432 12/01/24  0408 11/30/24  0501   WBC Thousand/uL 15.40* 14.51* 15.23*   HEMOGLOBIN g/dL 9.6* 10.0* 12.3   HEMATOCRIT % 30.8* 31.6* 37.8   PLATELETS Thousands/uL 425* 471* 513*   TOTAL NEUT ABS Thousands/µL 9.40*  --  10.45*   BANDS PCT %  --  4  --          Results from last 7 days   Lab Units 12/03/24  0432 12/01/24  0408 11/30/24  0501   SODIUM mmol/L 135 137 139   POTASSIUM mmol/L 3.7 3.9 3.7   CHLORIDE mmol/L 101 104 103   CO2 mmol/L 27 25 28   ANION GAP mmol/L 7 8 8   BUN mg/dL 6 7 8   CREATININE mg/dL 0.86 0.83 0.84   EGFR ml/min/1.73sq m 83 86 85   CALCIUM mg/dL 8.2* 8.7 9.5     Results from last 7 days   Lab Units 12/03/24  0432   AST U/L 15   ALT U/L 9   ALK PHOS U/L 104   TOTAL PROTEIN g/dL 6.3*   ALBUMIN g/dL 2.7*   TOTAL BILIRUBIN mg/dL 0.37     Results from last 7 days   Lab Units 12/05/24  1653 12/05/24  1111 12/05/24  0832 12/04/24  2101 12/04/24  1609 12/04/24  1146 12/04/24  0627 12/03/24 2028 12/03/24  1716 12/03/24  1048 12/03/24  0617 12/02/24  2031   POC GLUCOSE mg/dl 120 122 123 101 108 156* 140 184* 89 158* 147* 89     Results from last 7 days   Lab Units 12/03/24  0432 12/01/24  0408 11/30/24  0501   GLUCOSE RANDOM mg/dL 159* 118 78             Beta- Hydroxybutyrate   Date Value Ref Range Status   11/17/2024 <0.05 0.02 - 0.27 mmol/L Final     BETA-HYDROXYBUTYRATE   Date Value Ref Range Status   11/21/2020 0.1 <0.6 mmol/L Final        Results from last 7 days   Lab Units 12/01/24  0408  11/30/24  0930   PROCALCITONIN ng/ml 0.22 0.61*     Results from last 7 days   Lab Units 11/30/24  0930   LACTIC ACID mmol/L 0.7       Results from last 7 days   Lab Units 12/05/24  0534   HEP B S AG  Non-reactive   HEP C AB  Non-reactive   HEP B C IGM  Non-reactive   HEP B C TOTAL AB  Non-reactive       Results from last 7 days   Lab Units 11/30/24  0934   INFLUENZA A PCR  Negative   INFLUENZA B PCR  Negative   RSV PCR  Negative     Results from last 7 days   Lab Units 11/30/24  0928   BLOOD CULTURE  No Growth After 5 Days.  No Growth After 5 Days.                   Network Utilization Review Department  ATTENTION: Please call with any questions or concerns to [phone] and carefully listen to the prompts so that you are directed to the right person. All voicemails are confidential.   For Discharge needs, contact Care Management DC Support Team at [phone] opt. 2  Send all requests for admission clinical reviews, approved or denied determinations and any other requests to dedicated fax number below belonging to the [place] where the patient is receiving treatment. List of dedicated fax numbers for the Facilities:  FACILITY NAME UR FAX NUMBER   ADMISSION DENIALS (Administrative/Medical Necessity) [phone]   DISCHARGE SUPPORT TEAM (NETWORK) [phone]   PARENT CHILD HEALTH (Maternity/NICU/Pediatrics) [phone]   [facility] [phone]   [facility] [phone]   [facility] [phone]   [facility] [phone]   Psychiatric hospital [phone]   [facility] [phone]   [facility] [phone]   [facility] [phone]   [facility] [phone]   [facility] [phone]   [facility] [phone]   [facility]  [facility] [phone]

## 2024-12-06 VITALS
DIASTOLIC BLOOD PRESSURE: 76 MMHG | TEMPERATURE: 97.9 F | HEART RATE: 82 BPM | HEIGHT: 65 IN | BODY MASS INDEX: 30.49 KG/M2 | OXYGEN SATURATION: 97 % | WEIGHT: 183 LBS | RESPIRATION RATE: 18 BRPM | SYSTOLIC BLOOD PRESSURE: 124 MMHG

## 2024-12-06 LAB
GLUCOSE SERPL-MCNC: 148 MG/DL (ref 65–140)
GLUCOSE SERPL-MCNC: 182 MG/DL (ref 65–140)

## 2024-12-06 PROCEDURE — 82948 REAGENT STRIP/BLOOD GLUCOSE: CPT

## 2024-12-06 PROCEDURE — 99239 HOSP IP/OBS DSCHRG MGMT >30: CPT | Performed by: INTERNAL MEDICINE

## 2024-12-06 PROCEDURE — 99232 SBSQ HOSP IP/OBS MODERATE 35: CPT | Performed by: INTERNAL MEDICINE

## 2024-12-06 RX ORDER — ACETAMINOPHEN 325 MG/1
650 TABLET ORAL EVERY 6 HOURS PRN
Start: 2024-12-06

## 2024-12-06 RX ORDER — INSULIN GLARGINE 100 [IU]/ML
24 INJECTION, SOLUTION SUBCUTANEOUS
Status: DISCONTINUED | OUTPATIENT
Start: 2024-12-06 | End: 2024-12-06 | Stop reason: HOSPADM

## 2024-12-06 RX ADMIN — INSULIN LISPRO 5 UNITS: 100 INJECTION, SOLUTION INTRAVENOUS; SUBCUTANEOUS at 12:00

## 2024-12-06 RX ADMIN — PANTOPRAZOLE SODIUM 40 MG: 40 TABLET, DELAYED RELEASE ORAL at 05:28

## 2024-12-06 RX ADMIN — PREGABALIN 50 MG: 50 CAPSULE ORAL at 09:34

## 2024-12-06 RX ADMIN — INSULIN LISPRO 1 UNITS: 100 INJECTION, SOLUTION INTRAVENOUS; SUBCUTANEOUS at 12:00

## 2024-12-06 RX ADMIN — LIDOCAINE 1 PATCH: 50 PATCH TOPICAL at 09:34

## 2024-12-06 RX ADMIN — HEPARIN SODIUM 5000 UNITS: 5000 INJECTION INTRAVENOUS; SUBCUTANEOUS at 05:28

## 2024-12-06 NOTE — CASE MANAGEMENT
Case Management Discharge Planning Note    Patient name Tari Shannon  Location [place] [address] /[address] 2 M* MRN [id number]  : 1980 Date 2024         OBJECTIVE:  Risk of Unplanned Readmission Score: 8.62         Current admission status: Inpatient     DISCHARGE DETAILS:                                          Other Referral/Resources/Interventions Provided:  Interventions: Other (Specify) (Pt in need of IM injections at  Infusion center  and -  called center and arranged for 1pm appointment)

## 2024-12-06 NOTE — PROGRESS NOTES
"Progress Note - Infectious Disease   Name: Tari Shannon 44 y.o. female I MRN: [id number]  Unit/Bed#: [place] [address] -02 I Date of Admission: 11/26/2024   Date of Service: 12/6/2024 I Hospital Day: 9    Assessment & Plan  Syphilis  Patient's syphilis screen was positive.  Confirmatory RPR is now also positive, at high titer of 64.  Patient clearly has syphilis.  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  Patient denies prior history of STD but has never had syphilis testing.  According to the patient, she currently has a boyfriend and he is the only one that she has sexual relationship with.  He is currently in [facility].  She is  from \"the father of her children\" 4 years ago.  HIV screen negative.  Patient received her first dose of IM benzathine penicillin yesterday.  GC/chlamydia screen subsequently negative but patient already received 1 dose of ceftriaxone and azithromycin.  Benzathine penicillin 2,400,000 units IM weekly x 3 weeks.  Patient received 1 dose.  She will need 2 more doses.  Surveillance RPR at 3, 6, 12 and 24 months after treatment.  Follow-up chronic hepatitis screen.  LAD (lymphadenopathy), intra-abdominal  Patient has RLQ abdominal pain for 2 weeks and RLQ abdominal lymphadenopathy noted on 9/17 abdomen/pelvis CT. etiology of lymphadenopathy is unclear.  Patient has no history of cat bite or scratch, lower extremity injury or intra-abdominal/chronic infection.  She is status post biopsy of these lymph nodes 12/1, with preliminary pathology not show any malignancy.  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  Patient is getting treatment for her syphilis above.  She should get repeat imaging in 1 to 2 months.  Treatment for syphilis as in above.  Monitor abdominal pain.  Repeat abdomen/pelvis CT in 1 to 2 months.  Follow-up toxoplasma and " Bartonella serologies.  Fever  Patient fever is most likely secondary to her lymphadenopathy, which is probably secondary to secondary syphilis.  Temperature is now down.  She remains clinically and systemically well.  Blood cultures have no growth.  Treatment for syphilis as in above.  Monitor temperature/WBC.  Type 2 diabetes mellitus with hyperglycemia, with long-term current use of insulin ([facility])  Lab Results   Component Value Date    HGBA1C 14.0 (A) 11/21/2024     Recent Labs     12/05/24  1653 12/05/24  2118 12/06/24  0906 12/06/24  1116   POCGLU 120 182* 148* 182*       Blood Sugar Average: Last 72 hrs:  (P) 140  Poorly controlled DM may contribute to any infection above.  Management per primary service.  Esophageal thickening  This is most likely secondary to GE reflux.  Patient is being followed by GI.    Discussed with patient in detail regarding the above plan.  I have discussed with Dr. Fuentes from primary service the above plan to complete IM PCN treatment course.  He agrees with the plan.  Okay for discharge from ID viewpoint.  Patient will follow-up with us in approximately 1 month after discharge.    Antibiotics:  IM benzathine penicillin, status post 1 of 3 treatments    Subjective   Patient is well.  No abdominal pain much improved.  Temperature is down today.  No diarrhea.    Objective :  Temp:  [97.6 °F (36.4 °C)-101.9 °F (38.8 °C)] 97.9 °F (36.6 °C)  HR:  [71-92] 82  BP: (117-138)/(66-85) 124/76  Resp:  [18] 18  SpO2:  [95 %-98 %] 97 %  O2 Device: None (Room air)    Physical Exam:     General: Awake, alert, cooperative, no distress.   Neck:  Supple. No mass.  No lymphadenopathy.   Lungs: Expansion symmetric, no rales, no wheezing, respirations unlabored.   Heart:  Regular rate and rhythm, S1 and S2 normal, no murmur.   Abdomen: Soft, nondistended, minimal RLQ tenderness, bowel sounds active all four quadrants, no masses, no organomegaly.   Extremities: No edema. No erythema/warmth. No ulcer.  Nontender to palpation.   Skin:  No rash.   Neuro: Moves all extremities.        Lab Results: I have reviewed the following results:  Results from last 7 days   Lab Units 12/03/24  0432 12/01/24 0408 11/30/24  0501   WBC Thousand/uL 15.40* 14.51* 15.23*   HEMOGLOBIN g/dL 9.6* 10.0* 12.3   PLATELETS Thousands/uL 425* 471* 513*     Results from last 7 days   Lab Units 12/03/24  0432 12/01/24 0408 11/30/24  0501   SODIUM mmol/L 135 137 139   POTASSIUM mmol/L 3.7 3.9 3.7   CHLORIDE mmol/L 101 104 103   CO2 mmol/L 27 25 28   BUN mg/dL 6 7 8   CREATININE mg/dL 0.86 0.83 0.84   EGFR ml/min/1.73sq m 83 86 85   CALCIUM mg/dL 8.2* 8.7 9.5   AST U/L 15  --   --    ALT U/L 9  --   --    ALK PHOS U/L 104  --   --    ALBUMIN g/dL 2.7*  --   --      Results from last 7 days   Lab Units 11/30/24  0928   BLOOD CULTURE  No Growth After 5 Days.  No Growth After 5 Days.     Results from last 7 days   Lab Units 12/01/24 0408 11/30/24  0930   PROCALCITONIN ng/ml 0.22 0.61*

## 2024-12-06 NOTE — ASSESSMENT & PLAN NOTE
"Patient's syphilis screen was positive.  Confirmatory RPR is now also positive, at high titer of 64.  Patient clearly has syphilis.  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  Patient denies prior history of STD but has never had syphilis testing.  According to the patient, she currently has a boyfriend and he is the only one that she has sexual relationship with.  He is currently in [facility].  She is  from \"the father of her children\" 4 years ago.  HIV screen negative.  Patient received her first dose of IM benzathine penicillin yesterday.  GC/chlamydia screen subsequently negative but patient already received 1 dose of ceftriaxone and azithromycin.  Benzathine penicillin 2,400,000 units IM weekly x 3 weeks.  Patient received 1 dose.  She will need 2 more doses.  Surveillance RPR at 3, 6, 12 and 24 months after treatment.  Follow-up chronic hepatitis screen.  "

## 2024-12-06 NOTE — ASSESSMENT & PLAN NOTE
Patient fever is most likely secondary to her lymphadenopathy, which is probably secondary to secondary syphilis.  Temperature is now down.  She remains clinically and systemically well.  Blood cultures have no growth.  Treatment for syphilis as in above.  Monitor temperature/WBC.

## 2024-12-06 NOTE — PLAN OF CARE
Problem: PAIN - ADULT  Goal: Verbalizes/displays adequate comfort level or baseline comfort level  Description: Interventions:  - Encourage patient to monitor pain and request assistance  - Assess pain using appropriate pain scale  - Administer analgesics based on type and severity of pain and evaluate response  - Implement non-pharmacological measures as appropriate and evaluate response  - Consider cultural and social influences on pain and pain management  - Notify physician/advanced practitioner if interventions unsuccessful or patient reports new pain  Outcome: Progressing     Problem: INFECTION - ADULT  Goal: Absence or prevention of progression during hospitalization  Description: INTERVENTIONS:  - Assess and monitor for signs and symptoms of infection  - Monitor lab/diagnostic results  - Monitor all insertion sites, i.e. indwelling lines, tubes, and drains  - Monitor endotracheal if appropriate and nasal secretions for changes in amount and color  - [place] appropriate cooling/warming therapies per order  - Administer medications as ordered  - Instruct and encourage patient and family to use good hand hygiene technique  - Identify and instruct in appropriate isolation precautions for identified infection/condition  Outcome: Progressing     Problem: SAFETY ADULT  Goal: Patient will remain free of falls  Description: INTERVENTIONS:  - Educate patient/family on patient safety including physical limitations  - Instruct patient to call for assistance with activity   - Consult OT/PT to assist with strengthening/mobility   - Keep Call bell within reach  - Keep bed low and locked with side rails adjusted as appropriate  - Keep care items and personal belongings within reach  - Initiate and maintain comfort rounds  - Apply yellow socks and bracelet for high fall risk patients  - Consider moving patient to room near nurses station  Outcome: Progressing  Goal: Maintain or return to baseline ADL  function  Description: INTERVENTIONS:  -  Assess patient's ability to carry out ADLs; assess patient's baseline for ADL function and identify physical deficits which impact ability to perform ADLs (bathing, care of mouth/teeth, toileting, grooming, dressing, etc.)  - Assess/evaluate cause of self-care deficits   - Assess range of motion  - Assess patient's mobility; develop plan if impaired  - Assess patient's need for assistive devices and provide as appropriate  - Encourage maximum independence but intervene and supervise when necessary  - Involve family in performance of ADLs  - Assess for home care needs following discharge   - Consider OT consult to assist with ADL evaluation and planning for discharge  - Provide patient education as appropriate  Outcome: Progressing  Goal: Maintains/Returns to pre admission functional level  Description: INTERVENTIONS:  - Perform AM-PAC 6 Click Basic Mobility/ Daily Activity assessment daily.  - Set and communicate daily mobility goal to care team and patient/family/caregiver.   - Collaborate with rehabilitation services on mobility goals if consulted  - Out of bed to chair   - Out of bed for meals   - Out of bed for toileting  - Record patient progress and toleration of activity level   Outcome: Progressing     Problem: DISCHARGE PLANNING  Goal: Discharge to home or other facility with appropriate resources  Description: INTERVENTIONS:  - Identify barriers to discharge w/patient and caregiver  - Arrange for needed discharge resources and transportation as appropriate  - Identify discharge learning needs (meds, wound care, etc.)  - Arrange for interpretive services to assist at discharge as needed  - Refer to Case Management Department for coordinating discharge planning if the patient needs post-hospital services based on physician/advanced practitioner order or complex needs related to functional status, cognitive ability, or social support system  Outcome: Progressing      Problem: Knowledge Deficit  Goal: Patient/family/caregiver demonstrates understanding of disease process, treatment plan, medications, and discharge instructions  Description: Complete learning assessment and assess knowledge base.  Interventions:  - Provide teaching at level of understanding  - Provide teaching via preferred learning methods  Outcome: Progressing     Problem: GASTROINTESTINAL - ADULT  Goal: Minimal or absence of nausea and/or vomiting  Description: INTERVENTIONS:  - Administer IV fluids if ordered to ensure adequate hydration  - Maintain NPO status until nausea and vomiting are resolved  - Nasogastric tube if ordered  - Administer ordered antiemetic medications as needed  - Provide nonpharmacologic comfort measures as appropriate  - Advance diet as tolerated, if ordered  - Consider nutrition services referral to assist patient with adequate nutrition and appropriate food choices  Outcome: Progressing     Problem: Prexisting or High Potential for Compromised Skin Integrity  Goal: Skin integrity is maintained or improved  Description: INTERVENTIONS:  - Identify patients at risk for skin breakdown  - Assess and monitor skin integrity  - Assess and monitor nutrition and hydration status  - Monitor labs   - Assess for incontinence   - Turn and reposition patient  - Assist with mobility/ambulation  - Relieve pressure over bony prominences  - Avoid friction and shearing  - Provide appropriate hygiene as needed including keeping skin clean and dry  - Evaluate need for skin moisturizer/barrier cream  - Collaborate with interdisciplinary team   - Patient/family teaching  - Consider wound care consult   Outcome: Progressing     Problem: METABOLIC, FLUID AND ELECTROLYTES - ADULT  Goal: Electrolytes maintained within normal limits  Description: INTERVENTIONS:  - Monitor labs and assess patient for signs and symptoms of electrolyte imbalances  - Administer electrolyte replacement as ordered  - Monitor response  to electrolyte replacements, including repeat lab results as appropriate  - Instruct patient on fluid and nutrition as appropriate  Outcome: Progressing  Goal: Fluid balance maintained  Description: INTERVENTIONS:  - Monitor labs   - Monitor I/O and WT  - Instruct patient on fluid and nutrition as appropriate  - Assess for signs & symptoms of volume excess or deficit  Outcome: Progressing  Goal: Glucose maintained within target range  Description: INTERVENTIONS:  - Monitor Blood Glucose as ordered  - Assess for signs and symptoms of hyperglycemia and hypoglycemia  - Administer ordered medications to maintain glucose within target range  - Assess nutritional intake and initiate nutrition service referral as needed  Outcome: Progressing     Problem: HEMATOLOGIC - ADULT  Goal: Maintains hematologic stability  Description: INTERVENTIONS  - Assess for signs and symptoms of bleeding or hemorrhage  - Monitor labs  - Administer supportive blood products/factors as ordered and appropriate  Outcome: Progressing     Problem: Nutrition/Hydration-ADULT  Goal: Nutrient/Hydration intake appropriate for improving, restoring or maintaining nutritional needs  Description: Monitor and assess patient's nutrition/hydration status for malnutrition. Collaborate with interdisciplinary team and initiate plan and interventions as ordered.  Monitor patient's weight and dietary intake as ordered or per policy. Utilize nutrition screening tool and intervene as necessary. Determine patient's food preferences and provide high-protein, high-caloric foods as appropriate.     INTERVENTIONS:  - Monitor oral intake, urinary output, labs, and treatment plans  - Assess nutrition and hydration status and recommend course of action  - Evaluate amount of meals eaten  - Assist patient with eating if necessary   - Allow adequate time for meals  - Recommend/ encourage appropriate diets, oral nutritional supplements, and vitamin/mineral supplements  - Order,  calculate, and assess calorie counts as needed  - Recommend, monitor, and adjust tube feedings and TPN/PPN based on assessed needs  - Assess need for intravenous fluids  - Provide specific nutrition/hydration education as appropriate  - Include patient/family/caregiver in decisions related to nutrition  Outcome: Progressing

## 2024-12-06 NOTE — ASSESSMENT & PLAN NOTE
Lab Results   Component Value Date    HGBA1C 14.0 (A) 11/21/2024     Recent Labs     12/05/24  1653 12/05/24  2118 12/06/24  0906 12/06/24  1116   POCGLU 120 182* 148* 182*       Blood Sugar Average: Last 72 hrs:  (P) 140  Poorly controlled DM may contribute to any infection above.  Management per primary service.

## 2024-12-06 NOTE — NURSING NOTE
Reviewed discharge instructions and upcoming medications with patient. Discussed the importance of follow up care.

## 2024-12-06 NOTE — DISCHARGE SUMMARY
Discharge Summary - Tari Shannon, 1980, [id number]        Admission Date: 11/26/2024  Discharge Date: 12/6/2024      Discharge Diagnosis:   1.  Syphilis  2.  Intra-abdominal lymphadenopathy  3.  Uncontrolled type 2 diabetes  4.  Fever most likely related to #1  5.  Esophageal thickening secondary to esophageal reflux    Consulting Physicians:  1.  Dr. Jean-Paul Armstrong, surgical oncology  2.  Dr. Reginaldo Calderón, interventional radiology  3.  Dr. Morales, medical oncology  4.  Dr. Jaiyeola, gastroenterology  5.  Dr. Bear,  infectious disease    Procedures Performed:   Diagnostic laparoscopy with mesenteric biopsies    HPI: The patient is a 44-year-old woman with a history of uncontrolled type 2 diabetes who presented to the emergency room with worsening abdominal pain over 2 weeks.  She has been using Tylenol and a heating pad without relief.  She has had nausea and has been using Zofran.  She had been evaluated in the emergency room on November 17.  At the time of admission, evaluation included a CT scan of the abdomen and pelvis.  This revealed intra-abdominal lymphadenopathy.  The patient was admitted for further care.    Hospital Course: The patient was admitted to the hospital and hydrated with intravenous fluid.  She was treated with antiemetics and analgesics.  She was seen in consultation by Dr. Armstrong.  The possibility of percutaneous biopsy of the patient's lymph nodes was discussed.  This was reviewed with interventional radiology and was felt to be technically impossible.  Shortly after the patient's admission, she developed fever.  She was treated with Unasyn.  Because she did not improve, she was taken to the operating room for laparoscopy and biopsies.    On December 1, the patient was taken to the operating room where she underwent laparoscopy.  Right lower quadrant mesenteric lymphadenopathy was noted.  Biopsies were performed.  The patient tolerated the procedure well.  Fortunately, pathologic analysis of  the biopsies revealed no evidence of cancer.  Flow cytometry is pending.    The patient was evaluated in consultation by Dr. Bear of infectious disease.  He ordered additional serologic evaluation including studies for syphilis.  Both the screening and confirmatory test came back strongly positive.  It is thought that this is the underlying etiology for the patient's lymphadenopathy and fever.  She received 1 dose of penicillin during her stay.  She is scheduled for 2 additional shots 1 week apart as an outpatient.    With the institution of penicillin therapy, the patient's fever seemed to improve.  Clinically she did improve.  She will continue to monitor her temperature carefully at home.    The patient has type 2 diabetes.  She had been off therapy for a bit.  Her most recent hemoglobin A1c is 14.0%.  She was recently seen by endocrinology and was started on Lantus 30 units daily and Humalog 10 units with each meal.  During her stay her sugar was under very good control.  This necessitated a slight decrease in her insulin therapy.  She will continue to monitor this at home.  She does have endocrinology follow-up scheduled.    At the time of discharge the patient was feeling well.  Vital signs were stable.  Lungs were clear.  Cardiac exam revealed a regular rhythm.  The abdomen was soft and nontender.  There was no edema.    Disposition: The patient was discharged home on December 6.  She will continue with diabetic diet.  Her activity will be as tolerated.  She was asked to follow-up with her primary care physician within 1 week.  She is scheduled for 2 additional penicillin injections.  She will be seen by Dr. Bear on January 3.  Surgical follow-up is also scheduled.    Discharge instructions/Information to patient and family:   See after visit summary for information provided to patient and family.      Provisions for Follow-Up Care:  See after visit summary for information related to follow-up care and any  pertinent home health orders.      Planned Readmission: No    Discharge Statement   I spent 40 minutes discharging the patient. This time was spent on the day of discharge. I had direct contact with the patient on the day of discharge.     Discharge Medications:  See after visit summary for reconciled discharge medications provided to patient and family.

## 2024-12-06 NOTE — QUICK NOTE
Discussed results with patient's lymph node biopsy at bedside.    We discussed that the biopsy overall suggest against malignancy in favor of nonspecific chronic inflammation.    We did discuss that is very difficult to completely exclude malignancy (final diagnosis path report does say cannot exclude reactive versus neoplasia), overall her clinical picture would be atypical for malignancy.  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  She is already having symptomatic improvement with penicillin.    She is amenable to outpatient follow-up with interval CT scan.

## 2024-12-09 ENCOUNTER — PATIENT OUTREACH (OUTPATIENT)
Dept: CASE MANAGEMENT | Facility: OTHER | Age: 44
End: 2024-12-09

## 2024-12-09 ENCOUNTER — TELEPHONE (OUTPATIENT)
Dept: INFECTIOUS DISEASES | Facility: CLINIC | Age: 44
End: 2024-12-09

## 2024-12-09 DIAGNOSIS — Z71.89 COMPLEX CARE COORDINATION: Primary | ICD-10-CM

## 2024-12-09 NOTE — TELEPHONE ENCOUNTER
Tried to call patient today. Unable to reach patient as phone number listed is not receiving calls at this time.   Was calling to follow up with patient after being discharged from the hospital with a reminder of follow up appointment that is scheduled.

## 2024-12-09 NOTE — UTILIZATION REVIEW
NOTIFICATION OF ADMISSION DISCHARGE   This is a Notification of Discharge from [facility]. Please be advised that this patient has been discharge from our facility. Below you will find the admission and discharge date and time including the patient’s disposition.   UTILIZATION REVIEW CONTACT:  Lisa Call  Utilization   Network Utilization Review Department  Phone: [phone] x carefully listen to the prompts. All voicemails are confidential.  Email: [email or web address]     ADMISSION INFORMATION  PRESENTATION DATE: 11/26/2024 11:45 PM  OBERVATION ADMISSION DATE: N/A  INPATIENT ADMISSION DATE: 11/27/24  4:15 AM   DISCHARGE DATE: 12/6/2024  3:40 PM   DISPOSITION:Home/Self Care    Network Utilization Review Department  ATTENTION: Please call with any questions or concerns to [phone] and carefully listen to the prompts so that you are directed to the right person. All voicemails are confidential.   For Discharge needs, contact Care Management DC Support Team at [phone] opt. 2  Send all requests for admission clinical reviews, approved or denied determinations and any other requests to dedicated fax number below belonging to the campus where the patient is receiving treatment. List of dedicated fax numbers for the Facilities:  FACILITY NAME UR FAX NUMBER   ADMISSION DENIALS (Administrative/Medical Necessity) [phone]   DISCHARGE SUPPORT TEAM ([facility]) [phone]   PARENT CHILD HEALTH (Maternity/NICU/Pediatrics) [phone]   [facility] [phone]   [facility] [phone]   [facility] [phone]   [facility] [phone]   [facility] [phone]   General acute hospital [phone]   [facility] [phone]   [facility] [phone]    [facility] [phone]   [facility] [phone]   [facility] [phone]   [facility] [phone]

## 2024-12-09 NOTE — TELEPHONE ENCOUNTER
PA for Dexcom G7 Sensor APPROVED     Date(s) approved December 4, 2024 to December 4, 2025    Case #[id number]     Patient advised by          [x][employer] [place] Message  []Phone call   []LMOM  []L/M to call office as no active Communication consent on file  [x]Unable to leave detailed message as VM not approved on Communication consent       Pharmacy advised by    [x]Fax  []Phone call    Approval letter scanned into Media Yes

## 2024-12-09 NOTE — PROGRESS NOTES
"Outpatient Care Management Note:  In basket referral for complex care management received after recent hospitalization.  Chart review completed.     11/17/2024-presented to Er with abdominal pain.  Imaging showed mesenteric lymphadenopathy with necrosis.      11/21/2024-saw PCP.  Hgb A1C 14.0  PET scan ordered.     11/26/2024-saw endocrinology-has not been taking medications for approximately 2 months. Continues to have abdominal pain.  Endocrinology recommends holding on PET scan until glucose better controlled.     11/26/2024-Presented to ER due to worsening abdominal pain.  Node were not amenable to IR biopsy.  Excisional biopsy showed no malignancy.  Further testing by ID showed positive Syphillis testing.  Discharged to home with instructions for weekly Benzathine PCN injection X 2.   FU with surgical oncology, PCP and ID.  Started on Naprosyn and Lyrica.      Outreach call attempted to Ms. Shannon. Message received that \"the person you have dialed is not able to receive calls at this time. \"          "

## 2024-12-10 ENCOUNTER — TELEPHONE (OUTPATIENT)
Age: 44
End: 2024-12-10

## 2024-12-10 ENCOUNTER — TRANSITIONAL CARE MANAGEMENT (OUTPATIENT)
Age: 44
End: 2024-12-10

## 2024-12-10 NOTE — TELEPHONE ENCOUNTER
----- Message from Manolo Fuentes MD sent at 12/6/2024  2:20 PM EST -----  Thank you for allowing us to participate in the care of your patient, Tari Shannon, who was hospitalized from 11/26/2024 through 12/6/2024 with the admitting diagnosis of fever and pelvic lymphadenopathy.  The patient underwent an excisional biopsy of her lymph node.  Will pulmonary path revealed no evidence of carcinoma or lymphoma.  Flow cytometry is pending.  Serologic testing for syphilis was strongly positive.  This is believed to be the etiology of the patient's symptoms.  She received 1 dose of penicillin during her stay.  Arrangements for 2 additional doses have been made at the infusion center.  I asked her to arrange primary care follow-up within 1 week.  She will be reevaluated by infectious disease in early January.  Repeat CAT scan of the abdomen pelvis is anticipated.    If you have any additional questions or would like to discuss further, please feel free to contact me.    Manolo Fuentes MD  [facility] Internal Medicine, Hospitalist  [phone]

## 2024-12-11 ENCOUNTER — HOSPITAL ENCOUNTER (OUTPATIENT)
Dept: INFUSION CENTER | Facility: HOSPITAL | Age: 44
Discharge: HOME | End: 2024-12-11
Attending: INTERNAL MEDICINE
Payer: COMMERCIAL

## 2024-12-11 ENCOUNTER — OFFICE VISIT (OUTPATIENT)
Age: 44
End: 2024-12-11
Payer: COMMERCIAL

## 2024-12-11 VITALS
TEMPERATURE: 97.6 F | DIASTOLIC BLOOD PRESSURE: 75 MMHG | SYSTOLIC BLOOD PRESSURE: 121 MMHG | RESPIRATION RATE: 18 BRPM | HEART RATE: 85 BPM

## 2024-12-11 VITALS
HEIGHT: 65 IN | OXYGEN SATURATION: 99 % | WEIGHT: 183.4 LBS | DIASTOLIC BLOOD PRESSURE: 68 MMHG | BODY MASS INDEX: 30.56 KG/M2 | SYSTOLIC BLOOD PRESSURE: 110 MMHG | TEMPERATURE: 97.6 F | HEART RATE: 80 BPM

## 2024-12-11 DIAGNOSIS — A53.9 SYPHILIS: Primary | ICD-10-CM

## 2024-12-11 DIAGNOSIS — Z79.4 TYPE 2 DIABETES MELLITUS WITH HYPERGLYCEMIA, WITH LONG-TERM CURRENT USE OF INSULIN (HCC): ICD-10-CM

## 2024-12-11 DIAGNOSIS — A41.9 SEPSIS WITHOUT ACUTE ORGAN DYSFUNCTION, DUE TO UNSPECIFIED ORGANISM (HCC): ICD-10-CM

## 2024-12-11 DIAGNOSIS — A53.9 SYPHILIS: ICD-10-CM

## 2024-12-11 DIAGNOSIS — E11.65 TYPE 2 DIABETES MELLITUS WITH HYPERGLYCEMIA, WITH LONG-TERM CURRENT USE OF INSULIN (HCC): ICD-10-CM

## 2024-12-11 DIAGNOSIS — R59.0 LAD (LYMPHADENOPATHY), INTRA-ABDOMINAL: Primary | ICD-10-CM

## 2024-12-11 PROCEDURE — 96372 THER/PROPH/DIAG INJ SC/IM: CPT

## 2024-12-11 PROCEDURE — 99495 TRANSJ CARE MGMT MOD F2F 14D: CPT | Performed by: FAMILY MEDICINE

## 2024-12-11 RX ADMIN — PENICILLIN G BENZATHINE 2.4 MILLION UNITS: 1200000 INJECTION, SUSPENSION INTRAMUSCULAR at 13:19

## 2024-12-11 NOTE — PROGRESS NOTES
Tari Shannon  tolerated treatment well with no complications.      Tari Shannon is aware of future appt on 12/18/24 at 1pm.     AVS printed and given to Tari Shannon:  Yes

## 2024-12-11 NOTE — LETTER
December 11, 2024     Patient: Tari Shannon  YOB: 1980  Date of Visit: 12/11/2024      To Whom it May Concern:    Tari Shannon is under my professional care. Tari was seen in my office on 12/11/2024. Tari may return to work on 12/19/2024 .    If you have any questions or concerns, please don't hesitate to call.         Sincerely,          Chaim Foote MD        CC: No Recipients

## 2024-12-11 NOTE — PROGRESS NOTES
Transition of Care Visit  Name: Tari Shannon      : 1980      MRN: [id number]  Encounter Provider: Chaim Foote MD  Encounter Date: 2024   Encounter department: [facility]    Assessment & Plan  LAD (lymphadenopathy), intra-abdominal  Discussed supportive care and return parameters. Pt scheduled for follow-up with surg. Onc.       Sepsis without acute organ dysfunction, due to unspecified organism (HCC)  Resolving, Discussed supportive care and return parameters.        Syphilis  Continue treatment, Discussed supportive care and return parameters.        Type 2 diabetes mellitus with hyperglycemia, with long-term current use of insulin (HCC)  Continue meds. Discussed supportive care and return parameters.   Lab Results   Component Value Date    HGBA1C 14.0 (A) 2024                 History of Present Illness     Transitional Care Management Review:   Tari Shannon is a 44 y.o. female here for TCM follow up.     During the TCM phone call patient stated:  TCM Call       Date and time call was made  12/10/2024 12:39 PM    Hospital care reviewed  Records reviewed    Patient was hospitialized at  [facility]    Date of Admission  24    Date of discharge  24    Diagnosis  LAD INTRA ABDOMINAL    Disposition  Home    Were the patients medications reviewed and updated  Yes    Current Symptoms  None          TCM Call       Post hospital issues  None    Should patient be enrolled in anticoag monitoring?  No    Scheduled for follow up?  Yes    Did you obtain your prescribed medications  Yes    Do you need help managing your prescriptions or medications  No    Is transportation to your appointment needed  No    I have advised the patient to call PCP with any new or worsening symptoms  CHULA DELGADO CMA    Living Arrangements  Family members    Support System  Family    The type of support provided  Emotional; Financial; Physical    Do you have social support   "Yes, as much as I need    Are you recieving any outpatient services  No    Are you recieving home care services  No    Are you using any community resources  No    Current waiver services  No    Have you fallen in the last 12 months  No    Interperter language line needed  No    Counseling  Patient          Patient is a 45 y/o woman who presents for TCM visit, she had intra-abdominal lymphadenopathy, sepsis and was diagnosed with syphilis in a patient with DM2 she admits she is feeling closer to baseline no subsequent fevers chills nausea or vomiting.      Review of Systems   Constitutional: Negative.    HENT: Negative.     Eyes: Negative.    Respiratory: Negative.     Cardiovascular: Negative.    Gastrointestinal: Negative.    Endocrine: Negative.    Genitourinary: Negative.    Musculoskeletal: Negative.    Allergic/Immunologic: Negative.    Neurological: Negative.    Hematological: Negative.    Psychiatric/Behavioral: Negative.     All other systems reviewed and are negative.    Objective   /68 (BP Location: Right arm, Patient Position: Sitting, Cuff Size: Large)   Pulse 80   Temp 97.6 °F (36.4 °C) (Temporal)   Ht 5' 5\" (1.651 m)   Wt 83.2 kg (183 lb 6.4 oz)   LMP 11/17/2024 (Approximate)   SpO2 99%   BMI 30.52 kg/m²     Physical Exam  Constitutional:       General: She is not in acute distress.     Appearance: She is well-developed. She is not diaphoretic.   HENT:      Head: Normocephalic and atraumatic.      Right Ear: External ear normal.      Left Ear: External ear normal.      Nose: Nose normal.      Mouth/Throat:      Pharynx: No oropharyngeal exudate.   Eyes:      General:         Right eye: No discharge.         Left eye: No discharge.      Conjunctiva/sclera: Conjunctivae normal.      Pupils: Pupils are equal, round, and reactive to light.   Neck:      Thyroid: No thyromegaly.      Trachea: No tracheal deviation.   Cardiovascular:      Rate and Rhythm: Normal rate and regular rhythm.      " Heart sounds: Normal heart sounds. No murmur heard.     No friction rub. No gallop.   Pulmonary:      Effort: Pulmonary effort is normal. No respiratory distress.      Breath sounds: Normal breath sounds.   Abdominal:      General: There is no distension.      Palpations: Abdomen is soft.      Tenderness: There is no abdominal tenderness. There is no guarding or rebound.   Musculoskeletal:         General: Normal range of motion.   Lymphadenopathy:      Cervical: No cervical adenopathy.   Skin:     General: Skin is warm.   Neurological:      Mental Status: She is alert and oriented to person, place, and time.      Cranial Nerves: No cranial nerve deficit.   Psychiatric:         Behavior: Behavior normal.         Thought Content: Thought content normal.         Judgment: Judgment normal.       Medications have been reviewed by provider in current encounter

## 2024-12-12 ENCOUNTER — PATIENT OUTREACH (OUTPATIENT)
Dept: CASE MANAGEMENT | Facility: OTHER | Age: 44
End: 2024-12-12

## 2024-12-12 NOTE — PROGRESS NOTES
Outpatient Care Management Note:  Outreach call placed to Ms. Shannon after her recent hospitalization.  Introduced myself and my role.     Ms. Shannon states she is doing fine.  She has received her first outpatient injection and has the others scheduled.  She completed her TCM appointment.      Denies any needs at this time. Call disconnected.

## 2024-12-13 NOTE — ASSESSMENT & PLAN NOTE
Continue meds. Discussed supportive care and return parameters.   Lab Results   Component Value Date    HGBA1C 14.0 (A) 11/21/2024

## 2024-12-16 ENCOUNTER — ANNUAL EXAM (OUTPATIENT)
Dept: OBGYN CLINIC | Facility: CLINIC | Age: 44
End: 2024-12-16

## 2024-12-16 VITALS
HEART RATE: 93 BPM | DIASTOLIC BLOOD PRESSURE: 72 MMHG | HEIGHT: 65 IN | WEIGHT: 173.6 LBS | BODY MASS INDEX: 28.92 KG/M2 | SYSTOLIC BLOOD PRESSURE: 109 MMHG

## 2024-12-16 DIAGNOSIS — Z01.419 ENCOUNTER FOR ANNUAL ROUTINE GYNECOLOGICAL EXAMINATION: Primary | ICD-10-CM

## 2024-12-16 DIAGNOSIS — Z12.31 ENCOUNTER FOR SCREENING MAMMOGRAM FOR MALIGNANT NEOPLASM OF BREAST: ICD-10-CM

## 2024-12-16 PROCEDURE — S0612 ANNUAL GYNECOLOGICAL EXAMINA: HCPCS | Performed by: NURSE PRACTITIONER

## 2024-12-16 NOTE — PROGRESS NOTES
Annual Exam    Assessment   1. Encounter for annual routine gynecological examination        2. Encounter for screening mammogram for malignant neoplasm of breast  Mammo screening bilateral w 3d and cad        well woman       Plan       All questions answered.  Breast self exam technique reviewed and patient encouraged to perform self-exam monthly.  Contraception: tubal ligation.  Discussed healthy lifestyle modifications.  Follow up in 1 year.  Mammogram.     Patient Instructions   Schedule mammogram after 2025  Call with needs or concerns  Return in 1 year  Pt verbalized understanding of all discussed.      Subjective      Tari Shannon is a 44 y.o.  female who presents for annual well woman exam. Periods are regular every 28-30 days, lasting 4 days. No intermenstrual bleeding, spotting, or discharge.  2021 Last WNL PAP and negative HPV  2024 mammogram R fibroadenoma  1 partner x 4 years, denies domestic violence  Pt states she had HPV vaccines in the past, unsure when    Pt states she was recently hospitalized with N/V and an inability to keep anything down. Pt states she was  Dx'ed with Necrotic lymph nodes due to Syphilis. Pt states she has had 2 PNC doses the hosoital and the3rd is schedule at Dx the dils were 64, RPR was reactive     Current contraception: tubal ligation  History of abnormal Pap smear: no  Family history of uterine or ovarian cancer: no  Regular self breast exam: yes  History of abnormal mammogram: R breast fibroadenoma  Family history of breast cancer: no  History of abnormal lipids: no  Menstrual History:  OB History          6    Para   3    Term   3            AB   3    Living   3         SAB   2    IAB   1    Ectopic        Multiple        Live Births   3                Menarche age: 17  No LMP recorded (lmp unknown). 12/10/2024       The following portions of the patient's history were reviewed and updated as appropriate: allergies, current  "medications, past family history, past medical history, past social history, past surgical history and problem list.    Review of Systems  Pertinent items are noted in HPI.      Objective      /72 (BP Location: Right arm, Patient Position: Sitting)   Pulse 93   Ht 5' 5\" (1.651 m)   Wt 78.7 kg (173 lb 9.6 oz)   LMP  (LMP Unknown)   BMI 28.89 kg/m²     General: alert and oriented, in no acute distress, alert, appears stated age, and cooperative   Heart: NSR   Lungs: clear to auscultation bilaterally, WNL respiratory effort, negative cough or SOB   Thyroid: Negative masses palpable   Abdomen: soft, non-tender, without masses or organomegaly palpable   Vulva: normal   Vagina: normal mucosa   Cervix: no cervical motion tenderness and no lesions   Uterus: normal size, non-tender, normal shape and consistency   Adnexa: normal adnexa   Urethra: normal   Breasts: NT,negative masses palpable, negative discharge, or dimpling             "

## 2024-12-17 ENCOUNTER — OFFICE VISIT (OUTPATIENT)
Dept: GASTROENTEROLOGY | Facility: CLINIC | Age: 44
End: 2024-12-17
Payer: COMMERCIAL

## 2024-12-17 VITALS
DIASTOLIC BLOOD PRESSURE: 70 MMHG | TEMPERATURE: 98.3 F | SYSTOLIC BLOOD PRESSURE: 120 MMHG | HEIGHT: 65 IN | WEIGHT: 175 LBS | BODY MASS INDEX: 29.16 KG/M2

## 2024-12-17 DIAGNOSIS — R11.0 NAUSEA: Primary | ICD-10-CM

## 2024-12-17 DIAGNOSIS — R93.3 ABNORMAL CT SCAN, GASTROINTESTINAL TRACT: ICD-10-CM

## 2024-12-17 DIAGNOSIS — R10.31 RLQ ABDOMINAL PAIN: ICD-10-CM

## 2024-12-17 DIAGNOSIS — K22.89 ESOPHAGEAL THICKENING: ICD-10-CM

## 2024-12-17 PROCEDURE — 99214 OFFICE O/P EST MOD 30 MIN: CPT | Performed by: INTERNAL MEDICINE

## 2024-12-17 RX ORDER — POLYETHYLENE GLYCOL 3350 17 G/17G
POWDER, FOR SOLUTION ORAL
Qty: 238 G | Refills: 0 | Status: SHIPPED | OUTPATIENT
Start: 2024-12-17

## 2024-12-17 RX ORDER — BISACODYL 5 MG/1
5 TABLET, DELAYED RELEASE ORAL ONCE
Qty: 10 TABLET | Refills: 0 | Status: SHIPPED | OUTPATIENT
Start: 2024-12-17 | End: 2024-12-17

## 2024-12-17 NOTE — PATIENT INSTRUCTIONS
Scheduled date of colonoscopy/EGD (as of today): 12/20/2024  Physician performing colonoscopy: Dr. Koenig  Location of colonoscopy: [facility]  Bowel prep reviewed with patient: Miralax   Instructions reviewed with patient by: Ai MENG   Clearances:  Hospital setting only

## 2024-12-18 ENCOUNTER — HOSPITAL ENCOUNTER (OUTPATIENT)
Dept: INFUSION CENTER | Facility: HOSPITAL | Age: 44
Discharge: HOME | End: 2024-12-18
Attending: INTERNAL MEDICINE

## 2024-12-19 ENCOUNTER — TELEPHONE (OUTPATIENT)
Dept: GASTROENTEROLOGY | Facility: HOSPITAL | Age: 44
End: 2024-12-19

## 2024-12-19 ENCOUNTER — HOSPITAL ENCOUNTER (OUTPATIENT)
Dept: INFUSION CENTER | Facility: HOSPITAL | Age: 44
End: 2024-12-19
Attending: INTERNAL MEDICINE
Payer: COMMERCIAL

## 2024-12-19 VITALS
TEMPERATURE: 96.8 F | RESPIRATION RATE: 18 BRPM | DIASTOLIC BLOOD PRESSURE: 72 MMHG | SYSTOLIC BLOOD PRESSURE: 105 MMHG | HEART RATE: 76 BPM

## 2024-12-19 DIAGNOSIS — A53.9 SYPHILIS: Primary | ICD-10-CM

## 2024-12-19 PROCEDURE — 96372 THER/PROPH/DIAG INJ SC/IM: CPT

## 2024-12-19 RX ADMIN — PENICILLIN G BENZATHINE 2.4 MILLION UNITS: 1200000 INJECTION, SUSPENSION INTRAMUSCULAR at 12:36

## 2024-12-19 NOTE — PROGRESS NOTES
Patient tolerated bilateral gluteal penicillin injections without complications. Therapy complete, no future appointments. AVS declined.

## 2024-12-20 ENCOUNTER — HOSPITAL ENCOUNTER (OUTPATIENT)
Dept: GASTROENTEROLOGY | Facility: HOSPITAL | Age: 44
Setting detail: OUTPATIENT SURGERY
End: 2024-12-20
Attending: INTERNAL MEDICINE
Payer: COMMERCIAL

## 2024-12-20 ENCOUNTER — ANESTHESIA EVENT (OUTPATIENT)
Dept: GASTROENTEROLOGY | Facility: HOSPITAL | Age: 44
End: 2024-12-20
Payer: COMMERCIAL

## 2024-12-20 ENCOUNTER — ANESTHESIA (OUTPATIENT)
Dept: GASTROENTEROLOGY | Facility: HOSPITAL | Age: 44
End: 2024-12-20
Payer: COMMERCIAL

## 2024-12-20 VITALS
OXYGEN SATURATION: 99 % | DIASTOLIC BLOOD PRESSURE: 77 MMHG | TEMPERATURE: 96.4 F | SYSTOLIC BLOOD PRESSURE: 130 MMHG | HEART RATE: 70 BPM | RESPIRATION RATE: 18 BRPM

## 2024-12-20 DIAGNOSIS — R10.31 RLQ ABDOMINAL PAIN: ICD-10-CM

## 2024-12-20 DIAGNOSIS — R11.0 NAUSEA: ICD-10-CM

## 2024-12-20 LAB — GLUCOSE SERPL-MCNC: 251 MG/DL (ref 65–140)

## 2024-12-20 PROCEDURE — 82948 REAGENT STRIP/BLOOD GLUCOSE: CPT

## 2024-12-20 PROCEDURE — C1769 GUIDE WIRE: HCPCS

## 2024-12-20 RX ORDER — SODIUM CHLORIDE 9 MG/ML
25 INJECTION, SOLUTION INTRAVENOUS CONTINUOUS
Status: CANCELLED | OUTPATIENT
Start: 2024-12-20

## 2024-12-20 RX ORDER — PROPOFOL 10 MG/ML
INJECTION, EMULSION INTRAVENOUS AS NEEDED
Status: DISCONTINUED | OUTPATIENT
Start: 2024-12-20 | End: 2024-12-20

## 2024-12-20 RX ORDER — LIDOCAINE HYDROCHLORIDE 10 MG/ML
0.5 INJECTION, SOLUTION EPIDURAL; INFILTRATION; INTRACAUDAL; PERINEURAL ONCE AS NEEDED
Status: CANCELLED | OUTPATIENT
Start: 2024-12-20

## 2024-12-20 RX ORDER — LIDOCAINE HCL/PF 100 MG/5ML
SYRINGE (ML) INJECTION AS NEEDED
Status: DISCONTINUED | OUTPATIENT
Start: 2024-12-20 | End: 2024-12-20

## 2024-12-20 RX ORDER — PROPOFOL 10 MG/ML
INJECTION, EMULSION INTRAVENOUS CONTINUOUS PRN
Status: DISCONTINUED | OUTPATIENT
Start: 2024-12-20 | End: 2024-12-20

## 2024-12-20 RX ORDER — SODIUM CHLORIDE 9 MG/ML
INJECTION, SOLUTION INTRAVENOUS CONTINUOUS PRN
Status: DISCONTINUED | OUTPATIENT
Start: 2024-12-20 | End: 2024-12-20

## 2024-12-20 RX ADMIN — PROPOFOL 40 MG: 10 INJECTION, EMULSION INTRAVENOUS at 12:20

## 2024-12-20 RX ADMIN — LIDOCAINE HYDROCHLORIDE 100 MG: 20 INJECTION INTRAVENOUS at 12:09

## 2024-12-20 RX ADMIN — PROPOFOL 120 MCG/KG/MIN: 10 INJECTION, EMULSION INTRAVENOUS at 12:09

## 2024-12-20 RX ADMIN — PROPOFOL 70 MG: 10 INJECTION, EMULSION INTRAVENOUS at 12:09

## 2024-12-20 RX ADMIN — PROPOFOL 30 MG: 10 INJECTION, EMULSION INTRAVENOUS at 12:10

## 2024-12-20 RX ADMIN — SODIUM CHLORIDE: 0.9 INJECTION, SOLUTION INTRAVENOUS at 12:05

## 2024-12-20 NOTE — H&P
History and Physical - [facility]  Tari Shannon 44 y.o. female MRN: [id number]                  HPI: Tari Shannon is a 44 y.o. year old female who presents for EGD for abd pain, dysphagia. Initial plan for bidirectional endoscopy, but pt did not receive bowel prep. Previous EGD/colonoscopy 1/2024 showing sub-cm gastric polyp with path showing normal gastric, duodenal, esophageal bx with fundic gland polyp and unremarkable colonic bx.       REVIEW OF SYSTEMS: Per the HPI, and otherwise unremarkable.    Historical Information   Past Medical History:   Diagnosis Date    Diabetes mellitus (HCC)     Migraine headache     Vertigo, aural, unspecified laterality      Past Surgical History:   Procedure Laterality Date    COLONOSCOPY      HERNIA REPAIR      DC LAPS ABD PRTM&OMENTUM DX W/WO SPEC BR/WA SPX N/A 12/1/2024    Procedure: LAPAROSCOPY DIAGNOSTIC, MESENTERIC BIOPSIES;  Surgeon: Akiko Lamb MD;  Location: [facility];  Service: Surgical Oncology    TUBAL LIGATION  2017    UPPER GASTROINTESTINAL ENDOSCOPY       Social History   Social History     Substance and Sexual Activity   Alcohol Use Not Currently    Comment: ocassionally     Social History     Substance and Sexual Activity   Drug Use Never     Social History     Tobacco Use   Smoking Status Never   Smokeless Tobacco Never     Family History   Problem Relation Age of Onset    Hypertension Mother     Arthritis Mother     Asthma Sister     Bipolar disorder Brother     Schizophrenia Brother     Asthma Brother     Asthma Brother     Diabetes Maternal Grandmother     Diabetes Paternal Grandmother     No Known Problems Maternal Grandfather     No Known Problems Paternal Grandfather     Breast cancer Neg Hx        Meds/Allergies       Current Outpatient Medications:     Accu-Chek FastClix Lancets MISC    Accu-Chek Guide test strip    acetaminophen (TYLENOL) 325 mg tablet    Continuous Glucose Sensor (Dexcom G7 Sensor)    Injection Device for  Insulin (B-D PEN MINI) ENRICO    insulin aspart (NovoLOG FlexPen) 100 UNIT/ML injection pen    insulin degludec (Tresiba FlexTouch) 100 units/mL injection pen    Insulin Pen Needle (BD Pen Needle Em U/F) 32G X 4 MM MISC    pantoprazole (PROTONIX) 40 mg tablet    polyethylene glycol (GLYCOLAX) 17 GM/SCOOP powder    pregabalin (LYRICA) 50 mg capsule    bisacodyl (DULCOLAX) 5 mg EC tablet    Continuous Blood Gluc  (Dexcom G7 ) ENRICO  No current facility-administered medications for this encounter.    Allergies   Allergen Reactions    Metformin And Related Hives       Objective     /78   Pulse 74   Temp (!) 96 °F (35.6 °C) (Tympanic)   Resp 18   LMP  (LMP Unknown)   SpO2 100%       PHYSICAL EXAM    Gen: NAD  Head: NCAT  CV: RRR  CHEST: Clear  ABD: soft, NT/ND  EXT: no edema      ASSESSMENT/PLAN:  This is a 44 y.o. year old female here for EGD, and she is stable and optimized for her procedure.

## 2024-12-20 NOTE — ANESTHESIA PREPROCEDURE EVALUATION
Problem: Discharge Planning  Goal: Discharge to home or other facility with appropriate resources  2023 by Christina Malloy RN  Outcome: Progressing  2023 by Simi Villarreal RN  Outcome: Progressing     Problem: Pain -   Goal: Displays adequate comfort level or baseline comfort level  2023 by Christina Malloy RN  Outcome: Progressing  2023 by Simi Villarreal RN  Outcome: Progressing     Problem:  Thermoregulation - [place]/Pediatrics  Goal: Maintains normal body temperature  2023 by Christina Malloy RN  Outcome: Progressing  2023 by Simi Villarreal RN  Outcome: Progressing     Problem: Safety -   Goal: Free from fall injury  Outcome: Progressing     Problem: Normal [place]  Goal: [place] experiences normal transition  Outcome: Progressing  Goal: Total Weight Loss Less than 10% of birth weight  Outcome: Progressing Procedure:  EGD  COLONOSCOPY    Relevant Problems   CARDIO   (+) Migraine without aura and without status migrainosus, not intractable      ENDO   (+) Type 2 diabetes mellitus with hyperglycemia, with long-term current use of insulin (HCC)      GI/HEPATIC   (+) Other dysphagia      /RENAL   (+) Microalbuminuric diabetic nephropathy (HCC)      NEURO/PSYCH   (+) Migraine without aura and without status migrainosus, not intractable   (+) Numbness and tingling of both feet   (+) Numbness and tingling of right arm   (+) Right leg weakness      Other   (+) LAD (lymphadenopathy), intra-abdominal      Denies recent fever, cough or other symptom of upper respiratory tract infection.    Confirmed NPO appropriate    Physical Exam    Airway    Mallampati score: II  TM Distance: >3 FB  Neck ROM: full     Dental   No notable dental hx     Cardiovascular      Pulmonary      Other Findings  post-pubertal.      11/22/21 Regadenoson Stress:     Pharmacologic nuclear stress test negative for myocardial ischemia. Apical fixed defect most consistent with breast attenuation given normal apical wall motion.    Stress ECG: ECG portion of stress test with nonspecific changes, nondiagnostic for myocardial ischemia.    Stress ECG: The patient after exercising for 3 min and 6 sec and had a maximal HR of 115 bpm (63 % of MPHR) and 1.0 METS. The patient experienced no angina during the test. Blood pressure demonstrated a normal response and heart rate demonstrated a normal response to stress.    Frequent VPCs.    Stress Function: Left ventricular function post-stress is normal. Post-stress ejection fraction is 65 %.  Anesthesia Plan  ASA Score- 2     Anesthesia Type- IV sedation with anesthesia with ASA Monitors.         Additional Monitors:     Airway Plan:            Plan Factors-Exercise tolerance (METS): >4 METS.    Chart reviewed.   Existing labs reviewed.     Patient is not a current smoker.  Patient did not smoke on day of  surgery.            Induction- intravenous.    Postoperative Plan-         Informed Consent- Anesthetic plan and risks discussed with patient.

## 2024-12-20 NOTE — ANESTHESIA POSTPROCEDURE EVALUATION
Post-Op Assessment Note    CV Status:  Stable  Pain Score: 0    Pain management: adequate       Mental Status:  Sleepy   Hydration Status:  Euvolemic   PONV Controlled:  Controlled   Airway Patency:  Patent     Post Op Vitals Reviewed: Yes    No anethesia notable event occurred.    Staff: Anesthesiologist, CRNA           Last Filed PACU Vitals:  Vitals Value Taken Time   Temp     Pulse 68    /55    Resp 16    SpO2 99        Modified [place]:  Activity: 2 (12/20/2024 11:29 AM)  Respiration: 2 (12/20/2024 11:29 AM)  Circulation: 2 (12/20/2024 11:29 AM)  Consciousness: 2 (12/20/2024 11:29 AM)  Oxygen Saturation: 2 (12/20/2024 11:29 AM)  Modified Viridiana Score: 10 (12/20/2024 11:29 AM)

## 2024-12-21 NOTE — ASSESSMENT & PLAN NOTE
In setting of history of dysphagia and esophageal thickening on imaging study will plan for EGD evaluation simultaneously especially in setting of nausea

## 2024-12-21 NOTE — ASSESSMENT & PLAN NOTE
Complains of right lower quadrant pain  Orders:    polyethylene glycol (GLYCOLAX) 17 GM/SCOOP powder; At 5pm take 5mgx2 dulcolax. At 6pm 32oz miralax in 64oz gatorade. Drink 8oz glass every 5 mins until 32oz finished. Drink remaining as rec.    bisacodyl (DULCOLAX) 5 mg EC tablet; Take 1 tablet (5 mg total) by mouth once for 1 dose    EGD; Future    Colonoscopy; Future

## 2024-12-21 NOTE — PROGRESS NOTES
Name: Tari Shannon      : 1980      MRN: [id number]  Encounter Provider: Fredis Brown MD  Encounter Date: 2024   Encounter department: [facility] [place]  :  Assessment & Plan  RLQ abdominal pain  Complains of right lower quadrant pain  Orders:    polyethylene glycol (GLYCOLAX) 17 GM/SCOOP powder; At 5pm take 5mgx2 dulcolax. At 6pm 32oz miralax in 64oz gatorade. Drink 8oz glass every 5 mins until 32oz finished. Drink remaining as rec.    bisacodyl (DULCOLAX) 5 mg EC tablet; Take 1 tablet (5 mg total) by mouth once for 1 dose    EGD; Future    Colonoscopy; Future    Nausea    Orders:    EGD; Future    Esophageal thickening  In setting of history of dysphagia and esophageal thickening on imaging study will plan for EGD evaluation simultaneously especially in setting of nausea       Abnormal CT scan, gastrointestinal tract  Recent imaging study demonstrated low-density soft tissue in the right lower quadrant mesentery with low-density foci with concern for necrotic lymph nodes.  In this setting we will plan for  colonoscopy evaluation and can also consider repeat CAT scan in 3 months     Reviewed imaging study on PACS with necrotic lymph node area, and it was hard to visualize but there was increased fecal burden      History of Present Illness   HPI  Tari Shannon is a 44 y.o. female who presents for evaluation for right lower quadrant pain, nausea, history of esophageal thickening, abnormal CT scan.  Recent CT scan for right lower quadrant identified possible necrotic lymph node.  This is also history of severe right lower quadrant abdominal discomfort.      EGD evaluation demonstrated abscess esophageal dysmotility but due to normal limits relaxation pressures on visual topography achalasia was considered less likely.  Colonoscopy with random biopsy was negative in 2024.      Review of Systems   Gastrointestinal:  Positive for abdominal pain and nausea.  "         Objective   /70 (BP Location: Right arm, Patient Position: Sitting, Cuff Size: Adult)   Temp 98.3 °F (36.8 °C) (Tympanic)   Ht 5' 5\" (1.651 m)   Wt 79.4 kg (175 lb)   LMP  (LMP Unknown)   BMI 29.12 kg/m²      Physical Exam  HENT:      Head: Normocephalic and atraumatic.   Cardiovascular:      Rate and Rhythm: Normal rate and regular rhythm.   Pulmonary:      Effort: Pulmonary effort is normal.      Breath sounds: Normal breath sounds.   Abdominal:      General: Bowel sounds are normal. There is no distension.      Palpations: Abdomen is soft.      Tenderness: There is abdominal tenderness. There is no rebound.   Musculoskeletal:      Cervical back: Normal range of motion.   Skin:     General: Skin is warm.   Neurological:      Mental Status: She is oriented to person, place, and time.           "

## 2024-12-24 ENCOUNTER — TELEPHONE (OUTPATIENT)
Age: 44
End: 2024-12-24

## 2024-12-24 NOTE — TELEPHONE ENCOUNTER
Pt called top see if her FMLA forms had been completed. I Let her know that we never received the forms so she will be dropping them off.     Upon completion, they would need to be faxed to:  Dejon Petersen@ [employer]

## 2024-12-24 NOTE — TELEPHONE ENCOUNTER
Patient dropped of [facility] paperwork for Dr. Foote. Placed in Cj's bin for completion. Notified patient 7-10 business day turn around time.

## 2024-12-27 ENCOUNTER — DOCUMENTATION (OUTPATIENT)
Age: 44
End: 2024-12-27

## 2024-12-27 NOTE — PROGRESS NOTES
The above is for patients' current medical status since her admittance in the hospital on 11/11/26/24.  I spoke with patient today 12/27/24 about when she will return to work and she has to see what the Infectious Diease will advised on 01/03/24. She will advise us ASAP of next steps, in the meantime her FMLA will run from 11/26/24-01/18/24 until further notice.    I also example that Dr. Foote was out of the office over the holiday and will return next wee to review, sign and date FMLA paper work.

## 2025-01-03 ENCOUNTER — OFFICE VISIT (OUTPATIENT)
Dept: INFECTIOUS DISEASES | Facility: CLINIC | Age: 45
End: 2025-01-03
Payer: COMMERCIAL

## 2025-01-03 VITALS
HEART RATE: 82 BPM | TEMPERATURE: 96.5 F | DIASTOLIC BLOOD PRESSURE: 82 MMHG | OXYGEN SATURATION: 99 % | WEIGHT: 176.2 LBS | BODY MASS INDEX: 29.32 KG/M2 | SYSTOLIC BLOOD PRESSURE: 118 MMHG

## 2025-01-03 DIAGNOSIS — R59.0 LAD (LYMPHADENOPATHY), INTRA-ABDOMINAL: ICD-10-CM

## 2025-01-03 DIAGNOSIS — Z79.4 TYPE 2 DIABETES MELLITUS WITH OTHER SPECIFIED COMPLICATION, WITH LONG-TERM CURRENT USE OF INSULIN (HCC): ICD-10-CM

## 2025-01-03 DIAGNOSIS — A53.9 SYPHILIS: Primary | ICD-10-CM

## 2025-01-03 DIAGNOSIS — E11.69 TYPE 2 DIABETES MELLITUS WITH OTHER SPECIFIED COMPLICATION, WITH LONG-TERM CURRENT USE OF INSULIN (HCC): ICD-10-CM

## 2025-01-03 PROCEDURE — 99214 OFFICE O/P EST MOD 30 MIN: CPT | Performed by: NURSE PRACTITIONER

## 2025-01-03 NOTE — ASSESSMENT & PLAN NOTE
Lab Results   Component Value Date    HGBA1C 14.0 (A) 11/21/2024   Elevated blood glucose is risk factor for infection. Patient follows with outpatient endocrinology.  -recommend tight glycemic control  -blood glucose management per primary service

## 2025-01-03 NOTE — PATIENT INSTRUCTIONS
Complete CT A/P with contrast. Return to the outpatient infectious disease office after scan to review results.    Recommend repeat RPR with titers in 3 months, 6 months, 12 months, and 24 month. Recommend follow up screening to be coordinated by PCP.

## 2025-01-03 NOTE — PROGRESS NOTES
Name: Tari Shannon      : 1980      MRN: [id number]  Encounter Provider: KETURAH Perry  Encounter Date: 1/3/2025   Encounter department: [facility]  :  Assessment & Plan  Syphilis  Initial screen and follow up RPR positive with titer = 64. Patient denies prior history of STD but had never had syphilis testing. 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 Patient completed 3 injections of Benzathine penicillin. She tolerated the injections without difficulty. She has notified a previous partner and the health bureau is following up to make sure the partner has proper testing/treatment. Discussed titer and need for repeat testing.   -recommend patient have repeat RPR screening/titer at 3, 6, 12, 24 months  LAD (lymphadenopathy), intra-abdominal  RLQ abdominal lymphadenopathy noted on  abdomen/pelvis CT. etiology of lymphadenopathy is unclear. Patient has no history of cat bite or scratch, lower extremity injury or intra-abdominal/chronic infection. She is status post biopsy of these lymph nodes , with preliminary pathology not show any malignancy. 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 Patient should complete repeat CT A/P for re-evaluation.  -check CT A/P w contrast  -return to the outpatient ID office after CT to review results  Type 2 diabetes mellitus with other specified complication, with long-term current use of insulin ([facility])  Lab Results   Component Value Date    HGBA1C 14.0 (A) 2024   Elevated blood glucose is risk factor for infection. Patient follows with outpatient endocrinology.  -recommend tight glycemic control  -blood glucose management per primary service     Above plan was discussed in detail with patient in the exam room.    Antibiotics:  Complete 3 doses of IM benzathine penicillin    Subjective:  Patient presents to the  outpatient ID office for follow up after completing treatment for syphilis. The patient has had ongoing fatigue since discharge. Still has some abdominal pain, crampin, bloating, although her symptoms are slightly less intense. Today she has no fever, chills, sweats, shakes; no cough, shortness of breath, or chest pain. No new symptoms.    Objective:  Vitals:  Temp 96.5  HR 82  /82    Physical Exam:   General Appearance:  Alert, interactive, nontoxic, no acute distress.   Lungs:   Respirations unlabored on room air.   Heart:  RRR; no murmur, rub or gallop.   Skin: No new rashes noted on exposed skin.

## 2025-01-03 NOTE — ASSESSMENT & PLAN NOTE
Initial screen and follow up RPR positive with titer = 64. Patient denies prior history of STD but had never had syphilis testing. 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 Patient completed 3 injections of Benzathine penicillin. She tolerated the injections without difficulty. She has notified a previous partner and the health bureau is following up to make sure the partner has proper testing/treatment. Discussed titer and need for repeat testing.   -recommend patient have repeat RPR screening/titer at 3, 6, 12, 24 months

## 2025-01-03 NOTE — ASSESSMENT & PLAN NOTE
RLQ abdominal lymphadenopathy noted on 9/17 abdomen/pelvis CT. etiology of lymphadenopathy is unclear. Patient has no history of cat bite or scratch, lower extremity injury or intra-abdominal/chronic infection. She is status post biopsy of these lymph nodes 12/1, with preliminary pathology not show any malignancy. 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 Patient should complete repeat CT A/P for re-evaluation.  -check CT A/P w contrast  -return to the outpatient ID office after CT to review results

## 2025-01-05 PROBLEM — R50.9 FEVER: Status: RESOLVED | Noted: 2024-12-02 | Resolved: 2025-01-05

## 2025-01-16 ENCOUNTER — TELEPHONE (OUTPATIENT)
Age: 45
End: 2025-01-16

## 2025-02-03 ENCOUNTER — HOSPITAL ENCOUNTER (OUTPATIENT)
Dept: CT IMAGING | Facility: HOSPITAL | Age: 45
Discharge: HOME | End: 2025-02-03
Payer: COMMERCIAL

## 2025-02-03 DIAGNOSIS — R59.0 LAD (LYMPHADENOPATHY), INTRA-ABDOMINAL: ICD-10-CM

## 2025-02-03 PROCEDURE — 74177 CT ABD & PELVIS W/CONTRAST: CPT

## 2025-02-03 RX ADMIN — IOHEXOL 90 ML: 350 INJECTION, SOLUTION INTRAVENOUS at 12:54

## 2025-02-07 ENCOUNTER — OFFICE VISIT (OUTPATIENT)
Dept: INFECTIOUS DISEASES | Facility: CLINIC | Age: 45
End: 2025-02-07
Payer: COMMERCIAL

## 2025-02-07 VITALS
SYSTOLIC BLOOD PRESSURE: 110 MMHG | BODY MASS INDEX: 29.29 KG/M2 | TEMPERATURE: 97.9 F | OXYGEN SATURATION: 99 % | DIASTOLIC BLOOD PRESSURE: 70 MMHG | WEIGHT: 176 LBS | HEART RATE: 84 BPM

## 2025-02-07 DIAGNOSIS — Z79.4 TYPE 2 DIABETES MELLITUS WITH HYPERGLYCEMIA, WITH LONG-TERM CURRENT USE OF INSULIN (HCC): ICD-10-CM

## 2025-02-07 DIAGNOSIS — A53.9 SYPHILIS: Primary | ICD-10-CM

## 2025-02-07 DIAGNOSIS — R59.0 LAD (LYMPHADENOPATHY), INTRA-ABDOMINAL: ICD-10-CM

## 2025-02-07 DIAGNOSIS — E11.65 TYPE 2 DIABETES MELLITUS WITH HYPERGLYCEMIA, WITH LONG-TERM CURRENT USE OF INSULIN (HCC): ICD-10-CM

## 2025-02-07 PROCEDURE — 99214 OFFICE O/P EST MOD 30 MIN: CPT | Performed by: NURSE PRACTITIONER

## 2025-02-07 RX ORDER — INSULIN ASPART 100 [IU]/ML
10 INJECTION, SOLUTION INTRAVENOUS; SUBCUTANEOUS
COMMUNITY

## 2025-02-07 NOTE — PROGRESS NOTES
Name: Tari Shannon      : 1980      MRN: [id number]  Encounter Provider: KETURAH Perry  Encounter Date: 2025   Encounter department: [facility]  :  Assessment & Plan  Syphilis  Initial screen and follow up RPR positive with titer = 64. Patient denies prior history of STD but had never had syphilis testing. 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 Patient completed 3 injections of Benzathine penicillin. She tolerated the injections without difficulty. She has notified a previous partner and the health bureau is following up to make sure the partner has proper testing/treatment. Discussed titer and need for repeat testing.   -recommend patient have repeat RPR screening/titer at 3, 6, 12, 24 months  -patient can complete follow up testing with her PCP vs the health bureau   LAD (lymphadenopathy), intra-abdominal  RLQ abdominal lymphadenopathy noted on 2024 abdomen/pelvis CT. Etiology of lymphadenopathy is unclear. Patient has no history of cat bite or scratch, lower extremity injury or intra-abdominal/chronic infection. She is status post biopsy of these lymph nodes 2024, with preliminary pathology not show any malignancy. 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 Patient has completed follow up CT A/P w contrast. I personally reviewed the study which showed interval resolution of previously seen necrotic lymph nodes and surrounding fat stranding. 1 prominent node remains present. No indication for new antibiotic treatment at this time. She should have surveillance again in 6 months to ensure resolution of last lymph node.    -recommend surveillance CT A/P w contrast in 6 months  -return to the outpatient ID office for follow up after CT review results   Type 2 diabetes mellitus with hyperglycemia, with long-term current use of insulin (HCC)  Lab  Results   Component Value Date    HGBA1C 14.0 (A) 11/21/2024   Elevated blood glucose is risk factor for infection. Patient follows with outpatient endocrinology.  -recommend tight glycemic control  -blood glucose management per primary service     Above plan was discussed in detail with patient in the exam room.    Antibiotics:  No current antibiotic use    Subjective:  Patient presents to the outpatient infectious disease office for follow up for intra-abdominal lymphadenopathy after syphilis infection. Patient completed treatment in 12/2024 and returns to review her repeat CT imaging. Today she has no fever, chills, sweats, shakes; no nausea, vomiting, abdominal pain, diarrhea, or dysuria; no cough, shortness of breath, or chest pain. Is still noting a poor appetites with large meals just not appealing to her right now. No new symptoms.    Objective:  Vitals:  Temp 97.9  HR 84  O2 saturation 99% on room air  /70    Physical Exam:   General Appearance:  Alert, interactive, nontoxic, no acute distress. She appears comfortable sitting in the exam room.    Lungs:   Respirations unlabored on room air.   Heart:  RRR; no murmur, rub or gallop.   Extremities: No edema.   Skin: No new rashes noted on exposed skin.     Labs, Imaging, & Other studies:   All pertinent labs and imaging studies were personally reviewed by me including CT abdomen pelvis w contrast collected at [facility] on 2/3/2025.

## 2025-02-07 NOTE — ASSESSMENT & PLAN NOTE
RLQ abdominal lymphadenopathy noted on 9/17/2024 abdomen/pelvis CT. Etiology of lymphadenopathy is unclear. Patient has no history of cat bite or scratch, lower extremity injury or intra-abdominal/chronic infection. She is status post biopsy of these lymph nodes 12/1/2024, with preliminary pathology not show any malignancy. 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 Patient has completed follow up CT A/P w contrast. I personally reviewed the study which showed interval resolution of previously seen necrotic lymph nodes and surrounding fat stranding. 1 prominent node remains present. No indication for new antibiotic treatment at this time. She should have surveillance again in 6 months to ensure resolution of last lymph node.    -recommend surveillance CT A/P w contrast in 6 months  -return to the outpatient ID office for follow up after CT review results

## 2025-02-07 NOTE — PATIENT INSTRUCTIONS
Complete repeat CT A/P in 6 months.    Return to the outpatient infectious disease office after CT to review results and to discuss next steps.

## 2025-02-07 NOTE — ASSESSMENT & PLAN NOTE
Initial screen and follow up RPR positive with titer = 64. Patient denies prior history of STD but had never had syphilis testing. 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 Patient completed 3 injections of Benzathine penicillin. She tolerated the injections without difficulty. She has notified a previous partner and the health bureau is following up to make sure the partner has proper testing/treatment. Discussed titer and need for repeat testing.   -recommend patient have repeat RPR screening/titer at 3, 6, 12, 24 months  -patient can complete follow up testing with her PCP vs the health bureau

## 2025-02-07 NOTE — LETTER
February 7, 2025     Patient: Tari Shannon  YOB: 1980  Date of Visit: 2/7/2025      To Whom it May Concern:    Tari Shannon is under my professional care. Tari was seen in my office on 2/7/2025. Tari may return to work on 2/7/2025 without restriction .    If you have any questions or concerns, please don't hesitate to call.         Sincerely,          KETURAH Perry        CC: No Recipients

## 2025-03-11 ENCOUNTER — OFFICE VISIT (OUTPATIENT)
Age: 45
End: 2025-03-11
Payer: COMMERCIAL

## 2025-03-11 VITALS
DIASTOLIC BLOOD PRESSURE: 90 MMHG | SYSTOLIC BLOOD PRESSURE: 150 MMHG | RESPIRATION RATE: 16 BRPM | BODY MASS INDEX: 29.49 KG/M2 | WEIGHT: 177 LBS | TEMPERATURE: 98.3 F | HEART RATE: 77 BPM | HEIGHT: 65 IN | OXYGEN SATURATION: 98 %

## 2025-03-11 DIAGNOSIS — Z00.00 ROUTINE ADULT HEALTH MAINTENANCE: ICD-10-CM

## 2025-03-11 DIAGNOSIS — A53.0 POSITIVE RPR TEST: ICD-10-CM

## 2025-03-11 DIAGNOSIS — E11.65 TYPE 2 DIABETES MELLITUS WITH HYPERGLYCEMIA, WITH LONG-TERM CURRENT USE OF INSULIN (HCC): Primary | ICD-10-CM

## 2025-03-11 DIAGNOSIS — Z79.4 TYPE 2 DIABETES MELLITUS WITH HYPERGLYCEMIA, WITH LONG-TERM CURRENT USE OF INSULIN (HCC): Primary | ICD-10-CM

## 2025-03-11 DIAGNOSIS — E11.21 MICROALBUMINURIC DIABETIC NEPHROPATHY (HCC): ICD-10-CM

## 2025-03-11 LAB
LEFT EYE DIABETIC RETINOPATHY: ABNORMAL
LEFT EYE IMAGE QUALITY: ABNORMAL
LEFT EYE MACULAR EDEMA: ABNORMAL
LEFT EYE OTHER RETINOPATHY: ABNORMAL
RIGHT EYE DIABETIC RETINOPATHY: ABNORMAL
RIGHT EYE IMAGE QUALITY: ABNORMAL
RIGHT EYE MACULAR EDEMA: ABNORMAL
RIGHT EYE OTHER RETINOPATHY: ABNORMAL
SEVERITY (EYE EXAM): ABNORMAL
SL AMB POCT HEMOGLOBIN AIC: 12.3 (ref ?–6.5)

## 2025-03-11 PROCEDURE — 99214 OFFICE O/P EST MOD 30 MIN: CPT | Performed by: FAMILY MEDICINE

## 2025-03-11 PROCEDURE — 99396 PREV VISIT EST AGE 40-64: CPT | Performed by: FAMILY MEDICINE

## 2025-03-11 PROCEDURE — 83036 HEMOGLOBIN GLYCOSYLATED A1C: CPT | Performed by: FAMILY MEDICINE

## 2025-03-11 RX ORDER — LOSARTAN POTASSIUM 25 MG/1
25 TABLET ORAL DAILY
Qty: 30 TABLET | Refills: 0 | Status: SHIPPED | OUTPATIENT
Start: 2025-03-11

## 2025-03-13 PROBLEM — A53.0 POSITIVE RPR TEST: Status: ACTIVE | Noted: 2025-03-13

## 2025-03-13 PROBLEM — Z00.00 ROUTINE ADULT HEALTH MAINTENANCE: Status: ACTIVE | Noted: 2025-03-13

## 2025-03-13 NOTE — ASSESSMENT & PLAN NOTE
Check follow-up labs. Discussed supportive care and return parameters.   Orders:    RPR (DX) W/REFL TITER AND CONFIRM TESTING (REFL); Future

## 2025-03-13 NOTE — PROGRESS NOTES
Name: Tari Shannon      : 1980      MRN: [id number]  Encounter Provider: Chaim Foote MD  Encounter Date: 3/11/2025   Encounter department: [facility]  :  Assessment & Plan  Type 2 diabetes mellitus with hyperglycemia, with long-term current use of insulin (HCC)  Refer to clinical pharmacist, continue meds. And endo follow-up. Discussed supportive care and return parameters.   Lab Results   Component Value Date    HGBA1C 12.3 (A) 2025       Orders:    POCT hemoglobin A1c    Ambulatory referral to clinical pharmacy; Future    losartan (COZAAR) 25 mg tablet; Take 1 tablet (25 mg total) by mouth daily    IRIS Diabetic eye exam    Positive RPR test  Check follow-up labs. Discussed supportive care and return parameters.   Orders:    RPR (DX) W/REFL TITER AND CONFIRM TESTING (REFL); Future    Microalbuminuric diabetic nephropathy (HCC)  Refer to clinical pharmacist, continue meds. And endo follow-up. Discussed supportive care and return parameters.  Add losartan.  Lab Results   Component Value Date    HGBA1C 12.3 (A) 2025       Orders:    losartan (COZAAR) 25 mg tablet; Take 1 tablet (25 mg total) by mouth daily    Routine adult health maintenance  Annual well visit. Discussed various safety and health maintenance issues including healthy diet like the Mediterranean diet, exercise, ample sleep, stress reduction, and healthy weight as tolerated. Discussed supportive care and return parameters.               History of Present Illness   Patient is a 45 y/o woman who presents for follow-up on DM2 with diabetic microalbuminuria and positive RPR. No fevers chills nausea or vomiting. Pt also here for annual well visit admits being active eats and sleeps well.      Review of Systems   Constitutional: Negative.    HENT: Negative.     Eyes: Negative.    Respiratory: Negative.     Cardiovascular: Negative.    Gastrointestinal: Negative.    Endocrine: Negative.    Genitourinary:  "Negative.    Musculoskeletal: Negative.    Allergic/Immunologic: Negative.    Neurological: Negative.    Hematological: Negative.    Psychiatric/Behavioral: Negative.     All other systems reviewed and are negative.      Objective   /90   Pulse 77   Temp 98.3 °F (36.8 °C) (Temporal)   Resp 16   Ht 5' 5\" (1.651 m)   Wt 80.3 kg (177 lb)   SpO2 98%   BMI 29.45 kg/m²      Physical Exam  Constitutional:       General: She is not in acute distress.     Appearance: She is well-developed. She is not diaphoretic.   HENT:      Head: Normocephalic and atraumatic.      Right Ear: External ear normal.      Left Ear: External ear normal.      Nose: Nose normal.      Mouth/Throat:      Pharynx: No oropharyngeal exudate.   Eyes:      General:         Right eye: No discharge.         Left eye: No discharge.      Conjunctiva/sclera: Conjunctivae normal.      Pupils: Pupils are equal, round, and reactive to light.   Neck:      Thyroid: No thyromegaly.      Trachea: No tracheal deviation.   Cardiovascular:      Rate and Rhythm: Normal rate and regular rhythm.      Heart sounds: Normal heart sounds. No murmur heard.     No friction rub. No gallop.   Pulmonary:      Effort: Pulmonary effort is normal. No respiratory distress.      Breath sounds: Normal breath sounds.   Abdominal:      General: There is no distension.      Palpations: Abdomen is soft.      Tenderness: There is no abdominal tenderness. There is no guarding or rebound.   Musculoskeletal:         General: Normal range of motion.   Lymphadenopathy:      Cervical: No cervical adenopathy.   Skin:     General: Skin is warm.   Neurological:      Mental Status: She is alert and oriented to person, place, and time.      Cranial Nerves: No cranial nerve deficit.   Psychiatric:         Behavior: Behavior normal.         Thought Content: Thought content normal.         Judgment: Judgment normal.         "

## 2025-03-13 NOTE — ASSESSMENT & PLAN NOTE
Refer to clinical pharmacist, continue meds. And endo follow-up. Discussed supportive care and return parameters.  Add losartan.  Lab Results   Component Value Date    HGBA1C 12.3 (A) 03/11/2025       Orders:    losartan (COZAAR) 25 mg tablet; Take 1 tablet (25 mg total) by mouth daily

## 2025-03-13 NOTE — ASSESSMENT & PLAN NOTE
Refer to clinical pharmacist, continue meds. And endo follow-up. Discussed supportive care and return parameters.   Lab Results   Component Value Date    HGBA1C 12.3 (A) 03/11/2025       Orders:    POCT hemoglobin A1c    Ambulatory referral to clinical pharmacy; Future    losartan (COZAAR) 25 mg tablet; Take 1 tablet (25 mg total) by mouth daily    IRIS Diabetic eye exam

## 2025-03-15 ENCOUNTER — APPOINTMENT (OUTPATIENT)
Dept: LAB | Facility: HOSPITAL | Age: 45
End: 2025-03-15
Payer: COMMERCIAL

## 2025-03-15 DIAGNOSIS — E11.29 TYPE 2 DIABETES MELLITUS WITH MICROALBUMINURIA, WITH LONG-TERM CURRENT USE OF INSULIN (HCC): ICD-10-CM

## 2025-03-15 DIAGNOSIS — Z79.4 TYPE 2 DIABETES MELLITUS WITH MICROALBUMINURIA, WITH LONG-TERM CURRENT USE OF INSULIN (HCC): ICD-10-CM

## 2025-03-15 DIAGNOSIS — R59.0 MESENTERIC LYMPHADENOPATHY: ICD-10-CM

## 2025-03-15 DIAGNOSIS — R80.9 TYPE 2 DIABETES MELLITUS WITH MICROALBUMINURIA, WITH LONG-TERM CURRENT USE OF INSULIN (HCC): ICD-10-CM

## 2025-03-15 DIAGNOSIS — E11.65 UNCONTROLLED TYPE 2 DIABETES MELLITUS WITH HYPERGLYCEMIA, WITHOUT LONG-TERM CURRENT USE OF INSULIN (HCC): ICD-10-CM

## 2025-03-15 DIAGNOSIS — A53.0 POSITIVE RPR TEST: ICD-10-CM

## 2025-03-15 LAB
ALBUMIN SERPL BCG-MCNC: 3.8 G/DL (ref 3.5–5)
ALP SERPL-CCNC: 63 U/L (ref 34–104)
ALT SERPL W P-5'-P-CCNC: 12 U/L (ref 7–52)
ANION GAP SERPL CALCULATED.3IONS-SCNC: 7 MMOL/L (ref 4–13)
AST SERPL W P-5'-P-CCNC: 13 U/L (ref 13–39)
BASOPHILS # BLD AUTO: 0.04 THOUSANDS/ÂΜL (ref 0–0.1)
BASOPHILS NFR BLD AUTO: 1 % (ref 0–1)
BILIRUB SERPL-MCNC: 0.44 MG/DL (ref 0.2–1)
BUN SERPL-MCNC: 9 MG/DL (ref 5–25)
CALCIUM SERPL-MCNC: 9.1 MG/DL (ref 8.4–10.2)
CHLORIDE SERPL-SCNC: 101 MMOL/L (ref 96–108)
CHOLEST SERPL-MCNC: 166 MG/DL (ref ?–200)
CO2 SERPL-SCNC: 30 MMOL/L (ref 21–32)
CREAT SERPL-MCNC: 0.81 MG/DL (ref 0.6–1.3)
EOSINOPHIL # BLD AUTO: 0.19 THOUSAND/ÂΜL (ref 0–0.61)
EOSINOPHIL NFR BLD AUTO: 3 % (ref 0–6)
ERYTHROCYTE [DISTWIDTH] IN BLOOD BY AUTOMATED COUNT: 13.6 % (ref 11.6–15.1)
GFR SERPL CREATININE-BSD FRML MDRD: 89 ML/MIN/1.73SQ M
GLUCOSE P FAST SERPL-MCNC: 115 MG/DL (ref 65–99)
HCT VFR BLD AUTO: 37 % (ref 34.8–46.1)
HDLC SERPL-MCNC: 73 MG/DL
HGB BLD-MCNC: 11.8 G/DL (ref 11.5–15.4)
IMM GRANULOCYTES # BLD AUTO: 0.02 THOUSAND/UL (ref 0–0.2)
IMM GRANULOCYTES NFR BLD AUTO: 0 % (ref 0–2)
LDLC SERPL CALC-MCNC: 76 MG/DL (ref 0–100)
LYMPHOCYTES # BLD AUTO: 4.58 THOUSANDS/ÂΜL (ref 0.6–4.47)
LYMPHOCYTES NFR BLD AUTO: 62 % (ref 14–44)
MCH RBC QN AUTO: 27.6 PG (ref 26.8–34.3)
MCHC RBC AUTO-ENTMCNC: 31.9 G/DL (ref 31.4–37.4)
MCV RBC AUTO: 87 FL (ref 82–98)
MONOCYTES # BLD AUTO: 0.4 THOUSAND/ÂΜL (ref 0.17–1.22)
MONOCYTES NFR BLD AUTO: 5 % (ref 4–12)
NEUTROPHILS # BLD AUTO: 2.16 THOUSANDS/ÂΜL (ref 1.85–7.62)
NEUTS SEG NFR BLD AUTO: 29 % (ref 43–75)
NRBC BLD AUTO-RTO: 0 /100 WBCS
PLATELET # BLD AUTO: 357 THOUSANDS/UL (ref 149–390)
PMV BLD AUTO: 9.2 FL (ref 8.9–12.7)
POTASSIUM SERPL-SCNC: 3.7 MMOL/L (ref 3.5–5.3)
PROT SERPL-MCNC: 7 G/DL (ref 6.4–8.4)
RBC # BLD AUTO: 4.27 MILLION/UL (ref 3.81–5.12)
SODIUM SERPL-SCNC: 138 MMOL/L (ref 135–147)
TRIGL SERPL-MCNC: 86 MG/DL (ref ?–150)
TSH SERPL DL<=0.05 MIU/L-ACNC: 2.78 UIU/ML (ref 0.45–4.5)
WBC # BLD AUTO: 7.39 THOUSAND/UL (ref 4.31–10.16)

## 2025-03-15 PROCEDURE — 83036 HEMOGLOBIN GLYCOSYLATED A1C: CPT

## 2025-03-15 PROCEDURE — 86593 SYPHILIS TEST NON-TREP QUANT: CPT

## 2025-03-15 PROCEDURE — 84443 ASSAY THYROID STIM HORMONE: CPT

## 2025-03-15 PROCEDURE — 80061 LIPID PANEL: CPT

## 2025-03-15 PROCEDURE — 86592 SYPHILIS TEST NON-TREP QUAL: CPT

## 2025-03-15 PROCEDURE — 36415 COLL VENOUS BLD VENIPUNCTURE: CPT

## 2025-03-15 PROCEDURE — 80053 COMPREHEN METABOLIC PANEL: CPT

## 2025-03-15 PROCEDURE — 86780 TREPONEMA PALLIDUM: CPT

## 2025-03-15 PROCEDURE — 85025 COMPLETE CBC W/AUTO DIFF WBC: CPT

## 2025-03-16 LAB
EST. AVERAGE GLUCOSE BLD GHB EST-MCNC: 292 MG/DL
HBA1C MFR BLD: 11.8 %
TREPONEMA PALLIDUM IGG+IGM AB [PRESENCE] IN SERUM OR PLASMA BY IMMUNOASSAY: REACTIVE

## 2025-03-17 ENCOUNTER — OFFICE VISIT (OUTPATIENT)
Dept: ENDOCRINOLOGY | Facility: CLINIC | Age: 45
End: 2025-03-17
Payer: COMMERCIAL

## 2025-03-17 ENCOUNTER — RESULTS FOLLOW-UP (OUTPATIENT)
Dept: ENDOCRINOLOGY | Facility: CLINIC | Age: 45
End: 2025-03-17

## 2025-03-17 VITALS
WEIGHT: 181.8 LBS | HEIGHT: 65 IN | DIASTOLIC BLOOD PRESSURE: 88 MMHG | BODY MASS INDEX: 30.29 KG/M2 | SYSTOLIC BLOOD PRESSURE: 138 MMHG

## 2025-03-17 DIAGNOSIS — E11.65 UNCONTROLLED TYPE 2 DIABETES MELLITUS WITH HYPERGLYCEMIA, WITHOUT LONG-TERM CURRENT USE OF INSULIN (HCC): ICD-10-CM

## 2025-03-17 DIAGNOSIS — Z79.4 TYPE 2 DIABETES MELLITUS WITH HYPERGLYCEMIA, WITH LONG-TERM CURRENT USE OF INSULIN (HCC): Primary | ICD-10-CM

## 2025-03-17 DIAGNOSIS — E11.65 TYPE 2 DIABETES MELLITUS WITH HYPERGLYCEMIA, WITH LONG-TERM CURRENT USE OF INSULIN (HCC): Primary | ICD-10-CM

## 2025-03-17 LAB
RPR SER QL: REACTIVE
RPR SER-TITR: ABNORMAL {TITER}

## 2025-03-17 PROCEDURE — 95251 CONT GLUC MNTR ANALYSIS I&R: CPT | Performed by: PHYSICIAN ASSISTANT

## 2025-03-17 PROCEDURE — 99214 OFFICE O/P EST MOD 30 MIN: CPT | Performed by: PHYSICIAN ASSISTANT

## 2025-03-17 RX ORDER — INSULIN ASPART 100 [IU]/ML
INJECTION, SOLUTION INTRAVENOUS; SUBCUTANEOUS
Start: 2025-03-17

## 2025-03-17 RX ORDER — INSULIN DEGLUDEC 100 U/ML
34 INJECTION, SOLUTION SUBCUTANEOUS DAILY
Start: 2025-03-17

## 2025-03-17 NOTE — ASSESSMENT & PLAN NOTE
Lab Results   Component Value Date    HGBA1C 11.8 (H) 03/15/2025   Current HbA1c represents poor control. Blood sugars demonstrating hyperglycemia likely due to dietary indiscretion, discontinuation of Ozempic.   Continue current regimen with the following adjustments:  Increase Tresiba to 34u  Increase NovoLog to 10-12-12  Advised to adhere to diabetic diet, and recommended staying active/exercising routinely.  Discussed symptoms and treatment of hypoglycemia.    Recommended routine follow-up with Ophthalmology and foot exams.   Ordered blood work to complete prior to next visit.  Orders:  •  Comprehensive metabolic panel; Future  •  Hemoglobin A1C; Future

## 2025-03-17 NOTE — PATIENT INSTRUCTIONS
Continue your medications with the following changes:  Increase Tresiba to 34u  Increase NovoLog to 10-12-12  Monitor blood sugars regularly  Contact the office with any severe hypoglycemic or hyperglycemic events  Maintain appropriate diet and physical activity  Follow up in 3 months  Call with any questions, concerns, or refill requests  Obtain labs prior to your next appointment

## 2025-03-17 NOTE — PROGRESS NOTES
Name: Tari Shannon      : 1980      MRN: [id number]  Encounter Provider: Vangie Clark PA-C  Encounter Date: 3/17/2025   Encounter department: [facility] [place]    Chief Complaint   Patient presents with   • Diabetes Type 2   :  Assessment & Plan  Type 2 diabetes mellitus with hyperglycemia, with long-term current use of insulin (HCC)    Lab Results   Component Value Date    HGBA1C 11.8 (H) 03/15/2025   Current HbA1c represents poor control. Blood sugars demonstrating hyperglycemia likely due to dietary indiscretion, discontinuation of Ozempic.   Continue current regimen with the following adjustments:  Increase Tresiba to 34u  Increase NovoLog to 10-12-12  Advised to adhere to diabetic diet, and recommended staying active/exercising routinely.  Discussed symptoms and treatment of hypoglycemia.    Recommended routine follow-up with Ophthalmology and foot exams.   Ordered blood work to complete prior to next visit.  Orders:  •  Comprehensive metabolic panel; Future  •  Hemoglobin A1C; Future    Uncontrolled type 2 diabetes mellitus with hyperglycemia, without long-term current use of insulin ([facility])    Lab Results   Component Value Date    HGBA1C 11.8 (H) 03/15/2025     Orders:  •  insulin degludec (Tresiba FlexTouch) 100 units/mL injection pen; Inject 34 Units under the skin daily  •  insulin aspart (NovoLOG FlexPen) 100 UNIT/ML injection pen; Inject 3x daily with meals 10-12-12        History of Present Illness {?Quick Links Encounters * My Last Note * Last Note in Specialty * Snapshot * Since Last Visit * History :51902}    Tari Shannon is a 44 y.o. female with type 2 diabetes seen in follow up. Her management has been complicated by housing insecurity. Reports complications of microalbuminuria. Denies recent severe hypoglycemic or severe hyperglycemic episodes. Denies any issues with her current regimen. Last A1C was 10.8.     Patient was hospitalized 2024  "- 12/6/2024 for abdominal pain which revealed intraabdominal lymphadenopathy secondary to syphilis. She has since had laparoscopic removal of majority of affected nodes, undergone treatment and has follow-up with ID.     Patient typically eats breakfast around 9 or 10am and consists of oatmeal with peanut butter or cottage cheese and fruit, sometimes cereal or hardboiled eggs. Lunch is around 1pm and consists of salad and soup or just fruit and water. Dinner, she typically has a starch, protein and vegetable prior to 7pm.     Patient just located some of her supplies and began using Dexcom again a few days prior to appointment. We discussed the dramatic rise in blood sugars after lunch and the need for increased insulin dosing. She admits that since her \"stomach issues\" she has to force herself to eat. If able to tolerate full meals or high carb, she is agreeable to increase insulin.     CGM Interpretation:  Date Range: 3/4 - 3/17  Device used: Dexcom  Home use   Option - Indication: Type 2 Diabetes  Analysis of data:   Average Glucose: 263  GMI: N/A%  Coefficient of Variation: 27.5%  SD: 72 mg/dL  Time in Target Range: 19%   Time Above Range: 23% high, 58% very high  Time Below Range: 0% low, 0% very low  Interpretation of data: Persistent hyperglycemia rising further at 12 noon and peaking at 3 PM, remaining elevated throughout the evening, falling around 2 AM  More than 72 hours of data was reviewed. Report to be scanned to chart.     Current regimen:   Tresiba 30 units daily  NovoLog 10 units with meals      Last Eye Exam: 03/11/2025  Last Foot Exam: 11/21/2024  Health Maintenance   Topic Date Due   • Diabetic Foot Exam  11/21/2025   • Diabetic Eye Exam  03/11/2026     Pertinent Medical History   Patient has a history of type 2 diabetes, managed with insulin and complicated by microalbuminuria.  She was diagnosed with diabetes following her first pregnancy.  She has had an allergic response to metformin " "resulting in hives.  History of frequent UTIs. Ozempic discontinued due to abdominal pain. She has previously been checked for C-peptide and ángel 65 in the past which were negative.     Review of Systems as per HPI         Medical History Reviewed by provider this encounter:     .    Objective {?Quick Links Trend Vitals * Enter New Vitals * Results Review * Timeline (Adult) * Labs * Imaging * Cardiology * Procedures * Lung Cancer Screening * Surgical eConsent :76412}  /88 (BP Location: Right arm, Patient Position: Sitting, Cuff Size: Adult)   Ht 5' 5\" (1.651 m)   Wt 82.5 kg (181 lb 12.8 oz)   BMI 30.25 kg/m²      Body mass index is 30.25 kg/m².  Wt Readings from Last 3 Encounters:   03/17/25 82.5 kg (181 lb 12.8 oz)   03/11/25 80.3 kg (177 lb)   02/07/25 79.8 kg (176 lb)     Physical Exam  Vitals and nursing note reviewed.   Constitutional:       General: She is not in acute distress.     Appearance: She is well-developed.   HENT:      Head: Normocephalic and atraumatic.   Eyes:      General: No scleral icterus.     Conjunctiva/sclera: Conjunctivae normal.   Pulmonary:      Effort: Pulmonary effort is normal.   Abdominal:      Palpations: Abdomen is soft.   Neurological:      Mental Status: She is alert.   Psychiatric:         Attention and Perception: Attention normal.         Labs:   Lab Results   Component Value Date    HGBA1C 11.8 (H) 03/15/2025    HGBA1C 12.3 (A) 03/11/2025    HGBA1C 14.0 (A) 11/21/2024     Lab Results   Component Value Date    CREATININE 0.81 03/15/2025    CREATININE 0.86 12/03/2024    CREATININE 0.83 12/01/2024    BUN 9 03/15/2025     (L) 01/06/2014    K 3.7 03/15/2025     03/15/2025    CO2 30 03/15/2025     GFR, Calculated   Date Value Ref Range Status   04/20/2018 79 >60 mL/min/1.73m2 Final     Comment:     mL/min per 1.73 square meters                                            Normal Function or Mild Renal    Disease (if clinically at risk):  >or=60  Moderately " Decreased:                30-59  Severely Decreased:                  15-29  Renal Failure:                         <15                                            -American GFR: multiply reported GFR by 1.16    Please note that the eGFR is based on the CKD-EPI calculation, and is not intended to be used for drug dosing.                                            Note: Calculated GFR may not be an accurate indicator of renal function if the patient's renal function is not in a steady state.     eGFR   Date Value Ref Range Status   03/15/2025 89 ml/min/1.73sq m Final     Lab Results   Component Value Date    HDL 73 03/15/2025    TRIG 86 03/15/2025     Lab Results   Component Value Date    ALT 12 03/15/2025    AST 13 03/15/2025    ALKPHOS 63 03/15/2025     Lab Results   Component Value Date    FWG1XIMOUIGW 2.777 03/15/2025    TVI9LGHHONZC 2.230 03/18/2022    TNN3BAWBGVZR 1.105 11/21/2020       Patient Instructions   Continue your medications with the following changes:  Increase Tresiba to 34u  Increase NovoLog to 10-12-12  Monitor blood sugars regularly  Contact the office with any severe hypoglycemic or hyperglycemic events  Maintain appropriate diet and physical activity  Follow up in 3 months  Call with any questions, concerns, or refill requests  Obtain labs prior to your next appointment    Discussed with the patient and all questioned fully answered. She will call me if any problems arise.

## 2025-03-18 ENCOUNTER — PATIENT MESSAGE (OUTPATIENT)
Age: 45
End: 2025-03-18

## 2025-03-20 ENCOUNTER — APPOINTMENT (OUTPATIENT)
Dept: LAB | Facility: HOSPITAL | Age: 45
End: 2025-03-20

## 2025-03-20 DIAGNOSIS — E11.29 TYPE 2 DIABETES MELLITUS WITH MICROALBUMINURIA, WITH LONG-TERM CURRENT USE OF INSULIN (HCC): ICD-10-CM

## 2025-03-20 DIAGNOSIS — E11.65 UNCONTROLLED TYPE 2 DIABETES MELLITUS WITH HYPERGLYCEMIA, WITHOUT LONG-TERM CURRENT USE OF INSULIN (HCC): ICD-10-CM

## 2025-03-20 DIAGNOSIS — R80.9 TYPE 2 DIABETES MELLITUS WITH MICROALBUMINURIA, WITH LONG-TERM CURRENT USE OF INSULIN (HCC): ICD-10-CM

## 2025-03-20 DIAGNOSIS — Z79.4 TYPE 2 DIABETES MELLITUS WITH MICROALBUMINURIA, WITH LONG-TERM CURRENT USE OF INSULIN (HCC): ICD-10-CM

## 2025-03-20 DIAGNOSIS — R59.0 MESENTERIC LYMPHADENOPATHY: ICD-10-CM

## 2025-03-21 ENCOUNTER — APPOINTMENT (OUTPATIENT)
Dept: LAB | Facility: HOSPITAL | Age: 45
End: 2025-03-21
Payer: COMMERCIAL

## 2025-03-21 DIAGNOSIS — Z79.4 TYPE 2 DIABETES MELLITUS WITH HYPERGLYCEMIA, WITH LONG-TERM CURRENT USE OF INSULIN (HCC): ICD-10-CM

## 2025-03-21 DIAGNOSIS — E11.65 TYPE 2 DIABETES MELLITUS WITH HYPERGLYCEMIA, WITH LONG-TERM CURRENT USE OF INSULIN (HCC): ICD-10-CM

## 2025-03-21 LAB
CREAT UR-MCNC: 63.2 MG/DL
MICROALBUMIN UR-MCNC: 149.3 MG/L
MICROALBUMIN/CREAT 24H UR: 236 MG/G CREATININE (ref 0–30)

## 2025-03-21 PROCEDURE — 82570 ASSAY OF URINE CREATININE: CPT

## 2025-03-21 PROCEDURE — 82043 UR ALBUMIN QUANTITATIVE: CPT

## 2025-03-24 ENCOUNTER — TELEMEDICINE (OUTPATIENT)
Dept: FAMILY MEDICINE CLINIC | Facility: CLINIC | Age: 45
End: 2025-03-24

## 2025-03-24 DIAGNOSIS — Z79.899 MEDICATION MANAGEMENT: ICD-10-CM

## 2025-03-24 DIAGNOSIS — E11.65 TYPE 2 DIABETES MELLITUS WITH HYPERGLYCEMIA, WITH LONG-TERM CURRENT USE OF INSULIN (HCC): Primary | ICD-10-CM

## 2025-03-24 DIAGNOSIS — Z91.89 CARDIOVASCULAR RISK FACTOR: ICD-10-CM

## 2025-03-24 DIAGNOSIS — Z79.4 TYPE 2 DIABETES MELLITUS WITH HYPERGLYCEMIA, WITH LONG-TERM CURRENT USE OF INSULIN (HCC): Primary | ICD-10-CM

## 2025-03-24 DIAGNOSIS — I10 PRIMARY HYPERTENSION: ICD-10-CM

## 2025-03-24 PROCEDURE — PBNCHG PB NO CHARGE PLACEHOLDER: Performed by: PHARMACIST

## 2025-03-24 NOTE — ASSESSMENT & PLAN NOTE
Lab Results   Component Value Date    HGBA1C 11.8 (H) 03/15/2025    HGBA1C 12.3 (A) 03/11/2025    HGBA1C 14.0 (A) 11/21/2024     -  Most recent A1c: above goal.   - CGM TIR: below goal; TBR meets goal..  - Medications: well tolerated; denies any side effects from current medications.     Poorly controlled DM. Would like to utilize complementary therapies and minimize insulin burden if able. Discussed potential risk of  geniturinary infections with use of SGLT2 inhibitors. Patient amenable to starting therapy.       - Interventions:  Provided Jardiance education to patient. Discussed mechanism of action, potential side effects, expected outcomes, and proper administration.  Answered patient's questions.    Start Jardiance 10 mg QAM.   - Can titrate to 25 mg QAM after 4 weeks if needed.

## 2025-03-24 NOTE — PROGRESS NOTES
[facility] Clinical Integration Pharmacy Services   Nemo Mcdermott, Aga     Tari Shannon is a 44 y.o. female who was referred to the clinical pharmacist by Chaim Foote MD for diabetes. Patient presents via video for initial clinical pharmacist consult.      Virtual Care Documentation  Encounter provider Avel Khalil    The Patient is located at Home and in the following state in which I hold an active license: [place].    The patient was identified by name and date of birth. Tari Shannon was informed that this is a telemedicine visit and that the visit is being conducted through the Epic Embedded platform. She agrees to proceed.  My office door was closed. No one else was in the room. She acknowledged consent and understanding of privacy and security of the video platform. The patient has agreed to participate and understands they can discontinue the visit at any time.       Assessment & Plan  Type 2 diabetes mellitus with hyperglycemia, with long-term current use of insulin ([facility])    Lab Results   Component Value Date    HGBA1C 11.8 (H) 03/15/2025    HGBA1C 12.3 (A) 03/11/2025    HGBA1C 14.0 (A) 11/21/2024     -  Most recent A1c: above goal.   - CGM TIR: below goal; TBR meets goal..  - Medications: well tolerated; denies any side effects from current medications.     Poorly controlled DM. Would like to utilize complementary therapies and minimize insulin burden if able. Discussed potential risk of  geniturinary infections with use of SGLT2 inhibitors. Patient amenable to starting therapy.       - Interventions:  Provided Jardiance education to patient. Discussed mechanism of action, potential side effects, expected outcomes, and proper administration.  Answered patient's questions.    Start Jardiance 10 mg QAM.   - Can titrate to 25 mg QAM after 4 weeks if needed.   Primary hypertension    BP Readings from Last 3 Encounters:   03/17/25 138/88   03/11/25 150/90   02/07/25 110/70     BP goal:  <130/80 mmHg based on AHA/ACC Guidelines.   - In-office BP above goal.  Recently started ARB on 3/11/2025. BP has improved  MEDICATIONS: losartan 25 mg daily. Continue to closely monitor  Labs: BMP reviewed and up to date. Serum K+, Na+, SCr WNL. Proteinuria present.   Cardiovascular risk factor    Lab Results   Component Value Date    CHOLESTEROL 166 03/15/2025    TRIG 86 03/15/2025    HDL 73 03/15/2025    LDLCALC 76 03/15/2025     Patient > 40-yrs old. LDL > 70 mg/dL. Indicated for moderate-intensity statin per ADA Guidelines.  MEDICATIONS: none. Discuss statin at future visit.  Labs: most recent lipid panel reviewed, LFTs are normal.   Medication management  Medication Reconciliation: I have reviewed the patient's allergies, medications and history as noted in the electronic medical record and updated as necessary..   - Rx Insurance: Commercial    Education:  - Counseled patient on diagnostic results, glycemic goals, instruction for self-management, importance of treatment adherence, and risk factor reduction as appropriate. All questions fully answered    - She was given instructions on how to contact me in the event of adverse drug event, questions, or concerns.    Follow-up:   Follow-up with pharmacist in 2 weeks  Next PCP visit: 6/11/2025  Endocrinology: 6/19/2025    - Home Monitoring Records: CGM data.  - Labs: A1c due on or after 6/13/2025  Subjective:     Tari Shannon is a 44 y.o. female with a history of type 2 diabetes with long term use of insulin. Diabetes course has been improving with treatment. Denies recent illness or hospitalizations. Denies any issues with her current DM regimen.    Patient recently re-engaged in care, self-management of DM. Recently re-started using Dexcom.    Patient does not consistently eat 3 meals/day. Flexibility of Novolog is beneficial.      Previously used Jardiance. Denies any specific adverse effects. Does report being prone to UTIs.     Current DM Regimen:  Tresiba  34 units SQ HS  Novolog SQ 10/12/12    DM Self-Management:  - Self-monitoring: is performed regularly. She checks glucose levels using CGM. She uses  as reader.   - She is not connected to [facility].   - Hypoglycemic episodes: , denies any recent - History of Level 3/severe hypoglycemic event: none   - Hypoglycemia symptoms: N/A - Treatment of hypoglycemia: discussed treatment   Glucometer: No  CGM: Yes, Brand: Dexcom G7    CGM Data Review:     Device: Dexcom G7  Dates: 3/11 - 3/24/2025  Average glucose (mg/dL): 262   GMI (approx. lab A1c): 9.6%     Metric CGM Target   Time above range (TAR) Very High >250 mg/dL 56 <5%   Time above range (TAR) High >180 mg/dL 30 <25%   Time in range (TIR)  mg/dL 14 >70%   Time below range (TBR) Low <70 mg/dL 0 <4%    Time below range (TBR)Very Low <54 mg/dL 0 <1%          DM History:  Glycemic goals - A1c: <7%; Time In Range: >70% with TBR <4% and <1% of time with glucose <54 mg/dL: per ADA Guidelines.  Microvascular complications: nephropathy and peripheral neuropathy  Cardiovascular complications: none reported  - Statin: No  - ACEi/ARB: Yes    Episodes of DKA/HHS: none reported  Hospitalizations or surgery due to DM: yes. 11/2020 - episode of unresponsiveness precipitated by N/V due to uncontrolled diabetes  Previous DM medications:   Metformin (hives)  Januvia  Jardiance  Ozempic (stopped during hospitalization for intra-abdominal lymphadenopathy)    Cardiometabolic Risk Factors:  Diabetes Composite Score: 3   Values used to calculate this score:    Points  Metrics       1        Blood Pressure: 138/88       0        Prescribed Statins: No       0        Hemoglobin A1c: 11.8%       1        Smokes Tobacco: No       1        Prescribed Aspirin: N/A - patient does not meet cardiovascular risk criteria    - HF: No    - CKD: normal GFR with albuminuria     Drug Safety:  - Thyroid cancer: No  - History of pancreatitis: No    Drug Therapy Problems:  - Medication Adherence:  Responsible for medication management: patient. Patient is compliant all of the time currently.   Barriers to medication use: housing, stressors (in the past)         Meds/Allergies     Current Outpatient Medications:     Accu-Chek FastClix Lancets MISC, Test BG up to 3x daily as directed, Disp: 300 each, Rfl: 1    Accu-Chek Guide test strip, TEST BG UP TO 3X DAILY AS DIRECTED, Disp: 100 each, Rfl: 1    acetaminophen (TYLENOL) 325 mg tablet, Take 2 tablets (650 mg total) by mouth every 6 (six) hours as needed for mild pain, headaches or fever, Disp: , Rfl:     bisacodyl (DULCOLAX) 5 mg EC tablet, Take 1 tablet (5 mg total) by mouth once for 1 dose (Patient not taking: Reported on 2/7/2025), Disp: 10 tablet, Rfl: 0    Continuous Blood Gluc  (Dexcom G7 ) ENRICO, Use 1 Units daily, Disp: 1 each, Rfl: 1    Continuous Glucose Sensor (Dexcom G7 Sensor), Use 1 Device every 10 days, Disp: 9 each, Rfl: 3    Injection Device for Insulin (B-D PEN MINI) ENRICO, Use 4 (four) times a day Please use for Lantus and Humalog. 4 pen needles daily Dx: Diabetes, Disp: 400 each, Rfl: 0    insulin aspart (NovoLOG FlexPen) 100 UNIT/ML injection pen, Inject 3x daily with meals 10-12-12, Disp: , Rfl:     insulin degludec (Tresiba FlexTouch) 100 units/mL injection pen, Inject 34 Units under the skin daily, Disp: , Rfl:     Insulin Pen Needle (BD Pen Needle Em U/F) 32G X 4 MM MISC, Use daily, Disp: 100 each, Rfl: 0    losartan (COZAAR) 25 mg tablet, Take 1 tablet (25 mg total) by mouth daily, Disp: 30 tablet, Rfl: 0    pantoprazole (PROTONIX) 40 mg tablet, Take 1 tablet (40 mg total) by mouth daily (Patient not taking: Reported on 3/11/2025), Disp: 90 tablet, Rfl: 1    polyethylene glycol (GLYCOLAX) 17 GM/SCOOP powder, At 5pm take 5mgx2 dulcolax. At 6pm 32oz miralax in 64oz gatorade. Drink 8oz glass every 5 mins until 32oz finished. Drink remaining as rec. (Patient taking differently: if needed At 5pm take 5mgx2 dulcolax. At 6pm  "32oz miralax in 64oz gatorade. Drink 8oz glass every 5 mins until 32oz finished. Drink remaining as rec.), Disp: 238 g, Rfl: 0    pregabalin (LYRICA) 50 mg capsule, Take 1 PO HS x 5 days, then 1 PO BID x 5 days, then 1 PO TID, Disp: 90 capsule, Rfl: 1     Allergies   Allergen Reactions    Metformin And Related Hives     Objective     Vital Signs:  Estimated body mass index is 30.25 kg/m² as calculated from the following:    Height as of 3/17/25: 5' 5\" (1.651 m).    Weight as of 3/17/25: 82.5 kg (181 lb 12.8 oz).     Pertinent Lab Data:  Lab Results   Component Value Date    SODIUM 138 03/15/2025    K 3.7 03/15/2025     03/15/2025    CO2 30 03/15/2025    BUN 9 03/15/2025    CREATININE 0.81 03/15/2025    GLUF 115 (H) 03/15/2025    CALCIUM 9.1 03/15/2025     Lab Results   Component Value Date    HGBA1C 11.8 (H) 03/15/2025    HPEQTJLR33 722 12/05/2022      Lab Results   Component Value Date    EGFR 89 03/15/2025    MICROALBCRE 236 (H) 03/21/2025     Lab Results   Component Value Date    ALT 12 03/15/2025    AST 13 03/15/2025    ALKPHOS 63 03/15/2025      Lab Results   Component Value Date    LEFTDIABRET None 03/11/2025    RIGHTDIABRET Mild (A) 03/11/2025     Administrative Statements   Pharmacist Tracking Tool:   Reason For Outreach: Embedded Pharmacist  Demographics:  Intervention Method: Video  Type of Intervention: New  Topics Addressed: Diabetes, Dyslipidemia, and Hypertension  Pharmacologic Interventions: Medication Initiation  Non-Pharmacologic Interventions: Medication/Device education and Personal CGM  Time:  Direct Patient Care:  15  mins  Care Coordination:  25  mins  Recommendation Recipient: Patient/Caregiver  Outcome: Accepted    Documentation under collaborative practice agreement. Orders pended for PCP signature if needed.    Nemo Mcdermott, PharmD  Clinical Integration Pharmacist  [facility] Physician [facility]  "

## 2025-03-28 ENCOUNTER — TELEPHONE (OUTPATIENT)
Age: 45
End: 2025-03-28

## 2025-03-28 DIAGNOSIS — E11.319 DIABETIC RETINOPATHY ASSOCIATED WITH TYPE 2 DIABETES MELLITUS, MACULAR EDEMA PRESENCE UNSPECIFIED, UNSPECIFIED LATERALITY, UNSPECIFIED RETINOPATHY SEVERITY (HCC): Primary | ICD-10-CM

## 2025-03-28 NOTE — TELEPHONE ENCOUNTER
I just wanted to make you aware of Tari's IRIS result.  I was not sure if you saw it.  Please review.  Thanks

## 2025-04-07 ENCOUNTER — OFFICE VISIT (OUTPATIENT)
Age: 45
End: 2025-04-07

## 2025-04-07 DIAGNOSIS — H43.823 VITREOMACULAR ADHESION OF BOTH EYES: ICD-10-CM

## 2025-04-07 DIAGNOSIS — E11.319 DIABETIC RETINOPATHY ASSOCIATED WITH TYPE 2 DIABETES MELLITUS, MACULAR EDEMA PRESENCE UNSPECIFIED, UNSPECIFIED LATERALITY, UNSPECIFIED RETINOPATHY SEVERITY (HCC): ICD-10-CM

## 2025-04-07 DIAGNOSIS — E11.3293 MILD NONPROLIFERATIVE DIABETIC RETINOPATHY OF BOTH EYES WITHOUT MACULAR EDEMA ASSOCIATED WITH TYPE 2 DIABETES MELLITUS (HCC): Primary | ICD-10-CM

## 2025-04-07 NOTE — PROGRESS NOTES
Name: Tari Shannon      : 1980      MRN: [id number]  Encounter Provider: Marylin Payne MD  Encounter Date: 2025   Encounter department: [facility] OPHTHALMOLOGY  :  Assessment & Plan  Mild nonproliferative diabetic retinopathy of both eyes without macular edema associated with type 2 diabetes mellitus (HCC)    Lab Results   Component Value Date    HGBA1C 11.8 (H) 03/15/2025     Mild diabetic retinopathy on exam, no macular edema, no NV. The importance of proper blood sugar, blood lipids, and blood pressure control for minimizing the risk of vision loss due to retinopathy associated with diabetes mellitus and hypertension was discussed with the patient. Stressed the importance of following up for monitoring and management by a primary care physician.       Orders:    OCT, Retina - OU - Both Eyes    Fundus Photos - OU - Both Eyes    Vitreomacular adhesion of both eyes  Pt has syneresis; No retinal tears, breaks, or detachments on dilated examination; Recommend monitoring; Retinal detachment precautions were discussed with the patient. The patient was instructed to call or return immediately for an increase in flashes of light, floaters (dark spots in vision), or curtaining of the visual field.          Diabetic retinopathy associated with type 2 diabetes mellitus, macular edema presence unspecified, unspecified laterality, unspecified retinopathy severity (HCC)    Lab Results   Component Value Date    HGBA1C 11.8 (H) 03/15/2025     See above;   Orders:    Ambulatory Referral to Ophthalmology      Tari Shannon is a 44 y.o. female who presents for a diabetic eye exam.    T2DM x 10 years.  BS relatively controlled, 199 currently.  A1c: 11.8 last month.    Feels vision overall is doing well with current specs.  Denies retinal symptoms.  Her last eye exam was 11 months  ago at [facility].    History obtained from: patient    Review of Systems  Medical History Reviewed by provider this encounter:   Tobacco  Allergies  Meds  Problems  Med Hx  Surg Hx  Fam Hx     .     Past Ocular History: wears glasses  Ocular Meds/Drops: none    Base Eye Exam       Visual Acuity (Snellen - Linear)         Right Left    Dist cc 20/25+ 20/20-      Correction: Glasses              Tonometry (Tonopen, 2:32 PM)         Right Left    Pressure 17 18              Pupils         Pupils    Right PERRL    Left PERRL              Visual Fields         Left Right     Full Full              Extraocular Movement         Right Left     Full Full              Neuro/Psych       Oriented x3: Yes    Mood/Affect: Normal              Dilation       Both eyes: 2.5% Phenylephrine, 1% Tropicamide @ 2:32 PM                  Slit Lamp and Fundus Exam       External Exam         Right Left    External Normal Normal              Slit Lamp Exam         Right Left    Lids/Lashes Normal Normal    Conjunctiva/Sclera White and quiet White and quiet    Cornea Clear Clear    Anterior Chamber Deep and quiet Deep and quiet    Iris Round and reactive Round and reactive    Lens Clear Clear    Anterior Vitreous No PVD, clear, no cell No PVD, clear, no cell              Fundus Exam         Right Left    Disc sharp, no pallor sharp, no pallor    C/D Ratio 0.2 0.2    Macula flat Flat; one MA    Vessels normal in caliber normal in caliber    Periphery flat, no retinal tear or detachment; few dot heme inferiorly flat, no retinal tear or detachment; few dot heme temp and inferiorly                      IMAGING:  OCT, Retina - OU - Both Eyes          Right Eye  Quality was good. Findings include (Vitreomacular adhesion).     Left Eye  Quality was good. Findings include (Vitreomacular adhesion).          Fundus Photos - OU - Both Eyes          Right Eye  Disc findings include normal observations. Vessel findings include normal observations. Periphery findings include hemorrhage.     Left Eye  Disc findings include normal observations. Macula findings include  microaneurysms. Periphery findings include hemorrhage.

## 2025-04-12 PROBLEM — Z00.00 ROUTINE ADULT HEALTH MAINTENANCE: Status: RESOLVED | Noted: 2025-03-13 | Resolved: 2025-04-12

## 2025-04-16 DIAGNOSIS — E11.65 UNCONTROLLED TYPE 2 DIABETES MELLITUS WITH HYPERGLYCEMIA, WITHOUT LONG-TERM CURRENT USE OF INSULIN (HCC): ICD-10-CM

## 2025-04-16 RX ORDER — INSULIN ASPART 100 [IU]/ML
INJECTION, SOLUTION INTRAVENOUS; SUBCUTANEOUS
Qty: 15 ML | Refills: 3 | Status: SHIPPED | OUTPATIENT
Start: 2025-04-16

## 2025-04-16 RX ORDER — INSULIN DEGLUDEC 100 U/ML
34 INJECTION, SOLUTION SUBCUTANEOUS DAILY
Qty: 15 ML | Refills: 3 | Status: SHIPPED | OUTPATIENT
Start: 2025-04-16

## 2025-04-21 ENCOUNTER — TELEPHONE (OUTPATIENT)
Dept: INFECTIOUS DISEASES | Facility: CLINIC | Age: 45
End: 2025-04-21

## 2025-04-21 NOTE — TELEPHONE ENCOUNTER
I called Tari to reschedule her appt on 8/12 with Christine, she will be out of the office.  No answer, I left message for her to call the office to r/s. I let her know she has appts available 8/5 and 8/19 in the [place] office.

## 2025-04-24 ENCOUNTER — APPOINTMENT (EMERGENCY)
Dept: CT IMAGING | Facility: HOSPITAL | Age: 45
End: 2025-04-24
Payer: COMMERCIAL

## 2025-04-24 ENCOUNTER — HOSPITAL ENCOUNTER (EMERGENCY)
Facility: HOSPITAL | Age: 45
Discharge: HOME | End: 2025-04-24
Attending: EMERGENCY MEDICINE
Payer: COMMERCIAL

## 2025-04-24 VITALS
BODY MASS INDEX: 31.15 KG/M2 | SYSTOLIC BLOOD PRESSURE: 137 MMHG | OXYGEN SATURATION: 96 % | DIASTOLIC BLOOD PRESSURE: 74 MMHG | HEART RATE: 64 BPM | WEIGHT: 187.17 LBS | TEMPERATURE: 98.5 F | RESPIRATION RATE: 16 BRPM

## 2025-04-24 DIAGNOSIS — R10.9 ABDOMINAL PAIN: Primary | ICD-10-CM

## 2025-04-24 LAB
ALBUMIN SERPL BCG-MCNC: 3.6 G/DL (ref 3.5–5)
ALP SERPL-CCNC: 50 U/L (ref 34–104)
ALT SERPL W P-5'-P-CCNC: 10 U/L (ref 7–52)
ANION GAP SERPL CALCULATED.3IONS-SCNC: 5 MMOL/L (ref 4–13)
ANISOCYTOSIS BLD QL SMEAR: PRESENT
AST SERPL W P-5'-P-CCNC: 11 U/L (ref 13–39)
BASOPHILS # BLD MANUAL: 0 THOUSAND/UL (ref 0–0.1)
BASOPHILS NFR MAR MANUAL: 0 % (ref 0–1)
BILIRUB SERPL-MCNC: 0.32 MG/DL (ref 0.2–1)
BUN SERPL-MCNC: 14 MG/DL (ref 5–25)
CALCIUM SERPL-MCNC: 8.6 MG/DL (ref 8.4–10.2)
CHLORIDE SERPL-SCNC: 105 MMOL/L (ref 96–108)
CO2 SERPL-SCNC: 27 MMOL/L (ref 21–32)
CREAT SERPL-MCNC: 0.94 MG/DL (ref 0.6–1.3)
EOSINOPHIL # BLD MANUAL: 0.3 THOUSAND/UL (ref 0–0.4)
EOSINOPHIL NFR BLD MANUAL: 3 % (ref 0–6)
ERYTHROCYTE [DISTWIDTH] IN BLOOD BY AUTOMATED COUNT: 13.1 % (ref 11.6–15.1)
EXT PREGNANCY TEST URINE: NEGATIVE
EXT. CONTROL: NORMAL
GFR SERPL CREATININE-BSD FRML MDRD: 74 ML/MIN/1.73SQ M
GLUCOSE SERPL-MCNC: 140 MG/DL (ref 65–140)
HCT VFR BLD AUTO: 39.8 % (ref 34.8–46.1)
HGB BLD-MCNC: 12.9 G/DL (ref 11.5–15.4)
LIPASE SERPL-CCNC: 17 U/L (ref 11–82)
LYMPHOCYTES # BLD AUTO: 6.73 THOUSAND/UL (ref 0.6–4.47)
LYMPHOCYTES # BLD AUTO: 64 % (ref 14–44)
MCH RBC QN AUTO: 28.9 PG (ref 26.8–34.3)
MCHC RBC AUTO-ENTMCNC: 32.4 G/DL (ref 31.4–37.4)
MCV RBC AUTO: 89 FL (ref 82–98)
MONOCYTES # BLD AUTO: 0.3 THOUSAND/UL (ref 0–1.22)
MONOCYTES NFR BLD: 3 % (ref 4–12)
NEUTROPHILS # BLD MANUAL: 2.71 THOUSAND/UL (ref 1.85–7.62)
NEUTS SEG NFR BLD AUTO: 27 % (ref 43–75)
OVALOCYTES BLD QL SMEAR: PRESENT
PLATELET # BLD AUTO: 299 THOUSANDS/UL (ref 149–390)
PLATELET BLD QL SMEAR: ADEQUATE
PMV BLD AUTO: 8.9 FL (ref 8.9–12.7)
POLYCHROMASIA BLD QL SMEAR: PRESENT
POTASSIUM SERPL-SCNC: 4 MMOL/L (ref 3.5–5.3)
PROT SERPL-MCNC: 6.5 G/DL (ref 6.4–8.4)
RBC # BLD AUTO: 4.46 MILLION/UL (ref 3.81–5.12)
RBC MORPH BLD: PRESENT
SODIUM SERPL-SCNC: 137 MMOL/L (ref 135–147)
VARIANT LYMPHS # BLD AUTO: 3 %
WBC # BLD AUTO: 10.05 THOUSAND/UL (ref 4.31–10.16)

## 2025-04-24 PROCEDURE — 74177 CT ABD & PELVIS W/CONTRAST: CPT

## 2025-04-24 PROCEDURE — 96374 THER/PROPH/DIAG INJ IV PUSH: CPT

## 2025-04-24 PROCEDURE — 83690 ASSAY OF LIPASE: CPT

## 2025-04-24 PROCEDURE — 36415 COLL VENOUS BLD VENIPUNCTURE: CPT

## 2025-04-24 PROCEDURE — 85007 BL SMEAR W/DIFF WBC COUNT: CPT

## 2025-04-24 PROCEDURE — 99284 EMERGENCY DEPT VISIT MOD MDM: CPT

## 2025-04-24 PROCEDURE — 85027 COMPLETE CBC AUTOMATED: CPT

## 2025-04-24 PROCEDURE — 96375 TX/PRO/DX INJ NEW DRUG ADDON: CPT

## 2025-04-24 PROCEDURE — 80053 COMPREHEN METABOLIC PANEL: CPT

## 2025-04-24 PROCEDURE — 81025 URINE PREGNANCY TEST: CPT

## 2025-04-24 RX ORDER — ONDANSETRON 2 MG/ML
4 INJECTION INTRAMUSCULAR; INTRAVENOUS ONCE
Status: COMPLETED | OUTPATIENT
Start: 2025-04-24 | End: 2025-04-24

## 2025-04-24 RX ORDER — KETOROLAC TROMETHAMINE 30 MG/ML
15 INJECTION, SOLUTION INTRAMUSCULAR; INTRAVENOUS ONCE
Status: COMPLETED | OUTPATIENT
Start: 2025-04-24 | End: 2025-04-24

## 2025-04-24 RX ORDER — HYDROMORPHONE HCL/PF 1 MG/ML
1 SYRINGE (ML) INJECTION ONCE
Status: COMPLETED | OUTPATIENT
Start: 2025-04-24 | End: 2025-04-24

## 2025-04-24 RX ADMIN — ONDANSETRON 4 MG: 2 INJECTION INTRAMUSCULAR; INTRAVENOUS at 19:39

## 2025-04-24 RX ADMIN — HYDROMORPHONE HYDROCHLORIDE 1 MG: 1 INJECTION, SOLUTION INTRAMUSCULAR; INTRAVENOUS; SUBCUTANEOUS at 18:22

## 2025-04-24 RX ADMIN — IOHEXOL 100 ML: 350 INJECTION, SOLUTION INTRAVENOUS at 19:23

## 2025-04-24 RX ADMIN — KETOROLAC TROMETHAMINE 15 MG: 30 INJECTION, SOLUTION INTRAMUSCULAR; INTRAVENOUS at 17:10

## 2025-04-24 NOTE — ED PROVIDER NOTES
"Time reflects when diagnosis was documented in both MDM as applicable and the Disposition within this note       Time User Action Codes Description Comment    4/24/2025  8:12 PM Preeti Alonzo Add [R10.9] Abdominal pain           ED Disposition       ED Disposition   Discharge    Condition   Stable    Date/Time   Thu Apr 24, 2025  8:12 PM    Comment   Tari Shiva discharge to home/self care.                   Assessment & Plan       Medical Decision Making  43 y/o female PMH of type II diabetes, intra-abdominal lymphadenopathy, migraines, and vertigo here for a 2-week history of RLQ pain with nausea.  Denies fever, chills, V/D, urinary symptoms, chest pain, shortness of breath, leg swelling, calf pain, headache, vision changes.  Patient is well-appearing on exam, in no distress.  Vitals are normal.  She did have tenderness to the right upper and right lower quadrants as well as the periumbilical area.  Labs came back unremarkable. CT showed \"No acute findings in the abdomen or pelvis. Previously visualized right lower quadrant inflammatory process has near completely resolved.\"  Discussed supportive care, follow-up with GI, risks and return precautions.  Patient was agreeable to plan and was discharged home.    Amount and/or Complexity of Data Reviewed  Labs: ordered. Decision-making details documented in ED Course.  Radiology: ordered.    Risk  Prescription drug management.        ED Course as of 04/24/25 2022   Thu Apr 24, 2025   1708 PREGNANCY TEST URINE: Negative  Negative    1708 CBC and differential  Normal    1824 LIPASE: 17   1824 Comprehensive metabolic panel(!)  Grossly normal        Medications   ketorolac (TORADOL) injection 15 mg (15 mg Intravenous Given 4/24/25 1710)   HYDROmorphone (DILAUDID) injection 1 mg (1 mg Intravenous Given 4/24/25 1822)   iohexol (OMNIPAQUE) 350 MG/ML injection (MULTI-DOSE) 100 mL (100 mL Intravenous Given 4/24/25 1923)   ondansetron (ZOFRAN) injection 4 mg (4 mg " Intravenous Given 4/24/25 1939)       ED Risk Strat Scores                    No data recorded        SBIRT 20yo+      Flowsheet Row Most Recent Value   Initial Alcohol Screen: US AUDIT-C     1. How often do you have a drink containing alcohol? 0 Filed at: 04/24/2025 1714   2. How many drinks containing alcohol do you have on a typical day you are drinking?  0 Filed at: 04/24/2025 1714   3a. Male UNDER 65: How often do you have five or more drinks on one occasion? 0 Filed at: 04/24/2025 1714   3b. FEMALE Any Age, or MALE 65+: How often do you have 4 or more drinks on one occassion? 0 Filed at: 04/24/2025 1714   Audit-C Score 0 Filed at: 04/24/2025 1714   FAMILIA: How many times in the past year have you...    Used an illegal drug or used a prescription medication for non-medical reasons? Never Filed at: 04/24/2025 1714                            History of Present Illness       Chief Complaint   Patient presents with    Abdominal Pain     RLQ pain x2 weeks. Taking tylenol without relief. Denies n/v.        Past Medical History:   Diagnosis Date    Diabetes mellitus (HCC)     Migraine headache     Vertigo, aural, unspecified laterality       Past Surgical History:   Procedure Laterality Date    COLONOSCOPY      HERNIA REPAIR      IL LAPS ABD PRTM&OMENTUM DX W/WO SPEC BR/WA SPX N/A 12/1/2024    Procedure: LAPAROSCOPY DIAGNOSTIC, MESENTERIC BIOPSIES;  Surgeon: Akiko Lamb MD;  [facility]: [facility] OR;  Service: Surgical Oncology    TUBAL LIGATION  2017    UPPER GASTROINTESTINAL ENDOSCOPY        Family History   Problem Relation Age of Onset    Hypertension Mother     Arthritis Mother     Asthma Sister     Bipolar disorder Brother     Schizophrenia Brother     Asthma Brother     Asthma Brother     Diabetes Maternal Grandmother     No Known Problems Maternal Grandfather     Diabetes Paternal Grandmother     No Known Problems Paternal Grandfather     Breast cancer Neg Hx     Macular degeneration Neg Hx     Glaucoma Neg Hx  "      Social History     Tobacco Use    Smoking status: Never    Smokeless tobacco: Never   Vaping Use    Vaping status: Never Used   Substance Use Topics    Alcohol use: Not Currently     Comment: ocassionally    Drug use: Never      E-Cigarette/Vaping    E-Cigarette Use Never User       E-Cigarette/Vaping Substances    Nicotine No     THC No     CBD No     Flavoring No     Other No     Unknown No       I have reviewed and agree with the history as documented.     Patient is a 45 y/o female PMH of type II diabetes, intra-abdominal lymphadenopathy, migraines, and vertigo here for a 2-week history of RLQ pain with nausea.  Denies fever, chills, V/D, urinary symptoms, chest pain, shortness of breath, leg swelling, calf pain, headache, vision changes.  States she has a history of having \"necrotic lymph nodes\" on the right side of her abdomen this past November that she had removed.      Abdominal Pain  Associated symptoms: nausea    Associated symptoms: no chest pain, no chills, no cough, no diarrhea, no dysuria, no fever, no hematuria, no shortness of breath, no sore throat and no vomiting        Review of Systems   Constitutional:  Negative for chills and fever.   HENT:  Negative for congestion, ear pain, rhinorrhea and sore throat.    Eyes:  Negative for pain and visual disturbance.   Respiratory:  Negative for cough and shortness of breath.    Cardiovascular:  Negative for chest pain and palpitations.   Gastrointestinal:  Positive for abdominal pain and nausea. Negative for diarrhea and vomiting.   Genitourinary:  Negative for difficulty urinating, dysuria, flank pain, frequency, hematuria and urgency.   Musculoskeletal:  Negative for arthralgias, back pain, myalgias, neck pain and neck stiffness.   Skin:  Negative for color change and rash.   Neurological:  Negative for dizziness, seizures, syncope, weakness, light-headedness and headaches.   All other systems reviewed and are negative.      Objective       ED " Triage Vitals   Temperature Pulse Blood Pressure Respirations SpO2 Patient Position - Orthostatic VS   04/24/25 1518 04/24/25 1518 04/24/25 1518 04/24/25 1518 04/24/25 1518 04/24/25 1714   98.5 °F (36.9 °C) 80 149/71 18 99 % Lying      Temp src Heart Rate Source BP Location FiO2 (%) Pain Score    -- 04/24/25 1714 04/24/25 1714 -- 04/24/25 1710     Monitor Right arm  7      Vitals      Date and Time Temp Pulse SpO2 Resp BP Pain Score FACES Pain Rating User   04/24/25 1900 -- 72 99 % 16 154/85 -- -- JR   04/24/25 1824 -- 73 100 % 16 157/91 -- --    04/24/25 1822 -- -- -- -- -- 7 --    04/24/25 1714 -- 76 99 % 18 149/76 -- -- AW   04/24/25 1710 -- -- -- -- -- 7 --    04/24/25 1518 98.5 °F (36.9 °C) 80 99 % 18 149/71 -- -- GRANT            Physical Exam  Vitals and nursing note reviewed.   Constitutional:       General: She is not in acute distress.     Appearance: She is well-developed. She is not ill-appearing, toxic-appearing or diaphoretic.   HENT:      Head: Normocephalic.      Nose: Nose normal.      Mouth/Throat:      Mouth: Mucous membranes are moist.      Pharynx: Oropharynx is clear. No oropharyngeal exudate or posterior oropharyngeal erythema.   Eyes:      Extraocular Movements: Extraocular movements intact.      Conjunctiva/sclera: Conjunctivae normal.      Pupils: Pupils are equal, round, and reactive to light.   Cardiovascular:      Rate and Rhythm: Normal rate and regular rhythm.      Pulses: Normal pulses.      Heart sounds: Normal heart sounds.   Pulmonary:      Effort: Pulmonary effort is normal. No tachypnea, accessory muscle usage or respiratory distress.      Breath sounds: Normal breath sounds. No stridor. No wheezing, rhonchi or rales.   Chest:      Chest wall: No tenderness.   Abdominal:      General: Abdomen is flat. There is no distension.      Palpations: Abdomen is soft.      Tenderness: There is abdominal tenderness in the right upper quadrant, right lower quadrant and periumbilical area.  There is no guarding or rebound.   Musculoskeletal:         General: Normal range of motion.      Cervical back: Normal range of motion and neck supple.   Skin:     General: Skin is warm and dry.      Capillary Refill: Capillary refill takes less than 2 seconds.      Coloration: Skin is not cyanotic or pale.   Neurological:      General: No focal deficit present.      Mental Status: She is alert and oriented to person, place, and time.   Psychiatric:         Mood and Affect: Mood normal.         Behavior: Behavior normal.         Thought Content: Thought content normal.         Judgment: Judgment normal.         Results Reviewed       Procedure Component Value Units Date/Time    Comprehensive metabolic panel [[id number]]  (Abnormal) Collected: 04/24/25 1745    Lab Status: Final result Specimen: Blood from Arm, Left Updated: 04/24/25 1819     Sodium 137 mmol/L      Potassium 4.0 mmol/L      Chloride 105 mmol/L      CO2 27 mmol/L      ANION GAP 5 mmol/L      BUN 14 mg/dL      Creatinine 0.94 mg/dL      Glucose 140 mg/dL      Calcium 8.6 mg/dL      AST 11 U/L      ALT 10 U/L      Alkaline Phosphatase 50 U/L      Total Protein 6.5 g/dL      Albumin 3.6 g/dL      Total Bilirubin 0.32 mg/dL      eGFR 74 ml/min/1.73sq m     Narrative:      National Kid[employer] Foundation guidelines for Chronic Kidney Disease (CKD):     Stage 1 with normal or high GFR (GFR > 90 mL/min/1.73 square meters)    Stage 2 Mild CKD (GFR = 60-89 mL/min/1.73 square meters)    Stage 3A Moderate CKD (GFR = 45-59 mL/min/1.73 square meters)    Stage 3B Moderate CKD (GFR = 30-44 mL/min/1.73 square meters)    Stage 4 Severe CKD (GFR = 15-29 mL/min/1.73 square meters)    Stage 5 End Stage CKD (GFR <15 mL/min/1.73 square meters)  Note: GFR calculation is accurate only with a steady state creatinine    Lipase [[id number]]  (Normal) Collected: 04/24/25 1745    Lab Status: Final result Specimen: Blood from Arm, Left Updated: 04/24/25 1819     Lipase 17 u/L      Manual Differential(PHLEBS Do Not Order) [[id number]]  (Abnormal) Collected: 04/24/25 1654    Lab Status: Final result Specimen: Blood from Arm, Right Updated: 04/24/25 1755     Segmented % 27 %      Lymphocytes % 64 %      Monocytes % 3 %      Eosinophils % 3 %      Basophils % 0 %      Atypical Lymphocytes % 3 %      Absolute Neutrophils 2.71 Thousand/uL      Absolute Lymphocytes 6.73 Thousand/uL      Absolute Monocytes 0.30 Thousand/uL      Absolute Eosinophils 0.30 Thousand/uL      Absolute Basophils 0.00 Thousand/uL      Total Counted --     RBC Morphology Present     Platelet Estimate Adequate     Anisocytosis Present     Ovalocytes Present     Polychromasia Present    CBC and differential [[id number]]  (Normal) Collected: 04/24/25 1654    Lab Status: Final result Specimen: Blood from Arm, Right Updated: 04/24/25 1701     WBC 10.05 Thousand/uL      RBC 4.46 Million/uL      Hemoglobin 12.9 g/dL      Hematocrit 39.8 %      MCV 89 fL      MCH 28.9 pg      MCHC 32.4 g/dL      RDW 13.1 %      MPV 8.9 fL      Platelets 299 Thousands/uL     Narrative:      This is an appended report.  These results have been appended to a previously verified report.    POCT pregnancy, urine [[id number]]  (Normal) Collected: 04/24/25 1655    Lab Status: Final result Updated: 04/24/25 1655     EXT Preg Test, Ur Negative     Control Valid            CT abdomen pelvis with contrast   Final Interpretation by Foster Pozo MD (04/24 2008)      No acute findings in the abdomen or pelvis. Previously visualized right lower quadrant inflammatory process has near completely resolved.         Workstation performed: [id number]             Procedures    ED Medication and Procedure Management   Prior to Admission Medications   Prescriptions Last Dose Informant Patient Reported? Taking?   Accu-Chek FastClix Lancets MISC  Self No No   Sig: Test BG up to 3x daily as directed   Accu-Chek Guide test strip  Self No No   Sig: TEST BG UP TO 3X DAILY AS  DIRECTED   Continuous Blood Gluc  (Dexcom G7 ) ENRICO  Self No No   Sig: Use 1 Units daily   Continuous Glucose Sensor (Dexcom G7 Sensor)   No No   Sig: Use 1 Device every 10 days   Empagliflozin (JARDIANCE) 10 MG TABS tablet   No No   Sig: Take 1 tablet (10 mg total) by mouth daily   Injection Device for Insulin (B-D PEN MINI) ENRICO  Self No No   Sig: Use 4 (four) times a day Please use for Lantus and Humalog. 4 pen needles daily Dx: Diabetes   Insulin Pen Needle (BD Pen Needle Em U/F) 32G X 4 MM MISC  Self No No   Sig: Use daily   acetaminophen (TYLENOL) 325 mg tablet   No No   Sig: Take 2 tablets (650 mg total) by mouth every 6 (six) hours as needed for mild pain, headaches or fever   bisacodyl (DULCOLAX) 5 mg EC tablet   No No   Sig: Take 1 tablet (5 mg total) by mouth once for 1 dose   Patient not taking: Reported on 2/7/2025   insulin aspart (NovoLOG FlexPen) 100 UNIT/ML injection pen   No No   Sig: Inject 3x daily with meals 10-12-12   insulin degludec (Tresiba FlexTouch) 100 units/mL injection pen   No No   Sig: Inject 34 Units under the skin daily   losartan (COZAAR) 25 mg tablet   No No   Sig: Take 1 tablet (25 mg total) by mouth daily   pantoprazole (PROTONIX) 40 mg tablet  Self No No   Sig: Take 1 tablet (40 mg total) by mouth daily   Patient not taking: Reported on 3/11/2025   polyethylene glycol (GLYCOLAX) 17 GM/SCOOP powder   No No   Sig: At 5pm take 5mgx2 dulcolax. At 6pm 32oz miralax in 64oz gatorade. Drink 8oz glass every 5 mins until 32oz finished. Drink remaining as rec.   Patient taking differently: if needed At 5pm take 5mgx2 dulcolax. At 6pm 32oz miralax in 64oz gatorade. Drink 8oz glass every 5 mins until 32oz finished. Drink remaining as rec.   pregabalin (LYRICA) 50 mg capsule  Self No No   Sig: Take 1 PO HS x 5 days, then 1 PO BID x 5 days, then 1 PO TID      Facility-Administered Medications: None     Patient's Medications   Discharge Prescriptions    No medications on file        ED SEPSIS DOCUMENTATION   Time reflects when diagnosis was documented in both MDM as applicable and the Disposition within this note       Time User Action Codes Description Comment    4/24/2025  8:12 PM Preeti Alonzo Add [R10.9] Abdominal pain                  Preeti Alonzo PA-C  04/24/25 2022

## 2025-05-07 ENCOUNTER — HOSPITAL ENCOUNTER (OUTPATIENT)
Dept: MAMMOGRAPHY | Facility: CLINIC | Age: 45
Discharge: HOME | End: 2025-05-07
Payer: COMMERCIAL

## 2025-05-07 VITALS — HEIGHT: 65 IN | WEIGHT: 187 LBS | BODY MASS INDEX: 31.16 KG/M2

## 2025-05-07 DIAGNOSIS — Z12.31 ENCOUNTER FOR SCREENING MAMMOGRAM FOR MALIGNANT NEOPLASM OF BREAST: ICD-10-CM

## 2025-05-07 PROCEDURE — 77067 SCR MAMMO BI INCL CAD: CPT

## 2025-05-07 PROCEDURE — 77063 BREAST TOMOSYNTHESIS BI: CPT

## 2025-06-19 ENCOUNTER — OFFICE VISIT (OUTPATIENT)
Dept: ENDOCRINOLOGY | Facility: CLINIC | Age: 45
End: 2025-06-19
Payer: COMMERCIAL

## 2025-06-19 VITALS
WEIGHT: 190.4 LBS | DIASTOLIC BLOOD PRESSURE: 82 MMHG | HEART RATE: 82 BPM | SYSTOLIC BLOOD PRESSURE: 134 MMHG | HEIGHT: 65 IN | BODY MASS INDEX: 31.72 KG/M2 | OXYGEN SATURATION: 98 %

## 2025-06-19 DIAGNOSIS — Z79.4 TYPE 2 DIABETES MELLITUS WITH HYPERGLYCEMIA, WITH LONG-TERM CURRENT USE OF INSULIN (HCC): ICD-10-CM

## 2025-06-19 DIAGNOSIS — Z79.4 CURRENT USE OF INSULIN (HCC): ICD-10-CM

## 2025-06-19 DIAGNOSIS — E11.65 UNCONTROLLED TYPE 2 DIABETES MELLITUS WITH HYPERGLYCEMIA, WITHOUT LONG-TERM CURRENT USE OF INSULIN (HCC): Primary | ICD-10-CM

## 2025-06-19 DIAGNOSIS — E11.65 TYPE 2 DIABETES MELLITUS WITH HYPERGLYCEMIA, WITH LONG-TERM CURRENT USE OF INSULIN (HCC): ICD-10-CM

## 2025-06-19 LAB — SL AMB POCT HEMOGLOBIN AIC: 10.7 (ref ?–6.5)

## 2025-06-19 PROCEDURE — 99214 OFFICE O/P EST MOD 30 MIN: CPT | Performed by: PHYSICIAN ASSISTANT

## 2025-06-19 PROCEDURE — 83036 HEMOGLOBIN GLYCOSYLATED A1C: CPT | Performed by: PHYSICIAN ASSISTANT

## 2025-06-19 RX ORDER — ACYCLOVIR 400 MG/1
1 TABLET ORAL
Qty: 9 EACH | Refills: 3 | Status: SHIPPED | OUTPATIENT
Start: 2025-06-19

## 2025-06-19 RX ORDER — INSULIN DEGLUDEC 100 U/ML
30 INJECTION, SOLUTION SUBCUTANEOUS DAILY
Start: 2025-06-19

## 2025-06-19 RX ORDER — PEN NEEDLE, DIABETIC 32GX 5/32"
NEEDLE, DISPOSABLE MISCELLANEOUS DAILY
Qty: 100 EACH | Refills: 0 | Status: SHIPPED | OUTPATIENT
Start: 2025-06-19

## 2025-06-19 NOTE — PROGRESS NOTES
Name: Tari Shannon      : 1980      MRN: [id number]  Encounter Provider: Vangie Clark PA-C  Encounter Date: 2025   Encounter department: [facility] [place]    Chief Complaint   Patient presents with   • Diabetes Type 2   :  Assessment & Plan  Uncontrolled type 2 diabetes mellitus with hyperglycemia, without long-term current use of insulin (HCC)    Lab Results   Component Value Date    HGBA1C 10.7 (A) 2025   Current HbA1c represents poor control. In-office HbA1c is 10.7 today. Blood sugars demonstrating unavailable for review.  Continue current regimen with the following adjustments:  Patient will return with Dexcom reader for insulin adjustment recommendations  Reduce Tresiba to 30 given reported lows   Increase Jardiance to 25mg daily  Advised to adhere to diabetic diet, and recommended staying active/exercising routinely.  Discussed symptoms and treatment of hypoglycemia.    Recommended routine follow-up with Ophthalmology and foot exams.   Ordered blood work to complete prior to next visit.    Orders:  •  POCT hemoglobin A1c  •  Comprehensive metabolic panel; Future  •  Hemoglobin A1C; Future  •  Continuous Glucose Sensor (Dexcom G7 Sensor); Use 1 Device every 10 days  •  insulin degludec (Tresiba FlexTouch) 100 units/mL injection pen; Inject 30 Units under the skin daily  •  Insulin Pen Needle (BD Pen Needle Em U/F) 32G X 4 MM MISC; Use daily  •  Empagliflozin (Jardiance) 25 MG TABS; Take 1 tablet (25 mg total) by mouth every morning    Type 2 diabetes mellitus with hyperglycemia, with long-term current use of insulin (HCC)    Lab Results   Component Value Date    HGBA1C 10.7 (A) 2025            Current use of insulin ([facility])    Orders:  •  Continuous Glucose Sensor (Dexcom G7 Sensor); Use 1 Device every 10 days          Pertinent Medical History     Patient has a history of type 2 diabetes, managed with insulin and complicated by  "microalbuminuria.  She was diagnosed with diabetes following her first pregnancy.  She has had an allergic response to metformin resulting in hives.  History of frequent UTIs. Ozempic discontinued due to abdominal pain. She has previously been checked for C-peptide and ángel 65 in the past which were negative.     Review of Systems as per [facility]         Medical History Reviewed by provider this encounter:       .        History of Present Illness {?Quick Links Encounters * My Last Note * Last Note in Specialty * Snapshot * Since Last Visit * History :[id number]}  History of Present Illness  Tari Shannon is a 44-year-old female with type 2 diabetes and microalbuminuria, here for follow-up. Her management is complicated by housing insecurity.    The patient reports improvement since the last visit and has no significant concerns. She monitors her blood glucose daily with Dexcom reader but forgot to bring this today. Her blood glucose levels are inconsistent, with hypoglycemia (lowest 45) occurring during activity and rest, and postprandial hyperglycemia. She anticipates elevated hemoglobin A1c.    There has been no hospitalization or steroid use since January 2025. She had an ED visit in April 2025 for resolved abdominal pain. Her current medications include Tresiba 34 units, NovoLog 10, 12, 12 units, and Jardiance. She needs refills for Dexcom and pen needles.          Current diabetic regimen:   Tresiba to 34u  NovoLog to 10-12-12      Review of Systems as per [facility]         Medical History Reviewed by provider this encounter:     .    Objective {?Quick Links Trend Vitals * Enter New Vitals * Results Review * Timeline (Adult) * Labs * Imaging * Cardiology * Procedures * Lung Cancer Screening * Surgical eConsent :[id number]}  /82   Pulse 82   Ht 5' 5\" (1.651 m)   Wt 86.4 kg (190 lb 6.4 oz)   SpO2 98%   BMI 31.68 kg/m²      Body mass index is 31.68 kg/m².  Wt Readings from Last 3 Encounters:   06/19/25 86.4 kg (190 lb 6.4 " oz)   05/07/25 84.8 kg (187 lb)   04/24/25 84.9 kg (187 lb 2.7 oz)     Physical Exam  General Appearance: Alert, oriented, no acute distress.  Vital signs: Within normal limits.  HEENT: Normocephalic and atraumatic.  Eyes: No scleral icterus. Conjunctivae normal.  Respiratory: Pulmonary effort is normal.  Skin: Warm and dry, no rash.  Neurological: Alert.  Psychiatric: Attention normal.    Last Eye Exam: 03/11/2025  Last Foot Exam: 11/21/2024  Health Maintenance   Topic Date Due   • Diabetic Foot Exam  11/21/2025   • Diabetic Eye Exam  03/11/2026       Results    Labs: I have reviewed pertinent labs including:   Lab Results   Component Value Date    HGBA1C 10.7 (A) 06/19/2025    HGBA1C 11.8 (H) 03/15/2025    HGBA1C 12.3 (A) 03/11/2025      Lab Results   Component Value Date    CREATININE 0.94 04/24/2025    CREATININE 0.81 03/15/2025    CREATININE 0.86 12/03/2024    BUN 14 04/24/2025     (L) 01/06/2014    K 4.0 04/24/2025     04/24/2025    CO2 27 04/24/2025      GFR, Calculated   Date Value Ref Range Status   04/20/2018 79 >60 mL/min/1.73m2 Final     Comment:     mL/min per 1.73 square meters                                            Normal Function or Mild Renal    Disease (if clinically at risk):  >or=60  Moderately Decreased:                30-59  Severely Decreased:                  15-29  Renal Failure:                         <15                                            -American GFR: multiply reported GFR by 1.16    Please note that the eGFR is based on the CKD-EPI calculation, and is not intended to be used for drug dosing.                                            Note: Calculated GFR may not be an accurate indicator of renal function if the patient's renal function is not in a steady state.     eGFR   Date Value Ref Range Status   04/24/2025 74 ml/min/1.73sq m Final      HDL, Direct   Date Value Ref Range Status   03/15/2025 73 >=50 mg/dL Final     Triglycerides   Date Value Ref Range  Status   03/15/2025 86 See Comment mg/dL Final     Comment:     Triglyceride:     0-9Y            <75mg/dL     10Y-17Y         <90 mg/dL       >=18Y     Normal          <150 mg/dL     Borderline High 150-199 mg/dL     High            200-499 mg/dL        Very High       >499 mg/dL    Specimen collection should occur prior to Metamizole administration due to the potential for falsely depressed results.      ALT   Date Value Ref Range Status   04/24/2025 10 7 - 52 U/L Final     Comment:     Specimen collection should occur prior to Sulfasalazine administration due to the potential for falsely depressed results.    04/20/2018 16 <56 U/L Final     AST   Date Value Ref Range Status   04/24/2025 11 (L) 13 - 39 U/L Final   04/20/2018 16 <41 U/L Final     Alkaline Phosphatase   Date Value Ref Range Status   04/24/2025 50 34 - 104 U/L Final   04/20/2018 78 35 - 120 U/L Final        There are no Patient Instructions on file for this visit.    Discussed with the patient and all questioned fully answered. She will call me if any problems arise.

## 2025-07-15 ENCOUNTER — OFFICE VISIT (OUTPATIENT)
Age: 45
End: 2025-07-15
Payer: COMMERCIAL

## 2025-07-15 VITALS
HEART RATE: 91 BPM | OXYGEN SATURATION: 99 % | BODY MASS INDEX: 32.15 KG/M2 | HEIGHT: 65 IN | TEMPERATURE: 98.1 F | WEIGHT: 193 LBS | DIASTOLIC BLOOD PRESSURE: 76 MMHG | SYSTOLIC BLOOD PRESSURE: 120 MMHG

## 2025-07-15 DIAGNOSIS — E11.21 MICROALBUMINURIC DIABETIC NEPHROPATHY (HCC): ICD-10-CM

## 2025-07-15 DIAGNOSIS — E11.65 TYPE 2 DIABETES MELLITUS WITH HYPERGLYCEMIA, WITH LONG-TERM CURRENT USE OF INSULIN (HCC): Primary | ICD-10-CM

## 2025-07-15 DIAGNOSIS — G43.009 MIGRAINE WITHOUT AURA AND WITHOUT STATUS MIGRAINOSUS, NOT INTRACTABLE: ICD-10-CM

## 2025-07-15 DIAGNOSIS — Z79.4 TYPE 2 DIABETES MELLITUS WITH HYPERGLYCEMIA, WITH LONG-TERM CURRENT USE OF INSULIN (HCC): Primary | ICD-10-CM

## 2025-07-15 PROCEDURE — 99214 OFFICE O/P EST MOD 30 MIN: CPT | Performed by: FAMILY MEDICINE

## 2025-08-05 ENCOUNTER — OFFICE VISIT (OUTPATIENT)
Dept: INFECTIOUS DISEASES | Facility: CLINIC | Age: 45
End: 2025-08-05
Payer: COMMERCIAL

## 2025-08-05 VITALS
TEMPERATURE: 98.1 F | DIASTOLIC BLOOD PRESSURE: 78 MMHG | BODY MASS INDEX: 32.12 KG/M2 | WEIGHT: 193 LBS | OXYGEN SATURATION: 99 % | HEART RATE: 81 BPM | SYSTOLIC BLOOD PRESSURE: 124 MMHG

## 2025-08-05 DIAGNOSIS — R59.0 LAD (LYMPHADENOPATHY), INTRA-ABDOMINAL: ICD-10-CM

## 2025-08-05 DIAGNOSIS — A53.9 SYPHILIS: Primary | ICD-10-CM

## 2025-08-05 DIAGNOSIS — F43.9 STRESS AT HOME: ICD-10-CM

## 2025-08-05 DIAGNOSIS — E11.65 TYPE 2 DIABETES MELLITUS WITH HYPERGLYCEMIA, WITH LONG-TERM CURRENT USE OF INSULIN (HCC): ICD-10-CM

## 2025-08-05 DIAGNOSIS — Z79.4 TYPE 2 DIABETES MELLITUS WITH HYPERGLYCEMIA, WITH LONG-TERM CURRENT USE OF INSULIN (HCC): ICD-10-CM

## 2025-08-05 PROCEDURE — 99213 OFFICE O/P EST LOW 20 MIN: CPT | Performed by: NURSE PRACTITIONER

## 2025-08-20 ENCOUNTER — TELEPHONE (OUTPATIENT)
Age: 45
End: 2025-08-20

## 2025-08-20 DIAGNOSIS — A53.0 POSITIVE RPR TEST: Primary | ICD-10-CM

## 2025-08-21 DIAGNOSIS — E11.65 UNCONTROLLED TYPE 2 DIABETES MELLITUS WITH HYPERGLYCEMIA, WITHOUT LONG-TERM CURRENT USE OF INSULIN (HCC): ICD-10-CM

## 2025-08-21 DIAGNOSIS — Z79.4 CURRENT USE OF INSULIN (HCC): ICD-10-CM

## 2025-08-21 RX ORDER — ACYCLOVIR 400 MG/1
1 TABLET ORAL DAILY
Qty: 1 EACH | Refills: 0 | Status: SHIPPED | OUTPATIENT
Start: 2025-08-21

## (undated) DEVICE — SUT MONOCRYL 4-0 PS-2 18 IN Y496G

## (undated) DEVICE — ENDOPATH PNEUMONEEDLE INSUFFLATION NEEDLES WITH LUER LOCK CONNECTORS 120MM: Brand: ENDOPATH

## (undated) DEVICE — BETHLEHEM MAJOR GENERAL PACK: Brand: CARDINAL HEALTH

## (undated) DEVICE — 3M™ STERI-STRIP™ REINFORCED ADHESIVE SKIN CLOSURES, R1547, 1/2 IN X 4 IN (12 MM X 100 MM), 6 STRIPS/ENVELOPE: Brand: 3M™ STERI-STRIP™

## (undated) DEVICE — SUT VICRYL PLUS 0 UR-6 27IN VCP603H

## (undated) DEVICE — TROCAR: Brand: KII® SLEEVE

## (undated) DEVICE — GLOVE SRG BIOGEL 6.5

## (undated) DEVICE — ENSEAL X1 TISSUE SEALER, CURVED JAW, 37 CM SHAFT LENGTH: Brand: ENSEAL

## (undated) DEVICE — IRRIG ENDO FLO TUBING

## (undated) DEVICE — TUBING SMOKE EVAC W/FILTRATION DEVICE PLUMEPORT ACTIV

## (undated) DEVICE — PMI DISPOSABLE PUNCTURE CLOSURE DEVICE / SUTURE GRASPER: Brand: PMI

## (undated) DEVICE — SURGICEL 4 X 8IN

## (undated) DEVICE — SYRINGE 10ML LL

## (undated) DEVICE — ELECTRODE LAP J HOOK SPLIT STEM E-Z CLEAN 33CM -0021S

## (undated) DEVICE — CHLORAPREP HI-LITE 26ML ORANGE

## (undated) DEVICE — TROCAR: Brand: KII FIOS FIRST ENTRY

## (undated) DEVICE — PLUMEPEN PRO 10FT

## (undated) DEVICE — INSUFFLATION TUBING PRIMFLO

## (undated) DEVICE — VISUALIZATION SYSTEM: Brand: CLEARIFY

## (undated) DEVICE — ELECTRODE BLADE MOD E-Z CLEAN  2.75IN 7CM -0012AM

## (undated) DEVICE — INTENDED FOR TISSUE SEPARATION, AND OTHER PROCEDURES THAT REQUIRE A SHARP SURGICAL BLADE TO PUNCTURE OR CUT.: Brand: BARD-PARKER SAFETY BLADES SIZE 11, STERILE

## (undated) DEVICE — TISSUE RETRIEVAL SYSTEM: Brand: INZII RETRIEVAL SYSTEM

## (undated) DEVICE — EXOFIN PRECISION PEN HIGH VISCOSITY TOPICAL SKIN ADHESIVE: Brand: EXOFIN PRECISION PEN, 1G